# Patient Record
Sex: MALE | Race: WHITE | NOT HISPANIC OR LATINO | Employment: OTHER | URBAN - METROPOLITAN AREA
[De-identification: names, ages, dates, MRNs, and addresses within clinical notes are randomized per-mention and may not be internally consistent; named-entity substitution may affect disease eponyms.]

---

## 2017-05-01 ENCOUNTER — APPOINTMENT (OUTPATIENT)
Dept: LAB | Facility: CLINIC | Age: 77
End: 2017-05-01
Payer: MEDICARE

## 2017-05-01 ENCOUNTER — TRANSCRIBE ORDERS (OUTPATIENT)
Dept: LAB | Facility: CLINIC | Age: 77
End: 2017-05-01

## 2017-05-01 DIAGNOSIS — E78.5 HYPERLIPIDEMIA: ICD-10-CM

## 2017-05-01 LAB
ALBUMIN SERPL BCP-MCNC: 3.4 G/DL (ref 3.5–5)
ALP SERPL-CCNC: 52 U/L (ref 46–116)
ALT SERPL W P-5'-P-CCNC: 21 U/L (ref 12–78)
ANION GAP SERPL CALCULATED.3IONS-SCNC: 5 MMOL/L (ref 4–13)
AST SERPL W P-5'-P-CCNC: 12 U/L (ref 5–45)
BILIRUB SERPL-MCNC: 0.55 MG/DL (ref 0.2–1)
BUN SERPL-MCNC: 15 MG/DL (ref 5–25)
CALCIUM SERPL-MCNC: 9.4 MG/DL (ref 8.3–10.1)
CHLORIDE SERPL-SCNC: 103 MMOL/L (ref 100–108)
CHOLEST SERPL-MCNC: 177 MG/DL (ref 50–200)
CO2 SERPL-SCNC: 31 MMOL/L (ref 21–32)
CREAT SERPL-MCNC: 0.99 MG/DL (ref 0.6–1.3)
GFR SERPL CREATININE-BSD FRML MDRD: >60 ML/MIN/1.73SQ M
GLUCOSE P FAST SERPL-MCNC: 95 MG/DL (ref 65–99)
HDLC SERPL-MCNC: 84 MG/DL (ref 40–60)
LDLC SERPL CALC-MCNC: 83 MG/DL (ref 0–100)
POTASSIUM SERPL-SCNC: 4.6 MMOL/L (ref 3.5–5.3)
PROT SERPL-MCNC: 7 G/DL (ref 6.4–8.2)
SODIUM SERPL-SCNC: 139 MMOL/L (ref 136–145)
TRIGL SERPL-MCNC: 51 MG/DL

## 2017-05-01 PROCEDURE — 36415 COLL VENOUS BLD VENIPUNCTURE: CPT

## 2017-05-01 PROCEDURE — 80061 LIPID PANEL: CPT

## 2017-05-01 PROCEDURE — 80053 COMPREHEN METABOLIC PANEL: CPT

## 2017-05-17 ENCOUNTER — ALLSCRIPTS OFFICE VISIT (OUTPATIENT)
Dept: OTHER | Facility: OTHER | Age: 77
End: 2017-05-17

## 2017-05-18 ENCOUNTER — GENERIC CONVERSION - ENCOUNTER (OUTPATIENT)
Dept: OTHER | Facility: OTHER | Age: 77
End: 2017-05-18

## 2017-08-17 ENCOUNTER — ALLSCRIPTS OFFICE VISIT (OUTPATIENT)
Dept: OTHER | Facility: OTHER | Age: 77
End: 2017-08-17

## 2017-10-10 ENCOUNTER — ALLSCRIPTS OFFICE VISIT (OUTPATIENT)
Dept: OTHER | Facility: OTHER | Age: 77
End: 2017-10-10

## 2017-10-25 ENCOUNTER — APPOINTMENT (OUTPATIENT)
Dept: LAB | Facility: CLINIC | Age: 77
End: 2017-10-25
Payer: MEDICARE

## 2017-10-25 DIAGNOSIS — E78.5 HYPERLIPIDEMIA: ICD-10-CM

## 2017-10-25 DIAGNOSIS — N20.0 CALCULUS OF KIDNEY: ICD-10-CM

## 2017-10-25 DIAGNOSIS — Z12.5 ENCOUNTER FOR SCREENING FOR MALIGNANT NEOPLASM OF PROSTATE: ICD-10-CM

## 2017-10-25 LAB
ALBUMIN SERPL BCP-MCNC: 3.7 G/DL (ref 3.5–5)
ALP SERPL-CCNC: 55 U/L (ref 46–116)
ALT SERPL W P-5'-P-CCNC: 21 U/L (ref 12–78)
ANION GAP SERPL CALCULATED.3IONS-SCNC: 5 MMOL/L (ref 4–13)
AST SERPL W P-5'-P-CCNC: 17 U/L (ref 5–45)
BILIRUB SERPL-MCNC: 0.6 MG/DL (ref 0.2–1)
BUN SERPL-MCNC: 13 MG/DL (ref 5–25)
CALCIUM SERPL-MCNC: 8.8 MG/DL (ref 8.3–10.1)
CHLORIDE SERPL-SCNC: 103 MMOL/L (ref 100–108)
CHOLEST SERPL-MCNC: 190 MG/DL (ref 50–200)
CO2 SERPL-SCNC: 31 MMOL/L (ref 21–32)
CREAT SERPL-MCNC: 0.98 MG/DL (ref 0.6–1.3)
ERYTHROCYTE [DISTWIDTH] IN BLOOD BY AUTOMATED COUNT: 13.3 % (ref 11.6–15.1)
GFR SERPL CREATININE-BSD FRML MDRD: 74 ML/MIN/1.73SQ M
GLUCOSE P FAST SERPL-MCNC: 89 MG/DL (ref 65–99)
HCT VFR BLD AUTO: 42.3 % (ref 36.5–49.3)
HDLC SERPL-MCNC: 63 MG/DL (ref 40–60)
HGB BLD-MCNC: 14.3 G/DL (ref 12–17)
LDLC SERPL CALC-MCNC: 104 MG/DL (ref 0–100)
MCH RBC QN AUTO: 31.4 PG (ref 26.8–34.3)
MCHC RBC AUTO-ENTMCNC: 33.8 G/DL (ref 31.4–37.4)
MCV RBC AUTO: 93 FL (ref 82–98)
PLATELET # BLD AUTO: 179 THOUSANDS/UL (ref 149–390)
PMV BLD AUTO: 12 FL (ref 8.9–12.7)
POTASSIUM SERPL-SCNC: 4.1 MMOL/L (ref 3.5–5.3)
PROT SERPL-MCNC: 7 G/DL (ref 6.4–8.2)
PSA SERPL-MCNC: <0.1 NG/ML (ref 0–4)
RBC # BLD AUTO: 4.55 MILLION/UL (ref 3.88–5.62)
SODIUM SERPL-SCNC: 139 MMOL/L (ref 136–145)
TRIGL SERPL-MCNC: 116 MG/DL
TSH SERPL DL<=0.05 MIU/L-ACNC: 3.46 UIU/ML (ref 0.36–3.74)
WBC # BLD AUTO: 7.26 THOUSAND/UL (ref 4.31–10.16)

## 2017-10-25 PROCEDURE — 36415 COLL VENOUS BLD VENIPUNCTURE: CPT

## 2017-10-25 PROCEDURE — 80053 COMPREHEN METABOLIC PANEL: CPT

## 2017-10-25 PROCEDURE — G0103 PSA SCREENING: HCPCS

## 2017-10-25 PROCEDURE — 80061 LIPID PANEL: CPT

## 2017-10-25 PROCEDURE — 85027 COMPLETE CBC AUTOMATED: CPT

## 2017-10-25 PROCEDURE — 84443 ASSAY THYROID STIM HORMONE: CPT

## 2017-10-26 DIAGNOSIS — E78.5 HYPERLIPIDEMIA: ICD-10-CM

## 2017-10-26 DIAGNOSIS — Z12.5 ENCOUNTER FOR SCREENING FOR MALIGNANT NEOPLASM OF PROSTATE: ICD-10-CM

## 2017-10-26 DIAGNOSIS — N20.0 CALCULUS OF KIDNEY: ICD-10-CM

## 2017-11-15 ENCOUNTER — ALLSCRIPTS OFFICE VISIT (OUTPATIENT)
Dept: OTHER | Facility: OTHER | Age: 77
End: 2017-11-15

## 2018-01-10 NOTE — PROGRESS NOTES
Assessment   1  Encounter for preventive health examination (V70 0) (Z00 00)    Plan  Health Maintenance    · Call [phone] if: You have any warning signs of skin cancer ; Status:Complete;    Done: 24AQA1543   · Seek Immediate Medical Attention if: You experience a new kind of chest pain (angina)  or pressure ; Status:Complete;   Done: 61BMS6012   · Always use a seat belt and shoulder strap when riding or driving a motor vehicle ;  Status:Complete;   Done: 08FDK6862   · Eat a low fat and low cholesterol diet ; Status:Complete;   Done: 57PYR5527   · Stretch and warm up your muscles during the first 10 minutes , then cool down your  muscles for the last 10 minutes of exercise ; Status:Complete;   Done: 03UEQ2501   · The plan of care for falls is detailed in the plan and/or discussion section of today's note ;  Status:Complete;   Done: 35KKK5109   · There are many exercise options for seniors ; Status:Complete;   Done: 09VBP7080   · There ways to avoid falling ; Status:Complete;   Done: 45RYU1475   · These are things you can do to prevent falls in and around the home ; Status:Complete;    Done: 03COZ0346   · We recommend that you follow the "Mediterranean diet "; Status:Complete;   Done:  89RLY3887  PMH: History of hyperlipidemia    · (1) CBC/ PLT (NO DIFF); Status:Active - Retrospective By Protocol Authorization; Requested for:01Nov2018;    · (1) COMPREHENSIVE METABOLIC PANEL; Status:Active - Retrospective By Protocol  Authorization; Requested for:01Nov2018;    · (1) LIPID PANEL, FASTING; Status:Active - Retrospective By Protocol Authorization; Requested for:01Nov2018;    · (1) TSH; Status:Active - Retrospective By Protocol Authorization; Requested  for:01Nov2018;   PMH: History of malignant neoplasm of prostate    · (1) PSA, DIAGNOSTIC (FOLLOW-UP); Status:Active - Retrospective By Protocol  Authorization; Requested for:01Nov2018; Discussion/Summary    AWV completed  All labs excellent   PSA < 0 1  merrill yearly w labs  EKG UTD  FLU UTD  Impression: Subsequent Annual Wellness Visit  Cardiovascular screening and counseling: screening is current  Diabetes screening and counseling: screening is current  Colorectal cancer screening and counseling: screening not indicated  Prostate cancer screening and counseling: screening is current  Osteoporosis screening and counseling: screening not indicated  Abdominal aortic aneurysm screening and counseling: screening not indicated  Glaucoma screening and counseling: screening is current  HIV screening and counseling: screening not indicated  Advance Directive Planning: complete and up to date  Patient Discussion: follow-up visit needed in one year  Chief Complaint  AWV  Blue folder given  devon clark   Patient is here today for follow up of chronic conditions described in HPI  History of Present Illness  HPI: 66 yo wm for AWV  Labs completed  No new issues  Welcome to [place] and Wellness Visits: The patient is being seen for the subsequent annual wellness visit  Medicare Screening and Risk Factors   Hospitalizations: no previous hospitalizations  Medicare Screening Tests Risk Questions   Drug and Alcohol Use: The patient has never smoked cigarettes  The patient reports 4 glasses of wine/week  He has never used illicit drugs  Diet and Physical Activity: Current diet includes well balanced meals, 2 servings of dairy products per day, 3 cups of coffee per day and 4 glasses water daily  He exercises daily  Exercise: walking, stretching 20 minutes per day  Mood Disorder and Cognitive Impairment Screening:   Depression screening  negative for symptoms  He denies feeling down, depressed, or hopeless over the past two weeks  He denies feeling little interest or pleasure in doing things over the past two weeks     Cognitive impairment screening: denies difficulty learning/retaining new information, denies difficulty handling complex tasks, denies difficulty with reasoning, denies difficulty with spatial ability and orientation, denies difficulty with language and denies difficulty with behavior  Functional Ability/Level of Safety: Hearing is normal bilaterally, normal in the right ear and normal in the left ear  He denies hearing difficulties  He does not use a hearing aid  The patient is currently able to do activities of daily living without limitations  Fall risk factors: The patient fell 0 times in the past 12 months  Home safety risk factors:  no unfamiliar surroundings, no loose rugs, no poor household lighting, no uneven floors, no household clutter, grab bars in the bathroom and handrails on the stairs  Advance Directives: Advance directives: durable power of  for health care directives, but no living will and no advance directives  Co-Managers and Medical Equipment/Suppliers: See Patient Care Team       Reviewed Updated [employer]:   Last Medicare Wellness Visit Information was reviewed, patient interviewed, no change since last [facility]  Preventive Quality Program 65 and Older: Falls Risk: The patient fell 0 times in the past 12 months  The patient currently has no urinary incontinence symptoms  Date of last glaucoma screen was 05/2017      Patient Care Team    Care Team Member Role Specialty Office Number   Kaitlin Power CABRAL  Urology [phone]     Review of Systems    Constitutional: negative  Eyes: negative  ENT: negative  Cardiovascular: negative  Respiratory: negative  Gastrointestinal: negative  Genitourinary: negative  Musculoskeletal: negative  Integumentary and Breasts: negative  Neurological: negative  Psychiatric: negative  Endocrine: negative  Hematologic and Lymphatic: negative  Active Problems   1  Encounter for prostate cancer screening (V76 44) (Z12 5)  2  Flu vaccine need (V04 81) (Z23)  3  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)  4   Medicare annual wellness visit, subsequent (V70 0) (Z00 00)    Past Medical History    · History of Acute URI (465 9) (J06 9)   · History of Acute UTI (599 0) (N39 0)   · History of Astigmatism (367 20) (H52 209)   · History of Cyst of left kidney (753 10) (N28 1)   · History of Cyst of right kidney (753 10) (N28 1)   · History of Glaucoma screening (V80 1) (Z13 5)   · History of Gross hematuria (599 71) (R31 0)   · History of cataract (V12 49) (Z86 69)   · History of cough   · History of fatigue (V13 89) (N02 188)   · History of hematuria (V13 09) (Z87 448)   · History of influenza vaccination (V49 89) (Z92 29)   · History of malignant neoplasm of prostate (V10 46) (Z85 46)   · History of renal calculi (V13 01) (Z87 442)   · History of Impacted cerumen of left ear (380 4) (H61 22)   · History of Left knee pain (719 46) (M25 562)   · History of Need for influenza vaccination (V04 81) (Z23)   · History of Need for Tdap vaccination (V06 1) (Z23)   · History of Pneumococcal vaccination given (V06 6) (Z23)   · History of Prostate Cancer (V10 46)   · History of Renal cyst, acquired (593 2) (N28 1)   · History of Screening for genitourinary condition (V81 6) (Z13 89)   · History of Special screening examination for neoplasm of prostate (V76 44) (Z12 5)   · History of Special screening for other neurological conditions (V80 09) (Z13 89)   · History of Squamous Cell Carcinoma (199 1)   · History of Vitamin D deficiency (268 9) (E55 9)    Surgical History    · History of Biopsy Breast Open   · History of Complete Colonoscopy   · History of Diagnostic Cystoscopy   · History of Elbow Surgery   · History of Prostatectomy Retropubic Radical With Nerve Sparing   · History of Renal Lithotripsy    Family History  Mother    · Family history of Influenza  Father    · Family history of Congestive Heart Failure   · Family history of Peripheral Vascular Disease  Sister    · Family history of   Brother    · Family history of Prostate Cancer (V16 42)   · Family history of Stroke Syndrome (V17 1)  Family History    · Family history of Benign Essential Hypertension    Social History    · Being A Social Drinker   · Never A Smoker    Current Meds  1  Aspir-81 81 MG Oral Tablet Delayed Release; 1 qod; Therapy: (Recorded:31Ljv4946) to Recorded  2  Centrum Silver TABS; Therapy: (Recorded:09Gjj7535) to Recorded    Allergies   1  Sulfa Drugs    Immunizations   ** Printed in Appendix #1 below  Vitals  Signs    Systolic: 063  Diastolic: 72  Height: 5 ft 10 5 in  Weight: 171 lb   BMI Calculated: 24 19  BSA Calculated: 1 96    Physical Exam    Constitutional   General appearance: No acute distress, well appearing and well nourished  Eyes   Conjunctiva and lids: No swelling, erythema, or discharge  Pupils and irises: Equal, round and reactive to light  Ears, Nose, Mouth, and Throat   External inspection of ears and nose: Normal     Otoscopic examination: Tympanic membrance translucent with normal light reflex  Canals patent without erythema  Oropharynx: Normal with no erythema, edema, exudate or lesions  Pulmonary   Respiratory effort: No increased work of breathing or signs of respiratory distress  Auscultation of lungs: Clear to auscultation  Cardiovascular   Palpation of heart: Normal PMI, no thrills  Auscultation of heart: Normal rate and rhythm, normal S1 and S2, without murmurs  Examination of extremities for edema and/or varicosities: Normal     Abdomen   Abdomen: Non-tender, no masses  Liver and spleen: No hepatomegaly or splenomegaly  Lymphatic   Palpation of lymph nodes in neck: No lymphadenopathy  Musculoskeletal   Gait and station: Normal     Digits and nails: Normal without clubbing or cyanosis  Inspection/palpation of joints, bones, and muscles: Normal     Neurologic   Cranial nerves: Cranial nerves 2-12 intact      Psychiatric   Orientation to person, place and time: Normal     Mood and affect: Normal        Future Appointments    Date/Time Provider Specialty Site    99:38 AM Francheska Marie DO Family Medicine [facility]     Signatures   Electronically signed by : Emili Florentino DO; [id number] 10:11AM EST                       (Author)    Appendix #1     Patient: Eden Torres ; : 1940; MRN: [id number]      1 2 3 4 5 6    Influenza  05-Oct-2012 17-Sep-2013 15-Sep-2014 28-Sep-2015 03-Oct-2016 10-Oct-2017    PCV  28-Oct-2015         PPSV  18-Sep-2008         Tdap  Permanently Deferred: Medical Deferral, Insurance does  not cover  , [id number]         Zoster  18-Aug-2011 09-Apr-2013

## 2018-01-11 NOTE — PROGRESS NOTES
Chief Complaint  pt here today for a high dose flu shot  Active Problems    1  Astigmatism (367 20) (H52 209)   2  Cataract (366 9) (H26 9)   3  Cyst of left kidney (753 10) (N28 1)   4  Cyst of right kidney (753 10) (N28 1)   5  Encounter for prostate cancer screening (V76 44) (Z12 5)   6  Glaucoma screening (V80 1) (Z13 5)   7  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)   8  Hyperlipidemia (272 4) (E78 5)   9  Need for Tdap vaccination (V06 1) (Z23)   10  Nephrolithiasis (592 0) (N20 0)   11  Pneumococcal vaccination given (V06 6) (Z23)   12  Prostate cancer (185) (C61)    Current Meds   1  Aspir-81 81 MG Oral Tablet Delayed Release; 1 qod; Therapy: (Recorded:91Qiu1044) to Recorded   2  Centrum Silver TABS; Therapy: (Recorded:93Dkw3884) to Recorded   3  Pravastatin Sodium 10 MG Oral Tablet; TAKE ONE (1) TABLET(S) ONCE DAILY AS   DIRECTED; Therapy: 36RGQ9077 to (Evaluate:24Jun2017)  Requested for: 41Qfk0945; Last   Rx:29Jun2016 Ordered    Allergies    1   Sulfa Drugs    Plan  Need for influenza vaccination    · Fluzone High-Dose 0 5 ML Intramuscular Suspension Prefilled Syringe    Future Appointments    Date/Time Provider Specialty Site   58/34/3452 66:40 AM Fredis Hanson DO Family Medicine [facility]     Signatures   Electronically signed by : John Zaragoza DO; Oct  3 4893  8:12PM EST                       (Author)

## 2018-01-12 VITALS
HEIGHT: 71 IN | BODY MASS INDEX: 23.94 KG/M2 | SYSTOLIC BLOOD PRESSURE: 120 MMHG | DIASTOLIC BLOOD PRESSURE: 72 MMHG | WEIGHT: 171 LBS

## 2018-01-12 NOTE — RESULT NOTES
Message   Bilateral renal cysts with thinly septated cysts in the left mid and    upper poles  Rec  [facility] urology for US findings  Verified Results  Ultrasound Retroperitoneal [id number] 12:25PM Jitendra Contreras     Test Name Result Flag Reference   U/S Retroperitoneal (Report)     [address] [facility];;[place];[id number]   01/08/2016 1235   01/08/2016 1310   NONE     RENAL ULTRASOUND     INDICATION- Gross hematuria     COMPARISON- 6/18/2008     TECHNIQUE-  Ultrasound of the retroperitoneum was performed with a   curvilinear transducer utilizing volumetric sweeps and still imaging   techniques  FINDINGS-     KIDNEYS-   Symmetric and normal size  Right kidney- 11 8 x 6 0 cm  Normal echogenicity and contour  No suspicious masses detected  There is a small simple cyst in the   right midpole measuring 1 3 cm  There is a subcentimeter exophytic   midpole cyst    No hydronephrosis  No shadowing calculi  No perinephric fluid collections  Left kidney- 10 8 x 5 5 cm  Normal echogenicity and contour  No suspicious masses detected  There is a midpole cyst measuring 1 6 x   1 7 x 1 7 cm with thin avascular septation, not definitely present   previously  There is a simple lower pole cyst measuring 2 0 x 2 1 x   2 8 cm, previously 1 5 x 1 5 x 1 5 cm  There is a left upper pole cyst   with thin septation measuring 1 1 x 1 0 x 1 1 cm, previously 1 0 x 1 4   x 1 2 cm  There is a small peripelvic cyst    No hydronephrosis  No shadowing calculi  No perinephric fluid collections  URETERS-   Nonvisualized  BLADDER-    Normally distended  No focal thickening or mass lesions  Bilateral ureteral jets detected  IMPRESSION-      Bilateral renal cysts with thinly septated cysts in the left mid and   upper poles        Transcribed on- [id number]     - OSCAR Dykes MD   [place] Radiologist- OSCAR Dykes MD Electronically Signed- OSCAR Miranda MD   Released Date Time- 01/08/16 1344   ------------------------------------------------------------------------------   [id number]^ROSS I SILVER   [id number]^ROSS I SILVER

## 2018-01-14 VITALS
SYSTOLIC BLOOD PRESSURE: 108 MMHG | HEIGHT: 71 IN | WEIGHT: 172.38 LBS | DIASTOLIC BLOOD PRESSURE: 60 MMHG | BODY MASS INDEX: 24.13 KG/M2

## 2018-01-14 VITALS
HEIGHT: 71 IN | TEMPERATURE: 97.7 F | BODY MASS INDEX: 24.13 KG/M2 | SYSTOLIC BLOOD PRESSURE: 128 MMHG | WEIGHT: 172.38 LBS | DIASTOLIC BLOOD PRESSURE: 72 MMHG

## 2018-01-14 NOTE — CONSULTS
Assessment    1  Gross hematuria (599 71) (R31 0)   2  Acute UTI (599 0) (N39 0)   3  Blood in the urine (599 70) (R31 9)    Plan   Blood in the urine    · (1) URINALYSIS (will reflex a microscopy if leukocytes, occult blood, protein or nitrites are  not within normal limits); Status: In Progress - Specimen/Data Collected,Retrospective By  Protocol Authorization;   Done: [id number]   Perform:[facility]; JUR:[id number]; Last Updated Ramsey Gonsalves; 1/28/2016 10:45:29 AM;Ordered; For:Blood in the urine; Ordered By:Ronn Kent;   · Cystourethroscopy - POC; Status:Complete - Retrospective By Protocol Authorization;    Done: 62QKG1111   Perform: In Office; ; Last Updated Ramsey Gonsalves; 1/28/2016 10:45:29 AM;Ordered; For:Blood in the urine; Ordered By:Ronn Kent;  Blood in the urine, PMH: Diagnostic Cystoscopy    · (1) URINE CULTURE; Source:Urine, Clean Catch; Status: In Progress - Specimen/Data  Collected,Retrospective By Protocol Authorization;   Done: [id number]   Perform:[facility]; LZT:[id number]; Last Updated Ramsey Gonsalves; 1/28/2016 10:45:29 AM;Ordered; For:Blood in the urine, PMH: Diagnostic Cystoscopy; Ordered By:Ronn Kent;  Cyst of right kidney    · Urine Dip Non-Automated- POC; Status:Complete - Retrospective By Protocol  Authorization;   Done: 60ZVP6206 09:22AM   Performed: In Office; [id number]; Last Updated Koko Walden; 1/28/2016 9:24:18 AM;Ordered; For:Cyst of right kidney; Ordered By:Ronn Kent;  PMH: Diagnostic Cystoscopy    · (1) URINE CYTOLOGY; Source:Urine, Clean Catch; Status: In Progress - Specimen/Data  Collected,Retrospective By Protocol Authorization;   Done: 02DHE0077   Perform:[facility]; XFO:[id number]; Last Updated Ramsey Gonsalves; 1/28/2016 10:45:30 AM;Ordered; [address]: Diagnostic Cystoscopy; Ordered By:Ronn Kent;    Cystourethroscopy - POC; Status:Active; Requested CTF:82SNP0897;   Perform: In Office; IJR:[id number];CSUPGOZ; For:Blood in the urine; Ordered By:Ronn Kent; Discussion/Summary  Discussion Summary:   Patient has had evaluation for hematuria with ultrasound and now cystoscopy  There has been no abnormality identified  We will check urine cytology, but presuming this is normal, there is no further intervention necessary  He and his wife are relieved  They wish to continue follow-up with their family physician, and follow up here as needed  Certainly if any abnormal results are obtained we will address these  Understands and agrees with treatment plan: The treatment plan was reviewed with the patient/guardian  The patient/guardian understands and agrees with the treatment plan      Chief Complaint  Chief Complaint Free Text Note Form: pt presents b/l renal cyst, gross hematuria, uti  psa= < 0 1 as of 10/16/15      History of Present Illness  HPI: This is a pleasant 42-year-old gentleman who was previously followed for prostate cancer, and not seen since 2011  He has done well after prostatectomy in 2001 with no evidence of cancer recurrence area he denied any stress incontinence  He does have a history of stones  He reports recent history of spotting blood in underwear  This was temporary and self-limited  There was no pain associated with this  There was no trauma  He has not had radiation therapy in the past and has never smoked cigarettes  He was given Cipro for 5 days  He does not report any change in urinary stream or difficulty emptying his bladder  He thinks that he may have been constipated and straining, but he is not sure  He had an ultrasound which revealed bilateral renal cysts but no significant pathology noted  PSA was undetectable in April 2015  Review of Systems  Complete-Male Urology:   Constitutional: No fever or chills, feels well, no tiredness, no recent weight gain or weight loss     Respiratory: No complaints of shortness of breath, no wheezing, no cough, no SOB on exertion, no orthopnea or PND  Cardiovascular: No complaints of slow heart rate, no fast heart rate, no chest pain, no palpitations, no leg claudication, no lower extremity  Gastrointestinal: No complaints of abdominal pain, no constipation, no nausea or vomiting, no diarrhea or bloody stools  Genitourinary: Empty sensation and stream quality good, but as noted in HPI, no dysuria, no urinary hesitancy, no hematuria, no incontinence and no feelings of urinary urgency    The patient presents with complaints of occasional episodes of nocturia (1-2x)  Musculoskeletal: No complaints of arthralgia, no myalgias, no joint swelling or stiffness, no limb pain or swelling  Integumentary: No complaints of skin rash or skin lesions, no itching, no skin wound, no dry skin  Hematologic/Lymphatic: No complaints of swollen glands, no swollen glands in the neck, does not bleed easily, no easy bruising  Neurological: No compliants of headache, no confusion, no convulsions, no numbness or tingling, no dizziness or fainting, no limb weakness, no difficulty walking  ROS Reviewed:   ROS reviewed  Active Problems    1  Acute UTI (599 0) (N39 0)   2  Astigmatism (367 20) (H52 209)   3  Benign essential hypertension (401 1) (I10)   4  Blood in the urine (599 70) (R31 9)   5  Cataract (366 9) (H26 9)   6  Cyst of left kidney (753 10) (Q61 00)   7  Cyst of right kidney (753 10) (Q61 00)   8  Encounter for prostate cancer screening (V76 44) (Z12 5)   9  Fatigue (780 79) (R53 83)   10  Glaucoma screening (V80 1) (Z13 5)   11  Gross hematuria (599 71) (R31 0)   12  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)   13  Hyperlipidemia (272 4) (E78 5)   14  Impacted cerumen of left ear (380 4) (H61 22)   15  Left knee pain (719 46) (M25 562)   16  Need for influenza vaccination (V04 81) (Z23)   17  Nephrolithiasis (592 0) (N20 0)   18  Pneumococcal vaccination given (V06 6) (Z23)   19   Prostate cancer (185) (G39)   20  Vitamin D deficiency (268 9) (E55 9)    Past Medical History    1  History of Prostate Cancer (V10 46)   2  History of Renal cyst, acquired (593 2) (N28 1)   3  History of Special screening examination for neoplasm of prostate (V76 44) (Z12 5)   4  History of Squamous Cell Carcinoma (199 1)  Active Problems And Past Medical History Reviewed: The active problems and past medical history were reviewed and updated today  Surgical History    1  History of Biopsy Breast Open   2  History of Complete Colonoscopy   3  History of Elbow Surgery   4  History of Prostatectomy Retropubic Radical With Nerve Sparing   5  History of Renal Lithotripsy  Surgical History Reviewed: The surgical history was reviewed and updated today  Family History    1  Family history of Influenza    2  Family history of Congestive Heart Failure   3  Family history of Peripheral Vascular Disease    4  Family history of     5  Family history of Prostate Cancer (V16 42)   6  Family history of Stroke Syndrome (V17 1)    7  Family history of Benign Essential Hypertension  Family History Reviewed: The family history was reviewed and updated today  Social History    · Being A Social Drinker   · Never A Smoker  Social History Reviewed: The social history was reviewed and updated today  Current Meds   1  Aspir-81 81 MG Oral Tablet Delayed Release; 1 qod; Therapy: (Recorded:85Iwj0054) to Recorded   2  Centrum Silver TABS; Therapy: (Recorded:44Gup9920) to Recorded   3  Pravastatin Sodium 10 MG Oral Tablet; TAKE 1 TABLET DAILY AS DIRECTED; Therapy: 59LPO4577 to (Wendelyn Seat)  Requested for: 2015; Last   Rx:2015 Ordered    Allergies    1   Sulfa Drugs    Vitals  Vital Signs [Data Includes: Current Encounter]    Recorded: 23SQW6997 09:19AM   Heart Rate 72   Systolic 590   Diastolic 70   Height 5 ft 11 in   Weight 171 lb    BMI Calculated 23 85   BSA Calculated 1 97     Physical Exam    Constitutional   General appearance: No acute distress, well appearing and well nourished  Pulmonary   Respiratory effort: No increased work of breathing or signs of respiratory distress  Cardiovascular   Examination of extremities for edema and/or varicosities: Normal     Abdomen   Abdomen: Non-tender, no masses  Musculoskeletal   Gait and station: Normal     Skin   Skin and subcutaneous tissue: Normal without rashes or lesions  Neurologic   Sensation: No sensory loss  Additional Exam:  -no CVA tenderness  Results/Data  Encounter Results   Urine Dip Non-Automated- POC 00RNL3857 09:22AM Asim Mcmullen     Test Name Result Flag Reference   Color Yellow     Clarity Transparent     Leukocytes -     Nitrite -     Blood trace     Bilirubin -     Protein trace     Ph 6 0     Specific Gravity 1 030     Ketone -     Glucose -       Results   Ultrasound Retroperitoneal 26NJR2688 12:25PM Vernell Salamanca     Test Name Result Flag Reference   U/S Retroperitoneal (Report)     [address] [facility];;[place];[address]   01/08/2016 1235   01/08/2016 1310   NONE     RENAL ULTRASOUND     INDICATION- Gross hematuria     COMPARISON- 6/18/2008     TECHNIQUE-  Ultrasound of the retroperitoneum was performed with a   curvilinear transducer utilizing volumetric sweeps and still imaging   techniques  FINDINGS-     KIDNEYS-   Symmetric and normal size  Right kidney- 11 8 x 6 0 cm  Normal echogenicity and contour  No suspicious masses detected  There is a small simple cyst in the   right midpole measuring 1 3 cm  There is a subcentimeter exophytic   midpole cyst    No hydronephrosis  No shadowing calculi  No perinephric fluid collections  Left kidney- 10 8 x 5 5 cm  Normal echogenicity and contour  No suspicious masses detected   There is a midpole cyst measuring 1 6 x   1 7 x 1 7 cm with thin avascular septation, not definitely present previously  There is a simple lower pole cyst measuring 2 0 x 2 1 x   2 8 cm, previously 1 5 x 1 5 x 1 5 cm  There is a left upper pole cyst   with thin septation measuring 1 1 x 1 0 x 1 1 cm, previously 1 0 x 1 4   x 1 2 cm  There is a small peripelvic cyst    No hydronephrosis  No shadowing calculi  No perinephric fluid collections  URETERS-   Nonvisualized  BLADDER-    Normally distended  No focal thickening or mass lesions  Bilateral ureteral jets detected  IMPRESSION-      Bilateral renal cysts with thinly septated cysts in the left mid and   upper poles  Transcribed on- [id number]     - OSCAR Paulson MD   Reading Radiologist- OSCAR Paulson MD   Electronically Signed- OSCAR Paulson MD   Released Date Time- 01/08/16 1344   ------------------------------------------------------------------------------   [id number]^ROSS I SILVER   22923^ROSS I SILVER     Procedure    Procedure: diagnostic cystourethroscopy  Indications for the procedure include hematuria  Risks, risk of bleeding and infection risks were discussed with the patient  Written consent was obtained prior to the procedure and is detailed in the patient's record  Anesthesia: the urethra was lubricated with 2% lidocaine gel  Procedure Note:    The patient was prepped and draped in the usual sterile fashion using betadine  The periurethral area was exposed and a lubricated 16 Jordanian flexible cystoscope was introduced into the urethral meatus  The urethra was normal  The prostate was visualized at the proximal urethra and bladder neck and the prostate appeared abnormal and Prostate is surgically absent   All regions of the bladder were systematically inspected and the bladder appeared normal    Post-procedure: the bladder was drained and the cystoscope was removed      Future Appointments    Date/Time Provider Specialty Site   78/92/5088 92:20 AM Gabriela Pérez DO Family Medicine [facility]     Signatures   Electronically signed by : HUE Allen ; Jan 28 2016 12:38PM EST                       (Author)

## 2018-01-16 NOTE — PROGRESS NOTES
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)   10  Hyperlipidemia (272 4) (E78 5)   11  Medicare annual wellness visit, subsequent (V70 0) (Z00 00)   12  Need for influenza vaccination (V04 81) (Z23)   13  Need for Tdap vaccination (V06 1) (Z23)   14  Nephrolithiasis (592 0) (N20 0)   15  Pneumococcal vaccination given (V06 6) (Z23)   16  Prostate cancer (185) (C61)   17  Screening for genitourinary condition (V81 6) (Z13 89)   18  Special screening for other neurological conditions (V80 09) (Z13 89)    Current Meds   1  Aspir-81 81 MG Oral Tablet Delayed Release; 1 qod; Therapy: (Recorded:97Trd5231) to Recorded   2  Azithromycin 250 MG Oral Tablet; TAKE 2 TABLETS ON DAY 1 THEN TAKE 1 TABLET A   DAY FOR 4 DAYS; Therapy: 74BWU3786 to (Last Rx:55Orq5570)  Requested for: 50Efy7732 Ordered   3  Centrum Silver TABS; Therapy: (Recorded:23Pqi1133) to Recorded    Allergies    1   Sulfa Drugs    Plan  Flu vaccine need    · Fluzone High-Dose 0 5 ML Intramuscular Suspension Prefilled Syringe    Future Appointments    Date/Time Provider Specialty Site   73/63/7980 44:51 AM Ashlyn Levy DO Family Medicine [facility]     Signatures   Electronically signed by : Temi Escalante DO; Oct 10 1875 12:58PM EST                       (Author)

## 2018-08-09 DIAGNOSIS — Z23 NEED FOR SHINGLES VACCINE: Primary | ICD-10-CM

## 2018-08-13 ENCOUNTER — OFFICE VISIT (OUTPATIENT)
Dept: FAMILY MEDICINE CLINIC | Facility: CLINIC | Age: 78
End: 2018-08-13
Payer: MEDICARE

## 2018-08-13 VITALS
WEIGHT: 171.8 LBS | SYSTOLIC BLOOD PRESSURE: 114 MMHG | BODY MASS INDEX: 24.05 KG/M2 | DIASTOLIC BLOOD PRESSURE: 60 MMHG | HEIGHT: 71 IN

## 2018-08-13 DIAGNOSIS — Z85.46 HISTORY OF PROSTATE CANCER: ICD-10-CM

## 2018-08-13 DIAGNOSIS — N39.0 URINARY TRACT INFECTION WITH HEMATURIA, SITE UNSPECIFIED: ICD-10-CM

## 2018-08-13 DIAGNOSIS — R31.9 URINARY TRACT INFECTION WITH HEMATURIA, SITE UNSPECIFIED: ICD-10-CM

## 2018-08-13 DIAGNOSIS — I49.8 VENTRICULAR TRIGEMINY: ICD-10-CM

## 2018-08-13 DIAGNOSIS — R31.0 GROSS HEMATURIA: Primary | ICD-10-CM

## 2018-08-13 DIAGNOSIS — I49.9 IRREGULAR HEART RHYTHM: ICD-10-CM

## 2018-08-13 LAB
BACTERIA UR QL AUTO: ABNORMAL /HPF
BILIRUB UR QL STRIP: ABNORMAL
CAOX CRY URNS QL MICRO: ABNORMAL /HPF
CLARITY UR: ABNORMAL
COLOR UR: ABNORMAL
GLUCOSE UR STRIP-MCNC: ABNORMAL MG/DL
HGB UR QL STRIP.AUTO: ABNORMAL
KETONES UR STRIP-MCNC: ABNORMAL MG/DL
LEUKOCYTE ESTERASE UR QL STRIP: ABNORMAL
NITRITE UR QL STRIP: ABNORMAL
NON-SQ EPI CELLS URNS QL MICRO: ABNORMAL /HPF
PROT UR STRIP-MCNC: ABNORMAL MG/DL
RBC #/AREA URNS AUTO: ABNORMAL /HPF
SL AMB  POCT GLUCOSE, UA: ABNORMAL
SL AMB LEUKOCYTE ESTERASE,UA: ABNORMAL
SL AMB POCT BILIRUBIN,UA: ABNORMAL
SL AMB POCT BLOOD,UA: ABNORMAL
SL AMB POCT CLARITY,UA: ABNORMAL
SL AMB POCT COLOR,UA: ABNORMAL
SL AMB POCT KETONES,UA: 40
SL AMB POCT NITRITE,UA: ABNORMAL
SL AMB POCT PH,UA: 5
SL AMB POCT SPECIFIC GRAVITY,UA: 1.03
SL AMB POCT URINE PROTEIN: ABNORMAL
SL AMB POCT UROBILINOGEN: ABNORMAL
SP GR UR STRIP.AUTO: 1.03 (ref 1–1.03)
UROBILINOGEN UR QL STRIP.AUTO: ABNORMAL E.U./DL
WBC #/AREA URNS AUTO: ABNORMAL /HPF

## 2018-08-13 PROCEDURE — 81001 URINALYSIS AUTO W/SCOPE: CPT | Performed by: NURSE PRACTITIONER

## 2018-08-13 PROCEDURE — 87186 SC STD MICRODIL/AGAR DIL: CPT | Performed by: NURSE PRACTITIONER

## 2018-08-13 PROCEDURE — 99214 OFFICE O/P EST MOD 30 MIN: CPT | Performed by: NURSE PRACTITIONER

## 2018-08-13 PROCEDURE — 87086 URINE CULTURE/COLONY COUNT: CPT | Performed by: NURSE PRACTITIONER

## 2018-08-13 PROCEDURE — 93000 ELECTROCARDIOGRAM COMPLETE: CPT | Performed by: NURSE PRACTITIONER

## 2018-08-13 PROCEDURE — 87077 CULTURE AEROBIC IDENTIFY: CPT | Performed by: NURSE PRACTITIONER

## 2018-08-13 PROCEDURE — 81002 URINALYSIS NONAUTO W/O SCOPE: CPT | Performed by: NURSE PRACTITIONER

## 2018-08-13 RX ORDER — ASPIRIN 81 MG/1
TABLET ORAL
COMMUNITY
End: 2018-08-29 | Stop reason: SDUPTHER

## 2018-08-13 RX ORDER — UBIDECARENONE 75 MG
CAPSULE ORAL DAILY
COMMUNITY
End: 2018-08-29 | Stop reason: SDUPTHER

## 2018-08-13 RX ORDER — CIPROFLOXACIN 500 MG/1
500 TABLET, FILM COATED ORAL EVERY 12 HOURS SCHEDULED
Qty: 28 TABLET | Refills: 0 | Status: SHIPPED | OUTPATIENT
Start: 2018-08-13 | End: 2018-08-27

## 2018-08-13 NOTE — ASSESSMENT & PLAN NOTE
EKG completed  In and out of Trigeminy  This is new compared to previous EKG  Patient asymptomatic  Will update labs and check BMP and Magnesium and CBC  He was educated on any potential symptoms and when to call or go to ER  Patient and wife agree and understand

## 2018-08-13 NOTE — PATIENT INSTRUCTIONS
Start Cipro, this is 500mg twice daily for 14 days  Make appointment with urology  Schedule CT of abdomen and pelvis  Complete lab work  Call us if you experience any worsening symptoms or no improvement  Monitor any symptoms of chest pain/feeling faint  Premature Ventricular Contractions   WHAT YOU NEED TO KNOW:   Premature ventricular contractions (PVCs) are an interruption in your heart rhythm  They are caused by an early signal for your heart to pump  Your risk of PVCs increases when you drink alcohol or caffeine, or if you are fatigued or stressed  It is very important for you to follow up with your healthcare provider so the cause of your PVC can be diagnosed and treated  DISCHARGE INSTRUCTIONS:   Follow up with your healthcare provider as directed: You may need another EKG within the first 10 days and more testing for up to 12 months  Write down your questions so you remember to ask them during your visits  Medicines:   · Heart medicine  may be given to make your heart beat at a regular rate and rhythm  · Take your medicine as directed  Contact your healthcare provider if you think your medicine is not helping or if you have side effects  Tell him if you are allergic to any medicine  Keep a list of the medicines, vitamins, and herbs you take  Include the amounts, and when and why you take them  Bring the list or the pill bottles to follow-up visits  Carry your medicine list with you in case of an emergency  Contact your healthcare provider if:   · You still have symptoms after treatment, or your symptoms worsen  · You have questions or concerns about your condition or care    Return to the emergency department if:   · You have any of the following signs of a heart attack:      ¨ Squeezing, pressure, or pain in your chest that lasts longer than 5 minutes or returns    ¨ Discomfort or pain in your back, neck, jaw, stomach, or arm     ¨ Trouble breathing    ¨ Nausea or vomiting    ¨ Lightheadedness or a sudden cold sweat, especially with chest pain or trouble breathing    © 2017 [address] [employer] Information is for End User's use only and may not be sold, redistributed or otherwise used for commercial purposes  All illustrations and images included in CareNotes® are the copyrighted property of A D A M , Inc  or Kadeem Xie  The above information is an  only  It is not intended as medical advice for individual conditions or treatments  Talk to your doctor, nurse or pharmacist before following any medical regimen to see if it is safe and effective for you

## 2018-08-13 NOTE — PROGRESS NOTES
Assessment/Plan:    Gross hematuria/ UTI:   Urine very dark red  Will send out for UA and Culture  Will get CT abdomen and Pelvis given hx of prostate cancer  Will refer to urology  Will start cipro 500mg BID for 14 days  Will check CMP and CBC  Ventricular trigeminy  EKG completed  In and out of Trigeminy  This is new compared to previous EKG  Patient asymptomatic  Will update labs and check BMP and Magnesium and CBC  He was educated on any potential symptoms and when to call or go to ER  Patient and wife agree and understand  History of prostate cancer  Will refer back to urology for hematuria  Patient and wife verbalizes understand and agrees with treatment plan  Diagnoses and all orders for this visit:    Gross hematuria  -     POCT urine dip  -     CT abdomen pelvis w wo contrast; Future  -     Comprehensive metabolic panel; Future  -     Magnesium; Future  -     CBC and differential; Future  -     Cancel: Ambulatory referral to Urology; Future  -     Ambulatory referral to Urology; Future  -     Cancel: Urine culture; Future  -     Cancel: UA (URINE) with reflex to Microscopic  -     Urine culture  -     Urine culture; Future  -     UA (URINE) with reflex to Microscopic    Urinary tract infection with hematuria, site unspecified  -     ciprofloxacin (CIPRO) 500 mg tablet; Take 1 tablet (500 mg total) by mouth every 12 (twelve) hours for 14 days  -     Comprehensive metabolic panel; Future  -     Ambulatory referral to Urology; Future  -     UA (URINE) with reflex to Microscopic    Ventricular trigeminy  -     Comprehensive metabolic panel; Future  -     Magnesium; Future  -     CBC and differential; Future    History of prostate cancer  -     Cancel: Ambulatory referral to Urology; Future  -     Ambulatory referral to Urology; Future    Irregular heart rhythm  -     POCT ECG    Other orders  -     aspirin (ASPIR-81) 81 mg EC tablet;  Take by mouth  -     Multiple Vitamins-Minerals (CENTRUM SILVER 50+MEN) TABS; Take by mouth  -     cyanocobalamin (VITAMIN B-12) 100 mcg tablet; Take by mouth daily              Subjective:        Patient ID: Emily Lyman is a 77 y o  male  Chief Complaint   Patient presents with    Blood in Urine     pt noticed blood in urine this am/burning upon urination       Patient presents to office today for evaluation of hematuria and dysuria  Patient states that he woke up this morning when he PE noticed the blood  Symptoms started today  He does have some slight burning with urination  Patient admits to frequency  He denies any flank pain or urgency urethral discharge weak stream fever or chills  Patient does have a history of kidney stones 11 years ago which was treated with lithotripsy  Patient did have radical prostatectomy [facility] years ago at [facility] for cancer  Patient states last cysto was about 3 years ago when he was having hematuria but there were no findings  He has followed with Dr Dara Cool with Urology  Blood in Urine   This is a new problem  The current episode started today  The problem is unchanged  He describes the hematuria as gross hematuria  The hematuria occurs throughout his entire urinary stream  He reports no clotting in his urine stream  He describes his urine color as dark red  Irritative symptoms include frequency  Irritative symptoms do not include nocturia or urgency  Obstructive symptoms do not include dribbling, incomplete emptying, an intermittent stream, a slower stream, straining or a weak stream  Associated symptoms include dysuria  Pertinent negatives include no abdominal pain, chills, facial swelling, fever, flank pain, genital pain, hesitancy, inability to urinate, nausea or vomiting  His past medical history is significant for kidney stones   (  Prostate cancer)       The following portions of the patient's history were reviewed and updated as appropriate: allergies, current medications, past family history, past social history and problem list     Review of Systems   Constitutional: Negative for chills and fever  HENT: Negative for congestion and facial swelling  Eyes: Negative for pain and visual disturbance  Respiratory: Negative for cough and shortness of breath  Cardiovascular: Negative for chest pain, palpitations and leg swelling  Gastrointestinal: Negative for abdominal pain, diarrhea, nausea and vomiting  Genitourinary: Positive for dysuria, frequency and hematuria  Negative for difficulty urinating, flank pain, hesitancy, incomplete emptying, nocturia and urgency  Musculoskeletal: Negative for arthralgias and myalgias  Skin: Negative for color change and rash  Neurological: Negative for dizziness, syncope, numbness and headaches  Hematological: Negative for adenopathy  Psychiatric/Behavioral: Negative for agitation and behavioral problems  The patient is not nervous/anxious  Objective:  /60 (BP Location: Left arm, Patient Position: Sitting, Cuff Size: Standard)   Ht 5' 11" (1 803 m)   Wt 77 9 kg (171 lb 12 8 oz)   BMI 23 96 kg/m²      Physical Exam   Constitutional: He is oriented to person, place, and time  He appears well-developed  No distress  HENT:   Head: Normocephalic and atraumatic  Right Ear: External ear normal    Left Ear: External ear normal    Nose: Nose normal    Eyes: Conjunctivae and lids are normal  Right eye exhibits no discharge  Left eye exhibits no discharge  Neck: Normal range of motion  Neck supple  No tracheal deviation present  Cardiovascular: Normal rate  An irregular rhythm present  No murmur heard  Pulmonary/Chest: Effort normal and breath sounds normal  No respiratory distress  He has no wheezes  Abdominal: Soft  Bowel sounds are normal  He exhibits no distension  There is no tenderness  There is no guarding  Genitourinary:   Genitourinary Comments: Negative CVA tenderness   Musculoskeletal: Normal range of motion   He exhibits no edema, tenderness or deformity  Lymphadenopathy:     He has no cervical adenopathy  Neurological: He is alert and oriented to person, place, and time  Coordination normal    Skin: Skin is warm and dry  No rash noted  He is not diaphoretic  No erythema  Psychiatric: He has a normal mood and affect  His speech is normal and behavior is normal  Judgment and thought content normal  Cognition and memory are normal    Nursing note and vitals reviewed

## 2018-08-14 ENCOUNTER — APPOINTMENT (OUTPATIENT)
Dept: LAB | Facility: CLINIC | Age: 78
End: 2018-08-14
Payer: MEDICARE

## 2018-08-14 DIAGNOSIS — N39.0 URINARY TRACT INFECTION WITH HEMATURIA, SITE UNSPECIFIED: ICD-10-CM

## 2018-08-14 DIAGNOSIS — I49.8 VENTRICULAR TRIGEMINY: ICD-10-CM

## 2018-08-14 DIAGNOSIS — R31.9 URINARY TRACT INFECTION WITH HEMATURIA, SITE UNSPECIFIED: ICD-10-CM

## 2018-08-14 DIAGNOSIS — R31.0 GROSS HEMATURIA: ICD-10-CM

## 2018-08-14 LAB
ALBUMIN SERPL BCP-MCNC: 3.7 G/DL (ref 3.5–5)
ALP SERPL-CCNC: 46 U/L (ref 46–116)
ALT SERPL W P-5'-P-CCNC: 16 U/L (ref 12–78)
ANION GAP SERPL CALCULATED.3IONS-SCNC: 5 MMOL/L (ref 4–13)
AST SERPL W P-5'-P-CCNC: 14 U/L (ref 5–45)
BASOPHILS # BLD AUTO: 0.04 THOUSANDS/ΜL (ref 0–0.1)
BASOPHILS NFR BLD AUTO: 0 % (ref 0–1)
BILIRUB SERPL-MCNC: 0.93 MG/DL (ref 0.2–1)
BUN SERPL-MCNC: 17 MG/DL (ref 5–25)
CALCIUM SERPL-MCNC: 9.1 MG/DL (ref 8.3–10.1)
CHLORIDE SERPL-SCNC: 104 MMOL/L (ref 100–108)
CO2 SERPL-SCNC: 30 MMOL/L (ref 21–32)
CREAT SERPL-MCNC: 1.12 MG/DL (ref 0.6–1.3)
EOSINOPHIL # BLD AUTO: 0.42 THOUSAND/ΜL (ref 0–0.61)
EOSINOPHIL NFR BLD AUTO: 5 % (ref 0–6)
ERYTHROCYTE [DISTWIDTH] IN BLOOD BY AUTOMATED COUNT: 13.1 % (ref 11.6–15.1)
GFR SERPL CREATININE-BSD FRML MDRD: 62 ML/MIN/1.73SQ M
GLUCOSE P FAST SERPL-MCNC: 92 MG/DL (ref 65–99)
HCT VFR BLD AUTO: 42.7 % (ref 36.5–49.3)
HGB BLD-MCNC: 13.5 G/DL (ref 12–17)
IMM GRANULOCYTES # BLD AUTO: 0.05 THOUSAND/UL (ref 0–0.2)
IMM GRANULOCYTES NFR BLD AUTO: 1 % (ref 0–2)
LYMPHOCYTES # BLD AUTO: 1.45 THOUSANDS/ΜL (ref 0.6–4.47)
LYMPHOCYTES NFR BLD AUTO: 15 % (ref 14–44)
MAGNESIUM SERPL-MCNC: 2.5 MG/DL (ref 1.6–2.6)
MCH RBC QN AUTO: 30.5 PG (ref 26.8–34.3)
MCHC RBC AUTO-ENTMCNC: 31.6 G/DL (ref 31.4–37.4)
MCV RBC AUTO: 96 FL (ref 82–98)
MONOCYTES # BLD AUTO: 1.07 THOUSAND/ΜL (ref 0.17–1.22)
MONOCYTES NFR BLD AUTO: 11 % (ref 4–12)
NEUTROPHILS # BLD AUTO: 6.4 THOUSANDS/ΜL (ref 1.85–7.62)
NEUTS SEG NFR BLD AUTO: 68 % (ref 43–75)
NRBC BLD AUTO-RTO: 0 /100 WBCS
PLATELET # BLD AUTO: 170 THOUSANDS/UL (ref 149–390)
PMV BLD AUTO: 12.2 FL (ref 8.9–12.7)
POTASSIUM SERPL-SCNC: 4.2 MMOL/L (ref 3.5–5.3)
PROT SERPL-MCNC: 6.9 G/DL (ref 6.4–8.2)
RBC # BLD AUTO: 4.43 MILLION/UL (ref 3.88–5.62)
SODIUM SERPL-SCNC: 139 MMOL/L (ref 136–145)
WBC # BLD AUTO: 9.43 THOUSAND/UL (ref 4.31–10.16)

## 2018-08-14 PROCEDURE — 36415 COLL VENOUS BLD VENIPUNCTURE: CPT

## 2018-08-14 PROCEDURE — 85025 COMPLETE CBC W/AUTO DIFF WBC: CPT

## 2018-08-14 PROCEDURE — 83735 ASSAY OF MAGNESIUM: CPT

## 2018-08-14 PROCEDURE — 80053 COMPREHEN METABOLIC PANEL: CPT

## 2018-08-15 ENCOUNTER — HOSPITAL ENCOUNTER (OUTPATIENT)
Dept: CT IMAGING | Facility: HOSPITAL | Age: 78
Discharge: HOME | End: 2018-08-15
Payer: MEDICARE

## 2018-08-15 DIAGNOSIS — R31.0 GROSS HEMATURIA: ICD-10-CM

## 2018-08-15 PROCEDURE — 74178 CT ABD&PLV WO CNTR FLWD CNTR: CPT

## 2018-08-15 RX ADMIN — IOHEXOL 100 ML: 350 INJECTION, SOLUTION INTRAVENOUS at 14:56

## 2018-08-16 LAB — BACTERIA UR CULT: ABNORMAL

## 2018-08-20 ENCOUNTER — TELEPHONE (OUTPATIENT)
Dept: FAMILY MEDICINE CLINIC | Facility: CLINIC | Age: 78
End: 2018-08-20

## 2018-08-20 DIAGNOSIS — I49.8 VENTRICULAR TRIGEMINY: Primary | ICD-10-CM

## 2018-08-20 NOTE — TELEPHONE ENCOUNTER
Pt's wife is aware  She will be calling to make an appt with urology today  Also, they will see cardio, who do you recom?

## 2018-08-20 NOTE — TELEPHONE ENCOUNTER
Patient's wife called and states that he stopped the Cipro because he was having ankle pain  Also, what is the next step with the irregular EKG?   Calling for CT results too    #[phone]

## 2018-08-20 NOTE — TELEPHONE ENCOUNTER
CT results sent as result note    That's fine he stopped the cipro    Is he still having blood in urine? Did he make appointment with urology? Regarding abnormal EKG, labs were fine and didn't correlate for any causes  He can follow up with cardiology  Has he everseen a cardiologist before/ do they have a preference?

## 2018-08-21 ENCOUNTER — TELEPHONE (OUTPATIENT)
Dept: FAMILY MEDICINE CLINIC | Facility: CLINIC | Age: 78
End: 2018-08-21

## 2018-08-21 NOTE — TELEPHONE ENCOUNTER
Patient's wife called and asked if she should be scheduling his repeat EKG in 10 days?     #[phone]

## 2018-08-21 NOTE — TELEPHONE ENCOUNTER
Pt's wife states he is not being seen until October 4th  Per patients request, he wants to see Dr Essence Mckeon   He is scheduled for 08/27 at 230 PM

## 2018-08-24 RX ORDER — BIOTIN 1 MG
TABLET ORAL DAILY
COMMUNITY
End: 2018-09-05 | Stop reason: ALTCHOICE

## 2018-08-24 RX ORDER — PRAVASTATIN SODIUM 10 MG
TABLET ORAL DAILY
COMMUNITY
End: 2018-09-05 | Stop reason: ALTCHOICE

## 2018-08-24 RX ORDER — CHOLECALCIFEROL (VITAMIN D3) 125 MCG
1000 CAPSULE ORAL DAILY
COMMUNITY
End: 2022-02-12

## 2018-08-27 ENCOUNTER — OFFICE VISIT (OUTPATIENT)
Dept: FAMILY MEDICINE CLINIC | Facility: CLINIC | Age: 78
End: 2018-08-27
Payer: MEDICARE

## 2018-08-27 VITALS
DIASTOLIC BLOOD PRESSURE: 70 MMHG | WEIGHT: 170.4 LBS | BODY MASS INDEX: 23.85 KG/M2 | HEIGHT: 71 IN | SYSTOLIC BLOOD PRESSURE: 122 MMHG

## 2018-08-27 DIAGNOSIS — I49.8 VENTRICULAR TRIGEMINY: ICD-10-CM

## 2018-08-27 DIAGNOSIS — N30.01 ACUTE CYSTITIS WITH HEMATURIA: Primary | ICD-10-CM

## 2018-08-27 DIAGNOSIS — R31.0 FRANK HEMATURIA: ICD-10-CM

## 2018-08-27 DIAGNOSIS — N20.0 KIDNEY STONE: ICD-10-CM

## 2018-08-27 PROCEDURE — 99214 OFFICE O/P EST MOD 30 MIN: CPT | Performed by: FAMILY MEDICINE

## 2018-08-27 RX ORDER — NITROFURANTOIN 25; 75 MG/1; MG/1
100 CAPSULE ORAL 2 TIMES DAILY
Qty: 20 CAPSULE | Refills: 0 | Status: SHIPPED | OUTPATIENT
Start: 2018-08-27 | End: 2018-10-02 | Stop reason: ALTCHOICE

## 2018-08-29 ENCOUNTER — OFFICE VISIT (OUTPATIENT)
Dept: UROLOGY | Facility: AMBULATORY SURGERY CENTER | Age: 78
End: 2018-08-29
Payer: MEDICARE

## 2018-08-29 VITALS
HEIGHT: 71 IN | SYSTOLIC BLOOD PRESSURE: 128 MMHG | HEART RATE: 66 BPM | BODY MASS INDEX: 23.66 KG/M2 | WEIGHT: 169 LBS | DIASTOLIC BLOOD PRESSURE: 62 MMHG

## 2018-08-29 DIAGNOSIS — Z85.46 HISTORY OF PROSTATE CANCER: ICD-10-CM

## 2018-08-29 DIAGNOSIS — R31.9 URINARY TRACT INFECTION WITH HEMATURIA, SITE UNSPECIFIED: Primary | ICD-10-CM

## 2018-08-29 DIAGNOSIS — R31.0 GROSS HEMATURIA: ICD-10-CM

## 2018-08-29 DIAGNOSIS — N39.0 URINARY TRACT INFECTION WITH HEMATURIA, SITE UNSPECIFIED: Primary | ICD-10-CM

## 2018-08-29 LAB
POST-VOID RESIDUAL VOLUME, ML POC: 0 ML
SL AMB  POCT GLUCOSE, UA: NORMAL
SL AMB LEUKOCYTE ESTERASE,UA: NORMAL
SL AMB POCT BILIRUBIN,UA: NORMAL
SL AMB POCT BLOOD,UA: NORMAL
SL AMB POCT CLARITY,UA: NORMAL
SL AMB POCT COLOR,UA: NORMAL
SL AMB POCT KETONES,UA: NORMAL
SL AMB POCT NITRITE,UA: NORMAL
SL AMB POCT PH,UA: 5
SL AMB POCT SPECIFIC GRAVITY,UA: 1
SL AMB POCT URINE PROTEIN: NORMAL
SL AMB POCT UROBILINOGEN: NORMAL

## 2018-08-29 PROCEDURE — 81002 URINALYSIS NONAUTO W/O SCOPE: CPT | Performed by: UROLOGY

## 2018-08-29 PROCEDURE — 99214 OFFICE O/P EST MOD 30 MIN: CPT | Performed by: UROLOGY

## 2018-08-29 PROCEDURE — 51798 US URINE CAPACITY MEASURE: CPT | Performed by: UROLOGY

## 2018-08-29 NOTE — LETTER
August 29, 2018     Claude Oconnor, [address] [facility] , [address]  [address] [place] [address]    Patient: Rosalie Chris   YOB: 1940   Date of Visit: 8/29/2018       Dear Dr Esa Prescott: Thank you for referring Tia Brooks to me for evaluation  Below are my notes for this consultation  If you have questions, please do not hesitate to call me  I look forward to following your patient along with you           Sincerely,        Teena Pierson MD        CC: Martha Arenas, DO

## 2018-08-31 ENCOUNTER — TELEPHONE (OUTPATIENT)
Dept: FAMILY MEDICINE CLINIC | Facility: CLINIC | Age: 78
End: 2018-08-31

## 2018-08-31 NOTE — TELEPHONE ENCOUNTER
----- Message from Ryne Dominguez DO sent at 8/97/2503 10:28 AM EDT -----  Tell the patient Cardiology did not feel that this issue with his EKG would hold up his vacation in October  But if I am not mistaken I believe they got him in sooner to see Dr Beka Patel  If he has an appointment with this doctor then we do not even need to call him  ----- Message -----  From: Robbin Gómez MD  Sent: 8/28/2018   5:35 PM  To: DO Tristan Limon,    I am not able to see the ecg  Without seeing him it doesn't sound like this would hold up his trip  Thank you  Rinku Barron  ----- Message -----  From: Ryne Dominguez DO  Sent: 7/10/4097   9:16 AM  To: Robbin Gómez MD    Yannick Mcmillan, Mr Shantanu Rand has an appointment with you in early October  My nurse practitioner saw him for acute cystitis with hematuria approximately 2 weeks ago  He is following up with Urology  She noted incidentally that he might be in trigeminy  He is completely asymptomatic  He is supposed to go to [place] on vacation about 5 days after he seizure when October and he and his wife for concerns should they cancel the trip  I told him that likely he would have no issues from a cardiac standpoint  If you could at your convenience just double check the EKG to see if he have any major concerns that would make me think otherwise  I would hate cancel his trip just because of slightly abnormal EKG  Please let me know and as always thank you for your help  Comfort Taylor

## 2018-09-05 ENCOUNTER — OFFICE VISIT (OUTPATIENT)
Dept: CARDIOLOGY CLINIC | Facility: CLINIC | Age: 78
End: 2018-09-05
Payer: MEDICARE

## 2018-09-05 VITALS
SYSTOLIC BLOOD PRESSURE: 138 MMHG | WEIGHT: 171 LBS | BODY MASS INDEX: 23.94 KG/M2 | HEIGHT: 71 IN | DIASTOLIC BLOOD PRESSURE: 60 MMHG | HEART RATE: 86 BPM

## 2018-09-05 DIAGNOSIS — I49.9 IRREGULAR HEART BEAT: Primary | ICD-10-CM

## 2018-09-05 DIAGNOSIS — I49.3 PVC (PREMATURE VENTRICULAR CONTRACTION): ICD-10-CM

## 2018-09-05 PROCEDURE — 93000 ELECTROCARDIOGRAM COMPLETE: CPT | Performed by: INTERNAL MEDICINE

## 2018-09-05 PROCEDURE — 99204 OFFICE O/P NEW MOD 45 MIN: CPT | Performed by: INTERNAL MEDICINE

## 2018-09-05 NOTE — PROGRESS NOTES
Cardiology Follow Up    Jb Michael  1940  [id number]  [address]  [address] [address] [phone]    1  Irregular heart beat  POCT ECG    Holter monitor - 24 hour   2  PVC (premature ventricular contraction)  Echo complete with contrast if indicated    NM myocardial perfusion spect (stress and/or rest)       Interval History:  71-year-old male with no past cardiac history who was found to have ventricular trigeminy on routine EKG  He denies any symptoms including skipped heartbeat or palpitations  He denies chest pain shortness of breath orthopnea paroxysmal nocturnal dyspnea or syncope    Patient Active Problem List   Diagnosis    Hearing loss of left ear    Ventricular trigeminy    History of prostate cancer     Past Medical History:   Diagnosis Date    Astigmatism     last assessed 11/15/17    Cataract     Resolved 11/15/17    Cyst of left kidney     last assessed 11/15/2017    Cyst of right kidney     lasst assessed 11/15/2017    Gross hematuria     Last assessed 01/28/16    Hematuria     last assessed 01/28/16    Prostate cancer ([facility] [address] )     last assessed 05/18/17    Renal calculi     last assessed 08/16/12    Squamous cell carcinoma     Vitamin D deficiency     Last assessed 08/19/13     Social History     Social History    Marital status: /Civil Union     Spouse name: N/A    Number of children: N/A    Years of education: N/A     Occupational History    Not on file       Social History Main Topics    Smoking status: Never Smoker    Smokeless tobacco: Never Used    Alcohol use Yes      Comment: social    Drug use: No    Sexual activity: Not on file     Other Topics Concern    Not on file     Social History Narrative    No narrative on file      Family History   Problem Relation Age of Onset    Other Mother 76        Influenza    Heart failure Father 70    Peripheral vascular disease Father     Prostate cancer Brother     Stroke Brother 48    Hypertension Family      Past Surgical History:   Procedure Laterality Date    BREAST BIOPSY      Benign breast tumor    COLONOSCOPY      CYSTOSCOPY  01/28/2016    ELBOW SURGERY      Bone chip    LITHOTRIPSY      RETROPUBIC PROSTATECTOMY  11/13/2001    Radical with nerve sparing       Current Outpatient Prescriptions:     cyanocobalamin (VITAMIN B-12) 500 mcg tablet, 1,000 mcg Daily  , Disp: , Rfl:     Multiple Vitamins-Minerals (CENTRUM SILVER PO), Daily, Disp: , Rfl:     nitrofurantoin (MACROBID) 100 mg capsule, Take 1 capsule (100 mg total) by mouth 2 (two) times a day, Disp: 20 capsule, Rfl: 0  Allergies   Allergen Reactions    Sulfa Antibiotics Rash    Ciprofloxacin        Labs:  Office Visit on 08/29/2018   Component Date Value    POST-VOID RESIDUAL VOLUM* 08/29/2018 0     LEUKOCYTE ESTERASE,UA 08/29/2018 -     NITRITE,UA 08/29/2018 -     SL AMB POCT UROBILINOGEN 08/29/2018 -     SL AMB POCT URINE PROTEIN 08/29/2018 -      PH,UA 08/29/2018 5 0      BLOOD,UA 08/29/2018 trace     SPECIFIC GRAVITY,UA 08/29/2018 1 005     KETONES,UA 08/29/2018 -      BILIRUBIN,UA 08/29/2018 -     GLUCOSE, UA 08/29/2018 -      COLOR,UA 08/29/2018 west      CLARITY,UA 08/29/2018 transparent    Appointment on 08/14/2018   Component Date Value    Sodium 08/14/2018 139     Potassium 08/14/2018 4 2     Chloride 08/14/2018 104     CO2 08/14/2018 30     ANION GAP 08/14/2018 5     BUN 08/14/2018 17     Creatinine 08/14/2018 1 12     Glucose, Fasting 08/14/2018 92     Calcium 08/14/2018 9 1     AST 08/14/2018 14     ALT 08/14/2018 16     Alkaline Phosphatase 08/14/2018 46     Total Protein 08/14/2018 6 9     Albumin 08/14/2018 3 7     Total Bilirubin 08/14/2018 0 93     eGFR 08/14/2018 62     Magnesium 08/14/2018 2 5     WBC 08/14/2018 9 43     RBC 08/14/2018 4 43     Hemoglobin 08/14/2018 13 5     Hematocrit 08/14/2018 42 7     MCV 08/14/2018 96     MCH 08/14/2018 30 5     MCHC 08/14/2018 31 6     RDW 08/14/2018 13 1     MPV 08/14/2018 12 2     Platelets 93/25/6369 170     nRBC 08/14/2018 0     Neutrophils Relative 08/14/2018 68     Immat GRANS % 08/14/2018 1     Lymphocytes Relative 08/14/2018 15     Monocytes Relative 08/14/2018 11     Eosinophils Relative 08/14/2018 5     Basophils Relative 08/14/2018 0     Neutrophils Absolute 08/14/2018 6 40     Immature Grans Absolute 08/14/2018 0 05     Lymphocytes Absolute 08/14/2018 1 45     Monocytes Absolute 08/14/2018 1 07     Eosinophils Absolute 08/14/2018 0 42     Basophils Absolute 08/14/2018 0 04    Office Visit on 08/13/2018   Component Date Value    LEUKOCYTE ESTERASE,UA 08/13/2018 ++     NITRITE,UA 08/13/2018 +     SL AMB POCT UROBILINOGEN 08/13/2018 -     SL AMB POCT URINE PROTEIN 08/13/2018 +      PH,UA 08/13/2018 5      BLOOD,UA 08/13/2018 +++     SPECIFIC GRAVITY,UA 08/13/2018 1 030     KETONES,UA 08/13/2018 40      BILIRUBIN,UA 08/13/2018 +++     GLUCOSE, UA 08/13/2018 neg      COLOR,UA 08/13/2018 red      CLARITY,UA 08/13/2018 bloody     Urine Culture 08/13/2018 >100,000 cfu/ml Escherichia coli*    Color, UA 08/13/2018 Red     Clarity, UA 08/13/2018 Turbid     Specific Gravity, UA 08/13/2018 1 026     pH, UA 08/13/2018      Leukocytes, UA 08/13/2018 Interference- unable to analyze*    Nitrite, UA 08/13/2018 Interference- unable to analyze     Protein, UA 08/13/2018 Interference- unable to analyze*    Glucose, UA 08/13/2018 Interference- unable to analyze     Ketones, UA 08/13/2018 Interference- unable to analyze*    Urobilinogen, UA 08/13/2018 Interference-unable to analyze     Bilirubin, UA 08/13/2018 Interference- unable to analyze*    Blood, UA 08/13/2018 Interference- unable to analyze*    RBC, UA 08/13/2018 Innumerable*    WBC, UA 08/13/2018 Innumerable*    Epithelial Cells 08/13/2018 None Seen     Bacteria, UA 08/13/2018 Innumerable*    Ca Oxalate Nneka, UA 08/13/2018 Occasional*     Imaging: Ct Abdomen Pelvis W Wo Contrast    Result Date: 8/17/2018  Narrative: CT ABDOMEN AND PELVIS WITH AND WITHOUT IV CONTRAST INDICATION:   R31 0: Gross hematuria  COMPARISON:  None  TECHNIQUE: CT of the abdomen and pelvis was performed without intravenous contrast   Dynamic postcontrast CT evaluation of the abdomen and pelvis was performed in both nephrographic and delayed phases after the administration of intravenous contrast   Axial, sagittal, and coronal 2D reformatted images were created from the source data and submitted for interpretation  Radiation dose length product (DLP) for this visit:  1187 64 mGy-cm   This examination, like all CT scans performed in the [facility], was performed utilizing techniques to minimize radiation dose exposure, including the use of iterative reconstruction and automated exposure control  IV Contrast:  100 mL of iohexol (OMNIPAQUE) Enteric Contrast:  Enteric contrast was not administered  FINDINGS: ABDOMEN RIGHT KIDNEY AND URETER: No solid renal mass  No detectable urothelial mass  There are several too small to characterize but likely benign hypodensities in the right kidney  Punctate right kidney upper pole calyceal stone (series 2 image 63 ) No hydronephrosis or hydroureter  No ureteral stone  LEFT KIDNEY AND URETER: No solid renal mass  No detectable urothelial mass  2 cm simple cyst in the posterior aspect of the left kidney midpole  2 7 cm exophytic simple cyst off the left kidney lower pole  There is also a small nonenhancing hyperdense left kidney upper pole exophytic cyst measuring 9 mm, consistent with a hemorrhagic cyst (series 2 image 53 )  Several additional too small to characterize but likely benign hypodensities  No hydronephrosis or hydroureter  No urinary tract calculi  No perinephric collection  URINARY BLADDER: No bladder wall mass  No calculi   LOWER CHEST:  No clinically significant abnormality identified in the visualized lower chest  LIVER/BILIARY TREE:  Unremarkable  GALLBLADDER:  No calcified gallstones  No pericholecystic inflammatory change  SPLEEN:  Unremarkable  PANCREAS:  Unremarkable  ADRENAL GLANDS:  Unremarkable  STOMACH AND BOWEL:  Unremarkable  ABDOMINOPELVIC CAVITY:  No ascites  No free intraperitoneal air  No lymphadenopathy  VESSELS:  Unremarkable for patient's age  PELVIS REPRODUCTIVE ORGANS:  Status post prostatectomy  APPENDIX: No findings to suggest appendicitis  ABDOMINAL WALL/INGUINAL REGIONS:  Unremarkable  OSSEOUS STRUCTURES:  No acute fracture or destructive osseous lesion  Impression: There is no evidence of renal parenchymal or urothelial neoplasm  There is a punctate right kidney upper pole calyceal stone (series 2 image 63 ) Workstation performed: [id number]       Review of Systems:  Review of Systems   Constitutional: Negative for fatigue  HENT: Negative for nosebleeds  Eyes: Negative for redness  Respiratory: Negative for chest tightness and shortness of breath  Cardiovascular: Negative for chest pain, palpitations and leg swelling  Gastrointestinal: Negative for abdominal pain  Endocrine: Negative for polyuria  Genitourinary: Negative for urgency  Musculoskeletal: Negative for arthralgias  Skin: Negative for rash  Neurological: Negative for dizziness and syncope  Psychiatric/Behavioral: Negative for confusion and sleep disturbance  The patient is not nervous/anxious  Physical Exam:  Physical Exam   Constitutional: He is oriented to person, place, and time  He appears well-developed and well-nourished  HENT:   Head: Normocephalic and atraumatic  Nose: Nose normal    Mouth/Throat: Oropharynx is clear and moist    Eyes: Pupils are equal, round, and reactive to light  Neck: Neck supple  Cardiovascular: Normal rate, regular rhythm, normal heart sounds and intact distal pulses  Pulmonary/Chest: Effort normal and breath sounds normal    Abdominal: Soft  Bowel sounds are normal    Musculoskeletal: Normal range of motion  Neurological: He is alert and oriented to person, place, and time  Skin: Skin is warm and dry  Psychiatric: He has a normal mood and affect  His behavior is normal  Judgment and thought content normal        Discussion/Summary:  A 12 lead EKG in the office today confirms ventricular ectopy  He actually had a series of consecutive premature complexes couplets and a rhythm strip that does show trigeminy  Of he is not hyperthyroid, he does not drink axis any caffeine at home alcohol intake is modest and move up with  His physical exam is unremarkable  He is asymptomatic with normal affect with echocardiogram because structural integrity of his heart  On I will check an exercise nuclear stress test to rule out ischemic heart disease I will also have a Holter monitor to quantify his extent of ectopy and runs  I will see him back shortly after this to make any further recommendations

## 2018-09-06 ENCOUNTER — HOSPITAL ENCOUNTER (OUTPATIENT)
Dept: NON INVASIVE DIAGNOSTICS | Facility: CLINIC | Age: 78
Discharge: HOME | End: 2018-09-06
Payer: MEDICARE

## 2018-09-06 DIAGNOSIS — I49.3 PVC (PREMATURE VENTRICULAR CONTRACTION): ICD-10-CM

## 2018-09-06 DIAGNOSIS — I49.9 IRREGULAR HEART BEAT: ICD-10-CM

## 2018-09-06 PROCEDURE — 93018 CV STRESS TEST I&R ONLY: CPT | Performed by: INTERNAL MEDICINE

## 2018-09-06 PROCEDURE — 93306 TTE W/DOPPLER COMPLETE: CPT

## 2018-09-06 PROCEDURE — 93227 XTRNL ECG REC<48 HR R&I: CPT | Performed by: INTERNAL MEDICINE

## 2018-09-06 PROCEDURE — 93225 XTRNL ECG REC<48 HRS REC: CPT

## 2018-09-06 PROCEDURE — 78452 HT MUSCLE IMAGE SPECT MULT: CPT | Performed by: INTERNAL MEDICINE

## 2018-09-06 PROCEDURE — 93016 CV STRESS TEST SUPVJ ONLY: CPT | Performed by: INTERNAL MEDICINE

## 2018-09-06 PROCEDURE — 93306 TTE W/DOPPLER COMPLETE: CPT | Performed by: INTERNAL MEDICINE

## 2018-09-06 PROCEDURE — 78452 HT MUSCLE IMAGE SPECT MULT: CPT

## 2018-09-06 PROCEDURE — 93017 CV STRESS TEST TRACING ONLY: CPT

## 2018-09-06 PROCEDURE — A9502 TC99M TETROFOSMIN: HCPCS

## 2018-09-06 PROCEDURE — 93226 XTRNL ECG REC<48 HR SCAN A/R: CPT

## 2018-09-07 ENCOUNTER — TELEPHONE (OUTPATIENT)
Dept: CARDIOLOGY CLINIC | Facility: CLINIC | Age: 78
End: 2018-09-07

## 2018-09-07 LAB
CHEST PAIN STATEMENT: NORMAL
MAX DIASTOLIC BP: 80 MMHG
MAX HEART RATE: 130 BPM
MAX PREDICTED HEART RATE: 141 BPM
MAX. SYSTOLIC BP: 204 MMHG
PROTOCOL NAME: NORMAL
REASON FOR TERMINATION: NORMAL
TARGET HR FORMULA: NORMAL
TEST INDICATION: NORMAL
TIME IN EXERCISE PHASE: NORMAL

## 2018-09-10 NOTE — TELEPHONE ENCOUNTER
Spoke with pt, understand results, awaiting holter still  Pt does have an OV with Dr Claudy Howard on 10/4/18

## 2018-09-11 ENCOUNTER — CLINICAL SUPPORT (OUTPATIENT)
Dept: FAMILY MEDICINE CLINIC | Facility: CLINIC | Age: 78
End: 2018-09-11
Payer: MEDICARE

## 2018-09-11 DIAGNOSIS — N39.0 URINARY TRACT INFECTION WITHOUT HEMATURIA, SITE UNSPECIFIED: Primary | ICD-10-CM

## 2018-09-11 LAB
SL AMB  POCT GLUCOSE, UA: NORMAL
SL AMB LEUKOCYTE ESTERASE,UA: NORMAL
SL AMB POCT BILIRUBIN,UA: NORMAL
SL AMB POCT BLOOD,UA: NORMAL
SL AMB POCT CLARITY,UA: CLEAR
SL AMB POCT COLOR,UA: YELLOW
SL AMB POCT KETONES,UA: NORMAL
SL AMB POCT NITRITE,UA: NORMAL
SL AMB POCT PH,UA: 6.5
SL AMB POCT SPECIFIC GRAVITY,UA: 1.01
SL AMB POCT URINE PROTEIN: NORMAL
SL AMB POCT UROBILINOGEN: NORMAL

## 2018-09-11 PROCEDURE — 81002 URINALYSIS NONAUTO W/O SCOPE: CPT | Performed by: FAMILY MEDICINE

## 2018-09-11 NOTE — PROGRESS NOTES
Patient for a urine dip after completing a course of antibiotics  Urine dip done and negative  No further treatment needed

## 2018-09-14 DIAGNOSIS — I49.3 PVC'S (PREMATURE VENTRICULAR CONTRACTIONS): Primary | ICD-10-CM

## 2018-09-14 RX ORDER — VERAPAMIL HYDROCHLORIDE 240 MG/1
240 TABLET, FILM COATED, EXTENDED RELEASE ORAL DAILY
Qty: 30 TABLET | Refills: 5 | Status: SHIPPED | OUTPATIENT
Start: 2018-09-14 | End: 2018-10-04 | Stop reason: SDUPTHER

## 2018-09-14 NOTE — PROGRESS NOTES
Please call and make appointment next available with EPS  I discussed with Ashwin Penn  It can be any EP  Please do appointment before October 5

## 2018-09-17 ENCOUNTER — TELEPHONE (OUTPATIENT)
Dept: CARDIOLOGY CLINIC | Facility: CLINIC | Age: 78
End: 2018-09-17

## 2018-09-17 NOTE — TELEPHONE ENCOUNTER
Pt called stating he spoke with you on 9/14 re: a second opinion  Pt wants to know are you recommending a physician  He states he was unclear      Please Advise    C/B [phone]

## 2018-09-18 NOTE — TELEPHONE ENCOUNTER
I started him on Verapamil and he is to have an appointment with EPS next available  I had notified the office of this

## 2018-09-28 ENCOUNTER — CLINICAL SUPPORT (OUTPATIENT)
Dept: FAMILY MEDICINE CLINIC | Facility: CLINIC | Age: 78
End: 2018-09-28
Payer: MEDICARE

## 2018-09-28 DIAGNOSIS — Z23 NEED FOR INFLUENZA VACCINATION: Primary | ICD-10-CM

## 2018-09-28 PROCEDURE — 90662 IIV NO PRSV INCREASED AG IM: CPT | Performed by: FAMILY MEDICINE

## 2018-09-28 PROCEDURE — G0008 ADMIN INFLUENZA VIRUS VAC: HCPCS | Performed by: FAMILY MEDICINE

## 2018-10-01 ENCOUNTER — OFFICE VISIT (OUTPATIENT)
Dept: OBGYN CLINIC | Facility: OTHER | Age: 78
End: 2018-10-01
Payer: MEDICARE

## 2018-10-01 ENCOUNTER — APPOINTMENT (OUTPATIENT)
Dept: RADIOLOGY | Facility: OTHER | Age: 78
End: 2018-10-01
Payer: MEDICARE

## 2018-10-01 VITALS
HEIGHT: 71 IN | DIASTOLIC BLOOD PRESSURE: 63 MMHG | HEART RATE: 80 BPM | SYSTOLIC BLOOD PRESSURE: 156 MMHG | BODY MASS INDEX: 23.72 KG/M2 | WEIGHT: 169.4 LBS

## 2018-10-01 DIAGNOSIS — M79.672 PAIN OF LEFT HEEL: ICD-10-CM

## 2018-10-01 DIAGNOSIS — M72.2 PLANTAR FASCIITIS OF LEFT FOOT: Primary | ICD-10-CM

## 2018-10-01 PROCEDURE — 99203 OFFICE O/P NEW LOW 30 MIN: CPT | Performed by: ORTHOPAEDIC SURGERY

## 2018-10-01 PROCEDURE — 73650 X-RAY EXAM OF HEEL: CPT

## 2018-10-01 NOTE — LETTER
October 1, 5057     Walker Roman DO  [address] [facility] [address] [facility]  [address]  [address] [facility]    Patient: Thelma Holland   YOB: 1940   Date of Visit: 10/1/2018       Dear Dr Edilberto Deleon: Thank you for referring Maria Guadalupe Barreto to me for evaluation  Below are my notes for this consultation  If you have questions, please do not hesitate to call me  I look forward to following your patient along with you  Sincerely,        Jeana Paula MD        CC: No Recipients  Jeana Paula MD  10/1/2018 10:28 AM  Sign at close encounter  CC:  Left heel pain            Assessment:     Plantar fasciitis left foot    Plan :  I showed the patient appropriate home exercises to do with double leg wall stretch with knees straight, downhill skiier with knees bent, and  towel pull or rubber band stretch  I like him  to do 10 repetitions, 5 seconds each 1 at least once or twice a day  I went over the use of ice either with a small ice bag, rolling a can of soda, or ice massage with a [place] cup  He can take Advil, Aleve, or Tylenol, if cleared by his cardiologist   I did consider injection as an option but he wants to hold off on that for now that is perfectly acceptable  He should try to get some type of silicone rubber heel cups to wear in his shoes to give him some cushioniong when he wears his dock siders  They will call let me know how he is doing in 5 days and I want him  to come and see me for an injection before his vacation, if not significantly improved    HPI:     This 79-year-old white male friend of the family comes in complaining of left foot pain for the last 2 days  He notes no injury, trauma, or fall that brought this on  He got out of bed stepped down and has had pain since that time  He is limping  He denies this happening the past   He denies any antecedent change in his activity level such as at the gym, walking, gardening  He has no numbness, tingling, or paresthesias    He does have multiple other medical issues issue can say  He had allergic reaction to Cipro for bladder problems about 2 months ago on spell that is not totally resolved either and he still has pain in his Achilles tendon  PMHx:         Past Medical History:   Diagnosis Date    Astigmatism     last assessed 11/15/17    Cataract     Resolved 11/15/17    Cyst of left kidney     last assessed 11/15/2017    Cyst of right kidney     lasst assessed 11/15/2017    Gross hematuria     Last assessed 01/28/16    Hematuria     last assessed 01/28/16    Prostate cancer ([facility] [address] )     last assessed 05/18/17    Renal calculi     last assessed 08/16/12    Squamous cell carcinoma     Vitamin D deficiency     Last assessed 08/19/13       Past Surgical History:   Procedure Laterality Date    BREAST BIOPSY      Benign breast tumor    COLONOSCOPY      CYSTOSCOPY  01/28/2016    ELBOW SURGERY      Bone chip    HERNIA REPAIR  2002    LITHOTRIPSY      RETROPUBIC PROSTATECTOMY  11/13/2001    Radical with nerve sparing       Family History   Problem Relation Age of Onset    Other Mother 76        Influenza    Hypertension Mother     Heart failure Father 70    Peripheral vascular disease Father     Hypertension Father     Prostate cancer Brother     Stroke Brother 48    Hypertension Brother     Hypertension Family        Social History     Social History    Marital status: /Civil Union     Spouse name: N/A    Number of children: N/A    Years of education: N/A     Occupational History    Not on file       Social History Main Topics    Smoking status: Never Smoker    Smokeless tobacco: Never Used    Alcohol use Yes      Comment: social    Drug use: No    Sexual activity: Not on file     Other Topics Concern    Not on file     Social History Narrative    No narrative on file       Current Outpatient Prescriptions   Medication Sig Dispense Refill    cyanocobalamin (VITAMIN B-12) 500 mcg tablet 1,000 mcg Daily        Multiple Vitamins-Minerals (CENTRUM SILVER PO) Daily      nitrofurantoin (MACROBID) 100 mg capsule Take 1 capsule (100 mg total) by mouth 2 (two) times a day 20 capsule 0    verapamil (CALAN-SR) 240 mg CR tablet Take 1 tablet (240 mg total) by mouth daily 30 tablet 5     No current facility-administered medications for this visit  Allergies: Sulfa antibiotics and Ciprofloxacin    ROS:  Positive for cardiac arrhythmia, bladder issues, orthopedic problems as  above and cataracts  The remaining 8/12 systems on the intake sheet that I reviewed were negative  PE:  /63   Pulse 80   Ht 5' 11" (1 803 m)   Wt 76 8 kg (169 lb 6 4 oz)   BMI 23 63 kg/m²    Constitutional: The patient was  oriented to person, place, and time  Well-developed and well-nourished  In no acute distress  HEENT: Vision intact  Hearing normal  Swallowing normal   Head: Normocephalic  Cardiovascular: Intact distal pulses  Pulse regular  Pulmonary/Chest: Effort normal  No respiratory distress  Neurological: Alert and oriented to person, place, and time  Skin: Skin is warm  Psychiatric: Normal mood and affect  Ortho Exam:  He was limping mildly  He was not using crutches or cane  He has point tenderness over the plantar fascia insertion site on the medial calcaneus of his left heel  He had mild pronation of his feet upon weight-bearing, but his arch does reconstitute nonweightbearing  He was tender over the right Achilles tendon, but not on the irritated side on left  He had a good dorsalis pedis pulse  Sensation light touch was intact in his toes  He had normal knee flexion extension with no popliteal adenopathy and no calf tenderness  Studies reviewed:   X-rays of his left heel did not show any plantar fascia spur or calcification on my personal review today     His left ankle joint on the lateral view was normal also

## 2018-10-01 NOTE — PATIENT INSTRUCTIONS
Plan :  I showed the patient appropriate home exercises to do with double leg wall stretch with knees straight, downhill skiier with knees bent, and  towel pull or rubber band stretch  I like him  to do 10 repetitions, 5 seconds each 1 at least once or twice a day  I went over the use of ice either with a small ice bag, rolling a can of soda, or ice massage with a [place] cup  He can take Advil, Aleve, or Tylenol, if cleared by his cardiologist   I did consider injection as an option but he wants to hold off on that for now that is perfectly acceptable  He should try to get some type of silicone rubber heel cups to wear in his shoes to give him some cushioniong when he wears his dock siders    They will call let me know how he is doing in 5 days and I want him  to come and see me for an injection before his vacation, if not significantly improved

## 2018-10-01 NOTE — PROGRESS NOTES
CC:  Left heel pain            Assessment:     Plantar fasciitis left foot    Plan :  I showed the patient appropriate home exercises to do with double leg wall stretch with knees straight, downhill skiier with knees bent, and  towel pull or rubber band stretch  I like him  to do 10 repetitions, 5 seconds each 1 at least once or twice a day  I went over the use of ice either with a small ice bag, rolling a can of soda, or ice massage with a [place] cup  He can take Advil, Aleve, or Tylenol, if cleared by his cardiologist   I did consider injection as an option but he wants to hold off on that for now that is perfectly acceptable  He should try to get some type of silicone rubber heel cups to wear in his shoes to give him some cushioniong when he wears his dock siders  They will call let me know how he is doing in 5 days and I want him  to come and see me for an injection before his vacation, if not significantly improved    HPI:     This 48-year-old white male friend of the family comes in complaining of left foot pain for the last 2 days  He notes no injury, trauma, or fall that brought this on  He got out of bed stepped down and has had pain since that time  He is limping  He denies this happening the past   He denies any antecedent change in his activity level such as at the gym, walking, gardening  He has no numbness, tingling, or paresthesias  He does have multiple other medical issues issue can say  He had allergic reaction to Cipro for bladder problems about 2 months ago on spell that is not totally resolved either and he still has pain in his Achilles tendon      PMHx:         Past Medical History:   Diagnosis Date    Astigmatism     last assessed 11/15/17    Cataract     Resolved 11/15/17    Cyst of left kidney     last assessed 11/15/2017    Cyst of right kidney     lasst assessed 11/15/2017   Atha Liner hematuria     Last assessed 01/28/16    Hematuria     last assessed 01/28/16    Prostate cancer ([facility] [address] )     last assessed 05/18/17    Renal calculi     last assessed 08/16/12    Squamous cell carcinoma     Vitamin D deficiency     Last assessed 08/19/13       Past Surgical History:   Procedure Laterality Date    BREAST BIOPSY      Benign breast tumor    COLONOSCOPY      CYSTOSCOPY  01/28/2016    ELBOW SURGERY      Bone chip    HERNIA REPAIR  2002    LITHOTRIPSY      RETROPUBIC PROSTATECTOMY  11/13/2001    Radical with nerve sparing       Family History   Problem Relation Age of Onset    Other Mother 76        Influenza    Hypertension Mother     Heart failure Father 70    Peripheral vascular disease Father     Hypertension Father     Prostate cancer Brother     Stroke Brother 48    Hypertension Brother     Hypertension Family        Social History     Social History    Marital status: /Civil Union     Spouse name: N/A    Number of children: N/A    Years of education: N/A     Occupational History    Not on file  Social History Main Topics    Smoking status: Never Smoker    Smokeless tobacco: Never Used    Alcohol use Yes      Comment: social    Drug use: No    Sexual activity: Not on file     Other Topics Concern    Not on file     Social History Narrative    No narrative on file       Current Outpatient Prescriptions   Medication Sig Dispense Refill    cyanocobalamin (VITAMIN B-12) 500 mcg tablet 1,000 mcg Daily        Multiple Vitamins-Minerals (CENTRUM SILVER PO) Daily      nitrofurantoin (MACROBID) 100 mg capsule Take 1 capsule (100 mg total) by mouth 2 (two) times a day 20 capsule 0    verapamil (CALAN-SR) 240 mg CR tablet Take 1 tablet (240 mg total) by mouth daily 30 tablet 5     No current facility-administered medications for this visit  Allergies: Sulfa antibiotics and Ciprofloxacin    ROS:  Positive for cardiac arrhythmia, bladder issues, orthopedic problems as  above and cataracts     The remaining 8/12 systems on the intake sheet that I reviewed were negative  PE:  /63   Pulse 80   Ht 5' 11" (1 803 m)   Wt 76 8 kg (169 lb 6 4 oz)   BMI 23 63 kg/m²   Constitutional: The patient was  oriented to person, place, and time  Well-developed and well-nourished  In no acute distress  HEENT: Vision intact  Hearing normal  Swallowing normal   Head: Normocephalic  Cardiovascular: Intact distal pulses  Pulse regular  Pulmonary/Chest: Effort normal  No respiratory distress  Neurological: Alert and oriented to person, place, and time  Skin: Skin is warm  Psychiatric: Normal mood and affect  Ortho Exam:  He was limping mildly  He was not using crutches or cane  He has point tenderness over the plantar fascia insertion site on the medial calcaneus of his left heel  He had mild pronation of his feet upon weight-bearing, but his arch does reconstitute nonweightbearing  He was tender over the right Achilles tendon, but not on the irritated side on left  He had a good dorsalis pedis pulse  Sensation light touch was intact in his toes  He had normal knee flexion extension with no popliteal adenopathy and no calf tenderness  Studies reviewed:   X-rays of his left heel did not show any plantar fascia spur or calcification on my personal review today     His left ankle joint on the lateral view was normal also

## 2018-10-02 ENCOUNTER — OFFICE VISIT (OUTPATIENT)
Dept: CARDIOLOGY CLINIC | Facility: CLINIC | Age: 78
End: 2018-10-02
Payer: MEDICARE

## 2018-10-02 VITALS
DIASTOLIC BLOOD PRESSURE: 64 MMHG | WEIGHT: 169 LBS | BODY MASS INDEX: 23.66 KG/M2 | HEIGHT: 71 IN | HEART RATE: 64 BPM | SYSTOLIC BLOOD PRESSURE: 140 MMHG

## 2018-10-02 DIAGNOSIS — I49.3 PVC'S (PREMATURE VENTRICULAR CONTRACTIONS): Primary | ICD-10-CM

## 2018-10-02 DIAGNOSIS — I71.2 THORACIC AORTIC ANEURYSM WITHOUT RUPTURE (HCC): ICD-10-CM

## 2018-10-02 DIAGNOSIS — I35.1 NONRHEUMATIC AORTIC VALVE INSUFFICIENCY: ICD-10-CM

## 2018-10-02 PROCEDURE — 99203 OFFICE O/P NEW LOW 30 MIN: CPT | Performed by: INTERNAL MEDICINE

## 2018-10-02 PROCEDURE — 93000 ELECTROCARDIOGRAM COMPLETE: CPT | Performed by: INTERNAL MEDICINE

## 2018-10-02 NOTE — LETTER
October 9, 0657     Shasta Zhao DO  [address] [facility]  [address]  [address] [facility]    Patient: Mora Shepard   YOB: 1940   Date of Visit: 10/2/2018       Dear Dr Alis Merchant: Thank you for referring Gianni Stark to me for evaluation  Below are my notes for this consultation  If you have questions, please do not hesitate to call me  I look forward to following your patient along with you  Sincerely,        Oracio Spicer MD        CC: MD Oracio Ramos MD  10/9/2018 11:34 AM  Sign at close encounter                                             Cardiology Consultation     Mora Shepard  [id number]  1940  [address]  [address] [place] [address]  [address] [address]    1  PVC's (premature ventricular contractions)  POCT ECG   2  Thoracic aortic aneurysm without rupture ([address] )     3   Nonrheumatic aortic valve insufficiency           History of present illness  Patient has been referred by Dr Yuliya Ugalde for management of PVCs    Patient was originally seen for PVCs and underwent   Echo - normal, LVEF -60%  Nuclear stress test - normal   Holter - Predominantly sinus rhythm, with an average heart rate of 72 beats per minute, Significant PVC burden of 19 5%    He was started on verapamil with complete resolution of PVC on current rhythm strip    The patient is not complaining of anginal like chest pain or chest pressure  There is no worsening orthopnea, paroxysmal nocturnal dyspnea  There is no leg swelling    Patient does not get palpitation   There is no history of presyncope or syncope  There is no history of transient  lightheadedness  There is no associated exertional intolerance      There is no history of snoring at night  There is no history of morning fatigability  There is occasional history of daytime sleepiness      Patient Active Problem List   Diagnosis    Hearing loss of left ear    PVC's (premature ventricular contractions)    History of prostate cancer    Plantar fasciitis of left foot    Thoracic aortic aneurysm without rupture ([facility] [address]    Nonrheumatic aortic valve insufficiency    Non-rheumatic mitral regurgitation     Past Medical History:   Diagnosis Date    Astigmatism     last assessed 11/15/17    Cataract     Resolved 11/15/17    Cyst of left kidney     last assessed 11/15/2017    Cyst of right kidney     lasst assessed 11/15/2017    Gross hematuria     Last assessed 01/28/16    Hematuria     last assessed 01/28/16    Prostate cancer ([facility] [address] )     last assessed 05/18/17    Renal calculi     last assessed 08/16/12    Squamous cell carcinoma     Vitamin D deficiency     Last assessed 08/19/13     Social History     Social History    Marital status: /Civil Union     Spouse name: N/A    Number of children: N/A    Years of education: N/A     Occupational History    Not on file       Social History Main Topics    Smoking status: Never Smoker    Smokeless tobacco: Never Used    Alcohol use Yes      Comment: social    Drug use: No    Sexual activity: Not on file     Other Topics Concern    Not on file     Social History Narrative    No narrative on file      Family History   Problem Relation Age of Onset    Other Mother 76        Influenza    Hypertension Mother     Heart failure Father 70    Peripheral vascular disease Father     Hypertension Father     Prostate cancer Brother     Stroke Brother 48    Hypertension Brother     Hypertension Family      Past Surgical History:   Procedure Laterality Date    BREAST BIOPSY      Benign breast tumor    COLONOSCOPY      CYSTOSCOPY  01/28/2016    ELBOW SURGERY      Bone chip    HERNIA REPAIR  2002    LITHOTRIPSY      RETROPUBIC PROSTATECTOMY  11/13/2001    Radical with nerve sparing       Current Outpatient Prescriptions:     cyanocobalamin (VITAMIN B-12) 500 mcg tablet, 1,000 mcg Daily  , Disp: , Rfl:     Multiple Vitamins-Minerals (CENTRUM SILVER PO), Daily, Disp: , Rfl:     verapamil (CALAN-SR) 240 mg CR tablet, Take 1 tablet (240 mg total) by mouth daily, Disp: 90 tablet, Rfl: 3  Allergies   Allergen Reactions    Sulfa Antibiotics Rash    Ciprofloxacin      Vitals:    10/02/18 1321   BP: 140/64   BP Location: Left arm   Patient Position: Sitting   Cuff Size: Standard   Pulse: 64   Weight: 76 7 kg (169 lb)   Height: 5' 11" (1 803 m)       Labs:  Lab Results   Component Value Date     08/14/2018     04/06/2015    K 4 2 08/14/2018    K 3 9 04/06/2015     08/14/2018     04/06/2015    CO2 30 08/14/2018    CO2 30 04/06/2015    BUN 17 08/14/2018    BUN 16 04/06/2015    CREATININE 1 12 08/14/2018    CREATININE 1 04 04/06/2015    GLUCOSE 96 04/06/2015    CALCIUM 9 1 08/14/2018    CALCIUM 9 0 04/06/2015     No results found for: CKTOTAL, CKMB, CKMBINDEX, TROPONINI  Lab Results   Component Value Date    WBC 9 43 08/14/2018    WBC 6 73 10/16/2015    HGB 13 5 08/14/2018    HGB 14 7 10/16/2015    HCT 42 7 08/14/2018    HCT 43 5 10/16/2015    MCV 96 08/14/2018    MCV 92 10/16/2015     08/14/2018     10/16/2015     Lab Results   Component Value Date    CHOL 176 10/16/2015    TRIG 116 10/25/2017    TRIG 126 10/16/2015    HDL 63 (H) 10/25/2017    HDL 74 10/16/2015     Imaging:   Xr Heel / Calcaneus 2+ Vw Left  Result Date: 10/1/2018  Narrative: LEFT HEEL INDICATION:   M79 672: Pain in left foot  "-LEFT HEEL PAIN FOR A FEW WEEKS   NO INJURY " COMPARISON:  None VIEWS:  XR HEEL / CALCANEUS 2+ VW LEFT FINDINGS: There is no acute fracture or dislocation  No degenerative changes  No lytic or blastic lesions seen  Soft tissues are unremarkable  Impression: No acute osseous abnormality  Workstation performed: [id number]       Review of Systems:  Review of Systems   All other systems reviewed and are negative    as described in my history of present illness        Physical Exam:  Physical Exam   Constitutional: He is oriented to person, place, and time  He appears well-developed and well-nourished  No distress  Not in any distress at the current time   HENT:   Head: Normocephalic and atraumatic  Right Ear: External ear normal    Left Ear: External ear normal    Nose: Nose normal    Mouth/Throat: Uvula is midline and mucous membranes are normal    Eyes: Pupils are equal, round, and reactive to light  Conjunctivae, EOM and lids are normal  No scleral icterus  No pallor  No cyanosis  No icterus   Neck: Trachea normal and normal range of motion  No JVD present  Carotid bruit is not present  No thyromegaly present  No jugular lymphadenopathy   Cardiovascular: Normal rate, regular rhythm, S1 normal, S2 normal, normal heart sounds, intact distal pulses and normal pulses  PMI is not displaced  Exam reveals no gallop, no S3, no S4 and no friction rub  No murmur heard  Pulmonary/Chest: Effort normal and breath sounds normal  No accessory muscle usage  No respiratory distress  He has no decreased breath sounds  He has no wheezes  He has no rhonchi  He has no rales  He exhibits no tenderness  Abdominal: Soft  Normal appearance and bowel sounds are normal  He exhibits no distension and no mass  There is no splenomegaly or hepatomegaly  There is no tenderness  Musculoskeletal: Normal range of motion  He exhibits no edema, tenderness or deformity  Lymphadenopathy:     He has no cervical adenopathy  Neurological: He is alert and oriented to person, place, and time  Facial symmetry is retained  Extraocular movements are retained  Head neck tongue and palate movement are retained and symmetric   Skin: Skin is intact  No abrasion, no lesion and no rash noted  No erythema  Nails show no clubbing  Psychiatric: He has a normal mood and affect  His speech is normal and behavior is normal  Thought content normal        Discussion/Summary:      1   PVC  20% of all beats  Normal EF and no ischemia  Started on verapamil with complete resolution    Continue same   No other recommendation at current time         2   AVR, MVR, thoracic aortic aneurysm  Follow up with primary cardiologist

## 2018-10-04 ENCOUNTER — OFFICE VISIT (OUTPATIENT)
Dept: CARDIOLOGY CLINIC | Facility: CLINIC | Age: 78
End: 2018-10-04
Payer: MEDICARE

## 2018-10-04 VITALS
DIASTOLIC BLOOD PRESSURE: 54 MMHG | HEART RATE: 69 BPM | SYSTOLIC BLOOD PRESSURE: 136 MMHG | WEIGHT: 170 LBS | OXYGEN SATURATION: 97 % | BODY MASS INDEX: 23.71 KG/M2

## 2018-10-04 DIAGNOSIS — I34.0 NON-RHEUMATIC MITRAL REGURGITATION: ICD-10-CM

## 2018-10-04 DIAGNOSIS — I35.1 NONRHEUMATIC AORTIC VALVE INSUFFICIENCY: ICD-10-CM

## 2018-10-04 DIAGNOSIS — I49.3 PVC'S (PREMATURE VENTRICULAR CONTRACTIONS): Primary | ICD-10-CM

## 2018-10-04 DIAGNOSIS — I71.2 THORACIC AORTIC ANEURYSM WITHOUT RUPTURE (HCC): ICD-10-CM

## 2018-10-04 PROBLEM — I71.20 THORACIC AORTIC ANEURYSM WITHOUT RUPTURE: Status: ACTIVE | Noted: 2018-10-04

## 2018-10-04 PROCEDURE — 99214 OFFICE O/P EST MOD 30 MIN: CPT | Performed by: INTERNAL MEDICINE

## 2018-10-04 RX ORDER — VERAPAMIL HYDROCHLORIDE 240 MG/1
240 TABLET, FILM COATED, EXTENDED RELEASE ORAL DAILY
Qty: 90 TABLET | Refills: 3 | Status: SHIPPED | OUTPATIENT
Start: 2018-10-04 | End: 2019-04-26 | Stop reason: SDUPTHER

## 2018-10-04 NOTE — PROGRESS NOTES
Cardiology Follow Up            Rosalio Portillo  1940  [id number]  [address] [address] [address]  [place] [address] [address] [facility] [address]  [facility] [address]    1  PVC's (premature ventricular contractions)  verapamil (CALAN-SR) 240 mg CR tablet   2  Non-rheumatic mitral regurgitation     3  Nonrheumatic aortic valve insufficiency     4  Thoracic aortic aneurysm without rupture ([facility])         Interval History:  He feels well  No complaints  He denies chest pain shortness of breath orthopnea paroxysmal nocturnal dyspnea or syncope  He has no palpitations or easy aware of his heartbeat  Patient Active Problem List   Diagnosis    Hearing loss of left ear    PVC's (premature ventricular contractions)    History of prostate cancer    Plantar fasciitis of left foot    [facility]acic aortic aneurysm without rupture ([facility] [address]    Nonrheumatic aortic valve insufficiency    Non-rheumatic mitral regurgitation     Past Medical History:   Diagnosis Date    Astigmatism     last assessed 11/15/17    Cataract     Resolved 11/15/17    Cyst of left kidney     last assessed 11/15/2017    Cyst of right kidney     lasst assessed 11/15/2017    Gross hematuria     Last assessed 01/28/16    Hematuria     last assessed 01/28/16    [email or web address]     last assessed 05/18/17    Renal calculi     last assessed 08/16/12    Squamous cell carcinoma     Vitamin D deficiency     Last assessed 08/19/13     Social History     Social History    Marital status: /Civil Union     Spouse name: N/A    Number of children: N/A    Years of education: N/A     Occupational History    Not on file       Social History Main Topics    Smoking status: Never Smoker    Smokeless tobacco: Never Used    Alcohol use Yes      Comment: social    Drug use: No    Sexual activity: Not on file     Other Topics Concern    Not on file     Social History Narrative    No narrative on file      Family History   Problem Relation Age of Onset    Other Mother 76        Influenza    Hypertension Mother     Heart failure Father 70    Peripheral vascular disease Father     Hypertension Father     Prostate cancer Brother     Stroke Brother 48    Hypertension Brother     Hypertension Family      Past Surgical History:   Procedure Laterality Date    BREAST BIOPSY      Benign breast tumor    COLONOSCOPY      CYSTOSCOPY  01/28/2016    ELBOW SURGERY      Bone chip    HERNIA REPAIR  2002    LITHOTRIPSY      RETROPUBIC PROSTATECTOMY  11/13/2001    Radical with nerve sparing       Current Outpatient Prescriptions:     cyanocobalamin (VITAMIN B-12) 500 mcg tablet, 1,000 mcg Daily  , Disp: , Rfl:     Multiple Vitamins-Minerals (CENTRUM SILVER PO), Daily, Disp: , Rfl:     verapamil (CALAN-SR) 240 mg CR tablet, Take 1 tablet (240 mg total) by mouth daily, Disp: 90 tablet, Rfl: 3  Allergies   Allergen Reactions    Sulfa Antibiotics Rash    Ciprofloxacin        Labs:  Clinical Support on 09/11/2018   Component Date Value    LEUKOCYTE ESTERASE,UA 09/11/2018 neg     NITRITE,UA 09/11/2018 neg     SL AMB POCT UROBILINOGEN 09/11/2018 neg     SL AMB POCT URINE PROTEIN 09/11/2018 neg      PH,UA 09/11/2018 6 5      BLOOD,UA 09/11/2018 neg     SPECIFIC GRAVITY,UA 09/11/2018 1 010     KETONES,UA 09/11/2018 neg      BILIRUBIN,UA 09/11/2018 neg     GLUCOSE, UA 09/11/2018 neg      COLOR,UA 09/11/2018 yellow      CLARITY,UA 09/11/2018 clear    Hospital Outpatient Visit on 09/06/2018   Component Date Value    Protocol Name 09/06/2018 CARTER     Time In Exercise Phase 09/06/2018 00:05:00     MAX   SYSTOLIC BP 33/09/0311 200     Max Diastolic Bp 08/73/2634 80     Max Heart Rate 09/06/2018 130     Max Predicted Heart Rate 09/06/2018 141     Reason for Termination 09/06/2018 Fatigue     Test Indication 09/06/2018 Abnormal ECG- PVC's     Target Hr Formular 09/06/2018 (220 - Age)*100%  Arrhy During Ex 09/06/2018                      Value:ventricular premature beats  ventricular premature beats-triplets      Chest Pain Statement 09/06/2018 none    Office Visit on 08/29/2018   Component Date Value    POST-VOID RESIDUAL VOLUM* 08/29/2018 0     LEUKOCYTE ESTERASE,UA 08/29/2018 -     NITRITE,UA 08/29/2018 -     SL AMB POCT UROBILINOGEN 08/29/2018 -     SL AMB POCT URINE PROTEIN 08/29/2018 -      PH,UA 08/29/2018 5 0      BLOOD,UA 08/29/2018 trace     SPECIFIC GRAVITY,UA 08/29/2018 1 005     KETONES,UA 08/29/2018 -      BILIRUBIN,UA 08/29/2018 -     GLUCOSE, UA 08/29/2018 -      COLOR,UA 08/29/2018 west      CLARITY,UA 08/29/2018 transparent    Appointment on 08/14/2018   Component Date Value    Sodium 08/14/2018 139     Potassium 08/14/2018 4 2     Chloride 08/14/2018 104     CO2 08/14/2018 30     ANION GAP 08/14/2018 5     BUN 08/14/2018 17     Creatinine 08/14/2018 1 12     Glucose, Fasting 08/14/2018 92     Calcium 08/14/2018 9 1     AST 08/14/2018 14     ALT 08/14/2018 16     Alkaline Phosphatase 08/14/2018 46     Total Protein 08/14/2018 6 9     Albumin 08/14/2018 3 7     Total Bilirubin 08/14/2018 0 93     eGFR 08/14/2018 62     Magnesium 08/14/2018 2 5     WBC 08/14/2018 9 43     RBC 08/14/2018 4 43     Hemoglobin 08/14/2018 13 5     Hematocrit 08/14/2018 42 7     MCV 08/14/2018 96     MCH 08/14/2018 30 5     MCHC 08/14/2018 31 6     RDW 08/14/2018 13 1     MPV 08/14/2018 12 2     Platelets 14/42/6743 170     nRBC 08/14/2018 0     Neutrophils Relative 08/14/2018 68     Immat GRANS % 08/14/2018 1     Lymphocytes Relative 08/14/2018 15     Monocytes Relative 08/14/2018 11     Eosinophils Relative 08/14/2018 5     Basophils Relative 08/14/2018 0     Neutrophils Absolute 08/14/2018 6 40     Immature Grans Absolute 08/14/2018 0 05     Lymphocytes Absolute 08/14/2018 1 45     Monocytes Absolute 08/14/2018 1 07     Eosinophils Absolute 08/14/2018 0 42     Basophils Absolute 08/14/2018 0 04    Office Visit on 08/13/2018   Component Date Value    LEUKOCYTE ESTERASE,UA 08/13/2018 ++     NITRITE,UA 08/13/2018 +     SL AMB POCT UROBILINOGEN 08/13/2018 -     SL AMB POCT URINE PROTEIN 08/13/2018 +      PH,UA 08/13/2018 5      BLOOD,UA 08/13/2018 +++     SPECIFIC GRAVITY,UA 08/13/2018 1 030     KETONES,UA 08/13/2018 40      BILIRUBIN,UA 08/13/2018 +++     GLUCOSE, UA 08/13/2018 neg      COLOR,UA 08/13/2018 red      CLARITY,UA 08/13/2018 bloody     Urine Culture 08/13/2018 >100,000 cfu/ml Escherichia coli*    Color, UA 08/13/2018 Red     Clarity, UA 08/13/2018 Turbid     Specific Gravity, UA 08/13/2018 1 026     pH, UA 08/13/2018      Leukocytes, UA 08/13/2018 Interference- unable to analyze*    Nitrite, UA 08/13/2018 Interference- unable to analyze     Protein, UA 08/13/2018 Interference- unable to analyze*    Glucose, UA 08/13/2018 Interference- unable to analyze     Ketones, UA 08/13/2018 Interference- unable to analyze*    Urobilinogen, UA 08/13/2018 Interference-unable to analyze     Bilirubin, UA 08/13/2018 Interference- unable to analyze*    Blood, UA 08/13/2018 Interference- unable to analyze*    RBC, UA 08/13/2018 Innumerable*    WBC, UA 08/13/2018 Innumerable*    Epithelial Cells 08/13/2018 None Seen     Bacteria, UA 08/13/2018 Innumerable*    Ca Oxalate Nneka, UA 08/13/2018 Occasional*     Imaging: Xr Heel / Calcaneus 2+ Vw Left    Result Date: 10/1/2018  Narrative: LEFT HEEL INDICATION:   M79 672: Pain in left foot  "-LEFT HEEL PAIN FOR A FEW WEEKS   NO INJURY " COMPARISON:  None VIEWS:  XR HEEL / CALCANEUS 2+ VW LEFT FINDINGS: There is no acute fracture or dislocation  No degenerative changes  No lytic or blastic lesions seen  Soft tissues are unremarkable  Impression: No acute osseous abnormality   Workstation performed: [id number]       Review of Systems:  Review of Systems   Constitutional: Negative for fatigue  HENT: Negative for nosebleeds  Eyes: Negative for redness  Respiratory: Negative for chest tightness and shortness of breath  Cardiovascular: Negative for chest pain, palpitations and leg swelling  Gastrointestinal: Positive for constipation  Negative for abdominal pain  Endocrine: Negative for polyuria  Genitourinary: Negative for urgency  Musculoskeletal: Negative for arthralgias  Skin: Negative for rash  Neurological: Negative for dizziness and syncope  Psychiatric/Behavioral: Negative for confusion and sleep disturbance  The patient is not nervous/anxious  Physical Exam:  Physical Exam   Constitutional: He is oriented to person, place, and time  He appears well-developed and well-nourished  HENT:   Head: Normocephalic and atraumatic  Nose: Nose normal    Mouth/Throat: Oropharynx is clear and moist    Eyes: Pupils are equal, round, and reactive to light  Neck: Neck supple  Cardiovascular: Normal rate, regular rhythm, normal heart sounds and intact distal pulses  Pulmonary/Chest: Effort normal and breath sounds normal    Abdominal: Soft  Bowel sounds are normal    Musculoskeletal: Normal range of motion  Neurological: He is alert and oriented to person, place, and time  Skin: Skin is warm and dry  Psychiatric: He has a normal mood and affect  His behavior is normal  Judgment and thought content normal        Discussion/Summary:  He presented with asymptomatic ventricular ectopy Holter monitor showed that this encompasses 20% of his beats  It appeared to originate in the fascicle and was responsive to oral verapamil  He is on 240 mg daily with resolution  Left ventricular size and function was normal and stress testing showed no evidence of ischemia  He has mild constipation that he will treat with Colace I will see him again in 6 months  He had no ischemia on stress testing  2D echocardiogram showed moderate mitral and aortic insufficiency    There was mild aortic root dilatation These are asymptomatic    I will follow these up with an echo another echocardiogram in 1 year

## 2018-10-09 NOTE — PROGRESS NOTES
Cardiology Consultation     [facility]  [id number]  1940  HEART & VASCULAR  [facility] Martha Fregoso  [address] [place] [address]    1  PVC's (premature ventricular contractions)  POCT ECG   2  Thoracic aortic aneurysm without rupture ([facility] [address] )     3   Nonrheumatic aortic valve insufficiency           History of present illness  Patient has been referred by Dr James Russo for management of PVCs    Patient was originally seen for PVCs and underwent   Echo - normal, LVEF -60%  Nuclear stress test - normal   Holter - Predominantly sinus rhythm, with an average heart rate of 72 beats per minute, Significant PVC burden of 19 5%    He was started on verapamil with complete resolution of PVC on current rhythm strip    The patient is not complaining of anginal like chest pain or chest pressure  There is no worsening orthopnea, paroxysmal nocturnal dyspnea  There is no leg swelling    Patient does not get palpitation   There is no history of presyncope or syncope  There is no history of transient  lightheadedness  There is no associated exertional intolerance      There is no history of snoring at night  There is no history of morning fatigability  There is occasional history of daytime sleepiness      Patient Active Problem List   Diagnosis    Hearing loss of left ear    PVC's (premature ventricular contractions)    History of prostate cancer    Plantar fasciitis of left foot    Thoracic aortic aneurysm without rupture ([facility] [address]    Nonrheumatic aortic valve insufficiency    Non-rheumatic mitral regurgitation     Past Medical History:   Diagnosis Date    Astigmatism     last assessed 11/15/17    Cataract     Resolved 11/15/17    Cyst of left kidney     last assessed 11/15/2017    Cyst of right kidney     lasst assessed 11/15/2017    Gross hematuria     Last assessed 01/28/16    Hematuria     last assessed 01/28/16    Prostate cancer ([facility] [address] )     last assessed 05/18/17    Renal calculi     last assessed 08/16/12    Squamous cell carcinoma     Vitamin D deficiency     Last assessed 08/19/13     Social History     Social History    Marital status: /Civil Union     Spouse name: N/A    Number of children: N/A    Years of education: N/A     Occupational History    Not on file       Social History Main Topics    Smoking status: Never Smoker    Smokeless tobacco: Never Used    Alcohol use Yes      Comment: social    Drug use: No    Sexual activity: Not on file     Other Topics Concern    Not on file     Social History Narrative    No narrative on file      Family History   Problem Relation Age of Onset    Other Mother 76        Influenza    Hypertension Mother     Heart failure Father 70    Peripheral vascular disease Father     Hypertension Father     Prostate cancer Brother     Stroke Brother 48    Hypertension Brother     Hypertension Family      Past Surgical History:   Procedure Laterality Date    BREAST BIOPSY      Benign breast tumor    COLONOSCOPY      CYSTOSCOPY  01/28/2016    ELBOW SURGERY      Bone chip    HERNIA REPAIR  2002    LITHOTRIPSY      RETROPUBIC PROSTATECTOMY  11/13/2001    Radical with nerve sparing       Current Outpatient Prescriptions:     cyanocobalamin (VITAMIN B-12) 500 mcg tablet, 1,000 mcg Daily  , Disp: , Rfl:     Multiple Vitamins-Minerals (CENTRUM SILVER PO), Daily, Disp: , Rfl:     verapamil (CALAN-SR) 240 mg CR tablet, Take 1 tablet (240 mg total) by mouth daily, Disp: 90 tablet, Rfl: 3  Allergies   Allergen Reactions    Sulfa Antibiotics Rash    Ciprofloxacin      Vitals:    10/02/18 1321   BP: 140/64   BP Location: Left arm   Patient Position: Sitting   Cuff Size: Standard   Pulse: 64   Weight: 76 7 kg (169 lb)   Height: 5' 11" (1 803 m)       Labs:  Lab Results   Component Value Date     08/14/2018     04/06/2015    K 4 2 08/14/2018    K 3 9 04/06/2015     08/14/2018    CL 102 04/06/2015    CO2 30 08/14/2018    CO2 30 04/06/2015    BUN 17 08/14/2018    BUN 16 04/06/2015    CREATININE 1 12 08/14/2018    CREATININE 1 04 04/06/2015    GLUCOSE 96 04/06/2015    CALCIUM 9 1 08/14/2018    CALCIUM 9 0 04/06/2015     No results found for: CKTOTAL, CKMB, CKMBINDEX, TROPONINI  Lab Results   Component Value Date    WBC 9 43 08/14/2018    WBC 6 73 10/16/2015    HGB 13 5 08/14/2018    HGB 14 7 10/16/2015    HCT 42 7 08/14/2018    HCT 43 5 10/16/2015    MCV 96 08/14/2018    MCV 92 10/16/2015     08/14/2018     10/16/2015     Lab Results   Component Value Date    CHOL 176 10/16/2015    TRIG 116 10/25/2017    TRIG 126 10/16/2015    HDL 63 (H) 10/25/2017    HDL 74 10/16/2015     Imaging:   Xr Heel / Calcaneus 2+ Vw Left  Result Date: 10/1/2018  Narrative: LEFT HEEL INDICATION:   M79 672: Pain in left foot  "-LEFT HEEL PAIN FOR A FEW WEEKS   NO INJURY " COMPARISON:  None VIEWS:  XR HEEL / CALCANEUS 2+ VW LEFT FINDINGS: There is no acute fracture or dislocation  No degenerative changes  No lytic or blastic lesions seen  Soft tissues are unremarkable  Impression: No acute osseous abnormality  Workstation performed: [id number]       Review of Systems:  Review of Systems   All other systems reviewed and are negative  as described in my history of present illness        Physical Exam:  Physical Exam   Constitutional: He is oriented to person, place, and time  He appears well-developed and well-nourished  No distress  Not in any distress at the current time   HENT:   Head: Normocephalic and atraumatic  Right Ear: External ear normal    Left Ear: External ear normal    Nose: Nose normal    Mouth/Throat: Uvula is midline and mucous membranes are normal    Eyes: Pupils are equal, round, and reactive to light  Conjunctivae, EOM and lids are normal  No scleral icterus  No pallor  No cyanosis  No icterus   Neck: Trachea normal and normal range of motion  No JVD present   Carotid bruit is not present  No thyromegaly present  No jugular lymphadenopathy   Cardiovascular: Normal rate, regular rhythm, S1 normal, S2 normal, normal heart sounds, intact distal pulses and normal pulses  PMI is not displaced  Exam reveals no gallop, no S3, no S4 and no friction rub  No murmur heard  Pulmonary/Chest: Effort normal and breath sounds normal  No accessory muscle usage  No respiratory distress  He has no decreased breath sounds  He has no wheezes  He has no rhonchi  He has no rales  He exhibits no tenderness  Abdominal: Soft  Normal appearance and bowel sounds are normal  He exhibits no distension and no mass  There is no splenomegaly or hepatomegaly  There is no tenderness  Musculoskeletal: Normal range of motion  He exhibits no edema, tenderness or deformity  Lymphadenopathy:     He has no cervical adenopathy  Neurological: He is alert and oriented to person, place, and time  Facial symmetry is retained  Extraocular movements are retained  Head neck tongue and palate movement are retained and symmetric   Skin: Skin is intact  No abrasion, no lesion and no rash noted  No erythema  Nails show no clubbing  Psychiatric: He has a normal mood and affect  His speech is normal and behavior is normal  Thought content normal        Discussion/Summary:      1  PVC  20% of all beats  Normal EF and no ischemia  Started on verapamil with complete resolution    Continue same   No other recommendation at current time         2   AVR, MVR, thoracic aortic aneurysm  Follow up with primary cardiologist

## 2018-11-01 DIAGNOSIS — C61 MALIGNANT NEOPLASM OF PROSTATE (HCC): ICD-10-CM

## 2018-11-01 DIAGNOSIS — E78.5 HYPERLIPIDEMIA: ICD-10-CM

## 2018-11-02 ENCOUNTER — APPOINTMENT (OUTPATIENT)
Dept: LAB | Facility: CLINIC | Age: 78
End: 2018-11-02
Payer: MEDICARE

## 2018-11-02 DIAGNOSIS — E78.5 HYPERLIPIDEMIA: ICD-10-CM

## 2018-11-02 DIAGNOSIS — C61 MALIGNANT NEOPLASM OF PROSTATE (HCC): ICD-10-CM

## 2018-11-02 LAB
ALBUMIN SERPL BCP-MCNC: 3.4 G/DL (ref 3.5–5)
ALP SERPL-CCNC: 49 U/L (ref 46–116)
ALT SERPL W P-5'-P-CCNC: 39 U/L (ref 12–78)
ANION GAP SERPL CALCULATED.3IONS-SCNC: 4 MMOL/L (ref 4–13)
AST SERPL W P-5'-P-CCNC: 31 U/L (ref 5–45)
BILIRUB SERPL-MCNC: 0.72 MG/DL (ref 0.2–1)
BUN SERPL-MCNC: 17 MG/DL (ref 5–25)
CALCIUM SERPL-MCNC: 8.7 MG/DL (ref 8.3–10.1)
CHLORIDE SERPL-SCNC: 104 MMOL/L (ref 100–108)
CHOLEST SERPL-MCNC: 197 MG/DL (ref 50–200)
CO2 SERPL-SCNC: 28 MMOL/L (ref 21–32)
CREAT SERPL-MCNC: 1 MG/DL (ref 0.6–1.3)
ERYTHROCYTE [DISTWIDTH] IN BLOOD BY AUTOMATED COUNT: 12.9 % (ref 11.6–15.1)
GFR SERPL CREATININE-BSD FRML MDRD: 71 ML/MIN/1.73SQ M
GLUCOSE P FAST SERPL-MCNC: 87 MG/DL (ref 65–99)
HCT VFR BLD AUTO: 40.9 % (ref 36.5–49.3)
HDLC SERPL-MCNC: 64 MG/DL (ref 40–60)
HGB BLD-MCNC: 13.3 G/DL (ref 12–17)
LDLC SERPL CALC-MCNC: 111 MG/DL (ref 0–100)
MCH RBC QN AUTO: 30.7 PG (ref 26.8–34.3)
MCHC RBC AUTO-ENTMCNC: 32.5 G/DL (ref 31.4–37.4)
MCV RBC AUTO: 95 FL (ref 82–98)
NONHDLC SERPL-MCNC: 133 MG/DL
PLATELET # BLD AUTO: 175 THOUSANDS/UL (ref 149–390)
PMV BLD AUTO: 11.4 FL (ref 8.9–12.7)
POTASSIUM SERPL-SCNC: 4.2 MMOL/L (ref 3.5–5.3)
PROT SERPL-MCNC: 6.5 G/DL (ref 6.4–8.2)
PSA SERPL-MCNC: <0.1 NG/ML (ref 0–4)
RBC # BLD AUTO: 4.33 MILLION/UL (ref 3.88–5.62)
SODIUM SERPL-SCNC: 136 MMOL/L (ref 136–145)
TRIGL SERPL-MCNC: 108 MG/DL
TSH SERPL DL<=0.05 MIU/L-ACNC: 2.95 UIU/ML (ref 0.36–3.74)
WBC # BLD AUTO: 6.93 THOUSAND/UL (ref 4.31–10.16)

## 2018-11-02 PROCEDURE — 36415 COLL VENOUS BLD VENIPUNCTURE: CPT

## 2018-11-02 PROCEDURE — 80053 COMPREHEN METABOLIC PANEL: CPT

## 2018-11-02 PROCEDURE — 84443 ASSAY THYROID STIM HORMONE: CPT

## 2018-11-02 PROCEDURE — 85027 COMPLETE CBC AUTOMATED: CPT

## 2018-11-02 PROCEDURE — 80061 LIPID PANEL: CPT

## 2018-11-02 PROCEDURE — 84153 ASSAY OF PSA TOTAL: CPT

## 2018-11-29 ENCOUNTER — OFFICE VISIT (OUTPATIENT)
Dept: FAMILY MEDICINE CLINIC | Facility: CLINIC | Age: 78
End: 2018-11-29
Payer: MEDICARE

## 2018-11-29 VITALS
WEIGHT: 171.6 LBS | BODY MASS INDEX: 24.02 KG/M2 | HEIGHT: 71 IN | SYSTOLIC BLOOD PRESSURE: 120 MMHG | DIASTOLIC BLOOD PRESSURE: 58 MMHG

## 2018-11-29 DIAGNOSIS — Z13.6 ENCOUNTER FOR SCREENING FOR VASCULAR DISEASE: ICD-10-CM

## 2018-11-29 DIAGNOSIS — M79.604 RIGHT LEG PAIN: ICD-10-CM

## 2018-11-29 DIAGNOSIS — I49.3 PVC'S (PREMATURE VENTRICULAR CONTRACTIONS): ICD-10-CM

## 2018-11-29 DIAGNOSIS — Z23 NEED FOR PNEUMOCOCCAL VACCINATION: ICD-10-CM

## 2018-11-29 DIAGNOSIS — Z00.00 HEALTH CARE MAINTENANCE: Primary | ICD-10-CM

## 2018-11-29 PROCEDURE — 99213 OFFICE O/P EST LOW 20 MIN: CPT | Performed by: FAMILY MEDICINE

## 2018-11-29 PROCEDURE — 90732 PPSV23 VACC 2 YRS+ SUBQ/IM: CPT | Performed by: FAMILY MEDICINE

## 2018-11-29 PROCEDURE — G0439 PPPS, SUBSEQ VISIT: HCPCS | Performed by: FAMILY MEDICINE

## 2018-11-29 PROCEDURE — G0009 ADMIN PNEUMOCOCCAL VACCINE: HCPCS | Performed by: FAMILY MEDICINE

## 2018-11-29 NOTE — PROGRESS NOTES
Assessment and Plan:  Problem List Items Addressed This Visit     None        There are no preventive care reminders to display for this patient        HPI:  Patient Active Problem List   Diagnosis    Hearing loss of left ear    PVC's (premature ventricular contractions)    History of prostate cancer    Plantar fasciitis of left foot    Thoracic aortic aneurysm without rupture ([facility] [address]    Nonrheumatic aortic valve insufficiency    Non-rheumatic mitral regurgitation     Past Medical History:   Diagnosis Date    Astigmatism     last assessed 11/15/17    Cataract     Resolved 11/15/17    Cyst of left kidney     last assessed 11/15/2017    Cyst of right kidney     lasst assessed 11/15/2017    Gross hematuria     Last assessed 01/28/16    Hematuria     last assessed 01/28/16    Prostate cancer ([facility] [address] )     last assessed 05/18/17    Renal calculi     last assessed 08/16/12    Squamous cell carcinoma     Vitamin D deficiency     Last assessed 08/19/13     Past Surgical History:   Procedure Laterality Date    BREAST BIOPSY      Benign breast tumor    COLONOSCOPY      CYSTOSCOPY  01/28/2016    ELBOW SURGERY      Bone chip    HERNIA REPAIR  2002    LITHOTRIPSY      RETROPUBIC PROSTATECTOMY  11/13/2001    Radical with nerve sparing     Family History   Problem Relation Age of Onset    Other Mother 76        Influenza    Hypertension Mother     Heart failure Father 70    Peripheral vascular disease Father     Hypertension Father     Prostate cancer Brother     Stroke Brother 48    Hypertension Brother     Hypertension Family      History   Smoking Status    Never Smoker   Smokeless Tobacco    Never Used     History   Alcohol Use    Yes     Comment: social      History   Drug Use No         Current Outpatient Prescriptions   Medication Sig Dispense Refill    cyanocobalamin (VITAMIN B-12) 500 mcg tablet 1,000 mcg Daily        Multiple Vitamins-Minerals (CENTRUM SILVER PO) Daily      verapamil (CALAN-SR) 240 mg CR tablet Take 1 tablet (240 mg total) by mouth daily 90 tablet 3     No current facility-administered medications for this visit  Allergies   Allergen Reactions    Sulfa Antibiotics Rash    Ciprofloxacin      Immunization History   Administered Date(s) Administered    Influenza Split High Dose Preservative Free IM 10/05/2012, 09/17/2013, 09/15/2014, 09/28/2015, 10/03/2016, 10/10/2017    Influenza, high dose seasonal 0 5 mL 09/28/2018    Pneumococcal Conjugate 13-Valent 10/28/2015    Pneumococcal Polysaccharide PPV23 09/18/2008    Zoster 08/18/2011, 04/09/2013       Patient Care Team:  Poornima Krishnan DO as PCP - General  Caitlyn Amezquita MD    Medicare Screening Tests and Risk Assessments:  Brenda Garcia is here for his Subsequent Wellness visit  Last Medicare Wellness visit information reviewed, patient interviewed and updates made to the record today  Health Risk Assessment:  Patient rates overall health as very good  Patient feels that their physical health rating is Same  Eyesight was rated as Same  Hearing was rated as Same  Patient feels that their emotional and mental health rating is Same  Pain experienced by patient in the last 7 days has been Some  Patient's pain rating has been 3/10  Patient states that he has experienced no weight loss or gain in last 6 months  Emotional/Mental Health:  Patient has been feeling nervous/anxious  PHQ-9 Depression Screening:    Frequency of the following problems over the past two weeks:      1  Little interest or pleasure in doing things: 0 - not at all      2  Feeling down, depressed, or hopeless: 0 - not at all  PHQ-2 Score: 0          Broken Bones/Falls: Fall Risk Assessment:    In the past year, patient has experienced: No history of falling in past year          Bladder/Bowel:  Patient has not leaked urine accidently in the last six months  Patient reports no loss of bowel control      Immunizations:  Patient has had a flu vaccination within the last year  Patient has received a pneumonia shot  Patient has received a shingles shot  Patient has not received tetanus/diphtheria shot  Home Safety:  Patient does not have trouble with stairs inside or outside of their home  Patient currently reports that there are no safety hazards present in home, working smoke alarms, working carbon monoxide detectors  Preventative Screenings:   prostate cancer screen performed, no colon cancer screen completed, cholesterol screen completed, glaucoma eye exam completed,     Nutrition:  Current diet: Regular with servings of the following:    Medications:  Patient is currently taking over-the-counter supplements  List of OTC medications includes: Vitamin B12, Centrum Silver  Patient is able to manage medications  Lifestyle Choices:  Patient reports no tobacco use  Patient has not smoked or used tobacco in the past   Patient reports alcohol use  Alcohol use per week: 5 per week  Patient drives a vehicle  Patient wears seat belt  Current level of exercise of physical activity described by patient as: 15-20 min of walking each day, light yoga  Activities of Daily Living:  Can get out of bed by his or her self, able to dress self, able to make own meals, able to do own shopping, able to bathe self, can do own laundry/housekeeping, can manage own money, pay bills and track expenses    Previous Hospitalizations:  No hospitalization or ED visit in past 12 months        Advanced Directives:  Patient has decided on a power of   Patient has spoken to designated power of   Patient has completed advanced directive          Preventative Screening/Counseling:      Cardiovascular:      General: Screening Current          Diabetes:      General: Screening Current      Counseling: Healthy Weight and Healthy Diet          Colorectal Cancer:      General: Screening Not Indicated          Prostate Cancer:      General: Screening Current          Osteoporosis:      General: Screening Not Indicated          AAA:      General: Screening Not Indicated          Glaucoma:      General: Screening Current          HIV:      General: Screening Not Indicated          Hepatitis C:      General: Screening Not Indicated        Advanced Directives:   Patient has living will for healthcare, has durable POA for healthcare, patient has an advanced directive  End of life assessment reviewed with patient  Immunizations:      Influenza: Influenza UTD This Year      Pneumococcal: Pneumococcal Due Today      Shingrix: Risks & Benefits Discussed      Hepatitis B (Low risk patients): Series Not Indicated            No exam data present    Physical Exam:  Review of Systems   Gastrointestinal: Negative for bowel incontinence  Psychiatric/Behavioral: The patient is not nervous/anxious  There were no vitals filed for this visit  There is no height or weight on file to calculate BMI      Physical Exam

## 2018-11-30 PROBLEM — M72.2 PLANTAR FASCIITIS OF LEFT FOOT: Status: RESOLVED | Noted: 2018-10-01 | Resolved: 2018-11-30

## 2018-11-30 NOTE — PROGRESS NOTES
Assessment/Plan:  Patient here for annual Wellness visit as well as review of certain medical issues  Overall PVCs are quite stable on verapamil  Blood pressure stable  Patient had some issues recently after being on Cipro with some right Achilles pain  He did see Orthopedics for plantar fasciitis and that has resolved but he still having some persistent discomfort from time to time in his Achilles and lower calf  Recommend physical therapy evaluation and treat  If not better recommend orthopedic evaluation possible MRI to rule possible tear  Otherwise recheck yearly  1  Health care maintenance     2  Right leg pain  Ambulatory referral to Physical Therapy   3  PVC's (premature ventricular contractions)     4  Need for pneumococcal vaccination  PNEUMOCOCCAL POLYSACCHARIDE VACCINE 23-VALENT =>3YO SQ IM   5  Encounter for screening for vascular disease  VAS screening       Subjective:        Patient ID: Stacey Roy is a 77 y o  male  Chief Complaint   Patient presents with   [facility] Wellness Visit    Leg Pain     R leg pain from medication Cipro, low level pain pt states the pain effects his walking       Patient here for annual wellness as well as problem oriented visit  Overall feels well  Was on Cipro number of months ago and developed some pain in his calf and Achilles  He did stop it  Still having some issues  Leg Pain          The following portions of the patient's history were reviewed and updated as appropriate: past medical history, past surgical history and problem list       Review of Systems   Constitutional: Negative for appetite change, fatigue, fever and unexpected weight change  HENT: Negative for congestion, ear pain, postnasal drip, rhinorrhea, sinus pain, sinus pressure and sore throat  Eyes: Negative for redness and visual disturbance  Respiratory: Negative for chest tightness and shortness of breath      Cardiovascular: Negative for chest pain, palpitations and leg swelling  Gastrointestinal: Negative for abdominal distention, abdominal pain, diarrhea and nausea  Endocrine: Negative for cold intolerance and heat intolerance  Genitourinary: Negative for dysuria and hematuria  Musculoskeletal: Negative for arthralgias, gait problem and myalgias  Right Achilles and calf discomfort from time to time  Skin: Negative for pallor and rash  Neurological: Negative for dizziness and headaches  Psychiatric/Behavioral: Negative for behavioral problems  The patient is not nervous/anxious  Objective:  /58 (BP Location: Left arm, Patient Position: Sitting, Cuff Size: Standard)   Ht 5' 11" (1 803 m)   Wt 77 8 kg (171 lb 9 6 oz)   BMI 23 93 kg/m²        Physical Exam   Constitutional: He is oriented to person, place, and time  He appears well-nourished  No distress  HENT:   Head: Normocephalic and atraumatic  Right Ear: Tympanic membrane and external ear normal    Left Ear: Tympanic membrane and external ear normal    Nose: Nose normal    Mouth/Throat: Oropharynx is clear and moist    Eyes: Pupils are equal, round, and reactive to light  Conjunctivae and EOM are normal  No scleral icterus  Neck: Normal range of motion  Neck supple  No thyromegaly present  Cardiovascular: Normal rate, regular rhythm and intact distal pulses  No murmur heard  Pulses:       Carotid pulses are 0 on the right side, and 0 on the left side  Pulmonary/Chest: Effort normal and breath sounds normal  He has no wheezes  Abdominal: Soft  Bowel sounds are normal  He exhibits no distension and no mass  There is no tenderness  Musculoskeletal: Normal range of motion  He exhibits no edema  Lymphadenopathy:     He has no cervical adenopathy  Neurological: He is alert and oriented to person, place, and time  He has normal reflexes  No cranial nerve deficit  Coordination normal    Skin: Skin is warm  No pallor  Psychiatric: He has a normal mood and affect   His behavior is normal  Thought content normal    Nursing note and vitals reviewed

## 2018-11-30 NOTE — PROGRESS NOTES
Assessment/Plan:    Medicare wellness completed  Pneumovax 23 given today  Flu shot up-to-date  Patient is sent to [facility] for vascular screening  Diagnoses and all orders for this visit:    Health care maintenance    Right leg pain  -     Ambulatory referral to Physical Therapy; Future    PVC's (premature ventricular contractions)    Need for pneumococcal vaccination  -     PNEUMOCOCCAL POLYSACCHARIDE VACCINE 23-VALENT =>1YO SQ IM    Encounter for screening for vascular disease  -     VAS screening; Future        1  Health care maintenance     2  Right leg pain  Ambulatory referral to Physical Therapy   3  PVC's (premature ventricular contractions)     4  Need for pneumococcal vaccination  PNEUMOCOCCAL POLYSACCHARIDE VACCINE 23-VALENT =>1YO SQ IM   5  Encounter for screening for vascular disease  VAS screening       There are no preventive care reminders to display for this patient  Subjective:          HPI:  Lilia Pacheco is a 77 y o  male here for his Subsequent Wellness Visit  Reviewed Updated [facility] Prior Wellness Visits:   Last Medicare wellness visit information was reviewed, patient interviewed , no change since last AWV             Patient Active Problem List   Diagnosis    PVC's (premature ventricular contractions)    History of prostate cancer    Thoracic aortic aneurysm without rupture ([address]    Nonrheumatic aortic valve insufficiency    Non-rheumatic mitral regurgitation     Past Medical History:   Diagnosis Date    Astigmatism     last assessed 11/15/17    Cataract     Resolved 11/15/17    Cyst of left kidney     last assessed 11/15/2017    Cyst of right kidney     lasst assessed 11/15/2017    Gross hematuria     Last assessed 01/28/16    Hematuria     last assessed 01/28/16    Prostate cancer ([address] )     last assessed 05/18/17    Renal calculi     last assessed 08/16/12    Squamous cell carcinoma     Vitamin D deficiency     Last assessed 08/19/13     Past Surgical History:   Procedure Laterality Date    BREAST BIOPSY      Benign breast tumor    COLONOSCOPY      CYSTOSCOPY  01/28/2016    ELBOW SURGERY      Bone chip    HERNIA REPAIR  2002    LITHOTRIPSY      RETROPUBIC PROSTATECTOMY  11/13/2001    Radical with nerve sparing     Family History   Problem Relation Age of Onset    Other Mother 76        Influenza    Hypertension Mother     Heart failure Father 70    Peripheral vascular disease Father     Hypertension Father     Prostate cancer Brother     Stroke Brother 48    Hypertension Brother     Hypertension Family      History   Smoking Status    Never Smoker   Smokeless Tobacco    Never Used     History   Alcohol Use    Yes     Comment: social      History   Drug Use No       Current Outpatient Prescriptions   Medication Sig Dispense Refill    cyanocobalamin (VITAMIN B-12) 500 mcg tablet 1,000 mcg Daily        Multiple Vitamins-Minerals (CENTRUM SILVER PO) Daily      verapamil (CALAN-SR) 240 mg CR tablet Take 1 tablet (240 mg total) by mouth daily 90 tablet 3     No current facility-administered medications for this visit        Allergies   Allergen Reactions    Sulfa Antibiotics Rash    Ciprofloxacin      Immunization History   Administered Date(s) Administered    Influenza Split High Dose Preservative Free IM 10/05/2012, 09/17/2013, 09/15/2014, 09/28/2015, 10/03/2016, 10/10/2017    Influenza, high dose seasonal 0 5 mL 09/28/2018    Pneumococcal Conjugate 13-Valent 10/28/2015    Pneumococcal Polysaccharide PPV23 09/18/2008, 11/29/2018    Zoster 08/18/2011, 04/09/2013       Patient Care Team:  Walker Roman DO as PCP - Chris Raymond MD        Medicare Screening Tests and Risk Assessments:  [unfilled]   [unfilled]  No exam data present            Objective:  /58 (BP Location: Left arm, Patient Position: Sitting, Cuff Size: Standard)   Ht 5' 11" (1 803 m)   Wt 77 8 kg (171 lb 9 6 oz)   BMI 23 93 kg/m²

## 2018-12-04 ENCOUNTER — EVALUATION (OUTPATIENT)
Dept: PHYSICAL THERAPY | Facility: CLINIC | Age: 78
End: 2018-12-04
Payer: MEDICARE

## 2018-12-04 DIAGNOSIS — M79.604 RIGHT LEG PAIN: ICD-10-CM

## 2018-12-04 PROCEDURE — 97162 PT EVAL MOD COMPLEX 30 MIN: CPT | Performed by: PHYSICAL THERAPIST

## 2018-12-04 PROCEDURE — G8979 MOBILITY GOAL STATUS: HCPCS | Performed by: PHYSICAL THERAPIST

## 2018-12-04 PROCEDURE — G8978 MOBILITY CURRENT STATUS: HCPCS | Performed by: PHYSICAL THERAPIST

## 2018-12-04 PROCEDURE — 97530 THERAPEUTIC ACTIVITIES: CPT | Performed by: PHYSICAL THERAPIST

## 2018-12-06 ENCOUNTER — OFFICE VISIT (OUTPATIENT)
Dept: PHYSICAL THERAPY | Facility: CLINIC | Age: 78
End: 2018-12-06
Payer: MEDICARE

## 2018-12-06 DIAGNOSIS — M79.604 RIGHT LEG PAIN: Primary | ICD-10-CM

## 2018-12-06 PROCEDURE — 97112 NEUROMUSCULAR REEDUCATION: CPT | Performed by: PHYSICAL THERAPIST

## 2018-12-06 PROCEDURE — 97110 THERAPEUTIC EXERCISES: CPT | Performed by: PHYSICAL THERAPIST

## 2018-12-06 NOTE — PROGRESS NOTES
Daily Note     Today's date: 2018  Patient name: Alyson Powers  : 1940  MRN: [id number]  Referring provider: Dallas Lozano DO  Dx:   Encounter Diagnosis     ICD-10-CM    1  Right leg pain M79 604                   Subjective: "I was just a little sore after the home exercise program "      Objective: See treatment diary below        Precautions: not any given     Daily Treatment Diary      Manual                     R LE PROM/stretch t/o    10'                   R HS stretch    5x20"                                                                                                 Exercise Diary                     AROM R ankle 3xea  10x                   Step stretch 2x15"  10x15"                   DF strap stretch 2x15"  10x15"                   U/l balance    10x3"                   Bike    10'                    LE press    L2, 4'                    Bridging    10x2                    SLR    10x2                                            Postural education    5'                    HEP review    5'                                                                                                                                                                                                                                                 Modalities                                            CP                                                            Assessment: Tolerated treatment well  Patient presents with tightness of R LE t/o at hips  Working of generalized stretching and modified strengthening  No pain after tx  Educated on updated HEP  Plan: Continue PT as per POC

## 2018-12-11 ENCOUNTER — OFFICE VISIT (OUTPATIENT)
Dept: PHYSICAL THERAPY | Facility: CLINIC | Age: 78
End: 2018-12-11
Payer: MEDICARE

## 2018-12-11 DIAGNOSIS — M79.604 RIGHT LEG PAIN: Primary | ICD-10-CM

## 2018-12-11 PROCEDURE — 97112 NEUROMUSCULAR REEDUCATION: CPT | Performed by: PHYSICAL THERAPIST

## 2018-12-11 PROCEDURE — 97110 THERAPEUTIC EXERCISES: CPT | Performed by: PHYSICAL THERAPIST

## 2018-12-11 NOTE — PROGRESS NOTES
Daily Note     Today's date: 2018  Patient name: Jayshree Powell  : 1940  MRN: [id number]  Referring provider: Elina Hamilton DO  Dx:   Encounter Diagnosis     ICD-10-CM    1  Right leg pain M79 604                   Subjective: "I feel much better  I could walk for 1/2 hour yesterday without pain "      Objective: See treatment diary below       Precautions: not any given     Daily Treatment Diary      Manual                   R LE PROM/stretch t/o    10'  10'                 R HS stretch    5x20"  3'                                                                                               Exercise Diary                   AROM R ankle 3xea  10x                   Step stretch 2x15"  10x15"                   DF strap stretch 2x15"  10x15"  10x15"                 U/l balance    10x3"                   Bike    10'  10'                  LE press    L2, 4'  L2,5'                  Bridging    10x2  10x2 w  Tball                  SLR    10x2  15x2                                          Postural education    5'  5'                  HEP review    5'  5'                  B TKE w  T-ball      10x2                  step ups ant/lat      10x2                                                                                                                                                                                               Modalities                                          CP                                                       Assessment: Tolerated treatment well  Patient was able to perform all therex without any pain  Pt is motivated and was educated on updated daily HEP  Plan: Continue PT as per POC

## 2018-12-13 ENCOUNTER — OFFICE VISIT (OUTPATIENT)
Dept: PHYSICAL THERAPY | Facility: CLINIC | Age: 78
End: 2018-12-13
Payer: MEDICARE

## 2018-12-13 DIAGNOSIS — M79.604 RIGHT LEG PAIN: Primary | ICD-10-CM

## 2018-12-13 PROCEDURE — 97140 MANUAL THERAPY 1/> REGIONS: CPT | Performed by: PHYSICAL THERAPIST

## 2018-12-13 PROCEDURE — 97110 THERAPEUTIC EXERCISES: CPT | Performed by: PHYSICAL THERAPIST

## 2018-12-13 NOTE — PROGRESS NOTES
Daily Note     Today's date: 2018  Patient name: Cary Macedo  : 1940  MRN: [id number]  Referring provider: Linus Murphy DO  Dx:   Encounter Diagnosis     ICD-10-CM    1  Right leg pain M79 604                   Subjective: "I am better, just little muscle soreness in my right calf muscles  Nothing serious "      Objective: See treatment diary below  Precautions: not any given     Daily Treatment Diary      Manual                 R LE PROM/stretch t/o    10'  10'                 R HS stretch    5x20"  3'  3'                R calfs STM        10'                                                                     Exercise Diary                 AROM R ankle 3xea  10x                   Step stretch 2x15"  10x15"                   DF strap stretch 2x15"  10x15"  10x15"                 U/l balance    10x3"   foam, 10x2               Bike    10'  10'  10'                LE press    L2, 4'  L2,5'  L2,5'                Bridging    10x2  10x2 w  Tball                  SLR    10x2  15x2                                          Postural education    5'  5'  5'                HEP review    5'  5'  5'                B TKE w  T-ball      10x2                  step ups ant/lat      10x2  10x2                Foam HR        10x2                Foam mini-squats        10x2                HS strap stretch       10x15"                                                                                                                     Modalities                 MH        8'               CP                                                         Assessment: Tolerated treatment well  Patient continues to work on various strengthening/balance/gait and self stretching of LE techniques  No pain after tx  Plan: Continues PT as per POC

## 2018-12-14 ENCOUNTER — APPOINTMENT (OUTPATIENT)
Dept: PHYSICAL THERAPY | Facility: CLINIC | Age: 78
End: 2018-12-14
Payer: MEDICARE

## 2018-12-18 ENCOUNTER — OFFICE VISIT (OUTPATIENT)
Dept: PHYSICAL THERAPY | Facility: CLINIC | Age: 78
End: 2018-12-18
Payer: MEDICARE

## 2018-12-18 DIAGNOSIS — M79.604 RIGHT LEG PAIN: Primary | ICD-10-CM

## 2018-12-18 PROCEDURE — 97112 NEUROMUSCULAR REEDUCATION: CPT | Performed by: PHYSICAL THERAPIST

## 2018-12-18 PROCEDURE — 97110 THERAPEUTIC EXERCISES: CPT | Performed by: PHYSICAL THERAPIST

## 2018-12-18 NOTE — PROGRESS NOTES
Daily Note     Today's date: 2018  Patient name: Thelma Holland  : 1940  MRN: [id number]  Referring provider: Amna Borja DO  Dx:   Encounter Diagnosis     ICD-10-CM    1  Right leg pain M79 604                   Subjective: "I am good today "      Objective: See treatment diary below  Daily Treatment Diary      Manual               R LE PROM/stretch t/o    10'  10'                 R HS stretch    5x20"  3'  3'  3'              R calfs STM        10'                                                                     Exercise Diary               AROM R ankle 3xea  10x      10x2             Step stretch 2x15"  10x15"                   DF strap stretch 2x15"  10x15"  10x15"    10x15"             U/l balance    10x3"   foam, 10x2  foam 15x2             Bike    10'  10'  10'  10'              LE press    L2, 4'  L2,5'  L2,5'  L3,5'              Bridging    10x2  10x2 w  Tball                  SLR    10x2  15x2                                          Postural education    5'  5'  5'  8'              HEP review    5'  5'  5'  5'              B TKE w  T-ball      10x2    10x2              step ups ant/lat      10x2  10x2                Foam HR        10x2                Foam mini-squats        10x2                HS strap stretch       10x15"                sit to stand w T-ball          10x2             Eliptical           3'              Foam amb          5'              Open floor marching w arms swing          5'                   Modalities               MH        8'               CP                                                      Assessment: Tolerated treatment well  Patient continues to work on various LE strengthening and gait/balance ex  No pain after tx  Decreased arm swing with amb noted - verbal and tactile cues needed to correct posture  Plan: Continue PT as per POC

## 2018-12-21 ENCOUNTER — OFFICE VISIT (OUTPATIENT)
Dept: PHYSICAL THERAPY | Facility: CLINIC | Age: 78
End: 2018-12-21
Payer: MEDICARE

## 2018-12-21 DIAGNOSIS — M79.604 RIGHT LEG PAIN: Primary | ICD-10-CM

## 2018-12-21 PROCEDURE — 97110 THERAPEUTIC EXERCISES: CPT | Performed by: PHYSICAL THERAPIST

## 2018-12-21 PROCEDURE — 97112 NEUROMUSCULAR REEDUCATION: CPT | Performed by: PHYSICAL THERAPIST

## 2018-12-21 NOTE — PROGRESS NOTES
Daily Note     Today's date: 2018  Patient name: Surjit Mackey  : 1940  MRN: [id number]  Referring provider: Cesia Toth DO  Dx:   Encounter Diagnosis     ICD-10-CM    1  Right leg pain M79 604                   Subjective: "      Objective: See treatment diary below    Daily Treatment Diary      Manual             R LE PROM/stretch t/o    10'  10'                 R HS stretch    5x20"  3'  3'  3'  5'            R calfs STM        10'                                                                     Exercise Diary             AROM R ankle 3xea  10x      10x2             Step stretch 2x15"  10x15"                   DF strap stretch 2x15"  10x15"  10x15"    10x15"  10x15"           U/l balance    10x3"   foam, 10x2  foam 15x2  foam 15x2           Bike    10'  10'  10'  10'  10'            LE press    L2, 4'  L2,5'  L2,5'  L3,5'  L3,5'            Bridging    10x2  10x2 w  Tball      15x2 w t-ball            SLR    10x2  15x2      15x2                                    Postural education    5'  5'  5'  8'  8'            HEP review    5'  5'  5'  5'  5'            B TKE w  T-ball      10x2    10x2              step ups ant/lat      10x2  10x2    15x2 ea            Foam HR        10x2    10x2            Foam mini-squats        10x2    10x2            HS strap stretch       10x15"                sit to stand w T-ball          10x2             Eliptical           3'              Foam amb          5'              Open floor marching w arms swing          5'                   Modalities             MH        8'               CP                                                    Assessment: Tolerated treatment well  Patient without any discomfort  Showing progress in LE mm strength with various therex  Improvement in quality of gait/turns noted  Plan: Continue PT as per POC

## 2018-12-27 ENCOUNTER — APPOINTMENT (OUTPATIENT)
Dept: PHYSICAL THERAPY | Facility: CLINIC | Age: 78
End: 2018-12-27
Payer: MEDICARE

## 2018-12-27 ENCOUNTER — OFFICE VISIT (OUTPATIENT)
Dept: PHYSICAL THERAPY | Facility: CLINIC | Age: 78
End: 2018-12-27
Payer: MEDICARE

## 2018-12-27 DIAGNOSIS — M79.604 RIGHT LEG PAIN: Primary | ICD-10-CM

## 2018-12-27 PROCEDURE — 97140 MANUAL THERAPY 1/> REGIONS: CPT | Performed by: PHYSICAL THERAPIST

## 2018-12-27 PROCEDURE — 97110 THERAPEUTIC EXERCISES: CPT | Performed by: PHYSICAL THERAPIST

## 2018-12-27 NOTE — PROGRESS NOTES
Daily Note     Today's date: 2018  Patient name: Cary Macedo  : 1940  MRN: [id number]  Referring provider: Linus Murphy DO  Dx:   Encounter Diagnosis     ICD-10-CM    1  Right leg pain M79 604                   Subjective: "I am getting stronger, just today my right calf is hurting a little bit  About 2/10 "      Objective: See treatment diary below    Pain: 2/10 right calfs  Posture: Fair+, still decreased arm swing and trunk rotation with amb  MMT: L LE 4+ t/o, R LE 4/10 t/o  ROM: B LE WFL t/o   Balance: still decreased dynamic standing balance Fair/Fair+ on/off  HEP: some verbal and tactile cues needed for safe ex techniques      Daily Treatment Diary      Manual           R LE PROM/stretch t/o    10'  10'                 R HS stretch    5x20"  1'  3'  3'  5'  5'          R calfs STM        10'      10'                                                               Exercise Diary           AROM R ankle 3xea  10x      10x2             Step stretch 2x15"  10x15"                   DF strap stretch 2x15"  10x15"  10x15"    10x15"  10x15"           U/l balance    10x3"   foam, 10x2  foam 15x2  foam 15x2  foam 15x2         Bike    10'  10'  10'  10'  10'  10'          LE press    L2, 4'  L2,5'  L2,5'  L3,5'  L3,5'  L4, 5'          Bridging    10x2  10x2 w  Tball      15x2 w t-ball  15x2 w T-ball          SLR    10x2  15x2      15x2  15x2 ER         Re-asessment              10'            Postural education    5'  5'  5'  8'  8'  8'          HEP review    5'  5'  5'  5'  5'  3'          B TKE w  T-ball      10x2    10x2              step ups ant/lat      10x2  10x2    15x2 ea            Foam HR        10x2    10x2  15x2          Foam mini-squats        10x2    10x2  15x2          HS strap stretch       10x15"                sit to stand w T-ball          10x2             Eliptical           3'    3'          Foam amb          5'              Open floor marching w arms swing          5'    8'               Modalities  12/4 12/6 12/11 12/13 12/18 12/21                   8'               CP                                                         Goals from 12/4/18  STG's (1-2 weeks)  1  S with all HEP  (reached)  2  S with postural education  (reached)  3  Increase MMT in R ankle by 1/2 grade t/o  (reached)    LTG's (3-4 weeks) - pt  will continue to work on these goals for next 2 weeks  1  Independent with all HEP  (still working on)  2  Independent with self postural correction  (still working on)  3  B ankles/feet MMT 5/5 t/o - no limitations  (still working on)    Assessment: Tolerated treatment well  Patient shows improvement in LE strength, postural awareness  Still requires verbal and tactile cues with some therex/HEP techniques  Pt is very motivated and compliant with HEP  Plan: Continue as per new POC for next 2 weeks of individual tx

## 2018-12-28 ENCOUNTER — APPOINTMENT (OUTPATIENT)
Dept: PHYSICAL THERAPY | Facility: CLINIC | Age: 78
End: 2018-12-28
Payer: MEDICARE

## 2019-01-02 ENCOUNTER — OFFICE VISIT (OUTPATIENT)
Dept: PHYSICAL THERAPY | Facility: CLINIC | Age: 79
End: 2019-01-02
Payer: MEDICARE

## 2019-01-02 DIAGNOSIS — M79.604 RIGHT LEG PAIN: Primary | ICD-10-CM

## 2019-01-02 PROCEDURE — 97112 NEUROMUSCULAR REEDUCATION: CPT | Performed by: PHYSICAL THERAPIST

## 2019-01-02 PROCEDURE — 97110 THERAPEUTIC EXERCISES: CPT | Performed by: PHYSICAL THERAPIST

## 2019-01-02 NOTE — PROGRESS NOTES
Daily Note     Today's date: 2019  Patient name: Lei East  : 1940  MRN: [id number]  Referring provider: Migue Lobo DO  Dx:   Encounter Diagnosis     ICD-10-CM    1  Right leg pain M79 604                   Subjective: "I am good today  No pain  I can also walk more without any discomfort "      Objective: See treatment diary below       Manual    1/2       R LE PROM/stretch t/o    10'  10'                 R HS stretch    5x20"  3'  3'  3'  5'  5'          R calfs STM        10'      10'                                                               Exercise Diary    1       AROM R ankle 3xea  10x      10x2             Step stretch 2x15"  10x15"                   DF strap stretch 2x15"  10x15"  10x15"    10x15"  10x15"           U/l balance    10x3"   foam, 10x2  foam 15x2  foam 15x2  foam 15x2  foam 15x2       Bike    10'  10'  10'  10'  10'  10'  10'        LE press    L2, 4'  L2,5'  L2,5'  L3,5'  L3,5'  L4, 5'  L4,7'        Bridging    10x2  10x2 w  Tball      15x2 w t-ball  15x2 w T-ball  15x2 w T-ball        SLR    10x2  15x2      15x2  15x2 ER         Re-asessment              10'             Postural education    5'  5'  5'  8'  8'  8'  5'        HEP review    5'  5'  5'  5'  5'  3'  5'        B TKE w  T-ball      10x2    10x2              step ups ant/lat      10x2  10x2    15x2 ea    15x2ea        Foam HR        10x2    10x2  15x2  15x2        Foam mini-squats        10x2    10x2  15x2  15x2        HS strap stretch       10x15"                sit to stand w T-ball          10x2      15x2       Eliptical           3'    3'  4'        Foam amb          5'              Open floor marching w arms swing          5'    8'  8'             Modalities    1/2       MH        8'               CP                                                       Assessment: Tolerated treatment very well  No discomfort in R LE verbalized after tx  Patient continues to show improvement in LE strength and quality of gait/balance  Plan: 2 more review tx and D/C

## 2019-01-07 ENCOUNTER — OFFICE VISIT (OUTPATIENT)
Dept: PHYSICAL THERAPY | Facility: CLINIC | Age: 79
End: 2019-01-07
Payer: MEDICARE

## 2019-01-07 DIAGNOSIS — M79.604 RIGHT LEG PAIN: Primary | ICD-10-CM

## 2019-01-07 PROCEDURE — 97110 THERAPEUTIC EXERCISES: CPT | Performed by: PHYSICAL THERAPIST

## 2019-01-07 PROCEDURE — 97112 NEUROMUSCULAR REEDUCATION: CPT | Performed by: PHYSICAL THERAPIST

## 2019-01-07 NOTE — PROGRESS NOTES
Daily Note     Today's date: 2019  Patient name: Lei East  : 1940  MRN: [id number]  Referring provider: Migue Lobo DO  Dx:   Encounter Diagnosis     ICD-10-CM    1  Right leg pain M79 604                   Subjective: "I am fine  No pain in my leg "      Objective: See treatment diary below       Manual       R LE PROM/stretch t/o    10'  10'                 R HS stretch    5x20"  3'  3'  3'  5'  5'    5'      R calfs STM        10'      10'                                                                Exercise Diary       AROM R ankle 3xea  10x      10x2             Step stretch 2x15"  10x15"                   DF strap stretch 2x15"  10x15"  10x15"    10x15"  10x15"      10x15"     U/l balance    10x3"   foam, 10x2  foam 15x2  foam 15x2  foam 15x2  foam 15x2       Bike    10'  10'  10'  10'  10'  10'  10'  10'      LE press    L2, 4'  L2,5'  L2,5'  L3,5'  L3,5'  L4, 5'  L4,7'  L4,7'      Bridging    10x2  10x2 w  Tball      15x2 w t-ball  15x2 w T-ball  15x2 w T-ball  15x2  w T-call      SLR    10x2  15x2      15x2  15x2 ER    15x2     Re-asessment              10'             Postural education    5'  5'  5'  8'  8'  8'  5'  5'      HEP review    5'  5'  5'  5'  5'  3'  5'  3'      B TKE w  T-ball      10x2    10x2              step ups ant/lat      10x2  10x2    15x2 ea    15x2ea        Foam HR        10x2    10x2  15x2  15x2  15x2      Foam mini-squats        10x2    10x2  15x2  15x2  15x2      HS strap stretch       10x15"                sit to stand w T-ball          10x2      15x2       Eliptical           3'    3'  4'  4'      Foam amb          5'              Open floor marching w arms swing          5'    8'  8'             Modalities       MH        8'               CP                                             Assessment: Tolerated treatment well  Patient is safe with all therex  Improvement in quality of gait with better postural awareness noted  No pain after tx  Plan: Continue for 1 more tx  and D/C

## 2019-01-09 ENCOUNTER — OFFICE VISIT (OUTPATIENT)
Dept: PHYSICAL THERAPY | Facility: CLINIC | Age: 79
End: 2019-01-09
Payer: MEDICARE

## 2019-01-09 DIAGNOSIS — M79.604 RIGHT LEG PAIN: Primary | ICD-10-CM

## 2019-01-09 PROCEDURE — 97110 THERAPEUTIC EXERCISES: CPT | Performed by: PHYSICAL THERAPIST

## 2019-01-09 PROCEDURE — 97112 NEUROMUSCULAR REEDUCATION: CPT | Performed by: PHYSICAL THERAPIST

## 2019-01-09 NOTE — PROGRESS NOTES
Daily Note/Discharge Note    Today's date: 2019  Patient name: Milana Donovan  : 1940  MRN: [id number]  Referring provider: Mando Hood DO  Dx:   Encounter Diagnosis     ICD-10-CM    1  Right leg pain M79 604                   Subjective: "I am fine  No discomfort "      Objective: See treatment diary below  Posture: Good with increase arm swing and more upright posture with amb  Pain: 0/10  Gait: independent and safe on all surfaces, no AD use  B LE ROM: WFL t/o  B LE MMT: grossly 5/5 t/o       Manual     R LE PROM/stretch t/o    10'  10'                 R HS stretch    5x20"  3'  3'  3'  5'  5'    5'  5'    R calfs STM        10'      10'                                                                  Exercise Diary     AROM R ankle 3xea  10x      10x2             Step stretch 2x15"  10x15"                   DF strap stretch 2x15"  10x15"  10x15"    10x15"  10x15"      10x15"  10x15   U/l balance    10x3"   foam, 10x2  foam 15x2  foam 15x2  foam 15x2  foam 15x2       Bike    10'  10'  10'  10'  10'  10'  10'  10'  10'    LE press    L2, 4'  L2,5'  L2,5'  L3,5'  L3,5'  L4, 5'  L4,7'  L4,7'  L4,7'    Bridging    10x2  10x2 w  Tball      15x2 w t-ball  15x2 w T-ball  15x2 w T-ball  15x2  w T-call  15x2    SLR    10x2  15x2      15x2  15x2 ER    15x2  15x2   Re-asessment              10'             Postural education    5'  5'  5'  8'  8'  8'  5'  5'  5'    HEP review    5'  5'  5'  5'  5'  3'  5'  3'  5'    B TKE w  T-ball      10x2    10x2              step ups ant/lat      10x2  10x2    15x2 ea    15x2ea        Foam HR        10x2    10x2  15x2  15x2  15x2      Foam mini-squats        10x2    10x2  15x2  15x2  15x2      HS strap stretch       10x15"            10x15"    sit to stand w T-ball          10x2      15x2       Eliptical           3'    3'  4'  4'  4'    Foam amb          5'              Open floor marching w arms swing          5'    8'  8'    4'   Re-assessment          10'         Modalities  12/4 12/6 12/11 12/13 12/18 12/21 12/27 1/2 1/7 1/9           8'               CP                                                   Goals  STG's (1-2 weeks)  1  S with all HEP  (reached)  2  S with postural education  (reached)  3  Increase MMT in R ankle by 1/2 grade t/o  (reached)    LTG's (3-4 weeks)  1  Independent with all HEP  (reached)  2  Independent with self postural correction  (reached)  3  B ankles/feet MMT 5/5 t/o - no limitations  (reached)    Assessment: Tolerated treatment well  No pain, no functional limitations, no pain, all goals reached  Patient reached max potential in PT at this time        Plan: D/C from PT

## 2019-02-13 ENCOUNTER — OFFICE VISIT (OUTPATIENT)
Dept: FAMILY MEDICINE CLINIC | Facility: CLINIC | Age: 79
End: 2019-02-13
Payer: MEDICARE

## 2019-02-13 VITALS
HEIGHT: 71 IN | SYSTOLIC BLOOD PRESSURE: 110 MMHG | WEIGHT: 170 LBS | BODY MASS INDEX: 23.8 KG/M2 | DIASTOLIC BLOOD PRESSURE: 60 MMHG

## 2019-02-13 DIAGNOSIS — M53.3 DISORDER OF SI (SACROILIAC) JOINT: Primary | ICD-10-CM

## 2019-02-13 DIAGNOSIS — M54.50 ACUTE LEFT-SIDED LOW BACK PAIN WITHOUT SCIATICA: ICD-10-CM

## 2019-02-13 DIAGNOSIS — M21.70 LEG LENGTH DISCREPANCY: ICD-10-CM

## 2019-02-13 DIAGNOSIS — Z85.46 HISTORY OF PROSTATE CANCER: ICD-10-CM

## 2019-02-13 PROCEDURE — 99213 OFFICE O/P EST LOW 20 MIN: CPT | Performed by: FAMILY MEDICINE

## 2019-02-13 RX ORDER — UBIDECARENONE 75 MG
CAPSULE ORAL DAILY
COMMUNITY
End: 2019-02-27 | Stop reason: SDUPTHER

## 2019-02-13 NOTE — PROGRESS NOTES
Assessment/Plan:  Treat as acute left SI joint dysfunction  Recommend heat and range-of-motion exercises as demonstrated  Recommend topical salonpas as well as theraband  Recommend Advil 2-3 tablets twice daily  If no better will refer to [address] [place] [address] physical therapy in [place]  Hold x-rays for the 1st 30 days  Also given the name for local medical massage therapist        Diagnoses and all orders for this visit:    Disorder of SI (sacroiliac) joint    Acute left-sided low back pain without sciatica    Leg length discrepancy    History of prostate cancer    Other orders  -     Multiple Vitamins-Minerals (CENTRUM SILVER 50+MEN PO); Daily  -     cyanocobalamin (VITAMIN B-12) 100 mcg tablet; Daily        1  Disorder of SI (sacroiliac) joint     2  Acute left-sided low back pain without sciatica     3  Leg length discrepancy     4  History of prostate cancer         Subjective:        Patient ID: Cary Macedo is a 77 y o  male  Chief Complaint   Patient presents with    Hip Pain     Symptoms began on Monday after standing up - On Left side - middle of back/hip       Patient with left sided low back pain around his SI joint  States he just woke up on the weekend and within minutes started feeling discomfort in that area  Denies any injury or any recent travel or repetitive motion  Try Tylenol with no relief  The following portions of the patient's history were reviewed and updated as appropriate: past medical history, past surgical history and problem list       Review of Systems   Constitutional: Negative for fever  Respiratory: Negative for shortness of breath  Cardiovascular: Negative for chest pain and leg swelling  Musculoskeletal: Positive for back pain  Neurological: Negative for numbness           Objective:  /60 (BP Location: Right arm, Patient Position: Sitting, Cuff Size: Standard)   Ht 5' 11" (1 803 m)   Wt 77 1 kg (170 lb)   BMI 23 71 kg/m²        Physical Exam Constitutional: He appears well-nourished  He appears distressed  HENT:   Head: Normocephalic  Cardiovascular: Normal rate  Pulmonary/Chest: Breath sounds normal    Musculoskeletal: He exhibits no edema  Decreased lumbar  flexion  Negative straight leg raising  Slightly tender SI joint on palpation  Posture is not good alignment as patient is slightly hunched  Positive  leg length discrepancy   Neurological: He is alert

## 2019-02-15 ENCOUNTER — TRANSCRIBE ORDERS (OUTPATIENT)
Dept: FAMILY MEDICINE CLINIC | Facility: CLINIC | Age: 79
End: 2019-02-15

## 2019-02-15 ENCOUNTER — TELEPHONE (OUTPATIENT)
Dept: FAMILY MEDICINE CLINIC | Facility: CLINIC | Age: 79
End: 2019-02-15

## 2019-02-15 DIAGNOSIS — M54.9 BACK PAIN, UNSPECIFIED BACK LOCATION, UNSPECIFIED BACK PAIN LATERALITY, UNSPECIFIED CHRONICITY: Primary | ICD-10-CM

## 2019-02-15 NOTE — TELEPHONE ENCOUNTER
Please put physical therapy prescription in the computer  He will go for physical therapy done in [facility] for low back pain    Let the patient know when it is ready so he can call and make an appointment

## 2019-02-15 NOTE — TELEPHONE ENCOUNTER
Patient states he is feeling better but still feels he needs to see physical therapy and would like script put in  Please advise

## 2019-02-26 ENCOUNTER — EVALUATION (OUTPATIENT)
Dept: PHYSICAL THERAPY | Facility: CLINIC | Age: 79
End: 2019-02-26
Payer: MEDICARE

## 2019-02-26 DIAGNOSIS — M54.9 BACK PAIN, UNSPECIFIED BACK LOCATION, UNSPECIFIED BACK PAIN LATERALITY, UNSPECIFIED CHRONICITY: ICD-10-CM

## 2019-02-26 PROCEDURE — 97162 PT EVAL MOD COMPLEX 30 MIN: CPT | Performed by: PHYSICAL THERAPIST

## 2019-02-26 NOTE — PROGRESS NOTES
Physical Therapy Initial Evaluation    Today's date: 2019  Patient name: Benigno Guan  : 1940  MRN: [id number]  Referring provider: Evelyn Perrin DO  Dx:   Encounter Diagnosis     ICD-10-CM    1  Back pain, unspecified back location, unspecified back pain laterality, unspecified chronicity M54 9 Ambulatory referral to Physical Therapy                  Assessment  Assessment details: Pt's problem list: c/o pain with back extension/standing/amb, (+) tenderness and trigger points in LB, decreased MMT in LE's due to discomfort, poor posture, poor body mechanics, decreased AROM in LB  Impairments: abnormal gait, abnormal or restricted ROM, abnormal movement, activity intolerance, impaired physical strength, pain with function, weight-bearing intolerance, poor posture  and poor body mechanics  Understanding of Dx/Px/POC: good   Prognosis: good    Goals  STG's (1-3 weeks)  1  S with all HEP  2  S with postural education to improve body mechanics with ADL's at home with less discomfort to 5/10 at worst   3  Increase MMT in R LE by 1/2 grade at hip t/o to improve quality of gait  LTG's (4-6 weeks)  1  Independent with all HEP  2  Independent with self postural correction  3  Independent with safe body mechanics      Plan  Patient would benefit from: skilled physical therapy  Planned modality interventions: low level laser therapy and TENS  Planned therapy interventions: joint mobilization, manual therapy, neuromuscular re-education, patient education, postural training, strengthening, stretching, therapeutic activities, therapeutic exercise, therapeutic training, transfer training, home exercise program, gait training, functional ROM exercises, flexibility, body mechanics training and balance/weight bearing training  Frequency: 2x week  Duration in weeks: 6  Treatment plan discussed with: patient        Subjective Evaluation    History of Present Illness  Date of onset: 2019  Mechanism of injury: Pt  is 76 y/o male with c/o acute LBP for about 2 weeks  Pt has less discomfort in sitting and side sleeping  C/o increase pain with standing and amb/WB on R LE  Symptoms: c/o cenrtalized LBP and on/off R LE radic to calf mm with standing and amb  Current functional limitations: c/o pain amb and WB on R LE, prolong standing  Some decrease in symptoms with flexion FF  Not a recurrent problem   Quality of life: fair    Pain  Current pain ratin  At best pain ratin  At worst pain ratin  Location: LB and R LE  Quality: dull ache  Relieving factors: rest, relaxation, change in position and medications  Aggravating factors: standing and walking    Social Support  Steps to enter house: yes (2 steps, no HR)  Stairs in house: yes (2 flights of stairs, u/l HR)   Lives in: multiple-level home  Lives with: spouse    Employment status: not working    Diagnostic Tests  No diagnostic tests performed  Treatments  Previous treatment: medication  Current treatment: medication  Patient Goals  Patient goals for therapy: decreased pain, increased motion, increased strength and independence with ADLs/IADLs          Objective     Static Posture   General Observations  Asymmetrical weight bearing, left leg length discrepancy, shifted left and flexed  Head  Forward  Shoulders  Asymmetric shoulders and rounded  Thoracic Spine  Hyperkyphosis and flattened thoracic spine  Lumbar Spine   Flattened and decreased lordosis  Postural Observations  Seated posture: poor  Standing posture: poor  Correction of posture: has no consistent effect        Palpation     Right   Tenderness of the erector spinae and lumbar paraspinals  Trigger point to erector spinae and lumbar paraspinals       Active Range of Motion   Cervical/Thoracic Spine       Thoracic    Flexion:  WFL  Extension:  Restriction level: maximal  Left lateral flexion:  Restriction level: moderate  Right lateral flexion:  with pain Restriction level: maximal  Left rotation:  Restriction level: minimal  Right rotation:  Restriction level: minimal    Lumbar   Flexion:  Restriction level: minimal  Extension:  Restriction level: maximal  Left lateral flexion:  Restriction level: minimal  Right lateral flexion:  with pain  Left rotation:  Restriction level: minimal  Right rotation:  Restriction level: minimal    Passive Range of Motion     Lumbar   Left lateral flexion:  Restriction level: minimal  Left rotation:  Restriction level: minimal  Right rotation:  Restriction level: minimal    Strength/Myotome Testing     Left Hip   Planes of Motion   Flexion: 4  Extension: 4  Abduction: 4  Adduction: 4  External rotation: 4  Internal rotation: 4    Right Hip   Planes of Motion   Flexion: 3+  Extension: 3+  Abduction: 3+  Adduction: 3+  External rotation: 3+  Internal rotation: 3+    Left Knee   Extension: 5    Right Knee   Extension: 4    Left Ankle/Foot   Dorsiflexion: 5  Plantar flexion: 5  Inversion: 5  Eversion: 5    Right Ankle/Foot   Dorsiflexion: 5  Plantar flexion: 5  Inversion: 5  Eversion: 5    Tests     Lumbar     Right   Positive passive SLR  Ambulation     Observational Gait   Gait: antalgic and asymmetric   Left stance time, left swing time and left step length within functional limits  Decreased walking speed, stride length, right stance time, right swing time and right step length  Left foot contact pattern: heel to toe  Right foot contact pattern: heel to toe  Left arm swing: decreased  Right arm swing: decreased  Base of support: normal    Functional Assessment      Squat    Pain, trunk lean right and sitting toward right side  No trunk lean left and not sitting toward left side             Precautions: not any given      Daily Treatment Diary     Manual  2/26            TPR LB             B LE HS stretch                                                        Exercise Diary  2/26            U/L RFIL 3x            LTR 3x            Supine PPT 3x Postural edu 3'            HEP review 3'                                                                                                                                                                                                      Modalities

## 2019-02-27 ENCOUNTER — OFFICE VISIT (OUTPATIENT)
Dept: FAMILY MEDICINE CLINIC | Facility: CLINIC | Age: 79
End: 2019-02-27
Payer: MEDICARE

## 2019-02-27 VITALS
BODY MASS INDEX: 24.64 KG/M2 | WEIGHT: 176 LBS | DIASTOLIC BLOOD PRESSURE: 62 MMHG | SYSTOLIC BLOOD PRESSURE: 120 MMHG | OXYGEN SATURATION: 99 % | HEIGHT: 71 IN | HEART RATE: 61 BPM

## 2019-02-27 DIAGNOSIS — C61 MALIGNANT NEOPLASM OF PROSTATE (HCC): ICD-10-CM

## 2019-02-27 DIAGNOSIS — I71.2 THORACIC AORTIC ANEURYSM WITHOUT RUPTURE (HCC): ICD-10-CM

## 2019-02-27 DIAGNOSIS — H10.31 ACUTE BACTERIAL CONJUNCTIVITIS OF RIGHT EYE: Primary | ICD-10-CM

## 2019-02-27 DIAGNOSIS — H00.011 HORDEOLUM EXTERNUM OF RIGHT UPPER EYELID: ICD-10-CM

## 2019-02-27 PROCEDURE — 99213 OFFICE O/P EST LOW 20 MIN: CPT | Performed by: FAMILY MEDICINE

## 2019-02-27 RX ORDER — OFLOXACIN 3 MG/ML
1 SOLUTION/ DROPS OPHTHALMIC 4 TIMES DAILY
Qty: 5 ML | Refills: 0 | Status: SHIPPED | OUTPATIENT
Start: 2019-02-27 | End: 2019-04-04 | Stop reason: ALTCHOICE

## 2019-02-28 NOTE — PROGRESS NOTES
Assessment/Plan:  Treat as stye and possible conjunctivitis  Prescription for ofloxacin sent to pharmacy  Recommend warm compresses as needed  Call back if not improved  Diagnoses and all orders for this visit:    Acute bacterial conjunctivitis of right eye  -     ofloxacin (OCUFLOX) 0 3 % ophthalmic solution; Administer 1 drop to the right eye 4 (four) times a day    Hordeolum externum of right upper eyelid    Thoracic aortic aneurysm without rupture ([address] )    Malignant neoplasm of prostate ([address] )        1  Acute bacterial conjunctivitis of right eye  ofloxacin (OCUFLOX) 0 3 % ophthalmic solution   2  Hordeolum externum of right upper eyelid     3  Thoracic aortic aneurysm without rupture ([address] )     4  Malignant neoplasm of prostate ([facility])         Subjective:        Patient ID: Jil Alicea is a [de-identified] y o  male  Chief Complaint   Patient presents with    Stye     R eye pt states that he felt it over the weekend, not painful to touch       Patient with possible stye right eye upper lid  Also notes some pastiness to the eye  The following portions of the patient's history were reviewed and updated as appropriate: past medical history, past surgical history and problem list       Review of Systems   Constitutional: Negative for fever  HENT: Negative for congestion and sinus pressure  Eyes: Positive for discharge and redness  Negative for visual disturbance  Respiratory: Negative for shortness of breath  Neurological: Negative for headaches  Objective:  /62 (BP Location: Left arm, Patient Position: Sitting, Cuff Size: Standard)   Pulse 61   Ht 5' 11" (1 803 m)   Wt 79 8 kg (176 lb)   SpO2 99%   BMI 24 55 kg/m²        Physical Exam   Constitutional: He is oriented to person, place, and time  He appears well-nourished  HENT:   Head: Normocephalic  Eyes: Right eye exhibits discharge  Right conjunctiva is injected     Early stye formation right upper outer lid   Cardiovascular: Normal heart sounds  Pulmonary/Chest: Breath sounds normal    Neurological: He is alert and oriented to person, place, and time  Vitals reviewed

## 2019-03-05 ENCOUNTER — OFFICE VISIT (OUTPATIENT)
Dept: PHYSICAL THERAPY | Facility: CLINIC | Age: 79
End: 2019-03-05
Payer: MEDICARE

## 2019-03-05 DIAGNOSIS — M54.9 BACK PAIN, UNSPECIFIED BACK LOCATION, UNSPECIFIED BACK PAIN LATERALITY, UNSPECIFIED CHRONICITY: Primary | ICD-10-CM

## 2019-03-05 PROCEDURE — 97110 THERAPEUTIC EXERCISES: CPT | Performed by: PHYSICAL THERAPIST

## 2019-03-05 PROCEDURE — 97112 NEUROMUSCULAR REEDUCATION: CPT | Performed by: PHYSICAL THERAPIST

## 2019-03-05 NOTE — PROGRESS NOTES
Daily Note     Today's date: 3/5/2019  Patient name: Lei East  : 1940  MRN: [id number]  Referring provider: Migue Lobo DO  Dx:   Encounter Diagnosis     ICD-10-CM    1  Back pain, unspecified back location, unspecified back pain laterality, unspecified chronicity M54 9                   Subjective: "Some discomfort in my back today, about 3/10 today "      Objective: See treatment diary below    Precautions: not any given        Daily Treatment Diary      Manual  2/26  3/5                   TPR LB                       B LE HS stretch                                                                                                     Exercise Diary  2/26  3/5                   U/L RFIL 3x  10x5"                   LTR 3x  10x5"                   Supine PPT 3x  10x5"                                           Postural edu 3'  5'                   HEP review 3'  5'                                            UBE/L-roll, B'kwrd/posture    10'                    Corner stretch    10x10"                    B scap retr     Black TB 10x2                                                                                                                                                                                                                                                                         Modalities   2/26  3/5                    MH 90/90 supine    10'                                                                           Assessment: Tolerated treatment well  Patient is working on various self stretching back ex and strengthening with postural education  No worsening of pain after tx  Pt  was educated on updated HEP  Plan: Continue PT as per POC

## 2019-03-08 ENCOUNTER — OFFICE VISIT (OUTPATIENT)
Dept: PHYSICAL THERAPY | Facility: CLINIC | Age: 79
End: 2019-03-08
Payer: MEDICARE

## 2019-03-08 DIAGNOSIS — M54.9 BACK PAIN, UNSPECIFIED BACK LOCATION, UNSPECIFIED BACK PAIN LATERALITY, UNSPECIFIED CHRONICITY: Primary | ICD-10-CM

## 2019-03-08 PROCEDURE — 97112 NEUROMUSCULAR REEDUCATION: CPT | Performed by: PHYSICAL THERAPIST

## 2019-03-08 PROCEDURE — 97110 THERAPEUTIC EXERCISES: CPT | Performed by: PHYSICAL THERAPIST

## 2019-03-08 NOTE — PROGRESS NOTES
Daily Note     Today's date: 3/8/2019  Patient name: Mora Shepard  : 1940  MRN: [id number]  Referring provider: Carlee Escudero DO  Dx:   Encounter Diagnosis     ICD-10-CM    1  Back pain, unspecified back location, unspecified back pain laterality, unspecified chronicity M54 9                   Subjective: "Still some discomfort in my LB  Today about 2-3/10 "      Objective: See treatment diary below    Precautions: not any given        Daily Treatment Diary      Manual  2/26  3/5  3/8                 TPR LB                       B LE HS stretch      10'                                                                                               Exercise Diary  2/26  3/5  3/8                 U/L RFIL 3x  10x5"  10x5"                 LTR 3x  10x5"  10x5"                 Supine PPT 3x  10x5"  10x5"                  B TKE w DF stretch      5xea, 10"                 Postural edu 3'  5'  5'                 HEP review 3'  5'  5'                                          UBE/L-roll, B'kwrd/posture    10'                    Corner stretch    10x10"  15x10"                  B scap retr     Black TB 10x2  Black TB 10x2                  Recom  bike/L-roll use      10'                  step HS stretch      3x10" ea                                                                                                                                                                                                                       Modalities   2/26  3/5  3/8                  MH 90/90 supine    10'  10'                                                                      Assessment: Tolerated treatment  Patient continues to work on various back/core and LE stretching techniques with postural correction  No worsening of discomfort  Plan: Continue PT as per POC

## 2019-03-13 ENCOUNTER — OFFICE VISIT (OUTPATIENT)
Dept: PHYSICAL THERAPY | Facility: CLINIC | Age: 79
End: 2019-03-13
Payer: MEDICARE

## 2019-03-13 DIAGNOSIS — M54.9 BACK PAIN, UNSPECIFIED BACK LOCATION, UNSPECIFIED BACK PAIN LATERALITY, UNSPECIFIED CHRONICITY: Primary | ICD-10-CM

## 2019-03-13 PROCEDURE — 97110 THERAPEUTIC EXERCISES: CPT | Performed by: PHYSICAL THERAPIST

## 2019-03-13 PROCEDURE — 97112 NEUROMUSCULAR REEDUCATION: CPT | Performed by: PHYSICAL THERAPIST

## 2019-03-13 NOTE — PROGRESS NOTES
Daily Note     Today's date: 3/13/2019  Patient name: Leah Coelho  : 1940  MRN: [id number]  Referring provider: Francheska Marie DO  Dx:   Encounter Diagnosis     ICD-10-CM    1  Back pain, unspecified back location, unspecified back pain laterality, unspecified chronicity M54 9                   Subjective: "I have little bit of R LB today about 4/10 " no unusual activity at home, no trauma  Objective: See treatment diary below    Precautions: not any given        Daily Treatment Diary      Manual  2/26  3/5  3/8  3/13               TPR LB                       B LE HS stretch      10'                                                                                               Exercise Diary  2/26  3/5  3/8  3/13               U/L RFIL 3x  10x5"  10x5"  15x5"               LTR 3x  10x5"  10x5"  15x5"               Supine PPT 3x  10x5"  10x5"  15x5"                B TKE w DF stretch      5xea, 10"  10xea, 10"               Postural edu 3'  5'  5'  5'               HEP review 3'  5'  5'  5'                                        UBE/L-roll, B'kwrd/posture    10'                    Corner stretch    10x10"  15x10"  15x10"                B scap retr     Black TB 10x2  Black TB 10x2  Black TB 15x2                Recom  bike/L-roll use      10'  10'                step HS stretch      3x10" ea  3x10" ea                                                                                                                                                                                                                     Modalities   2/26  3/5  3/8  3/13                MH 90/90 supine    10'  10'  10'                                                                   Assessment: Tolerated treatment fair, no worsening of discomfort after tx  Patient's therex is modified to samira  range only with verbal/tactile cues for safe ex techniques  Plan: Continue PT as per POC

## 2019-03-15 ENCOUNTER — OFFICE VISIT (OUTPATIENT)
Dept: PHYSICAL THERAPY | Facility: CLINIC | Age: 79
End: 2019-03-15
Payer: MEDICARE

## 2019-03-15 DIAGNOSIS — M54.9 BACK PAIN, UNSPECIFIED BACK LOCATION, UNSPECIFIED BACK PAIN LATERALITY, UNSPECIFIED CHRONICITY: Primary | ICD-10-CM

## 2019-03-15 PROCEDURE — 97140 MANUAL THERAPY 1/> REGIONS: CPT | Performed by: PHYSICAL THERAPIST

## 2019-03-15 PROCEDURE — 97110 THERAPEUTIC EXERCISES: CPT | Performed by: PHYSICAL THERAPIST

## 2019-03-15 NOTE — PROGRESS NOTES
Daily Note     Today's date: 3/15/2019  Patient name: Rocio Sellers  : 1940  MRN: [id number]  Referring provider: Ashlyn Levy DO  Dx:   Encounter Diagnosis     ICD-10-CM    1  Back pain, unspecified back location, unspecified back pain laterality, unspecified chronicity M54 9                   Subjective: "My back is getting better, just little bit on the right side today "      Objective: See treatment diary below  Precautions: not any given        Daily Treatment Diary      Manual  2/26  3/5  3/8  3/13  3/15             TPR LB                       B LE HS stretch      10'    10'                                                                                           Exercise Diary  2/26  3/5  3/8  3/13  3/15             U/L RFIL 3x  10x5"  10x5"  15x5"  15x5"             LTR 3x  10x5"  10x5"  15x5"  15x5"             Supine PPT 3x  10x5"  10x5"  15x5"                B TKE w DF stretch      5xea, 10"  10xea, 10"  15xea, 10"             Postural edu 3'  5'  5'  5'  5'             HEP review 3'  5'  5'  5'  5'                                      UBE/L-roll, B'kwrd/posture    10'      10'              Corner stretch    10x10"  15x10"  15x10"                B scap retr     Black TB 10x2  Black TB 10x2  Black TB 15x2  black, elb  Flex/ext 20x ea              Recom  bike/L-roll use      10'  10'                step HS stretch      3x10" ea  3x10" ea  3x15" ea              Leg press w  L-roll          L2, 20x                                                                                                                                                                                           Modalities   2/26  3/5  3/8  3/13  3/15              MH 90/90 supine    10'  10'  10'  10'                                                                              Assessment: Tolerated treatment well   Patient continue to progress in current program, better postural awareness noted, no worsening of discomfort after tx       Plan: Continue PT as per POC

## 2019-03-19 ENCOUNTER — OFFICE VISIT (OUTPATIENT)
Dept: PHYSICAL THERAPY | Facility: CLINIC | Age: 79
End: 2019-03-19
Payer: MEDICARE

## 2019-03-19 DIAGNOSIS — M54.9 BACK PAIN, UNSPECIFIED BACK LOCATION, UNSPECIFIED BACK PAIN LATERALITY, UNSPECIFIED CHRONICITY: Primary | ICD-10-CM

## 2019-03-19 PROCEDURE — 97110 THERAPEUTIC EXERCISES: CPT | Performed by: PHYSICAL THERAPIST

## 2019-03-19 PROCEDURE — 97140 MANUAL THERAPY 1/> REGIONS: CPT | Performed by: PHYSICAL THERAPIST

## 2019-03-19 NOTE — PROGRESS NOTES
Daily Note     Today's date: 3/19/2019  Patient name: Gabriela Moore  : 1940  MRN: [id number]  Referring provider: Karishma Gutierrez DO  Dx:   Encounter Diagnosis     ICD-10-CM    1  Back pain, unspecified back location, unspecified back pain laterality, unspecified chronicity M54 9                   Subjective: "I still have some low back discomfort on the right  No bad, maybe 3/10 "      Objective: See treatment diary below      Precautions: not any given   Daily Treatment Diary   Manual  2/26 3/5 3/8 3/13 3/15 3/19       TPR LB      5'       B LE HS stretch   10'  10' 5'                                                Exercise Diary  2/26 3/5 3/8 3/13 3/15 3/19       U/L RFIL 3x 10x5" 10x5" 15x5" 15x5" 15x10"       LTR 3x 10x5" 10x5" 15x5" 15x5" 15x5"       Supine PPT 3x 10x5" 10x5" 15x5"         B TKE w DF stretch   5xea, 10" 10xea, 10" 15xea, 10" 15x10"       Postural edu 3' 5' 5' 5' 5' 5'       HEP review 3' 5' 5' 5' 5' 5'                    UBE/L-roll, B'kwrd/posture  10'   10' 10'       Corner stretch  10x10" 15x10" 15x10"         B scap retr  Black TB 10x2 Black TB 10x2 Black TB 15x2 black, elb  Flex/ext 20x ea Black flex/ext 20x ea       Recom  bike/L-roll use   10' 10'  10'       step HS stretch   3x10" ea 3x10" ea 3x15" ea 5x15" ea       Leg press w  L-roll     L2, 20x L2, 20x                                                                                                    Modalities  2/26 3/5 3/8 3/13 3/15 3/19       MH 90/90 supine  10' 10' 10' 10' 10'                                       Assessment: Tolerated treatment well  Patient was able to abolish pain after manual tx and modified therex  Postural awareness improved, but on/off some verbal cues needed  Plan: Continue PT as per POC

## 2019-03-22 ENCOUNTER — OFFICE VISIT (OUTPATIENT)
Dept: PHYSICAL THERAPY | Facility: CLINIC | Age: 79
End: 2019-03-22
Payer: MEDICARE

## 2019-03-22 DIAGNOSIS — M54.9 BACK PAIN, UNSPECIFIED BACK LOCATION, UNSPECIFIED BACK PAIN LATERALITY, UNSPECIFIED CHRONICITY: Primary | ICD-10-CM

## 2019-03-22 PROCEDURE — 97110 THERAPEUTIC EXERCISES: CPT | Performed by: PHYSICAL THERAPIST

## 2019-03-22 PROCEDURE — 97140 MANUAL THERAPY 1/> REGIONS: CPT | Performed by: PHYSICAL THERAPIST

## 2019-03-22 NOTE — PROGRESS NOTES
Daily Note     Today's date: 3/22/2019  Patient name: Polo Dueñas  : 1940  MRN: [id number]  Referring provider: Sheron Zapata DO  Dx:   Encounter Diagnosis     ICD-10-CM    1  Back pain, unspecified back location, unspecified back pain laterality, unspecified chronicity M54 9                   Subjective: "For the first time I had no pain "      Objective: See treatment diary below  Precautions: not any given   Daily Treatment Diary   Manual  2/26 3/5 3/8 3/13 3/15 3/19 3/22      TPR LB      5' 10'      B LE HS stretch   10'  10' 5'                                                Exercise Diary  2/26 3/5 3/8 3/13 3/15 3/19 3/22      U/L RFIL 3x 10x5" 10x5" 15x5" 15x5" 15x10"       LTR 3x 10x5" 10x5" 15x5" 15x5" 15x5"       Supine PPT 3x 10x5" 10x5" 15x5"         B TKE w DF stretch   5xea, 10" 10xea, 10" 15xea, 10" 15x10"       Postural edu 3' 5' 5' 5' 5' 5' 5'      HEP review 3' 5' 5' 5' 5' 5' 5'                   UBE/L-roll, B'kwrd/posture  10'   10' 10' 10'      Corner stretch  10x10" 15x10" 15x10"   15x10"      B scap retr  Black TB 10x2 Black TB 10x2 Black TB 15x2 black, elb  Flex/ext 20x ea Black flex/ext 20x ea Black flex/ext  20x      Recom  bike/L-roll use   10' 10'  10' 10'      step HS stretch   3x10" ea 3x10" ea 3x15" ea 5x15" ea 5x15"      Leg press w  L-roll     L2, 20x L2, 20x L2, 20x                                                                                                   Modalities  2/26 3/5 3/8 3/13 3/15 3/19 3/22      MH 90/90 supine  10' 10' 10' 10' 10' 10'                                      Assessment: Tolerated treatment very well  Patient without any discomfort after tx  Continues various back/core therex with postural improvement  Plan: Continue PT as per POC

## 2019-03-26 ENCOUNTER — OFFICE VISIT (OUTPATIENT)
Dept: PHYSICAL THERAPY | Facility: CLINIC | Age: 79
End: 2019-03-26
Payer: MEDICARE

## 2019-03-26 DIAGNOSIS — M54.9 BACK PAIN, UNSPECIFIED BACK LOCATION, UNSPECIFIED BACK PAIN LATERALITY, UNSPECIFIED CHRONICITY: Primary | ICD-10-CM

## 2019-03-26 PROCEDURE — 97140 MANUAL THERAPY 1/> REGIONS: CPT | Performed by: PHYSICAL THERAPIST

## 2019-03-26 PROCEDURE — 97110 THERAPEUTIC EXERCISES: CPT | Performed by: PHYSICAL THERAPIST

## 2019-03-26 NOTE — PROGRESS NOTES
Daily Note     Today's date: 3/26/2019  Patient name: Pallavi Carbajal  : 1940  MRN: [id number]  Referring provider: Laverne Barnhart DO  Dx:   Encounter Diagnosis     ICD-10-CM    1  Back pain, unspecified back location, unspecified back pain laterality, unspecified chronicity M54 9                   Subjective: "Already for 4 days I am pain free  Finally it is getting better "      Objective: See treatment diary below    Precautions: not any given   Daily Treatment Diary   Manual  2/26 3/5 3/8 3/13 3/15 3/19 3/22  3/26       TPR LB           5' 10'  10'       B LE HS stretch     10'   10' 5'                                                                                      Exercise Diary  2/26 3/5 3/8 3/13 3/15 3/19 3/22  3        U/L RFIL 3x 10x5" 10x5" 15x5" 15x5" 15x10"           LTR 3x 10x5" 10x5" 15x5" 15x5" 15x5"           Supine PPT 3x 10x5" 10x5" 15x5"               B TKE w DF stretch     5xea, 10" 10xea, 10" 15xea, 10" 15x10"           Postural edu 3' 5' 5' 5' 5' 5' 5'  5'       HEP review 3' 5' 5' 5' 5' 5' 5'  5'                               UBE/L-roll, B'kwrd/posture   8'     10' 10' 10'  10'       Corner stretch   10x10" 15x10" 15x10"     15x10"         B scap retr    Black TB 10x2 Black TB 10x2 Black TB 15x2 black, elb  Flex/ext 20x ea Black flex/ext 20x ea Black flex/ext  20x  black flex/ext 20x       Recom  bike/L-roll use     10' 10'   10' 10'  10'       step HS stretch     3x10" ea 3x10" ea 3x15" ea 5x15" ea 5x15"  5x15"       Leg press w  L-roll         L2, 20x L2, 20x L2, 20x  L3,4'                                                                                                                                                                                  Modalities  2/26 3/5 3/8 3/13 3/15 3/19 3/22  3/26       MH 90/90 supine   10' 10' 10' 10' 10' 10'  10'                                              Assessment: Tolerated treatment well  Patient without any discomfort after tx  Postural improvement noted with therex  Plan: Continue PT as per POC

## 2019-03-29 ENCOUNTER — OFFICE VISIT (OUTPATIENT)
Dept: PHYSICAL THERAPY | Facility: CLINIC | Age: 79
End: 2019-03-29
Payer: MEDICARE

## 2019-03-29 DIAGNOSIS — M54.9 BACK PAIN, UNSPECIFIED BACK LOCATION, UNSPECIFIED BACK PAIN LATERALITY, UNSPECIFIED CHRONICITY: Primary | ICD-10-CM

## 2019-03-29 PROCEDURE — 97110 THERAPEUTIC EXERCISES: CPT | Performed by: PHYSICAL THERAPIST

## 2019-03-29 PROCEDURE — 97140 MANUAL THERAPY 1/> REGIONS: CPT | Performed by: PHYSICAL THERAPIST

## 2019-03-29 NOTE — PROGRESS NOTES
Daily Note     Today's date: 3/29/2019  Patient name: Sara Davis  : 1940  MRN: [id number]  Referring provider: Balaji Aragon DO  Dx:   Encounter Diagnosis     ICD-10-CM    1  Back pain, unspecified back location, unspecified back pain laterality, unspecified chronicity M54 9                   Subjective: "I feel so good  I think I am going to try to do my exercises at home on my own "      Objective: See treatment diary below  Posture: Good  Body mechanics: Good  Pain: 0/10  MMT: B LE MMT  t/o  ROM in LB: WFL t/o  HEP: safe and independent with all       Precautions: not any given   Daily Treatment Diary   Manual  2/26 3/5 3/8 3/13 3/15 3/19 3/22 3/26 3/29    TPR LB      5' 10' 10'     B LE HS stretch   10'  10' 5'                                                Exercise Diary  2/26 3/5 3/8 3/13 3/15 3/19 3/22 3/26 3/29    U/L RFIL 3x 10x5" 10x5" 15x5" 15x5" 15x10"       LTR 3x 10x5" 10x5" 15x5" 15x5" 15x5"       Supine PPT 3x 10x5" 10x5" 15x5"         B TKE w DF stretch   5xea, 10" 10xea, 10" 15xea, 10" 15x10"       Postural edu 3' 5' 5' 5' 5' 5' 5' 5' 5'    HEP review 3' 5' 5' 5' 5' 5' 5' 5' 5'                 UBE/L-roll, B'kwrd/posture  10'   10' 10' 10' 10' 10'    Corner stretch  10x10" 15x10" 15x10"   15x10"  15x10"    B scap retr  Black TB 10x2 Black TB 10x2 Black TB 15x2 black, elb  Flex/ext 20x ea Black flex/ext 20x ea Black flex/ext   20x black flex/ext 20x 20x ea    Recom  bike/L-roll use   10' 10'  10' 10' 10' 10'    step HS stretch   3x10" ea 3x10" ea 3x15" ea 5x15" ea 5x15" 5x15" 10x15"    Leg press w  L-roll     L2, 20x L2, 20x L2, 20x L3,4' L3,5'                                                                                                 Modalities  2/26 3/5 3/8 3/13 3/15 3/19 3/22 3/26 3/29      supine  10' 10' 10' 10' 10' 10' 10'                    LTG's (4-6 weeks) from 19  1  Independent with all HEP  (reached)  2  Independent with self postural correction  (reached)  3  Independent with safe body mechanics  (reached)      Assessment: Tolerated treatment well, all LTG's reached  Patient shows improved postural awareness, safe with all HEP, no c/o pain at this time  Max potential reached        Plan: Self D/C from PT

## 2019-04-04 ENCOUNTER — OFFICE VISIT (OUTPATIENT)
Dept: CARDIOLOGY CLINIC | Facility: CLINIC | Age: 79
End: 2019-04-04
Payer: MEDICARE

## 2019-04-04 VITALS
OXYGEN SATURATION: 98 % | DIASTOLIC BLOOD PRESSURE: 64 MMHG | SYSTOLIC BLOOD PRESSURE: 146 MMHG | HEART RATE: 83 BPM | WEIGHT: 169.8 LBS | HEIGHT: 71 IN | BODY MASS INDEX: 23.77 KG/M2

## 2019-04-04 DIAGNOSIS — I49.3 PVC'S (PREMATURE VENTRICULAR CONTRACTIONS): Primary | ICD-10-CM

## 2019-04-04 DIAGNOSIS — I35.1 NONRHEUMATIC AORTIC VALVE INSUFFICIENCY: ICD-10-CM

## 2019-04-04 DIAGNOSIS — I71.2 THORACIC AORTIC ANEURYSM WITHOUT RUPTURE (HCC): ICD-10-CM

## 2019-04-04 DIAGNOSIS — I34.0 NON-RHEUMATIC MITRAL REGURGITATION: ICD-10-CM

## 2019-04-04 PROCEDURE — 99214 OFFICE O/P EST MOD 30 MIN: CPT | Performed by: INTERNAL MEDICINE

## 2019-04-04 PROCEDURE — 93000 ELECTROCARDIOGRAM COMPLETE: CPT | Performed by: INTERNAL MEDICINE

## 2019-04-09 ENCOUNTER — PROCEDURE VISIT (OUTPATIENT)
Dept: CARDIOLOGY CLINIC | Facility: CLINIC | Age: 79
End: 2019-04-09
Payer: MEDICARE

## 2019-04-09 DIAGNOSIS — I49.3 VENTRICULAR PREMATURE BEATS: Primary | ICD-10-CM

## 2019-04-09 PROCEDURE — 93224 XTRNL ECG REC UP TO 48 HRS: CPT | Performed by: INTERNAL MEDICINE

## 2019-04-11 ENCOUNTER — HOSPITAL ENCOUNTER (OUTPATIENT)
Dept: NON INVASIVE DIAGNOSTICS | Facility: HOSPITAL | Age: 79
Discharge: HOME | End: 2019-04-11
Payer: MEDICARE

## 2019-04-11 DIAGNOSIS — I49.3 PVC'S (PREMATURE VENTRICULAR CONTRACTIONS): ICD-10-CM

## 2019-04-11 PROCEDURE — 93226 XTRNL ECG REC<48 HR SCAN A/R: CPT

## 2019-04-11 PROCEDURE — 93225 XTRNL ECG REC<48 HRS REC: CPT

## 2019-04-12 PROCEDURE — 93227 XTRNL ECG REC<48 HR R&I: CPT | Performed by: INTERNAL MEDICINE

## 2019-04-24 ENCOUNTER — OFFICE VISIT (OUTPATIENT)
Dept: CARDIOLOGY CLINIC | Facility: CLINIC | Age: 79
End: 2019-04-24
Payer: MEDICARE

## 2019-04-24 VITALS
SYSTOLIC BLOOD PRESSURE: 130 MMHG | WEIGHT: 171 LBS | HEART RATE: 80 BPM | HEIGHT: 71 IN | DIASTOLIC BLOOD PRESSURE: 62 MMHG | BODY MASS INDEX: 23.94 KG/M2

## 2019-04-24 DIAGNOSIS — I34.0 NON-RHEUMATIC MITRAL REGURGITATION: ICD-10-CM

## 2019-04-24 DIAGNOSIS — I35.1 NONRHEUMATIC AORTIC VALVE INSUFFICIENCY: ICD-10-CM

## 2019-04-24 DIAGNOSIS — I49.3 PVC'S (PREMATURE VENTRICULAR CONTRACTIONS): Primary | ICD-10-CM

## 2019-04-24 DIAGNOSIS — I71.2 THORACIC AORTIC ANEURYSM WITHOUT RUPTURE (HCC): ICD-10-CM

## 2019-04-24 PROCEDURE — 99214 OFFICE O/P EST MOD 30 MIN: CPT | Performed by: INTERNAL MEDICINE

## 2019-04-25 ENCOUNTER — OFFICE VISIT (OUTPATIENT)
Dept: FAMILY MEDICINE CLINIC | Facility: CLINIC | Age: 79
End: 2019-04-25
Payer: MEDICARE

## 2019-04-25 ENCOUNTER — APPOINTMENT (OUTPATIENT)
Dept: RADIOLOGY | Facility: CLINIC | Age: 79
End: 2019-04-25
Payer: MEDICARE

## 2019-04-25 VITALS
SYSTOLIC BLOOD PRESSURE: 140 MMHG | WEIGHT: 171 LBS | BODY MASS INDEX: 23.94 KG/M2 | DIASTOLIC BLOOD PRESSURE: 60 MMHG | HEIGHT: 71 IN

## 2019-04-25 DIAGNOSIS — M25.551 PAIN OF RIGHT HIP JOINT: ICD-10-CM

## 2019-04-25 DIAGNOSIS — M54.16 CHRONIC RADICULAR LUMBAR PAIN: ICD-10-CM

## 2019-04-25 DIAGNOSIS — G89.29 CHRONIC RADICULAR LUMBAR PAIN: ICD-10-CM

## 2019-04-25 DIAGNOSIS — M54.16 CHRONIC RADICULAR LUMBAR PAIN: Primary | ICD-10-CM

## 2019-04-25 DIAGNOSIS — G89.29 CHRONIC RADICULAR LUMBAR PAIN: Primary | ICD-10-CM

## 2019-04-25 DIAGNOSIS — M51.36 LUMBAR DEGENERATIVE DISC DISEASE: ICD-10-CM

## 2019-04-25 PROCEDURE — 99213 OFFICE O/P EST LOW 20 MIN: CPT | Performed by: FAMILY MEDICINE

## 2019-04-25 PROCEDURE — 72110 X-RAY EXAM L-2 SPINE 4/>VWS: CPT

## 2019-04-26 DIAGNOSIS — I49.3 PVC'S (PREMATURE VENTRICULAR CONTRACTIONS): ICD-10-CM

## 2019-04-30 RX ORDER — VERAPAMIL HYDROCHLORIDE 240 MG/1
240 TABLET, FILM COATED, EXTENDED RELEASE ORAL DAILY
Qty: 135 TABLET | Refills: 3 | Status: SHIPPED | OUTPATIENT
Start: 2019-04-30 | End: 2019-06-03 | Stop reason: DRUGHIGH

## 2019-05-02 ENCOUNTER — TELEPHONE (OUTPATIENT)
Dept: FAMILY MEDICINE CLINIC | Facility: CLINIC | Age: 79
End: 2019-05-02

## 2019-05-03 ENCOUNTER — APPOINTMENT (OUTPATIENT)
Dept: RADIOLOGY | Facility: CLINIC | Age: 79
End: 2019-05-03
Payer: MEDICARE

## 2019-05-03 DIAGNOSIS — M54.16 CHRONIC RADICULAR LUMBAR PAIN: ICD-10-CM

## 2019-05-03 DIAGNOSIS — M25.551 PAIN OF RIGHT HIP JOINT: ICD-10-CM

## 2019-05-03 DIAGNOSIS — G89.29 CHRONIC RADICULAR LUMBAR PAIN: ICD-10-CM

## 2019-05-03 PROCEDURE — 73502 X-RAY EXAM HIP UNI 2-3 VIEWS: CPT

## 2019-05-08 ENCOUNTER — TELEPHONE (OUTPATIENT)
Dept: FAMILY MEDICINE CLINIC | Facility: CLINIC | Age: 79
End: 2019-05-08

## 2019-05-15 ENCOUNTER — OFFICE VISIT (OUTPATIENT)
Dept: OBGYN CLINIC | Facility: MEDICAL CENTER | Age: 79
End: 2019-05-15
Payer: MEDICARE

## 2019-05-15 VITALS — HEIGHT: 71 IN | BODY MASS INDEX: 23.66 KG/M2 | WEIGHT: 169 LBS

## 2019-05-15 DIAGNOSIS — S86.811A STRAIN OF CALF MUSCLE, RIGHT, INITIAL ENCOUNTER: Primary | ICD-10-CM

## 2019-05-15 PROBLEM — S86.819A STRAIN OF CALF MUSCLE: Status: ACTIVE | Noted: 2019-05-15

## 2019-05-15 PROCEDURE — 99213 OFFICE O/P EST LOW 20 MIN: CPT | Performed by: ORTHOPAEDIC SURGERY

## 2019-05-15 PROCEDURE — 1160F RVW MEDS BY RX/DR IN RCRD: CPT | Performed by: ORTHOPAEDIC SURGERY

## 2019-05-16 ENCOUNTER — TELEPHONE (OUTPATIENT)
Dept: CARDIOLOGY CLINIC | Facility: CLINIC | Age: 79
End: 2019-05-16

## 2019-06-03 DIAGNOSIS — I49.3 PVC (PREMATURE VENTRICULAR CONTRACTION): Primary | ICD-10-CM

## 2019-06-04 ENCOUNTER — OFFICE VISIT (OUTPATIENT)
Dept: FAMILY MEDICINE CLINIC | Facility: CLINIC | Age: 79
End: 2019-06-04
Payer: MEDICARE

## 2019-06-04 VITALS
HEIGHT: 71 IN | DIASTOLIC BLOOD PRESSURE: 60 MMHG | WEIGHT: 166.4 LBS | SYSTOLIC BLOOD PRESSURE: 130 MMHG | BODY MASS INDEX: 23.3 KG/M2

## 2019-06-04 DIAGNOSIS — I49.3 PVC'S (PREMATURE VENTRICULAR CONTRACTIONS): ICD-10-CM

## 2019-06-04 DIAGNOSIS — K59.00 CONSTIPATION, UNSPECIFIED CONSTIPATION TYPE: Primary | ICD-10-CM

## 2019-06-04 DIAGNOSIS — E78.00 PURE HYPERCHOLESTEROLEMIA: ICD-10-CM

## 2019-06-04 DIAGNOSIS — R79.9 ABNORMAL FINDING OF BLOOD CHEMISTRY: ICD-10-CM

## 2019-06-04 DIAGNOSIS — Z85.46 HISTORY OF PROSTATE CANCER: ICD-10-CM

## 2019-06-04 PROCEDURE — 99213 OFFICE O/P EST LOW 20 MIN: CPT | Performed by: FAMILY MEDICINE

## 2019-06-04 RX ORDER — POLYETHYLENE GLYCOL 3350 17 G/17G
17 POWDER, FOR SOLUTION ORAL EVERY OTHER DAY
COMMUNITY
End: 2020-01-08 | Stop reason: ALTCHOICE

## 2019-06-04 RX ORDER — VERAPAMIL HYDROCHLORIDE 240 MG/1
360 CAPSULE, EXTENDED RELEASE ORAL
COMMUNITY
End: 2019-06-04

## 2019-06-04 RX ORDER — VERAPAMIL HYDROCHLORIDE 240 MG/1
TABLET, FILM COATED, EXTENDED RELEASE ORAL
Qty: 135 TABLET | Refills: 3 | Status: SHIPPED | OUTPATIENT
Start: 2019-06-04 | End: 2020-05-26 | Stop reason: SDUPTHER

## 2019-06-14 RX ORDER — VERAPAMIL HYDROCHLORIDE 360 MG/1
360 CAPSULE, DELAYED RELEASE PELLETS ORAL
Qty: 30 CAPSULE | Refills: 6 | Status: CANCELLED | OUTPATIENT
Start: 2019-06-14

## 2019-07-04 NOTE — PROGRESS NOTES
Assessment/Plan:    Constipation reviewed in detail  Multiple etiologies as well as over-the-counter treatments have been reviewed in full  This all includes anything from Colace and dietary changes all way down to MiraLax and possible suppositories  Will call back if not improving  Patient on verapamil which can be constipating  May try to switch to bedtime dosing to see if this helps  Routine labs ordered for next routine visit  Diagnoses and all orders for this visit:    Constipation, unspecified constipation type    PVC's (premature ventricular contractions)  -     Comprehensive metabolic panel; Future  -     Lipid panel; Future  -     TSH, 3rd generation; Future  -     verapamil (CALAN-SR) 240 mg CR tablet; Take 1 and a 1/2  tablets daily as directed  Can switch to bedtime dosing if patient prefers    History of prostate cancer  -     PSA Total, Diagnostic; Future    Abnormal finding of blood chemistry   -     Lipid panel; Future    Pure hypercholesterolemia   -     TSH, 3rd generation; Future    Other orders  -     polyethylene glycol (MIRALAX) powder; Take 17 g by mouth every other day  -     Discontinue: verapamil (VERELAN) 240 MG 24 hr capsule; Take 360 mg by mouth daily at bedtime Take 1 1/2 tablets daily        1  Constipation, unspecified constipation type     2  PVC's (premature ventricular contractions)  Comprehensive metabolic panel    Lipid panel    TSH, 3rd generation    verapamil (CALAN-SR) 240 mg CR tablet   3  History of prostate cancer  PSA Total, Diagnostic   4  Abnormal finding of blood chemistry   Lipid panel   5  Pure hypercholesterolemia   TSH, 3rd generation       Subjective:        Patient ID: Rodrigo Gordon is a [de-identified] y o  male    Chief Complaint   Patient presents with    Follow-up     6 months;    Constipation     pt states that he may have constipation from the medication, last bowel movement two days ago, pt is drink prune jucice morning and night, one miralax everyother day, and from there a bowel movement everyother day  pt would like to discuss how to take miralax        Patient here to review constipation  Has been having issues  The following portions of the patient's history were reviewed and updated as appropriate: past medical history, past surgical history and problem list       Review of Systems   Constitutional: Negative for fatigue and fever  Cardiovascular: Negative  Gastrointestinal: Positive for constipation  Negative for abdominal distention, abdominal pain, blood in stool, nausea, rectal pain and vomiting  Genitourinary: Negative for dysuria  Musculoskeletal: Negative for back pain  Objective:  /60 (BP Location: Left arm, Patient Position: Sitting, Cuff Size: Standard)   Ht 5' 11" (1 803 m)   Wt 75 5 kg (166 lb 6 4 oz)   BMI 23 21 kg/m²        Physical Exam   Constitutional: He is oriented to person, place, and time  He appears well-nourished  No distress  HENT:   Head: Normocephalic  Cardiovascular: Normal heart sounds  Pulmonary/Chest: Breath sounds normal    Abdominal: Soft  Bowel sounds are normal  He exhibits no distension and no mass  There is no tenderness  Neurological: He is alert and oriented to person, place, and time  Nursing note and vitals reviewed

## 2019-07-09 ENCOUNTER — APPOINTMENT (OUTPATIENT)
Dept: LAB | Facility: CLINIC | Age: 79
End: 2019-07-09
Payer: MEDICARE

## 2019-07-09 ENCOUNTER — OFFICE VISIT (OUTPATIENT)
Dept: FAMILY MEDICINE CLINIC | Facility: CLINIC | Age: 79
End: 2019-07-09
Payer: MEDICARE

## 2019-07-09 VITALS
TEMPERATURE: 97.4 F | WEIGHT: 164.8 LBS | OXYGEN SATURATION: 98 % | SYSTOLIC BLOOD PRESSURE: 122 MMHG | DIASTOLIC BLOOD PRESSURE: 50 MMHG | HEIGHT: 72 IN | BODY MASS INDEX: 22.32 KG/M2 | HEART RATE: 68 BPM

## 2019-07-09 DIAGNOSIS — Z85.46 HISTORY OF PROSTATE CANCER: ICD-10-CM

## 2019-07-09 DIAGNOSIS — M79.10 MUSCLE PAIN: ICD-10-CM

## 2019-07-09 DIAGNOSIS — I73.9 PAD (PERIPHERAL ARTERY DISEASE) (HCC): ICD-10-CM

## 2019-07-09 DIAGNOSIS — M16.0 PRIMARY OSTEOARTHRITIS OF BOTH HIPS: ICD-10-CM

## 2019-07-09 DIAGNOSIS — M79.10 MUSCLE PAIN: Primary | ICD-10-CM

## 2019-07-09 DIAGNOSIS — R79.9 ABNORMAL FINDING OF BLOOD CHEMISTRY: ICD-10-CM

## 2019-07-09 DIAGNOSIS — I73.9 CLAUDICATION (HCC): ICD-10-CM

## 2019-07-09 DIAGNOSIS — I49.3 PVC'S (PREMATURE VENTRICULAR CONTRACTIONS): ICD-10-CM

## 2019-07-09 DIAGNOSIS — E78.00 PURE HYPERCHOLESTEROLEMIA: ICD-10-CM

## 2019-07-09 LAB
ALBUMIN SERPL BCP-MCNC: 3.8 G/DL (ref 3.5–5)
ALP SERPL-CCNC: 58 U/L (ref 46–116)
ALT SERPL W P-5'-P-CCNC: 20 U/L (ref 12–78)
ANION GAP SERPL CALCULATED.3IONS-SCNC: 4 MMOL/L (ref 4–13)
AST SERPL W P-5'-P-CCNC: 14 U/L (ref 5–45)
BILIRUB SERPL-MCNC: 0.4 MG/DL (ref 0.2–1)
BUN SERPL-MCNC: 18 MG/DL (ref 5–25)
CALCIUM SERPL-MCNC: 9.4 MG/DL (ref 8.3–10.1)
CHLORIDE SERPL-SCNC: 105 MMOL/L (ref 100–108)
CHOLEST SERPL-MCNC: 225 MG/DL (ref 50–200)
CK SERPL-CCNC: 40 U/L (ref 39–308)
CO2 SERPL-SCNC: 30 MMOL/L (ref 21–32)
CREAT SERPL-MCNC: 1.16 MG/DL (ref 0.6–1.3)
CRP SERPL QL: <3 MG/L
ERYTHROCYTE [SEDIMENTATION RATE] IN BLOOD: 4 MM/HOUR (ref 0–10)
GFR SERPL CREATININE-BSD FRML MDRD: 59 ML/MIN/1.73SQ M
GLUCOSE SERPL-MCNC: 108 MG/DL (ref 65–140)
HDLC SERPL-MCNC: 64 MG/DL (ref 40–60)
LDH SERPL-CCNC: 153 U/L (ref 81–234)
LDLC SERPL CALC-MCNC: 137 MG/DL (ref 0–100)
NONHDLC SERPL-MCNC: 161 MG/DL
POTASSIUM SERPL-SCNC: 4.1 MMOL/L (ref 3.5–5.3)
PROT SERPL-MCNC: 7.1 G/DL (ref 6.4–8.2)
PSA SERPL-MCNC: <0.1 NG/ML (ref 0–4)
SODIUM SERPL-SCNC: 139 MMOL/L (ref 136–145)
TRIGL SERPL-MCNC: 119 MG/DL
TSH SERPL DL<=0.05 MIU/L-ACNC: 3.1 UIU/ML (ref 0.36–3.74)

## 2019-07-09 PROCEDURE — 82550 ASSAY OF CK (CPK): CPT

## 2019-07-09 PROCEDURE — 82085 ASSAY OF ALDOLASE: CPT

## 2019-07-09 PROCEDURE — 99214 OFFICE O/P EST MOD 30 MIN: CPT | Performed by: FAMILY MEDICINE

## 2019-07-09 PROCEDURE — 85652 RBC SED RATE AUTOMATED: CPT

## 2019-07-09 PROCEDURE — 86038 ANTINUCLEAR ANTIBODIES: CPT

## 2019-07-09 PROCEDURE — 86430 RHEUMATOID FACTOR TEST QUAL: CPT

## 2019-07-09 PROCEDURE — 80061 LIPID PANEL: CPT

## 2019-07-09 PROCEDURE — 86140 C-REACTIVE PROTEIN: CPT

## 2019-07-09 PROCEDURE — 80053 COMPREHEN METABOLIC PANEL: CPT

## 2019-07-09 PROCEDURE — 84443 ASSAY THYROID STIM HORMONE: CPT

## 2019-07-09 PROCEDURE — 83615 LACTATE (LD) (LDH) ENZYME: CPT

## 2019-07-09 PROCEDURE — 84153 ASSAY OF PSA TOTAL: CPT

## 2019-07-09 PROCEDURE — 36415 COLL VENOUS BLD VENIPUNCTURE: CPT

## 2019-07-09 NOTE — PROGRESS NOTES
Assessment/Plan:    A review of the last 3-4 months worth of information has been completed  Multiple etiologies for muscle pain were reviewed  Per patient and per orthopedic note it does not appear to be related to the degenerative joint disease of the hip or back  Explain to the patient on the x-rays of the hip there were some arteriosclerotic calcifications noted  Pulses today could be more robust   Recommend arterial duplex of both extremities to rule out PAD  Update labs that go with possible myositis  Await test results  If negative patient may opt to just observe and live with it  Recommend continued heat and daily stretching if that is the case  They will also contact Orthopedics since then other doctor outside of the office  Diagnoses and all orders for this visit:    Muscle pain  -     VAS lower limb arterial duplex, complete bilateral; Future  -     TSH, 3rd generation; Future  -     RF Screen w/ Reflex to Titer; Future  -     Sedimentation rate, automated; Future  -     C-reactive protein; Future  -     YOSEF Screen w/ Reflex to Titer/Pattern; Future  -     Creatine Kinase, Total; Future  -     Aldolase; Future  -     LD,Blood; Future    PAD (peripheral artery disease) (HCC)  -     VAS lower limb arterial duplex, complete bilateral; Future    Claudication (HCC)  -     VAS lower limb arterial duplex, complete bilateral; Future    Primary osteoarthritis of both hips        1  Muscle pain  VAS lower limb arterial duplex, complete bilateral    TSH, 3rd generation    RF Screen w/ Reflex to Titer    Sedimentation rate, automated    C-reactive protein    YOSEF Screen w/ Reflex to Titer/Pattern    Creatine Kinase, Total    Aldolase    LD,Blood   2  PAD (peripheral artery disease) (HCC)  VAS lower limb arterial duplex, complete bilateral   3  Claudication (HCC)  VAS lower limb arterial duplex, complete bilateral   4   Primary osteoarthritis of both hips         Subjective:        Patient ID: Gerry Orantes Adam Cowan is a [de-identified] y o  male  Chief Complaint   Patient presents with    Muscle Pain     ongoing muscle pain; pt states sx have not changed       Patient here today with continued complaint of right lower extremity muscle pain  States the last 3 days it is barely bother him  Have been through physical therapy and has seen Orthopedics with regards to this  Pain mostly starts on the lateral calf of the right lower extremity  Sometimes he can come up to the thigh and gluteal area  The following portions of the patient's history were reviewed and updated as appropriate: past medical history, past surgical history and problem list       Review of Systems   Constitutional: Negative for fatigue  Respiratory: Negative  Cardiovascular: Negative  Gastrointestinal: Negative for abdominal pain  Genitourinary: Negative for difficulty urinating  Musculoskeletal: Negative for back pain, gait problem and joint swelling  Right lower extremity lateral muscle ache intermittently  Skin: Negative for rash  Neurological: Negative for weakness  Psychiatric/Behavioral: The patient is not nervous/anxious  Objective:  /50 (BP Location: Left arm, Patient Position: Sitting, Cuff Size: Standard)   Pulse 68   Temp (!) 97 4 °F (36 3 °C)   Ht 5' 11 5" (1 816 m)   Wt 74 8 kg (164 lb 12 8 oz)   SpO2 98%   BMI 22 66 kg/m²        Physical Exam   Constitutional: He is oriented to person, place, and time  He appears well-nourished  No distress  HENT:   Head: Normocephalic  Eyes: Pupils are equal, round, and reactive to light  Cardiovascular: Normal heart sounds  Pulmonary/Chest: Breath sounds normal    Musculoskeletal: He exhibits no edema, tenderness or deformity  Negative straight leg raising   Lymphadenopathy:     He has no cervical adenopathy  Neurological: He is alert and oriented to person, place, and time  He displays normal reflexes  No cranial nerve deficit   Coordination normal  Skin: No rash noted  Psychiatric: He has a normal mood and affect  His behavior is normal  Thought content normal    Nursing note and vitals reviewed

## 2019-07-10 LAB
ALDOLASE SERPL-CCNC: 5.7 U/L (ref 3.3–10.3)
RHEUMATOID FACT SER QL LA: NEGATIVE
RYE IGE QN: NEGATIVE

## 2019-07-17 ENCOUNTER — OFFICE VISIT (OUTPATIENT)
Dept: CARDIOLOGY CLINIC | Facility: CLINIC | Age: 79
End: 2019-07-17
Payer: MEDICARE

## 2019-07-17 VITALS
BODY MASS INDEX: 22.9 KG/M2 | WEIGHT: 163.6 LBS | OXYGEN SATURATION: 96 % | SYSTOLIC BLOOD PRESSURE: 122 MMHG | HEIGHT: 71 IN | HEART RATE: 78 BPM | DIASTOLIC BLOOD PRESSURE: 80 MMHG

## 2019-07-17 DIAGNOSIS — I34.0 NON-RHEUMATIC MITRAL REGURGITATION: ICD-10-CM

## 2019-07-17 DIAGNOSIS — I71.2 THORACIC AORTIC ANEURYSM WITHOUT RUPTURE (HCC): ICD-10-CM

## 2019-07-17 DIAGNOSIS — I35.1 NONRHEUMATIC AORTIC VALVE INSUFFICIENCY: ICD-10-CM

## 2019-07-17 DIAGNOSIS — I49.3 PVC'S (PREMATURE VENTRICULAR CONTRACTIONS): Primary | ICD-10-CM

## 2019-07-17 PROCEDURE — 99214 OFFICE O/P EST MOD 30 MIN: CPT | Performed by: INTERNAL MEDICINE

## 2019-07-17 NOTE — PROGRESS NOTES
Cardiology Follow Up    Benjy Barber  1940  [id number]  [address] [facility] [id number] [phone]    1  PVC's (premature ventricular contractions)     2  Thoracic aortic aneurysm without rupture ([facility] [address] )     3  Nonrheumatic aortic valve insufficiency  Echo complete with contrast if indicated   4  Non-rheumatic mitral regurgitation         Interval History:  Of still with some constipation however he told me with appropriate supplements it has been manageable  He denies any palpitations  He denies any chest pain shortness of breath orthopnea paroxysmal nocturnal dyspnea or syncope      Patient Active Problem List   Diagnosis    PVC's (premature ventricular contractions)    History of prostate cancer    Thoracic aortic aneurysm without rupture ([facility] )    Nonrheumatic aortic valve insufficiency    Non-rheumatic mitral regurgitation    Malignant neoplasm of prostate ([facility] )    Strain of calf muscle     Past Medical History:   Diagnosis Date    Astigmatism     last assessed 11/15/17    Cataract     Resolved 11/15/17    Cyst of left kidney     last assessed 11/15/2017    Cyst of right kidney     lasst assessed 11/15/2017    Gross hematuria     Last assessed 01/28/16    Hematuria     last assessed 01/28/16    [facility]     last assessed 05/18/17    Renal calculi     last assessed 08/16/12    Squamous cell carcinoma     Vitamin D deficiency     Last assessed 08/19/13     Social History     Socioeconomic History    Marital status: /Civil Union     Spouse name: Not on file    Number of children: Not on file    Years of education: Not on file    Highest education level: Not on file   Occupational History    Not on file   Social Needs    Financial resource strain: Not on file    Food insecurity:     Worry: Not on file     Inability: Not on file    Transportation needs:     Medical: Not on file     Non-medical: Not on file   Tobacco Use    Smoking status: Never Smoker    Smokeless tobacco: Never Used   Substance and Sexual Activity    Alcohol use: Yes     Comment: social    Drug use: No    Sexual activity: Not on file   Lifestyle    Physical activity:     Days per week: Not on file     Minutes per session: Not on file    Stress: Not on file   Relationships    Social connections:     Talks on phone: Not on file     Gets together: Not on file     Attends Sabianism service: Not on file     Active member of club or organization: Not on file     Attends meetings of clubs or organizations: Not on file     Relationship status: Not on file    Intimate partner violence:     Fear of current or ex partner: Not on file     Emotionally abused: Not on file     Physically abused: Not on file     Forced sexual activity: Not on file   Other Topics Concern    Not on file   Social History Narrative    Not on file      Family History   Problem Relation Age of Onset    Other Mother 76        Influenza    Hypertension Mother     Heart failure Father 70    Peripheral vascular disease Father     Hypertension Father     Prostate cancer Brother     Stroke Brother 48    Hypertension Brother     Hypertension Family      Past Surgical History:   Procedure Laterality Date    BREAST BIOPSY      Benign breast tumor    COLONOSCOPY      CYSTOSCOPY  01/28/2016    ELBOW SURGERY      Bone chip    HERNIA REPAIR  2002    LITHOTRIPSY      RETROPUBIC PROSTATECTOMY  11/13/2001    Radical with nerve sparing       Current Outpatient Medications:     cyanocobalamin (VITAMIN B-12) 500 mcg tablet, 1,000 mcg Daily  , Disp: , Rfl:     Multiple Vitamins-Minerals (CENTRUM SILVER 50+MEN PO), Daily, Disp: , Rfl:     polyethylene glycol (MIRALAX) powder, Take 17 g by mouth every other day, Disp: , Rfl:     verapamil (CALAN-SR) 240 mg CR tablet, Take 1 and a 1/2  tablets daily as directed    Can switch to bedtime dosing if patient prefers (Patient taking differently: Take 1 and a 1/2  tablets daily as directed  Can switch to bedtime dosing if patient prefers), Disp: 135 tablet, Rfl: 3  Allergies   Allergen Reactions    Sulfa Antibiotics Rash    Ciprofloxacin        Labs:  Appointment on 07/09/2019   Component Date Value    Sodium 07/09/2019 139     Potassium 07/09/2019 4 1     Chloride 07/09/2019 105     CO2 07/09/2019 30     ANION GAP 07/09/2019 4     BUN 07/09/2019 18     Creatinine 07/09/2019 1 16     Glucose 07/09/2019 108     Calcium 07/09/2019 9 4     AST 07/09/2019 14     ALT 07/09/2019 20     Alkaline Phosphatase 07/09/2019 58     Total Protein 07/09/2019 7 1     Albumin 07/09/2019 3 8     Total Bilirubin 07/09/2019 0 40     eGFR 07/09/2019 59     Cholesterol 07/09/2019 225*    Triglycerides 07/09/2019 119     HDL, Direct 07/09/2019 64*    LDL Calculated 07/09/2019 137*    Non-HDL-Chol (CHOL-HDL) 07/09/2019 161     TSH 3RD GENERATON 07/09/2019 3 100     PSA, Diagnostic 07/09/2019 <0 1     Rheumatoid Factor 07/09/2019 Negative     Sed Rate 07/09/2019 4     CRP 07/09/2019 <3 0     YOSEF 07/09/2019 Negative     Aldolase 07/09/2019 5 7     LD 07/09/2019 153     Total CK 07/09/2019 40      Imaging: No results found  Review of Systems:  Review of Systems   Constitutional: Negative for fatigue  HENT: Negative for nosebleeds  Eyes: Negative for redness  Respiratory: Negative for chest tightness and shortness of breath  Cardiovascular: Negative for chest pain, palpitations and leg swelling  Gastrointestinal: Positive for constipation  Negative for abdominal pain  Endocrine: Negative for polyuria  Genitourinary: Negative for urgency  Musculoskeletal: Negative for arthralgias  Skin: Negative for rash  Neurological: Negative for dizziness and syncope  Psychiatric/Behavioral: Negative for confusion and sleep disturbance  The patient is not nervous/anxious          Physical Exam:  Physical Exam   Constitutional: He is oriented to person, place, and time  He appears well-developed and well-nourished  HENT:   Head: Normocephalic and atraumatic  Nose: Nose normal    Mouth/Throat: Oropharynx is clear and moist    Eyes: Pupils are equal, round, and reactive to light  Neck: Neck supple  Cardiovascular: Normal rate, regular rhythm, normal heart sounds and intact distal pulses  Pulmonary/Chest: Effort normal and breath sounds normal    Abdominal: Soft  Bowel sounds are normal    Musculoskeletal: Normal range of motion  Neurological: He is alert and oriented to person, place, and time  Skin: Skin is warm and dry  Psychiatric: He has a normal mood and affect  His behavior is normal  Judgment and thought content normal        Discussion/Summary:  On verapamil 360 mg daily for ventricular ectopy  On Holter monitor verapamil his at ectopic beat burden by 50% to 9%  Still has some constipation  He feels it is manageable  Discussed changing to another calcium channel blocker such as Cardizem however he would prefer to stay with the current medical regimen  As noted he is asymptomatic in functional class 1  He has a history of moderate aortic and mitral insufficiency  His aorta on echocardiogram was also 4 3 cm  I will recheck an echocardiogram prior to his next visit  We discussed possibly getting a CT scan to assess aortic diameter  Further recommendations will be made at next visit

## 2019-07-21 ENCOUNTER — OFFICE VISIT (OUTPATIENT)
Dept: URGENT CARE | Facility: CLINIC | Age: 79
End: 2019-07-21
Payer: MEDICARE

## 2019-07-21 VITALS
HEIGHT: 72 IN | OXYGEN SATURATION: 98 % | BODY MASS INDEX: 22.11 KG/M2 | SYSTOLIC BLOOD PRESSURE: 161 MMHG | TEMPERATURE: 97.8 F | HEART RATE: 46 BPM | DIASTOLIC BLOOD PRESSURE: 67 MMHG | WEIGHT: 163.2 LBS | RESPIRATION RATE: 20 BRPM

## 2019-07-21 DIAGNOSIS — H10.32 ACUTE BACTERIAL CONJUNCTIVITIS OF LEFT EYE: Primary | ICD-10-CM

## 2019-07-21 PROCEDURE — G0463 HOSPITAL OUTPT CLINIC VISIT: HCPCS | Performed by: PHYSICIAN ASSISTANT

## 2019-07-21 PROCEDURE — 99213 OFFICE O/P EST LOW 20 MIN: CPT | Performed by: PHYSICIAN ASSISTANT

## 2019-07-21 RX ORDER — POLYMYXIN B SULFATE AND TRIMETHOPRIM 1; 10000 MG/ML; [USP'U]/ML
1 SOLUTION OPHTHALMIC EVERY 4 HOURS
Qty: 10 ML | Refills: 0 | Status: SHIPPED | OUTPATIENT
Start: 2019-07-21 | End: 2019-07-28

## 2019-07-21 NOTE — PROGRESS NOTES
Assessment/Plan    Acute bacterial conjunctivitis of left eye [H10 32]  1  Acute bacterial conjunctivitis of left eye  polymyxin b-trimethoprim (POLYTRIM) ophthalmic solution     Use eye drops as directed  Recommend warm, moist compresses to eye  Follow up with PCP in 3-5 days  Go to ER if chest pain or difficulty breathing develop    Subjective:     Patient ID: Mat Hawthorne is a [de-identified] y o  male  Reason For Visit / Chief Complaint  Chief Complaint   Patient presents with    Eye Redness     Pt reports Friday he started with left eye irriation and it continues to get worse  Pt reports itching and eye drainage  This am eye was closed shut  Patient presents with complaint of left eye irritation and discharge x 1 day  Pt states that the irritation began on Friday but didn't start having discharge until last night  Pt states that he woke up this AM with his eye crusted shut  Pt denies fever, chills, chest tightness, dyspnea, congestion, rhinorrhea, abdominal pain, and nausea  Pt states that he is getting cataract surgery on August 12th  Pt reports an allergy to ciprofloxacin stating that it has been causing him tendon pain since August  Pt denies recent antibiotic use        Past Medical History:   Diagnosis Date    Astigmatism     last assessed 11/15/17    Cataract     Resolved 11/15/17    Cyst of left kidney     last assessed 11/15/2017    Cyst of right kidney     lasst assessed 11/15/2017    Gross hematuria     Last assessed 01/28/16    Hematuria     last assessed 01/28/16    Prostate cancer ([facility] [address] )     last assessed 05/18/17    Renal calculi     last assessed 08/16/12    Squamous cell carcinoma     Vitamin D deficiency     Last assessed 08/19/13       Past Surgical History:   Procedure Laterality Date    BREAST BIOPSY      Benign breast tumor    COLONOSCOPY      CYSTOSCOPY  01/28/2016    ELBOW SURGERY      Bone chip    HERNIA REPAIR  2002    LITHOTRIPSY      RETROPUBIC PROSTATECTOMY 11/13/2001    Radical with nerve sparing       Family History   Problem Relation Age of Onset    Other Mother 76        Influenza   Flavia Cross Hypertension Mother     Heart failure Father 70    Peripheral vascular disease Father     Hypertension Father     Prostate cancer Brother     Stroke Brother 48    Hypertension Brother     Hypertension Family        Review of Systems   Constitutional: Negative for chills, fatigue and fever  HENT: Negative for congestion, ear pain, postnasal drip, rhinorrhea and sore throat  Eyes: Positive for discharge, redness and itching  Respiratory: Negative for cough, chest tightness and shortness of breath  Cardiovascular: Negative for chest pain  Gastrointestinal: Negative for abdominal pain, blood in stool, diarrhea, nausea and vomiting  Musculoskeletal: Negative for myalgias, neck pain and neck stiffness  Skin: Negative for color change, rash and wound  Neurological: Negative for dizziness, weakness, light-headedness, numbness and headaches  All other systems reviewed and are negative  Objective:    /67 (BP Location: Right arm, Patient Position: Sitting)   Pulse (!) 46   Temp 97 8 °F (36 6 °C) (Tympanic)   Resp 20   Ht 5' 11 5" (1 816 m)   Wt 74 kg (163 lb 3 2 oz)   SpO2 98%   BMI 22 44 kg/m²     Physical Exam   Constitutional: He is oriented to person, place, and time  He appears well-developed and well-nourished  No distress  HENT:   Head: Normocephalic and atraumatic  Right Ear: External ear normal    Left Ear: External ear normal    Nose: Nose normal    Mouth/Throat: Oropharynx is clear and moist  No oropharyngeal exudate  Eyes: Pupils are equal, round, and reactive to light  EOM are normal  Right eye exhibits no discharge  Left eye exhibits discharge  Left eye: Scleral injection with yellow discharge; mild swelling of upper eyelid   Neck: Normal range of motion  Neck supple     Cardiovascular: Normal rate, regular rhythm and normal heart sounds  Pulmonary/Chest: Effort normal and breath sounds normal  No respiratory distress  He has no wheezes  Lymphadenopathy:     He has no cervical adenopathy  Neurological: He is alert and oriented to person, place, and time  No cranial nerve deficit or sensory deficit  Skin: Skin is warm and dry  Capillary refill takes less than 2 seconds  No rash noted  He is not diaphoretic  Psychiatric: He has a normal mood and affect  His behavior is normal  Thought content normal    Nursing note and vitals reviewed

## 2019-07-21 NOTE — PATIENT INSTRUCTIONS
Use eye drops as directed  Recommend warm, moist compresses to eye  Follow up with PCP in 3-5 days  Go to ER if chest pain or difficulty breathing develop

## 2019-07-26 ENCOUNTER — HOSPITAL ENCOUNTER (OUTPATIENT)
Dept: NON INVASIVE DIAGNOSTICS | Facility: CLINIC | Age: 79
Discharge: HOME | End: 2019-07-26
Payer: MEDICARE

## 2019-07-26 DIAGNOSIS — I73.9 PAD (PERIPHERAL ARTERY DISEASE) (HCC): ICD-10-CM

## 2019-07-26 DIAGNOSIS — M79.10 MUSCLE PAIN: ICD-10-CM

## 2019-07-26 DIAGNOSIS — I73.9 CLAUDICATION (HCC): ICD-10-CM

## 2019-07-26 PROCEDURE — 93925 LOWER EXTREMITY STUDY: CPT

## 2019-07-26 PROCEDURE — 93925 LOWER EXTREMITY STUDY: CPT | Performed by: SURGERY

## 2019-07-26 PROCEDURE — 93923 UPR/LXTR ART STDY 3+ LVLS: CPT

## 2019-07-26 PROCEDURE — 93922 UPR/L XTREMITY ART 2 LEVELS: CPT | Performed by: SURGERY

## 2019-08-02 ENCOUNTER — OFFICE VISIT (OUTPATIENT)
Dept: FAMILY MEDICINE CLINIC | Facility: CLINIC | Age: 79
End: 2019-08-02
Payer: MEDICARE

## 2019-08-02 VITALS
BODY MASS INDEX: 22.08 KG/M2 | SYSTOLIC BLOOD PRESSURE: 124 MMHG | HEART RATE: 64 BPM | TEMPERATURE: 98 F | DIASTOLIC BLOOD PRESSURE: 72 MMHG | OXYGEN SATURATION: 97 % | WEIGHT: 163 LBS | HEIGHT: 72 IN

## 2019-08-02 DIAGNOSIS — I34.0 NON-RHEUMATIC MITRAL REGURGITATION: ICD-10-CM

## 2019-08-02 DIAGNOSIS — I35.1 NONRHEUMATIC AORTIC VALVE INSUFFICIENCY: ICD-10-CM

## 2019-08-02 DIAGNOSIS — I49.3 PVC'S (PREMATURE VENTRICULAR CONTRACTIONS): ICD-10-CM

## 2019-08-02 DIAGNOSIS — H26.9 CATARACT OF BOTH EYES, UNSPECIFIED CATARACT TYPE: ICD-10-CM

## 2019-08-02 DIAGNOSIS — Z01.818 PRE-OP EXAMINATION: Primary | ICD-10-CM

## 2019-08-02 DIAGNOSIS — I71.2 THORACIC AORTIC ANEURYSM WITHOUT RUPTURE (HCC): ICD-10-CM

## 2019-08-02 PROCEDURE — 99214 OFFICE O/P EST MOD 30 MIN: CPT | Performed by: NURSE PRACTITIONER

## 2019-08-02 NOTE — ASSESSMENT & PLAN NOTE
Yearly PSA, being managed  In remission  Was initially treated at [facility]

## 2019-08-02 NOTE — ASSESSMENT & PLAN NOTE
Follows with cardiology, most recent visit July 2019- they will discuss further imaging at next appointment  Patient asymptomatic

## 2019-08-02 NOTE — PROGRESS NOTES
Subjective:      Karina Garcia is a [de-identified] y o  male who presents to the office today for a preoperative consultation at the request of surgeon Dr Sohail Wagner who plans on performing Cataract Surgery on August 12 for right eye and August 26th for left eye  Planned anesthesia is MAC  The patient has the following known anesthesia issues: past general anesthesia with complications (respiratory arrest- Narcan given in 2001)  Patient has a bleeding risk of: use of Ca-channel blockers (see med list)  Review of Systems  Review of Systems   Constitutional: Negative for chills and fever  HENT: Negative for congestion  Eyes: Negative for pain and visual disturbance  Respiratory: Negative for cough and shortness of breath  Cardiovascular: Negative for chest pain, palpitations and leg swelling  Gastrointestinal: Negative for abdominal pain, diarrhea, nausea and vomiting  Genitourinary: Negative for difficulty urinating and dysuria  Musculoskeletal: Negative for arthralgias and myalgias  Skin: Negative for color change and rash  Neurological: Negative for dizziness, syncope, numbness and headaches  Hematological: Negative for adenopathy  Psychiatric/Behavioral: Negative for agitation and behavioral problems  The patient is not nervous/anxious  Objective:      Physical Exam    /72 (BP Location: Left arm, Patient Position: Sitting, Cuff Size: Standard)   Pulse 64   Temp 98 °F (36 7 °C) (Temporal)   Ht 5' 11 5" (1 816 m)   Wt 73 9 kg (163 lb)   SpO2 97%   BMI 22 42 kg/m²     Physical Exam   Constitutional: He is oriented to person, place, and time  He appears well-developed and well-nourished  No distress  HENT:   Head: Normocephalic and atraumatic  Right Ear: External ear normal    Left Ear: External ear normal    Nose: Nose normal    Eyes: Conjunctivae and lids are normal  Right eye exhibits no discharge  Left eye exhibits no discharge  Neck: Normal range of motion  Neck supple  No tracheal deviation present  Cardiovascular: Normal rate and regular rhythm  No murmur heard  Pulmonary/Chest: Effort normal and breath sounds normal  No respiratory distress  He has no wheezes  Abdominal: Soft  Bowel sounds are normal  He exhibits no distension  There is no tenderness  There is no guarding  Musculoskeletal: Normal range of motion  He exhibits no edema, tenderness or deformity  Lymphadenopathy:     He has no cervical adenopathy  Neurological: He is alert and oriented to person, place, and time  Coordination normal    Skin: Skin is warm and dry  No rash noted  He is not diaphoretic  No erythema  Psychiatric: He has a normal mood and affect  His speech is normal and behavior is normal  Judgment and thought content normal  Cognition and memory are normal    Nursing note and vitals reviewed  Predictors of intubation difficulty:  Morbid obesity? no  Anatomically abnormal facies? no  Short, thick neck? no  Neck range of motion: normal    Cardiographics  None indicated by surgeon  Imaging  None indicated by surgeon  Lab Review   None indicated by surgeon  Assessment:      [de-identified] y o  male with planned surgery as above  Known risk factors for perioperative complications: None  aortic valve insufficiency, mitral regurgitation, Frequent PVCs, thoracic aortic aneurysm     Respiratory arrest in 2001 with general anesthesia     Can walk 2 blocks without difficulty: yes  Can walk up 2 flights of stairs without difficulty: yes     1  Pre-op examination     2  Cataract of both eyes, unspecified cataract type     3  PVC's (premature ventricular contractions)     4  Thoracic aortic aneurysm without rupture ([address] )     5  Non-rheumatic mitral regurgitation     6  Nonrheumatic aortic valve insufficiency            Plan:        Malignant neoplasm of prostate ([address] )  Yearly PSA, being managed  In remission  [facility]       Thoracic aortic aneurysm without rupture [facility])  Follows with cardiology, most recent visit July 2019- they will discuss further imaging at next appointment  Patient asymptomatic  PVC's (premature ventricular contractions)  Follows with cardiology, most recent appointment July 2019, asymptomatic  Takes Verapamil  Nonrheumatic aortic valve insufficiency  Per cardiology note will be rechecking echocardiogram prior to next visit  Non-rheumatic mitral regurgitation  Per cardiology note will be rechecking echocardiogram prior to next visit  Patient is medically cleared for surgery         Sulfa antibiotics and Ciprofloxacin  Past Medical History:   Diagnosis Date    Astigmatism     last assessed 11/15/17    Cataract     Resolved 11/15/17    Cyst of left kidney     last assessed 11/15/2017    Cyst of right kidney     lasst assessed 11/15/2017    Gross hematuria     Last assessed 01/28/16    Hematuria     last assessed 01/28/16    Prostate cancer ([address] )     last assessed 05/18/17    Renal calculi     last assessed 08/16/12    Squamous cell carcinoma     Vitamin D deficiency     Last assessed 08/19/13     Past Surgical History:   Procedure Laterality Date    BREAST BIOPSY      Benign breast tumor    COLONOSCOPY      CYSTOSCOPY  01/28/2016    ELBOW SURGERY      Bone chip    HERNIA REPAIR  2002    LITHOTRIPSY      RETROPUBIC PROSTATECTOMY  11/13/2001    Radical with nerve sparing     Patient Active Problem List   Diagnosis    PVC's (premature ventricular contractions)    History of prostate cancer    Thoracic aortic aneurysm without rupture ([address] )    Nonrheumatic aortic valve insufficiency    Non-rheumatic mitral regurgitation    Malignant neoplasm of prostate ([address] )    Strain of calf muscle     Social History     Socioeconomic History    Marital status: /Civil Union     Spouse name: None    Number of children: None    Years of education: None    Highest education level: None   Occupational History    None   Social Needs    Financial resource strain: None    Food insecurity:     Worry: None     Inability: None    Transportation needs:     Medical: None     Non-medical: None   Tobacco Use    Smoking status: Never Smoker    Smokeless tobacco: Never Used   Substance and Sexual Activity    Alcohol use: Yes     Comment: social    Drug use: No    Sexual activity: None   Lifestyle    Physical activity:     Days per week: None     Minutes per session: None    Stress: None   Relationships    Social connections:     Talks on phone: None     Gets together: None     Attends Jain service: None     Active member of club or organization: None     Attends meetings of clubs or organizations: None     Relationship status: None    Intimate partner violence:     Fear of current or ex partner: None     Emotionally abused: None     Physically abused: None     Forced sexual activity: None   Other Topics Concern    None   Social History Narrative    None       Current Outpatient Medications:     cyanocobalamin (VITAMIN B-12) 500 mcg tablet, 1,000 mcg Daily  , Disp: , Rfl:     Multiple Vitamins-Minerals (CENTRUM SILVER 50+MEN PO), Daily, Disp: , Rfl:     polyethylene glycol (MIRALAX) powder, Take 17 g by mouth every other day, Disp: , Rfl:     verapamil (CALAN-SR) 240 mg CR tablet, Take 1 and a 1/2  tablets daily as directed  Can switch to bedtime dosing if patient prefers (Patient taking differently: Take 1 and a 1/2  tablets daily as directed    Can switch to bedtime dosing if patient prefers), Disp: 135 tablet, Rfl: 3  Lab Results   Component Value Date    WBC 6 93 11/02/2018    HGB 13 3 11/02/2018    HCT 40 9 11/02/2018    MCV 95 11/02/2018     11/02/2018     Lab Results   Component Value Date     04/06/2015    K 4 1 07/09/2019     07/09/2019    CO2 30 07/09/2019    ANIONGAP 7 04/06/2015    BUN 18 07/09/2019    CREATININE 1 16 07/09/2019    GLUCOSE 96 04/06/2015    GLUF 87 11/02/2018 CALCIUM 9 4 07/09/2019    AST 14 07/09/2019    ALT 20 07/09/2019    ALKPHOS 58 07/09/2019    PROT 7 0 04/06/2015    BILITOT 0 65 04/06/2015    EGFR 59 07/09/2019     No results found for: [facility]

## 2019-10-09 ENCOUNTER — TELEPHONE (OUTPATIENT)
Dept: FAMILY MEDICINE CLINIC | Facility: CLINIC | Age: 79
End: 2019-10-09

## 2019-10-09 NOTE — TELEPHONE ENCOUNTER
Patient is scheduled to see you on 11/5  He had his full blood work on 6/14/19    Does he need to get more blood work?    [phone]

## 2019-10-09 NOTE — TELEPHONE ENCOUNTER
Tell the patient that I do not think he needs any new labs right now other than just coming in for his office visit    We will go over everthing then

## 2019-10-23 ENCOUNTER — IMMUNIZATIONS (OUTPATIENT)
Dept: FAMILY MEDICINE CLINIC | Facility: CLINIC | Age: 79
End: 2019-10-23
Payer: MEDICARE

## 2019-10-23 DIAGNOSIS — Z23 ENCOUNTER FOR IMMUNIZATION: ICD-10-CM

## 2019-10-23 PROCEDURE — 90662 IIV NO PRSV INCREASED AG IM: CPT | Performed by: FAMILY MEDICINE

## 2019-10-23 PROCEDURE — G0008 ADMIN INFLUENZA VIRUS VAC: HCPCS | Performed by: FAMILY MEDICINE

## 2019-10-30 ENCOUNTER — HOSPITAL ENCOUNTER (OUTPATIENT)
Dept: NON INVASIVE DIAGNOSTICS | Facility: CLINIC | Age: 79
Discharge: HOME | End: 2019-10-30
Payer: MEDICARE

## 2019-10-30 DIAGNOSIS — I35.1 NONRHEUMATIC AORTIC VALVE INSUFFICIENCY: ICD-10-CM

## 2019-10-30 PROCEDURE — 93306 TTE W/DOPPLER COMPLETE: CPT

## 2019-10-30 PROCEDURE — 93306 TTE W/DOPPLER COMPLETE: CPT | Performed by: INTERNAL MEDICINE

## 2019-11-05 ENCOUNTER — OFFICE VISIT (OUTPATIENT)
Dept: FAMILY MEDICINE CLINIC | Facility: CLINIC | Age: 79
End: 2019-11-05
Payer: MEDICARE

## 2019-11-05 VITALS
TEMPERATURE: 97.5 F | SYSTOLIC BLOOD PRESSURE: 170 MMHG | BODY MASS INDEX: 22.85 KG/M2 | HEIGHT: 71 IN | DIASTOLIC BLOOD PRESSURE: 82 MMHG | WEIGHT: 163.2 LBS | HEART RATE: 78 BPM | OXYGEN SATURATION: 97 %

## 2019-11-05 DIAGNOSIS — M79.661 RIGHT CALF PAIN: Primary | ICD-10-CM

## 2019-11-05 DIAGNOSIS — I35.1 NONRHEUMATIC AORTIC VALVE INSUFFICIENCY: ICD-10-CM

## 2019-11-05 DIAGNOSIS — I34.0 NONRHEUMATIC MITRAL VALVE REGURGITATION: ICD-10-CM

## 2019-11-05 DIAGNOSIS — I77.810 AORTIC ROOT DILATATION (HCC): ICD-10-CM

## 2019-11-05 DIAGNOSIS — E78.00 PURE HYPERCHOLESTEROLEMIA: ICD-10-CM

## 2019-11-05 DIAGNOSIS — S86.811S STRAIN OF CALF MUSCLE, RIGHT, SEQUELA: ICD-10-CM

## 2019-11-05 DIAGNOSIS — Z85.46 HISTORY OF PROSTATE CANCER: ICD-10-CM

## 2019-11-05 PROCEDURE — 99214 OFFICE O/P EST MOD 30 MIN: CPT | Performed by: FAMILY MEDICINE

## 2019-11-10 NOTE — PROGRESS NOTES
Assessment/Plan:    Recommend MRI of the calf area to rule out possible micro tear potentially due to use of Cipro in the past   Patient initially responded with therapy but is still having issues therefore MRI is recommended  If this is completely negative then would need to look further at the lumbar spine  Patient has seen orthopedics in the past but not recently with regards to this issue  Otherwise do it 6 months for full blood work  Patient recently had echocardiogram ordered by Cardiology  There was some signs of mitral and aortic regurgitation as well as aortic root dilatation  Patient will continue with Cardiology routinely  Flu shot up-to-date  Await test results  Diagnoses and all orders for this visit:    Right calf pain  -     MRI tibia fibula right w wo contrast; Future    Strain of calf muscle, right, sequela    Pure hypercholesterolemia   -     Comprehensive metabolic panel; Future  -     Lipid panel; Future  -     Lipid panel; Future  -     Comprehensive metabolic panel; Future    Aortic root dilatation (HCC)    Nonrheumatic mitral valve regurgitation    Nonrheumatic aortic valve insufficiency    History of prostate cancer  -     PSA Total, Diagnostic; Future        1  Right calf pain  MRI tibia fibula right w wo contrast   2  Strain of calf muscle, right, sequela     3  Pure hypercholesterolemia   Comprehensive metabolic panel    Lipid panel    Lipid panel    Comprehensive metabolic panel   4  Aortic root dilatation (HCC)     5  Nonrheumatic mitral valve regurgitation     6  Nonrheumatic aortic valve insufficiency     7  History of prostate cancer  PSA Total, Diagnostic       Subjective:        Patient ID: Sasha Guadalupe is a [de-identified] y o  male  Chief Complaint   Patient presents with    Follow-up     5 months; R leg pain that has been going on for a year, pain starts at the calf and then goes up his leg  Patient here today to have routine office visit    Blood pressure stable  The following portions of the patient's history were reviewed and updated as appropriate: past medical history, past surgical history and problem list       Review of Systems   Constitutional: Negative for appetite change, fatigue, fever and unexpected weight change  HENT: Negative for congestion, ear pain, postnasal drip, rhinorrhea, sinus pressure, sinus pain and sore throat  Eyes: Negative for redness and visual disturbance  Respiratory: Negative for chest tightness and shortness of breath  Cardiovascular: Negative for chest pain, palpitations and leg swelling  Gastrointestinal: Negative for abdominal distention, abdominal pain, diarrhea and nausea  Endocrine: Negative for cold intolerance and heat intolerance  Genitourinary: Negative for dysuria and hematuria  Musculoskeletal: Negative for arthralgias, gait problem and myalgias  Refractory right calf stiffness  Skin: Negative for pallor and rash  Neurological: Negative for dizziness and headaches  Psychiatric/Behavioral: Negative for behavioral problems  The patient is not nervous/anxious  Objective:  /82 (BP Location: Left arm, Patient Position: Sitting, Cuff Size: Standard)   Pulse 78   Temp 97 5 °F (36 4 °C)   Ht 5' 11" (1 803 m)   Wt 74 kg (163 lb 3 2 oz)   SpO2 97%   BMI 22 76 kg/m²        Physical Exam   Constitutional: He is oriented to person, place, and time  He appears well-nourished  No distress  HENT:   Head: Normocephalic and atraumatic  Right Ear: Tympanic membrane normal    Left Ear: Tympanic membrane normal    Mouth/Throat: Oropharynx is clear and moist    Eyes: Pupils are equal, round, and reactive to light  Conjunctivae and EOM are normal  No scleral icterus  Neck: Neck supple  No thyromegaly present  Cardiovascular: Normal rate, regular rhythm, normal heart sounds and intact distal pulses  No murmur heard    Pulses:       Carotid pulses are 0 on the right side, and 0 on the left side  Pulmonary/Chest: Effort normal and breath sounds normal  No respiratory distress  He has no wheezes  Musculoskeletal: He exhibits no edema  Lymphadenopathy:     He has no cervical adenopathy  Neurological: He is alert and oriented to person, place, and time  He has normal reflexes  He displays normal reflexes  No cranial nerve deficit  Skin: Skin is warm  No pallor  Psychiatric: He has a normal mood and affect  His behavior is normal  Judgment and thought content normal    Nursing note and vitals reviewed

## 2019-11-13 DIAGNOSIS — M79.661 RIGHT CALF PAIN: Primary | ICD-10-CM

## 2019-11-19 ENCOUNTER — TELEPHONE (OUTPATIENT)
Dept: FAMILY MEDICINE CLINIC | Facility: CLINIC | Age: 79
End: 2019-11-19

## 2019-11-19 NOTE — TELEPHONE ENCOUNTER
The MRI showed no mass or bony abnormality  There was some edema which is fluid in part of the back of the calf which could be related to a partial tear  My concern is this could be from the Cipro  I would typically have this evaluated by Orthopedics  I do like Dr Gideon Smith who is a foot and ankle specialist with [facility] who has office is in [facility] which since they live in [place] may be helpful    See if willing to go and if so set up

## 2019-11-20 ENCOUNTER — TRANSCRIBE ORDERS (OUTPATIENT)
Dept: FAMILY MEDICINE CLINIC | Facility: CLINIC | Age: 79
End: 2019-11-20

## 2019-11-20 DIAGNOSIS — M79.669 CALF PAIN, UNSPECIFIED LATERALITY: Primary | ICD-10-CM

## 2019-11-22 ENCOUNTER — OFFICE VISIT (OUTPATIENT)
Dept: OBGYN CLINIC | Facility: CLINIC | Age: 79
End: 2019-11-22
Payer: MEDICARE

## 2019-11-22 VITALS
SYSTOLIC BLOOD PRESSURE: 160 MMHG | DIASTOLIC BLOOD PRESSURE: 77 MMHG | HEIGHT: 68 IN | BODY MASS INDEX: 24.55 KG/M2 | WEIGHT: 162 LBS | HEART RATE: 90 BPM

## 2019-11-22 DIAGNOSIS — M25.571 PAIN, JOINT, ANKLE AND FOOT, RIGHT: ICD-10-CM

## 2019-11-22 DIAGNOSIS — M76.61 ACHILLES TENDINITIS, RIGHT LEG: Primary | ICD-10-CM

## 2019-11-22 DIAGNOSIS — M79.669 CALF PAIN, UNSPECIFIED LATERALITY: ICD-10-CM

## 2019-11-22 PROCEDURE — 99213 OFFICE O/P EST LOW 20 MIN: CPT | Performed by: ORTHOPAEDIC SURGERY

## 2019-11-22 RX ORDER — METHYLPREDNISOLONE 4 MG/1
TABLET ORAL
Qty: 1 EACH | Refills: 0 | Status: SHIPPED | OUTPATIENT
Start: 2019-11-22 | End: 2020-01-03

## 2019-11-22 NOTE — PROGRESS NOTES
HUE Acuna  Attending, Orthopaedic Surgery  [facility] [address] [facility]        ORTHOPAEDIC FOOT AND ANKLE CLINIC VISIT     Assessment:     Encounter Diagnoses   Name Primary?  Calf pain, unspecified laterality     Pain, joint, ankle and foot, right     Achilles tendinitis, right leg Yes              Plan:   · The patient verbalized understanding of exam findings and treatment plan  We engaged in the shared decision-making process and treatment options were discussed at length with the patient  Surgical and conservative management discussed today along with risks and benefits  · Rell Ro is diagnosed with right achilles tendinopathy at the musculotendinous junction  He was given a referral to physical therapy  · He is to wear a high tide cam walker boot, which was dispensed to him today in the office  He is to wear boot with activity  · He was prescribed a medrol dose patrick, he is to take as directed  · Rest, ice and elevation was advised  Return in about 6 weeks (around 1/3/2020) for Recheck right calf                 History of Present Illness:   Chief Complaint:   Chief Complaint   Patient presents with    Right Ankle - Pain     Tiara Cam is a [de-identified] y o  male who was referred by Dr Riya Bhat is being seen for right calf pain  He denies any injury mechanism and states pain started acutely, he states pain radiates up leg from the calf muscle to lumbar spine  Pain is localized at calf muscle with minimal radiating and described as sharp and severe  Patient denies numbness, tingling or radicular pain  Denies history of neuropathy  Patient does not smoke, does not have diabetes and does not take blood thinners  Patient denies family history of anesthesia complications and has not had any complications with anesthesia       Pain/symptom timing:  Worse during the day when active  Pain/symptom context:  Worse with activites and work  Pain/symptom modifying factors:  Rest makes better, activities make worse  Pain/symptom associated signs/symptoms: none    Prior treatment   · NSAIDsNo    · Injections No   · Bracing/Orthotics No   · Physical Therapy Yes     Orthopedic Surgical History:   See below    Past Medical, Surgical and Social History:  Past Medical History:  has a past medical history of Astigmatism, Cataract, Cyst of left kidney, Cyst of right kidney, Gross hematuria, Hematuria, Prostate cancer ([address] ), Renal calculi, Squamous cell carcinoma, and Vitamin D deficiency  Problem List: does not have any pertinent problems on file  Past Surgical History:  has a past surgical history that includes Breast biopsy; Colonoscopy; Cystoscopy (01/28/2016); Elbow surgery; Retropubic prostatectomy (11/13/2001); Lithotripsy; Hernia repair (2002); Cataract extraction (Left, 08/26/2019); and Cataract extraction (Right, 08/12/2019)  Family History: family history includes Heart failure (age of onset: 70) in his father; Hypertension in his brother, family, father, and mother; Other (age of onset: 76) in his mother; Peripheral vascular disease in his father; Prostate cancer in his brother; Stroke (age of onset: 48) in his brother  Social History:  reports that he has never smoked  He has never used smokeless tobacco  He reports that he drinks alcohol  He reports that he does not use drugs  Current Medications: has a current medication list which includes the following prescription(s): vitamin b-12, multiple vitamins-minerals, polyethylene glycol, verapamil, and methylprednisolone  Allergies: is allergic to sulfa antibiotics and ciprofloxacin       Review of Systems:  General- denies fever/chills  HEENT- denies hearing loss or sore throat  Eyes- denies eye pain or visual disturbances, denies red eyes  Respiratory- denies cough or SOB  Cardio- denies chest pain or palpitations  GI- denies abdominal pain  Endocrine- denies urinary frequency  Urinary- denies pain with urination  Musculoskeletal- Negative except noted above  Skin- denies rashes or wounds  Neurological- denies dizziness or headache  Psychiatric- denies anxiety or difficulty concentrating    Physical Exam:   /77 (BP Location: Left arm, Patient Position: Sitting, Cuff Size: Adult)   Pulse 90   Ht 5' 8" (1 727 m)   Wt 73 5 kg (162 lb)   BMI 24 63 kg/m²   General/Constitutional: No apparent distress: well-nourished and well developed  Eyes: normal ocular motion  Lymphatic: No appreciable lymphadenopathy  Respiratory: Non-labored breathing  Vascular: No edema, swelling or tenderness, except as noted in detailed exam   Integumentary: No impressive skin lesions present, except as noted in detailed exam   Neuro: No ataxia or tremors noted  Psych: Normal mood and affect, oriented to person, place and time  Appropriate affect  Musculoskeletal: Normal, except as noted in detailed exam and in HPI  Examination    Right    Gait Normal   Musculoskeletal Tender to palpation at soleus muscular / tendonous junction   Skin Normal       Nails Normal    Range of Motion  10 degrees dorsiflexion pain present with dorsiflexion, 40 degrees plantarflexion  Subtalar motion: wnl    Stability Stable    Muscle Strength 5/5 tibialis anterior  5/5 gastrocnemius-soleus  5/5 posterior tibialis  5/5 peroneal/eversion strength  5/5 EHL  5/5 FHL    Neurologic Normal    Sensation Intact to light touch throughout sural, saphenous, superficial peroneal, deep peroneal and medial/lateral plantar nerve distributions  Reading-Bianca 5 07 filament (10g) testing deferred  Cardiovascular Brisk capillary refill < 2 seconds,intact DP and PT pulses    Special Tests Archer Sign:  Negative  Straight leg raise:  Negative      Imaging Studies:   MRI of the right ankle/calf was reviewed which demonstrates edema at the musculotendinous junction of the gastrocsoleus complex  Reviewed by me personally          Scribe Attestation    I,:   Berto Iyer am acting as a scribe while in the presence of the attending physician :        I,:   Rico Marsh MD personally performed the services described in this documentation    as scribed in my presence :              Jeremy Hull Lachman, MD  Foot & Ankle Surgery   Department of [address] [place] [address]      I personally performed the service  Jeremy Hull Lachman, MD

## 2019-11-26 ENCOUNTER — EVALUATION (OUTPATIENT)
Dept: PHYSICAL THERAPY | Facility: CLINIC | Age: 79
End: 2019-11-26
Payer: MEDICARE

## 2019-11-26 DIAGNOSIS — M79.669 CALF PAIN, UNSPECIFIED LATERALITY: ICD-10-CM

## 2019-11-26 DIAGNOSIS — M76.61 ACHILLES TENDINITIS, RIGHT LEG: Primary | ICD-10-CM

## 2019-11-26 PROCEDURE — 97161 PT EVAL LOW COMPLEX 20 MIN: CPT | Performed by: PHYSICAL THERAPIST

## 2019-11-26 NOTE — PROGRESS NOTES
PT Evaluation     Today's date: 2019  Patient name: Isael Montes  : 1940  MRN: [id number]  Referring provider: Keisha Shetty MD  Dx:   Encounter Diagnosis     ICD-10-CM    1  Calf pain, unspecified laterality M79 [id number] Ambulatory referral to Physical Therapy   2  Achilles tendinitis, right leg M76 61 Ambulatory referral to Physical Therapy                  Assessment  Assessment details: Patient is a [de-identified] y o  male who presents to outpatient PT with pain, decreased rom, decreased strength and decreased functional mobility  He will benefit from skilled PT to address these deficits in order to achieve his goals and maximize his functional mobility  Goals  Short Term Goals: Independent performance of initial hep  Decrease pain 2 points on VAS      Long Term Goals: Independent performance of comprehensive hep    Performance of IADL's is returned to max level of function  Performance in recreational activities is improved to max level of function  Able to walk community distances with min pain    Plan  Planned therapy interventions: IADL retraining, joint mobilization, manual therapy, massage, strengthening, stretching, therapeutic activities, therapeutic exercise, therapeutic training and home exercise program  Frequency: 2x week  Duration in weeks: 6        Subjective Evaluation    History of Present Illness  Mechanism of injury: Pt reports that he has been having on and off achilles pain for months  But that it has recently it has been getting worse and that he notices radiations up his leg  Reports that he has been in a CAM boot for 4 days with good pain reduction  Has been instructed to wear the boot for 6 weeks  Reports that prior to the boot he had difficulty with stair climbing      Pain  Current pain ratin  At best pain ratin  At worst pain ratin    Patient Goals  Patient goals for therapy: decreased pain, increased motion, increased strength, independence with ADLs/IADLs and return to sport/leisure activities          Objective   R ankle    ROM:    DF: 4  PF: 30  INV: 25  EV: 16    STRENGTH:  Df: 4+/5  Pf: NT  INV:  4/5  Ev: 4 /5    Slight TTP distal 1/3 of achilles                              Precautions: chronic lumbar pain, achilles tendonitis      Manual              prom             laser                                                        Exercise Diary              bike             Ankle tb no DF             Manual HS stretch             Seated rockerboard                                                                                                                                                                                                                                 Modalities

## 2019-12-02 ENCOUNTER — OFFICE VISIT (OUTPATIENT)
Dept: PHYSICAL THERAPY | Facility: CLINIC | Age: 79
End: 2019-12-02
Payer: MEDICARE

## 2019-12-02 DIAGNOSIS — M76.61 ACHILLES TENDINITIS, RIGHT LEG: Primary | ICD-10-CM

## 2019-12-02 PROCEDURE — 97110 THERAPEUTIC EXERCISES: CPT

## 2019-12-02 PROCEDURE — 97140 MANUAL THERAPY 1/> REGIONS: CPT

## 2019-12-02 NOTE — PROGRESS NOTES
Daily Note     Today's date: 2019  Patient name: Nicolasa Sibley  : 1940  MRN: [id number]  Referring provider: Zina Yu MD  Dx:   Encounter Diagnosis     ICD-10-CM    1  Achilles tendinitis, right leg M76 61                 Subjective: Prior to today's PT treatment patient states, "I'm actually feeling pretty good right now "  Patient reports compliance with wearing the CAM boot  Objective: See treatment diary below  Assessment: Treatment is tolerated well without complaints  Plan: Continue treatment as per PT plan of care       Precautions: chronic lumbar pain, achilles tendonitis    Manual              prom DF  3'            Laser R achilles and calf  5'                                                       Exercise Diary              bike             ankle tb no DF red  20            manual HS stretch 30"x3            seated rockerboard df/pf  inv/ev  20 ea            ankle pumps 30                                                                                                                                                                                                                   Modalities              MHP pre tx 8'

## 2019-12-06 ENCOUNTER — OFFICE VISIT (OUTPATIENT)
Dept: PHYSICAL THERAPY | Facility: CLINIC | Age: 79
End: 2019-12-06
Payer: MEDICARE

## 2019-12-06 DIAGNOSIS — M76.61 ACHILLES TENDINITIS, RIGHT LEG: Primary | ICD-10-CM

## 2019-12-06 PROCEDURE — 97140 MANUAL THERAPY 1/> REGIONS: CPT

## 2019-12-06 PROCEDURE — 97110 THERAPEUTIC EXERCISES: CPT

## 2019-12-06 NOTE — PROGRESS NOTES
Daily Note     Today's date: 2019  Patient name: Bolivar Nelson  : 1940  MRN: [id number]  Referring provider: Lizzie Escobar MD  Dx:   Encounter Diagnosis     ICD-10-CM    1  Achilles tendinitis, right leg M76 61                 Subjective: Patient reports the right calf was sore for one day following the last PT treatment - patient states, "It wasn't a severe pain "    Objective: See treatment diary below  Assessment: No pain noted with addition of theraband ankle dorsiflexion  Plan: Continue treatment as per PT plan of care       Precautions: chronic lumbar pain, achilles tendonitis    Manual             prom DF  3' DF  3'           Laser R achilles and calf  5' 5'                                                      Exercise Diary             bike  8'           ankle tb no DF red  20 all ways  red  20           manual HS stretch 30"x3 30"x3           seated rockerboard df/pf  inv/ev  20 ea df/pf  inv/ev  30 ea           ankle pumps 30 30                                                                                                                                                                                                                  Modalities             MHP pre tx 8' NP

## 2019-12-10 ENCOUNTER — OFFICE VISIT (OUTPATIENT)
Dept: PHYSICAL THERAPY | Facility: CLINIC | Age: 79
End: 2019-12-10
Payer: MEDICARE

## 2019-12-10 DIAGNOSIS — M76.61 ACHILLES TENDINITIS, RIGHT LEG: Primary | ICD-10-CM

## 2019-12-10 PROCEDURE — 97110 THERAPEUTIC EXERCISES: CPT

## 2019-12-10 PROCEDURE — 97140 MANUAL THERAPY 1/> REGIONS: CPT

## 2019-12-10 NOTE — PROGRESS NOTES
Daily Note     Today's date: 12/10/2019  Patient name: Dulce Mcmanus  : 1940  MRN: [id number]  Referring provider: Favian Holloway MD  Dx:   Encounter Diagnosis     ICD-10-CM    1  Achilles tendinitis, right leg M76 61                 Subjective: Patient reports right calf pain is improving  However patient complains the right hamstring felt sore for about a day following the last PT treatment  Objective: See treatment diary below  Assessment: Was more gentle during manual hamstring stretching today which patient tolerates well  Plan: Continue treatment as per PT plan of care       Precautions: chronic lumbar pain, achilles tendonitis    Manual  12/2 12/6 12/10          PROM R ankle DF  3' DF  3' DF  3'          Laser R achilles and calf  5' 5' 5' use roller ball                                                     Exercise Diary  12/2 12/6 12/10          bike  8' 8'          ankle tb no DF red  20 all ways  red  20 all ways  red  20          manual HS stretch 30"x3 30"x3 30"x4          seated rockerboard df/pf  inv/ev  20 ea df/pf  inv/ev  30 ea df/pf  inv/ev  30 ea          ankle pumps 30 30 30                                                                                                                                                                                                                 Modalities  12/2 12/6 12/10          MHP pre tx 8' NP NP

## 2019-12-12 ENCOUNTER — OFFICE VISIT (OUTPATIENT)
Dept: PHYSICAL THERAPY | Facility: CLINIC | Age: 79
End: 2019-12-12
Payer: MEDICARE

## 2019-12-12 DIAGNOSIS — M76.61 ACHILLES TENDINITIS, RIGHT LEG: Primary | ICD-10-CM

## 2019-12-12 PROCEDURE — 97140 MANUAL THERAPY 1/> REGIONS: CPT

## 2019-12-12 PROCEDURE — 97110 THERAPEUTIC EXERCISES: CPT

## 2019-12-12 NOTE — PROGRESS NOTES
Daily Note     Today's date: 2019  Patient name: Gabby Rowe  : 1940  MRN: [id number]  Referring provider: Mayda Cohen MD  Dx:   Encounter Diagnosis     ICD-10-CM    1  Achilles tendinitis, right leg M76 61                 Subjective: Patient reports he continues to be sore for 1-2 days following PT treatment  Objective: See treatment diary below  Assessment: Good tolerance to increased repetitions for ankle theraband exercises  Plan: Continue treatment as per PT plan of care       Precautions: chronic lumbar pain, achilles tendonitis    Manual  12/2 12/6 12/10 12/12         PROM R ankle DF  3' DF  3' DF  3' DF  3'         Laser R achilles and calf  5' 5' 5' use roller ball 5' use roller ball                                                    Exercise Diary  12/2 12/6 12/10 12/12         bike  8' 8' 8'         ankle tb no DF red  20 all ways  red  20 all ways  red  20 all ways red  25         manual HS stretch 30"x3 30"x3 30"x4 30"x4         seated rockerboard df/pf  inv/ev  20 ea df/pf  inv/ev  30 ea df/pf  inv/ev  30 ea df/pf  inv/ev  30 ea         ankle pumps 30 30 30 NP                                                                                                                                                                                                                Modalities  12/2 12/6 12/10 12/12         MHP pre tx 8' NP NP NP

## 2019-12-17 ENCOUNTER — OFFICE VISIT (OUTPATIENT)
Dept: PHYSICAL THERAPY | Facility: CLINIC | Age: 79
End: 2019-12-17
Payer: MEDICARE

## 2019-12-17 DIAGNOSIS — M76.61 ACHILLES TENDINITIS, RIGHT LEG: Primary | ICD-10-CM

## 2019-12-17 PROCEDURE — 97110 THERAPEUTIC EXERCISES: CPT

## 2019-12-17 PROCEDURE — 97140 MANUAL THERAPY 1/> REGIONS: CPT

## 2019-12-17 NOTE — PROGRESS NOTES
Daily Note     Today's date: 2019  Patient name: Martha Reich  : 1940  MRN: [id number]  Referring provider: Nathan Gillis MD  Dx:   Encounter Diagnosis     ICD-10-CM    1  Achilles tendinitis, right leg M76 61                 Subjective: Patient reports improvement in right calf pain  Objective: See treatment diary below  Assessment: Limited flexibility noted in the right hamstring during the manual stretching  Plan: Continue treatment as per PT plan of care       Precautions: chronic lumbar pain, achilles tendonitis    Manual  12/2 12/6 12/10 12/12 12/17        PROM R ankle DF  3' DF  3' DF  3' DF  3' DF  3'        Laser R achilles and calf  5' 5' 5' use roller ball 5' use roller ball 5' use  roller  ball                                                   Exercise Diary  12/2 12/6 12/10 12/12 12/17        bike  8' 8' 8' 8'        ankle tb no DF red  20 all ways  red  20 all ways  red  20 all ways red  25 all ways red 25        manual HS stretch 30"x3 30"x3 30"x4 30"x4 30"x4        seated rockerboard df/pf  inv/ev  20 ea df/pf  inv/ev  30 ea df/pf  inv/ev  30 ea df/pf  inv/ev  30 ea df/pf  inv/ev 30 ea        ankle pumps 30 30 30 NP 30                                                                                                                                                                                                               Modalities  12/2 12/6 12/10 12/12 12/17        MHP pre tx 8' NP NP NP NP

## 2019-12-19 ENCOUNTER — APPOINTMENT (OUTPATIENT)
Dept: PHYSICAL THERAPY | Facility: CLINIC | Age: 79
End: 2019-12-19
Payer: MEDICARE

## 2019-12-23 ENCOUNTER — OFFICE VISIT (OUTPATIENT)
Dept: PHYSICAL THERAPY | Facility: CLINIC | Age: 79
End: 2019-12-23
Payer: MEDICARE

## 2019-12-23 DIAGNOSIS — M76.61 ACHILLES TENDINITIS, RIGHT LEG: Primary | ICD-10-CM

## 2019-12-23 PROCEDURE — 97110 THERAPEUTIC EXERCISES: CPT

## 2019-12-23 PROCEDURE — 97140 MANUAL THERAPY 1/> REGIONS: CPT

## 2019-12-23 NOTE — PROGRESS NOTES
Daily Note     Today's date: 2019  Patient name: Bolivar Nelson  : 1940  MRN: [id number]  Referring provider: Lizzie Escobar MD  Dx:   Encounter Diagnosis     ICD-10-CM    1  Achilles tendinitis, right leg M76 61                 Subjective: Patient complains of mild soreness in the right calf this morning  Objective: See treatment diary below  Assessment: Therapeutic exercise program is tolerated well without complaints  Right hamstring flexibility slightly improved with manual stretching  Plan: Continue treatment as per PT plan of care       Precautions: chronic lumbar pain, achilles tendonitis    Manual  12/2 12/6 12/10 12/12 12/17 12/23       PROM R ankle DF  3' DF  3' DF  3' DF  3' DF  3' DF  3'       Laser R achilles and calf  5' 5' 5' use roller ball 5' use roller ball 5' use  roller  ball 5' use roller  ball                                                  Exercise Diary  12/2 12/6 12/10 12/12 12/17 12/23       bike  8' 8' 8' 8' 8'       ankle tb no DF red  20 all ways  red  20 all ways  red  20 all ways red  25 all ways red 25 all ways red 30       manual HS stretch 30"x3 30"x3 30"x4 30"x4 30"x4 30"x4       seated rockerboard df/pf  inv/ev  20 ea df/pf  inv/ev  30 ea df/pf  inv/ev  30 ea df/pf  inv/ev  30 ea df/pf  inv/ev 30 ea df/pf  inv/ev 30 ea       ankle pumps 30 30 30 NP 30 30                                                                                                                                                                                                              Modalities  12/2 12/6 12/10 12/12 12/17 12/23       MHP pre tx 8' NP NP NP NP NP

## 2019-12-27 ENCOUNTER — OFFICE VISIT (OUTPATIENT)
Dept: PHYSICAL THERAPY | Facility: CLINIC | Age: 79
End: 2019-12-27
Payer: MEDICARE

## 2019-12-27 DIAGNOSIS — M76.61 ACHILLES TENDINITIS, RIGHT LEG: Primary | ICD-10-CM

## 2019-12-27 PROCEDURE — 97110 THERAPEUTIC EXERCISES: CPT

## 2019-12-27 PROCEDURE — 97140 MANUAL THERAPY 1/> REGIONS: CPT

## 2019-12-27 NOTE — PROGRESS NOTES
Daily Note     Today's date: 2019  Patient name: Ze Monteiro  : 1940  MRN: [id number]  Referring provider: Juan Kirkpatrick MD  Dx:   Encounter Diagnosis     ICD-10-CM    1  Achilles tendinitis, right leg M76 61                 Subjective: Patient complains of "a little tightness" in the right calf  Patient reports he had been in the car for 2 1/2 hours - "When I got out, I felt it "    Objective: See treatment diary below  Assessment: Progression of therapeutic exercise program is tolerated well without complaints  Plan: Continue treatment as per PT plan of care       Precautions: chronic lumbar pain, achilles tendonitis    Manual  12/2 12/6 12/10 12/12 12/17 12/23 12/27      PROM R ankle DF  3' DF  3' DF  3' DF  3' DF  3' DF  3' DF  3'      Laser R achilles and calf  5' 5' 5' use roller ball 5' use roller ball 5' use  roller  ball 5' use roller  ball 5' use  roller  ball                                                 Exercise Diary  12/2 12/6 12/10 12/12 12/17 12/23 12/27      bike  8' 8' 8' 8' 8' 8'      ankle tb no DF red  20 all ways  red  20 all ways  red  20 all ways red  25 all ways red 25 all ways red 30 all ways red  30      manual HS stretch 30"x3 30"x3 30"x4 30"x4 30"x4 30"x4 30"x4      seated rockerboard df/pf  inv/ev  20 ea df/pf  inv/ev  30 ea df/pf  inv/ev  30 ea df/pf  inv/ev  30 ea df/pf  inv/ev 30 ea df/pf  inv/ev 30 ea stand  df/pf  inv/ev 30 ea      ankle pumps 30 30 30 NP 30 30 NP      Hr/tr       15 ea      gastroc stretch off step       30"x4      Tandem walking foam beams       6 laps                                                                                                                                                                      Modalities  12/2 12/6 12/10 12/12 12/17 12/23 12/27      MHP pre tx 8' NP NP NP NP NP NP

## 2019-12-31 ENCOUNTER — OFFICE VISIT (OUTPATIENT)
Dept: PHYSICAL THERAPY | Facility: CLINIC | Age: 79
End: 2019-12-31
Payer: MEDICARE

## 2019-12-31 DIAGNOSIS — M76.61 ACHILLES TENDINITIS, RIGHT LEG: Primary | ICD-10-CM

## 2019-12-31 PROCEDURE — 97110 THERAPEUTIC EXERCISES: CPT

## 2019-12-31 PROCEDURE — 97140 MANUAL THERAPY 1/> REGIONS: CPT

## 2019-12-31 NOTE — PROGRESS NOTES
Daily Note     Today's date: 2019  Patient name: Isael Montes  : 1940  MRN: [id number]  Referring provider: Keisha Shetty MD  Dx:   Encounter Diagnosis     ICD-10-CM    1  Achilles tendinitis, right leg M76 61                 Subjective: No significant changes to report since the last PT visit  Objective: See treatment diary below  Assessment: Therapeutic exercise program is tolerated well without complaints of pain  Patient's balance is challenged when performing tandem ambulation  Plan: Continue treatment as per PT plan of care       Precautions: chronic lumbar pain, achilles tendonitis    Manual  12/2 12/6 12/10 12/12 12/17 12/23 12/27 12/31     PROM R ankle DF  3' DF  3' DF  3' DF  3' DF  3' DF  3' DF  3' DF  3'     Laser R achilles and calf  5' 5' 5' use roller ball 5' use roller ball 5' use  roller  ball 5' use roller  ball 5' use  roller  ball 5' use roller ball                                                Exercise Diary  12/2 12/6 12/10 12/12 12/17 12/23 12/27 12/30     bike  8' 8' 8' 8' 8' 8' 8'     ankle tb no DF red  20 all ways  red  20 all ways  red  20 all ways red  25 all ways red 25 all ways red 30 all ways red  30 all ways  green  20     manual HS stretch 30"x3 30"x3 30"x4 30"x4 30"x4 30"x4 30"x4 30"x4     seated rockerboard df/pf  inv/ev  20 ea df/pf  inv/ev  30 ea df/pf  inv/ev  30 ea df/pf  inv/ev  30 ea df/pf  inv/ev 30 ea df/pf  inv/ev 30 ea stand  df/pf  inv/ev 30 ea stand  df/pf  inv/ev 30 ea     ankle pumps 30 30 30 NP 30 30 NP NP     Hr/tr       15 ea 30 ea     gastroc stretch off step       30"x4 30"x4     Tandem walking foam beams       6 laps 6 laps                                                                                                                                                                     Modalities  12/2 12/6 12/10 12/12 12/17 12/23 12/27 12/31     MHP pre tx 8' NP NP NP NP NP NP NP

## 2020-01-02 ENCOUNTER — OFFICE VISIT (OUTPATIENT)
Dept: PHYSICAL THERAPY | Facility: CLINIC | Age: 80
End: 2020-01-02
Payer: MEDICARE

## 2020-01-02 DIAGNOSIS — M76.61 ACHILLES TENDINITIS, RIGHT LEG: Primary | ICD-10-CM

## 2020-01-02 PROCEDURE — 97110 THERAPEUTIC EXERCISES: CPT

## 2020-01-02 PROCEDURE — 97140 MANUAL THERAPY 1/> REGIONS: CPT

## 2020-01-02 NOTE — PROGRESS NOTES
Daily Note     Today's date: 2020  Patient name: Nora Lawrence  : 1940  MRN: [id number]  Referring provider: Evie Beasley MD  Dx:   Encounter Diagnosis     ICD-10-CM    1  Achilles tendinitis, right leg M76 61                 Subjective: Patient reports right achilles tendon and calf pain is improving  Patient reports he is still wearing the CAM boot with the exception of when he is driving  Objective: See treatment diary below  Assessment: Therapeutic exercise program is tolerated well although patient's balance is challenged when performing tandem ambulation on the foam balance beams  Plan: Continue treatment as per PT plan of care         Precautions: chronic lumbar pain, achilles tendonitis    Manual  12/2 12/6 12/10 12/12 12/17 12/23 12/27 12/31 1/2    PROM R ankle DF  3' DF  3' DF  3' DF  3' DF  3' DF  3' DF  3' DF  3' DF  3'    Laser R achilles and calf  5' 5' 5' use roller ball 5' use roller ball 5' use  roller  ball 5' use roller  ball 5' use  roller  ball 5' use roller ball 5' use roller ball                                               Exercise Diary  12/2 12/6 12/10 12/12 12/17 12/23 12/27 12/30 1/2    bike  8' 8' 8' 8' 8' 8' 8' 8'    ankle tb no DF red  20 all ways  red  20 all ways  red  20 all ways red  25 all ways red 25 all ways red 30 all ways red  30 all ways  green  20 all ways green  25    manual HS stretch 30"x3 30"x3 30"x4 30"x4 30"x4 30"x4 30"x4 30"x4 30"x4    seated rockerboard df/pf  inv/ev  20 ea df/pf  inv/ev  30 ea df/pf  inv/ev  30 ea df/pf  inv/ev  30 ea df/pf  inv/ev 30 ea df/pf  inv/ev 30 ea stand  df/pf  inv/ev 30 ea stand  df/pf  inv/ev 30 ea stand  df/pf  inv/ev 30 ea    ankle pumps 30 30 30 NP 30 30 NP NP NP    Hr/tr       15 ea 30 ea 30 ea    gastroc stretch off step       30"x4 30"x4 30"x4    Tandem walking foam beams       6 laps 6 laps 6 laps Modalities  12/2 12/6 12/10 12/12 12/17 12/23 12/27 12/31 1/2    MHP pre tx 8' NP NP NP NP NP NP NP NP

## 2020-01-03 ENCOUNTER — OFFICE VISIT (OUTPATIENT)
Dept: OBGYN CLINIC | Facility: CLINIC | Age: 80
End: 2020-01-03
Payer: MEDICARE

## 2020-01-03 VITALS
SYSTOLIC BLOOD PRESSURE: 168 MMHG | HEIGHT: 68 IN | WEIGHT: 162 LBS | HEART RATE: 74 BPM | DIASTOLIC BLOOD PRESSURE: 54 MMHG | BODY MASS INDEX: 24.55 KG/M2

## 2020-01-03 DIAGNOSIS — S86.811D STRAIN OF CALF MUSCLE, RIGHT, SUBSEQUENT ENCOUNTER: Primary | ICD-10-CM

## 2020-01-03 PROCEDURE — 99213 OFFICE O/P EST LOW 20 MIN: CPT | Performed by: ORTHOPAEDIC SURGERY

## 2020-01-03 NOTE — PROGRESS NOTES
HUE Anthony  Attending, Orthopaedic Surgery  [facility] [address] [facility]      ORTHOPAEDIC FOOT AND ANKLE CLINIC VISIT     Assessment:     Encounter Diagnosis   Name Primary?  Strain of calf muscle, right, subsequent encounter Yes            Plan:   · The patient verbalized understanding of exam findings and treatment plan  We engaged in the shared decision-making process and treatment options were discussed at length with the patient  Surgical and conservative management discussed today along with risks and benefits  · Pt may wean out of boot at this point, instructions given on how to do this  · May continue with any remaining PT sessions as seen fit, transition to HEP eventually  · WBAT, no restrictions  · Return to normal activities as tolerated  Return if symptoms worsen or fail to improve  History of Present Illness:   Chief Complaint:   Chief Complaint   Patient presents with    Right Lower Leg - Follow-up     Lamont Teran is a [de-identified] y o  male who is being seen in follow-up for Right calf myontendinous strain  When we last saw he we recommended PT and CAM boot  Pain has completely improved  Residual pain is located at distal heel with minimal radiating and described as sharp and severe  No new injuries no numbness or tingling       Pain/symptom timing:  Worse during the day when active  Pain/symptom context:  Worse with activites and work  Pain/symptom modifying factors:  Rest makes better, activities make worse  Pain/symptom associated signs/symptoms: none    Prior treatment   · NSAIDsYes   · Injections No   · Bracing/Orthotics Yes    · Physical Therapy Yes     Orthopedic Surgical History:    none in the right leg or ankle    Past Medical, Surgical and Social History:  Past Medical History:  has a past medical history of Astigmatism, Cataract, Cyst of left kidney, Cyst of right kidney, Gross hematuria, Hematuria, Prostate cancer ([facility] [address] ), Renal calculi, Squamous cell carcinoma, and Vitamin D deficiency  Problem List: does not have any pertinent problems on file  Past Surgical History:  has a past surgical history that includes Breast biopsy; Colonoscopy; Cystoscopy (01/28/2016); Elbow surgery; Retropubic prostatectomy (11/13/2001); Lithotripsy; Hernia repair (2002); Cataract extraction (Left, 08/26/2019); and Cataract extraction (Right, 08/12/2019)  Family History: family history includes Heart failure (age of onset: 70) in his father; Hypertension in his brother, family, father, and mother; Other (age of onset: 76) in his mother; Peripheral vascular disease in his father; Prostate cancer in his brother; Stroke (age of onset: 48) in his brother  Social History:  reports that he has never smoked  He has never used smokeless tobacco  He reports that he drinks alcohol  He reports that he does not use drugs  Current Medications: has a current medication list which includes the following prescription(s): vitamin b-12, multiple vitamins-minerals, polyethylene glycol, and verapamil  Allergies: is allergic to sulfa antibiotics and ciprofloxacin  Review of Systems:  General- denies fever/chills  HEENT- denies hearing loss or sore throat  Eyes- denies eye pain or visual disturbances, denies red eyes  Respiratory- denies cough or SOB  Cardio- denies chest pain or palpitations  GI- denies abdominal pain  Endocrine- denies urinary frequency  Urinary- denies pain with urination  Musculoskeletal- Negative except noted above  Skin- denies rashes or wounds  Neurological- denies dizziness or headache  Psychiatric- denies anxiety or difficulty concentrating    Physical Exam:   /54   Pulse 74   Ht 5' 8" (1 727 m)   Wt 73 5 kg (162 lb)   BMI 24 63 kg/m²   General/Constitutional: No apparent distress: well-nourished and well developed    Eyes: normal ocular motion  Lymphatic: No appreciable lymphadenopathy  Respiratory: Non-labored breathing  Vascular: No edema, swelling or tenderness, except as noted in detailed exam   Integumentary: No impressive skin lesions present, except as noted in detailed exam   Neuro: No ataxia or tremors noted  Psych: Normal mood and affect, oriented to person, place and time  Appropriate affect  Musculoskeletal: Normal, except as noted in detailed exam and in HPI  Examination    Right    Gait Normal   Musculoskeletal  nontender to palpation at  Myotendinous junction right Achilles    Skin Normal       Nails Normal    Range of Motion   within normal limits    Stability Stable    Muscle Strength 5/5 tibialis anterior  5/5 gastrocnemius-soleus  5/5 posterior tibialis  5/5 peroneal/eversion strength  5/5 EHL  5/5 FHL    Neurologic Normal    Sensation Intact to light touch throughout sural, saphenous, superficial peroneal, deep peroneal and medial/lateral plantar nerve distributions  [place] 07 filament (10g) testing deferred  Cardiovascular Brisk capillary refill < 2 seconds,intact DP and PT pulses    Special Tests None      Imaging Studies:   No new imaging      James R Lachman, MD  Foot & Ankle Surgery   Department of [address] [place] [address]      I personally performed the service  Dallis Chum Lachman, MD

## 2020-01-06 ENCOUNTER — OFFICE VISIT (OUTPATIENT)
Dept: PHYSICAL THERAPY | Facility: CLINIC | Age: 80
End: 2020-01-06
Payer: MEDICARE

## 2020-01-06 DIAGNOSIS — M76.61 ACHILLES TENDINITIS, RIGHT LEG: Primary | ICD-10-CM

## 2020-01-06 PROCEDURE — 97110 THERAPEUTIC EXERCISES: CPT

## 2020-01-06 PROCEDURE — 97140 MANUAL THERAPY 1/> REGIONS: CPT

## 2020-01-06 NOTE — PROGRESS NOTES
Daily Note     Today's date: 2020  Patient name: Quoc Cisneros  : 1940  MRN: [id number]  Referring provider: Yue Tomlin MD  Dx:   Encounter Diagnosis     ICD-10-CM    1  Achilles tendinitis, right leg M76 61                   Subjective: Patient reports he went to the doctor on Friday and was told to wean out of the CAM boot  Patient reports having "just a little discomfort" in the right calf since doing this  Patient reports he is sleeping better  Objective: See treatment diary below  Assessment: Therapeutic exercise program is tolerated well without complaints  Noted poor flexibility in the right hamstring during the manual stretching  Plan: Continue treatment as per PT plan of care         Precautions: chronic lumbar pain, achilles tendonitis    Manual  12/2 12/6 12/10 12/12 12/17 12/23 12/27 12/31 1/2 1/6   PROM R ankle DF  3' DF  3' DF  3' DF  3' DF  3' DF  3' DF  3' DF  3' DF  3' DF  3'   Laser R achilles and calf  5' 5' 5' use roller ball 5' use roller ball 5' use  roller  ball 5' use roller  ball 5' use  roller  ball 5' use roller ball 5' use roller ball 5' use roller ball                                              Exercise Diary  12/2 12/6 12/10 12/12 12/17 12/23 12/27 12/30 1/2 1/6   bike  8' 8' 8' 8' 8' 8' 8' 8' 8'   ankle tb no DF red  20 all ways  red  20 all ways  red  20 all ways red  25 all ways red 25 all ways red 30 all ways red  30 all ways  green  20 all ways green  25 all ways green  30   manual HS stretch 30"x3 30"x3 30"x4 30"x4 30"x4 30"x4 30"x4 30"x4 30"x4 30"x4   seated rockerboard df/pf  inv/ev  20 ea df/pf  inv/ev  30 ea df/pf  inv/ev  30 ea df/pf  inv/ev  30 ea df/pf  inv/ev 30 ea df/pf  inv/ev 30 ea stand  df/pf  inv/ev 30 ea stand  df/pf  inv/ev 30 ea stand  df/pf  inv/ev 30 ea stand  df/pf  inv/ev 30 ea   ankle pumps 30 30 30 NP 30 30 NP NP NP NP   Hr/tr       15 ea 30 ea 30 ea 30 ea   gastroc stretch off step       30"x4 30"x4 30"x4 30"x4   Tandem walking foam beams       6 laps 6 laps 6 laps 6 laps                                                                                                                                                                   Modalities  12/2 12/6 12/10 12/12 12/17 12/23 12/27 12/31 1/2 1/6   MHP pre tx 8' NP NP NP NP NP NP NP NP NP

## 2020-01-08 ENCOUNTER — OFFICE VISIT (OUTPATIENT)
Dept: CARDIOLOGY CLINIC | Facility: CLINIC | Age: 80
End: 2020-01-08
Payer: MEDICARE

## 2020-01-08 VITALS
SYSTOLIC BLOOD PRESSURE: 128 MMHG | HEART RATE: 82 BPM | BODY MASS INDEX: 24.64 KG/M2 | OXYGEN SATURATION: 100 % | HEIGHT: 68 IN | DIASTOLIC BLOOD PRESSURE: 60 MMHG | WEIGHT: 162.6 LBS

## 2020-01-08 DIAGNOSIS — I71.2 THORACIC AORTIC ANEURYSM WITHOUT RUPTURE (HCC): ICD-10-CM

## 2020-01-08 DIAGNOSIS — I49.3 PVC'S (PREMATURE VENTRICULAR CONTRACTIONS): Primary | ICD-10-CM

## 2020-01-08 DIAGNOSIS — I35.1 NONRHEUMATIC AORTIC VALVE INSUFFICIENCY: ICD-10-CM

## 2020-01-08 DIAGNOSIS — I34.0 NON-RHEUMATIC MITRAL REGURGITATION: ICD-10-CM

## 2020-01-08 PROCEDURE — 99214 OFFICE O/P EST MOD 30 MIN: CPT | Performed by: INTERNAL MEDICINE

## 2020-01-08 NOTE — PROGRESS NOTES
Cardiology Follow Up    Leonardo Newton  1940  [id number]  [address] [facility] [address]  [phone]    1  PVC's (premature ventricular contractions)     2  Thoracic aortic aneurysm without rupture ([facility] )     3  Non-rheumatic mitral regurgitation     4  Nonrheumatic aortic valve insufficiency         Interval History:  No new cardiovascular complaints  Tolerates all activity  Denies chest pain shortness of breath orthopnea paroxysmal nocturnal dyspnea or syncope      Patient Active Problem List   Diagnosis    PVC's (premature ventricular contractions)    History of prostate cancer    Thoracic aortic aneurysm without rupture ([facility] )    Nonrheumatic aortic valve insufficiency    Non-rheumatic mitral regurgitation    Malignant neoplasm of prostate ([facility] )    Strain of calf muscle     Past Medical History:   Diagnosis Date    Astigmatism     last assessed 11/15/17    Cataract     Resolved 11/15/17    Cyst of left kidney     last assessed 11/15/2017    Cyst of right kidney     lasst assessed 11/15/2017    Gross hematuria     Last assessed 01/28/16    Hematuria     last assessed 01/28/16    Prostate cancer ([facility] )     last assessed 05/18/17    Renal calculi     last assessed 08/16/12    Squamous cell carcinoma     Vitamin D deficiency     Last assessed 08/19/13     Social History     Socioeconomic History    Marital status: /Civil Union     Spouse name: Not on file    Number of children: Not on file    Years of education: Not on file    Highest education level: Not on file   Occupational History    Not on file   Social Needs    Financial resource strain: Not on file    Food insecurity:     Worry: Not on file     Inability: Not on file    Transportation needs:     Medical: Not on file     Non-medical: Not on file   Tobacco Use    Smoking status: Never Smoker    Smokeless tobacco: Never Used Substance and Sexual Activity    Alcohol use: Yes     Comment: social    Drug use: No    Sexual activity: Not on file   Lifestyle    Physical activity:     Days per week: Not on file     Minutes per session: Not on file    Stress: Not on file   Relationships    Social connections:     Talks on phone: Not on file     Gets together: Not on file     Attends Zoroastrianism service: Not on file     Active member of club or organization: Not on file     Attends meetings of clubs or organizations: Not on file     Relationship status: Not on file    Intimate partner violence:     Fear of current or ex partner: Not on file     Emotionally abused: Not on file     Physically abused: Not on file     Forced sexual activity: Not on file   Other Topics Concern    Not on file   Social History Narrative    Not on file      Family History   Problem Relation Age of Onset    Other Mother 76        Influenza    Hypertension Mother     Heart failure Father 70    Peripheral vascular disease Father     Hypertension Father     Prostate cancer Brother     Stroke Brother 48    Hypertension Brother     Hypertension Family      Past Surgical History:   Procedure Laterality Date    BREAST BIOPSY      Benign breast tumor    CATARACT EXTRACTION Left 08/26/2019    CATARACT EXTRACTION Right 08/12/2019    COLONOSCOPY      CYSTOSCOPY  01/28/2016    ELBOW SURGERY      Bone chip    HERNIA REPAIR  2002    LITHOTRIPSY      RETROPUBIC PROSTATECTOMY  11/13/2001    Radical with nerve sparing       Current Outpatient Medications:     cyanocobalamin (VITAMIN B-12) 500 mcg tablet, 1,000 mcg Daily  , Disp: , Rfl:     Multiple Vitamins-Minerals (CENTRUM SILVER 50+MEN PO), Daily, Disp: , Rfl:     verapamil (CALAN-SR) 240 mg CR tablet, Take 1 and a 1/2  tablets daily as directed  Can switch to bedtime dosing if patient prefers (Patient taking differently: Take 1 and a 1/2  tablets daily as directed    Can switch to bedtime dosing if patient prefers), Disp: 135 tablet, Rfl: 3  Allergies   Allergen Reactions    Sulfa Antibiotics Rash    Ciprofloxacin        Labs:  No visits with results within 2 Month(s) from this visit  Latest known visit with results is:   Appointment on 07/09/2019   Component Date Value    Sodium 07/09/2019 139     Potassium 07/09/2019 4 1     Chloride 07/09/2019 105     CO2 07/09/2019 30     ANION GAP 07/09/2019 4     BUN 07/09/2019 18     Creatinine 07/09/2019 1 16     Glucose 07/09/2019 108     Calcium 07/09/2019 9 4     AST 07/09/2019 14     ALT 07/09/2019 20     Alkaline Phosphatase 07/09/2019 58     Total Protein 07/09/2019 7 1     Albumin 07/09/2019 3 8     Total Bilirubin 07/09/2019 0 40     eGFR 07/09/2019 59     Cholesterol 07/09/2019 225*    Triglycerides 07/09/2019 119     HDL, Direct 07/09/2019 64*    LDL Calculated 07/09/2019 137*    Non-HDL-Chol (CHOL-HDL) 07/09/2019 161     TSH 3RD GENERATON 07/09/2019 3 100     PSA, Diagnostic 07/09/2019 <0 1     Rheumatoid Factor 07/09/2019 Negative     Sed Rate 07/09/2019 4     CRP 07/09/2019 <3 0     YOSEF 07/09/2019 Negative     Aldolase 07/09/2019 5 7     LD 07/09/2019 153     Total CK 07/09/2019 40      Imaging: No results found  Review of Systems:  Review of Systems   Constitutional: Negative for fatigue  HENT: Negative for nosebleeds  Eyes: Negative for redness  Respiratory: Negative for chest tightness and shortness of breath  Cardiovascular: Negative for chest pain, palpitations and leg swelling  Gastrointestinal: Positive for constipation  Negative for abdominal pain  Endocrine: Negative for polyuria  Genitourinary: Negative for urgency  Musculoskeletal: Negative for arthralgias  Skin: Negative for rash  Neurological: Negative for dizziness and syncope  Psychiatric/Behavioral: Negative for confusion and sleep disturbance  The patient is not nervous/anxious          Physical Exam:  Physical Exam Constitutional: He is oriented to person, place, and time  He appears well-developed and well-nourished  HENT:   Head: Normocephalic and atraumatic  Nose: Nose normal    Mouth/Throat: Oropharynx is clear and moist    Eyes: Pupils are equal, round, and reactive to light  Neck: Neck supple  Cardiovascular: Normal rate, regular rhythm, normal heart sounds and intact distal pulses  Pulmonary/Chest: Effort normal and breath sounds normal    Abdominal: Soft  Bowel sounds are normal    Musculoskeletal: Normal range of motion  Neurological: He is alert and oriented to person, place, and time  Skin: Skin is warm and dry  Psychiatric: He has a normal mood and affect  His behavior is normal  Judgment and thought content normal        Discussion/Summary:  History of PVCs in the setting of normal left ventricular systolic function that are partially suppressed with calcium channel blocker  He will continue on verapamil it is current dosage  2D echocardiogram from October as noted below     LEFT VENTRICLE:  Size was normal   Systolic function was normal  Ejection fraction was estimated to be 60 %  Wall thickness was normal   Doppler parameters were consistent with abnormal left ventricular relaxation (grade 1 diastolic dysfunction)      RIGHT VENTRICLE:  The size was normal   Systolic function was normal      MITRAL VALVE:  There was mild to moderate regurgitation      AORTIC VALVE:  There was moderate regurgitation      TRICUSPID VALVE:  There was mild regurgitation      AORTA:  The root exhibited mild dilatation (44 mm)      COMPARISONS:  There has been no significant interval change  Comparison was made with the previous study of 16-Sep-2018  Left ventricular size and function is normal   He continues to have mild-to-moderate mitral regurgitation and moderate aortic regurgitation  His aortic root was 44 mm    Left ventricular cavity size is normal   We will continue to monitor him both for symptoms and by echocardiography  We will check another 1 in the year  We did discuss is small aortic aneurysm  As he will be happen an echocardiogram in a year to compare size we will withhold from ordering a CT scan at this time  I have made no other changes I will see him again in 6 months

## 2020-01-09 ENCOUNTER — OFFICE VISIT (OUTPATIENT)
Dept: PHYSICAL THERAPY | Facility: CLINIC | Age: 80
End: 2020-01-09
Payer: MEDICARE

## 2020-01-09 DIAGNOSIS — M76.61 ACHILLES TENDINITIS, RIGHT LEG: Primary | ICD-10-CM

## 2020-01-09 PROCEDURE — 97110 THERAPEUTIC EXERCISES: CPT

## 2020-01-09 NOTE — PROGRESS NOTES
Daily Note     Today's date: 2020  Patient name: Quoc Cisneros  : 1940  MRN: [id number]  Referring provider: Yue Tomlin MD  Dx:   Encounter Diagnosis     ICD-10-CM    1  Achilles tendinitis, right leg M76 61                   Subjective: Patient reports he has been feeling pretty good, no new complaints at this time  Objective: See treatment diary below  Precautions: chronic lumbar pain, achilles tendonitis    Manual     PROM R ankle DF  3' DF  3' DF  3' DF  3' DF  3' DF  3' DF 3'   Laser R achilles and calf  5' use  roller  ball 5' use roller  ball 5' use  roller  ball 5' use roller ball 5' use roller ball 5' use roller ball 5' use roller ball                                     Exercise Diary     bike 8' 8' 8' 8' 8' 8' 8'   ankle tb no DF all ways red 25 all ways red 30 all ways red  30 all ways  green  20 all ways green  25 all ways green  30 Blue all ways x30 ea   manual HS stretch 30"x4 30"x4 30"x4 30"x4 30"x4 30"x4 30"x4   seated rockerboard df/pf  inv/ev 30 ea df/pf  inv/ev 30 ea stand  df/pf  inv/ev 30 ea stand  df/pf  inv/ev 30 ea stand  df/pf  inv/ev 30 ea stand  df/pf  inv/ev 30 ea Stand df/pf/inv/evr x30 ea   ankle pumps 30 30 NP NP NP NP    Hr/tr   15 ea 30 ea 30 ea 30 ea 30 ea   gastroc stretch off step   30"x4 30"x4 30"x4 30"x4 30"x4   Tandem walking foam beams   6 laps 6 laps 6 laps 6 laps 6 laps                                                                                                                               Modalities  12/2 12/6 12/10 12/12 12/17 12/23 12/27 12/31 1/2 1/6   MHP pre tx 8' NP NP NP NP NP NP NP NP NP                                      Assessment: Patient did well with all TE as listed, reports no pain during or post tx  Plan: Continue per plan of care

## 2020-02-28 ENCOUNTER — OFFICE VISIT (OUTPATIENT)
Dept: FAMILY MEDICINE CLINIC | Facility: CLINIC | Age: 80
End: 2020-02-28
Payer: MEDICARE

## 2020-02-28 VITALS
HEIGHT: 68 IN | WEIGHT: 163 LBS | DIASTOLIC BLOOD PRESSURE: 70 MMHG | TEMPERATURE: 97.5 F | OXYGEN SATURATION: 96 % | BODY MASS INDEX: 24.71 KG/M2 | SYSTOLIC BLOOD PRESSURE: 140 MMHG | HEART RATE: 75 BPM

## 2020-02-28 DIAGNOSIS — C61 MALIGNANT NEOPLASM OF PROSTATE (HCC): ICD-10-CM

## 2020-02-28 DIAGNOSIS — H10.33 ACUTE BACTERIAL CONJUNCTIVITIS OF BOTH EYES: ICD-10-CM

## 2020-02-28 DIAGNOSIS — M51.36 LUMBAR DEGENERATIVE DISC DISEASE: Primary | ICD-10-CM

## 2020-02-28 DIAGNOSIS — M54.10 RADICULAR LEG PAIN: ICD-10-CM

## 2020-02-28 PROCEDURE — 99214 OFFICE O/P EST MOD 30 MIN: CPT | Performed by: FAMILY MEDICINE

## 2020-02-28 PROCEDURE — 4040F PNEUMOC VAC/ADMIN/RCVD: CPT | Performed by: FAMILY MEDICINE

## 2020-02-28 PROCEDURE — 1036F TOBACCO NON-USER: CPT | Performed by: FAMILY MEDICINE

## 2020-02-28 PROCEDURE — 1160F RVW MEDS BY RX/DR IN RCRD: CPT | Performed by: FAMILY MEDICINE

## 2020-02-28 RX ORDER — TOBRAMYCIN 3 MG/ML
1 SOLUTION/ DROPS OPHTHALMIC
Qty: 1 BOTTLE | Refills: 0 | Status: SHIPPED | OUTPATIENT
Start: 2020-02-28 | End: 2020-04-27 | Stop reason: SDUPTHER

## 2020-03-02 NOTE — PROGRESS NOTES
Assessment/Plan:    No problem-specific Assessment & Plan notes found for this encounter  Diagnoses and all orders for this visit:    Lumbar degenerative disc disease  -     MRI lumbar spine wo contrast; Future  -     Ambulatory referral to Physical Therapy; Future    Malignant neoplasm of prostate (HCC)    Acute bacterial conjunctivitis of both eyes  -     tobramycin (TOBREX) 0 3 % SOLN; Administer 1 drop to both eyes every 4 (four) hours while awake    Radicular leg pain  -     Ambulatory referral to Physical Therapy; Future    Other orders  -     Docusate Sodium (COLACE PO); Take by mouth daily        1  Lumbar degenerative disc disease  MRI lumbar spine wo contrast    Ambulatory referral to Physical Therapy   2  Malignant neoplasm of prostate ([facility] )     3  Acute bacterial conjunctivitis of both eyes  tobramycin (TOBREX) 0 3 % SOLN   4  Radicular leg pain  Ambulatory referral to Physical Therapy       Subjective:        Patient ID: Maria A Melendez is a 79 y o  male  Chief Complaint   Patient presents with    Leg Pain     pt states that his leg pain is the same    Follow-up     pt would like his eyes looked at discharge in the morning, not ithcy or painful       Patient here with wife with regards to different symptoms down right lower extremity  Notes right lower extremity pain with radicular symptoms  Recently diagnosed with Achilles tendon pain  Underwent full physical therapy for this  This is better  Current complaint is different and seems to be coming from back  The following portions of the patient's history were reviewed and updated as appropriate: past medical history, past surgical history and problem list       Review of Systems   Constitutional: Negative for appetite change, fatigue, fever and unexpected weight change  HENT: Negative for congestion, ear pain, postnasal drip, rhinorrhea, sinus pressure, sinus pain and sore throat      Eyes: Negative for redness and visual disturbance  Respiratory: Negative for chest tightness and shortness of breath  Cardiovascular: Negative for chest pain, palpitations and leg swelling  Gastrointestinal: Negative for abdominal distention, abdominal pain, diarrhea and nausea  Endocrine: Negative for cold intolerance and heat intolerance  Genitourinary: Negative for dysuria and hematuria  Musculoskeletal: Positive for back pain  Negative for arthralgias, gait problem and myalgias  Radicular symptoms down right lower extremity  Leg does not feel as strong  Improved with regards to Achilles complaint status post physical therapy   Skin: Negative for pallor and rash  Neurological: Negative for dizziness and headaches  Psychiatric/Behavioral: Negative for behavioral problems  The patient is not nervous/anxious  Objective:  /70 (BP Location: Left arm, Patient Position: Sitting, Cuff Size: Standard)   Pulse 75   Temp 97 5 °F (36 4 °C)   Ht 5' 8" (1 727 m)   Wt 73 9 kg (163 lb)   SpO2 96%   BMI 24 78 kg/m²        Physical Exam   Constitutional: He is oriented to person, place, and time  He appears well-nourished  No distress  HENT:   Head: Normocephalic and atraumatic  Right Ear: Tympanic membrane normal    Left Ear: Tympanic membrane normal    Mouth/Throat: Oropharynx is clear and moist    Eyes: Pupils are equal, round, and reactive to light  Conjunctivae and EOM are normal  No scleral icterus  Neck: Normal range of motion  Neck supple  No thyromegaly present  Cardiovascular: Normal rate, regular rhythm, normal heart sounds and intact distal pulses  No murmur heard  Pulses:       Carotid pulses are 0 on the right side, and 0 on the left side  Pulmonary/Chest: Effort normal and breath sounds normal  No respiratory distress  He has no wheezes  Abdominal: Soft  He exhibits no distension  Musculoskeletal: He exhibits no edema  Minimal decreased lumbar range of motion     Lymphadenopathy:     He has no cervical adenopathy  Neurological: He is alert and oriented to person, place, and time  He has normal reflexes  He displays normal reflexes  No cranial nerve deficit  Skin: Skin is warm  No pallor  Psychiatric: He has a normal mood and affect  His behavior is normal  Judgment and thought content normal    Nursing note and vitals reviewed

## 2020-03-06 ENCOUNTER — HOSPITAL ENCOUNTER (OUTPATIENT)
Dept: MRI IMAGING | Facility: HOSPITAL | Age: 80
Discharge: HOME | End: 2020-03-06
Payer: MEDICARE

## 2020-03-06 DIAGNOSIS — M51.36 LUMBAR DEGENERATIVE DISC DISEASE: ICD-10-CM

## 2020-03-06 PROCEDURE — 72148 MRI LUMBAR SPINE W/O DYE: CPT

## 2020-03-09 ENCOUNTER — EVALUATION (OUTPATIENT)
Dept: PHYSICAL THERAPY | Facility: MEDICAL CENTER | Age: 80
End: 2020-03-09
Payer: MEDICARE

## 2020-03-09 DIAGNOSIS — M54.16 LUMBAR RADICULOPATHY: Primary | ICD-10-CM

## 2020-03-09 DIAGNOSIS — M51.36 LUMBAR DEGENERATIVE DISC DISEASE: ICD-10-CM

## 2020-03-09 DIAGNOSIS — M54.10 RADICULAR LEG PAIN: ICD-10-CM

## 2020-03-09 PROCEDURE — 97162 PT EVAL MOD COMPLEX 30 MIN: CPT | Performed by: PHYSICAL THERAPIST

## 2020-03-09 NOTE — PROGRESS NOTES
PT Evaluation     Today's date: 3/9/2020  Patient name: Keila Berg  : 1940  MRN: [id number]  Referring provider: Golda Meigs, DO  Dx:   Encounter Diagnosis     ICD-10-CM    1  Lumbar radiculopathy M54 16                   Assessment/Plan  Patient is a very peasant 78 yo male who presents with worsening symptoms of right calf and leg pain with ambulation and standing for periods greater than 2 minutes  Patient demonstrated symptoms consistent with R L5 radiculopathy secondary to lumbar DDD and transverse foraminal stenosis  Patient has noted left foot clonus as well as B patellar hyper-reflexia as well as supra-patellar reflex  Patient had MRI on Friday and results are pending  Patient should have further work up due to this finding however is not contra-indicated to starting PT  2x a week for 6 weeks  Subjective  Patient states that he has been having right calf pain that goes up into his lower back following standing and walking for greater than 2 minutes  Patient and wife note shuffling gait at times however they both feel as though it is worsened with pain and he is doing it as a safety mechanism  Patient notes that he is otherwise doing well just has this increase in leg pain with walking  Objective  Red Flags: UMN signs clonus, hyperreflexia, supra petellar reflex, no signs or symptoms in UE  Pain: 8/10 with walking greater than 10min  Reflexes: B Patellar 3+, B achilles 2+  Posture:forward head rounded shoulders  AROM:WFL  Palpation:decreased symptoms into right leg with distraction and caudal lumbar glide  Loss of lumbar PAROM with UPA B  Special Tests: - SLR - slump - crossed slr - inverted supinator  Coordination of Movement/strengthe: Patient demonstrates poor activation of Transversus Abdominus and multifidus force couple and loss of feed forward mechanism with reaching tasks  Patient was able to perform activation with cueing     Goals:  - Pt I with initial HEP in 1-2 visits  - Improve ROM equal to contralateral side in 4-6 weeks  - improved stabilizing structure activation and improved feed forward mechanism with distal activation  - Increase Functional Status Measure measured by FOTO by MDIC  - ambulation 30 minutes without stopping         Precautions: none

## 2020-03-11 ENCOUNTER — OFFICE VISIT (OUTPATIENT)
Dept: PHYSICAL THERAPY | Facility: MEDICAL CENTER | Age: 80
End: 2020-03-11
Payer: MEDICARE

## 2020-03-11 DIAGNOSIS — M51.36 LUMBAR DEGENERATIVE DISC DISEASE: ICD-10-CM

## 2020-03-11 DIAGNOSIS — M54.10 RADICULAR LEG PAIN: ICD-10-CM

## 2020-03-11 DIAGNOSIS — M54.16 LUMBAR RADICULOPATHY: Primary | ICD-10-CM

## 2020-03-11 PROCEDURE — 97012 MECHANICAL TRACTION THERAPY: CPT | Performed by: PHYSICAL THERAPIST

## 2020-03-11 PROCEDURE — 97140 MANUAL THERAPY 1/> REGIONS: CPT | Performed by: PHYSICAL THERAPIST

## 2020-03-11 NOTE — PROGRESS NOTES
Daily Note     Today's date: 3/11/2020  Patient name: Nati Brooks  : 1940  MRN: [id number]  Referring provider: Adebayo Looney DO  Dx:   Encounter Diagnosis     ICD-10-CM    1  Lumbar radiculopathy M54 16    2  Lumbar degenerative disc disease M51 36    3  Radicular leg pain M54 10                   Subjective: patient states that is feeling good having some discomfort in his upper back and right leg  Patient is happy to start treatment  Objective: See treatment diary below      Assessment: Tolerated treatment well  Patient demonstrated fatigue post treatment, exhibited good technique with therapeutic exercises and would benefit from continued PT  Trial of traction today was tolerated well  Patient was feeling no pain in upper back following rib and thoracic mobilization  Plan: Continue per plan of care  Precautions: none    Daily Treatment Diary     Manual  3/11/2020              UPA T4-5 Gr   Iv-            Lumbar rotational mob right Gr  iv                                                       Exercise Diary              Hip flexor str             Hamstring str             Piriformis str             LTR             SKTC             SLR             Bridging with ball between knees             Clamshell with band             Standing hip abduction             Standing hip extension                                       Bike                                                                                                            Modalities                           Mechanical traction 10min 30/5 70lbs

## 2020-03-17 ENCOUNTER — APPOINTMENT (OUTPATIENT)
Dept: PHYSICAL THERAPY | Facility: MEDICAL CENTER | Age: 80
End: 2020-03-17
Payer: MEDICARE

## 2020-03-19 ENCOUNTER — APPOINTMENT (OUTPATIENT)
Dept: PHYSICAL THERAPY | Facility: MEDICAL CENTER | Age: 80
End: 2020-03-19
Payer: MEDICARE

## 2020-03-24 ENCOUNTER — APPOINTMENT (OUTPATIENT)
Dept: PHYSICAL THERAPY | Facility: MEDICAL CENTER | Age: 80
End: 2020-03-24
Payer: MEDICARE

## 2020-03-26 ENCOUNTER — APPOINTMENT (OUTPATIENT)
Dept: PHYSICAL THERAPY | Facility: MEDICAL CENTER | Age: 80
End: 2020-03-26
Payer: MEDICARE

## 2020-04-06 ENCOUNTER — TELEMEDICINE (OUTPATIENT)
Dept: PHYSICAL THERAPY | Facility: MEDICAL CENTER | Age: 80
End: 2020-04-06
Payer: MEDICARE

## 2020-04-06 DIAGNOSIS — M51.36 LUMBAR DEGENERATIVE DISC DISEASE: ICD-10-CM

## 2020-04-06 DIAGNOSIS — M54.10 RADICULAR LEG PAIN: ICD-10-CM

## 2020-04-06 DIAGNOSIS — M54.16 LUMBAR RADICULOPATHY: Primary | ICD-10-CM

## 2020-04-06 PROCEDURE — 97110 THERAPEUTIC EXERCISES: CPT | Performed by: PHYSICAL THERAPIST

## 2020-04-10 ENCOUNTER — TELEMEDICINE (OUTPATIENT)
Dept: PHYSICAL THERAPY | Facility: MEDICAL CENTER | Age: 80
End: 2020-04-10
Payer: MEDICARE

## 2020-04-10 DIAGNOSIS — M54.10 RADICULAR LEG PAIN: ICD-10-CM

## 2020-04-10 DIAGNOSIS — M54.16 LUMBAR RADICULOPATHY: Primary | ICD-10-CM

## 2020-04-10 DIAGNOSIS — M51.36 LUMBAR DEGENERATIVE DISC DISEASE: ICD-10-CM

## 2020-04-10 PROCEDURE — 97110 THERAPEUTIC EXERCISES: CPT | Performed by: PHYSICAL THERAPIST

## 2020-04-13 ENCOUNTER — TELEMEDICINE (OUTPATIENT)
Dept: PHYSICAL THERAPY | Facility: MEDICAL CENTER | Age: 80
End: 2020-04-13
Payer: MEDICARE

## 2020-04-13 DIAGNOSIS — M51.36 LUMBAR DEGENERATIVE DISC DISEASE: ICD-10-CM

## 2020-04-13 DIAGNOSIS — M54.16 LUMBAR RADICULOPATHY: Primary | ICD-10-CM

## 2020-04-13 DIAGNOSIS — M54.10 RADICULAR LEG PAIN: ICD-10-CM

## 2020-04-13 PROCEDURE — 97110 THERAPEUTIC EXERCISES: CPT | Performed by: PHYSICAL THERAPIST

## 2020-04-15 ENCOUNTER — APPOINTMENT (OUTPATIENT)
Dept: PHYSICAL THERAPY | Facility: MEDICAL CENTER | Age: 80
End: 2020-04-15
Payer: MEDICARE

## 2020-04-17 ENCOUNTER — TELEMEDICINE (OUTPATIENT)
Dept: PHYSICAL THERAPY | Facility: MEDICAL CENTER | Age: 80
End: 2020-04-17
Payer: MEDICARE

## 2020-04-17 ENCOUNTER — APPOINTMENT (OUTPATIENT)
Dept: PHYSICAL THERAPY | Facility: MEDICAL CENTER | Age: 80
End: 2020-04-17
Payer: MEDICARE

## 2020-04-17 DIAGNOSIS — M54.16 LUMBAR RADICULOPATHY: Primary | ICD-10-CM

## 2020-04-17 DIAGNOSIS — M54.10 RADICULAR LEG PAIN: ICD-10-CM

## 2020-04-17 DIAGNOSIS — M51.36 LUMBAR DEGENERATIVE DISC DISEASE: ICD-10-CM

## 2020-04-17 PROCEDURE — 97110 THERAPEUTIC EXERCISES: CPT | Performed by: PHYSICAL THERAPIST

## 2020-04-22 ENCOUNTER — APPOINTMENT (OUTPATIENT)
Dept: PHYSICAL THERAPY | Facility: MEDICAL CENTER | Age: 80
End: 2020-04-22
Payer: MEDICARE

## 2020-04-24 ENCOUNTER — APPOINTMENT (OUTPATIENT)
Dept: PHYSICAL THERAPY | Facility: MEDICAL CENTER | Age: 80
End: 2020-04-24
Payer: MEDICARE

## 2020-04-27 DIAGNOSIS — H10.33 ACUTE BACTERIAL CONJUNCTIVITIS OF BOTH EYES: ICD-10-CM

## 2020-04-28 RX ORDER — TOBRAMYCIN 3 MG/ML
1 SOLUTION/ DROPS OPHTHALMIC
Qty: 1 BOTTLE | Refills: 0 | Status: SHIPPED | OUTPATIENT
Start: 2020-04-28 | End: 2020-07-27

## 2020-05-08 ENCOUNTER — APPOINTMENT (OUTPATIENT)
Dept: PHYSICAL THERAPY | Facility: MEDICAL CENTER | Age: 80
End: 2020-05-08
Payer: MEDICARE

## 2020-05-26 DIAGNOSIS — I49.3 PVC'S (PREMATURE VENTRICULAR CONTRACTIONS): ICD-10-CM

## 2020-05-26 RX ORDER — VERAPAMIL HYDROCHLORIDE 240 MG/1
TABLET, FILM COATED, EXTENDED RELEASE ORAL
Qty: 135 TABLET | Refills: 3 | Status: SHIPPED | OUTPATIENT
Start: 2020-05-26 | End: 2020-11-10

## 2020-06-01 ENCOUNTER — OFFICE VISIT (OUTPATIENT)
Dept: PHYSICAL THERAPY | Facility: MEDICAL CENTER | Age: 80
End: 2020-06-01
Payer: MEDICARE

## 2020-06-01 DIAGNOSIS — M54.16 LUMBAR RADICULOPATHY: Primary | ICD-10-CM

## 2020-06-01 DIAGNOSIS — M54.41 CHRONIC BILATERAL LOW BACK PAIN WITH RIGHT-SIDED SCIATICA: ICD-10-CM

## 2020-06-01 DIAGNOSIS — G89.29 CHRONIC BILATERAL LOW BACK PAIN WITH RIGHT-SIDED SCIATICA: ICD-10-CM

## 2020-06-01 PROCEDURE — 97012 MECHANICAL TRACTION THERAPY: CPT | Performed by: PHYSICAL THERAPIST

## 2020-06-01 PROCEDURE — 97110 THERAPEUTIC EXERCISES: CPT | Performed by: PHYSICAL THERAPIST

## 2020-06-01 PROCEDURE — 97164 PT RE-EVAL EST PLAN CARE: CPT | Performed by: PHYSICAL THERAPIST

## 2020-06-05 ENCOUNTER — OFFICE VISIT (OUTPATIENT)
Dept: PHYSICAL THERAPY | Facility: MEDICAL CENTER | Age: 80
End: 2020-06-05
Payer: MEDICARE

## 2020-06-05 DIAGNOSIS — M54.16 LUMBAR RADICULOPATHY: Primary | ICD-10-CM

## 2020-06-05 DIAGNOSIS — M54.41 CHRONIC BILATERAL LOW BACK PAIN WITH RIGHT-SIDED SCIATICA: ICD-10-CM

## 2020-06-05 DIAGNOSIS — G89.29 CHRONIC BILATERAL LOW BACK PAIN WITH RIGHT-SIDED SCIATICA: ICD-10-CM

## 2020-06-05 PROCEDURE — 97012 MECHANICAL TRACTION THERAPY: CPT | Performed by: PHYSICAL THERAPIST

## 2020-06-05 PROCEDURE — 97110 THERAPEUTIC EXERCISES: CPT | Performed by: PHYSICAL THERAPIST

## 2020-06-05 PROCEDURE — 97140 MANUAL THERAPY 1/> REGIONS: CPT | Performed by: PHYSICAL THERAPIST

## 2020-06-08 ENCOUNTER — OFFICE VISIT (OUTPATIENT)
Dept: PHYSICAL THERAPY | Facility: MEDICAL CENTER | Age: 80
End: 2020-06-08
Payer: MEDICARE

## 2020-06-08 DIAGNOSIS — M54.41 CHRONIC BILATERAL LOW BACK PAIN WITH RIGHT-SIDED SCIATICA: ICD-10-CM

## 2020-06-08 DIAGNOSIS — M54.16 LUMBAR RADICULOPATHY: Primary | ICD-10-CM

## 2020-06-08 DIAGNOSIS — G89.29 CHRONIC BILATERAL LOW BACK PAIN WITH RIGHT-SIDED SCIATICA: ICD-10-CM

## 2020-06-08 PROCEDURE — 97110 THERAPEUTIC EXERCISES: CPT | Performed by: PHYSICAL THERAPIST

## 2020-06-08 PROCEDURE — 97140 MANUAL THERAPY 1/> REGIONS: CPT | Performed by: PHYSICAL THERAPIST

## 2020-06-08 PROCEDURE — 97012 MECHANICAL TRACTION THERAPY: CPT | Performed by: PHYSICAL THERAPIST

## 2020-06-10 ENCOUNTER — APPOINTMENT (OUTPATIENT)
Dept: PHYSICAL THERAPY | Facility: MEDICAL CENTER | Age: 80
End: 2020-06-10
Payer: MEDICARE

## 2020-06-12 ENCOUNTER — OFFICE VISIT (OUTPATIENT)
Dept: PHYSICAL THERAPY | Facility: MEDICAL CENTER | Age: 80
End: 2020-06-12
Payer: MEDICARE

## 2020-06-12 DIAGNOSIS — M54.16 LUMBAR RADICULOPATHY: Primary | ICD-10-CM

## 2020-06-12 DIAGNOSIS — G89.29 CHRONIC BILATERAL LOW BACK PAIN WITH RIGHT-SIDED SCIATICA: ICD-10-CM

## 2020-06-12 DIAGNOSIS — M54.41 CHRONIC BILATERAL LOW BACK PAIN WITH RIGHT-SIDED SCIATICA: ICD-10-CM

## 2020-06-12 PROCEDURE — 97012 MECHANICAL TRACTION THERAPY: CPT | Performed by: PHYSICAL THERAPIST

## 2020-06-12 PROCEDURE — 97140 MANUAL THERAPY 1/> REGIONS: CPT | Performed by: PHYSICAL THERAPIST

## 2020-06-12 PROCEDURE — 97110 THERAPEUTIC EXERCISES: CPT | Performed by: PHYSICAL THERAPIST

## 2020-06-15 ENCOUNTER — OFFICE VISIT (OUTPATIENT)
Dept: PHYSICAL THERAPY | Facility: MEDICAL CENTER | Age: 80
End: 2020-06-15
Payer: MEDICARE

## 2020-06-15 DIAGNOSIS — M54.41 CHRONIC BILATERAL LOW BACK PAIN WITH RIGHT-SIDED SCIATICA: ICD-10-CM

## 2020-06-15 DIAGNOSIS — M54.16 LUMBAR RADICULOPATHY: Primary | ICD-10-CM

## 2020-06-15 DIAGNOSIS — G89.29 CHRONIC BILATERAL LOW BACK PAIN WITH RIGHT-SIDED SCIATICA: ICD-10-CM

## 2020-06-15 PROCEDURE — 97012 MECHANICAL TRACTION THERAPY: CPT | Performed by: PHYSICAL THERAPIST

## 2020-06-15 PROCEDURE — 97140 MANUAL THERAPY 1/> REGIONS: CPT | Performed by: PHYSICAL THERAPIST

## 2020-06-19 ENCOUNTER — OFFICE VISIT (OUTPATIENT)
Dept: PHYSICAL THERAPY | Facility: MEDICAL CENTER | Age: 80
End: 2020-06-19
Payer: MEDICARE

## 2020-06-19 DIAGNOSIS — G89.29 CHRONIC BILATERAL LOW BACK PAIN WITH RIGHT-SIDED SCIATICA: ICD-10-CM

## 2020-06-19 DIAGNOSIS — M54.41 CHRONIC BILATERAL LOW BACK PAIN WITH RIGHT-SIDED SCIATICA: ICD-10-CM

## 2020-06-19 DIAGNOSIS — M54.16 LUMBAR RADICULOPATHY: Primary | ICD-10-CM

## 2020-06-19 PROCEDURE — 97110 THERAPEUTIC EXERCISES: CPT | Performed by: PHYSICAL THERAPIST

## 2020-06-19 PROCEDURE — 97012 MECHANICAL TRACTION THERAPY: CPT | Performed by: PHYSICAL THERAPIST

## 2020-06-19 PROCEDURE — 97140 MANUAL THERAPY 1/> REGIONS: CPT | Performed by: PHYSICAL THERAPIST

## 2020-06-26 ENCOUNTER — OFFICE VISIT (OUTPATIENT)
Dept: PHYSICAL THERAPY | Facility: MEDICAL CENTER | Age: 80
End: 2020-06-26
Payer: MEDICARE

## 2020-06-26 DIAGNOSIS — G89.29 CHRONIC BILATERAL LOW BACK PAIN WITH RIGHT-SIDED SCIATICA: ICD-10-CM

## 2020-06-26 DIAGNOSIS — M54.16 LUMBAR RADICULOPATHY: Primary | ICD-10-CM

## 2020-06-26 DIAGNOSIS — M54.41 CHRONIC BILATERAL LOW BACK PAIN WITH RIGHT-SIDED SCIATICA: ICD-10-CM

## 2020-06-26 PROCEDURE — 97012 MECHANICAL TRACTION THERAPY: CPT | Performed by: PHYSICAL THERAPIST

## 2020-06-26 PROCEDURE — 97110 THERAPEUTIC EXERCISES: CPT | Performed by: PHYSICAL THERAPIST

## 2020-06-26 PROCEDURE — 97140 MANUAL THERAPY 1/> REGIONS: CPT | Performed by: PHYSICAL THERAPIST

## 2020-07-02 ENCOUNTER — OFFICE VISIT (OUTPATIENT)
Dept: PHYSICAL THERAPY | Facility: MEDICAL CENTER | Age: 80
End: 2020-07-02
Payer: MEDICARE

## 2020-07-02 DIAGNOSIS — M54.16 LUMBAR RADICULOPATHY: Primary | ICD-10-CM

## 2020-07-02 DIAGNOSIS — G89.29 CHRONIC BILATERAL LOW BACK PAIN WITH RIGHT-SIDED SCIATICA: ICD-10-CM

## 2020-07-02 DIAGNOSIS — M54.41 CHRONIC BILATERAL LOW BACK PAIN WITH RIGHT-SIDED SCIATICA: ICD-10-CM

## 2020-07-02 PROCEDURE — 97110 THERAPEUTIC EXERCISES: CPT | Performed by: PHYSICAL THERAPIST

## 2020-07-02 PROCEDURE — 97140 MANUAL THERAPY 1/> REGIONS: CPT | Performed by: PHYSICAL THERAPIST

## 2020-07-02 PROCEDURE — 97012 MECHANICAL TRACTION THERAPY: CPT | Performed by: PHYSICAL THERAPIST

## 2020-07-02 NOTE — PROGRESS NOTES
Daily Note     Today's date: 2020  Patient name: Nick Hardy  : 1940  MRN: [id number]  Referring provider: Dolores Farrell DO  Dx:   Encounter Diagnosis     ICD-10-CM    1  Lumbar radiculopathy M54 16    2  Chronic bilateral low back pain with right-sided sciatica M54 41     G89 29                   Subjective: patient states that he continues to have the same pain in his lower leg  But is overall improved  Limping at times when his leg is painful  Objective: See treatment diary below      Assessment: Patient has improved with his lower back pain however he continues to have discomfort in his leg that does not seem to be changing with treatment  Patient will also continue with PT treatment as he has not plateaued and feels as though he sees improvement in strength and stability  Plan: Continue per plan of care  Precautions: none    Daily Treatment Diary     Manual  2020               UPA L4-5 Gr   Iv-             Lumbar rotational mob right Gr  iv              right calf and posterior leg 5 min                                             Exercise Diary              Hip flexor str             Hamstring str             Piriformis str             LTR             SKTC             SLR             Bridging with ball between knees             Clamshell with band             Standing hip abduction             Standing hip extension                                       Bike                                                                                                            Modalities                           Mechanical traction 10min 30/5 100lbs

## 2020-07-09 ENCOUNTER — CONSULT (OUTPATIENT)
Dept: PAIN MEDICINE | Facility: CLINIC | Age: 80
End: 2020-07-09
Payer: MEDICARE

## 2020-07-09 VITALS
SYSTOLIC BLOOD PRESSURE: 140 MMHG | BODY MASS INDEX: 24.71 KG/M2 | DIASTOLIC BLOOD PRESSURE: 68 MMHG | WEIGHT: 163 LBS | HEIGHT: 68 IN | TEMPERATURE: 98.3 F | HEART RATE: 85 BPM

## 2020-07-09 DIAGNOSIS — M48.062 SPINAL STENOSIS OF LUMBAR REGION WITH NEUROGENIC CLAUDICATION: Primary | ICD-10-CM

## 2020-07-09 DIAGNOSIS — G89.29 CHRONIC BILATERAL LOW BACK PAIN WITH RIGHT-SIDED SCIATICA: ICD-10-CM

## 2020-07-09 DIAGNOSIS — M54.41 CHRONIC BILATERAL LOW BACK PAIN WITH RIGHT-SIDED SCIATICA: ICD-10-CM

## 2020-07-09 DIAGNOSIS — M54.16 LUMBAR RADICULOPATHY: ICD-10-CM

## 2020-07-09 PROCEDURE — 4040F PNEUMOC VAC/ADMIN/RCVD: CPT | Performed by: ANESTHESIOLOGY

## 2020-07-09 PROCEDURE — 1036F TOBACCO NON-USER: CPT | Performed by: ANESTHESIOLOGY

## 2020-07-09 PROCEDURE — 3008F BODY MASS INDEX DOCD: CPT | Performed by: ANESTHESIOLOGY

## 2020-07-09 PROCEDURE — 1160F RVW MEDS BY RX/DR IN RCRD: CPT | Performed by: ANESTHESIOLOGY

## 2020-07-09 PROCEDURE — 99204 OFFICE O/P NEW MOD 45 MIN: CPT | Performed by: ANESTHESIOLOGY

## 2020-07-09 NOTE — PROGRESS NOTES
Assessment  1  Spinal stenosis of lumbar region with neurogenic claudication    2  Lumbar radiculopathy    3  Chronic bilateral low back pain with right-sided sciatica        Plan  The patient's pain persists despite time, relative rest, activity modification and therapy  I believe that Alcira Viveros would benefit from a lumbar epidural steroid injection to diminish any inflammatory component of his pain  I will initially use an translaminar approach  If the patient does not receive significant pain relief following the initial injection, I may need to repeat using a transforaminal approach that may allow for better concentrate of the steroid along the affected nerve root  If he does not obtain significant relief than one may consider EMG of the lower limb  In the office today, we reviewed the nature of the patient's pathology in depth using  diagrams and models  I discussed the approach I would use for the epidural steroid injection and provided literature for home review  The patient understands the risks associated with the procedure including but not limited to bleeding, infection, tissue injury, exacerbation of symptoms, allergic reaction, spinal headache, and paralysis and provided verbal consent  today  My impressions and treatment recommendations were discussed in detail with the patient who verbalized understanding and had no further questions  Discharge instructions were provided  I personally saw and examined the patient and I agree with the above discussed plan of care  This note is created using dictation transcription  It may contain typographical errors, grammatical errors, improperly dictated words, background noise and other errors  Orders Placed This Encounter   Procedures    FL spine and pain procedure     Standing Status:   Future     Standing Expiration Date:   7/9/2024     Order Specific Question:   Reason for Exam:     Answer:   MELVI to the right 1       Order Specific Question: Anticoagulant hold needed? Answer:   No    FL spine and pain procedure     Standing Status:   Future     Standing Expiration Date:   7/9/2024     Order Specific Question:   Reason for Exam:     Answer:   LESI to the right 2  Order Specific Question:   Anticoagulant hold needed? Answer:   No       REFERRED BY DR YOUNG [facility]     History of Present Illness    Flory Gamez is a 79 y o  male with 12 month history of right-sided lower extremity pain  He is unaware of any clear precipitating event denies any trauma or injury  He is undergoing physical therapy is pain is moderate to severe rates as 7/10 on the visual analog scale significant interfering with daily living activities  His pain is described as pressure-like achy with subjective weakness of his lower limbs standing and walking aggravate his symptoms  He denies any bowel or bladder dysfunction any recent fever chills or respiratory issues  I have personally reviewed and/or updated the patient's past medical history, past surgical history, family history, social history, current medications, allergies, and vital signs today  Review of Systems   Constitutional: Negative for fever and unexpected weight change  HENT: Negative for trouble swallowing  Eyes: Negative for visual disturbance  Respiratory: Negative for shortness of breath and wheezing  Cardiovascular: Negative for chest pain and palpitations  Gastrointestinal: Negative for constipation, diarrhea, nausea and vomiting  Endocrine: Negative for cold intolerance, heat intolerance and polydipsia  Genitourinary: Negative for difficulty urinating and frequency  Musculoskeletal: Negative for arthralgias, gait problem, joint swelling and myalgias  Skin: Negative for rash  Neurological: Negative for dizziness, seizures, syncope, weakness and headaches  Hematological: Does not bruise/bleed easily  Psychiatric/Behavioral: Negative for dysphoric mood     All other systems reviewed and are negative        Patient Active Problem List   Diagnosis    PVC's (premature ventricular contractions)    History of prostate cancer    [facility]    Nonrheumatic aortic valve insufficiency    Non-rheumatic mitral regurgitation    [facility]    Strain of calf muscle       Past Medical History:   Diagnosis Date    Astigmatism     last assessed 11/15/17    Cataract     Resolved 11/15/17    Cyst of left kidney     last assessed 11/15/2017    Cyst of right kidney     lasst assessed 11/15/2017    Gross hematuria     Last assessed 01/28/16    Hematuria     last assessed 01/28/16    [facility]     last assessed 05/18/17    Renal calculi     last assessed 08/16/12    Squamous cell carcinoma     Vitamin D deficiency     Last assessed 08/19/13       Past Surgical History:   Procedure Laterality Date    BREAST BIOPSY      Benign breast tumor    CATARACT EXTRACTION Left 08/26/2019    CATARACT EXTRACTION Right 08/12/2019    COLONOSCOPY      CYSTOSCOPY  01/28/2016    ELBOW SURGERY      Bone chip    HERNIA REPAIR  2002    LITHOTRIPSY      RETROPUBIC PROSTATECTOMY  11/13/2001    Radical with nerve sparing       Family History   Problem Relation Age of Onset    Other Mother 76        Influenza    Hypertension Mother     Heart failure Father 70    Peripheral vascular disease Father     Hypertension Father     Prostate cancer Brother     Stroke Brother 48    Hypertension Brother     Hypertension Family        Social History     Occupational History    Not on file   Tobacco Use    Smoking status: Never Smoker    Smokeless tobacco: Never Used   Substance and Sexual Activity    Alcohol use: Yes     Comment: social    Drug use: No    Sexual activity: Not on file       Current Outpatient Medications on File Prior to Visit   Medication Sig    cyanocobalamin (VITAMIN B-12) 500 mcg tablet 1,000 mcg Daily      Docusate Sodium (COLACE PO) Take by mouth daily    Multiple Vitamins-Minerals (CENTRUM SILVER 50+MEN PO) Daily    tobramycin (TOBREX) 0 3 % SOLN Administer 1 drop to both eyes every 4 (four) hours while awake    verapamil (CALAN-SR) 240 mg CR tablet Take 1 and a 1/2  tablets daily as directed  Can switch to bedtime dosing if patient prefers     No current facility-administered medications on file prior to visit  Allergies   Allergen Reactions    Sulfa Antibiotics Rash    Ciprofloxacin        Physical Exam    /68   Pulse 85   Temp 98 3 °F (36 8 °C)   Ht 5' 8" (1 727 m)   Wt 73 9 kg (163 lb)   BMI 24 78 kg/m²     Constitutional: normal, well developed, well nourished, alert, in no distress and non-toxic and no overt pain behavior  Eyes: anicteric  HEENT: grossly intact  Neck: supple, symmetric, trachea midline and no masses   Pulmonary:even and unlabored  Cardiovascular:No edema or pitting edema present  Skin:Normal without rashes or lesions and well hydrated  Psychiatric:Mood and affect appropriate  Neurologic:Cranial Nerves II-XII grossly intact  Musculoskeletal:normal,   Inspection:  Normal station and gait  Normal lumbar lordotic curve with no significant scoliosis or spinal step-off  Skin intact without erythema  No gross mass or muscle atrophy  Palpation:  There is no tenderness to palpation overlying the sacroiliac joint as well as the ischial bursa bilateral   No significant tenderness over the greater trochanteric bursa bilaterally  Motor/Strength:  5/5 strength in the bilateral lower limbs  The patient is able to heel and/toe walk  Tandem gait is intact  Reflexes: Deep tendon reflex are diminished but symmetrical bilateral lower limbs  Sensation:   Sensation intact to light touch and pinprick in the bilateral lower limbs  Proprioception is intact at bilateral hallux    Maneuvers:  Positive straight leg raising on the right g   Negative Kenji's maneuver  Imaging  MRI LUMBAR SPINE WITHOUT CONTRAST     INDICATION: M51 [id number]: Other intervertebral disc degeneration, lumbar region      COMPARISON:  None      TECHNIQUE:  Sagittal T1, sagittal T2, sagittal inversion recovery, axial T1 and axial T2, coronal T2     IMAGE QUALITY:  Diagnostic     FINDINGS:     VERTEBRAL BODIES:  There are 5 lumbar type vertebral bodies  Moderate rotatory levoscoliosis of the lumbar spine centered at L3  No spondylolisthesis or spondylolysis  Endplate marrow degenerative change towards the right at the L3-4 level      SACRUM:  Normal signal within the sacrum  No evidence of insufficiency or stress fracture      DISTAL CORD AND CONUS:  Normal size and signal within the distal cord and conus      PARASPINAL SOFT TISSUES:  There is a small simple cyst identified within the left kidney  Mild atrophy of the paraspinal musculature      LOWER THORACIC DISC SPACES:  Minor lower thoracic degenerative change without canal stenosis, cord compression or foraminal nerve impingement      LUMBAR DISC SPACES:     L1-L2:  Normal      L2-L3:  Loss of disc height with mild annular bulging diffusely  Mild facet arthropathy  No disc herniation is present  No significant canal stenosis or foraminal narrowing      L3-L4:  Disc desiccation and loss of disc height with diffuse annular bulging, asymmetric towards the right  Mild facet arthropathy  There is no canal stenosis  Mild bilateral foraminal narrowing      L4-L5:  Disc desiccation and loss of disc height  Diffuse annular bulging with bilateral facet degenerative change and ligamentum flavum thickening  Moderate canal stenosis with tricompartmental canal stenosis present  Mild bilateral foraminal   narrowing      L5-S1:  Loss of disc height with diffuse annular bulging asymmetric towards the left  Far lateral endplate osteophyte formation  There is left greater than right facet arthropathy  No canal stenosis    Mild left foraminal narrowing      IMPRESSION:     There is moderate rotatory levoscoliosis of the lumbar spine  Multilevel lumbar degenerative disc disease, most prominent at L4-5 where there is moderate canal stenosis and mild bilateral foraminal narrowing  Mild right foraminal narrowing noted at the   L3-4 level and mild left foraminal narrowing at L5-S1    I have personally reviewed pertinent films in PACS

## 2020-07-09 NOTE — PATIENT INSTRUCTIONS
Lumbar Radiculopathy   WHAT YOU NEED TO KNOW:   What is lumbar radiculopathy? Lumbar radiculopathy is a painful condition that happens when a nerve in your lumbar spine (lower back) is pinched or irritated  Nerves control feeling and movement in your body  What causes lumbar radiculopathy? You may get a pinched nerve in your lumbar spine if you have disc damage  Discs are natural, spongy cushions between your vertebrae (back bones) that allow your spine to move  Your discs may move out of place and pinch the nerve in your spine  Your risk for a pinched nerve and lumbar radiculopathy increases if:  · You smoke  · You have diabetes, a spinal infection, or a growth in your spine  · You are overweight  · You are male  · You are elderly  What are the signs and symptoms of lumbar radiculopathy? You may have any of the following:  · Pain that moves from your lower back to your buttocks, groin, and the back of your leg  The pain is often felt below your knee  Your pain may worsen when you cough, sneeze, stand, or sit  · Numbness, weakness, or tingling in your back or legs  How is lumbar radiculopathy diagnosed? Your healthcare provider will examine you and ask about your family history of back and leg pain  He may also test you for weakness, numbness, or tingling in your back, buttocks, and legs  Your healthcare provider may ask you to lie on your back and lift your leg to locate your pain  You may have any of the following:  · Blood tests: You may need blood taken to give caregivers information about how your body is working  The blood may be taken from your hand, arm, or IV  · Magnetic resonance imaging (MRI): An MRI machine is used to take a picture of your lower back  Your healthcare provider will use this picture to check for problems and changes in your back bones, nerves, and discs  You will need to lie still during this test  The MRI machine contains a very powerful magnet    Never enter the MRI room with any metal objects  This can cause serious injury  Tell your healthcare provider if you have any metal implants in your body  · X-ray: An x-ray is a picture of your lower back  A lumbar x-ray may show signs of infection or other problems with your spine  · An electromyography (EMG)  test measures the electrical activity of your muscles at rest and with movement  · Computed tomography (CT) scan: A special x-ray machine uses a computer to take pictures of your lower back  It may be used to look at your bones, discs, and nerves  You may be given dye in your IV to help improve the pictures  Tell your healthcare provider if you are allergic to shellfish, or have other allergies or medical conditions  People who are allergic to iodine or shellfish (lobster, crab, or shrimp) may be allergic to some dyes  How is lumbar radiculopathy treated? Treatment of lumbar radiculopathy may reduce pain and swelling, and improve your ability to walk and do your normal activities  Ask your healthcare provider for more information about these and other treatments for lumbar radiculopathy:  · Medicines:     ¨ NSAIDs , such as ibuprofen, help decrease swelling, pain, and fever  This medicine is available with or without a doctor's order  NSAIDs can cause stomach bleeding or kidney problems in certain people  If you take blood thinner medicine, always ask your healthcare provider if NSAIDs are safe for you  Always read the medicine label and follow directions  ¨ Muscle relaxers  help decrease pain and muscle spasms  ¨ Opioids: This is a strong medicine given to reduce severe pain  It is also called narcotic pain medicine  Take this medicine exactly as directed by your healthcare provider  ¨ Oral steroids: Steroids may be given to reduce swelling and pain  ¨ Steroid injections: Healthcare providers may give you steroid medicine through a needle (shot) into your lumbar spine   This may help decrease your nerve pain and swelling  You may need more than 1 injection if your symptoms do not improve after the first treatment  · Physical therapy: Your healthcare provider may suggest physical therapy  Your physical therapist may teach you certain exercises to improve posture (the way you stand and sit), flexibility, and strength in your lower back  He may also teach you how to remain safely active and avoid further injury  Follow the exercise plan given to you by your physical therapist     · Transcutaneous electrical nerve stimulation: This treatment, called TENS, stimulates your nerves and may decrease your pain  Wires are attached to pads  The pads are attached to your skin  The wires send a mild current through your nerves  · Surgery: You may need surgery to relieve your pinched nerve if your condition has not improved within 4 to 6 weeks  You may also need it if you have lumbar radiculopathy more than once  What are the risks of treatment for lumbar radiculopathy? · Without treatment, your pain may worsen  The pinched and swollen nerve may lead to problems when you walk or move  In severe cases, you may lose control of your urine or bowel movements  Bedrest can make your symptoms worse  · Pain medicines can cause vomiting, upset stomach, constipation (large, hard bowel movements that are difficult to pass), or kidney or liver problems  Opioid medicine may be addictive (hard to quit taking once you start)  Oral steroids and steroid injections may have side effects, such as facial redness, fluid retention (water weight gain), and mood changes  Steroid injections may be painful and can cause severe headaches, infections, allergic reactions, or nerve damage  Surgery risks include delayed problems with healing, spinal or bladder infection, damage to the spinal cord or other nerves, and ongoing pain  In rare cases, you could become paralyzed (not able to move your arms or legs)    How can I care for myself when I have lumbar radiculopathy? · Stay active: It is best to be active when you have lumbar radiculopathy  Your healthcare provider may tell you to take walks to ease yourself back into your daily routine  Avoid long periods of bed rest  Bed rest could worsen your symptoms  Do not move in ways that increase your pain  Ask your healthcare provider for more information about the best ways to stay active  · Use ice or heat packs:  Use ice or heat packs on the sore area of your body to decrease the pain and swelling  Put ice in a plastic bag covered with a towel on your low back  Cover heated items with a towel to avoid burns  Use ice and heat as directed  · Avoid heavy lifting: Your condition may worsen if you lift heavy things  Avoid lifting if possible  · Maintain a healthy weight:  Excess body weight may strain your back  Talk with your healthcare provider about ways to lose excess weight if you are overweight  When should I contact my healthcare provider? · Your pain does not improve within 1 to 3 weeks after treatment  · Your pain and weakness keep you from your normal activities at work, home, or school  · You lose more than 10 pounds in 6 months without trying  · You become depressed or sad because of the pain  · You have questions or concerns about your condition or care  When should I seek immediate care or call 911? · You have a fever greater than 100 4°F for longer than 2 days  · You have new, severe back or leg pain, or your pain spreads to both legs  · You have any new signs of numbness or weakness, especially in your lower back, legs, arms, or genital area  · You have new trouble controlling your urine and bowel movements  · You do not feel like your bladder empties when you urinate  CARE AGREEMENT:   You have the right to help plan your care  Learn about your health condition and how it may be treated   Discuss treatment options with your caregivers to decide what care you want to receive  You always have the right to refuse treatment  The above information is an  only  It is not intended as medical advice for individual conditions or treatments  Talk to your doctor, nurse or pharmacist before following any medical regimen to see if it is safe and effective for you  © 2017 [employer] Information is for End User's use only and may not be sold, redistributed or otherwise used for commercial purposes  All illustrations and images included in CareNotes® are the copyrighted property of [employer]  or [employer]

## 2020-07-10 ENCOUNTER — HOSPITAL ENCOUNTER (OUTPATIENT)
Dept: RADIOLOGY | Facility: CLINIC | Age: 80
Discharge: HOME | End: 2020-07-10
Attending: ANESTHESIOLOGY | Admitting: ANESTHESIOLOGY
Payer: MEDICARE

## 2020-07-10 ENCOUNTER — APPOINTMENT (OUTPATIENT)
Dept: PHYSICAL THERAPY | Facility: MEDICAL CENTER | Age: 80
End: 2020-07-10
Payer: MEDICARE

## 2020-07-10 VITALS
TEMPERATURE: 98.6 F | HEART RATE: 81 BPM | RESPIRATION RATE: 18 BRPM | DIASTOLIC BLOOD PRESSURE: 73 MMHG | OXYGEN SATURATION: 98 % | SYSTOLIC BLOOD PRESSURE: 146 MMHG

## 2020-07-10 DIAGNOSIS — M48.062 SPINAL STENOSIS OF LUMBAR REGION WITH NEUROGENIC CLAUDICATION: ICD-10-CM

## 2020-07-10 DIAGNOSIS — M54.16 LUMBAR RADICULOPATHY: ICD-10-CM

## 2020-07-10 PROCEDURE — 62323 NJX INTERLAMINAR LMBR/SAC: CPT | Performed by: ANESTHESIOLOGY

## 2020-07-10 RX ORDER — LIDOCAINE HYDROCHLORIDE 10 MG/ML
5 INJECTION, SOLUTION EPIDURAL; INFILTRATION; INTRACAUDAL; PERINEURAL ONCE
Status: COMPLETED | OUTPATIENT
Start: 2020-07-10 | End: 2020-07-10

## 2020-07-10 RX ORDER — METHYLPREDNISOLONE ACETATE 80 MG/ML
160 INJECTION, SUSPENSION INTRA-ARTICULAR; INTRALESIONAL; INTRAMUSCULAR; PARENTERAL; SOFT TISSUE ONCE
Status: COMPLETED | OUTPATIENT
Start: 2020-07-10 | End: 2020-07-10

## 2020-07-10 RX ADMIN — LIDOCAINE HYDROCHLORIDE 3 ML: 10 INJECTION, SOLUTION EPIDURAL; INFILTRATION; INTRACAUDAL; PERINEURAL at 11:05

## 2020-07-10 RX ADMIN — METHYLPREDNISOLONE ACETATE 160 MG: 80 INJECTION, SUSPENSION INTRA-ARTICULAR; INTRALESIONAL; INTRAMUSCULAR; SOFT TISSUE at 11:07

## 2020-07-10 RX ADMIN — IOHEXOL 1 ML: 300 INJECTION, SOLUTION INTRAVENOUS at 11:06

## 2020-07-10 NOTE — H&P
History of Present Illness: The patient is a 79 y o  male who presents with complaints of low back and leg pain  Patient Active Problem List   Diagnosis    PVC's (premature ventricular contractions)    History of prostate cancer    [facility]    Nonrheumatic aortic valve insufficiency    Non-rheumatic mitral regurgitation    [facility]    Strain of calf muscle       Past Medical History:   Diagnosis Date    Astigmatism     last assessed 11/15/17    Cataract     Resolved 11/15/17    Cyst of left kidney     last assessed 11/15/2017    Cyst of right kidney     lasst assessed 11/15/2017    Gross hematuria     Last assessed 01/28/16    Hematuria     last assessed 01/28/16    [facility]     last assessed 05/18/17    Renal calculi     last assessed 08/16/12    Squamous cell carcinoma     Vitamin D deficiency     Last assessed 08/19/13       Past Surgical History:   Procedure Laterality Date    BREAST BIOPSY      Benign breast tumor    CATARACT EXTRACTION Left 08/26/2019    CATARACT EXTRACTION Right 08/12/2019    COLONOSCOPY      CYSTOSCOPY  01/28/2016    ELBOW SURGERY      Bone chip    HERNIA REPAIR  2002    LITHOTRIPSY      RETROPUBIC PROSTATECTOMY  11/13/2001    Radical with nerve sparing         Current Outpatient Medications:     cyanocobalamin (VITAMIN B-12) 500 mcg tablet, 1,000 mcg Daily  , Disp: , Rfl:     Docusate Sodium (COLACE PO), Take by mouth daily, Disp: , Rfl:     Multiple Vitamins-Minerals (CENTRUM SILVER 50+MEN PO), Daily, Disp: , Rfl:     tobramycin (TOBREX) 0 3 % SOLN, Administer 1 drop to both eyes every 4 (four) hours while awake, Disp: 1 Bottle, Rfl: 0    verapamil (CALAN-SR) 240 mg CR tablet, Take 1 and a 1/2  tablets daily as directed    Can switch to bedtime dosing if patient prefers, Disp: 135 tablet, Rfl: 3    Current Facility-Administered Medications:     iohexol (OMNIPAQUE) 300 mg/mL injection 50 mL, 50 mL, Epidural, Once, Franky Wallace DO    lidocaine (PF) (XYLOCAINE-MPF) 1 % injection 5 mL, 5 mL, Other, Once, Franky Wallace DO    methylPREDNISolone acetate (DEPO-MEDROL) injection 160 mg, 160 mg, Epidural, Once, [employer], DO    Allergies   Allergen Reactions    Sulfa Antibiotics Rash    Ciprofloxacin        Physical Exam:   General: Awake, Alert, Oriented x 3, Mood and affect appropriate  Respiratory: Respirations even and unlabored  Cardiovascular: Peripheral pulses intact; no edema  Musculoskeletal Exam:  Decreased range of motion lumbar spine    ASA Score: II         Assessment:   1  Lumbar radiculopathy    2  Spinal stenosis of lumbar region with neurogenic claudication        Plan: LESI to the right 1

## 2020-07-10 NOTE — DISCHARGE INSTRUCTIONS
Epidural Steroid Injection   WHAT YOU NEED TO KNOW:   An epidural steroid injection (ROBBI) is a procedure to inject steroid medicine into the epidural space  The epidural space is between your spinal cord and vertebrae  Steroids reduce inflammation and fluid buildup in your spine that may be causing pain  You may be given pain medicine along with the steroids  ACTIVITY  · Do not drive or operate machinery today  · No strenuous activity today - bending, lifting, etc   · You may resume normal activites starting tomorrow - start slowly and as tolerated  · You may shower today, but no tub baths or hot tubs  · You may have numbness for several hours from the local anesthetic  Please use caution and common sense, especially with weight-bearing activities  CARE OF THE INJECTION SITE  · If you have soreness or pain, apply ice to the area today (20 minutes on/20 minutes off)  · Starting tomorrow, you may use warm, moist heat or ice if needed  · You may have an increase or change in your discomfort for 36-48 hours after your treatment  · Apply ice and continue with any pain medication you have been prescribed  · [facility]ify [facility] if you have any of the following: redness, drainage, swelling, headache, stiff neck or fever above 100°F     SPECIAL INSTRUCTIONS  · Our office will contact you in approximately 7 days for a progress report  MEDICATIONS  · Continue to take all routine medications  · Our office may have instructed you to hold some medications  If you have a problem specifically related to your procedure, please call our office at [phone]  Problems not related to your procedure should be directed to your primary care physician

## 2020-07-17 ENCOUNTER — TELEPHONE (OUTPATIENT)
Dept: PAIN MEDICINE | Facility: CLINIC | Age: 80
End: 2020-07-17

## 2020-07-20 ENCOUNTER — APPOINTMENT (OUTPATIENT)
Dept: LAB | Facility: CLINIC | Age: 80
End: 2020-07-20
Payer: MEDICARE

## 2020-07-20 DIAGNOSIS — Z85.46 HISTORY OF PROSTATE CANCER: ICD-10-CM

## 2020-07-20 DIAGNOSIS — E78.00 PURE HYPERCHOLESTEROLEMIA: ICD-10-CM

## 2020-07-20 LAB
ALBUMIN SERPL BCP-MCNC: 4 G/DL (ref 3.5–5)
ALP SERPL-CCNC: 56 U/L (ref 46–116)
ALT SERPL W P-5'-P-CCNC: 21 U/L (ref 12–78)
ANION GAP SERPL CALCULATED.3IONS-SCNC: 5 MMOL/L (ref 4–13)
AST SERPL W P-5'-P-CCNC: 13 U/L (ref 5–45)
BILIRUB SERPL-MCNC: 0.64 MG/DL (ref 0.2–1)
BUN SERPL-MCNC: 24 MG/DL (ref 5–25)
CALCIUM SERPL-MCNC: 9.4 MG/DL (ref 8.3–10.1)
CHLORIDE SERPL-SCNC: 106 MMOL/L (ref 100–108)
CHOLEST SERPL-MCNC: 216 MG/DL (ref 50–200)
CO2 SERPL-SCNC: 28 MMOL/L (ref 21–32)
CREAT SERPL-MCNC: 1.03 MG/DL (ref 0.6–1.3)
GFR SERPL CREATININE-BSD FRML MDRD: 68 ML/MIN/1.73SQ M
GLUCOSE P FAST SERPL-MCNC: 83 MG/DL (ref 65–99)
HDLC SERPL-MCNC: 80 MG/DL
LDLC SERPL CALC-MCNC: 112 MG/DL (ref 0–100)
NONHDLC SERPL-MCNC: 136 MG/DL
POTASSIUM SERPL-SCNC: 4 MMOL/L (ref 3.5–5.3)
PROT SERPL-MCNC: 7.3 G/DL (ref 6.4–8.2)
PSA SERPL-MCNC: <0.1 NG/ML (ref 0–4)
SODIUM SERPL-SCNC: 139 MMOL/L (ref 136–145)
TRIGL SERPL-MCNC: 119 MG/DL

## 2020-07-20 PROCEDURE — 80061 LIPID PANEL: CPT

## 2020-07-20 PROCEDURE — 80053 COMPREHEN METABOLIC PANEL: CPT

## 2020-07-20 PROCEDURE — 84153 ASSAY OF PSA TOTAL: CPT

## 2020-07-20 PROCEDURE — 36415 COLL VENOUS BLD VENIPUNCTURE: CPT

## 2020-07-27 ENCOUNTER — OFFICE VISIT (OUTPATIENT)
Dept: FAMILY MEDICINE CLINIC | Facility: CLINIC | Age: 80
End: 2020-07-27
Payer: MEDICARE

## 2020-07-27 VITALS
HEIGHT: 68 IN | BODY MASS INDEX: 24.25 KG/M2 | DIASTOLIC BLOOD PRESSURE: 60 MMHG | WEIGHT: 160 LBS | OXYGEN SATURATION: 98 % | TEMPERATURE: 97.8 F | HEART RATE: 95 BPM | SYSTOLIC BLOOD PRESSURE: 130 MMHG

## 2020-07-27 DIAGNOSIS — M48.062 SPINAL STENOSIS OF LUMBAR REGION WITH NEUROGENIC CLAUDICATION: ICD-10-CM

## 2020-07-27 DIAGNOSIS — I49.3 PVC'S (PREMATURE VENTRICULAR CONTRACTIONS): Primary | ICD-10-CM

## 2020-07-27 DIAGNOSIS — M54.16 LUMBAR RADICULOPATHY: ICD-10-CM

## 2020-07-27 DIAGNOSIS — I35.1 NONRHEUMATIC AORTIC VALVE INSUFFICIENCY: ICD-10-CM

## 2020-07-27 DIAGNOSIS — I77.819 AORTIC DILATATION (HCC): ICD-10-CM

## 2020-07-27 DIAGNOSIS — C61 MALIGNANT NEOPLASM OF PROSTATE (HCC): ICD-10-CM

## 2020-07-27 PROCEDURE — 1160F RVW MEDS BY RX/DR IN RCRD: CPT | Performed by: FAMILY MEDICINE

## 2020-07-27 PROCEDURE — 4040F PNEUMOC VAC/ADMIN/RCVD: CPT | Performed by: FAMILY MEDICINE

## 2020-07-27 PROCEDURE — 99214 OFFICE O/P EST MOD 30 MIN: CPT | Performed by: FAMILY MEDICINE

## 2020-07-27 PROCEDURE — 3008F BODY MASS INDEX DOCD: CPT | Performed by: FAMILY MEDICINE

## 2020-07-27 PROCEDURE — 1036F TOBACCO NON-USER: CPT | Performed by: FAMILY MEDICINE

## 2020-07-27 RX ORDER — ZINC GLUCONATE 50 MG
50 TABLET ORAL
COMMUNITY
End: 2021-01-27 | Stop reason: ALTCHOICE

## 2020-07-27 NOTE — PROGRESS NOTES
Assessment/Plan:    Blood pressure stable  Patient doing well from a PVC standpoint  Will be seeing pain management later this week  May continue physical therapy  Recommend flu shot in the fall  Labs up-to-date and are excellent  PSA is less than 0 01  Patient should be following up with cardiology on a yearly basis regarding aortic dilatation and aortic regurgitation  COVID score reviewed and explained     Diagnoses and all orders for this visit:    PVC's (premature ventricular contractions)    Spinal stenosis of lumbar region with neurogenic claudication    Malignant neoplasm of prostate ([address] )    Aortic dilatation (HCC)    Lumbar radiculopathy    Nonrheumatic aortic valve insufficiency    Other orders  -     Zinc 50 MG TABS; Take 50 mg by mouth  -     Probiotic Product (PROBIOTIC PO); Take by mouth        1  PVC's (premature ventricular contractions)     2  Spinal stenosis of lumbar region with neurogenic claudication     3  Malignant neoplasm of prostate ([address] )     4  Aortic dilatation (HCC)     5  Lumbar radiculopathy     6  Nonrheumatic aortic valve insufficiency         Subjective:        Patient ID: Stacey Roy is a 79 y o  male  Chief Complaint   Patient presents with    Follow-up     8 months;       Patient here for routine recheck  Has had 1 shot in the low back from pain management which did not help as much as physical therapy  He does see pain management later this week  Otherwise feels well  Had labs done      The following portions of the patient's history were reviewed and updated as appropriate: past medical history, past surgical history and problem list       Review of Systems   Constitutional: Negative for appetite change, fatigue, fever and unexpected weight change  HENT: Negative for congestion  Eyes: Negative for redness and visual disturbance  Respiratory: Negative for cough, chest tightness and shortness of breath      Cardiovascular: Negative for chest pain, palpitations and leg swelling  Gastrointestinal: Negative for abdominal distention, abdominal pain, diarrhea and nausea  Endocrine: Negative for cold intolerance and heat intolerance  Genitourinary: Negative for dysuria and hematuria  Musculoskeletal: Positive for back pain  Negative for arthralgias, gait problem and myalgias  Skin: Negative for pallor and rash  Neurological: Negative for dizziness and headaches  Psychiatric/Behavioral: Negative for behavioral problems  The patient is not nervous/anxious  Objective:  /60 (BP Location: Left arm, Patient Position: Sitting, Cuff Size: Standard)   Pulse 95   Temp 97 8 °F (36 6 °C)   Ht 5' 8" (1 727 m)   Wt 72 6 kg (160 lb)   SpO2 98%   BMI 24 33 kg/m²        Physical Exam   Constitutional: He is oriented to person, place, and time  He appears well-developed and well-nourished  No distress  HENT:   Head: Normocephalic  Right Ear: Tympanic membrane normal    Left Ear: Tympanic membrane normal    Mouth/Throat: No oropharyngeal exudate  Eyes: Pupils are equal, round, and reactive to light  No scleral icterus  Neck:   Negative carotid bruit   Cardiovascular: Normal rate, regular rhythm and normal heart sounds  No murmur heard  Pulmonary/Chest: Breath sounds normal    Abdominal: Soft  Musculoskeletal: He exhibits no edema  Lymphadenopathy:     He has no cervical adenopathy  Neurological: He is alert and oriented to person, place, and time  Psychiatric: He has a normal mood and affect  His behavior is normal  Thought content normal    Nursing note and vitals reviewed

## 2020-07-30 ENCOUNTER — HOSPITAL ENCOUNTER (OUTPATIENT)
Dept: RADIOLOGY | Facility: CLINIC | Age: 80
Discharge: HOME | End: 2020-07-30
Attending: ANESTHESIOLOGY | Admitting: ANESTHESIOLOGY
Payer: MEDICARE

## 2020-07-30 VITALS
SYSTOLIC BLOOD PRESSURE: 162 MMHG | RESPIRATION RATE: 20 BRPM | OXYGEN SATURATION: 96 % | HEART RATE: 76 BPM | TEMPERATURE: 99.1 F | DIASTOLIC BLOOD PRESSURE: 73 MMHG

## 2020-07-30 DIAGNOSIS — M48.062 SPINAL STENOSIS OF LUMBAR REGION WITH NEUROGENIC CLAUDICATION: ICD-10-CM

## 2020-07-30 DIAGNOSIS — M54.16 LUMBAR RADICULOPATHY: ICD-10-CM

## 2020-07-30 PROCEDURE — 62323 NJX INTERLAMINAR LMBR/SAC: CPT | Performed by: ANESTHESIOLOGY

## 2020-07-30 RX ORDER — LIDOCAINE HYDROCHLORIDE 10 MG/ML
5 INJECTION, SOLUTION EPIDURAL; INFILTRATION; INTRACAUDAL; PERINEURAL ONCE
Status: COMPLETED | OUTPATIENT
Start: 2020-07-30 | End: 2020-07-30

## 2020-07-30 RX ORDER — METHYLPREDNISOLONE ACETATE 80 MG/ML
160 INJECTION, SUSPENSION INTRA-ARTICULAR; INTRALESIONAL; INTRAMUSCULAR; PARENTERAL; SOFT TISSUE ONCE
Status: COMPLETED | OUTPATIENT
Start: 2020-07-30 | End: 2020-07-30

## 2020-07-30 RX ADMIN — IOHEXOL 1 ML: 300 INJECTION, SOLUTION INTRAVENOUS at 13:07

## 2020-07-30 RX ADMIN — LIDOCAINE HYDROCHLORIDE 3 ML: 10 INJECTION, SOLUTION EPIDURAL; INFILTRATION; INTRACAUDAL; PERINEURAL at 13:07

## 2020-07-30 RX ADMIN — METHYLPREDNISOLONE ACETATE 160 MG: 80 INJECTION, SUSPENSION INTRA-ARTICULAR; INTRALESIONAL; INTRAMUSCULAR; SOFT TISSUE at 13:07

## 2020-07-30 NOTE — DISCHARGE INSTRUCTIONS
Epidural Steroid Injection   WHAT YOU NEED TO KNOW:   An epidural steroid injection (ROBBI) is a procedure to inject steroid medicine into the epidural space  The epidural space is between your spinal cord and vertebrae  Steroids reduce inflammation and fluid buildup in your spine that may be causing pain  You may be given pain medicine along with the steroids  ACTIVITY  · Do not drive or operate machinery today  · No strenuous activity today - bending, lifting, etc   · You may resume normal activites starting tomorrow - start slowly and as tolerated  · You may shower today, but no tub baths or hot tubs  · You may have numbness for several hours from the local anesthetic  Please use caution and common sense, especially with weight-bearing activities  CARE OF THE INJECTION SITE  · If you have soreness or pain, apply ice to the area today (20 minutes on/20 minutes off)  · Starting tomorrow, you may use warm, moist heat or ice if needed  · You may have an increase or change in your discomfort for 36-48 hours after your treatment  · Apply ice and continue with any pain medication you have been prescribed  · Notify [facility] if you have any of the following: redness, drainage, swelling, headache, stiff neck or fever above 100°F     SPECIAL INSTRUCTIONS  · Our office will contact you in approximately 7 days for a progress report  MEDICATIONS  · Continue to take all routine medications  · Our office may have instructed you to hold some medications  If you have a problem specifically related to your procedure, please call our office at [phone]  Problems not related to your procedure should be directed to your primary care physician

## 2020-07-30 NOTE — H&P
History of Present Illness: The patient is a 79 y o  male who presents with complaints of back and leg pain  Patient Active Problem List   Diagnosis    PVC's (premature ventricular contractions)    History of prostate cancer    [facility]    Nonrheumatic aortic valve insufficiency    Non-rheumatic mitral regurgitation    Malignant neoplasm of [facility] )    Strain of calf muscle    Spinal stenosis of lumbar region with neurogenic claudication    Lumbar radiculopathy       Past Medical History:   Diagnosis Date    Astigmatism     last assessed 11/15/17    Cataract     Resolved 11/15/17    Cyst of left kidney     last assessed 11/15/2017    Cyst of right kidney     lasst assessed 11/15/2017    Gross hematuria     Last assessed 01/28/16    Hematuria     last assessed 01/28/16    Prostate cancer ([facility] )     last assessed 05/18/17    Renal calculi     last assessed 08/16/12    Squamous cell carcinoma     Vitamin D deficiency     Last assessed 08/19/13       Past Surgical History:   Procedure Laterality Date    BREAST BIOPSY      Benign breast tumor    CATARACT EXTRACTION Left 08/26/2019    CATARACT EXTRACTION Right 08/12/2019    COLONOSCOPY      CYSTOSCOPY  01/28/2016    ELBOW SURGERY      Bone chip    HERNIA REPAIR  2002    LITHOTRIPSY      RETROPUBIC PROSTATECTOMY  11/13/2001    Radical with nerve sparing         Current Outpatient Medications:     cyanocobalamin (VITAMIN B-12) 500 mcg tablet, 1,000 mcg Daily  , Disp: , Rfl:     Docusate Sodium (COLACE PO), Take by mouth daily, Disp: , Rfl:     Multiple Vitamins-Minerals (CENTRUM SILVER 50+MEN PO), Daily, Disp: , Rfl:     Probiotic Product (PROBIOTIC PO), Take by mouth, Disp: , Rfl:     verapamil (CALAN-SR) 240 mg CR tablet, Take 1 and a 1/2  tablets daily as directed    Can switch to bedtime dosing if patient prefers, Disp: 135 tablet, Rfl: 3    Zinc 50 MG TABS, Take 50 mg by mouth, Disp: , Rfl: Current Facility-Administered Medications:     iohexol (OMNIPAQUE) 300 mg/mL injection 50 mL, 50 mL, Epidural, Once, Franky Wallace DO    lidocaine (PF) (XYLOCAINE-MPF) 1 % injection 5 mL, 5 mL, Other, Once, Franky Wallace DO    methylPREDNISolone acetate (DEPO-MEDROL) injection 160 mg, 160 mg, Epidural, Once, [employer], DO    Allergies   Allergen Reactions    Sulfa Antibiotics Rash    Ciprofloxacin        Physical Exam:   Vitals:    07/30/20 1258   BP: 154/73   Pulse: 77   Resp: 20   Temp: 99 1 °F (37 3 °C)   SpO2: 95%     General: Awake, Alert, Oriented x 3, Mood and affect appropriate  Respiratory: Respirations even and unlabored  Cardiovascular: Peripheral pulses intact; no edema  Musculoskeletal Exam:  Decreased range of motion lumbar spine    ASA Score: II    Patient/Chart Verification  Patient ID Verified: Verbal  ID Band Applied: No  Consents Confirmed: Procedural, To be obtained in the Pre-Procedure area  H&P( within 30 days) Verified: To be obtained in the Pre-Procedure area  Interval H&P(within 24 hr) Complete (required for Outpatients and Surgery Admit only): To be obtained in the Pre-Procedure area  Allergies Reviewed: Yes  Anticoag/NSAID held?: No  Currently on antibiotics?: No    Assessment:   1  Lumbar radiculopathy    2  Spinal stenosis of lumbar region with neurogenic claudication        Plan: LESI to the right 2

## 2020-08-06 ENCOUNTER — TELEPHONE (OUTPATIENT)
Dept: PAIN MEDICINE | Facility: CLINIC | Age: 80
End: 2020-08-06

## 2020-08-14 ENCOUNTER — OFFICE VISIT (OUTPATIENT)
Dept: CARDIOLOGY CLINIC | Facility: CLINIC | Age: 80
End: 2020-08-14
Payer: MEDICARE

## 2020-08-14 VITALS
DIASTOLIC BLOOD PRESSURE: 60 MMHG | WEIGHT: 156 LBS | TEMPERATURE: 98.8 F | BODY MASS INDEX: 23.64 KG/M2 | SYSTOLIC BLOOD PRESSURE: 136 MMHG | HEART RATE: 82 BPM | HEIGHT: 68 IN

## 2020-08-14 DIAGNOSIS — I35.1 NONRHEUMATIC AORTIC VALVE INSUFFICIENCY: ICD-10-CM

## 2020-08-14 DIAGNOSIS — I71.2 THORACIC AORTIC ANEURYSM WITHOUT RUPTURE (HCC): ICD-10-CM

## 2020-08-14 DIAGNOSIS — I34.0 NON-RHEUMATIC MITRAL REGURGITATION: ICD-10-CM

## 2020-08-14 DIAGNOSIS — I49.3 PVC'S (PREMATURE VENTRICULAR CONTRACTIONS): Primary | ICD-10-CM

## 2020-08-14 PROCEDURE — 4040F PNEUMOC VAC/ADMIN/RCVD: CPT | Performed by: INTERNAL MEDICINE

## 2020-08-14 PROCEDURE — 99214 OFFICE O/P EST MOD 30 MIN: CPT | Performed by: INTERNAL MEDICINE

## 2020-08-14 PROCEDURE — 93000 ELECTROCARDIOGRAM COMPLETE: CPT | Performed by: INTERNAL MEDICINE

## 2020-08-14 PROCEDURE — 1160F RVW MEDS BY RX/DR IN RCRD: CPT | Performed by: INTERNAL MEDICINE

## 2020-08-14 PROCEDURE — 3008F BODY MASS INDEX DOCD: CPT | Performed by: INTERNAL MEDICINE

## 2020-08-14 PROCEDURE — 1036F TOBACCO NON-USER: CPT | Performed by: INTERNAL MEDICINE

## 2020-08-14 NOTE — PROGRESS NOTES
Cardiology Follow Up    Milana Donovan  1940  [id number]  [address] [id number] [phone]    1  PVC's (premature ventricular contractions)  POCT ECG   2  Nonrheumatic aortic valve insufficiency  Echo complete with contrast if indicated   3  Non-rheumatic mitral regurgitation     4  Thoracic aortic aneurysm without rupture ([facility])         Interval History:  No complaints today  He denies chest pain shortness of breath orthopnea paroxysmal nocturnal dyspnea or syncope      Patient Active Problem List   Diagnosis    PVC's (premature ventricular contractions)    History of prostate cancer    Thoracic aortic aneurysm without rupture ([address] )    Nonrheumatic aortic valve insufficiency    Non-rheumatic mitral regurgitation    Malignant ne[facility] of [facility] )    Strain of calf muscle    Spinal stenosis of lumbar region with neurogenic claudication    Lumbar radiculopathy     Past Medical History:   Diagnosis Date    Astigmatism     last assessed 11/15/17    Cataract     Resolved 11/15/17    Cyst of left kidney     last assessed 11/15/2017    Cyst of right kidney     lasst assessed 11/15/2017    Gross hematuria     Last assessed 01/28/16    Hematuria     last assessed 01/28/16    Prostate cancer ([address] )     last assessed 05/18/17    Renal calculi     last assessed 08/16/12    Squamous cell carcinoma     Vitamin D deficiency     Last assessed 08/19/13     Social History     Socioeconomic History    Marital status: /Civil Union     Spouse name: Not on file    Number of children: Not on file    Years of education: Not on file    Highest education level: Not on file   Occupational History    Not on file   Social Needs    Financial resource strain: Not on file    Food insecurity     Worry: Not on file     Inability: Not on file    Transportation needs     Medical: Not on file Non-medical: Not on file   Tobacco Use    Smoking status: Never Smoker    Smokeless tobacco: Never Used   Substance and Sexual Activity    Alcohol use: Yes     Comment: social    Drug use: No    Sexual activity: Not on file   Lifestyle    Physical activity     Days per week: Not on file     Minutes per session: Not on file    Stress: Not on file   Relationships    Social connections     Talks on phone: Not on file     Gets together: Not on file     Attends Sikh service: Not on file     Active member of club or organization: Not on file     Attends meetings of clubs or organizations: Not on file     Relationship status: Not on file    Intimate partner violence     Fear of current or ex partner: Not on file     Emotionally abused: Not on file     Physically abused: Not on file     Forced sexual activity: Not on file   Other Topics Concern    Not on file   Social History Narrative    Not on file      Family History   Problem Relation Age of Onset    Other Mother 76        Influenza    Hypertension Mother     Heart failure Father 70    Peripheral vascular disease Father     Hypertension Father     Prostate cancer Brother     Stroke Brother 48    Hypertension Brother     Hypertension Family      Past Surgical History:   Procedure Laterality Date    BREAST BIOPSY      Benign breast tumor    CATARACT EXTRACTION Left 08/26/2019    CATARACT EXTRACTION Right 08/12/2019    COLONOSCOPY      CYSTOSCOPY  01/28/2016    ELBOW SURGERY      Bone chip    HERNIA REPAIR  2002    LITHOTRIPSY      RETROPUBIC PROSTATECTOMY  11/13/2001    Radical with nerve sparing       Current Outpatient Medications:     cyanocobalamin (VITAMIN B-12) 500 mcg tablet, 1,000 mcg Daily  , Disp: , Rfl:     Docusate Sodium (COLACE PO), Take by mouth daily, Disp: , Rfl:     Multiple Vitamins-Minerals (CENTRUM SILVER 50+MEN PO), Daily, Disp: , Rfl:     Probiotic Product (PROBIOTIC PO), Take by mouth, Disp: , Rfl:    verapamil (CALAN-SR) 240 mg CR tablet, Take 1 and a 1/2  tablets daily as directed  Can switch to bedtime dosing if patient prefers, Disp: 135 tablet, Rfl: 3    Zinc 50 MG TABS, Take 50 mg by mouth, Disp: , Rfl:   Allergies   Allergen Reactions    Sulfa Antibiotics Rash    Ciprofloxacin        Labs:  Appointment on 07/20/2020   Component Date Value    Sodium 07/20/2020 139     Potassium 07/20/2020 4 0     Chloride 07/20/2020 106     CO2 07/20/2020 28     ANION GAP 07/20/2020 5     BUN 07/20/2020 24     Creatinine 07/20/2020 1 03     Glucose, Fasting 07/20/2020 83     Calcium 07/20/2020 9 4     AST 07/20/2020 13     ALT 07/20/2020 21     Alkaline Phosphatase 07/20/2020 56     Total Protein 07/20/2020 7 3     Albumin 07/20/2020 4 0     Total Bilirubin 07/20/2020 0 64     eGFR 07/20/2020 68     Cholesterol 07/20/2020 216*    Triglycerides 07/20/2020 119     HDL, Direct 07/20/2020 80     LDL Calculated 07/20/2020 112*    Non-HDL-Chol (CHOL-HDL) 07/20/2020 136     PSA, Diagnostic 07/20/2020 <0 1      Imaging: [facility]    Result Date: 7/30/2020  Narrative:  Patient presents after initial evaluation she with lumbar epidural steroid injection  Patient reports 50% reduction symptoms presents today for repeat epidural steroid injection  Procedure:   Lumbar epidural steroid injection under fluoroscopic guidance  L5-S1 directed towards the right Diagnosis:  Lumbar spinal stenosis with lumbar radiculitis Indication for Fluoroscopy: This procedure requires the precise placement of the Tuohy needle into the epidural space  It is the only way to accurately perform the injection  Medical Necessity:  Failure of conservative management  Procedure Note:   After fully informed written consent was obtained, the patient was brought into the Procedure Room and placed prone on the fluoroscopy table  A pillow was placed under the abdomen to lessen the lumbar lordosis    After Chloraprep and sterile drapes, C-arm fluoroscopy was utilized to identify the proper interspace  Using a 25 gauge needle 1% Lidocaine was used for local anesthetic  A 20 gauge Tuohy needle was then placed by loss of resistance technique on the first attempt  After negative aspiration for heme and CSF,  Omnipaque 300 was injected confirming proper placement in the epidural space  AP projections were obtained  Again, after negative aspiration methylprednisolone diluted in preservative free saline was then injected  The needle was then withdrawn, the back cleansed and a band-aid placed  Complications:  None  Disposition: Motor function was intact  Vital signs stable  The patient was discharged home with a   The discharge instruction sheet was given to the patient  The patient was discharged with instructions to call immediately if there are any complications  Estimated blood loss:  Minimal No specimens were taken          Review of Systems:  Review of Systems   Constitutional: Negative for fatigue  HENT: Negative for nosebleeds  Eyes: Negative for redness  Respiratory: Negative for chest tightness and shortness of breath  Cardiovascular: Negative for chest pain, palpitations and leg swelling  Gastrointestinal: Positive for constipation  Negative for abdominal pain  Endocrine: Negative for polyuria  Genitourinary: Negative for urgency  Musculoskeletal: Negative for arthralgias  Skin: Negative for rash  Neurological: Negative for dizziness and syncope  Psychiatric/Behavioral: Negative for confusion and sleep disturbance  The patient is not nervous/anxious  Physical Exam:  Physical Exam  Constitutional:       Appearance: He is well-developed  HENT:      Head: Normocephalic and atraumatic  Nose: Nose normal    Eyes:      Pupils: Pupils are equal, round, and reactive to light  Neck:      Musculoskeletal: Neck supple     Cardiovascular:      Rate and Rhythm: Normal rate and regular rhythm  Heart sounds: Normal heart sounds  Pulmonary:      Effort: Pulmonary effort is normal       Breath sounds: Normal breath sounds  Abdominal:      General: Bowel sounds are normal       Palpations: Abdomen is soft  Musculoskeletal: Normal range of motion  Skin:     General: Skin is warm and dry  Neurological:      Mental Status: He is alert and oriented to person, place, and time  Psychiatric:         Behavior: Behavior normal          Thought Content: Thought content normal          Judgment: Judgment normal          Discussion/Summary:  Known history of PVCs as many as 19% of his beats with some suppression with calcium channel blocker  Twelve lead EKG today shows normal sinus rhythm and is within normal limits there was no ectopy on the EKG nor on examination  He continues on verapamil 240 mg daily  Blood pressure is 136/60  He has a known history of moderate aortic insufficiency and mild-to-moderate mitral regurgitation  I will check a 2D echocardiogram in 3 months to reassess both of these regurgitant lesions  In addition he has a known aortic diameter of 4 4 mm  This has been relatively stable we can also consider reassessment  His blood pressure is well controlled on verapamil and because of his age potential conduction system disease he is not on beta blockade at this time  Aortic diameter will continue to be monitored  I will see him again in 6 months

## 2020-08-27 ENCOUNTER — OFFICE VISIT (OUTPATIENT)
Dept: PAIN MEDICINE | Facility: CLINIC | Age: 80
End: 2020-08-27
Payer: MEDICARE

## 2020-08-27 VITALS
HEIGHT: 68 IN | HEART RATE: 80 BPM | WEIGHT: 158 LBS | BODY MASS INDEX: 23.95 KG/M2 | DIASTOLIC BLOOD PRESSURE: 72 MMHG | SYSTOLIC BLOOD PRESSURE: 132 MMHG | TEMPERATURE: 98.4 F

## 2020-08-27 DIAGNOSIS — G89.29 CHRONIC BILATERAL LOW BACK PAIN WITHOUT SCIATICA: ICD-10-CM

## 2020-08-27 DIAGNOSIS — M48.062 SPINAL STENOSIS OF LUMBAR REGION WITH NEUROGENIC CLAUDICATION: ICD-10-CM

## 2020-08-27 DIAGNOSIS — M54.50 CHRONIC BILATERAL LOW BACK PAIN WITHOUT SCIATICA: ICD-10-CM

## 2020-08-27 DIAGNOSIS — M54.16 LUMBAR RADICULOPATHY: ICD-10-CM

## 2020-08-27 DIAGNOSIS — G89.4 CHRONIC PAIN SYNDROME: Primary | ICD-10-CM

## 2020-08-27 PROCEDURE — 99214 OFFICE O/P EST MOD 30 MIN: CPT | Performed by: NURSE PRACTITIONER

## 2020-08-27 PROCEDURE — 1036F TOBACCO NON-USER: CPT | Performed by: NURSE PRACTITIONER

## 2020-08-27 PROCEDURE — 1160F RVW MEDS BY RX/DR IN RCRD: CPT | Performed by: NURSE PRACTITIONER

## 2020-08-27 PROCEDURE — 4040F PNEUMOC VAC/ADMIN/RCVD: CPT | Performed by: NURSE PRACTITIONER

## 2020-08-27 PROCEDURE — 3008F BODY MASS INDEX DOCD: CPT | Performed by: NURSE PRACTITIONER

## 2020-08-27 RX ORDER — GABAPENTIN 300 MG/1
CAPSULE ORAL
Qty: 90 CAPSULE | Refills: 1 | Status: SHIPPED | OUTPATIENT
Start: 2020-08-27 | End: 2020-11-10

## 2020-08-27 NOTE — PATIENT INSTRUCTIONS
Gabapentin (By mouth)   Gabapentin (nay-a-PEN-tin)  Treats seizures and pain caused by shingles  Brand Name(s): ACTIVE-PAC with Gabapentin, Convenience Terrence, Cyclo/Rosa M 10/300 Pack, FusePaq Fanatrex, Rosa M-V, Gralise, [address] Pack, Neurontin, SmartRx Rosa M Kit   There may be other brand names for this medicine  When This Medicine Should Not Be Used: This medicine is not right for everyone  Do not use it if you had an allergic reaction to gabapentin  How to Use This Medicine:   Capsule, Liquid, Tablet  · Take your medicine as directed  Your dose may need to be changed several times to find what works best for you  If you have epilepsy, do not allow more than 12 hours to pass between doses  · Capsule: Swallow the capsule whole with plenty of water  Do not open, crush, or chew it  · Gralise® tablet: Swallow the tablet whole   Do not crush, break, or chew it  · Neurontin® tablet: If you break a tablet into 2 pieces, use the second half as your next dose  If you don't use it within 28 days, throw it away  · Measure the oral liquid medicine with a marked measuring spoon, oral syringe, or medicine cup  · This medicine should come with a Medication Guide  Ask your pharmacist for a copy if you do not have one  · Missed dose: Take a dose as soon as you remember  If it is almost time for your next dose, wait until then and take a regular dose  Do not take extra medicine to make up for a missed dose  · Store the medicine in a closed container at room temperature, away from heat, moisture, and direct light  Store the Neurontin® oral liquid in the refrigerator  Do not freeze  Drugs and Foods to Avoid:   Ask your doctor or pharmacist before using any other medicine, including over-the-counter medicines, vitamins, and herbal products  · Some medicines can affect how gabapentin works   Tell your doctor if you also use any of the following:   ¨ Hydrocodone  ¨ Morphine  · If you take an antacid, wait at least 2 hours before you take gabapentin  · Tell your doctor if you use anything else that makes you sleepy  Some examples are allergy medicine, narcotic pain medicine, and alcohol  Warnings While Using This Medicine:   · Tell your doctor if you are pregnant or breastfeeding, or if you have kidney problems or are receiving dialysis  Tell your doctor if you have a history of depression or mental health problems  · This medicine may increase depression or thoughts of suicide  Tell your doctor right away if you start to feel more depressed or think about hurting yourself  · This medicine may cause a serious allergic reaction called multiorgan hypersensitivity, which can damage organs and be life-threatening  · Do not stop using this medicine suddenly  Your doctor will need to slowly decrease your dose before you stop it completely  If you take this medicine to prevent seizures, your seizures may return or occur more often if you stop this medicine suddenly  · This medicine may make you dizzy or drowsy  Do not drive or do anything else that could be dangerous until you know how this medicine affects you  · Tell any doctor or dentist who treats you that you are using this medicine  This medicine may affect certain medical test results  · Your doctor will check your progress and the effects of this medicine at regular visits  Keep all appointments  · Keep all medicine out of the reach of children  Never share your medicine with anyone    Possible Side Effects While Using This Medicine:   Call your doctor right away if you notice any of these side effects:  · Allergic reaction: Itching or hives, swelling in your face or hands, swelling or tingling in your mouth or throat, chest tightness, trouble breathing  · Behavior problems, aggression, restlessness, trouble concentrating, moodiness (especially in children)  · Blistering, peeling, red skin rash  · Change in how much or how often you urinate, bloody or cloudy urine,  · Chest pain, fast heartbeat, trouble breathing  · Dark urine or pale stools, nausea, vomiting, loss of appetite, stomach pain, yellow skin or eyes  · Fever, rash, swollen or tender glands in the neck, armpit, or groin  · Problems with coordination, shakiness, unsteadiness  · Rapid weight gain, swelling in your hands, ankles, or feet  · Unusual moods or behaviors, thoughts of hurting yourself, feeling depressed  If you notice these less serious side effects, talk with your doctor:   · Dizziness, drowsiness, sleepiness, tiredness  If you notice other side effects that you think are caused by this medicine, tell your doctor  Call your doctor for medical advice about side effects  You may report side effects to FDA at 2-116-FDA-4930  © 2017 [employer] Information is for End User's use only and may not be sold, redistributed or otherwise used for commercial purposes  The above information is an  only  It is not intended as medical advice for individual conditions or treatments  Talk to your doctor, nurse or pharmacist before following any medical regimen to see if it is safe and effective for you

## 2020-08-27 NOTE — PROGRESS NOTES
Assessment:  1  Chronic pain syndrome    2  Chronic bilateral low back pain without sciatica    3  Lumbar radiculopathy    4  Spinal stenosis of lumbar region with neurogenic claudication        Plan:  While the patient was in the office today, I did have a thorough conversation with the patient regarding their chronic pain syndrome, symptoms, medication regimen, and treatment plan  I discussed with the patient and his wife that at this point time since the epidural steroid injection is still providing moderate relief, but does seem to be fading quickly, we could consider a repeat injection utilizing a right L5 and S1 transforaminal approach to see if that would provide better relief and allow for better concentration of the medication along the affected nerve roots  However, at this point the patient is not sure that he wants to proceed with a repeat injection because of the fact that the relief is already starting to fade after 4 weeks  For now, the patient would like to hold off on a repeat injection and discuss other options  I did explain to the patient and his wife that 1 option would be to consider proceeding with an updated MRI and a surgical consultation  However, the patient prefers to save a surgical consultation as the absolute last option and was not interested in a surgical consultation at this time  I did discuss with the patient that overall I feel there is definitely a significant neuropathic component to his pain symptoms and that he may benefit from a neuron membrane stabilizer such as gabapentin  I discussed with the patient the type of medication it is, how it works, and that it requires a titration process that is specific to each individual  I reviewed with the patient that it may take 3-4 weeks for the medication's effects to be noticed and that it should never be abruptly stopped   Possible side effects include but are not limited to; vertigo, lethargy, nausea, and edema of the extremities  Advised the patient to call our office if they experience any side effects  The patient verbalized an understanding  The patient will follow-up in 6-7 weeks for medication prescription refill and reevaluation  The patient was advised to contact the office should their symptoms worsen in the interim  The patient was agreeable and verbalized an understanding  History of Present Illness: The patient is a 79 y o  male last seen on 7/30/2020 who presents for a follow up office visit in regards to chronic pain syndrome secondary to lumbar stenosis with radiculopathy  The patient currently reports that since his last office visit he does feel there is some improvement overall in his right lower extremity radicular symptoms as he is status post his 2nd L5-S1 interlaminar lumbar epidural steroid injection directed towards the right with Dr Mario Alberto Woodruff on July 30, 2020  The patient reports that after both injections he did note at least moderate improvement of his right leg pain and although he is still noting some improvement, he would say at best is 20% and fading  The patient reports that typically the leg pain starts as soon as he stands and walks and gets worse as the day goes on  He reports that if he is sitting or lying down, he has little to no pain  He presents today for re-evaluation and to discuss his treatment plan options  I have personally reviewed and/or updated the patient's past medical history, past surgical history, family history, social history, current medications, allergies, and vital signs today  Review of Systems:    Review of Systems   Respiratory: Negative for shortness of breath  Cardiovascular: Negative for chest pain  Gastrointestinal: Negative for constipation, diarrhea, nausea and vomiting  Musculoskeletal: Positive for gait problem  Negative for arthralgias, joint swelling and myalgias  Skin: Negative for rash     Neurological: Negative for dizziness, seizures and weakness  All other systems reviewed and are negative  Past Medical History:   Diagnosis Date    Astigmatism     last assessed 11/15/17    Cataract     Resolved 11/15/17    Cyst of left kidney     last assessed 11/15/2017    Cyst of right kidney     lasst assessed 11/15/2017    Gross hematuria     Last assessed 01/28/16    Hematuria     last assessed 01/28/16    Prostate cancer ([facility] [address] )     last assessed 05/18/17    Renal calculi     last assessed 08/16/12    Squamous cell carcinoma     Vitamin D deficiency     Last assessed 08/19/13       Past Surgical History:   Procedure Laterality Date    BREAST BIOPSY      Benign breast tumor    CATARACT EXTRACTION Left 08/26/2019    CATARACT EXTRACTION Right 08/12/2019    COLONOSCOPY      CYSTOSCOPY  01/28/2016    ELBOW SURGERY      Bone chip    HERNIA REPAIR  2002    LITHOTRIPSY      RETROPUBIC PROSTATECTOMY  11/13/2001    Radical with nerve sparing       Family History   Problem Relation Age of Onset    Other Mother 76        Influenza    Hypertension Mother     Heart failure Father 70    Peripheral vascular disease Father     Hypertension Father     Prostate cancer Brother     Stroke Brother 48    Hypertension Brother     Hypertension Family        Social History     Occupational History    Not on file   Tobacco Use    Smoking status: Never Smoker    Smokeless tobacco: Never Used   Substance and Sexual Activity    Alcohol use: Yes     Comment: social    Drug use: No    Sexual activity: Not on file         Current Outpatient Medications:     cyanocobalamin (VITAMIN B-12) 500 mcg tablet, 1,000 mcg Daily  , Disp: , Rfl:     Docusate Sodium (COLACE PO), Take by mouth daily, Disp: , Rfl:     Multiple Vitamins-Minerals (CENTRUM SILVER 50+MEN PO), Daily, Disp: , Rfl:     Probiotic Product (PROBIOTIC PO), Take by mouth, Disp: , Rfl:     verapamil (CALAN-SR) 240 mg CR tablet, Take 1 and a 1/2  tablets daily as directed    Can switch to bedtime dosing if patient prefers, Disp: 135 tablet, Rfl: 3    Zinc 50 MG TABS, Take 50 mg by mouth, Disp: , Rfl:     gabapentin (NEURONTIN) 300 mg capsule, Take 1 PO HS every other night x 4 days, then 1 PO HS x 1 week, then 1 PO BID x 1 week, then 1 PO TID , Disp: 90 capsule, Rfl: 1    Allergies   Allergen Reactions    Sulfa Antibiotics Rash    Ciprofloxacin        Physical Exam:    /72 (BP Location: Left arm, Patient Position: Sitting, Cuff Size: Standard)   Pulse 80   Temp 98 4 °F (36 9 °C)   Ht 5' 8" (1 727 m)   Wt 71 7 kg (158 lb)   BMI 24 02 kg/m²     Constitutional:normal, well developed, well nourished, alert, in no distress and non-toxic and no overt pain behavior  Eyes:anicteric  HEENT:grossly intact  Neck:supple, symmetric, trachea midline and no masses   Pulmonary:even and unlabored  Cardiovascular:No edema or pitting edema present  Skin:Normal without rashes or lesions and well hydrated  Psychiatric:Mood and affect appropriate  Neurologic:Cranial Nerves II-XII grossly intact  Musculoskeletal:The patient's gait is slightly antalgic, but steady without the use of any assistive devices  Imaging  No orders to display         No orders of the defined types were placed in this encounter

## 2020-08-28 PROBLEM — G89.4 CHRONIC PAIN SYNDROME: Status: ACTIVE | Noted: 2020-08-28

## 2020-09-09 ENCOUNTER — TELEPHONE (OUTPATIENT)
Dept: FAMILY MEDICINE CLINIC | Facility: CLINIC | Age: 80
End: 2020-09-09

## 2020-09-09 DIAGNOSIS — H10.33 ACUTE BACTERIAL CONJUNCTIVITIS OF BOTH EYES: Primary | ICD-10-CM

## 2020-09-09 RX ORDER — TOBRAMYCIN 3 MG/ML
1 SOLUTION/ DROPS OPHTHALMIC
Qty: 5 ML | Refills: 1 | Status: SHIPPED | OUTPATIENT
Start: 2020-09-09 | End: 2021-02-09

## 2020-09-15 NOTE — TELEPHONE ENCOUNTER
S/w pt, stated that he started taking gabapentin 300 mg 1 pill po bid yesterday w/ no relief of his pain  Pt stated that since he started gabapentin he has had blurred vision, dizziness, light headedness, blurred ision, tremors (UE)  Pt stated that his balance is poor and affecting his ability to walk  Pt stated that he would like instructions to stop this medication  Advised pt, will d/w DG and cb to advise  Pt verbalized understanding and appreciation

## 2020-09-15 NOTE — TELEPHONE ENCOUNTER
He can decrease the gabapentin down to 1 pill daily x 3 days, then 1 pill every other day x 4 days, then STOP  He should call us with an update next week when he is off of the gabapentin and we can either discuss a different neuropathic medication, OR repeat injection, OR an new MRI and surgical eval at that point as we discussed at his last OV

## 2020-09-15 NOTE — TELEPHONE ENCOUNTER
Pt's wife Nini Yanelis called in to say that the Gabapentin 300 mg is giving the PT side effects dizziness, blurred vision, tremors in both arms and slurred speech  Please be advise thank you        Please call Nini Hilario  back @ [phone]

## 2020-09-15 NOTE — TELEPHONE ENCOUNTER
Note:    Discussed repeat procedure - hold off for now  MRI w/ poss surg eval - last choice    gabapentin (NEURONTIN) 300 mg capsule [[id number]]     Order Details   Dose, Route, Frequency: As Directed    Dispense Quantity: 90 capsule  Refills: 1  Fills remaining: --             Sig: Take 1 PO HS every other night x 4 days, then 1 PO HS x 1 week, then 1 PO BID x 1 week, then 1 PO TID             Written Date: 08/27/20  Expiration Date: 08/27/21      Start Date: 08/27/20  End Date: --              Ordering Provider: --  Phone:  --  Fax:  --     Address:  --  [facility] #:  [id number]  NPI:  --             Authorizing Provider: Ilya Pinzon

## 2020-09-29 NOTE — TELEPHONE ENCOUNTER
Patients wife is calling for advice on what is next step for her  since Gabapentin did not work maybe an injection if so he is ready to do that      Please advise,     Thank you     [phone]

## 2020-09-29 NOTE — TELEPHONE ENCOUNTER
S/w pt's wife, Juice Alan per medical communcation consent on file  Per Corina Swain gabapentin LD on 9/25/2020  Pt continues w/ pain, se's resolved  Pt c/o pain in the back of his R lower leg / calf  Stated that he would like to proceed w/ the injection discussed w/ DG at his last ov      we could consider a repeat injection utilizing a right L5 and S1 transforaminal approach to see if that would provide better relief and allow for     Explained to Corina Swain, the previous injection - LESI, provides a general spread of medication where as tfesi concentrates the steroid at a very specific site, targeting pain that travels along a specific pathway  Elba Brown, min d/w Dr Janice Martínez  Anticipate a cb from [address] [place] [address] [address] to schedule injection as discussed  Corina Swain verbalized understanding and appreciation  Corina Swain added that the pt's gait has been affected by pain x ~ 4 mos and would likely benefit from PT for reconditioning after his procedure  Elba Brown, will d/w Dr Janice Martínez as well  Elba Brown, at this point, would recommend keeping fu ov on 10/15 as scheduled in the event that the pt's procedure is scheduled this week - no anticoag, no insurance delays  Corina Swain again, verbalized understanding and appreciation

## 2020-10-02 DIAGNOSIS — M48.062 SPINAL STENOSIS OF LUMBAR REGION WITH NEUROGENIC CLAUDICATION: ICD-10-CM

## 2020-10-02 DIAGNOSIS — M54.16 LUMBAR RADICULOPATHY: Primary | ICD-10-CM

## 2020-10-06 ENCOUNTER — IMMUNIZATIONS (OUTPATIENT)
Dept: FAMILY MEDICINE CLINIC | Facility: CLINIC | Age: 80
End: 2020-10-06
Payer: MEDICARE

## 2020-10-06 DIAGNOSIS — Z23 ENCOUNTER FOR IMMUNIZATION: Primary | ICD-10-CM

## 2020-10-06 PROCEDURE — 90662 IIV NO PRSV INCREASED AG IM: CPT

## 2020-10-06 PROCEDURE — G0008 ADMIN INFLUENZA VIRUS VAC: HCPCS

## 2020-10-19 ENCOUNTER — HOSPITAL ENCOUNTER (OUTPATIENT)
Dept: RADIOLOGY | Facility: CLINIC | Age: 80
Discharge: HOME | End: 2020-10-19
Attending: ANESTHESIOLOGY | Admitting: ANESTHESIOLOGY
Payer: MEDICARE

## 2020-10-19 VITALS
SYSTOLIC BLOOD PRESSURE: 179 MMHG | TEMPERATURE: 98.8 F | HEART RATE: 72 BPM | OXYGEN SATURATION: 98 % | DIASTOLIC BLOOD PRESSURE: 77 MMHG | RESPIRATION RATE: 20 BRPM

## 2020-10-19 DIAGNOSIS — M48.062 SPINAL STENOSIS OF LUMBAR REGION WITH NEUROGENIC CLAUDICATION: ICD-10-CM

## 2020-10-19 DIAGNOSIS — M54.16 LUMBAR RADICULOPATHY: ICD-10-CM

## 2020-10-19 PROCEDURE — 64483 NJX AA&/STRD TFRM EPI L/S 1: CPT | Performed by: ANESTHESIOLOGY

## 2020-10-19 PROCEDURE — 64484 NJX AA&/STRD TFRM EPI L/S EA: CPT | Performed by: ANESTHESIOLOGY

## 2020-10-19 RX ORDER — PAPAVERINE HCL 150 MG
20 CAPSULE, EXTENDED RELEASE ORAL ONCE
Status: COMPLETED | OUTPATIENT
Start: 2020-10-19 | End: 2020-10-19

## 2020-10-19 RX ORDER — LIDOCAINE HYDROCHLORIDE 10 MG/ML
5 INJECTION, SOLUTION EPIDURAL; INFILTRATION; INTRACAUDAL; PERINEURAL ONCE
Status: COMPLETED | OUTPATIENT
Start: 2020-10-19 | End: 2020-10-19

## 2020-10-19 RX ADMIN — DEXAMETHASONE SODIUM PHOSPHATE 20 MG: 10 INJECTION, SOLUTION INTRAMUSCULAR; INTRAVENOUS at 14:14

## 2020-10-19 RX ADMIN — LIDOCAINE HYDROCHLORIDE 3 ML: 20 INJECTION, SOLUTION EPIDURAL; INFILTRATION; INTRACAUDAL at 14:14

## 2020-10-19 RX ADMIN — LIDOCAINE HYDROCHLORIDE 3 ML: 10 INJECTION, SOLUTION EPIDURAL; INFILTRATION; INTRACAUDAL; PERINEURAL at 14:11

## 2020-10-19 RX ADMIN — IOHEXOL 1 ML: 300 INJECTION, SOLUTION INTRAVENOUS at 14:14

## 2020-10-26 ENCOUNTER — TELEPHONE (OUTPATIENT)
Dept: PAIN MEDICINE | Facility: CLINIC | Age: 80
End: 2020-10-26

## 2020-11-09 DIAGNOSIS — I49.3 PVC'S (PREMATURE VENTRICULAR CONTRACTIONS): ICD-10-CM

## 2020-11-10 ENCOUNTER — OFFICE VISIT (OUTPATIENT)
Dept: PAIN MEDICINE | Facility: CLINIC | Age: 80
End: 2020-11-10
Payer: MEDICARE

## 2020-11-10 VITALS
WEIGHT: 158 LBS | HEART RATE: 72 BPM | SYSTOLIC BLOOD PRESSURE: 122 MMHG | TEMPERATURE: 97.8 F | BODY MASS INDEX: 23.95 KG/M2 | DIASTOLIC BLOOD PRESSURE: 82 MMHG | HEIGHT: 68 IN

## 2020-11-10 DIAGNOSIS — M54.50 CHRONIC BILATERAL LOW BACK PAIN WITHOUT SCIATICA: ICD-10-CM

## 2020-11-10 DIAGNOSIS — M54.16 LUMBAR RADICULOPATHY: ICD-10-CM

## 2020-11-10 DIAGNOSIS — G89.29 CHRONIC BILATERAL LOW BACK PAIN WITHOUT SCIATICA: ICD-10-CM

## 2020-11-10 DIAGNOSIS — M48.062 SPINAL STENOSIS OF LUMBAR REGION WITH NEUROGENIC CLAUDICATION: ICD-10-CM

## 2020-11-10 DIAGNOSIS — G89.4 CHRONIC PAIN SYNDROME: Primary | ICD-10-CM

## 2020-11-10 PROCEDURE — 99214 OFFICE O/P EST MOD 30 MIN: CPT | Performed by: NURSE PRACTITIONER

## 2020-11-10 RX ORDER — VERAPAMIL HYDROCHLORIDE 240 MG/1
TABLET, FILM COATED, EXTENDED RELEASE ORAL
Qty: 135 TABLET | Refills: 3 | Status: SHIPPED | OUTPATIENT
Start: 2020-11-10 | End: 2022-03-11 | Stop reason: SDUPTHER

## 2020-11-10 RX ORDER — ASCORBIC ACID 500 MG
500 TABLET ORAL DAILY
COMMUNITY
End: 2022-03-11

## 2020-11-16 ENCOUNTER — HOSPITAL ENCOUNTER (OUTPATIENT)
Dept: NON INVASIVE DIAGNOSTICS | Facility: HOSPITAL | Age: 80
Discharge: HOME | End: 2020-11-16
Attending: INTERNAL MEDICINE
Payer: MEDICARE

## 2020-11-16 DIAGNOSIS — I35.1 NONRHEUMATIC AORTIC VALVE INSUFFICIENCY: ICD-10-CM

## 2020-11-16 PROCEDURE — 93306 TTE W/DOPPLER COMPLETE: CPT | Performed by: INTERNAL MEDICINE

## 2020-11-16 PROCEDURE — 93306 TTE W/DOPPLER COMPLETE: CPT

## 2020-11-19 ENCOUNTER — HOSPITAL ENCOUNTER (OUTPATIENT)
Dept: MRI IMAGING | Facility: HOSPITAL | Age: 80
Discharge: HOME | End: 2020-11-19
Payer: MEDICARE

## 2020-11-19 DIAGNOSIS — G89.29 CHRONIC BILATERAL LOW BACK PAIN WITHOUT SCIATICA: ICD-10-CM

## 2020-11-19 DIAGNOSIS — M54.50 CHRONIC BILATERAL LOW BACK PAIN WITHOUT SCIATICA: ICD-10-CM

## 2020-11-19 DIAGNOSIS — M48.062 SPINAL STENOSIS OF LUMBAR REGION WITH NEUROGENIC CLAUDICATION: ICD-10-CM

## 2020-11-19 DIAGNOSIS — M54.16 LUMBAR RADICULOPATHY: ICD-10-CM

## 2020-11-19 DIAGNOSIS — G89.4 CHRONIC PAIN SYNDROME: ICD-10-CM

## 2020-11-19 PROCEDURE — 72148 MRI LUMBAR SPINE W/O DYE: CPT

## 2020-11-19 PROCEDURE — G1004 CDSM NDSC: HCPCS

## 2020-11-23 ENCOUNTER — TELEPHONE (OUTPATIENT)
Dept: PAIN MEDICINE | Facility: CLINIC | Age: 80
End: 2020-11-23

## 2020-11-23 ENCOUNTER — TELEPHONE (OUTPATIENT)
Dept: FAMILY MEDICINE CLINIC | Facility: CLINIC | Age: 80
End: 2020-11-23

## 2020-12-01 ENCOUNTER — TRANSCRIBE ORDERS (OUTPATIENT)
Dept: ADMINISTRATIVE | Facility: HOSPITAL | Age: 80
End: 2020-12-01

## 2020-12-01 ENCOUNTER — OFFICE VISIT (OUTPATIENT)
Dept: FAMILY MEDICINE CLINIC | Facility: CLINIC | Age: 80
End: 2020-12-01
Payer: MEDICARE

## 2020-12-01 VITALS
SYSTOLIC BLOOD PRESSURE: 110 MMHG | HEIGHT: 68 IN | RESPIRATION RATE: 16 BRPM | TEMPERATURE: 97.6 F | DIASTOLIC BLOOD PRESSURE: 70 MMHG | OXYGEN SATURATION: 99 % | WEIGHT: 158.6 LBS | HEART RATE: 70 BPM | BODY MASS INDEX: 24.04 KG/M2

## 2020-12-01 DIAGNOSIS — N60.21 LUMPY BREASTS, RIGHT: ICD-10-CM

## 2020-12-01 DIAGNOSIS — N63.0 BREAST LUMP: Primary | ICD-10-CM

## 2020-12-01 DIAGNOSIS — N63.10 LUMP OF RIGHT BREAST: Primary | ICD-10-CM

## 2020-12-01 DIAGNOSIS — N28.1 RENAL CYST, ACQUIRED, LEFT: ICD-10-CM

## 2020-12-01 DIAGNOSIS — N63.21 UNSPECIFIED LUMP IN THE LEFT BREAST, UPPER OUTER QUADRANT: ICD-10-CM

## 2020-12-01 PROCEDURE — 99214 OFFICE O/P EST MOD 30 MIN: CPT | Performed by: FAMILY MEDICINE

## 2020-12-04 ENCOUNTER — HOSPITAL ENCOUNTER (OUTPATIENT)
Dept: ULTRASOUND IMAGING | Facility: CLINIC | Age: 80
Discharge: HOME | End: 2020-12-04
Payer: MEDICARE

## 2020-12-04 ENCOUNTER — HOSPITAL ENCOUNTER (OUTPATIENT)
Dept: MAMMOGRAPHY | Facility: CLINIC | Age: 80
Discharge: HOME | End: 2020-12-04
Payer: MEDICARE

## 2020-12-04 VITALS — WEIGHT: 158 LBS | BODY MASS INDEX: 23.95 KG/M2 | HEIGHT: 68 IN

## 2020-12-04 DIAGNOSIS — N63.21 UNSPECIFIED LUMP IN THE LEFT BREAST, UPPER OUTER QUADRANT: ICD-10-CM

## 2020-12-04 DIAGNOSIS — N63.0 BREAST LUMP: ICD-10-CM

## 2020-12-04 PROCEDURE — 77066 DX MAMMO INCL CAD BI: CPT

## 2020-12-04 PROCEDURE — G0279 TOMOSYNTHESIS, MAMMO: HCPCS

## 2020-12-04 PROCEDURE — 76642 ULTRASOUND BREAST LIMITED: CPT

## 2020-12-07 DIAGNOSIS — N63.0 BREAST LUMP: ICD-10-CM

## 2020-12-07 DIAGNOSIS — R92.8 ABNORMAL MAMMOGRAM: Primary | ICD-10-CM

## 2020-12-10 PROBLEM — N28.1 RENAL CYST, ACQUIRED, LEFT: Status: ACTIVE | Noted: 2020-12-10

## 2021-01-21 ENCOUNTER — IMMUNIZATIONS (OUTPATIENT)
Dept: FAMILY MEDICINE CLINIC | Facility: HOSPITAL | Age: 81
End: 2021-01-21

## 2021-01-21 DIAGNOSIS — Z23 ENCOUNTER FOR IMMUNIZATION: Primary | ICD-10-CM

## 2021-01-21 PROCEDURE — 91301 SARS-COV-2 / COVID-19 MRNA VACCINE (MODERNA) 100 MCG: CPT

## 2021-01-21 PROCEDURE — 0011A SARS-COV-2 / COVID-19 MRNA VACCINE (MODERNA) 100 MCG: CPT

## 2021-01-27 ENCOUNTER — OFFICE VISIT (OUTPATIENT)
Dept: FAMILY MEDICINE CLINIC | Facility: CLINIC | Age: 81
End: 2021-01-27
Payer: MEDICARE

## 2021-01-27 VITALS
BODY MASS INDEX: 22.73 KG/M2 | TEMPERATURE: 97.9 F | HEIGHT: 71 IN | RESPIRATION RATE: 16 BRPM | WEIGHT: 162.4 LBS | OXYGEN SATURATION: 98 % | DIASTOLIC BLOOD PRESSURE: 62 MMHG | HEART RATE: 76 BPM | SYSTOLIC BLOOD PRESSURE: 100 MMHG

## 2021-01-27 DIAGNOSIS — I71.2 THORACIC AORTIC ANEURYSM WITHOUT RUPTURE (HCC): ICD-10-CM

## 2021-01-27 DIAGNOSIS — E78.00 PURE HYPERCHOLESTEROLEMIA: ICD-10-CM

## 2021-01-27 DIAGNOSIS — R26.2 AMBULATORY DYSFUNCTION: ICD-10-CM

## 2021-01-27 DIAGNOSIS — M51.36 LUMBAR DEGENERATIVE DISC DISEASE: ICD-10-CM

## 2021-01-27 DIAGNOSIS — I49.9 CARDIAC ARRHYTHMIA, UNSPECIFIED CARDIAC ARRHYTHMIA TYPE: ICD-10-CM

## 2021-01-27 DIAGNOSIS — C61 MALIGNANT NEOPLASM OF PROSTATE (HCC): ICD-10-CM

## 2021-01-27 DIAGNOSIS — M54.16 LUMBAR RADICULOPATHY: ICD-10-CM

## 2021-01-27 DIAGNOSIS — E55.9 VITAMIN D DEFICIENCY: ICD-10-CM

## 2021-01-27 DIAGNOSIS — M62.81 DECREASED MOTOR STRENGTH: ICD-10-CM

## 2021-01-27 DIAGNOSIS — Z00.00 HEALTHCARE MAINTENANCE: Primary | ICD-10-CM

## 2021-01-27 DIAGNOSIS — I49.3 PVC'S (PREMATURE VENTRICULAR CONTRACTIONS): ICD-10-CM

## 2021-01-27 PROCEDURE — 1123F ACP DISCUSS/DSCN MKR DOCD: CPT | Performed by: FAMILY MEDICINE

## 2021-01-27 PROCEDURE — 99213 OFFICE O/P EST LOW 20 MIN: CPT | Performed by: FAMILY MEDICINE

## 2021-01-27 PROCEDURE — G0439 PPPS, SUBSEQ VISIT: HCPCS | Performed by: FAMILY MEDICINE

## 2021-01-27 RX ORDER — MELOXICAM 7.5 MG/1
7.5 TABLET ORAL DAILY
Qty: 30 TABLET | Refills: 5 | Status: SHIPPED | OUTPATIENT
Start: 2021-01-27 | End: 2021-03-23 | Stop reason: SDUPTHER

## 2021-01-27 NOTE — PROGRESS NOTES
Assessment and Plan:     Problem List Items Addressed This Visit        Cardiovascular and Mediastinum    PVC's (premature ventricular contractions)    Relevant Orders    Lipid panel    Comprehensive metabolic panel    TSH, 3rd generation    Thoracic aortic aneurysm without rupture (HCC)       Nervous and Auditory    Lumbar radiculopathy    Relevant Medications    meloxicam (MOBIC) 7 5 mg tablet       Genitourinary    Malignant neoplasm of prostate (HCC)    Relevant Orders    CBC and differential    PSA Total, Diagnostic      Other Visit Diagnoses     Healthcare maintenance    -  Primary    Lumbar degenerative disc disease        Relevant Medications    meloxicam (MOBIC) 7 5 mg tablet    Ambulatory dysfunction        Relevant Orders    Ambulatory referral to Physical Therapy    Pure hypercholesterolemia         Relevant Orders    Lipid panel    Cardiac arrhythmia, unspecified cardiac arrhythmia type         Relevant Orders    TSH, 3rd generation    Vitamin D deficiency        Relevant Orders    Vitamin D 25 hydroxy    Decreased motor strength        Relevant Orders    Ambulatory referral to Physical Therapy           Preventive health issues were discussed with patient, and age appropriate screening tests were ordered as noted in patient's After Visit Summary  Personalized health advice and appropriate referrals for health education or preventive services given if needed, as noted in patient's After Visit Summary       History of Present Illness:     Patient presents for Medicare Annual Wellness visit    Patient Care Team:  Liam Pacheco DO as PCP - General  Rosangela Nayak MD     Problem List:     Patient Active Problem List   Diagnosis    PVC's (premature ventricular contractions)    History of prostate cancer    Thoracic aortic aneurysm without rupture ([address]    Nonrheumatic aortic valve insufficiency    Non-rheumatic mitral regurgitation    Malignant neoplasm of prostate ([address]    Strain of calf muscle  Spinal stenosis of lumbar region with neurogenic claudication    Lumbar radiculopathy    Chronic bilateral low back pain without sciatica    Chronic pain syndrome    Renal cyst, acquired, left      Past Medical and Surgical History:     Past Medical History:   Diagnosis Date    Astigmatism     last assessed 11/15/17    Cataract     Resolved 11/15/17    Gross hematuria     Last assessed 01/28/16    Hematuria     last assessed 01/28/16    Prostate cancer ([address] )     last assessed 05/18/17    Renal calculi     last assessed 08/16/12    Squamous cell carcinoma     Vitamin D deficiency     Last assessed 08/19/13     Past Surgical History:   Procedure Laterality Date    BREAST EXCISIONAL BIOPSY N/A     Benign breast tumor unsure of side over 50 yrs ago    CATARACT EXTRACTION Left 08/26/2019    CATARACT EXTRACTION Right 08/12/2019    COLONOSCOPY      CYSTOSCOPY  01/28/2016    ELBOW SURGERY      Bone chip    HERNIA REPAIR  2002    LITHOTRIPSY      RETROPUBIC PROSTATECTOMY  11/13/2001    Radical with nerve sparing      Family History:     Family History   Problem Relation Age of Onset    Other Mother 76        Influenza    Hypertension Mother     Heart failure Father 70    Peripheral vascular disease Father     Hypertension Father     Prostate cancer Brother     Stroke Brother 48    Hypertension Brother     Hypertension Family       Social History:        Social History     Socioeconomic History    Marital status: /Civil Union     Spouse name: Not on file    Number of children: Not on file    Years of education: Not on file    Highest education level: Not on file   Occupational History    Not on file   Social Needs    Financial resource strain: Not on file    Food insecurity     Worry: Not on file     Inability: Not on file    Transportation needs     Medical: Not on file     Non-medical: Not on file   Tobacco Use    Smoking status: Never Smoker    Smokeless tobacco: Never Used   Substance and Sexual Activity    Alcohol use: Yes     Comment: social    Drug use: No    Sexual activity: Not on file   Lifestyle    Physical activity     Days per week: Not on file     Minutes per session: Not on file    Stress: Not on file   Relationships    Social connections     Talks on phone: Not on file     Gets together: Not on file     Attends [employer] service: Not on file     Active member of club or organization: Not on file     Attends meetings of clubs or organizations: Not on file     Relationship status: Not on file    Intimate partner violence     Fear of current or ex partner: Not on file     Emotionally abused: Not on file     Physically abused: Not on file     Forced sexual activity: Not on file   Other Topics Concern    Not on file   Social History Narrative    Not on file      Medications and Allergies:     Current Outpatient Medications   Medication Sig Dispense Refill    ascorbic acid (VITAMIN C) 500 mg tablet Take 500 mg by mouth daily      cyanocobalamin (VITAMIN B-12) 500 mcg tablet 1,000 mcg Daily        Docusate Sodium (COLACE PO) Take by mouth daily      Multiple Vitamins-Minerals (CENTRUM SILVER 50+MEN PO) Daily      Probiotic Product (PROBIOTIC PO) Take by mouth      verapamil (CALAN-SR) 240 mg CR tablet TAKE 1 AND 1/2 TABLETS BY MOUTH DAILY AS DIRECTED 135 tablet 3    meloxicam (MOBIC) 7 5 mg tablet Take 1 tablet (7 5 mg total) by mouth daily As needed for leg pain 30 tablet 5    tobramycin (TOBREX) 0 3 % SOLN Administer 1 drop to both eyes every 4 (four) hours while awake (Patient not taking: Reported on 12/1/2020) 5 mL 1     No current facility-administered medications for this visit        Allergies   Allergen Reactions    Sulfa Antibiotics Rash    Ciprofloxacin       Immunizations:     Immunization History   Administered Date(s) Administered    Influenza Split High Dose Preservative Free IM 10/05/2012, 09/17/2013, 09/15/2014, 09/28/2015, 10/03/2016, 10/10/2017    Influenza, high dose seasonal 0 7 mL 09/28/2018, 10/23/2019, 10/06/2020    Pneumococcal Conjugate 13-Valent 10/28/2015    Pneumococcal Polysaccharide PPV23 09/18/2008, 11/29/2018    SARS-CoV-2 / COVID-19 mRNA IM (Moderna) 01/21/2021    Zoster 08/18/2011, 04/09/2013      Health Maintenance: There are no preventive care reminders to display for this patient  There are no preventive care reminders to display for this patient  Medicare Health Risk Assessment:     /62 (BP Location: Left arm, Patient Position: Sitting, Cuff Size: Standard)   Pulse 76   Temp 97 9 °F (36 6 °C) (Temporal)   Resp 16   Ht 5' 11" (1 803 m)   Wt 73 7 kg (162 lb 6 4 oz)   SpO2 98%   BMI 22 65 kg/m²      Mansoor Ferreira is here for his Subsequent Wellness visit  Last Medicare Wellness visit information reviewed, patient interviewed, no change since last AWV  Health Risk Assessment:   Patient rates overall health as very good  Patient feels that their physical health rating is slightly worse  Eyesight was rated as slightly worse  Hearing was rated as same  Patient feels that their emotional and mental health rating is same  Pain experienced in the last 7 days has been some  Patient's pain rating has been 5/10  Patient states that he has experienced no weight loss or gain in last 6 months  Right leg Pain    Fall Risk Screening: In the past year, patient has experienced: no history of falling in past year      Home Safety:  Patient has trouble with stairs inside or outside of their home  Patient has working smoke alarms and has working carbon monoxide detector  Home safety hazards include: none  Nutrition:   Current diet is Regular and Limited junk food  Medications:   Patient is currently taking over-the-counter supplements  OTC medications include: Vitamins  Patient is able to manage medications       Activities of Daily Living (ADLs)/Instrumental Activities of Daily Living (IADLs):   Walk and transfer into and out of bed and chair?: Yes  Dress and groom yourself?: Yes    Bathe or shower yourself?: Yes    Feed yourself? Yes  Do your laundry/housekeeping?: Yes  Manage your money, pay your bills and track your expenses?: Yes  Make your own meals?: Yes    Do your own shopping?: Yes    Previous Hospitalizations:   Any hospitalizations or ED visits within the last 12 months?: No      Advance Care Planning:   Living will: Yes    Durable POA for healthcare:  Yes    Advanced directive: Yes    Five wishes given: Yes      Cognitive Screening:   Provider or family/friend/caregiver concerned regarding cognition?: No    PREVENTIVE SCREENINGS      Cardiovascular Screening:    General: Screening Not Indicated and History Lipid Disorder      Diabetes Screening:     General: Screening Current      Prostate Cancer Screening:    General: History Prostate Cancer and Screening Not Indicated      Osteoporosis Screening:    General: Screening Not Indicated      Lung Cancer Screening:     General: Screening Not Indicated      Hepatitis C Screening:    General: Screening Not Indicated      Natty Vega, DO

## 2021-02-09 NOTE — PROGRESS NOTES
Assessment/Plan:     Medicare wellness completed  Patient has living will and power-of-  Patient's as always are always wonderful to take care of  Patient unfortunately continues to have issues with his lumbar degenerative disc disease in his lumbar radiculopathy  Will check with local massage therapy to see whether not they will be willing to take him on as a new patient  Diagnoses and all orders for this visit:    Healthcare maintenance    Lumbar degenerative disc disease  -     meloxicam (MOBIC) 7 5 mg tablet; Take 1 tablet (7 5 mg total) by mouth daily As needed for leg pain    Lumbar radiculopathy  -     meloxicam (MOBIC) 7 5 mg tablet; Take 1 tablet (7 5 mg total) by mouth daily As needed for leg pain    Ambulatory dysfunction  -     Ambulatory referral to Physical Therapy; Future    Thoracic aortic aneurysm without rupture (HCC)    Malignant neoplasm of prostate (HCC)  -     CBC and differential; Future  -     PSA Total, Diagnostic; Future    PVC's (premature ventricular contractions)  -     Lipid panel; Future  -     Comprehensive metabolic panel; Future  -     TSH, 3rd generation; Future    Pure hypercholesterolemia   -     Lipid panel; Future    Cardiac arrhythmia, unspecified cardiac arrhythmia type   -     TSH, 3rd generation; Future    Vitamin D deficiency  -     Vitamin D 25 hydroxy; Future    Decreased motor strength  -     Ambulatory referral to Physical Therapy; Future        1  Healthcare maintenance     2  Lumbar degenerative disc disease  meloxicam (MOBIC) 7 5 mg tablet   3  Lumbar radiculopathy  meloxicam (MOBIC) 7 5 mg tablet   4  Ambulatory dysfunction  Ambulatory referral to Physical Therapy   5  Thoracic aortic aneurysm without rupture ([facility] [address] )     6  Malignant neoplasm of prostate (HCC)  CBC and differential    PSA Total, Diagnostic   7  PVC's (premature ventricular contractions)  Lipid panel    Comprehensive metabolic panel    TSH, 3rd generation   8   Pure hypercholesterolemia   Lipid panel   9  Cardiac arrhythmia, unspecified cardiac arrhythmia type   TSH, 3rd generation   10  Vitamin D deficiency  Vitamin D 25 hydroxy   11  Decreased motor strength  Ambulatory referral to Physical Therapy       Subjective:        Patient ID: Keila Berg is a 79 y o  male  Chief Complaint   Patient presents with   [facility] Wellness Visit     Subsequent    Leg Pain     Right         Patient here today with wife as usual for Medicare wellness  Overall doing well  Having unfortunately continued issues with his low back issues and lumbar radiculopathy  Has seen pain management in the past  Also has done physical therapy  Looking for possible new avenue  Frustrated  Labs otherwise up-to-date  Cardiology up-to-date  The following portions of the patient's history were reviewed and updated as appropriate: past medical history, past surgical history and problem list       Review of Systems   Constitutional: Negative for fatigue  Eyes: Negative for visual disturbance  Respiratory: Negative for cough and shortness of breath  Cardiovascular: Negative for chest pain, palpitations and leg swelling  Gastrointestinal: Negative for abdominal pain  Neurological: Negative for dizziness and headaches  Chronic lower extremity lumbar radiculopathy   Psychiatric/Behavioral: The patient is not nervous/anxious  Objective:  /62 (BP Location: Left arm, Patient Position: Sitting, Cuff Size: Standard)   Pulse 76   Temp 97 9 °F (36 6 °C) (Temporal)   Resp 16   Ht 5' 11" (1 803 m)   Wt 73 7 kg (162 lb 6 4 oz)   SpO2 98%   BMI 22 65 kg/m²        Physical Exam  Vitals signs and nursing note reviewed  Constitutional:       General: He is not in acute distress  HENT:      Head: Normocephalic  Eyes:      General: No scleral icterus  Pupils: Pupils are equal, round, and reactive to light     Cardiovascular:      Rate and Rhythm: Normal rate and regular rhythm  Heart sounds: Normal heart sounds  No murmur  Pulmonary:      Breath sounds: Normal breath sounds  Musculoskeletal:      Right lower leg: No edema  Left lower leg: No edema  Lymphadenopathy:      Cervical: No cervical adenopathy  Skin:     Coloration: Skin is not pale  Neurological:      General: No focal deficit present  Mental Status: He is alert and oriented to person, place, and time  Psychiatric:         Mood and Affect: Mood normal          Behavior: Behavior normal          Thought Content:  Thought content normal          Judgment: Judgment normal

## 2021-02-19 ENCOUNTER — IMMUNIZATIONS (OUTPATIENT)
Dept: FAMILY MEDICINE CLINIC | Facility: HOSPITAL | Age: 81
End: 2021-02-19

## 2021-02-19 DIAGNOSIS — Z23 ENCOUNTER FOR IMMUNIZATION: Primary | ICD-10-CM

## 2021-02-19 PROCEDURE — 91301 SARS-COV-2 / COVID-19 MRNA VACCINE (MODERNA) 100 MCG: CPT

## 2021-02-19 PROCEDURE — 0012A SARS-COV-2 / COVID-19 MRNA VACCINE (MODERNA) 100 MCG: CPT

## 2021-03-02 ENCOUNTER — TELEPHONE (OUTPATIENT)
Dept: FAMILY MEDICINE CLINIC | Facility: CLINIC | Age: 81
End: 2021-03-02

## 2021-03-02 NOTE — TELEPHONE ENCOUNTER
Let her know that we can increase the dose to 2 tablets daily which would be equal to 15 milligrams total   See if he would like to try a higher dose

## 2021-03-02 NOTE — TELEPHONE ENCOUNTER
Thierno Cabezas pt;'s spouse called the office requesting to please get a message out for you to address  Pt has been on Meloxicam 7 5 mg pt takes 1 tablet by mouth daily  Pt has finished script and there is refills on file for 1 month  It really has not done anything for his pain  Should pt increase dose or is there another medication he can try? Pt uses the [facility]  on [address] in [place]     Please call [phone]

## 2021-03-02 NOTE — TELEPHONE ENCOUNTER
Spoke to Pt's wife Venkat Oh and she states yes Pt will try 2 tablets daily  She said thank you very much

## 2021-03-08 ENCOUNTER — EVALUATION (OUTPATIENT)
Dept: PHYSICAL THERAPY | Facility: CLINIC | Age: 81
End: 2021-03-08
Payer: MEDICARE

## 2021-03-08 DIAGNOSIS — R26.2 AMBULATORY DYSFUNCTION: ICD-10-CM

## 2021-03-08 DIAGNOSIS — M62.81 DECREASED MOTOR STRENGTH: ICD-10-CM

## 2021-03-08 PROCEDURE — 97162 PT EVAL MOD COMPLEX 30 MIN: CPT | Performed by: PHYSICAL MEDICINE & REHABILITATION

## 2021-03-08 NOTE — PROGRESS NOTES
PT Evaluation     Today's date: 3/8/2021  Patient name: Gabby Rowe  : 1940  MRN: [id number]  Referring provider: Juana Squires DO  Dx:   Encounter Diagnosis     ICD-10-CM    1  Decreased motor strength  R53 1 Ambulatory referral to Physical Therapy   2  Ambulatory dysfunction  R26 2 Ambulatory referral to Physical Therapy                  Assessment  Assessment details: Pt is a pleasant 79 y o  male presenting to outpatient physical therapy with sgs/sxs consistent with imaging and referring diagnoses  Pt presents with pain, decreased range of motion, decreased strength, and decreased tolerance to activity  Pt is a good candidate for outpatient physical therapy and would benefit from skilled physical therapy to address limitations and to achieve goals  Thank you for this referral    Impairments: abnormal coordination, abnormal or restricted ROM, activity intolerance, impaired physical strength and pain with function  Understanding of Dx/Px/POC: good   Prognosis: good    Goals  ST  Patient will report 25% decrease in pain in 4 weeks  2  Patient will demonstrate 25% improvement in ROM in 4 weeks  3  Patient will demonstrate 1/2 grade improvement in strength in 4 weeks  LT  Patient will be able to perform IADLS without restriction or pain by discharge  2  Patient will be independent in HEP by discharge    3  Patient will be able to ambulate preferred distance throughout neighborhood without significant symptom increase      Plan  Patient would benefit from: PT eval and skilled PT  Planned modality interventions: cryotherapy and thermotherapy: hydrocollator packs  Planned therapy interventions: IADL retraining, body mechanics training, flexibility, functional ROM exercises, home exercise program, neuromuscular re-education, manual therapy, postural training, strengthening, stretching, therapeutic activities, therapeutic exercise and joint mobilization  Frequency: 2x week  Duration in visits: 12  Duration in weeks: 6  Treatment plan discussed with: patient        Subjective Evaluation    History of Present Illness  Mechanism of injury: Patient presents with c/o RLE pain present for over a year  Patient has had series of lumbar injections which offered little relief  Patient now notes decreased step length, decreased walking tolerance  Patient notes mild relief of symptoms with recent change in Meloxicam dosage  Patient notes step to step pattern secondary to pain  Mild discomfort with transfer into car  Patient denies N/T, perceived weakness in the RLE  Patient notes most pain with WBing activity, quick relief of sxs with seated rest, supported flexion (ie: walking with shopping cart)     Pain  Current pain ratin  At worst pain ratin    Treatments  Previous treatment: injection treatment and physical therapy  Patient Goals  Patient goal: Walk around neighborhood, a bit less than a mile         Objective     Strength/Myotome Testing     Left Hip   Planes of Motion   Flexion: 4  External rotation: 4  Internal rotation: 4    Right Hip   Planes of Motion   Flexion: 4-  External rotation: 4-  Internal rotation: 4-    Left Knee   Flexion: 4  Extension: 4    Right Knee   Flexion: 4-  Extension: 4-    Left Ankle/Foot   Dorsiflexion: 4  Plantar flexion: 4+    Right Ankle/Foot   Dorsiflexion: 4-  Plantar flexion: 4-  Inversion: 4-  Eversion: 4-    General Comments:      Lumbar Comments  Gait: decreased step length, absent trunk rotation and arm swing, overall decreased gait speed, increased downward gaze, lack of full R knee ext  90/90 -70 limited by posterior LE sxs  Passive SLR limited to 30 deg hip flexion    Able to initiate SLR without pain provocation, range limited by LE pain  Challenged with HR, unable to perform TR in standing on R    5x STS 18 sec without use of hands  Standing reach: 3 in   Unable to maintain balance on RLE in SLS  Able to maintain NBOS and tandem stance with min/mod sway    -30 deg passive hip ext in sidelying limited by perceived stretch    (-) TTP through   Mild LE tremor noted with exertion             Precautions: h/o prostate CA, PVCs      Manuals             R hip PROM                                                    Neuro Re-Ed             NBOS foam             Tandem walking             Walking with head turns             Wobble board                                                    Ther Ex             Roll outs w/ pball- fwd             Seated march             HR  HEP            Step up/down                                                                 Ther Activity                                       Gait Training                                       Modalities

## 2021-03-10 ENCOUNTER — OFFICE VISIT (OUTPATIENT)
Dept: PHYSICAL THERAPY | Facility: CLINIC | Age: 81
End: 2021-03-10
Payer: MEDICARE

## 2021-03-10 DIAGNOSIS — R26.2 AMBULATORY DYSFUNCTION: ICD-10-CM

## 2021-03-10 DIAGNOSIS — M62.81 DECREASED MOTOR STRENGTH: Primary | ICD-10-CM

## 2021-03-10 PROCEDURE — 97140 MANUAL THERAPY 1/> REGIONS: CPT

## 2021-03-10 PROCEDURE — 97110 THERAPEUTIC EXERCISES: CPT

## 2021-03-15 ENCOUNTER — OFFICE VISIT (OUTPATIENT)
Dept: PHYSICAL THERAPY | Facility: CLINIC | Age: 81
End: 2021-03-15
Payer: MEDICARE

## 2021-03-15 DIAGNOSIS — M62.81 DECREASED MOTOR STRENGTH: Primary | ICD-10-CM

## 2021-03-15 DIAGNOSIS — R26.2 AMBULATORY DYSFUNCTION: ICD-10-CM

## 2021-03-15 PROCEDURE — 97140 MANUAL THERAPY 1/> REGIONS: CPT

## 2021-03-15 PROCEDURE — 97110 THERAPEUTIC EXERCISES: CPT

## 2021-03-15 PROCEDURE — 97112 NEUROMUSCULAR REEDUCATION: CPT

## 2021-03-15 NOTE — PROGRESS NOTES
Daily Note     Today's date: 3/15/2021  Patient name: Philippe Welch  : 1940  MRN: [id number]  Referring provider: Evelyn Frausto DO  Dx:   Encounter Diagnosis     ICD-10-CM    1  Decreased motor strength  R53 1    2  Ambulatory dysfunction  R26 2                   Subjective: Patient arrives to today's PT treatment complaining of mild pain in the right hip and right calf  Objective: See treatment diary below  Assessment: Fatigue noted with progression of therapeutic exercise program   Patient's balance is challenged throughout performance of several therapeutic exercises - specifically tandem walking, marching over hurdles, and cone toe taps  Patient will benefit from continued PT to improve his gait, balance, and overall strength  Plan: Continue treatment as per PT plan of care         Precautions: h/o prostate CA, PVCs      Manuals  3/10 3/15          R hip PROM  10' 8'                                                 Neuro Re-Ed             NBOS foam  3x30"  No UE airex  30"x3          Tandem walking  x2laps 12ft x4          Walking with head turns             Wobble board             Cone top taps   2x10 ea          Marching over hurdles   12ft x6          sidestepping   12ft x4                                    Ther Ex             Roll outs w/ pball- fwd  5"x10 10"x10          Seated march  x15           HR  HEP x20 20          Step up/down  6"x10  ea step up  6"  15 on R          bike   7'          Stairs   2 flights                                    Ther Activity                                       Gait Training                                       Modalities

## 2021-03-17 ENCOUNTER — OFFICE VISIT (OUTPATIENT)
Dept: PHYSICAL THERAPY | Facility: CLINIC | Age: 81
End: 2021-03-17
Payer: MEDICARE

## 2021-03-17 DIAGNOSIS — R26.2 AMBULATORY DYSFUNCTION: ICD-10-CM

## 2021-03-17 DIAGNOSIS — M62.81 DECREASED MOTOR STRENGTH: Primary | ICD-10-CM

## 2021-03-17 PROCEDURE — 97110 THERAPEUTIC EXERCISES: CPT

## 2021-03-17 PROCEDURE — 97112 NEUROMUSCULAR REEDUCATION: CPT

## 2021-03-17 PROCEDURE — 97140 MANUAL THERAPY 1/> REGIONS: CPT

## 2021-03-17 NOTE — PROGRESS NOTES
Daily Note     Today's date: 3/17/2021  Patient name: Nora Lawrence  : 1940  MRN: [id number]  Referring provider: Lucina Robin DO  Dx:   Encounter Diagnosis     ICD-10-CM    1  Decreased motor strength  R53 1    2  Ambulatory dysfunction  R26 2                   Subjective: Patient reports his right lower extremity felt sore after the last PT visit  Objective: See treatment diary below  Assessment: Treatment is tolerated well  Good balance noted during NBOS on airex - consider performing this with eyes closed next visit  Patient will benefit from continued PT to improve his gait, balance, and overall strength  Plan: Continue treatment as per PT plan of care         Precautions: h/o prostate CA, PVCs      Manuals  3/10 3/15 3/17         R hip PROM  10' 8' 8'                                                Neuro Re-Ed             NBOS foam  3x30"  No UE airex  30"x3 airex  30"x3 EC        Tandem walking  x2laps 12ft x4 12ft x4         Walking with head turns             [employer]             Cone top taps   2x10 ea 20 ea         Marching over hurdles   12ft x6 forward and lateral  12ft x4 ea         sidestepping   12ft x4 12ft x2                                   Ther Ex             Roll outs w/ pball- fwd  5"x10 10"x10 10"x10         Seated march  x15           HR  HEP x20 20 25         Step up/down  6"x10  ea step up  6"  15 on R step up  6"  20 on R         bike   7' 8'         Stairs   2 flights 2 flights                                   Ther Activity                                       Gait Training                                       Modalities

## 2021-03-22 ENCOUNTER — APPOINTMENT (OUTPATIENT)
Dept: PHYSICAL THERAPY | Facility: CLINIC | Age: 81
End: 2021-03-22
Payer: MEDICARE

## 2021-03-23 ENCOUNTER — TELEPHONE (OUTPATIENT)
Dept: FAMILY MEDICINE CLINIC | Facility: CLINIC | Age: 81
End: 2021-03-23

## 2021-03-23 DIAGNOSIS — M51.36 LUMBAR DEGENERATIVE DISC DISEASE: ICD-10-CM

## 2021-03-23 DIAGNOSIS — M54.16 LUMBAR RADICULOPATHY: ICD-10-CM

## 2021-03-23 RX ORDER — MELOXICAM 7.5 MG/1
TABLET ORAL
Qty: 60 TABLET | Refills: 5 | Status: SHIPPED | OUTPATIENT
Start: 2021-03-23 | End: 2022-02-12

## 2021-03-23 NOTE — TELEPHONE ENCOUNTER
Patient's wife states his Meloxicam 7 5 mg was changed to BID  Needs new Rx to [facility]  For new dose

## 2021-03-24 ENCOUNTER — OFFICE VISIT (OUTPATIENT)
Dept: PHYSICAL THERAPY | Facility: CLINIC | Age: 81
End: 2021-03-24
Payer: MEDICARE

## 2021-03-24 DIAGNOSIS — M62.81 DECREASED MOTOR STRENGTH: Primary | ICD-10-CM

## 2021-03-24 DIAGNOSIS — R26.2 AMBULATORY DYSFUNCTION: ICD-10-CM

## 2021-03-24 PROCEDURE — 97110 THERAPEUTIC EXERCISES: CPT | Performed by: PHYSICAL THERAPIST

## 2021-03-24 PROCEDURE — 97140 MANUAL THERAPY 1/> REGIONS: CPT | Performed by: PHYSICAL THERAPIST

## 2021-03-24 PROCEDURE — 97116 GAIT TRAINING THERAPY: CPT | Performed by: PHYSICAL THERAPIST

## 2021-03-24 NOTE — PROGRESS NOTES
Daily Note     Today's date: 3/24/2021  Patient name: Gabby Rowe  : 1940  MRN: [id number]  Referring provider: Juana Squires DO  Dx:   Encounter Diagnosis     ICD-10-CM    1  Decreased motor strength  R53 1    2  Ambulatory dysfunction  R26 2                   Subjective: Patient reports mild mm soreness after his LV but otherwise offers no complaints  Objective: See treatment diary below  Assessment: progressed therex as listed with reports of fatigue but otherwise good tolerance  Pt requires occasional assistance to maintain balance when standing on airex  Plan: Continue treatment as per PT plan of care         Precautions: h/o prostate CA, PVCs      Manuals  3/10 3/15 3/17 3/24        R hip PROM  10' 8' 8' 8'                                               Neuro Re-Ed             NBOS foam  3x30"  No UE airex  30"x3 airex  30"x3 EC 30"x3        Tandem walking  x2laps 12ft x4 12ft x4 12ftx6        Walking with head turns             [employer]             Cone top taps   2x10 ea 20 ea 30ea        Marching over hurdles   12ft x6 forward and lateral  12ft x4 ea [id number]        sidestepping   12ft x4 12ft x2 12ftx4                                  Ther Ex             Roll outs w/ pball- fwd  5"x10 10"x10 10"x10 10"  x10        Seated march  x15           HR  HEP x20 20 25 25        Step up/down  6"x10  ea step up  6"  15 on R step up  6"  20 on R 8"x20 R        bike   7' 8' 8'        Stairs   2 flights 2 flights 2 flights                                  Ther Activity                                       Gait Training                                       Modalities

## 2021-03-26 ENCOUNTER — OFFICE VISIT (OUTPATIENT)
Dept: CARDIOLOGY CLINIC | Facility: CLINIC | Age: 81
End: 2021-03-26
Payer: MEDICARE

## 2021-03-26 VITALS
DIASTOLIC BLOOD PRESSURE: 66 MMHG | HEIGHT: 71 IN | SYSTOLIC BLOOD PRESSURE: 146 MMHG | WEIGHT: 159.2 LBS | HEART RATE: 81 BPM | BODY MASS INDEX: 22.29 KG/M2

## 2021-03-26 DIAGNOSIS — I35.1 NONRHEUMATIC AORTIC VALVE INSUFFICIENCY: ICD-10-CM

## 2021-03-26 DIAGNOSIS — I34.0 NON-RHEUMATIC MITRAL REGURGITATION: ICD-10-CM

## 2021-03-26 DIAGNOSIS — I49.3 PVC'S (PREMATURE VENTRICULAR CONTRACTIONS): Primary | ICD-10-CM

## 2021-03-26 DIAGNOSIS — I71.2 THORACIC AORTIC ANEURYSM WITHOUT RUPTURE (HCC): ICD-10-CM

## 2021-03-26 PROCEDURE — 99214 OFFICE O/P EST MOD 30 MIN: CPT | Performed by: INTERNAL MEDICINE

## 2021-03-26 NOTE — PROGRESS NOTES
Cardiology Follow Up    Heather Tillman  1940  [id number]  [facility]  [address] [facility] [address]  [phone]    1  PVC's (premature ventricular contractions)     2  Nonrheumatic aortic valve insufficiency     3  Non-rheumatic mitral regurgitation     4  Thoracic aortic aneurysm without rupture ([facility])         Interval History:  No cardiovascular complaints  Denies chest pain shortness of breath orthopnea paroxysmal nocturnal dyspnea palpitations or syncope      Patient Active Problem List   Diagnosis    PVC's (premature ventricular contractions)    History of prostate cancer    Thoracic aortic aneurysm without rupture ([facility] )    Nonrheumatic aortic valve insufficiency    Non-rheumatic mitral regurgitation    Malignant neoplasm of prostate ([facility] )    Strain of calf muscle    Spinal stenosis of lumbar region with neurogenic claudication    Lumbar radiculopathy    Chronic bilateral low back pain without sciatica    Chronic pain syndrome    Renal cyst, acquired, left     Past Medical History:   Diagnosis Date    Astigmatism     last assessed 11/15/17    Cataract     Resolved 11/15/17    Gross hematuria     Last assessed 01/28/16    Hematuria     last assessed 01/28/16    Prostate cancer ([facility] [address] )     last assessed 05/18/17    Renal calculi     last assessed 08/16/12    Squamous cell carcinoma     Vitamin D deficiency     Last assessed 08/19/13     Social History     Socioeconomic History    Marital status: /Civil Union     Spouse name: Not on file    Number of children: Not on file    Years of education: Not on file    Highest education level: Not on file   Occupational History    Not on file   Social Needs    Financial resource strain: Not on file    Food insecurity     Worry: Not on file     Inability: Not on file    Transportation needs     Medical: Not on file     Non-medical: Not on file   Tobacco Use    Smoking status: Never Smoker    Smokeless tobacco: Never Used   Substance and Sexual Activity    Alcohol use: Yes     Comment: social    Drug use: No    Sexual activity: Not on file   Lifestyle    Physical activity     Days per week: Not on file     Minutes per session: Not on file    Stress: Not on file   Relationships    Social connections     Talks on phone: Not on file     Gets together: Not on file     Attends [employer] service: Not on file     Active member of club or organization: Not on file     Attends meetings of clubs or organizations: Not on file     Relationship status: Not on file    Intimate partner violence     Fear of current or ex partner: Not on file     Emotionally abused: Not on file     Physically abused: Not on file     Forced sexual activity: Not on file   Other Topics Concern    Not on file   Social History Narrative    Not on file      Family History   Problem Relation Age of Onset    Other Mother 76        Influenza    Hypertension Mother     Heart failure Father 70    Peripheral vascular disease Father     Hypertension Father     Prostate cancer Brother     Stroke Brother 48    Hypertension Brother     Hypertension Family      Past Surgical History:   Procedure Laterality Date    BREAST EXCISIONAL BIOPSY N/A     Benign breast tumor unsure of side over 50 yrs ago    CATARACT EXTRACTION Left 08/26/2019    CATARACT EXTRACTION Right 08/12/2019    COLONOSCOPY      CYSTOSCOPY  01/28/2016    ELBOW SURGERY      Bone chip    HERNIA REPAIR  2002    LITHOTRIPSY      RETROPUBIC PROSTATECTOMY  11/13/2001    Radical with nerve sparing       Current Outpatient Medications:     ascorbic acid (VITAMIN C) 500 mg tablet, Take 500 mg by mouth daily, Disp: , Rfl:     cyanocobalamin (VITAMIN B-12) 500 mcg tablet, 1,000 mcg Daily  , Disp: , Rfl:     Docusate Sodium (COLACE PO), Take by mouth daily, Disp: , Rfl:     meloxicam (MOBIC) 7 5 mg tablet, Take 1 tablet twice daily As needed for leg pain, Disp: 60 tablet, Rfl: 5    Multiple Vitamins-Minerals (CENTRUM SILVER 50+MEN PO), Daily, Disp: , Rfl:     Probiotic Product (PROBIOTIC PO), Take by mouth, Disp: , Rfl:     verapamil (CALAN-SR) 240 mg CR tablet, TAKE 1 AND 1/2 TABLETS BY MOUTH DAILY AS DIRECTED, Disp: 135 tablet, Rfl: 3  Allergies   Allergen Reactions    Sulfa Antibiotics Rash    Ciprofloxacin        Labs:  No visits with results within 2 Month(s) from this visit  Latest known visit with results is:   Appointment on 07/20/2020   Component Date Value    Sodium 07/20/2020 139     Potassium 07/20/2020 4 0     Chloride 07/20/2020 106     CO2 07/20/2020 28     ANION GAP 07/20/2020 5     BUN 07/20/2020 24     Creatinine 07/20/2020 1 03     Glucose, Fasting 07/20/2020 83     Calcium 07/20/2020 9 4     AST 07/20/2020 13     ALT 07/20/2020 21     Alkaline Phosphatase 07/20/2020 56     Total Protein 07/20/2020 7 3     Albumin 07/20/2020 4 0     Total Bilirubin 07/20/2020 0 64     eGFR 07/20/2020 68     Cholesterol 07/20/2020 216*    Triglycerides 07/20/2020 119     HDL, Direct 07/20/2020 80     LDL Calculated 07/20/2020 112*    Non-HDL-Chol (CHOL-HDL) 07/20/2020 136     PSA, Diagnostic 07/20/2020 <0 1      Imaging: No results found  Review of Systems:  Review of Systems   Constitutional: Negative for fatigue  HENT: Negative for nosebleeds  Eyes: Negative for redness  Respiratory: Negative for chest tightness and shortness of breath  Cardiovascular: Negative for chest pain, palpitations and leg swelling  Gastrointestinal: Positive for constipation  Negative for abdominal pain  Endocrine: Negative for polyuria  Genitourinary: Negative for urgency  Musculoskeletal: Positive for back pain  Negative for arthralgias  Skin: Negative for rash  Neurological: Negative for dizziness and syncope  Psychiatric/Behavioral: Negative for confusion and sleep disturbance  The patient is not nervous/anxious  Physical Exam:  Physical Exam  Constitutional:       Appearance: He is well-developed  HENT:      Head: Normocephalic and atraumatic  Nose: Nose normal    Eyes:      Pupils: Pupils are equal, round, and reactive to light  Neck:      Musculoskeletal: Neck supple  Cardiovascular:      Rate and Rhythm: Normal rate and regular rhythm  Heart sounds: Normal heart sounds  Pulmonary:      Effort: Pulmonary effort is normal       Breath sounds: Normal breath sounds  Abdominal:      General: Bowel sounds are normal       Palpations: Abdomen is soft  Musculoskeletal: Normal range of motion  Skin:     General: Skin is warm and dry  Neurological:      Mental Status: He is alert and oriented to person, place, and time  Psychiatric:         Behavior: Behavior normal          Thought Content: Thought content normal          Judgment: Judgment normal          Discussion/Summary:  Asymptomatic in functional class 1   Echocardiogram in November showed normal left ventricular size and function with moderate aortic and mitral insufficiency  Ascending aorta was 4 5 cm which was consistent with previous echocardiography  Blood pressure is slightly elevated today  He is on verapamil 360 mg daily and has constipation  His PVCs are not entirely sensitive could to calcium blockade  In the setting of his elevated blood pressure and aortic aneurysm I will place him on metoprolol 50 25 mg b i d  Audrey Allan I will cut the verapamil back to 240 mg once daily  He will have a blood pressure check with his next primary care physician appointment and I will see him again in 6 months

## 2021-03-29 ENCOUNTER — OFFICE VISIT (OUTPATIENT)
Dept: PHYSICAL THERAPY | Facility: CLINIC | Age: 81
End: 2021-03-29
Payer: MEDICARE

## 2021-03-29 DIAGNOSIS — R26.2 AMBULATORY DYSFUNCTION: ICD-10-CM

## 2021-03-29 DIAGNOSIS — M62.81 DECREASED MOTOR STRENGTH: Primary | ICD-10-CM

## 2021-03-29 PROCEDURE — 97110 THERAPEUTIC EXERCISES: CPT

## 2021-03-29 PROCEDURE — 97140 MANUAL THERAPY 1/> REGIONS: CPT

## 2021-03-29 PROCEDURE — 97112 NEUROMUSCULAR REEDUCATION: CPT

## 2021-03-29 NOTE — PROGRESS NOTES
Daily Note     Today's date: 3/29/2021  Patient name: Surya Mcdonald  : 1940  MRN: [id number]  Referring provider: Charmaine Madrigal DO  Dx:   Encounter Diagnosis     ICD-10-CM    1  Decreased motor strength  R53 1    2  Ambulatory dysfunction  R26 2                   Subjective: No significant changes to report since the last PT treatment  Objective: See treatment diary below  Assessment: Therapeutic exercise program is tolerated well  Right hamstring flexibility remains limited - patient will benefit from continued manual stretching to improve this  Plan: Continue treatment as per PT plan of care         Precautions: h/o prostate CA, PVCs      Manuals  3/10 3/15 3/17 3/24 3/29       R hip PROM  10' 8' 8' 8' 8'                                              Neuro Re-Ed             NBOS foam  3x30"  No UE airex  30"x3 airex  30"x3 EC 30"x3 airex  EC  30"x3       Tandem walking  x2laps 12ft x4 12ft x4 12ftx6 12ft x4       Walking with head turns             [employer]             Cone top taps   2x10 ea 20 ea 30ea 20 ea       Marching over hurdles   12ft x6 forward and lateral  12ft x4 ea [id number] forward and lateral  12ft x4 ea       sidestepping   12ft x4 12ft x2 12ftx4 12ft x4                                 Ther Ex             Roll outs w/ pball - fwd  5"x10 10"x10 10"x10 10"  x10 10"x10       Seated march  x15           HR  HEP x20 20 25 25 30       Step up/down  6"x10  ea step up  6"  15 on R step up  6"  20 on R 8"x20 R step up on R  8" x20       bike   7' 8' 8' 8'       Stairs   2 flights 2 flights 2 flights 2 flights       Mini squats       20                    Ther Activity                                       Gait Training                                       Modalities

## 2021-03-31 ENCOUNTER — OFFICE VISIT (OUTPATIENT)
Dept: PHYSICAL THERAPY | Facility: CLINIC | Age: 81
End: 2021-03-31
Payer: MEDICARE

## 2021-03-31 DIAGNOSIS — R26.2 AMBULATORY DYSFUNCTION: ICD-10-CM

## 2021-03-31 DIAGNOSIS — M62.81 DECREASED MOTOR STRENGTH: Primary | ICD-10-CM

## 2021-03-31 PROCEDURE — 97140 MANUAL THERAPY 1/> REGIONS: CPT

## 2021-03-31 PROCEDURE — 97112 NEUROMUSCULAR REEDUCATION: CPT

## 2021-03-31 PROCEDURE — 97110 THERAPEUTIC EXERCISES: CPT

## 2021-04-05 ENCOUNTER — OFFICE VISIT (OUTPATIENT)
Dept: PHYSICAL THERAPY | Facility: CLINIC | Age: 81
End: 2021-04-05
Payer: MEDICARE

## 2021-04-05 DIAGNOSIS — M62.81 DECREASED MOTOR STRENGTH: Primary | ICD-10-CM

## 2021-04-05 DIAGNOSIS — R26.2 AMBULATORY DYSFUNCTION: ICD-10-CM

## 2021-04-05 PROCEDURE — 97110 THERAPEUTIC EXERCISES: CPT

## 2021-04-05 PROCEDURE — 97140 MANUAL THERAPY 1/> REGIONS: CPT

## 2021-04-05 PROCEDURE — 97112 NEUROMUSCULAR REEDUCATION: CPT

## 2021-04-05 NOTE — PROGRESS NOTES
Daily Note     Today's date: 2021  Patient name: Suan Apley  : 1940  MRN: [id number]  Referring provider: Melany Bliss DO  Dx:   Encounter Diagnosis     ICD-10-CM    1  Decreased motor strength  R53 1    2  Ambulatory dysfunction  R26 2                   Subjective: No significant changes to report since the last PT treatment  Objective: See treatment diary below  Assessment: Additional squats on the leg press tolerated well  FT EC balance on airex foam more challenging today due to right lower extremity fatigue  Patient will benefit from continued PT to improve his gait, balance, and lower extremity strength  Plan: Continue treatment as per PT plan of care         Precautions: h/o prostate CA, PVCs      Manuals  3/10 3/15 3/17 3/24 3/29 3/31 4/5     R hip PROM  10' 8' 8' 8' 8' 8' 8'                                            Neuro Re-Ed             NBOS foam  3x30"  No UE airex  30"x3 airex  30"x3 EC 30"x3 airex  EC  30"x3 airex  EC  30"x3 airex  EC  30"x3     Tandem walking  x2laps 12ft x4 12ft x4 12ftx6 12ft x4 12ft x4 12ft x4     Walking with head turns             [employer]       a/p, m/l  15 ea a/p, m/l  20 ea     Cone top taps   2x10 ea 20 ea 30ea 20 ea 20 ea 20 ea     Marching over hurdles   12ft x6 forward and lateral  12ft x4 ea [id number] forward and lateral  12ft x4 ea forward  12ft x6      sidestepping   12ft x4 12ft x2 12ftx4 12ft x4 12ft x4 12ft x4                               Ther Ex             Roll outs w/ pball - fwd  5"x10 10"x10 10"x10 10"  x10 10"x10 10"x10 10"x10     Seated march  x15           HR  HEP x20 20 25 25 30 30 30     Step up/down  6"x10  ea step up  6"  15 on R step up  6"  20 on R 8"x20 R step up on R  8" x20 step up on R  8" x20 step up on R  8" x25     bike   7' 8' 8' 8' 8' 8'     Stairs   2 flights 2 flights 2 flights 2 flights 2 flights 2 flights     Mini squats       20 20 20     Leg press squats        20#  15                  Ther Activity                                       Gait Training                                       Modalities

## 2021-04-07 ENCOUNTER — OFFICE VISIT (OUTPATIENT)
Dept: PHYSICAL THERAPY | Facility: CLINIC | Age: 81
End: 2021-04-07
Payer: MEDICARE

## 2021-04-07 DIAGNOSIS — R26.2 AMBULATORY DYSFUNCTION: ICD-10-CM

## 2021-04-07 DIAGNOSIS — M62.81 DECREASED MOTOR STRENGTH: Primary | ICD-10-CM

## 2021-04-07 PROCEDURE — 97110 THERAPEUTIC EXERCISES: CPT

## 2021-04-07 PROCEDURE — 97140 MANUAL THERAPY 1/> REGIONS: CPT

## 2021-04-07 PROCEDURE — 97112 NEUROMUSCULAR REEDUCATION: CPT

## 2021-04-07 NOTE — PROGRESS NOTES
Daily Note     Today's date: 2021  Patient name: Gabriela Moore  : 1940  MRN: [id number]  Referring provider: Karishma Gutierrez DO  Dx:   Encounter Diagnosis     ICD-10-CM    1  Decreased motor strength  R53 1    2  Ambulatory dysfunction  R26 2                   Subjective: No significant changes to report since the last PT treatment  Objective: See treatment diary below  Assessment: Treatment is tolerated well  Limited mobility and weakness noted when performing additional bridges  Patient will benefit from continued PT to improve his gait, balance, and lower extremity strength  Plan: Continue treatment as per PT plan of care         Precautions: h/o prostate CA, PVCs      Manuals  3/10 3/15 3/17 3/24 3/29 3/31 4/5 4/7    R hip PROM  10' 8' 8' 8' 8' 8' 8' 8'                                           Neuro Re-Ed             NBOS foam  3x30"  No UE airex  30"x3 airex  30"x3 EC 30"x3 airex  EC  30"x3 airex  EC  30"x3 airex  EC  30"x3     Tandem walking  x2laps 12ft x4 12ft x4 12ftx6 12ft x4 12ft x4 12ft x4 12ft x4    Walking with head turns             [employer]       a/p, m/l  15 ea a/p, m/l  20 ea a/p, m/l  20 ea    [employer] top taps   2x10 ea 20 ea 30ea 20 ea 20 ea 20 ea     Marching over hurdles   12ft x6 forward and lateral  12ft x4 ea [id number] forward and lateral  12ft x4 ea forward  12ft x6  forward  12ft x6    sidestepping   12ft x4 12ft x2 12ftx4 12ft x4 12ft x4 12ft x4 red  12ft x6                              Ther Ex             Roll outs w/ pball - fwd  5"x10 10"x10 10"x10 10"  x10 10"x10 10"x10 10"x10 10"x10    Seated march  x15           HR  HEP x20 20 25 25 30 30 30 30    Step up/down  6"x10  ea step up  6"  15 on R step up  6"  20 on R 8"x20 R step up on R  8" x20 step up on R  8" x20 step up on R  8" x25 step up on R  8" x25    bike   7' 8' 8' 8' 8' 8' 8'    Stairs   2 flights 2 flights 2 flights 2 flights 2 flights 2 flights 2 flights    Mini squats       20 20 20 20    Leg press squats        20#  15 20#  20    bridges          20                 Ther Activity                                       Gait Training                                       Modalities

## 2021-04-12 ENCOUNTER — OFFICE VISIT (OUTPATIENT)
Dept: PHYSICAL THERAPY | Facility: CLINIC | Age: 81
End: 2021-04-12
Payer: MEDICARE

## 2021-04-12 DIAGNOSIS — R26.2 AMBULATORY DYSFUNCTION: ICD-10-CM

## 2021-04-12 DIAGNOSIS — M62.81 DECREASED MOTOR STRENGTH: Primary | ICD-10-CM

## 2021-04-12 PROCEDURE — 97112 NEUROMUSCULAR REEDUCATION: CPT

## 2021-04-12 PROCEDURE — 97110 THERAPEUTIC EXERCISES: CPT

## 2021-04-12 PROCEDURE — 97140 MANUAL THERAPY 1/> REGIONS: CPT

## 2021-04-12 NOTE — PROGRESS NOTES
Daily Note     Today's date: 2021  Patient name: Jayshree Powell  : 1940  MRN: [id number]  Referring provider: Elina Hamilton DO  Dx:   Encounter Diagnosis     ICD-10-CM    1  Decreased motor strength  R53 1    2  Ambulatory dysfunction  R26 2                   Subjective: Patient arrives to today's PT treatment complaining of discomfort in the right calf and hamstring  Objective: See treatment diary below  Assessment: Fatigue noted throughout performance of therapeutic exercise program    In order to improve patient's gait and balance, introduced "obstacle course" - when stepping up/over steps and hurdles, patient requires UE support on the hand rail to avoid loss of balance  Plan: Continue treatment as per PT plan of care         Precautions: h/o prostate CA, PVCs      Manuals  3/10 3/15 3/17 3/24 3/29 3/31 4/5 4/7 4/12   R hip PROM  10' 8' 8' 8' 8' 8' 8' 8' 8'                                          Neuro Re-Ed             NBOS foam  3x30"  No UE airex  30"x3 airex  30"x3 EC 30"x3 airex  EC  30"x3 airex  EC  30"x3 airex  EC  30"x3     Tandem walking  x2laps 12ft x4 12ft x4 12ftx6 12ft x4 12ft x4 12ft x4 12ft x4    Walking with head turns             rockerboard       a/p, m/l  15 ea a/p, m/l  20 ea a/p, m/l  20 ea a/p, m/l  20 ea   [facility] top taps   2x10 ea 20 ea 30ea 20 ea 20 ea 20 ea     Marching over hurdles   12ft x6 forward and lateral  12ft x4 ea [id number] forward and lateral  12ft x4 ea forward  12ft x6  forward  12ft x6    sidestepping   12ft x4 12ft x2 12ftx4 12ft x4 12ft x4 12ft x4 red  12ft x6    obstacle course           4'                Ther Ex             Roll outs w/ pball - fwd  5"x10 10"x10 10"x10 10"  x10 10"x10 10"x10 10"x10 10"x10 10"x10   Seated march  x15           HR  HEP x20 20 25 25 30 30 30 30 30   Step up/down  6"x10  ea step up  6"  15 on R step up  6"  20 on R 8"x20 R step up on R  8" x20 step up on R  8" x20 step up on R  8" x25 step up on R  8" x25 step up on R  8" x25   bike   7' 8' 8' 8' 8' 8' 8' 8'   Stairs   2 flights 2 flights 2 flights 2 flights 2 flights 2 flights 2 flights 2 flights   Mini squats       20 20 20 20 25   Leg press squats        20#  15 20#  20 20#  25   bridges          20  20                Ther Activity                                       Gait Training                                       Modalities

## 2021-04-14 ENCOUNTER — APPOINTMENT (OUTPATIENT)
Dept: PHYSICAL THERAPY | Facility: CLINIC | Age: 81
End: 2021-04-14
Payer: MEDICARE

## 2021-04-19 ENCOUNTER — TELEPHONE (OUTPATIENT)
Dept: PAIN MEDICINE | Facility: CLINIC | Age: 81
End: 2021-04-19

## 2021-04-19 ENCOUNTER — APPOINTMENT (OUTPATIENT)
Dept: PHYSICAL THERAPY | Facility: CLINIC | Age: 81
End: 2021-04-19
Payer: MEDICARE

## 2021-04-19 NOTE — TELEPHONE ENCOUNTER
The patient has not been seen since November  He should schedule a f/u OV so we can re-discuss the options and proceed with whatever he is most comfortable with

## 2021-04-19 NOTE — TELEPHONE ENCOUNTER
Patients wife Aurora called requesting to have MARE Aragon to discuss patient's next plan of care   Please advise, mateusz    Call back# [phone]

## 2021-04-21 ENCOUNTER — APPOINTMENT (OUTPATIENT)
Dept: PHYSICAL THERAPY | Facility: CLINIC | Age: 81
End: 2021-04-21
Payer: MEDICARE

## 2021-04-26 ENCOUNTER — OFFICE VISIT (OUTPATIENT)
Dept: PAIN MEDICINE | Facility: CLINIC | Age: 81
End: 2021-04-26
Payer: MEDICARE

## 2021-04-26 VITALS
DIASTOLIC BLOOD PRESSURE: 70 MMHG | BODY MASS INDEX: 23.52 KG/M2 | SYSTOLIC BLOOD PRESSURE: 128 MMHG | HEIGHT: 71 IN | HEART RATE: 75 BPM | WEIGHT: 168 LBS | TEMPERATURE: 97.7 F

## 2021-04-26 DIAGNOSIS — G89.4 CHRONIC PAIN SYNDROME: Primary | ICD-10-CM

## 2021-04-26 DIAGNOSIS — M54.50 CHRONIC BILATERAL LOW BACK PAIN WITHOUT SCIATICA: ICD-10-CM

## 2021-04-26 DIAGNOSIS — M48.062 SPINAL STENOSIS OF LUMBAR REGION WITH NEUROGENIC CLAUDICATION: ICD-10-CM

## 2021-04-26 DIAGNOSIS — G89.29 CHRONIC BILATERAL LOW BACK PAIN WITHOUT SCIATICA: ICD-10-CM

## 2021-04-26 DIAGNOSIS — M54.16 LUMBAR RADICULOPATHY: ICD-10-CM

## 2021-04-26 PROCEDURE — 99214 OFFICE O/P EST MOD 30 MIN: CPT | Performed by: NURSE PRACTITIONER

## 2021-04-26 RX ORDER — DULOXETIN HYDROCHLORIDE 20 MG/1
CAPSULE, DELAYED RELEASE ORAL
Qty: 60 CAPSULE | Refills: 1 | Status: SHIPPED | OUTPATIENT
Start: 2021-04-26 | End: 2021-06-14 | Stop reason: SDUPTHER

## 2021-04-26 NOTE — PROGRESS NOTES
Assessment:  1  Chronic pain syndrome    2  Chronic bilateral low back pain without sciatica    3  Lumbar radiculopathy    4  Spinal stenosis of lumbar region with neurogenic claudication        Plan:    While the patient was in the office today, I did have a thorough conversation with the patient regarding their chronic pain syndrome, symptoms, medication regimen, and treatment plan  I discussed with the patient and his wife that at this point since the physical therapy seems to make his pain worse and he is currently not interested in a surgical opinion, we could try to exhaust all alternative options and agree that maybe acupuncture may be helpful and encouraged him to follow-up with Dr Alcira Batres  The patient was agreeable and verbalized an understanding  In the meantime, I did discuss with the patient and his wife that overall I feel that there is definitely significant osteoarthritic, neuropathic, and myofascial component to his pain symptoms and since he did not do well with the previously prescribed gabapentin we will start him on a very low and subtherapeutic dose of Cymbalta 20 mg and slowly work him up to 40 mg over the next several weeks and see how he does  The patient denied being prescribed any anti-depressant and/or psychiatric medications  I reviewed with the patient that it may take 3-4 weeks for the medication's effects to be noticed and that it should never be abruptly stopped  Possible side effects include but are not limited to; vertigo, lethargy, nausea, worsening depression/anxiety, and confusion  I advised the patient to call our office if they experience any side effects  The patient verbalized an understanding  The patient will follow-up in 6-7 weeks for medication prescription refill and reevaluation  The patient was advised to contact the office should their symptoms worsen in the interim  The patient was agreeable and verbalized an understanding        History of Present Illness: The patient is a 79 y o  male last seen on 11/10/2020 who presents for a follow up office visit in regards to Chronic pain syndrome secondary to lumbar stenosis with radiculopathy  The patient currently reports that since his last office visit he did retry physical therapy for several weeks and unfortunately it seems as though the therapy only makes his pain significantly worse  The patient did have an updated MRI of the lumbosacral spine which did show moderate central canal stenosis as well as bilateral foraminal stenosis at the L4-L5 level  However, it is still the patient's overall goal to try to avoid surgery as much as possible and presents today to discuss his medication regimen and treatment plan options as he is also been trying over-the-counter CBD cream and oral with very little, if any improvement  He also reports he has been seriously considering acupuncture and is going to be following up with Dr Georgina Kraus as well, however, he presents today to discuss his medication regimen options as we had previously discussed at his last office visit in November  I have personally reviewed and/or updated the patient's past medical history, past surgical history, family history, social history, current medications, allergies, and vital signs today  Review of Systems:    Review of Systems   Respiratory: Negative for shortness of breath  Cardiovascular: Negative for chest pain  Gastrointestinal: Negative for constipation, diarrhea, nausea and vomiting  Musculoskeletal: Positive for gait problem  Negative for arthralgias, joint swelling (joint stiffness) and myalgias  Skin: Negative for rash  Neurological: Negative for dizziness, seizures and weakness  All other systems reviewed and are negative          Past Medical History:   Diagnosis Date    Astigmatism     last assessed 11/15/17    Cataract     Resolved 11/15/17   Eather Waverly hematuria     Last assessed 01/28/16    Hematuria     last assessed 01/28/16    Prostate cancer ([facility] [address] )     last assessed 05/18/17    Renal calculi     last assessed 08/16/12    Squamous cell carcinoma     Vitamin D deficiency     Last assessed 08/19/13       Past Surgical History:   Procedure Laterality Date    BREAST EXCISIONAL BIOPSY N/A     Benign breast tumor unsure of side over 50 yrs ago    CATARACT EXTRACTION Left 08/26/2019    CATARACT EXTRACTION Right 08/12/2019    COLONOSCOPY      CYSTOSCOPY  01/28/2016    ELBOW SURGERY      Bone chip    HERNIA REPAIR  2002    LITHOTRIPSY      RETROPUBIC PROSTATECTOMY  11/13/2001    Radical with nerve sparing       Family History   Problem Relation Age of Onset    Other Mother 76        Influenza    Hypertension Mother     Heart failure Father 70    Peripheral vascular disease Father     Hypertension Father     Prostate cancer Brother     Stroke Brother 48    Hypertension Brother     Hypertension Family        Social History     Occupational History    Not on file   Tobacco Use    Smoking status: Never Smoker    Smokeless tobacco: Never Used   Substance and Sexual Activity    Alcohol use: Yes     Comment: social    Drug use: No    Sexual activity: Not on file         Current Outpatient Medications:     ascorbic acid (VITAMIN C) 500 mg tablet, Take 500 mg by mouth daily, Disp: , Rfl:     cyanocobalamin (VITAMIN B-12) 500 mcg tablet, 1,000 mcg Daily  , Disp: , Rfl:     Docusate Sodium (COLACE PO), Take by mouth daily, Disp: , Rfl:     meloxicam (MOBIC) 7 5 mg tablet, Take 1 tablet twice daily As needed for leg pain, Disp: 60 tablet, Rfl: 5    metoprolol tartrate (LOPRESSOR) 25 mg tablet, Take 1 tablet (25 mg total) by mouth every 12 (twelve) hours, Disp: 180 tablet, Rfl: 3    Multiple Vitamins-Minerals (CENTRUM SILVER 50+MEN PO), Daily, Disp: , Rfl:     Probiotic Product (PROBIOTIC PO), Take by mouth, Disp: , Rfl:     verapamil (CALAN-SR) 240 mg CR tablet, TAKE 1 AND 1/2 TABLETS BY MOUTH DAILY AS DIRECTED, Disp: 135 tablet, Rfl: 3    DULoxetine (CYMBALTA) 20 mg capsule, Take 1 PO HS every other night x 10 days, then 1 PO HS every night x 10 days, then 2 PO HS , Disp: 60 capsule, Rfl: 1    Allergies   Allergen Reactions    Sulfa Antibiotics Rash    Ciprofloxacin     Gabapentin Dizziness       Physical Exam:    /70 (BP Location: Left arm, Patient Position: Sitting, Cuff Size: Standard)   Pulse 75   Temp 97 7 °F (36 5 °C)   Ht 5' 11" (1 803 m)   Wt 76 2 kg (168 lb)   BMI 23 43 kg/m²     Constitutional:normal, well developed, well nourished, alert, in no distress and non-toxic and no overt pain behavior  Eyes:anicteric  HEENT:grossly intact  Neck:supple, symmetric, trachea midline and no masses   Pulmonary:even and unlabored  Cardiovascular:No edema or pitting edema present  Skin:Normal without rashes or lesions and well hydrated  Psychiatric:Mood and affect appropriate  Neurologic:Cranial Nerves II-XII grossly intact  Musculoskeletal:The patient's gait is slightly antalgic, painful, but steady without the use of any assistive devices  Imaging  No orders to display         No orders of the defined types were placed in this encounter

## 2021-04-26 NOTE — PATIENT INSTRUCTIONS
Duloxetine (By mouth)   Duloxetine ([id number]-LOX-[id number])  Treats depression, anxiety, diabetic peripheral neuropathy, fibromyalgia, and chronic muscle or bone pain  This medicine is an SSNRI  Brand Name(s): Cymbalta, izalma Sprinkle, Agaka   There may be other brand names for this medicine  When This Medicine Should Not Be Used: This medicine is not right for everyone  Do not use it if you had an allergic reaction to duloxetine  How to Use This Medicine:   Capsule, Delayed Release Capsule  · Take your medicine as directed  Your dose may need to be changed several times to find what works best for you  · Delayed-release capsule: Swallow the capsule whole  Do not crush, chew, break, or open it  Do not open the Cymbalta® delayed-release capsule and sprinkle the contents on food or in liquids  · If you have trouble swallowing the Krista  delayed release capsule:   ? You may open the capsule and sprinkle the contents over one tablespoon (15 mL) of applesauce  Swallow the mixture right away and do not save any of the mixture to use later  ? You may open the capsule and pour the contents to an all plastic catheter tip syringe and add 50 mL of water  Do not use other liquids  Gently shake it for 10 seconds, and then use it through a nasogastric tube  Rinse with additional water (about 15 mL) if needed  · This medicine should come with a Medication Guide  Ask your pharmacist for a copy if you do not have one  · Missed dose: Take a dose as soon as you remember  If it is almost time for your next dose, wait until then and take a regular dose  Do not take extra medicine to make up for a missed dose  · Store the medicine in a closed container at room temperature, away from heat, moisture, and direct light  Drugs and Foods to Avoid:   Ask your doctor or pharmacist before using any other medicine, including over-the-counter medicines, vitamins, and herbal products    · Do not take duloxetine if you have used an MAO inhibitor (MAOI) within the past 14 days  Do not start taking an MAO inhibitor within 5 days of stopping duloxetine  · Some medicines can affect how duloxetine works  Tell your doctor if you are using any of the following:  ? Buspirone, cimetidine, ciprofloxacin, enoxacin, fentanyl, lithium, Jennifer's wort, theophylline, tramadol, tryptophan, or warfarin  ? Amphetamines  ? Blood pressure medicine  ? Diuretic (water pill)  ? Medicine for heart rhythm problems (including flecainide, propafenone, quinidine)  ? Medicine to treat migraine headaches (including triptans)  ? NSAID pain or arthritis medicine (including aspirin, celecoxib, diclofenac, ibuprofen, naproxen)  ? Other medicine to treat depression or mood disorders (including amitriptyline, desipramine, fluoxetine, imipramine, nortriptyline, paroxetine)  ? Phenothiazine medicine (including thioridazine)  · Tell your doctor if you use anything else that makes you sleepy  Some examples are allergy medicine, narcotic pain medicine, and alcohol  · Do not drink alcohol while you are using this medicine  Warnings While Using This Medicine:   · Tell your doctor if you are pregnant or breastfeeding, or if you have kidney disease, liver disease, diabetes, digestion problems, glaucoma, heart disease, high or low blood pressure, or problems with urination  Tell your doctor if you smoke or you have a history of seizures, or drug or alcohol addiction  · This medicine may cause the following problems:   ? Serious liver problems  ? Serotonin syndrome (more likely when used with certain other medicines)  ? Increased risk of bleeding problems  ? Serious skin reactions  ? Low sodium levels in the blood  · This medicine can increase thoughts of suicide  Tell your doctor right away if you start to feel depressed and have thoughts about hurting yourself  · This medicine can cause changes in your blood pressure  This may make you dizzy or drowsy   Do not drive or do anything that could be dangerous until you know how this medicine affects you  Stand up slowly to avoid falls  · Do not stop using this medicine suddenly  Your doctor will need to slowly decrease your dose before you stop it completely  · Your doctor will check your progress and the effects of this medicine at regular visits  Keep all appointments  · Keep all medicine out of the reach of children  Never share your medicine with anyone  Possible Side Effects While Using This Medicine:   Call your doctor right away if you notice any of these side effects:  · Allergic reaction: Itching or hives, swelling in your face or hands, swelling or tingling in your mouth or throat, chest tightness, trouble breathing  · Anxiety, restlessness, fever, fast heartbeat, sweating, muscle spasms, diarrhea, seeing or hearing things that are not there  · Blistering, peeling, red skin rash  · Confusion, weakness, muscle twitching  · Dark urine or pale stools, nausea, vomiting, loss of appetite, stomach pain, yellow skin or eyes  · Decrease in how much or how often you urinate  · Eye pain, vision changes, seeing halos around lights  · Feeling more energetic than usual  · Lightheadedness, dizziness, fainting  · Unusual moods or behaviors, worsening depression, thoughts about hurting yourself, trouble sleeping  · Unusual bleeding or bruising  If you notice these less serious side effects, talk with your doctor:   · Decrease in appetite or weight  · Dry mouth, constipation, mild nausea  · Headache  · Unusual drowsiness, sleepiness, or tiredness  If you notice other side effects that you think are caused by this medicine, tell your doctor  Call your doctor for medical advice about side effects  You may report side effects to FDA at 4-566-FDA-0829  © Copyright [address] [place] [address] Drive Information is for End User's use only and may not be sold, redistributed or otherwise used for commercial purposes  The above information is an  only   It is not intended as medical advice for individual conditions or treatments  Talk to your doctor, nurse or pharmacist before following any medical regimen to see if it is safe and effective for you

## 2021-05-12 ENCOUNTER — TELEPHONE (OUTPATIENT)
Dept: PAIN MEDICINE | Facility: CLINIC | Age: 81
End: 2021-05-12

## 2021-05-12 NOTE — TELEPHONE ENCOUNTER
Patient's wife Corina Swain would like to know if patient is able to take Cymbalta with Meloxicam medication together   Please advise, thx    Call back# [phone]

## 2021-05-12 NOTE — TELEPHONE ENCOUNTER
S/w pt's wife, Claude Dominique, per myc release  Advised angle, ok to take meloxicam and cymbalta together  Explained that meloxicam is more of an "as needed" medication - ok to skip a dose if it's not needed  However, cymbalta is a maintenance medication and should be taken consistently  Shannon Sheehan verbalized understanding and appreciation  Will proceed as directed

## 2021-06-14 ENCOUNTER — OFFICE VISIT (OUTPATIENT)
Dept: PAIN MEDICINE | Facility: CLINIC | Age: 81
End: 2021-06-14
Payer: MEDICARE

## 2021-06-14 VITALS
WEIGHT: 170 LBS | DIASTOLIC BLOOD PRESSURE: 74 MMHG | BODY MASS INDEX: 23.8 KG/M2 | TEMPERATURE: 98.1 F | HEART RATE: 62 BPM | HEIGHT: 71 IN | SYSTOLIC BLOOD PRESSURE: 128 MMHG

## 2021-06-14 DIAGNOSIS — M54.16 LUMBAR RADICULOPATHY: ICD-10-CM

## 2021-06-14 DIAGNOSIS — M48.062 SPINAL STENOSIS OF LUMBAR REGION WITH NEUROGENIC CLAUDICATION: ICD-10-CM

## 2021-06-14 DIAGNOSIS — G89.4 CHRONIC PAIN SYNDROME: ICD-10-CM

## 2021-06-14 DIAGNOSIS — M54.50 CHRONIC BILATERAL LOW BACK PAIN WITHOUT SCIATICA: ICD-10-CM

## 2021-06-14 DIAGNOSIS — G89.29 CHRONIC BILATERAL LOW BACK PAIN WITHOUT SCIATICA: ICD-10-CM

## 2021-06-14 PROCEDURE — 99214 OFFICE O/P EST MOD 30 MIN: CPT | Performed by: NURSE PRACTITIONER

## 2021-06-14 RX ORDER — DULOXETIN HYDROCHLORIDE 30 MG/1
CAPSULE, DELAYED RELEASE ORAL
Qty: 60 CAPSULE | Refills: 1 | Status: SHIPPED | OUTPATIENT
Start: 2021-06-14 | End: 2021-08-05

## 2021-06-14 NOTE — PROGRESS NOTES
Assessment:  1  Chronic pain syndrome    2  Chronic bilateral low back pain without sciatica    3  Lumbar radiculopathy    4  Spinal stenosis of lumbar region with neurogenic claudication        Plan:    While the patient was in the office today, I did have a thorough conversation with the patient regarding their chronic pain syndrome, symptoms, medication regimen, and treatment plan  I encouraged the patient to proceed with his surgical consultation as scheduled later on this month with Dr Ulices Morfin  The patient was agreeable and verbalized an understanding  In the meantime, our office can continue to work on finding a medication regimen the provides at least moderate stable overall relief and to make his pain more manageable  At this point we decided to increase the Cymbalta to 60 mg a day which is a more therapeutic dose and see how he does with the next 2-3 weeks  The patient is to give our office a call if he experiences any side effects with the increase and in 2-3 weeks to let us know how he is doing with the medications as at that point we would decide whether not to continue the Cymbalta or titrate him off of the Cymbalta and try Lyrica instead  I advised the patient that if they experience any side effects or issues with the changes in their medication regiment, they should give our office a call to discuss  I also advised the patient not to drive or operate machinery until they see how the changes in the medication regimen affects them  The patient was agreeable and verbalized an understanding  The patient will follow-up in 8 weeks for medication prescription refill and reevaluation  The patient was advised to contact the office should their symptoms worsen in the interim  The patient was agreeable and verbalized an understanding  History of Present Illness:     The patient is a 79 y o  male last seen on 4/26/2021 who presents for a follow up office visit in regards to Chronic pain syndrome secondary to lumbar stenosis with radiculopathy  The patient currently reports that since his last office visit he has not seen any improvement in his pain symptoms or any worsening in his pain symptoms despite starting the Cymbalta and is currently taking 40 mg daily  The patient reports that the Cymbalta is not providing any relief, however, he is also not noticed any side effects  He does report that he does have an upcoming surgical consultation with Dr Sergey Jackson to discuss any possible surgical options as scheduled on June 30, 2021  The patient and his wife present today for his regular medication follow-up visit and to discuss his treatment plan options  Current pain medications includes: Cymbalta 40 mg daily  The patient reports that this regimen is providing minimal to no pain relief  The patient is reporting no side effects from this pain medication regimen  I have personally reviewed and/or updated the patient's past medical history, past surgical history, family history, social history, current medications, allergies, and vital signs today  Review of Systems:    Review of Systems   Respiratory: Negative for shortness of breath  Cardiovascular: Negative for chest pain  Gastrointestinal: Negative for constipation, diarrhea, nausea and vomiting  Musculoskeletal: Positive for gait problem  Negative for arthralgias, joint swelling and myalgias  Skin: Negative for rash  Neurological: Negative for dizziness, seizures and weakness  All other systems reviewed and are negative          Past Medical History:   Diagnosis Date    Astigmatism     last assessed 11/15/17    Cataract     Resolved 11/15/17    Gross hematuria     Last assessed 01/28/16    Hematuria     last assessed 01/28/16    Prostate cancer ([facility] [address] )     last assessed 05/18/17    Renal calculi     last assessed 08/16/12    Squamous cell carcinoma     Vitamin D deficiency     Last assessed 08/19/13       Past Surgical History:   Procedure Laterality Date    BREAST EXCISIONAL BIOPSY N/A     Benign breast tumor unsure of side over 50 yrs ago    CATARACT EXTRACTION Left 08/26/2019    CATARACT EXTRACTION Right 08/12/2019    COLONOSCOPY      CYSTOSCOPY  01/28/2016    ELBOW SURGERY      Bone chip    HERNIA REPAIR  2002    LITHOTRIPSY      RETROPUBIC PROSTATECTOMY  11/13/2001    Radical with nerve sparing       Family History   Problem Relation Age of Onset    Other Mother 76        Influenza    Hypertension Mother     Heart failure Father 70    Peripheral vascular disease Father     Hypertension Father     Prostate cancer Brother     Stroke Brother 48    Hypertension Brother     Hypertension Family        Social History     Occupational History    Not on file   Tobacco Use    Smoking status: Never Smoker    Smokeless tobacco: Never Used   Substance and Sexual Activity    Alcohol use: Yes     Comment: social    Drug use: No    Sexual activity: Not on file         Current Outpatient Medications:     ascorbic acid (VITAMIN C) 500 mg tablet, Take 500 mg by mouth daily, Disp: , Rfl:     cyanocobalamin (VITAMIN B-12) 500 mcg tablet, 1,000 mcg Daily  , Disp: , Rfl:     Docusate Sodium (COLACE PO), Take by mouth daily, Disp: , Rfl:     meloxicam (MOBIC) 7 5 mg tablet, Take 1 tablet twice daily As needed for leg pain, Disp: 60 tablet, Rfl: 5    metoprolol tartrate (LOPRESSOR) 25 mg tablet, Take 1 tablet (25 mg total) by mouth every 12 (twelve) hours, Disp: 180 tablet, Rfl: 3    Multiple Vitamins-Minerals (CENTRUM SILVER 50+MEN PO), Daily, Disp: , Rfl:     Probiotic Product (PROBIOTIC PO), Take by mouth, Disp: , Rfl:     verapamil (CALAN-SR) 240 mg CR tablet, TAKE 1 AND 1/2 TABLETS BY MOUTH DAILY AS DIRECTED, Disp: 135 tablet, Rfl: 3    DULoxetine (CYMBALTA) 30 mg delayed release capsule, Take 2 PO HS , Disp: 60 capsule, Rfl: 1    Allergies   Allergen Reactions    Sulfa Antibiotics Rash    Ciprofloxacin     Gabapentin Dizziness       Physical Exam:    /74 (BP Location: Left arm, Patient Position: Sitting, Cuff Size: Standard)   Pulse 62   Temp 98 1 °F (36 7 °C)   Ht 5' 11" (1 803 m)   Wt 77 1 kg (170 lb)   BMI 23 71 kg/m²     Constitutional:normal, well developed, well nourished, alert, in no distress and non-toxic and no overt pain behavior  Eyes:anicteric  HEENT:grossly intact  Neck:supple, symmetric, trachea midline and no masses   Pulmonary:even and unlabored  Cardiovascular:No edema or pitting edema present  Skin:Normal without rashes or lesions and well hydrated  Psychiatric:Mood and affect appropriate  Neurologic:Cranial Nerves II-XII grossly intact  Musculoskeletal:The patient's gait is slightly antalgic, but steady without the use of any assistive devices  Imaging  No orders to display         No orders of the defined types were placed in this encounter

## 2021-06-17 ENCOUNTER — TELEPHONE (OUTPATIENT)
Dept: PAIN MEDICINE | Facility: CLINIC | Age: 81
End: 2021-06-17

## 2021-06-17 NOTE — TELEPHONE ENCOUNTER
I would see no reason he couldn't but pain should be his guide and follow the recommendations and instructions with/for the device  Thank you

## 2021-06-17 NOTE — TELEPHONE ENCOUNTER
Wife  [phone]  MISHA Reynaga    Wife want to know if her  can use an inversion board?  Please follow up thank you

## 2021-06-30 ENCOUNTER — OFFICE VISIT (OUTPATIENT)
Dept: NEUROSURGERY | Facility: CLINIC | Age: 81
End: 2021-06-30
Payer: MEDICARE

## 2021-06-30 VITALS
DIASTOLIC BLOOD PRESSURE: 75 MMHG | HEIGHT: 71 IN | TEMPERATURE: 97.8 F | BODY MASS INDEX: 23.8 KG/M2 | SYSTOLIC BLOOD PRESSURE: 125 MMHG | WEIGHT: 170 LBS

## 2021-06-30 DIAGNOSIS — G89.4 CHRONIC PAIN SYNDROME: ICD-10-CM

## 2021-06-30 DIAGNOSIS — M79.661 RIGHT CALF PAIN: Primary | ICD-10-CM

## 2021-06-30 DIAGNOSIS — M48.062 SPINAL STENOSIS OF LUMBAR REGION WITH NEUROGENIC CLAUDICATION: ICD-10-CM

## 2021-06-30 DIAGNOSIS — G89.29 CHRONIC BILATERAL LOW BACK PAIN WITHOUT SCIATICA: ICD-10-CM

## 2021-06-30 DIAGNOSIS — M54.16 LUMBAR RADICULOPATHY: ICD-10-CM

## 2021-06-30 DIAGNOSIS — M54.50 CHRONIC BILATERAL LOW BACK PAIN WITHOUT SCIATICA: ICD-10-CM

## 2021-06-30 PROCEDURE — 99204 OFFICE O/P NEW MOD 45 MIN: CPT | Performed by: NEUROLOGICAL SURGERY

## 2021-06-30 NOTE — PATIENT INSTRUCTIONS
Ordered MRI lumbar spine and scoliosis x-rays as well as bilateral lower extremity duplex and MRI right calf to rule out etiology given patient's continued symptoms  Patient will follow-up in 2-4 weeks once imaging completed or sooner if symptoms worsen

## 2021-06-30 NOTE — PROGRESS NOTES
Neurosurgery Office Note  Chantel Centeno 79 y o  male MRN: [id number]      Assessment/Plan     Lumbar radiculopathy  Patient seen in outpatient office today as a referral by pain management for further evaluation of lumbar radiculopathy  Imaging:    MRI lumbar spine wo 11/19/20:Moderate levoscoliotic curvature to the lumbar spine with stable multilevel lumbar spondylosis resulting in moderate spinal stenosis and bilateral foraminal narrowing at L4-L5  Multiple left renal cysts, one of which contains layering hemorrhage  The exophytic cyst measures approximately 2 5 cm  Plan:  · Reviewed imaging with patient and wife  · Pain control with OTC medication as needed  · Patient states he saw physical therapy in April of this year without much change in his pain or symptoms  Patient states last year he received multiple injections in his back from Pain Management which did not help  · Ordered updated MRI lumbar spine as well as scoliosis x-ray to rule out any etiology given patient's symptoms  Also ordered updated bilateral LE VAS duplex and MRI right calf to rule out any etiology given patient's continued right calf pain  · Patient will follow up in 2-4 weeks once imaging completed or sooner if symptoms worsen as a spnx with Ad[facility]  · Patient made aware if he develops new or worsening back pain, leg pain, leg weakness, paresthesias, bowel or bladder issues, saddle anesthesia, or ambulatory dysfunction to seek care sooner     · Patient made aware to contact neurosurgery with any further questions or concerns          Diagnoses and all orders for this visit:    Right calf pain  -     VAS lower limb arterial duplex, complete bilateral; Future  -     MRI tibia fibula right wo contrast; Future    Chronic pain syndrome  -     Ambulatory referral to Neurosurgery  -     XR entire spine (scoliosis) 2-3 vw; Future  -     MRI lumbar spine without contrast; Future    Chronic bilateral low back pain without sciatica  - Ambulatory referral to Neurosurgery    Lumbar radiculopathy  -     Ambulatory referral to Neurosurgery    Spinal stenosis of lumbar region with neurogenic claudication  -     Ambulatory referral to Neurosurgery            CHIEF COMPLAINT    Chief Complaint   Patient presents with    Consult     Chronic pain syndrome       HISTORY    History of Present Illness     79y o  year old male with past medical history significant for prostate CA s/p prostatectomy, PVCs, thoracic aortic aneurysm without rupture, and squamous cell carcinoma  Patient seen in outpatient office today as a referral by pain management for further evaluation of lumbar radiculopathy  Patient states low back pain and right calf pain has been going on since 2019  He denies any precipitating events or traumas  Patient was seeing Pain Management and had MRI lumbar spine done in November 2020 which demonstrated moderate scoliotic curvature of lumbar spine with multilevel lumbar spondylosis resulting in moderate spinal stenosis and bilateral foraminal narrowing at L4-5  Patient was referred to our service at that time but due to personal reasons patient did not follow-up  Patient states over the past 18 months his right calf pain has worsened  Wife also states he has been having trouble walking  Patient states he has no pain while sitting in a chair or lying down but once he swings his legs over the bed bilateral legs become painful  He reports his left leg pain resolves quickly but right leg pain remains there for little  Patient reports as the day goes on his right posterior calf pain will radiate slowly up into his lateral thigh and hip and into his back towards the end of the day  Patient denies any back pain currently  Patient reports standing or walking for long periods of time worsen his pain  Patient reports sitting or lying down will help with his pain  He reports taking Cymbalta, Mobic, and Tylenol which does not seem to help  Patient does report when he does have back pain in his low back pain  He denies any recent falls or traumas or difficulty with his balance  He denies any headaches, dizziness, blurry vision, chest pain, shortness of breath, abdominal pain, nausea, vomiting, diarrhea, no problems with bowel or bladder, no saddle anesthesia, no new weakness or numbness/tingling  Patient states he did see physical therapy in April of this year which did not help with his pain  He also reports seeing Pain Management last year and receiving multiple injections which did not help with his pain either  Of note patient did undergo MRI right calf in 2019 which per read demonstrates no acute osseous abnormality  Edema of the medial soleus muscle from either a soft tissue contusion, myositis, or small partial thickness tear  No findings of a mass  Patient was seen by orthopedics in January 2020 for right calf strain  Reviewed imaging with patient and wife  Also reviewed bilateral lower extremity duplex from July 2019 which demonstrates hyperechoic, avascular focus within right calf muscle noted at the distal calf  Ordered updated MRI lumbar spine as well as scoliosis x-rays to rule out etiology given patient's symptoms  Also ordered updated bilateral lower extremity duplex and MRI right calf to re-evaluate right calf pain and finding on prior imaging  Patient will follow-up in 2-4 weeks once imaging completed or sooner symptoms worsen  Patient made aware to contact Neurosurgery with any further questions or concerns  HPI    See Discussion    REVIEW OF SYSTEMS    Review of Systems   Constitutional: Negative  HENT: Negative  Eyes: Negative  Respiratory: Negative  Cardiovascular: Negative  Gastrointestinal: Negative  Endocrine: Negative  Genitourinary: Negative  Musculoskeletal: Positive for arthralgias (Right leg pain) and gait problem (Painful to walk )  Pain for about 2 years getting worse  Physical therapy- three different times 2019,2020,4/2021  (no relief)   Injections- 10/2020, 7/2020  (no relief)      Skin: Negative  Allergic/Immunologic: Negative  Neurological: Positive for weakness (Right leg)  Hematological: Negative  Psychiatric/Behavioral: Negative  ROS reviewed with patient and agree and changes were made as needed  Meds/Allergies     Current Outpatient Medications   Medication Sig Dispense Refill    ascorbic acid (VITAMIN C) 500 mg tablet Take 500 mg by mouth daily      cyanocobalamin (VITAMIN B-12) 500 mcg tablet 1,000 mcg Daily        Docusate Sodium (COLACE PO) Take by mouth daily      DULoxetine (CYMBALTA) 30 mg delayed release capsule Take 2 PO HS  60 capsule 1    meloxicam (MOBIC) 7 5 mg tablet Take 1 tablet twice daily As needed for leg pain 60 tablet 5    metoprolol tartrate (LOPRESSOR) 25 mg tablet Take 1 tablet (25 mg total) by mouth every 12 (twelve) hours 180 tablet 3    Multiple Vitamins-Minerals (CENTRUM SILVER 50+MEN PO) Daily      Probiotic Product (PROBIOTIC PO) Take by mouth      verapamil (CALAN-SR) 240 mg CR tablet TAKE 1 AND 1/2 TABLETS BY MOUTH DAILY AS DIRECTED 135 tablet 3     No current facility-administered medications for this visit         Allergies   Allergen Reactions    Sulfa Antibiotics Rash    Ciprofloxacin     Gabapentin Dizziness       PAST HISTORY    Past Medical History:   Diagnosis Date    Astigmatism     last assessed 11/15/17    Cataract     Resolved 11/15/17    Gross hematuria     Last assessed 01/28/16    Hematuria     last assessed 01/28/16    Prostate cancer ([facility] [address] )     last assessed 05/18/17    Renal calculi     last assessed 08/16/12    Squamous cell carcinoma     Vitamin D deficiency     Last assessed 08/19/13       Past Surgical History:   Procedure Laterality Date    BREAST EXCISIONAL BIOPSY N/A     Benign breast tumor unsure of side over 50 yrs ago    CATARACT EXTRACTION Left 08/26/2019    CATARACT EXTRACTION Right 08/12/2019    COLONOSCOPY      CYSTOSCOPY  01/28/2016    ELBOW SURGERY      Bone chip    HERNIA REPAIR  2002    LITHOTRIPSY      RETROPUBIC PROSTATECTOMY  11/13/2001    Radical with nerve sparing       Social History     Tobacco Use    Smoking status: Never Smoker    Smokeless tobacco: Never Used   Substance Use Topics    Alcohol use: Yes     Comment: social    Drug use: No       Family History   Problem Relation Age of Onset    Other Mother 76        Influenza   [facility] Hypertension Mother     Heart failure Father 70    Peripheral vascular disease Father     Hypertension Father     Prostate cancer Brother     Stroke Brother 48    Hypertension Brother     Hypertension Family          Above history personally reviewed  EXAM    Vitals:Blood pressure 125/75, temperature 97 8 °F (36 6 °C), temperature source Temporal, height 5' 11" (1 803 m), weight 77 1 kg (170 lb)  ,Body mass index is 23 71 kg/m²  Physical Exam  Vitals reviewed  Constitutional:       General: He is awake  He is not in acute distress  Appearance: Normal appearance  He is not ill-appearing  HENT:      Head: Normocephalic and atraumatic  Eyes:      Extraocular Movements: Extraocular movements intact and EOM normal       Conjunctiva/sclera: Conjunctivae normal       Pupils: Pupils are equal, round, and reactive to light  Cardiovascular:      Rate and Rhythm: Normal rate  Pulmonary:      Effort: Pulmonary effort is normal  No respiratory distress  Chest:      Chest wall: No tenderness  Abdominal:      General: There is no distension  Palpations: Abdomen is soft  Tenderness: There is no abdominal tenderness  Musculoskeletal:         General: Normal range of motion  Cervical back: Normal range of motion and neck supple  No tenderness  No spinous process tenderness or muscular tenderness  Thoracic back: No tenderness  Lumbar back: No tenderness     Skin:     General: Skin is warm and dry  Neurological:      Mental Status: He is alert and oriented to person, place, and time  Coordination: Finger-Nose-Finger Test normal       Deep Tendon Reflexes:      Reflex Scores:       Tricep reflexes are 2+ on the right side and 2+ on the left side  Bicep reflexes are 2+ on the right side and 2+ on the left side  Brachioradialis reflexes are 2+ on the right side and 2+ on the left side  Patellar reflexes are 2+ on the right side and 2+ on the left side  Psychiatric:         Attention and Perception: Attention and perception normal          Mood and Affect: Mood and affect normal          Speech: Speech normal          Behavior: Behavior normal  Behavior is cooperative  Thought Content: Thought content normal          Cognition and Memory: Cognition and memory normal          Judgment: Judgment normal          Neurologic Exam     Mental Status   Oriented to person, place, and time  Follows 2 step commands  Attention: normal  Concentration: normal    Speech: speech is normal   Level of consciousness: alert  Knowledge: good  Able to perform simple calculations  Able to name object  Able to repeat  Normal comprehension  Cranial Nerves     CN III, IV, VI   Pupils are equal, round, and reactive to light  Extraocular motions are normal    CN III: no CN III palsy  CN VI: no CN VI palsy  Nystagmus: none   Diplopia: none  Conjugate gaze: present    CN V   Facial sensation intact  CN VII   Facial expression full, symmetric       CN VIII   CN VIII normal    Hearing: intact    CN IX, X   CN IX normal      CN XI   CN XI normal      CN XII   CN XII normal      Motor Exam   Muscle bulk: normal  Overall muscle tone: normal  Right arm pronator drift: absent  Left arm pronator drift: absentstrength 4+/5 throughout      Sensory Exam   Light touch normal    Proprioception normal    JPS and DST intact     Gait, Coordination, and Reflexes     Gait  Gait: (slowed walking)    Coordination   Finger to nose coordination: normal    Tremor   Resting tremor: absent  Intention tremor: absent  Action tremor: absent    Reflexes   Right brachioradialis: 2+  Left brachioradialis: 2+  Right biceps: 2+  Left biceps: 2+  Right triceps: 2+  Left triceps: 2+  Right patellar: 2+  Left patellar: 2+  Right Chacon: absent  Left Chacon: absent  Right ankle clonus: absent  Left ankle clonus: absent        MEDICAL DECISION MAKING    Imaging Studies:     No results found  I have personally reviewed pertinent reports     and I have personally reviewed pertinent films in PACS

## 2021-06-30 NOTE — ASSESSMENT & PLAN NOTE
Patient seen in outpatient office today as a referral by pain management for further evaluation of lumbar radiculopathy  Imaging:    MRI lumbar spine wo 11/19/20:Moderate levoscoliotic curvature to the lumbar spine with stable multilevel lumbar spondylosis resulting in moderate spinal stenosis and bilateral foraminal narrowing at L4-L5  Multiple left renal cysts, one of which contains layering hemorrhage  The exophytic cyst measures approximately 2 5 cm  Plan:  · Reviewed imaging with patient and wife  · Pain control with OTC medication as needed  · Patient states he saw physical therapy in April of this year without much change in his pain or symptoms  Patient states last year he received multiple injections in his back from Pain Management which did not help  · Ordered updated MRI lumbar spine as well as scoliosis x-ray to rule out any etiology given patient's symptoms  Also ordered updated bilateral LE VAS duplex and MRI right calf to rule out any etiology given patient's continued right calf pain  · Patient will follow up in 2-4 weeks once imaging completed or sooner if symptoms worsen as a spnx with Ad[facility]  · Patient made aware if he develops new or worsening back pain, leg pain, leg weakness, paresthesias, bowel or bladder issues, saddle anesthesia, or ambulatory dysfunction to seek care sooner     · Patient made aware to contact neurosurgery with any further questions or concerns

## 2021-07-02 ENCOUNTER — HOSPITAL ENCOUNTER (OUTPATIENT)
Dept: NON INVASIVE DIAGNOSTICS | Facility: CLINIC | Age: 81
Discharge: HOME | End: 2021-07-02
Payer: MEDICARE

## 2021-07-02 DIAGNOSIS — M79.661 RIGHT CALF PAIN: ICD-10-CM

## 2021-07-02 PROCEDURE — 93922 UPR/L XTREMITY ART 2 LEVELS: CPT | Performed by: SURGERY

## 2021-07-02 PROCEDURE — 93925 LOWER EXTREMITY STUDY: CPT | Performed by: SURGERY

## 2021-07-02 PROCEDURE — 93925 LOWER EXTREMITY STUDY: CPT

## 2021-07-02 PROCEDURE — 93923 UPR/LXTR ART STDY 3+ LVLS: CPT

## 2021-07-06 NOTE — TELEPHONE ENCOUNTER
Can we call and see if he is taking the 30 mg, 2 PO HS? Is he having side effects? Obviously he is not having relief  Thank you

## 2021-07-06 NOTE — TELEPHONE ENCOUNTER
S/w pt's wife, Gaye Amin  Per Bhupinder Corea, pt is not getting relief with cymbalta  Proceeding w/ more tests as ordered by Dr Basim Doran, mri's and us  Corine Capellan per DG, decrease cymbalta to 1 pill po qd x 10 days, 1 pill eod x 10 days, stop  Gaye Amin verbalized understanding and appreciation  Stated that she will cancel the appt on 8/9 and fu prn for now  Advised kandi barbour sooner if questions / concerns arise       DG aware

## 2021-07-06 NOTE — TELEPHONE ENCOUNTER
Wenceslao Knott called in to request the pt be weaned off the Cymbalta and wanted to know how to do that because it is not helping  Please be advise thank you        Please call Wenceslao Knott  back @ [phone]

## 2021-07-13 ENCOUNTER — HOSPITAL ENCOUNTER (OUTPATIENT)
Dept: MRI IMAGING | Facility: HOSPITAL | Age: 81
Discharge: HOME | End: 2021-07-13
Payer: MEDICARE

## 2021-07-13 ENCOUNTER — HOSPITAL ENCOUNTER (OUTPATIENT)
Dept: RADIOLOGY | Facility: HOSPITAL | Age: 81
Discharge: HOME | End: 2021-07-13
Payer: MEDICARE

## 2021-07-13 DIAGNOSIS — G89.4 CHRONIC PAIN SYNDROME: ICD-10-CM

## 2021-07-13 DIAGNOSIS — M79.661 RIGHT CALF PAIN: ICD-10-CM

## 2021-07-13 PROCEDURE — G1004 CDSM NDSC: HCPCS

## 2021-07-13 PROCEDURE — 72148 MRI LUMBAR SPINE W/O DYE: CPT

## 2021-07-13 PROCEDURE — 73718 MRI LOWER EXTREMITY W/O DYE: CPT

## 2021-07-13 PROCEDURE — 72082 X-RAY EXAM ENTIRE SPI 2/3 VW: CPT

## 2021-07-20 ENCOUNTER — APPOINTMENT (OUTPATIENT)
Dept: LAB | Facility: CLINIC | Age: 81
End: 2021-07-20
Payer: MEDICARE

## 2021-07-20 ENCOUNTER — TELEPHONE (OUTPATIENT)
Dept: PAIN MEDICINE | Facility: CLINIC | Age: 81
End: 2021-07-20

## 2021-07-20 DIAGNOSIS — E55.9 VITAMIN D DEFICIENCY: ICD-10-CM

## 2021-07-20 DIAGNOSIS — E78.00 PURE HYPERCHOLESTEROLEMIA: ICD-10-CM

## 2021-07-20 DIAGNOSIS — C61 MALIGNANT NEOPLASM OF PROSTATE (HCC): ICD-10-CM

## 2021-07-20 DIAGNOSIS — I49.9 CARDIAC ARRHYTHMIA, UNSPECIFIED CARDIAC ARRHYTHMIA TYPE: ICD-10-CM

## 2021-07-20 DIAGNOSIS — I49.3 PVC'S (PREMATURE VENTRICULAR CONTRACTIONS): ICD-10-CM

## 2021-07-20 LAB
25(OH)D3 SERPL-MCNC: 37.9 NG/ML (ref 30–100)
ALBUMIN SERPL BCP-MCNC: 3.7 G/DL (ref 3.5–5)
ALP SERPL-CCNC: 49 U/L (ref 46–116)
ALT SERPL W P-5'-P-CCNC: 24 U/L (ref 12–78)
ANION GAP SERPL CALCULATED.3IONS-SCNC: 5 MMOL/L (ref 4–13)
AST SERPL W P-5'-P-CCNC: 15 U/L (ref 5–45)
BASOPHILS # BLD AUTO: 0.07 THOUSANDS/ΜL (ref 0–0.1)
BASOPHILS NFR BLD AUTO: 1 % (ref 0–1)
BILIRUB SERPL-MCNC: 0.73 MG/DL (ref 0.2–1)
BUN SERPL-MCNC: 21 MG/DL (ref 5–25)
CALCIUM SERPL-MCNC: 9.2 MG/DL (ref 8.3–10.1)
CHLORIDE SERPL-SCNC: 105 MMOL/L (ref 100–108)
CHOLEST SERPL-MCNC: 210 MG/DL (ref 50–200)
CO2 SERPL-SCNC: 28 MMOL/L (ref 21–32)
CREAT SERPL-MCNC: 1.1 MG/DL (ref 0.6–1.3)
EOSINOPHIL # BLD AUTO: 0.54 THOUSAND/ΜL (ref 0–0.61)
EOSINOPHIL NFR BLD AUTO: 7 % (ref 0–6)
ERYTHROCYTE [DISTWIDTH] IN BLOOD BY AUTOMATED COUNT: 13.1 % (ref 11.6–15.1)
GFR SERPL CREATININE-BSD FRML MDRD: 62 ML/MIN/1.73SQ M
GLUCOSE P FAST SERPL-MCNC: 95 MG/DL (ref 65–99)
HCT VFR BLD AUTO: 42.4 % (ref 36.5–49.3)
HDLC SERPL-MCNC: 59 MG/DL
HGB BLD-MCNC: 13.2 G/DL (ref 12–17)
IMM GRANULOCYTES # BLD AUTO: 0.11 THOUSAND/UL (ref 0–0.2)
IMM GRANULOCYTES NFR BLD AUTO: 1 % (ref 0–2)
LDLC SERPL CALC-MCNC: 127 MG/DL (ref 0–100)
LYMPHOCYTES # BLD AUTO: 1.39 THOUSANDS/ΜL (ref 0.6–4.47)
LYMPHOCYTES NFR BLD AUTO: 17 % (ref 14–44)
MCH RBC QN AUTO: 30.5 PG (ref 26.8–34.3)
MCHC RBC AUTO-ENTMCNC: 31.1 G/DL (ref 31.4–37.4)
MCV RBC AUTO: 98 FL (ref 82–98)
MONOCYTES # BLD AUTO: 0.96 THOUSAND/ΜL (ref 0.17–1.22)
MONOCYTES NFR BLD AUTO: 12 % (ref 4–12)
NEUTROPHILS # BLD AUTO: 5.07 THOUSANDS/ΜL (ref 1.85–7.62)
NEUTS SEG NFR BLD AUTO: 62 % (ref 43–75)
NONHDLC SERPL-MCNC: 151 MG/DL
NRBC BLD AUTO-RTO: 0 /100 WBCS
PLATELET # BLD AUTO: 172 THOUSANDS/UL (ref 149–390)
PMV BLD AUTO: 12 FL (ref 8.9–12.7)
POTASSIUM SERPL-SCNC: 4.4 MMOL/L (ref 3.5–5.3)
PROT SERPL-MCNC: 7.2 G/DL (ref 6.4–8.2)
PSA SERPL-MCNC: <0.1 NG/ML (ref 0–4)
RBC # BLD AUTO: 4.33 MILLION/UL (ref 3.88–5.62)
SODIUM SERPL-SCNC: 138 MMOL/L (ref 136–145)
TRIGL SERPL-MCNC: 120 MG/DL
TSH SERPL DL<=0.05 MIU/L-ACNC: 1.91 UIU/ML (ref 0.36–3.74)
WBC # BLD AUTO: 8.14 THOUSAND/UL (ref 4.31–10.16)

## 2021-07-20 PROCEDURE — 84443 ASSAY THYROID STIM HORMONE: CPT

## 2021-07-20 PROCEDURE — 85025 COMPLETE CBC W/AUTO DIFF WBC: CPT

## 2021-07-20 PROCEDURE — 80053 COMPREHEN METABOLIC PANEL: CPT

## 2021-07-20 PROCEDURE — 82306 VITAMIN D 25 HYDROXY: CPT

## 2021-07-20 PROCEDURE — 36415 COLL VENOUS BLD VENIPUNCTURE: CPT

## 2021-07-20 PROCEDURE — 80061 LIPID PANEL: CPT

## 2021-07-20 PROCEDURE — 84153 ASSAY OF PSA TOTAL: CPT

## 2021-07-20 NOTE — TELEPHONE ENCOUNTER
Walker Wagner called in to request another [y of the disability decal forms to be filled out and she will pick it up from the office   Please be advise thank you        Please call patient back @ [phone]

## 2021-07-21 NOTE — TELEPHONE ENCOUNTER
S/w angle  Advised of above  Eulene Eugenie verbalized understanding and appreciation  Will pu the paperwork tomorrow

## 2021-07-21 NOTE — TELEPHONE ENCOUNTER
Can you please let the patient know that the handicapped placard forms that they requested are waiting for him at the   Thank you

## 2021-07-28 ENCOUNTER — OFFICE VISIT (OUTPATIENT)
Dept: FAMILY MEDICINE CLINIC | Facility: CLINIC | Age: 81
End: 2021-07-28
Payer: MEDICARE

## 2021-07-28 VITALS
TEMPERATURE: 96.4 F | DIASTOLIC BLOOD PRESSURE: 70 MMHG | OXYGEN SATURATION: 99 % | SYSTOLIC BLOOD PRESSURE: 108 MMHG | BODY MASS INDEX: 23.49 KG/M2 | HEIGHT: 71 IN | HEART RATE: 82 BPM | WEIGHT: 167.8 LBS

## 2021-07-28 DIAGNOSIS — M79.661 RIGHT CALF PAIN: ICD-10-CM

## 2021-07-28 DIAGNOSIS — C61 MALIGNANT NEOPLASM OF PROSTATE (HCC): Primary | ICD-10-CM

## 2021-07-28 DIAGNOSIS — I71.2 THORACIC AORTIC ANEURYSM WITHOUT RUPTURE (HCC): ICD-10-CM

## 2021-07-28 DIAGNOSIS — I35.1 NONRHEUMATIC AORTIC VALVE INSUFFICIENCY: ICD-10-CM

## 2021-07-28 DIAGNOSIS — I49.3 PVC'S (PREMATURE VENTRICULAR CONTRACTIONS): ICD-10-CM

## 2021-07-28 DIAGNOSIS — I34.0 NON-RHEUMATIC MITRAL REGURGITATION: ICD-10-CM

## 2021-07-28 PROCEDURE — 99214 OFFICE O/P EST MOD 30 MIN: CPT | Performed by: FAMILY MEDICINE

## 2021-08-04 ENCOUNTER — TELEPHONE (OUTPATIENT)
Dept: FAMILY MEDICINE CLINIC | Facility: CLINIC | Age: 81
End: 2021-08-04

## 2021-08-04 NOTE — TELEPHONE ENCOUNTER
Patient aware and agreeable to seeing Dr Roger Ford again and would appreciate you speaking to him prior to him scheduling an appointment

## 2021-08-04 NOTE — TELEPHONE ENCOUNTER
----- Message from Aimee Winston, DO sent at 5/3/9581  9:20 AM EDT -----   Can you let Mr  Scott Chavez know that I spoke with vascular  They do not feel that anything on the arterial scan relates to there field  They agree with me to have Ortho consider re-evaluating the patient    Let me know if he would like to see Dr Kamran David again and I will speak to that Dr  Prior to give them a heads up

## 2021-08-05 PROBLEM — G89.4 CHRONIC PAIN SYNDROME: Status: RESOLVED | Noted: 2020-08-28 | Resolved: 2021-08-05

## 2021-08-05 NOTE — PROGRESS NOTES
Assessment/Plan:     blood pressure stable but needs to be careful that it is not going too low  Not having any symptoms of orthostatic hypotension  To date with Cardiology with regards to his cardiac diagnosis  Continues on verapamil and metoprolol  If blood pressure going too low dosing may need to be adjusted  Vitamin-D level normal PSA less than 0 1   TSH normal   CMP normal   Cholesterol 210 with an HDL of 59 and an LDL of 127  Most important thing that is honestly bothering this patient is proximal posterior calf discomfort with ambulation  Rest he is okay  He did have an Achilles injury status post quinolone  Initially were boot which improved  Eventually saw pain management with regards to lumbar degenerative changes  Currently back pain is fine he is left mid to proximal right posterior discomfort  No evidence of DVT  Recent MRI area came back negative  Will check vascular regard to arterial duplex  May want the patient to be seen back by orthopedics further evaluation  Otherwise recheck 6 months  Consider flu shot in the fall     Diagnoses and all orders for this visit:    Malignant neoplasm of prostate ([address] )    Right calf pain    PVC's (premature ventricular contractions)    Thoracic aortic aneurysm without rupture (HCC)    Nonrheumatic aortic valve insufficiency    Non-rheumatic mitral regurgitation    Other orders  -     Cholecalciferol 25 MCG (1000 UT) capsule; Daily        1  Malignant neoplasm of prostate ([address] )     2  Right calf pain     3  PVC's (premature ventricular contractions)     4  Thoracic aortic aneurysm without rupture ([address] )     5  Nonrheumatic aortic valve insufficiency     6  Non-rheumatic mitral regurgitation         Subjective:        Patient ID: Emily Lyman is a 79 y o  male  Chief Complaint   Patient presents with    Follow-up     6 month follow up  Pt has c/o right calf pain          HPI    The following portions of the patient's history were reviewed and updated as appropriate: past medical history, past surgical history and problem list       Review of Systems      Objective:  /70   Pulse 82   Temp (!) 96 4 °F (35 8 °C) (Temporal)   Ht 5' 11" (1 803 m)   Wt 76 1 kg (167 lb 12 8 oz)   SpO2 99%   BMI 23 40 kg/m²        Physical Exam

## 2021-08-05 NOTE — ASSESSMENT & PLAN NOTE
Patient seen in outpatient office today for follow up after imaging for lumbar radiculopathy  Imaging:  · X-ray entire spine 07/13/2021:  Lumbar level scoliosis appears slightly progressed from April 2019  Lower lumbar degenerative changes  · MRI lumbar spine without contrast 07/13/2021: Spondylotic degenerative changes are identified similar to the prior study resulting in moderate central and bilateral lateral recess stenosis at L4-5 with mild bilateral foraminal narrowing  There is a levoscoliosis apex L3 noted  Plan:  · Extensive discussion with attending, patient and wife  Review of imaging reveals scoliosis as well as some foraminal narrowing  Patient's symptomatology does not fully correlate with imaging as he reports his pain starts in the calf and radiates upward which is not typical of a radiculopathy  We did discuss surgical options including a large corrective fixation fusion, a minimally invasive foraminotomy as well as possible spinal cord stimulator placement  Given patient's history of tendon rupture, atypical presentation for a lumbar radiculopathy, tenderness to palpation of the calf, etcetera, query CRPS  Patient may benefit overall from a spinal cord stimulator which is ultimately the recommendation however if he is interested in a more minimally invasive procedure rather than a fusion, he may come back to the office to further discuss that as an option  · Patient may return to the office on an as-needed basis or sooner should he develop worsening symptoms or red flag signs or decided he would like permanent spinal cord stimulator placement

## 2021-08-06 ENCOUNTER — TELEPHONE (OUTPATIENT)
Dept: FAMILY MEDICINE CLINIC | Facility: CLINIC | Age: 81
End: 2021-08-06

## 2021-08-06 ENCOUNTER — OFFICE VISIT (OUTPATIENT)
Dept: NEUROSURGERY | Facility: CLINIC | Age: 81
End: 2021-08-06
Payer: MEDICARE

## 2021-08-06 VITALS
SYSTOLIC BLOOD PRESSURE: 130 MMHG | TEMPERATURE: 97 F | DIASTOLIC BLOOD PRESSURE: 60 MMHG | RESPIRATION RATE: 16 BRPM | BODY MASS INDEX: 23.38 KG/M2 | WEIGHT: 167 LBS | HEART RATE: 66 BPM | HEIGHT: 71 IN

## 2021-08-06 DIAGNOSIS — M54.16 LUMBAR RADICULOPATHY: Primary | ICD-10-CM

## 2021-08-06 PROCEDURE — 99214 OFFICE O/P EST MOD 30 MIN: CPT | Performed by: NEUROLOGICAL SURGERY

## 2021-08-06 NOTE — TELEPHONE ENCOUNTER
Patient's wife called asking for a recommendation for a General Orthopaedic Doctor and if we can setup the appointment for the patient

## 2021-08-06 NOTE — TELEPHONE ENCOUNTER
Called and spoke with the Pt he is unsure if he has seen any other general orthopedics he has to check with his wife and then get back to us

## 2021-08-06 NOTE — PROGRESS NOTES
Neurosurgery Office Note  Brittney Richter 79 y o  male MRN: [id number]      Assessment/Plan     Lumbar radiculopathy  Patient seen in outpatient office today for follow up after imaging for lumbar radiculopathy  Imaging:  · X-ray entire spine 07/13/2021:  Lumbar level scoliosis appears slightly progressed from April 2019  Lower lumbar degenerative changes  · MRI lumbar spine without contrast 07/13/2021: Spondylotic degenerative changes are identified similar to the prior study resulting in moderate central and bilateral lateral recess stenosis at L4-5 with mild bilateral foraminal narrowing  There is a levoscoliosis apex L3 noted  Plan:  · Extensive discussion with attending, patient and wife  Review of imaging reveals scoliosis as well as some foraminal narrowing  Patient's symptomatology does not fully correlate with imaging as he reports his pain starts in the calf and radiates upward which is not typical of a radiculopathy  We did discuss surgical options including a large corrective fixation fusion, a minimally invasive foraminotomy as well as possible spinal cord stimulator placement  Given patient's history of tendon rupture, atypical presentation for a lumbar radiculopathy, tenderness to palpation of the calf, etcetera, query CRPS  Patient may benefit overall from a spinal cord stimulator which is ultimately the recommendation however if he is interested in a more minimally invasive procedure rather than a fusion, he may come back to the office to further discuss that as an option  · Patient may return to the office on an as-needed basis or sooner should he develop worsening symptoms or red flag signs or decided he would like permanent spinal cord stimulator placement      Strain of calf muscle  Imaging:  · VAS lower limb arterial duplex 7/2/2021:  No evidence of significant lower extremity arterial occlusive disease bilaterally  · MRI tibia fibula right wo contrast 7/13/2021:  No source of pain identified in the right tibia-fibula  Plan:  No further work up regarding this issue        CHIEF COMPLAINT    Chief Complaint   Patient presents with    Follow-up       HISTORY    History of Present Illness     79y o  year old male with past medical history significant for prostate cancer status post prostatectomy, PVCs, thoracic aortic aneurysm without rupture, squamous cell carcinoma, hypertension who is seen for the evaluation of possible lumbar radiculopathy with updated imaging  Patient states overall he is doing about the same  He denies any back pain  He states for the most part he has just isolated right calf pain that radiates up into his buttock with activity such as standing and walking  When he is sitting down he is normally okay without significant pain  He does make note that the pain radiates upward towards his buttock rather than down towards his calf  He has no numbness but feels his leg is weak secondary to pain  His wife is present and states he ambulates very slowly and spends most of his time in his chair at home as he is unable to ambulate for long distances  He has not had any falls but has felt a little bit wobbly on his legs  He does follow with pain management, states he was recently weaned off of Cymbalta as he did not think this was helping  He takes 2 Tylenol and Mobic in the morning but does not know if this helps  He has had 2 injections in the past without significant relief  Denies any bowel or bladder issues or saddle anesthesia  Of note, patient and wife do admit to Cipro use in the past which did cause tendon rupture in the right leg  He was managed in a boot however shortly after this event patient developed this right calf pain, query CRPS?      HPI    See Discussion    REVIEW OF SYSTEMS    Review of Systems   Constitutional: Negative  HENT: Negative  Eyes: Negative  Respiratory: Negative  Cardiovascular: Negative      Gastrointestinal: Negative  Endocrine: Negative  Genitourinary: Negative  Musculoskeletal: Positive for back pain (right ankle pain traveling up side of leg to right buttock/hip) and gait problem (Painful to walk )  Negative for arthralgias  Pain for about 2 years getting worse  Physical therapy- three different times 2019,2020,4/2021  (no relief)   Injections- 10/2020, 7/2020  (no relief)      Skin: Negative  Allergic/Immunologic: Negative  Neurological: Positive for weakness (Right leg)  Hematological: Negative  Psychiatric/Behavioral: Negative  Meds/Allergies     Current Outpatient Medications   Medication Sig Dispense Refill    ascorbic acid (VITAMIN C) 500 mg tablet Take 500 mg by mouth daily      Cholecalciferol 25 MCG (1000 UT) capsule Daily      cyanocobalamin (VITAMIN B-12) 500 mcg tablet 1,000 mcg Daily        Docusate Sodium (COLACE PO) Take by mouth daily      meloxicam (MOBIC) 7 5 mg tablet Take 1 tablet twice daily As needed for leg pain 60 tablet 5    metoprolol tartrate (LOPRESSOR) 25 mg tablet Take 1 tablet (25 mg total) by mouth every 12 (twelve) hours 180 tablet 3    Multiple Vitamins-Minerals (CENTRUM SILVER 50+MEN PO) Daily      Probiotic Product (PROBIOTIC PO) Take by mouth      verapamil (CALAN-SR) 240 mg CR tablet TAKE 1 AND 1/2 TABLETS BY MOUTH DAILY AS DIRECTED (Patient taking differently: Take 240 mg by mouth daily ) 135 tablet 3     No current facility-administered medications for this visit         Allergies   Allergen Reactions    Sulfa Antibiotics Rash    Ciprofloxacin     Gabapentin Dizziness       PAST HISTORY    Past Medical History:   Diagnosis Date    Astigmatism     last assessed 11/15/17    Cataract     Resolved 11/15/17    Gross hematuria     Last assessed 01/28/16    Hematuria     last assessed 01/28/16    Prostate cancer ([facility] [address] )     last assessed 05/18/17    Renal calculi     last assessed 08/16/12    Squamous cell carcinoma     Vitamin D deficiency     Last assessed 08/19/13       Past Surgical History:   Procedure Laterality Date    BREAST EXCISIONAL BIOPSY N/A     Benign breast tumor unsure of side over 50 yrs ago    CATARACT EXTRACTION Left 08/26/2019    CATARACT EXTRACTION Right 08/12/2019    COLONOSCOPY      CYSTOSCOPY  01/28/2016    ELBOW SURGERY      Bone chip    HERNIA REPAIR  2002    LITHOTRIPSY      RETROPUBIC PROSTATECTOMY  11/13/2001    Radical with nerve sparing       Social History     Tobacco Use    Smoking status: Never Smoker    Smokeless tobacco: Never Used   Substance Use Topics    Alcohol use: Yes     Comment: social    Drug use: No       Family History   Problem Relation Age of Onset    Other Mother 76        Influenza   [place]als Hypertension Mother     Heart failure Father 70    Peripheral vascular disease Father     Hypertension Father     Prostate cancer Brother     Stroke Brother 48    Hypertension Brother     Hypertension Family          Above history personally reviewed  EXAM    Vitals:Blood pressure 130/60, pulse 66, temperature (!) 97 °F (36 1 °C), temperature source Tympanic, resp  rate 16, height 5' 11" (1 803 m), weight 75 8 kg (167 lb)  ,Body mass index is 23 29 kg/m²  Physical Exam  Constitutional:       General: He is awake  Appearance: Normal appearance  HENT:      Head: Normocephalic and atraumatic  Eyes:      Conjunctiva/sclera: Conjunctivae normal    Cardiovascular:      Rate and Rhythm: Normal rate  Pulmonary:      Effort: Pulmonary effort is normal  No respiratory distress  Musculoskeletal:      Cervical back: No tenderness  Thoracic back: No tenderness  Lumbar back: No tenderness  Comments: TTP right buttock region   Skin:     General: Skin is warm and dry  Neurological:      Mental Status: He is alert and oriented to person, place, and time        Coordination: Finger-Nose-Finger Test normal       Deep Tendon Reflexes: Strength normal       Reflex Scores:       Patellar reflexes are 2+ on the right side and 2+ on the left side  Psychiatric:         Attention and Perception: Attention and perception normal          Mood and Affect: Mood and affect normal          Speech: Speech normal          Behavior: Behavior normal  Behavior is cooperative  Thought Content: Thought content normal          Cognition and Memory: Cognition and memory normal          Judgment: Judgment normal          Neurologic Exam     Mental Status   Oriented to person, place, and time  Follows 1 step commands  Attention: normal  Concentration: normal    Speech: speech is normal   Level of consciousness: alert  Knowledge: good  Normal comprehension  Cranial Nerves     CN III, IV, VI   Conjugate gaze: present    CN VIII   Hearing: intact    Motor Exam   Muscle bulk: normal  Overall muscle tone: normal  Right arm pronator drift: absent  Left arm pronator drift: absent    Strength   Strength 5/5 throughout  Sensory Exam   Light touch normal      Gait, Coordination, and Reflexes     Gait  Gait: (slowed gait)    Coordination   Finger to nose coordination: normal    Tremor   Resting tremor: absent  Intention tremor: absent  Action tremor: absent    Reflexes   Right patellar: 2+  Left patellar: 2+  Right : 2+  Left : 2+  Right Ch[place]: absent  Left [place]: absent  Right ankle clonus: absent  Left ankle clonus: absent        MEDICAL DECISION MAKING    Imaging Studies:     MRI lumbar spine without contrast    Result Date: 7/17/2021  Narrative: MRI LUMBAR SPINE WITHOUT CONTRAST INDICATION: G89 4: Chronic pain syndrome  Right calf pain  Chronic pain syndrome  L4-5 spinal stenosis  Chronic low back pain radiating down the right leg  COMPARISON:  November 19, 2020 TECHNIQUE:  Sagittal T1, sagittal T2, sagittal inversion recovery, axial T1 and axial T2, coronal T2   IMAGE QUALITY:  Diagnostic FINDINGS: VERTEBRAL BODIES:  There are 5 lumbar type vertebral bodies  Levoscoliosis apex L3  Normal marrow signal is identified within the visualized bony structures  No discrete marrow lesion  SACRUM:  Normal signal within the sacrum  No evidence of insufficiency or stress fracture  DISTAL CORD AND CONUS:  Normal size and signal within the distal cord and conus  PARASPINAL SOFT TISSUES:  Paraspinal soft tissues are unremarkable  LOWER THORACIC DISC SPACES:  Normal disc height and signal   No disc herniation, canal stenosis or foraminal narrowing  LUMBAR DISC SPACES: L1-L2:  No significant central or foraminal narrowing  L2-L3:  Mild facet hypertrophy with ligamentous infolding noted with mild annular bulge  Minimal central stenosis  Foramina patent  L3-L4:  Moderate facet hypertrophy identified  Degenerative disc osteophyte complex with marginal osteophytes eccentric to the right results in moderate right foraminal narrowing  Central canal is patent  L4-L5:  Severe facet hypertrophy identified  There is a degenerative disc osteophyte complex with marginal osteophytes which result in moderate central and bilateral lateral recess stenosis  There is mild bilateral foraminal narrowing  L5-S1:  Moderate facet hypertrophy  Mild annular bulge results in minimal left foraminal narrowing  Impression: Spondylotic degenerative changes are identified similar to the prior study resulting in moderate central and bilateral lateral recess stenosis at L4-5 with mild bilateral foraminal narrowing  There is a levoscoliosis apex L3 noted  Workstation performed: [id number]     MRI tibia fibula right wo contrast    Result Date: 7/15/2021  Narrative: MRI LOWER EXTREMITY WITHOUT CONTRAST - RIGHT TIBIA-FIBULA INDICATION:   M79 661: Pain in right lower leg  Right calf pain  COMPARISON:  Generalized right calf pain for about 2 years  TECHNIQUE:  MR examination of the right tibia-fibula was performed   Pulse Sequences: Localizers, coronal STIR, coronal T1-weighted, sagittal T2 fat sat, axial T1 weighted, axial T2 fat sat  (Please note the coronal images also include the contralateral lower extremity ) IV contrast was not given  FINDINGS: SUBCUTANEOUS TISSUES: Normal BONES: No fracture, dislocation or destructive osseous lesion  ARTICULAR SURFACES:  Normal  VISUALIZED MUSCULATURE:  Unremarkable  OTHER SOFT TISSUES:  Unremarkable  OTHER PERTINENT FINDINGS:  None  PARTIALLY IMAGED CONTRALATERAL LOWER EXTREMITY: There are no abnormalities in the partially imaged contralateral lower extremity  Impression: No source of pain identified in the right tibia-fibula  Workstation performed: [id number]     XR entire spine (scoliosis) 2-3 vw    Result Date: 7/19/2021  Narrative: SCOLIOSIS INDICATION:   G89 4: Chronic pain syndrome  COMPARISON:  April 25, 2019 VIEWS:  XR ENTIRE SPINE (SCOLIOSIS) 2-3 VW FINDINGS: Osseous structures are somewhat osteopenic  There is no fracture, pathologic bone lesion or congenital segmentation anomaly  There is left convex curvature lumbar spine, apex at L3 with an angle of curvature of approximately 34 degrees somewhat progressed from April 25, 2019  Multilevel lower lumbar degenerative changes are noted  No anterolisthesis or retrolisthesis  Multilevel facet spondylosis in the lumbar spine  Surgical clips noted throughout the lumbar spine  Impression: Lumbar levoscoliosis appears slightly progressed from April 2019  Lower lumbar degenerative changes  Workstation performed: [id number]       I have personally reviewed pertinent reports     and I have personally reviewed pertinent films in PACS

## 2021-08-06 NOTE — TELEPHONE ENCOUNTER
Please tell the patient I had a conversation with Dr Lonnie Mckeon who reviewed the latest imaging  He feels this is likely better served by [facility] as he is more Foot and Ankle  The doctor you saw in the past Dr John Paul Redd  is no longer with [address]    Please double check if patient has ever seen other general Orthopedics if not I will refer to Aracely Rangel

## 2021-08-06 NOTE — TELEPHONE ENCOUNTER
Tell the patient or his wife I am going to review his records through the weekend and make the appropriate recommendation Monday    Please send back

## 2021-08-06 NOTE — PATIENT INSTRUCTIONS
Outpatient follow-up on an as-needed basis or sooner should patient develop worsening symptoms or red flag signs or should spinal cord stimulator permanent placement be warranted  Continue to follow-up with pain management on a regular basis and discussed possible spinal cord stimulator placement with them

## 2021-08-06 NOTE — ASSESSMENT & PLAN NOTE
Imaging:  · VAS lower limb arterial duplex 7/2/2021:  No evidence of significant lower extremity arterial occlusive disease bilaterally  · MRI tibia fibula right wo contrast 7/13/2021:  No source of pain identified in the right tibia-fibula      Plan:  No further work up regarding this issue

## 2021-08-17 DIAGNOSIS — M17.11 PRIMARY OSTEOARTHRITIS OF RIGHT KNEE: ICD-10-CM

## 2021-08-17 DIAGNOSIS — M79.661 RIGHT CALF PAIN: Primary | ICD-10-CM

## 2021-08-17 NOTE — TELEPHONE ENCOUNTER
Please apologize to Mr  Mrs Mukherjee as I am off this week  I did directly speak with Dr Karen Muñoz  He would be very happy to see him regarding his chronic right calf discomfort  He is also off this week but I will place a referral if they are willing to do so  It may just take a short bit of time to get in if they can be patient

## 2021-08-17 NOTE — TELEPHONE ENCOUNTER
Patient and his wife are agreeable to seeing Dr Sue Carballo, please place the referral   Will need to make the patient aware when it is done so they can call to schedule appt

## 2021-08-25 ENCOUNTER — OFFICE VISIT (OUTPATIENT)
Dept: UROLOGY | Facility: AMBULATORY SURGERY CENTER | Age: 81
End: 2021-08-25
Payer: MEDICARE

## 2021-08-25 VITALS
BODY MASS INDEX: 23.21 KG/M2 | DIASTOLIC BLOOD PRESSURE: 68 MMHG | HEIGHT: 71 IN | HEART RATE: 61 BPM | SYSTOLIC BLOOD PRESSURE: 140 MMHG | WEIGHT: 165.8 LBS

## 2021-08-25 DIAGNOSIS — R30.0 DYSURIA: Primary | ICD-10-CM

## 2021-08-25 DIAGNOSIS — N40.1 BENIGN PROSTATIC HYPERPLASIA WITH URINARY FREQUENCY: ICD-10-CM

## 2021-08-25 DIAGNOSIS — R35.0 BENIGN PROSTATIC HYPERPLASIA WITH URINARY FREQUENCY: ICD-10-CM

## 2021-08-25 LAB
BACTERIA UR QL AUTO: ABNORMAL /HPF
BILIRUB UR QL STRIP: ABNORMAL
CLARITY UR: CLEAR
COLOR UR: ABNORMAL
GLUCOSE UR STRIP-MCNC: NEGATIVE MG/DL
HGB UR QL STRIP.AUTO: NEGATIVE
HYALINE CASTS #/AREA URNS LPF: ABNORMAL /LPF
KETONES UR STRIP-MCNC: ABNORMAL MG/DL
LEUKOCYTE ESTERASE UR QL STRIP: ABNORMAL
NITRITE UR QL STRIP: NEGATIVE
NON-SQ EPI CELLS URNS QL MICRO: ABNORMAL /HPF
PH UR STRIP.AUTO: 5.5 [PH]
POST-VOID RESIDUAL VOLUME, ML POC: 0 ML
PROT UR STRIP-MCNC: ABNORMAL MG/DL
RBC #/AREA URNS AUTO: ABNORMAL /HPF
SL AMB  POCT GLUCOSE, UA: ABNORMAL
SL AMB LEUKOCYTE ESTERASE,UA: ABNORMAL
SL AMB POCT BILIRUBIN,UA: ABNORMAL
SL AMB POCT BLOOD,UA: ABNORMAL
SL AMB POCT CLARITY,UA: CLEAR
SL AMB POCT COLOR,UA: ABNORMAL
SL AMB POCT KETONES,UA: ABNORMAL
SL AMB POCT NITRITE,UA: ABNORMAL
SL AMB POCT PH,UA: 5
SL AMB POCT SPECIFIC GRAVITY,UA: 1.02
SL AMB POCT URINE PROTEIN: ABNORMAL
SL AMB POCT UROBILINOGEN: ABNORMAL
SP GR UR STRIP.AUTO: 1.03 (ref 1–1.03)
UROBILINOGEN UR QL STRIP.AUTO: 1 E.U./DL
WBC #/AREA URNS AUTO: ABNORMAL /HPF

## 2021-08-25 PROCEDURE — 99213 OFFICE O/P EST LOW 20 MIN: CPT | Performed by: NURSE PRACTITIONER

## 2021-08-25 PROCEDURE — 81002 URINALYSIS NONAUTO W/O SCOPE: CPT | Performed by: NURSE PRACTITIONER

## 2021-08-25 PROCEDURE — 51798 US URINE CAPACITY MEASURE: CPT | Performed by: NURSE PRACTITIONER

## 2021-08-25 PROCEDURE — 87086 URINE CULTURE/COLONY COUNT: CPT | Performed by: NURSE PRACTITIONER

## 2021-08-25 PROCEDURE — 81001 URINALYSIS AUTO W/SCOPE: CPT | Performed by: NURSE PRACTITIONER

## 2021-08-25 NOTE — PROGRESS NOTES
08/25/21    Jaki Victor   1940   [id number]     Assessment  1 Radical prostatectomy (2001)  2 Urinary incontinence    Discussion/Plan  1 Radical prostatectomy (2001)   7/20/21 PSA <0 1, undetectable   2 Urinary incontinence   Urine dip in office: negative leukocytes, negative blood, dark kaity    Sent for microscopic urinalysis and culture   PVR: 0   Encouraged increased hydration with water and avoidance of bladder irritating beverages  Patient will practice Kegel exercises and wear a light pad for protection from leakage  He defers medication and pelvic floor physical therapy at this time  Patient wishes to remain conservative in his treatment at this time  Await urine studies  If urinalysis reveals microscopic blood, patient is agreeable to Renal US imaging  He will increase his water intake and notify our office if symptoms failure to improve or worsen  All questions answered at this time  Subjective  HPI   Sunny Cook is an 79 y o  male known to Dr Vladimir Graham with a history of radical prostatectomy in 2001 at [facility]  He was originally referred for evaluation of gross hematuria in 2016  He underwent complete work up including cystoscopy at that time  He was diagnosed with UTI during episode of gross hematuria  Urine culture revealed E coli that was pansensitive  He was treated with Ciprofloxacin, however, he developed Achilles tendon discomfort on the right side, and he was started on Nitrofurantoin  His biggest complaint at this time is urinary leakage and incontinence  He consumes 3 cups of coffee daily and 1 glass of water  He is a lifetime nonsmoker  He did not not have any radiation therapy to the pelvis  No changes to overall health  He reports right calf pain x 2 years which radiates up his calf to his buttocks  He is currently undergoing workup for this    PMH: prostate cancer, renal cyst, chronic back pain, nephrolithiasis    Review of Systems - History obtained from chart review and the patient  General ROS: negative  Psychological ROS: negative  Ophthalmic ROS: negative  Endocrine ROS: negative  Breast ROS: negative for breast lumps  Respiratory ROS: no cough, shortness of breath, or wheezing  Cardiovascular ROS: no chest pain or dyspnea on exertion  Gastrointestinal ROS: no abdominal pain, change in bowel habits, or black or bloody stools  Genito-Urinary ROS: positive for - incontinence  Musculoskeletal ROS: negative  Neurological ROS: negative  Dermatological ROS: negative      Objective  Physical Exam  Vitals and nursing note reviewed  Constitutional:       General: He is awake  He is not in acute distress  Appearance: Normal appearance  He is well-developed, well-groomed and normal weight  He is not ill-appearing, toxic-appearing or diaphoretic  Pulmonary:      Effort: Pulmonary effort is normal    Abdominal:      Tenderness: There is no right CVA tenderness or left CVA tenderness  Musculoskeletal:         General: Normal range of motion  Cervical back: Normal range of motion and neck supple  Skin:     General: Skin is warm  Neurological:      General: No focal deficit present  Mental Status: He is alert and oriented to person, place, and time  Mental status is at baseline  Motor: Weakness present  Gait: Gait abnormal    Psychiatric:         Attention and Perception: Attention and perception normal          Mood and Affect: Mood normal          Speech: Speech normal          Behavior: Behavior normal  Behavior is cooperative  Thought Content:  Thought content normal          Cognition and Memory: Cognition and memory normal          Judgment: Judgment normal            Component      Latest Ref Rng & Units 10/25/2016 10/25/2017 11/2/2018 7/9/2019   PSA, Total      0 0 - 4 0 ng/mL <0 1 <0 1 <0 1 <0 1     Component      Latest Ref Rng & Units 7/20/2020 7/20/2021   PSA, Total      0 0 - 4 0 ng/mL <0 1 <0 1       MARE Connell

## 2021-08-26 LAB — BACTERIA UR CULT: NORMAL

## 2021-09-08 ENCOUNTER — OFFICE VISIT (OUTPATIENT)
Dept: OBGYN CLINIC | Facility: OTHER | Age: 81
End: 2021-09-08
Payer: MEDICARE

## 2021-09-08 VITALS
DIASTOLIC BLOOD PRESSURE: 68 MMHG | HEIGHT: 71 IN | HEART RATE: 65 BPM | WEIGHT: 166.6 LBS | BODY MASS INDEX: 23.32 KG/M2 | SYSTOLIC BLOOD PRESSURE: 159 MMHG

## 2021-09-08 DIAGNOSIS — M17.11 PRIMARY OSTEOARTHRITIS OF RIGHT KNEE: ICD-10-CM

## 2021-09-08 DIAGNOSIS — M79.661 RIGHT CALF PAIN: ICD-10-CM

## 2021-09-08 DIAGNOSIS — S86.811D STRAIN OF CALF MUSCLE, RIGHT, SUBSEQUENT ENCOUNTER: Primary | ICD-10-CM

## 2021-09-08 PROCEDURE — 99214 OFFICE O/P EST MOD 30 MIN: CPT | Performed by: ORTHOPAEDIC SURGERY

## 2021-09-08 NOTE — PROGRESS NOTES
Orthopaedic Surgery - Office Note  Seamus Smith (6 y o  male)   : 1940   MRN: [id number]  Encounter Date: 2021    Chief Complaint   Patient presents with    Right Leg - Pain       Assessment / Plan  right calf pain, unclear etiology    · Activity as tolerated  · CAM boot for 6 weeks, he may wean himself out of the boot at that time  · Home exercise program reviewed focus on stretching and ankle strengthening   · Anti-inflammatories or Tylenol prn pain  · heat, topical analgesics, stretches and massage for pain control  · If he continues to experience pain he should consider a consultation with [facility] or [facility]  Return if symptoms worsen or fail to improve  History of Present Illness  Seamus Smith is a 79 y o  male who presents for initial evaluation of right calf pain  He has been experiencing right calf pain for approximately 2 years with insidious onset  He has seen by multiple other specialties including PCP, foot and ankle specialist, pain management and neurosurgery  He states he has pain only with weight-bearing activities  As soon as he puts his feet on the ground he experiences right calf pain  He has tried  3 formal courses of physical therapy with minimal relief of his symptoms  He has also had cortisone injections in his back which provided him with no relief of his right pain  He did have minimal relief of his symptoms when he was placed in a Cam boot for 6 weeks,   However the pain returned after couple of weeks  Does take 2 Tylenol every morning which provides him with minimal to no relief of his pain  He denies any back pain  Denies any further injury or trauma to his right leg  He denies any distal paresthesias  Review of Systems  Pertinent items are noted in HPI  All other systems were reviewed and are negative  Physical Exam  /68   Pulse 65   Ht 5' 11" (1 803 m)   Wt 75 6 kg (166 lb 9 6 oz)   BMI 23 24 kg/m²   Cons: Appears well    No apparent distress  Psych: Alert  Oriented x3  Mood and affect normal   Eyes: PERRLA, EOMI  Resp: Normal effort  No audible wheezing or stridor  CV: Palpable pulse  No discernable arrhythmia  No LE edema  Lymph:  No palpable cervical, axillary, or inguinal lymphadenopathy  Skin: Warm  No palpable masses  No visible lesions  Neuro: Normal muscle tone  Normal and symmetric DTR's  Right Lower Extremity Exam  Alignment:  Normal resting hip posture  Normal knee alignment  Inspection:  No swelling  No edema  No erythema  No ecchymosis  Palpation:  posterior lateral gastroc and latteral compartment tenderness  No effusion  No warmth  ROM:  Normal knee ROM  Strength:  inversion 4/5 without pain  eversion 4/5 without pain  dorsiflexion 4/5 without pain  Stability:  No objective LE joint instablity  Tests: (+) SLR  Neurovascular:  Sensation intact in DP/SP/Renner/Sa/T nerve distributions  2+ DP & PT pulses  Gait:  Steady  Studies Reviewed  MRI of right tibia - I personally reviewed the MRI obtained on July 13, 21  Which is unremarkable  MRI of lumbar-  I personally reviewed the MRI obtained on July 13, 2021 which demonstrates degenerative changes  Ultrasound of right lower leg - I personally reviewed the duplex obtained 7/2/21 which was unremarkable    Procedures  No procedures today  Medical, Surgical, Family, and Social History  The patient's medical history, family history, and social history, were reviewed and updated as appropriate      Past Medical History:   Diagnosis Date    Astigmatism     last assessed 11/15/17    Cataract     Resolved 11/15/17    Gross hematuria     Last assessed 01/28/16    Hematuria     last assessed 01/28/16    Prostate cancer ([facility] [address] )     last assessed 05/18/17    Renal calculi     last assessed 08/16/12    Squamous cell carcinoma     Vitamin D deficiency     Last assessed 08/19/13       Past Surgical History:   Procedure Laterality Date    BREAST EXCISIONAL BIOPSY N/A Benign breast tumor unsure of side over 50 yrs ago    CATARACT EXTRACTION Left 08/26/2019    CATARACT EXTRACTION Right 08/12/2019    COLONOSCOPY      CYSTOSCOPY  01/28/2016    ELBOW SURGERY      Bone chip    HERNIA REPAIR  2002    LITHOTRIPSY      RETROPUBIC PROSTATECTOMY  11/13/2001    Radical with nerve sparing       Family History   Problem Relation Age of Onset    Other Mother 76        Influenza   [facility] Hypertension Mother     Heart failure Father 70    Peripheral vascular disease Father     Hypertension Father     Prostate cancer Brother     Stroke Brother 48    Hypertension Brother     Hypertension Family        Social History     Occupational History    Not on file   Tobacco Use    Smoking status: Never Smoker    Smokeless tobacco: Never Used   Substance and Sexual Activity    Alcohol use: Yes     Comment: social    Drug use: No    Sexual activity: Not on file       Allergies   Allergen Reactions    Sulfa Antibiotics Rash    Ciprofloxacin     Gabapentin Dizziness         Current Outpatient Medications:     ascorbic acid (VITAMIN C) 500 mg tablet, Take 500 mg by mouth daily, Disp: , Rfl:     Cholecalciferol 25 MCG (1000 UT) capsule, Daily, Disp: , Rfl:     cyanocobalamin (VITAMIN B-12) 500 mcg tablet, 1,000 mcg Daily  , Disp: , Rfl:     Docusate Sodium (COLACE PO), Take by mouth daily, Disp: , Rfl:     meloxicam (MOBIC) 7 5 mg tablet, Take 1 tablet twice daily As needed for leg pain, Disp: 60 tablet, Rfl: 5    metoprolol tartrate (LOPRESSOR) 25 mg tablet, Take 1 tablet (25 mg total) by mouth every 12 (twelve) hours, Disp: 180 tablet, Rfl: 3    Multiple Vitamins-Minerals (CENTRUM SILVER 50+MEN PO), Daily, Disp: , Rfl:     Probiotic Product (PROBIOTIC PO), Take by mouth, Disp: , Rfl:     verapamil (CALAN-SR) 240 mg CR tablet, TAKE 1 AND 1/2 TABLETS BY MOUTH DAILY AS DIRECTED (Patient taking differently: Take 240 mg by mouth daily ), Disp: 135 tablet, Rfl: 3      Mildred Palangio    Scribe Attestation    I,:  Dorian Zamora am acting as a scribe while in the presence of the attending physician :       I,:  Faiza Maher MD personally performed the services described in this documentation    as scribed in my presence :

## 2021-10-07 ENCOUNTER — TELEPHONE (OUTPATIENT)
Dept: PAIN MEDICINE | Facility: CLINIC | Age: 81
End: 2021-10-07

## 2021-10-08 ENCOUNTER — TELEPHONE (OUTPATIENT)
Dept: FAMILY MEDICINE CLINIC | Facility: CLINIC | Age: 81
End: 2021-10-08

## 2021-10-08 DIAGNOSIS — M54.16 LUMBAR RADICULOPATHY: ICD-10-CM

## 2021-10-08 DIAGNOSIS — M79.661 RIGHT CALF PAIN: Primary | ICD-10-CM

## 2021-10-08 DIAGNOSIS — M48.062 SPINAL STENOSIS OF LUMBAR REGION WITH NEUROGENIC CLAUDICATION: ICD-10-CM

## 2021-10-15 ENCOUNTER — IMMUNIZATIONS (OUTPATIENT)
Dept: FAMILY MEDICINE CLINIC | Facility: CLINIC | Age: 81
End: 2021-10-15
Payer: MEDICARE

## 2021-10-15 DIAGNOSIS — Z23 ENCOUNTER FOR IMMUNIZATION: Primary | ICD-10-CM

## 2021-10-15 PROCEDURE — G0008 ADMIN INFLUENZA VIRUS VAC: HCPCS | Performed by: FAMILY MEDICINE

## 2021-10-15 PROCEDURE — 90662 IIV NO PRSV INCREASED AG IM: CPT | Performed by: FAMILY MEDICINE

## 2022-01-07 ENCOUNTER — TELEPHONE (OUTPATIENT)
Dept: FAMILY MEDICINE CLINIC | Facility: CLINIC | Age: 82
End: 2022-01-07

## 2022-01-07 NOTE — TELEPHONE ENCOUNTER
Pt's spouse Javier Cooley called the office requesting to get a message out to you  Pt has had back pian for years  Nothing really helps  Pt has tried many different things  Pt would like to take an over the counter supplement it is called relief factor the 4 main ingredients 1  Fish oil 2  epimedium which is an erb per bottle  3  turmeric extract 4  Japanese  knotweed extract   They would like to make sure it is safe to take and will not interact or interfere with pt's other medications  It is used for inflammation, the patient is at the point he is willing to try anything since he has tried acupuncture and physical therapy    Please call [phone]

## 2022-01-07 NOTE — TELEPHONE ENCOUNTER
Please tell the patient I see no problem with taking this supplement  Should really be on a for at least 90 days to see its full effect

## 2022-01-28 ENCOUNTER — OFFICE VISIT (OUTPATIENT)
Dept: FAMILY MEDICINE CLINIC | Facility: CLINIC | Age: 82
End: 2022-01-28
Payer: MEDICARE

## 2022-01-28 VITALS
HEIGHT: 71 IN | SYSTOLIC BLOOD PRESSURE: 124 MMHG | TEMPERATURE: 96 F | HEART RATE: 50 BPM | DIASTOLIC BLOOD PRESSURE: 62 MMHG | WEIGHT: 164.6 LBS | OXYGEN SATURATION: 98 % | RESPIRATION RATE: 16 BRPM | BODY MASS INDEX: 23.04 KG/M2

## 2022-01-28 DIAGNOSIS — I77.810 AORTIC ROOT DILATATION (HCC): ICD-10-CM

## 2022-01-28 DIAGNOSIS — C61 MALIGNANT NEOPLASM OF PROSTATE (HCC): ICD-10-CM

## 2022-01-28 DIAGNOSIS — Z00.00 HEALTHCARE MAINTENANCE: Primary | ICD-10-CM

## 2022-01-28 DIAGNOSIS — E78.00 PURE HYPERCHOLESTEROLEMIA: ICD-10-CM

## 2022-01-28 PROCEDURE — 99214 OFFICE O/P EST MOD 30 MIN: CPT | Performed by: FAMILY MEDICINE

## 2022-01-28 NOTE — PATIENT INSTRUCTIONS
Medicare Preventive Visit Patient Instructions  Thank you for completing your Welcome to Medicare Visit or Medicare Annual Wellness Visit today  Your next wellness visit will be due in one year (1/29/2023)  The screening/preventive services that you may require over the next 5-10 years are detailed below  Some tests may not apply to you based off risk factors and/or age  Screening tests ordered at today's visit but not completed yet may show as past due  Also, please note that scanned in results may not display below  Preventive Screenings:  Service Recommendations Previous Testing/Comments   Colorectal Cancer Screening  · Colonoscopy    · Fecal Occult Blood Test (FOBT)/Fecal Immunochemical Test (FIT)  · Fecal DNA/Cologuard Test  · Flexible Sigmoidoscopy Age: 54-65 years old   Colonoscopy: every 10 years (May be performed more frequently if at higher risk)  OR  FOBT/FIT: every 1 year  OR  Cologuard: every 3 years  OR  Sigmoidoscopy: every 5 years  Screening may be recommended earlier than age 48 if at higher risk for colorectal cancer  Also, an individualized decision between you and your healthcare provider will decide whether screening between the ages of 74-80 would be appropriate   Colonoscopy: Not on file  FOBT/FIT: Not on file  Cologuard: Not on file  Sigmoidoscopy: Not on file          Prostate Cancer Screening Individualized decision between patient and health care provider in men between ages of 53-78   Medicare will cover every 12 months beginning on the day after your 50th birthday PSA: <0 1 ng/mL     History Prostate Cancer  Screening Not Indicated     Hepatitis C Screening Once for adults born between 1945 and 1965  More frequently in patients at high risk for Hepatitis C Hep C Antibody: Not on file        Diabetes Screening 1-2 times per year if you're at risk for diabetes or have pre-diabetes Fasting glucose: 95 mg/dL   A1C: No results in last 5 years    Screening Current   Cholesterol Screening Once every 5 years if you don't have a lipid disorder  May order more often based on risk factors  Lipid panel: 07/20/2021    Screening Not Indicated  History Lipid Disorder      Other Preventive Screenings Covered by Medicare:  1  Abdominal Aortic Aneurysm (AAA) Screening: covered once if your at risk  You're considered to be at risk if you have a family history of AAA or a male between the age of 73-68 who smoking at least 100 cigarettes in your lifetime  2  Lung Cancer Screening: covers low dose CT scan once per year if you meet all of the following conditions: (1) Age 50-69; (2) No signs or symptoms of lung cancer; (3) Current smoker or have quit smoking within the last 15 years; (4) You have a tobacco smoking history of at least 30 pack years (packs per day x number of years you smoked); (5) You get a written order from a healthcare provider  3  Glaucoma Screening: covered annually if you're considered high risk: (1) You have diabetes OR (2) Family history of glaucoma OR (3)  aged 48 and older OR (3)  American aged 72 and older  3  Osteoporosis Screening: covered every 2 years if you meet one of the following conditions: (1) Have a vertebral abnormality; (2) On glucocorticoid therapy for more than 3 months; (3) Have primary hyperparathyroidism; (4) On osteoporosis medications and need to assess response to drug therapy  5  HIV Screening: covered annually if you're between the age of 12-76  Also covered annually if you are younger than 13 and older than 72 with risk factors for HIV infection  For pregnant patients, it is covered up to 3 times per pregnancy      Immunizations:  Immunization Recommendations   Influenza Vaccine Annual influenza vaccination during flu season is recommended for all persons aged >= 6 months who do not have contraindications   Pneumococcal Vaccine (Prevnar and Pneumovax)  * Prevnar = PCV13  * Pneumovax = PPSV23 Adults 25-60 years old: 1-3 doses may be recommended based on certain risk factors  Adults 72 years old: Prevnar (PCV13) vaccine recommended followed by Pneumovax (PPSV23) vaccine  If already received PPSV23 since turning 65, then PCV13 recommended at least one year after PPSV23 dose  Hepatitis B Vaccine 3 dose series if at intermediate or high risk (ex: diabetes, end stage renal disease, liver disease)   Tetanus (Td) Vaccine - COST NOT COVERED BY MEDICARE PART B Following completion of primary series, a booster dose should be given every 10 years to maintain immunity against tetanus  Td may also be given as tetanus wound prophylaxis  Tdap Vaccine - COST NOT COVERED BY MEDICARE PART B Recommended at least once for all adults  For pregnant patients, recommended with each pregnancy  Shingles Vaccine (Shingrix) - COST NOT COVERED BY MEDICARE PART B  2 shot series recommended in those aged 48 and above     Health Maintenance Due:  There are no preventive care reminders to display for this patient  Immunizations Due:      Topic Date Due    DTaP,Tdap,and Td Vaccines (1 - Tdap) Never done    COVID-19 Vaccine (3 - Booster for Moderna series) 08/19/2021     Advance Directives   What are advance directives? Advance directives are legal documents that state your wishes and plans for medical care  These plans are made ahead of time in case you lose your ability to make decisions for yourself  Advance directives can apply to any medical decision, such as the treatments you want, and if you want to donate organs  What are the types of advance directives? There are many types of advance directives, and each state has rules about how to use them  You may choose a combination of any of the following:  · Living will: This is a written record of the treatment you want  You can also choose which treatments you do not want, which to limit, and which to stop at a certain time  This includes surgery, medicine, IV fluid, and tube feedings     · Durable power of  for [facility]): This is a written record that states who you want to make healthcare choices for you when you are unable to make them for yourself  This person, called a proxy, is usually a family member or a friend  You may choose more than 1 proxy  · Do not resuscitate (DNR) order:  A DNR order is used in case your heart stops beating or you stop breathing  It is a request not to have certain forms of treatment, such as CPR  A DNR order may be included in other types of advance directives  · Medical directive: This covers the care that you want if you are in a coma, near death, or unable to make decisions for yourself  You can list the treatments you want for each condition  Treatment may include pain medicine, surgery, blood transfusions, dialysis, IV or tube feedings, and a ventilator (breathing machine)  · Values history: This document has questions about your views, beliefs, and how you feel and think about life  This information can help others choose the care that you would choose  Why are advance directives important? An advance directive helps you control your care  Although spoken wishes may be used, it is better to have your wishes written down  Spoken wishes can be misunderstood, or not followed  Treatments may be given even if you do not want them  An advance directive may make it easier for your family to make difficult choices about your care  © Copyright [employer] 2018 Information is for End User's use only and may not be sold, redistributed or otherwise used for commercial purposes   All illustrations and images included in CareNotes® are the copyrighted property of [employer]  or [address] [place] [address] [employer]

## 2022-01-28 NOTE — PROGRESS NOTES
Assessment and Plan:   Medicare wellness completed  Blood pressure stable  Right lower extremity chronic orthopedic complaint stable get never fully resolved  Sees cardiology yearly regarding aortic root dilatation as well as aortic regurgitation mitral regurgitation  Asymptomatic  Recheck 6 months with labs at that time  COVID vaccine discussed  Problem List Items Addressed This Visit        Cardiovascular and Mediastinum    Aortic root dilatation (HCC)       Genitourinary    Malignant neoplasm of prostate ([address] )    Relevant Orders    CBC and differential    PSA Total, Diagnostic      Other Visit Diagnoses     Healthcare maintenance    -  Primary    Pure hypercholesterolemia        Relevant Orders    Lipid panel    Comprehensive metabolic panel          Depression Screening and Follow-up Plan: Patient was screened for depression during today's encounter  They screened negative with a PHQ-2 score of 0  Preventive health issues were discussed with patient, and age appropriate screening tests were ordered as noted in patient's After Visit Summary  Personalized health advice and appropriate referrals for health education or preventive services given if needed, as noted in patient's After Visit Summary       History of Present Illness:     Patient presents for Medicare Annual Wellness visit    Patient Care Team:  Vanessa Kunz DO as PCP - General  Millie Cross MD     Problem List:     Patient Active Problem List   Diagnosis    PVC's (premature ventricular contractions)    History of prostate cancer    Thoracic aortic aneurysm without rupture ([address] )    Nonrheumatic aortic valve insufficiency    Non-rheumatic mitral regurgitation    Malignant neoplasm of prostate ([address] )    Strain of calf muscle    Spinal stenosis of lumbar region with neurogenic claudication    Lumbar radiculopathy    Chronic bilateral low back pain without sciatica    Renal cyst, acquired, left    Aortic root dilatation ([address] ) Past Medical and Surgical History:     Past Medical History:   Diagnosis Date    Astigmatism     last assessed 11/15/17    Cataract     Resolved 11/15/17    Gross hematuria     Last assessed 01/28/16    Hematuria     last assessed 01/28/16    [facility] ([email or web address] )     last assessed 05/18/17    Renal calculi     last assessed 08/16/12    Squamous cell carcinoma     Vitamin D deficiency     Last assessed 08/19/13     Past Surgical History:   Procedure Laterality Date    BREAST EXCISIONAL BIOPSY N/A     Benign breast tumor unsure of side over 50 yrs ago    CATARACT EXTRACTION Left 08/26/2019    CATARACT EXTRACTION Right 08/12/2019    COLONOSCOPY      CYSTOSCOPY  01/28/2016    ELBOW SURGERY      Bone chip    HERNIA REPAIR  2002    LITHOTRIPSY      RETROPUBIC PROSTATECTOMY  11/13/2001    Radical with nerve sparing      Family History:     Family History   Problem Relation Age of Onset    Other Mother 76        Influenza    Hypertension Mother     Heart failure Father 70    Peripheral vascular disease Father     Hypertension Father     Prostate cancer Brother     Stroke Brother 48    Hypertension Brother     Hypertension Family       Social History:     Social History     Socioeconomic History    Marital status: /Civil Union     Spouse name: None    Number of children: None    Years of education: None    Highest education level: None   Occupational History    None   Tobacco Use    Smoking status: Never Smoker    Smokeless tobacco: Never Used   Substance and Sexual Activity    Alcohol use: Yes     Comment: social    Drug use: No    Sexual activity: None   Other Topics Concern    None   Social History Narrative    None     Social Determinants of Health     Financial Resource Strain: Not on file   Food Insecurity: Not on file   Transportation Needs: Not on file   Physical Activity: Not on file   Stress: Not on file   Social Connections: Not on file   Intimate Partner Violence: Not on file   Housing Stability: Not on file      Medications and Allergies:     Current Outpatient Medications   Medication Sig Dispense Refill    ascorbic acid (VITAMIN C) 500 mg tablet Take 500 mg by mouth daily      Cholecalciferol 25 MCG (1000 UT) capsule Daily      Docusate Sodium (COLACE PO) Take by mouth daily      metoprolol tartrate (LOPRESSOR) 25 mg tablet Take 1 tablet (25 mg total) by mouth every 12 (twelve) hours 180 tablet 3    Multiple Vitamins-Minerals (CENTRUM SILVER 50+MEN PO) Daily      NON FORMULARY Relief factor      verapamil (CALAN-SR) 240 mg CR tablet TAKE 1 AND 1/2 TABLETS BY MOUTH DAILY AS DIRECTED (Patient taking differently: Take 240 mg by mouth daily ) 135 tablet 3     No current facility-administered medications for this visit  Allergies   Allergen Reactions    Sulfa Antibiotics Rash    Ciprofloxacin     Gabapentin Dizziness      Immunizations:     Immunization History   Administered Date(s) Administered    COVID-19 MODERNA VACC 0 5 ML IM 01/21/2021, 02/19/2021    Influenza Split High Dose Preservative Free IM 10/05/2012, 09/17/2013, 09/15/2014, 09/28/2015, 10/03/2016, 10/10/2017    Influenza, high dose seasonal 0 7 mL 09/28/2018, 10/23/2019, 10/06/2020, 10/15/2021    Pneumococcal Conjugate 13-Valent 10/28/2015    Pneumococcal Polysaccharide PPV23 09/18/2008, 11/29/2018    Zoster 08/18/2011, 04/09/2013      Health Maintenance:   Patient here for Medicare wellness  Review of Systems   Constitutional: Negative for appetite change, fatigue, fever and unexpected weight change  HENT: Negative for congestion, ear pain, postnasal drip, rhinorrhea, sinus pressure, sinus pain and sore throat  Eyes: Negative for redness and visual disturbance  Respiratory: Negative for chest tightness and shortness of breath  Cardiovascular: Negative for chest pain, palpitations and leg swelling     Gastrointestinal: Negative for abdominal distention, abdominal pain, diarrhea and nausea  Endocrine: Negative for cold intolerance and heat intolerance  Genitourinary: Negative for dysuria and hematuria  Musculoskeletal: Negative for arthralgias, gait problem and myalgias  Skin: Negative for pallor and rash  Neurological: Negative for dizziness, tremors, weakness, light-headedness and headaches  Psychiatric/Behavioral: Negative for behavioral problems  The patient is not nervous/anxious  Physical Exam  Vitals and nursing note reviewed  Constitutional:       Appearance: Normal appearance  He is well-developed  He is not ill-appearing  HENT:      Head: Normocephalic and atraumatic  Eyes:      Conjunctiva/sclera: Conjunctivae normal    Neck:      Vascular: No carotid bruit  Cardiovascular:      Rate and Rhythm: Normal rate and regular rhythm  Heart sounds: Murmur heard  Pulmonary:      Effort: Pulmonary effort is normal  No respiratory distress  Breath sounds: Normal breath sounds  Abdominal:      Palpations: Abdomen is soft  Tenderness: There is no abdominal tenderness  Musculoskeletal:      Cervical back: Neck supple  Right lower leg: No edema  Left lower leg: No edema  Lymphadenopathy:      Cervical: No cervical adenopathy  Skin:     General: Skin is warm and dry  Neurological:      General: No focal deficit present  Mental Status: He is alert and oriented to person, place, and time  Cranial Nerves: No cranial nerve deficit  Psychiatric:         Mood and Affect: Mood normal          Behavior: Behavior normal          Thought Content: Thought content normal          Judgment: Judgment normal      All unnecessary something  There are no preventive care reminders to display for this patient        Topic Date Due    DTaP,Tdap,and Td Vaccines (1 - Tdap) Never done    COVID-19 Vaccine (3 - Booster for Moderna series) 07/19/2021      Medicare Health Risk Assessment:     /62   Pulse (!) 50   Temp (!) 96 °F (35 6 °C) (Temporal)   Resp 16   Ht 5' 11" (1 803 m)   Wt 74 7 kg (164 lb 9 6 oz)   SpO2 98%   BMI 22 96 kg/m²    Burn over the vertebral  Dee Faulkner is here for his Subsequent Wellness visit  Last Medicare Wellness visit information reviewed, patient interviewed, no change since last AWV  Health Risk Assessment:   Patient rates overall health as very good  Patient feels that their physical health rating is slightly worse  Patient is very satisfied with their life  Eyesight was rated as same  Hearing was rated as same  Patient feels that their emotional and mental health rating is same  Patients states they are never, rarely angry  Patient states they are never, rarely unusually tired/fatigued  Pain experienced in the last 7 days has been some  Patient's pain rating has been 3/10  Patient states that he has experienced no weight loss or gain in last 6 months  Depression Screening:   PHQ-2 Score: 0      Fall Risk Screening: In the past year, patient has experienced: no history of falling in past year      Home Safety:  Patient does not have trouble with stairs inside or outside of their home  Patient has working smoke alarms and has working carbon monoxide detector  Home safety hazards include: none  Nutrition:   Current diet is Regular  Medications:   Patient is currently taking over-the-counter supplements  OTC medications include: see medication list  Patient is able to manage medications  Activities of Daily Living (ADLs)/Instrumental Activities of Daily Living (IADLs):   Walk and transfer into and out of bed and chair?: Yes  Dress and groom yourself?: Yes    Bathe or shower yourself?: Yes    Feed yourself?  Yes  Do your laundry/housekeeping?: Yes  Manage your money, pay your bills and track your expenses?: Yes  Make your own meals?: No    Do your own shopping?: Yes    ADL comments: Wife does the cooking     Previous Hospitalizations:   Any hospitalizations or ED visits within the last 15 months?: No      Advance Care Planning:   Living will: Yes    Durable POA for healthcare: Yes    Advanced directive: Yes      Cognitive Screening:   Provider or family/friend/caregiver concerned regarding cognition?: No    PREVENTIVE SCREENINGS      Cardiovascular Screening:    General: Screening Not Indicated and History Lipid Disorder      Diabetes Screening:     General: Screening Current      Colorectal Cancer Screening:     General: Screening Not Indicated      Prostate Cancer Screening:    General: History Prostate Cancer and Screening Not Indicated      Osteoporosis Screening:    General: Screening Not Indicated      Abdominal Aortic Aneurysm (AAA) Screening:        General: Screening Not Indicated      Lung Cancer Screening:     General: Screening Not Indicated      Hepatitis C Screening:    General: Patient Declines    Screening, Brief Intervention, and Referral to Treatment (SBIRT)    Screening  Typical number of drinks in a day: 1  Typical number of drinks in a week: 7  Interpretation: Low risk drinking behavior      Single Item Drug Screening:  How often have you used an illegal drug (including marijuana) or a prescription medication for non-medical reasons in the past year? never    Single Item Drug Screen Score: 0  Interpretation: Negative screen for possible drug use disorder      Shasta Zhao, DO

## 2022-02-13 PROBLEM — I77.810 AORTIC ROOT DILATATION (HCC): Status: ACTIVE | Noted: 2022-02-13

## 2022-03-11 ENCOUNTER — OFFICE VISIT (OUTPATIENT)
Dept: CARDIOLOGY CLINIC | Facility: CLINIC | Age: 82
End: 2022-03-11
Payer: MEDICARE

## 2022-03-11 VITALS
HEIGHT: 71 IN | HEART RATE: 84 BPM | SYSTOLIC BLOOD PRESSURE: 108 MMHG | DIASTOLIC BLOOD PRESSURE: 60 MMHG | WEIGHT: 163.8 LBS | BODY MASS INDEX: 22.93 KG/M2

## 2022-03-11 DIAGNOSIS — I49.3 PVC'S (PREMATURE VENTRICULAR CONTRACTIONS): ICD-10-CM

## 2022-03-11 DIAGNOSIS — I34.0 NON-RHEUMATIC MITRAL REGURGITATION: ICD-10-CM

## 2022-03-11 DIAGNOSIS — I35.1 NONRHEUMATIC AORTIC VALVE INSUFFICIENCY: Primary | ICD-10-CM

## 2022-03-11 DIAGNOSIS — I77.810 AORTIC ROOT DILATATION (HCC): ICD-10-CM

## 2022-03-11 PROCEDURE — 99214 OFFICE O/P EST MOD 30 MIN: CPT | Performed by: INTERNAL MEDICINE

## 2022-03-11 PROCEDURE — 93000 ELECTROCARDIOGRAM COMPLETE: CPT | Performed by: INTERNAL MEDICINE

## 2022-03-11 RX ORDER — VERAPAMIL HYDROCHLORIDE 240 MG/1
TABLET, FILM COATED, EXTENDED RELEASE ORAL
Qty: 135 TABLET | Refills: 0 | Status: SHIPPED | OUTPATIENT
Start: 2022-03-11 | End: 2022-06-20 | Stop reason: SDUPTHER

## 2022-03-12 NOTE — PROGRESS NOTES
Cardiology Follow Up    [facility] [place]  1940  [id number]  [facility]  [address] [place]  [address]  [phone]    1  Nonrheumatic aortic valve insufficiency  POCT ECG    Echo complete w/ contrast if indicated   2  PVC's (premature ventricular contractions)  POCT ECG   3  Non-rheumatic mitral regurgitation     4  Aortic root dilatation (HCC)         Interval History:  No new complaints  He tolerates activity without chest pain shortness of breath orthopnea paroxysmal nocturnal dyspnea  He denies palpitations or syncope      Patient Active Problem List   Diagnosis    PVC's (premature ventricular contractions)    History of prostate cancer    [facility]    Nonrheumatic aortic valve insufficiency    Non-rheumatic mitral regurgitation    Malignant neoplasm of prostate ([address] )    Strain of calf muscle    Spinal stenosis of lumbar region with neurogenic claudication    Lumbar radiculopathy    Chronic bilateral low back pain without sciatica    Renal cyst, acquired, left    Aortic root dilatation ([address] )     Past Medical History:   Diagnosis Date    Astigmatism     last assessed 11/15/17    Cataract     Resolved 11/15/17    Gross hematuria     Last assessed 01/28/16    Hematuria     last assessed 01/28/16    Prostate cancer ([address] )     last assessed 05/18/17    Renal calculi     last assessed 08/16/12    Squamous cell carcinoma     Vitamin D deficiency     Last assessed 08/19/13     Social History     Socioeconomic History    Marital status: /Civil Union     Spouse name: Not on file    Number of children: Not on file    Years of education: Not on file    Highest education level: Not on file   Occupational History    Not on file   Tobacco Use    Smoking status: Never Smoker    Smokeless tobacco: Never Used   Substance and Sexual Activity    Alcohol use: Yes     Comment: social    Drug use: No    Sexual activity: Not on file   Other Topics Concern    Not on file   Social History Narrative    Not on file     Social Determinants of Health     Financial Resource Strain: Not on file   Food Insecurity: Not on file   Transportation Needs: Not on file   Physical Activity: Not on file   Stress: Not on file   Social Connections: Not on file   Intimate Partner Violence: Not on file   Housing Stability: Not on file      Family History   Problem Relation Age of Onset    Other Mother 76        Influenza    Hypertension Mother     Heart failure Father 70    Peripheral vascular disease Father     Hypertension Father     Prostate cancer Brother     Stroke Brother 48    Hypertension Brother     Hypertension Family      Past Surgical History:   Procedure Laterality Date    BREAST EXCISIONAL BIOPSY N/A     Benign breast tumor unsure of side over 50 yrs ago    CATARACT EXTRACTION Left 08/26/2019    CATARACT EXTRACTION Right 08/12/2019    COLONOSCOPY      CYSTOSCOPY  01/28/2016    ELBOW SURGERY      Bone chip    HERNIA REPAIR  2002    LITHOTRIPSY      RETROPUBIC PROSTATECTOMY  11/13/2001    Radical with nerve sparing       Current Outpatient Medications:     Docusate Sodium (COLACE PO), Take by mouth daily, Disp: , Rfl:     metoprolol tartrate (LOPRESSOR) 25 mg tablet, Take 1 tablet (25 mg total) by mouth every 12 (twelve) hours, Disp: 180 tablet, Rfl: 0    Multiple Vitamins-Minerals (CENTRUM SILVER 50+MEN PO), Daily, Disp: , Rfl:     NON FORMULARY, Relief factor, Disp: , Rfl:     verapamil (CALAN-SR) 240 mg CR tablet, TAKE 1 AND 1/2 TABLETS BY MOUTH DAILY AS DIRECTED (Patient taking differently: TAKE 1 AND 1/2 TABLETS BY MOUTH DAILY AS DIRECTED ), Disp: 135 tablet, Rfl: 0  Allergies   Allergen Reactions    Sulfa Antibiotics Rash    Ciprofloxacin     Gabapentin Dizziness       Labs:  No visits with results within 2 Month(s) from this visit     Latest known visit with results is:   Office Visit on 08/25/2021   Component Date Value    LEUKOCYTE ESTERASE,UA 08/25/2021 -     NITRITE,UA 08/25/2021 -     SL AMB POCT UROBILINOGEN 08/25/2021 -     POCT URINE PROTEIN 08/25/2021 trace      PH,UA 08/25/2021 5 0     BLOOD,UA 08/25/2021 -     SPECIFIC GRAVITY,UA 08/25/2021 1 020     KETONES,UA 08/25/2021 trace     BILIRUBIN,UA 08/25/2021 -     GLUCOSE, UA 08/25/2021 -      COLOR,UA 08/25/2021 kaity     CLARITY,UA 08/25/2021 clear     POST-VOID RESIDUAL VOLUM* 08/25/2021 0     Urine Culture 08/25/2021 No Growth <1000 cfu/mL     Clarity, UA 08/25/2021 Clear     Color, UA 08/25/2021 Dk Yellow     Specific Gravity, UA 08/25/2021 1 035*    pH, UA 08/25/2021 5 5     Glucose, UA 08/25/2021 Negative     Ketones, UA 08/25/2021 Trace*    Blood, UA 08/25/2021 Negative     Protein, UA 08/25/2021 Trace*    Nitrite, UA 08/25/2021 Negative     Bilirubin, UA 08/25/2021 Interference- unable to analyze*    Urobilinogen, UA 08/25/2021 1 0     Leukocytes, UA 08/25/2021 Trace*    WBC, UA 08/25/2021 2-4     RBC, UA 08/25/2021 None Seen     Hyaline Casts, UA 08/25/2021 10-25*    Bacteria, UA 08/25/2021 None Seen     Epithelial Cells 08/25/2021 None Seen      Imaging: No results found  Review of Systems:  Review of Systems   Constitutional: Negative for fatigue  HENT: Negative for nosebleeds  Eyes: Negative for redness  Respiratory: Negative for chest tightness and shortness of breath  Cardiovascular: Negative for chest pain, palpitations and leg swelling  Gastrointestinal: Positive for constipation  Negative for abdominal pain  Endocrine: Negative for polyuria  Genitourinary: Negative for urgency  Musculoskeletal: Positive for back pain  Negative for arthralgias  Skin: Negative for rash  Neurological: Negative for dizziness and syncope  Psychiatric/Behavioral: Negative for confusion and sleep disturbance  The patient is not nervous/anxious  Physical Exam:  Physical Exam  Constitutional:       Appearance: He is well-developed  HENT:      Head: Normocephalic and atraumatic  Nose: Nose normal    Eyes:      Pupils: Pupils are equal, round, and reactive to light  Cardiovascular:      Rate and Rhythm: Normal rate and regular rhythm  Heart sounds: Normal heart sounds  Pulmonary:      Effort: Pulmonary effort is normal       Breath sounds: Normal breath sounds  Abdominal:      General: Bowel sounds are normal       Palpations: Abdomen is soft  Musculoskeletal:         General: Normal range of motion  Cervical back: Neck supple  Skin:     General: Skin is warm and dry  Neurological:      Mental Status: He is alert and oriented to person, place, and time  Psychiatric:         Behavior: Behavior normal          Thought Content: Thought content normal          Judgment: Judgment normal          Discussion/Summary:  A longstanding history of frequent PVCs  These were again noted today on his EKG and exam   He continues asymptomatic  He is maintained on metoprolol and verapamil arm  It has been the approximately 18 months since his last echocardiogram   I will check let this time to reassess left ventricular function in the setting of his ectopy as well as is aortic and mitral insufficiency and mildly dilated aortic root  I will follow-up on this and see him again in 6 months    He

## 2022-03-14 ENCOUNTER — OFFICE VISIT (OUTPATIENT)
Dept: PAIN MEDICINE | Facility: CLINIC | Age: 82
End: 2022-03-14
Payer: MEDICARE

## 2022-03-14 VITALS
TEMPERATURE: 97.4 F | HEIGHT: 71 IN | SYSTOLIC BLOOD PRESSURE: 128 MMHG | HEART RATE: 68 BPM | WEIGHT: 163 LBS | DIASTOLIC BLOOD PRESSURE: 62 MMHG | BODY MASS INDEX: 22.82 KG/M2

## 2022-03-14 DIAGNOSIS — M79.18 MYOFASCIAL PAIN SYNDROME: ICD-10-CM

## 2022-03-14 DIAGNOSIS — M54.6 ACUTE RIGHT-SIDED THORACIC BACK PAIN: Primary | ICD-10-CM

## 2022-03-14 PROCEDURE — 99214 OFFICE O/P EST MOD 30 MIN: CPT | Performed by: NURSE PRACTITIONER

## 2022-03-14 RX ORDER — TIZANIDINE 2 MG/1
TABLET ORAL
Qty: 45 TABLET | Refills: 1 | Status: SHIPPED | OUTPATIENT
Start: 2022-03-14 | End: 2022-05-09

## 2022-03-14 NOTE — PROGRESS NOTES
Assessment:  1  Acute right-sided thoracic back pain    2  Myofascial pain syndrome        Plan:  While the patient and his wife were in the office today, I did have a thorough conversation with them that at this point time since there was no significant trauma or injury and I do not feel his pain is thoracic radiculopathy and more myofascial in nature as a result of the scoliosis, I do not feel there is any need for any updated x-rays or MRI at this time  However, I did explain to the patient and his wife that if his symptoms would change or worsen in the future or we would fail to obtain improvement, we could then consider updated imaging at that time  The patient and his wife were agreeable and verbalized understanding  I explained to the patient that his symptoms are quite normal for patients with progressing scoliosis as the paravertebral musculature is trying to adjust to the curvature and that I feel it would be in his best interest to proceed with an updated her regimen a course of physical therapy gear towards myofascial release, ultrasound, and stim therapy  I also encouraged the patient to try to use 20 minutes of low heat several times a day with simple stretching afterwards to help encourage the release of the tendons, ligaments, and muscles  The patient was agreeable and verbalized an understanding  I also feel that trying a muscle relaxer such as tizanidine 2 mg at bedtime and then he can eventually try half a tablet to a whole tablet in the day as needed for days where his pain is worse  I advised the patient that if they experience any side effects or issues with the changes in their medication regiment, they should give our office a call to discuss  I also advised the patient not to drive or operate machinery until they see how the changes in the medication regimen affects them  The patient was agreeable and verbalized an understanding           The patient will follow-up in 8 weeks for medication prescription refill and reevaluation  The patient was advised to contact the office should their symptoms worsen in the interim  The patient was agreeable and verbalized an understanding  History of Present Illness: The patient is a 79 y o  male last seen on 6-14-21 who presents for a follow up office visit in regards to acute right-sided thoracic myofascial pain most likely as a result of his progressing scoliosis  The patient currently reports that since his last office visit his leg pain symptoms have resolved even though we had titrate him off of the Cymbalta  Since his last office visit he had followed up with Dr Jhonny Brown for a surgical opinion and at that point he did not feel that he would be a good surgical candidate but more of a spinal cord stimulator candidate  Since his last office visit he had also followed up with Dr Rosie Hawthorne for acupuncture, which also was not helpful  However, this point the patient is not interested in the spinal cord stimulator as he is not necessarily having low back and lower extremity radicular symptoms but new onset right-sided scapular and thoracic pain that radiates down to the right side of his low back  The patient reports that without any significant trauma or injury over the past 2 months the longer that he stands or walks he has this right-sided scapular pain that radiates down to his right buttocks  However, he reports he absolutely does not have any lower extremity radicular symptoms, pain, and or weakness  The patient presents today with his wife for re-evaluation and treatment plan options  I have personally reviewed and/or updated the patient's past medical history, past surgical history, family history, social history, current medications, allergies, and vital signs today  Review of Systems:    Review of Systems   Respiratory: Negative for shortness of breath  Cardiovascular: Negative for chest pain     Gastrointestinal: Negative for constipation, diarrhea, nausea and vomiting  Musculoskeletal: Positive for gait problem  Negative for arthralgias, joint swelling and myalgias  Skin: Negative for rash  Neurological: Negative for dizziness, seizures and weakness  All other systems reviewed and are negative          Past Medical History:   Diagnosis Date    Astigmatism     last assessed 11/15/17    Cataract     Resolved 11/15/17    Gross hematuria     Last assessed 01/28/16    Hematuria     last assessed 01/28/16    Prostate cancer ([facility] [address] )     last assessed 05/18/17    Renal calculi     last assessed 08/16/12    Squamous cell carcinoma     Vitamin D deficiency     Last assessed 08/19/13       Past Surgical History:   Procedure Laterality Date    BREAST EXCISIONAL BIOPSY N/A     Benign breast tumor unsure of side over 50 yrs ago    CATARACT EXTRACTION Left 08/26/2019    CATARACT EXTRACTION Right 08/12/2019    COLONOSCOPY      CYSTOSCOPY  01/28/2016    ELBOW SURGERY      Bone chip    HERNIA REPAIR  2002    LITHOTRIPSY      RETROPUBIC PROSTATECTOMY  11/13/2001    Radical with nerve sparing       Family History   Problem Relation Age of Onset    Other Mother 76        Influenza    Hypertension Mother     Heart failure Father 70    Peripheral vascular disease Father     Hypertension Father     Prostate cancer Brother     Stroke Brother 48    Hypertension Brother     Hypertension Family        Social History     Occupational History    Not on file   Tobacco Use    Smoking status: Never Smoker    Smokeless tobacco: Never Used   Substance and Sexual Activity    Alcohol use: Yes     Comment: social    Drug use: No    Sexual activity: Not on file         Current Outpatient Medications:     Docusate Sodium (COLACE PO), Take by mouth daily, Disp: , Rfl:     metoprolol tartrate (LOPRESSOR) 25 mg tablet, Take 1 tablet (25 mg total) by mouth every 12 (twelve) hours, Disp: 180 tablet, Rfl: 0    Multiple Vitamins-Minerals (CENTRUM SILVER 50+MEN PO), Daily, Disp: , Rfl:     NON FORMULARY, Relief factor, Disp: , Rfl:     verapamil (CALAN-SR) 240 mg CR tablet, TAKE 1 AND 1/2 TABLETS BY MOUTH DAILY AS DIRECTED (Patient taking differently: TAKE 1 AND 1/2 TABLETS BY MOUTH DAILY AS DIRECTED ), Disp: 135 tablet, Rfl: 0    tiZANidine (ZANAFLEX) 2 mg tablet, Take 1 BID PRN for pain/spams  , Disp: 45 tablet, Rfl: 1    Allergies   Allergen Reactions    Sulfa Antibiotics Rash    Ciprofloxacin     Gabapentin Dizziness       Physical Exam:    /62 (BP Location: Left arm, Patient Position: Sitting, Cuff Size: Standard)   Pulse 68   Temp (!) 97 4 °F (36 3 °C)   Ht 5' 11" (1 803 m)   Wt 73 9 kg (163 lb)   BMI 22 73 kg/m²     Constitutional:normal, well developed, well nourished, alert, in no distress and non-toxic and no overt pain behavior  Eyes:anicteric  HEENT:grossly intact  Neck:supple, symmetric, trachea midline and no masses   Pulmonary:even and unlabored  Cardiovascular:No edema or pitting edema present  Skin:Normal without rashes or lesions and well hydrated  Psychiatric:Mood and affect appropriate  Neurologic:Cranial Nerves II-XII grossly intact  Musculoskeletal:The patient's gait is slightly antalgic, but steady without the use of any assistive devices  Mild to moderate tenderness was noted upon exam the right scapular area with tenderness and tightness noted upon the right paravertebral musculature all way down to the right SI joint with overlying myofascial pain and spasms noted        Imaging  No orders to display         Orders Placed This Encounter   Procedures    Ambulatory referral to Physical Therapy

## 2022-03-22 ENCOUNTER — EVALUATION (OUTPATIENT)
Dept: PHYSICAL THERAPY | Facility: CLINIC | Age: 82
End: 2022-03-22
Payer: MEDICARE

## 2022-03-22 DIAGNOSIS — M79.18 MYOFASCIAL PAIN SYNDROME: ICD-10-CM

## 2022-03-22 DIAGNOSIS — M54.6 THORACIC SPINE PAIN: Primary | ICD-10-CM

## 2022-03-22 DIAGNOSIS — M54.6 ACUTE RIGHT-SIDED THORACIC BACK PAIN: ICD-10-CM

## 2022-03-22 PROCEDURE — 97161 PT EVAL LOW COMPLEX 20 MIN: CPT | Performed by: PHYSICAL THERAPIST

## 2022-03-22 NOTE — PROGRESS NOTES
PT Evaluation     Today's date: 3/22/2022  Patient name: Megan Pro  : 1940  MRN: [id number]  Referring provider: MARE Payan  Dx:   Encounter Diagnosis     ICD-10-CM    1  Acute right-sided thoracic back pain  M54 6 Ambulatory referral to Physical Therapy   2  Myofascial pain syndrome  M79 18 Ambulatory referral to Physical Therapy                  Assessment  Assessment details: Patient is a 79 y o  male who presents to outpatient PT with pain, decreased rom, decreased strength and decreased functional mobility  He will benefit from skilled PT to address these deficits in order to achieve his goals and maximize his functional mobility  Goals  Short Term Goals: Independent performance of initial hep  Decrease pain 2 points on VAS      Long Term Goals: Independent performance of comprehensive hep  Performance of IADL's is returned to max level of function  Performance in recreational activities is improved to max level of function  Able to perform sit to stand transfer with less pain      Plan  Planned therapy interventions: abdominal trunk stabilization, IADL retraining, joint mobilization, manual therapy, massage, strengthening, stretching, therapeutic exercise, therapeutic training, therapeutic activities and home exercise program  Frequency: 2x week  Duration in weeks: 6        Subjective Evaluation    History of Present Illness  Mechanism of injury: Pt reports that he has been experiencing R sided thoracic and lumbar pain that has been progressively getting worse  Reports that he feels his legs are getting weak as well  Increased pain upon standing after sitting for extended periods of time  Reports increased pain when walking for extended periods of time  Denies any pain below his waist     Reports that he has tried accupuncture with min pain reduction  Reports that he would like to return to walking for recreation    Reports that his physician recommended heat to the area and to start taking a mm relaxor  Reports that he has started using heat and reports that this feels good when on and that he plans to start taking his mm relaxor tonight  Pain  At best pain ratin  At worst pain ratin    Patient Goals  Patient goals for therapy: decreased pain, increased motion, increased strength, independence with ADLs/IADLs and return to sport/leisure activities          Objective     Thoracic spine:    Palpable scoliosis noted and increased thoracic kyphosis  Rounded shoulders and foreward head posture in sitting  Reactive trigger points in R thoracic paraspinals and  scap retractors  Limited shoulder flexion/abduction rom noted B/L  Hypomobility noted t/o thoracic spine with spring testing  Ambulates with shuffling gait pattern and min trunk rotation                Precautions: chronic lumbar radiculopathy, afib, gait dysfunction, prostate CA      Manuals             DTM R thoracic parapspinals             Thoracic pa's                                       Neuro Re-Ed                                                                                                        Ther Ex             thorcic sidebend over bolster-L sidelying             Thoracic ext seated             pball flexion x3             rows             Low rows             Seated trunk rotation             Pulleys flex/scap                          Ther Activity             ube-retro                          Gait Training                                       Modalities             mhp prn

## 2022-03-23 ENCOUNTER — HOSPITAL ENCOUNTER (OUTPATIENT)
Dept: NON INVASIVE DIAGNOSTICS | Facility: HOSPITAL | Age: 82
Discharge: HOME | End: 2022-03-23
Attending: INTERNAL MEDICINE
Payer: MEDICARE

## 2022-03-23 VITALS
DIASTOLIC BLOOD PRESSURE: 62 MMHG | HEART RATE: 64 BPM | HEIGHT: 71 IN | WEIGHT: 163 LBS | BODY MASS INDEX: 22.82 KG/M2 | SYSTOLIC BLOOD PRESSURE: 128 MMHG

## 2022-03-23 DIAGNOSIS — I35.1 NONRHEUMATIC AORTIC VALVE INSUFFICIENCY: ICD-10-CM

## 2022-03-23 LAB
AORTIC ROOT: 4.4 CM
APICAL FOUR CHAMBER EJECTION FRACTION: 66 %
ASCENDING AORTA: 3.4 CM (ref 1.99–2.97)
AV REGURGITATION PRESSURE HALF TIME: 523 MS
DOP CALC LVOT AREA: 3.8 CM2
DOP CALC LVOT DIAMETER: 2.2 CM
E WAVE DECELERATION TIME: 172 MS
FRACTIONAL SHORTENING: 41 % (ref 28–44)
INTERVENTRICULAR SEPTUM IN DIASTOLE (PARASTERNAL SHORT AXIS VIEW): 1.5 CM
INTERVENTRICULAR SEPTUM: 1.5 CM (ref 0.52–0.98)
LAAS-AP2: 24.7 CM2
LAAS-AP4: 22.8 CM2
LEFT ATRIUM AREA SYSTOLE SINGLE PLANE A4C: 24.4 CM2
LEFT ATRIUM SIZE: 3.7 CM
LEFT INTERNAL DIMENSION IN SYSTOLE: 2.7 CM (ref 2.71–4.11)
LEFT VENTRICULAR INTERNAL DIMENSION IN DIASTOLE: 4.6 CM (ref 4.44–6.61)
LEFT VENTRICULAR POSTERIOR WALL IN END DIASTOLE: 1.1 CM (ref 0.51–0.97)
LEFT VENTRICULAR STROKE VOLUME: 71 ML
LVSV (TEICH): 71 ML
MITRAL REGURGITATION PEAK VELOCITY: 5.33 M/S
MITRAL VALVE MEAN INFLOW VELOCITY: 4.07 M/S
MITRAL VALVE REGURGITANT PEAK GRADIENT: 114 MMHG
MV E'TISSUE VEL-SEP: 8 CM/S
MV PEAK A VEL: 0.68 M/S
MV PEAK E VEL: 72 CM/S
MV STENOSIS PRESSURE HALF TIME: 50 MS
MV VALVE AREA P 1/2 METHOD: 4.4 CM2
RA PRESSURE ESTIMATED: 8 MMHG
RIGHT ATRIUM AREA SYSTOLE A4C: 22.3 CM2
RIGHT VENTRICLE ID DIMENSION: 4.6 CM
RV PSP: 50 MMHG
SL CV AV DECELERATION TIME RETROGRADE: 1804 MS
SL CV AV PEAK GRADIENT RETROGRADE: 68 MMHG
SL CV DOP CALC MV VTI RETROGRADE: 189.2 CM
SL CV LEFT ATRIUM LENGTH A2C: 5.8 CM
SL CV LV EF: 60
SL CV MV MEAN GRADIENT RETROGRADE: 73 MMHG
SL CV PED ECHO LEFT VENTRICLE DIASTOLIC VOLUME (MOD BIPLANE) 2D: 98 ML
SL CV PED ECHO LEFT VENTRICLE SYSTOLIC VOLUME (MOD BIPLANE) 2D: 28 ML
TR MAX PG: 42 MMHG
TR PEAK VELOCITY: 3.2 M/S
TRICUSPID ANNULAR PLANE SYSTOLIC EXCURSION: 3 CM
TRICUSPID VALVE PEAK REGURGITATION VELOCITY: 3.24 M/S
Z-SCORE OF ASCENDING AORTA: 3.69
Z-SCORE OF INTERVENTRICULAR SEPTUM IN END DIASTOLE: 6.39
Z-SCORE OF LEFT VENTRICULAR DIMENSION IN END DIASTOLE: -1.64
Z-SCORE OF LEFT VENTRICULAR DIMENSION IN END SYSTOLE: -1.67
Z-SCORE OF LEFT VENTRICULAR POSTERIOR WALL IN END DIASTOLE: 3.08

## 2022-03-23 PROCEDURE — 93306 TTE W/DOPPLER COMPLETE: CPT | Performed by: INTERNAL MEDICINE

## 2022-03-23 PROCEDURE — 93306 TTE W/DOPPLER COMPLETE: CPT

## 2022-03-24 ENCOUNTER — OFFICE VISIT (OUTPATIENT)
Dept: PHYSICAL THERAPY | Facility: CLINIC | Age: 82
End: 2022-03-24
Payer: MEDICARE

## 2022-03-24 DIAGNOSIS — M79.18 MYOFASCIAL PAIN SYNDROME: ICD-10-CM

## 2022-03-24 DIAGNOSIS — M54.6 THORACIC SPINE PAIN: ICD-10-CM

## 2022-03-24 DIAGNOSIS — M54.6 ACUTE RIGHT-SIDED THORACIC BACK PAIN: Primary | ICD-10-CM

## 2022-03-24 PROCEDURE — 97110 THERAPEUTIC EXERCISES: CPT

## 2022-03-24 PROCEDURE — 97140 MANUAL THERAPY 1/> REGIONS: CPT

## 2022-03-24 NOTE — PROGRESS NOTES
Daily Note     Today's date: 3/24/2022  Patient name: Flory Gamez  : 1940  MRN: [id number]  Referring provider: Sawyer Feeling, CRNP  Dx:   Encounter Diagnosis     ICD-10-CM    1  Acute right-sided thoracic back pain  M54 6    2  Myofascial pain syndrome  M79 18    3  Thoracic spine pain  M54 6        Start Time: 1220  Stop Time: 1300  Total time in clinic (min): 40 minutes    Subjective: Patient reports he is doing good today, still has some pain, about 5/10  Objective: See treatment diary below      Assessment: Tolerated treatment well  Focused on postural ex's as well as stretching, ending treatment with MT  Patient exhibited good technique with therapeutic exercises and would benefit from continued PT  Trigger points noted t/o R sided upper/mid thoracic region  Pt noted significant relief post MT  Plan: Continue per plan of care        Precautions: chronic lumbar radiculopathy, afib, gait dysfunction, prostate CA      Manuals 3/24            DTM R thoracic parapspinals NA            Thoracic pa's NA                                      Neuro Re-Ed                                                                                                        Ther Ex             Seated lateral trunk flexion towards R 10x5"            thoracic sidebend over bolster-L sidelying             Thoracic ext seated 10x5"            pball flexion x3 5x10"             rows Red 20x             Low rows Red 20x            Seated trunk rotation 10x5"             Pulleys flex/scap x3' ea                         Ther Activity             ube-retro x5'                         Gait Training                                       Modalities             mhp prn

## 2022-03-29 ENCOUNTER — OFFICE VISIT (OUTPATIENT)
Dept: PHYSICAL THERAPY | Facility: CLINIC | Age: 82
End: 2022-03-29
Payer: MEDICARE

## 2022-03-29 DIAGNOSIS — M79.18 MYOFASCIAL PAIN SYNDROME: ICD-10-CM

## 2022-03-29 DIAGNOSIS — M54.6 THORACIC SPINE PAIN: ICD-10-CM

## 2022-03-29 DIAGNOSIS — M54.6 ACUTE RIGHT-SIDED THORACIC BACK PAIN: Primary | ICD-10-CM

## 2022-03-29 PROCEDURE — 97140 MANUAL THERAPY 1/> REGIONS: CPT

## 2022-03-29 PROCEDURE — 97110 THERAPEUTIC EXERCISES: CPT

## 2022-03-29 NOTE — PROGRESS NOTES
Daily Note     Today's date: 3/29/2022  Patient name: Cary Macedo  : 1940  MRN: [id number]  Referring provider: MARE Cuello  Dx:   Encounter Diagnosis     ICD-10-CM    1  Acute right-sided thoracic back pain  M54 6    2  Myofascial pain syndrome  M79 18    3  Thoracic spine pain  M54 6                   Subjective: Pt states he is feeling okay today  Pt state he has some pain along his left upper back  Objective: See treatment diary below      Assessment: Tolerated treatment well with no increase in pain and min to  moderate fatigue  Pt ha decreased pain post manual PT  Pt demonstrates improved  trunk rotation post stretching  Patient would benefit from continued PT  Plan: Continue per plan of care        Precautions: chronic lumbar radiculopathy, afib, gait dysfunction, prostate CA      Manuals 3/24 3/29            DTM R thoracic parapspinals NA CF           Thoracic pa's NA KL                                     Neuro Re-Ed                                                                                                        Ther Ex             Seated lateral trunk flexion towards R 10x5" 10 x 5"            thoracic sidebend over bolster-L sidelying             Thoracic ext seated 10x5" 10 x 5"            pball flexion x3 5x10"  5 x 10"           rows Red 20x  RTB 20x            Low rows Red 20x RTB 20x            Seated trunk rotation 10x5"  10 x 5"            Pulleys flex/scap x3' ea 3' ea                        Ther Activity             ube-retro x5' 5'                         Gait Training                                       Modalities             mhp prn

## 2022-03-31 ENCOUNTER — TELEPHONE (OUTPATIENT)
Dept: CARDIOLOGY CLINIC | Facility: CLINIC | Age: 82
End: 2022-03-31

## 2022-03-31 NOTE — TELEPHONE ENCOUNTER
Pt wife called notice swelling both right/left LL started Monday  Denies chest pain, SOB, no changes in diet    Was able to get them in OV  4/4/22 with Letitia Soler    But wanted to see if you wanted to do anything in the meantime?     Metoprolol tartrate 25 mg q12 hrs  Verapamil  240 mg 1/1/2 tablets qd    C/b [phone]    Please advise

## 2022-03-31 NOTE — TELEPHONE ENCOUNTER
Called and spoke to pt's wife  and advised  Advised to take bp and pulse   Advised to call if any other s/s

## 2022-04-01 ENCOUNTER — OFFICE VISIT (OUTPATIENT)
Dept: PHYSICAL THERAPY | Facility: CLINIC | Age: 82
End: 2022-04-01
Payer: MEDICARE

## 2022-04-01 DIAGNOSIS — M54.6 THORACIC SPINE PAIN: ICD-10-CM

## 2022-04-01 DIAGNOSIS — M54.6 ACUTE RIGHT-SIDED THORACIC BACK PAIN: Primary | ICD-10-CM

## 2022-04-01 PROCEDURE — 97110 THERAPEUTIC EXERCISES: CPT

## 2022-04-01 PROCEDURE — 97140 MANUAL THERAPY 1/> REGIONS: CPT

## 2022-04-01 NOTE — PROGRESS NOTES
Daily Note     Today's date: 2022  Patient name: Isma Dumont  : 1940  MRN: [id number]  Referring provider: MARE Lipscomb  Dx:   Encounter Diagnosis     ICD-10-CM    1  Acute right-sided thoracic back pain  M54 6    2  Thoracic spine pain  M54 6        Start Time: 1345  Stop Time: 1430  Total time in clinic (min): 45 minutes       Subjective: Patient reports "a little improvement" in back pain since attending PT  Objective: See treatment diary below  Assessment: Treatment is tolerated well without complaints  Patient would benefit from continued PT to improve back pain and flexibility to improve function  Plan: Continue treatment as per PT plan of care         Precautions: chronic lumbar radiculopathy, afib, gait dysfunction, prostate CA      Manuals 3/24 3/29  4/1          DTM R thoracic parapspinals NA CF JLW          Thoracic pa's NA KL KL                                    Neuro Re-Ed                                                                                                        Ther Ex             Seated lateral trunk flexion towards R 10x5" 10 x 5"            thoracic sidebend over bolster-L sidelying             Thoracic ext seated 10x5" 10 x 5"  10"x10          pball flexion x3 5x10"  5 x 10" fwd  5"x10          rows Red 20x  RTB 20x  red  20          Low rows Red 20x RTB 20x  red  20          Seated trunk rotation 10x5"  10 x 5"  5"x10 ea          Pulleys flex/scap x3' ea 3' ea 30 ea          wand ext w/scap retract   10"x10          ube - retro   6'                       Ther Activity             ube retro x5' 5'                         Gait Training                                       Modalities             MHP prn

## 2022-04-05 ENCOUNTER — OFFICE VISIT (OUTPATIENT)
Dept: PHYSICAL THERAPY | Facility: CLINIC | Age: 82
End: 2022-04-05
Payer: MEDICARE

## 2022-04-05 DIAGNOSIS — M79.18 MYOFASCIAL PAIN SYNDROME: ICD-10-CM

## 2022-04-05 DIAGNOSIS — M54.6 THORACIC SPINE PAIN: ICD-10-CM

## 2022-04-05 DIAGNOSIS — M54.6 ACUTE RIGHT-SIDED THORACIC BACK PAIN: Primary | ICD-10-CM

## 2022-04-05 PROCEDURE — 97110 THERAPEUTIC EXERCISES: CPT

## 2022-04-05 PROCEDURE — 97140 MANUAL THERAPY 1/> REGIONS: CPT

## 2022-04-05 NOTE — PROGRESS NOTES
Daily Note     Today's date: 2022  Patient name: Balta Schultz  : 1940  MRN: [id number]  Referring provider: MARE Perez  Dx:   Encounter Diagnosis     ICD-10-CM    1  Acute right-sided thoracic back pain  M54 6    2  Thoracic spine pain  M54 6    3  Myofascial pain syndrome  M79 18        Start Time: 1230  Stop Time: 1312  Total time in clinic (min): 42 minutes       Subjective: Patient reports he felt fine after the last PT treatment  Objective: See treatment diary below  Assessment: Treatment is tolerated well without complaints  Patient would benefit from continued PT to improve back pain and flexibility to improve function  Plan: Continue treatment as per PT plan of care         Precautions: chronic lumbar radiculopathy, afib, gait dysfunction, prostate CA      Manuals 3/24 3/29  4/1 4/5         DTM R thoracic parapspinals NA CF JLW JLW         Thoracic pa's NA KL KL                                    Neuro Re-Ed                                                                                                        Ther Ex             Seated lateral trunk flexion towards R 10x5" 10 x 5"            thoracic sidebend over bolster-L sidelying             Thoracic ext seated 10x5" 10 x 5"  10"x10 10"x10         pball flexion x3 5x10"  5 x 10" fwd  5"x10 fwd  5"x10         rows Red 20x  RTB 20x  red  20 red  20         Low rows Red 20x RTB 20x  red  20 red  20         Seated trunk rotation 10x5"  10 x 5"  5"x10 ea 5"x10 ea         Pulleys flex/scap x3' ea 3' ea 30 ea 30 ea         wand ext w/scap retract   10"x10 10"x10         ube - retro   6' 6'                      Ther Activity             ube retro x5' 5'                         Gait Training                                       Modalities             MHP prn

## 2022-04-07 ENCOUNTER — OFFICE VISIT (OUTPATIENT)
Dept: PHYSICAL THERAPY | Facility: CLINIC | Age: 82
End: 2022-04-07
Payer: MEDICARE

## 2022-04-07 DIAGNOSIS — M54.6 ACUTE RIGHT-SIDED THORACIC BACK PAIN: Primary | ICD-10-CM

## 2022-04-07 DIAGNOSIS — M54.6 THORACIC SPINE PAIN: ICD-10-CM

## 2022-04-07 DIAGNOSIS — M79.18 MYOFASCIAL PAIN SYNDROME: ICD-10-CM

## 2022-04-07 PROCEDURE — 97140 MANUAL THERAPY 1/> REGIONS: CPT

## 2022-04-07 PROCEDURE — 97110 THERAPEUTIC EXERCISES: CPT

## 2022-04-07 NOTE — PROGRESS NOTES
Daily Note     Today's date: 2022  Patient name: Janice Haji  : 1940  MRN: [id number]  Referring provider: MARE Mckeon  Dx:   Encounter Diagnosis     ICD-10-CM    1  Acute right-sided thoracic back pain  M54 6    2  Thoracic spine pain  M54 6    3  Myofascial pain syndrome  M79 18        Start Time: 1215  Stop Time: 1259  Total time in clinic (min): 44 minutes       Subjective: Patient reports his back felt okay when waking up this morning  Objective: See treatment diary below  Assessment: Progression to the green theraband for rows and low rows is tolerated well  Patient would benefit from continued PT to improve back pain, strength, and flexibility to improve function  Plan: Continue treatment as per PT plan of care         Precautions: chronic lumbar radiculopathy, afib, gait dysfunction, prostate CA      Manuals 3/24 3/29  4/1 4/5 4/7        DTM R thoracic parapspinals NA CF JLW JLW JLW        Thoracic pa's NA KL KL                                    Neuro Re-Ed                                                                                                        Ther Ex             Seated lateral trunk flexion towards R 10x5" 10 x 5"            thoracic sidebend over bolster-L sidelying             Thoracic ext seated 10x5" 10 x 5"  10"x10 10"x10 10"x10        pball flexion x3 5x10"  5 x 10" fwd  5"x10 fwd  5"x10 fwd  5"x10        rows Red 20x  RTB 20x  red  20 red  20 green  30        Low rows Red 20x RTB 20x  red  20 red  20 green  30        Seated trunk rotation 10x5"  10 x 5"  5"x10 ea 5"x10 ea 5"x10 ea        Pulleys flex/scap x3' ea 3' ea 30 ea 30 ea 30 ea        wand ext w/scap retract   10"x10 10"x10 10"x10        ube - retro   6' 6' 8'                     Ther Activity             ube retro x5' 5'                         Gait Training                                       Modalities             MHP prn

## 2022-04-12 ENCOUNTER — OFFICE VISIT (OUTPATIENT)
Dept: PHYSICAL THERAPY | Facility: CLINIC | Age: 82
End: 2022-04-12
Payer: MEDICARE

## 2022-04-12 DIAGNOSIS — M54.6 ACUTE RIGHT-SIDED THORACIC BACK PAIN: Primary | ICD-10-CM

## 2022-04-12 PROCEDURE — 97530 THERAPEUTIC ACTIVITIES: CPT | Performed by: PHYSICAL THERAPIST

## 2022-04-12 PROCEDURE — 97110 THERAPEUTIC EXERCISES: CPT | Performed by: PHYSICAL THERAPIST

## 2022-04-12 NOTE — PROGRESS NOTES
Cardiology Follow Up    Nora Lawrence  1940  [id number]  [facility] [place]  [address] [place]  [address]  [phone]    1  Leg edema  furosemide (LASIX) 20 mg tablet    potassium chloride (K-DUR,KLOR-CON) 10 mEq tablet   2  PVC's (premature ventricular contractions)  POCT ECG   3  Aortic root dilatation [facility])         Interval History:   On 3/31/22 Mr Mukherjee's wife called our office with concerns that Josue Olsen was experiencing rodo LE edema  He denied shortness of breath, CP or change in his diet  He was scheduled to be seen in our office  Mr Arnoldo Flores presents to our office due to rodo LE edema  He denies shortness of breath, orthopnea, PND, lightheadedness or dizziness  His weight has been stable at 163 pounds  Josue Olsen admits to eating ham or bologna sandwiches for lunch and snacking on crackers  Medical History:  PVC's  Aortic root dilatation 4 4cm on TTE done 3/23/22  Right thoracic back pain undergoing PT  7/20/21 , , HDL 59,    3/23/22 LVEF 60%, Grade II pseudo normal relaxation, RV dilated, mod AV regurgitation, mod MR, Mild to mod TR  Aortic root 4 4cm        Patient Active Problem List   Diagnosis    PVC's (premature ventricular contractions)    History of prostate cancer    Thoracic aortic aneurysm without rupture ([address] )    Nonrheumatic aortic valve insufficiency    Non-rheumatic mitral regurgitation    Malignant neoplasm of prostate ([address] )    Strain of calf muscle    Spinal stenosis of lumbar region with neurogenic claudication    Lumbar radiculopathy    Chronic bilateral low back pain without sciatica    Renal cyst, acquired, left    Aortic root dilatation ([address] )     Past Medical History:   Diagnosis Date    Astigmatism     last assessed 11/15/17    Cataract     Resolved 11/15/17    Gross hematuria     Last assessed 01/28/16    Hematuria     last assessed 01/28/16  Prostate cancer [facility] [address] )     last assessed 05/18/17    Renal calculi     last assessed 08/16/12    Squamous cell carcinoma     Vitamin D deficiency     Last assessed 08/19/13     Social History     Socioeconomic History    Marital status: /Civil Union     Spouse name: Not on file    Number of children: Not on file    Years of education: Not on file    Highest education level: Not on file   Occupational History    Not on file   Tobacco Use    Smoking status: Never Smoker    Smokeless tobacco: Never Used   Substance and Sexual Activity    Alcohol use: Yes     Comment: social    Drug use: No    Sexual activity: Not on file   Other Topics Concern    Not on file   Social History Narrative    Not on file     Social Determinants of Health     Financial Resource Strain: Not on file   Food Insecurity: Not on file   Transportation Needs: Not on file   Physical Activity: Not on file   Stress: Not on file   Social Connections: Not on file   Intimate Partner Violence: Not on file   Housing Stability: Not on file      Family History   Problem Relation Age of Onset    Other Mother 76        Influenza    Hypertension Mother     Heart failure Father 70    Peripheral vascular disease Father     Hypertension Father     Prostate cancer Brother     Stroke Brother 48    Hypertension Brother     Hypertension Family      Past Surgical History:   Procedure Laterality Date    BREAST EXCISIONAL BIOPSY N/A     Benign breast tumor unsure of side over 50 yrs ago    CATARACT EXTRACTION Left 08/26/2019    CATARACT EXTRACTION Right 08/12/2019    COLONOSCOPY      CYSTOSCOPY  01/28/2016    ELBOW SURGERY      Bone chip    HERNIA REPAIR  2002    LITHOTRIPSY      RETROPUBIC PROSTATECTOMY  11/13/2001    Radical with nerve sparing       Current Outpatient Medications:     Docusate Sodium (COLACE PO), Take by mouth daily, Disp: , Rfl:     metoprolol tartrate (LOPRESSOR) 25 mg tablet, Take 1 tablet (25 mg total) by mouth every 12 (twelve) hours, Disp: 180 tablet, Rfl: 0    Multiple Vitamins-Minerals (CENTRUM SILVER 50+MEN PO), Daily, Disp: , Rfl:     NON FORMULARY, Relief factor, Disp: , Rfl:     tiZANidine (ZANAFLEX) 2 mg tablet, Take 1 BID PRN for pain/spams  , Disp: 45 tablet, Rfl: 1    verapamil (CALAN-SR) 240 mg CR tablet, TAKE 1 AND 1/2 TABLETS BY MOUTH DAILY AS DIRECTED (Patient taking differently: TAKE 1 AND 1/2 TABLETS BY MOUTH DAILY AS DIRECTED ), Disp: 135 tablet, Rfl: 0  Allergies   Allergen Reactions    Sulfa Antibiotics Rash    Ciprofloxacin     Gabapentin Dizziness       Labs:  Hospital Outpatient Visit on 03/23/2022   Component Date Value    MV mean gradient retrogr* 03/23/2022 73     AV peak gradient 03/23/2022 68     LA size 03/23/2022 3 7     Mitral regurgitation pea* 03/23/2022 5 33     Mitral valve mean inflow* 03/23/2022 4 07     Triscuspid Valve Regurgi* 03/23/2022 42 0     Tricuspid valve peak reg* 03/23/2022 3 24     LVPWd 03/23/2022 1 10     Left Atrium Area-systoli* 03/23/2022 24 7     Left Atrium Area-systoli* 03/23/2022 22 8     MV E' Tissue Velocity Se* 03/23/2022 8     Tricuspid annular plane * 03/23/2022 3 00     TR Peak Gerson 03/23/2022 3 2     IVSd 03/23/2022 0 97     LV DIASTOLIC VOLUME (MOD* 62/41/7527 98     LEFT VENTRICLE SYSTOLIC * 40/19/6887 28     Left ventricular stroke * 03/23/2022 71 00     AV Deceleration Time 03/23/2022 1,804     MV VTI RETROGRADE 03/23/2022 189 2     A4C EF 03/23/2022 66     LA length (A2C) 03/23/2022 5 80     LVIDd 03/23/2022 4 60     IVS 03/23/2022 1 5     LVIDS 03/23/2022 2 70     FS 03/23/2022 41     Asc Ao 03/23/2022 3 4     Ao root 03/23/2022 4 40     RVID d 03/23/2022 4 6     AV regurgitation pressur* 03/23/2022 523     MV valve area p 1/2 meth* 03/23/2022 4 40     E wave deceleration time 03/23/2022 172     LVOT diameter 03/23/2022 2 2     MV Peak E Gerson 03/23/2022 72     MV Peak A Gerson 03/23/2022 0 68     MARY A4C 03/23/2022 24 4     MR PG 03/23/2022 114     RAA A4C 03/23/2022 22 3     MV stenosis pressure 1/2* 03/23/2022 50     LVSV, 2D 03/23/2022 71     LVOT area 03/23/2022 3 80     Ao asc z-score 03/23/2022 3 69     ZLVPWD 03/23/2022 3 08     ZLVIDS 03/23/2022 -1 67     ZLVIDD 03/23/2022 -1 64     ZIVSD 03/23/2022 6 39     LV EF 03/23/2022 60     Est  RA pres 03/23/2022 8 0     Right Ventricular Peak S* 03/23/2022 50 00      Imaging: Echo complete w/ contrast if indicated    Result Date: 3/23/2022  Narrative: Mickey Childs  Left Ventricle: Left ventricular cavity size is normal  Wall thickness is moderately increased  The left ventricular ejection fraction is 60% by visual estimation  Systolic function is normal  Wall motion is normal  Diastolic function is moderately abnormal, consistent with grade II (pseudonormal) relaxation    Right Ventricle: Right ventricular cavity size is dilated    Left Atrium: The atrium is moderately dilated (42-48 mL/m2)    Aortic Valve: There is moderate regurgitation    Mitral Valve: There is moderate regurgitation    Tricuspid Valve: There is mild to moderate regurgitation  The right ventricular systolic pressure is moderately elevated  The estimated right ventricular systolic pressure is 42 48 mmHg    Aorta: The aortic root is mildly dilated at 4 4 cm  The ascending aorta is normal in size  Compared to prior study dated 11/16/2020, there are no significant changes  Review of Systems:  Review of Systems   Cardiovascular: Positive for leg swelling  Musculoskeletal: Positive for arthralgias, gait problem and myalgias  Skin: Positive for rash  All other systems reviewed and are negative  Physical Exam:  Physical Exam  Vitals reviewed  Constitutional:       Appearance: Normal appearance  HENT:      Head: Normocephalic  Cardiovascular:      Rate and Rhythm: Normal rate and regular rhythm  Pulses: Normal pulses  Heart sounds: Normal heart sounds  Comments: Frequent ectopic beats   Pulmonary:      Effort: Pulmonary effort is normal       Breath sounds: Normal breath sounds  Abdominal:      General: Bowel sounds are normal       Palpations: Abdomen is soft  Musculoskeletal:      Cervical back: Normal range of motion  Right lower leg: Edema present  Left lower leg: Edema present  Comments: 1+ monty Pedal and ankle edema    Skin:     General: Skin is warm and dry  Capillary Refill: Capillary refill takes less than 2 seconds  Neurological:      General: No focal deficit present  Mental Status: He is alert and oriented to person, place, and time  Psychiatric:         Mood and Affect: Mood normal          Behavior: Behavior normal          Discussion/Summary:  1  Monty LE edema, grade II diastolic dysfunction per TTE on 3/23/22  Dietary indiscretion eating ham or bologna sandwiches for lunch  Lasix 20mg daily for 3 days, K dur 10meq daily for 3 days then PRN for LE edema  BMP and mag in near future  Hand out information provided on a 2gm sodium diet, reading food labels to consume low sodium,      2  PVC's frequent, EKG showed multifocal PVC's, ST T wave abnormality in lateral leads  Discussed ischemic evaluation  Arnie Spears denies SOB or CP  3/23/22 TTE Showed LVEF 74%, Systolic function moderately abnormal consistent with grade 2 pseudo normal relaxation  It was decided to hold off on ischemic evaluation at this time  Continue on  Calan  mg daily, metoprolol tartrate 25 mg q 12 hours,  Denies symptoms of palpitation,  shortness of breath,  lightheadedness or dizziness  TSH  BMP and Mag to be done in the near future  24 hour Holter monitor evaluate burden of PVCs  3   Aortic root dilatation 4 4cm on TTE done 3/23/22 continue to monitor yearly

## 2022-04-12 NOTE — PROGRESS NOTES
Daily Note     Today's date: 2022  Patient name: Lena Barba  : 1940  MRN: [id number]  Referring provider: MARE Fagan  Dx:   Encounter Diagnosis     ICD-10-CM    1  Acute right-sided thoracic back pain  M54 6                      Subjective: Pt reports min pain upon arrival to PT today but reports that he is noticing that he is shuffling more while he is walking  Objective: See treatment diary below  Assessment: progressed therex as listed  Pt requires verbal cues to maintain lumbar extension and scap retraction during cone taps and lateral steps  Plan: Continue treatment as per PT plan of care  Precautions: chronic lumbar radiculopathy, afib, gait dysfunction, prostate CA      Manuals 3/24 3/29  4/1 4/5 4/7 4/12       DTM R thoracic parapspinals NA CF JLW JLW JLW kl       Thoracic pa's NA KL KL   kl                                 Neuro Re-Ed                                                                                                        Ther Ex             Seated lateral trunk flexion towards R 10x5" 10 x 5"            thoracic sidebend over bolster-L sidelying             Thoracic ext seated 10x5" 10 x 5"  10"x10 10"x10 10"x10 10"x10       pball flexion x3 5x10"  5 x 10" fwd  5"x10 fwd  5"x10 fwd  5"x10        rows Red 20x  RTB 20x  red  20 red  20 green  30 black x30       Low rows Red 20x RTB 20x  red  20 red  20 green  30 black x20       Seated trunk rotation 10x5"  10 x 5"  5"x10 ea 5"x10 ea 5"x10 ea        Pulleys flex/scap x3' ea 3' ea 30 ea 30 ea 30 ea x20       wand ext w/scap retract   10"x10 10"x10 10"x10 10"x10       ube - retro   6' 6' 8' 8'       Cone tap orange      x20       Lateral step up and over          6"x20       Ther Activity             ube retro x5' 5'                         Gait Training                                       Modalities             MHP prn

## 2022-04-13 ENCOUNTER — OFFICE VISIT (OUTPATIENT)
Dept: CARDIOLOGY CLINIC | Facility: CLINIC | Age: 82
End: 2022-04-13
Payer: MEDICARE

## 2022-04-13 VITALS
WEIGHT: 163.4 LBS | HEART RATE: 72 BPM | OXYGEN SATURATION: 92 % | BODY MASS INDEX: 22.88 KG/M2 | DIASTOLIC BLOOD PRESSURE: 60 MMHG | SYSTOLIC BLOOD PRESSURE: 128 MMHG | HEIGHT: 71 IN

## 2022-04-13 DIAGNOSIS — I77.810 AORTIC ROOT DILATATION (HCC): ICD-10-CM

## 2022-04-13 DIAGNOSIS — I49.3 PVC'S (PREMATURE VENTRICULAR CONTRACTIONS): ICD-10-CM

## 2022-04-13 DIAGNOSIS — R60.0 LEG EDEMA: Primary | ICD-10-CM

## 2022-04-13 PROCEDURE — 99215 OFFICE O/P EST HI 40 MIN: CPT | Performed by: INTERNAL MEDICINE

## 2022-04-13 PROCEDURE — 93000 ELECTROCARDIOGRAM COMPLETE: CPT | Performed by: INTERNAL MEDICINE

## 2022-04-13 RX ORDER — FUROSEMIDE 20 MG/1
TABLET ORAL
Qty: 3 TABLET | Refills: 10 | Status: SHIPPED | OUTPATIENT
Start: 2022-04-13 | End: 2022-05-09

## 2022-04-13 RX ORDER — POTASSIUM CHLORIDE 750 MG/1
TABLET, EXTENDED RELEASE ORAL
Qty: 3 TABLET | Refills: 10 | Status: SHIPPED | OUTPATIENT
Start: 2022-04-13 | End: 2022-05-09

## 2022-04-13 NOTE — PATIENT INSTRUCTIONS
2gm sodium low fat low cholesterol diet, eating fresh is best    Lab studies  Lasix 20mg daily for 3 days with K dur 10meq daily for 3 days, then on when needed basis for LE edema    Give our office a call next week

## 2022-04-15 ENCOUNTER — OFFICE VISIT (OUTPATIENT)
Dept: PHYSICAL THERAPY | Facility: CLINIC | Age: 82
End: 2022-04-15
Payer: MEDICARE

## 2022-04-15 DIAGNOSIS — M54.6 THORACIC SPINE PAIN: ICD-10-CM

## 2022-04-15 DIAGNOSIS — M54.6 ACUTE RIGHT-SIDED THORACIC BACK PAIN: Primary | ICD-10-CM

## 2022-04-15 DIAGNOSIS — M79.18 MYOFASCIAL PAIN SYNDROME: ICD-10-CM

## 2022-04-15 PROCEDURE — 97110 THERAPEUTIC EXERCISES: CPT

## 2022-04-15 PROCEDURE — 97140 MANUAL THERAPY 1/> REGIONS: CPT

## 2022-04-15 NOTE — PROGRESS NOTES
Daily Note     Today's date: 4/15/2022  Patient name: Dulce Mcmanus  : 1940  MRN: [id number]  Referring provider: MARE Russ  Dx:   Encounter Diagnosis     ICD-10-CM    1  Acute right-sided thoracic back pain  M54 6    2  Thoracic spine pain  M54 6    3  Myofascial pain syndrome  M79 18        Start Time: 1300  Stop Time: 1345  Total time in clinic (min): 45 minutes       Subjective: Patient reports his back is feeling "a little sore" but better since attending PT  Patient reports he is sleeping better  Objective: See treatment diary below  Assessment: Exercise technique improved when performing rows and low rows with decreased resistance  Plan: Continue treatment as per PT plan of care         Precautions: chronic lumbar radiculopathy, afib, gait dysfunction, prostate CA      Manuals 3/24 3/29  4/1 4/5 4/7 4/12 4/15      DTM R thoracic parapspinals NA CF JLW JLW JLW kl JLW      Thoracic pa's NA KL KL   kl                                 Neuro Re-Ed                                                                                                        Ther Ex             Seated lateral trunk flexion towards R 10x5" 10 x 5"            thoracic sidebend over bolster-L sidelying             Thoracic ext seated 10x5" 10 x 5"  10"x10 10"x10 10"x10 10"x10 10"x10      pball flexion x3 5x10"  5 x 10" fwd  5"x10 fwd  5"x10 fwd  5"x10  5"x10 ea      rows Red 20x  RTB 20x  red  20 red  20 green  30 black x30 blue  20      Low rows Red 20x RTB 20x  red  20 red  20 green  30 black x20 blue  20      Seated trunk rotation 10x5"  10 x 5"  5"x10 ea 5"x10 ea 5"x10 ea  with 2 lb med ball  10 ea side      Pulleys flex/scap x3' ea 3' ea 30 ea 30 ea 30 ea x20 30 ea      wand ext w/scap retract   10"x10 10"x10 10"x10 10"x10 10"x10      ube - retro   6' 6' 8' 8' 8'      Cone tap orange      x20 20 ea      Lateral step up and over      6"x20  6"   15                                Ther Activity ube retro x5' 5'                         Gait Training                                       Modalities             MHP prn

## 2022-04-19 ENCOUNTER — OFFICE VISIT (OUTPATIENT)
Dept: PHYSICAL THERAPY | Facility: CLINIC | Age: 82
End: 2022-04-19
Payer: MEDICARE

## 2022-04-19 DIAGNOSIS — M54.6 THORACIC SPINE PAIN: ICD-10-CM

## 2022-04-19 DIAGNOSIS — M54.6 ACUTE RIGHT-SIDED THORACIC BACK PAIN: Primary | ICD-10-CM

## 2022-04-19 DIAGNOSIS — M79.18 MYOFASCIAL PAIN SYNDROME: ICD-10-CM

## 2022-04-19 PROCEDURE — 97140 MANUAL THERAPY 1/> REGIONS: CPT

## 2022-04-19 PROCEDURE — 97110 THERAPEUTIC EXERCISES: CPT

## 2022-04-19 NOTE — PROGRESS NOTES
Daily Note     Today's date: 2022  Patient name: Constance Roach  : 1940  MRN: [id number]  Referring provider: MARE Jacob  Dx:   Encounter Diagnosis     ICD-10-CM    1  Acute right-sided thoracic back pain  M54 6    2  Thoracic spine pain  M54 6    3  Myofascial pain syndrome  M79 18        Start Time: 1145  Stop Time: 1230  Total time in clinic (min): 45 minutes       Subjective: Patient reports he is able to sleep well however back pain limits his ability to participate in recreational activities  Objective: See treatment diary below  Assessment: Treatment is tolerated well  Patient would benefit from continued PT to improve back pain, strength, and flexibility to improve function  Plan: Continue treatment as per PT plan of care         Precautions: chronic lumbar radiculopathy, afib, gait dysfunction, prostate CA      Manuals 3/24 3/29  4/1 4/5 4/7 4/12 4/15 4/19     DTM R thoracic parapspinals NA CF JLW JLW JLW kl JLW JLW     Thoracic pa's NA KL KL   kl                                 Neuro Re-Ed                                                                                                        Ther Ex             Seated lateral trunk flexion towards R 10x5" 10 x 5"            thoracic sidebend over bolster-L sidelying             Thoracic ext seated 10x5" 10 x 5"  10"x10 10"x10 10"x10 10"x10 10"x10 10"x10     pball flexion x3 5x10"  5 x 10" fwd  5"x10 fwd  5"x10 fwd  5"x10  5"x10 ea 5"x10 ea     rows Red 20x  RTB 20x  red  20 red  20 green  30 black x30 blue  20 blue  20     Low rows Red 20x RTB 20x  red  20 red  20 green  30 black x20 blue  20 blue  20     Seated trunk rotation 10x5"  10 x 5"  5"x10 ea 5"x10 ea 5"x10 ea  with 2 lb med ball  10 ea side      Pulleys flex/scap x3' ea 3' ea 30 ea 30 ea 30 ea x20 30 ea 30 ea     wand ext w/scap retract   10"x10 10"x10 10"x10 10"x10 10"x10 10"x10     ube - retro   6' 6' 8' 8' 8' 8'     Cone tap orange      x20 20 ea 20 ea Lateral step up and over      6"x20  6"   15  6"   15                               Ther Activity             ube retro x5' 5'                         Gait Training                                       Modalities             MHP prn

## 2022-04-21 ENCOUNTER — OFFICE VISIT (OUTPATIENT)
Dept: PHYSICAL THERAPY | Facility: CLINIC | Age: 82
End: 2022-04-21
Payer: MEDICARE

## 2022-04-21 DIAGNOSIS — M79.18 MYOFASCIAL PAIN SYNDROME: ICD-10-CM

## 2022-04-21 DIAGNOSIS — M54.6 ACUTE RIGHT-SIDED THORACIC BACK PAIN: Primary | ICD-10-CM

## 2022-04-21 DIAGNOSIS — M54.6 THORACIC SPINE PAIN: ICD-10-CM

## 2022-04-21 PROCEDURE — 97140 MANUAL THERAPY 1/> REGIONS: CPT

## 2022-04-21 PROCEDURE — 97110 THERAPEUTIC EXERCISES: CPT

## 2022-04-21 NOTE — PROGRESS NOTES
Daily Note     Today's date: 2022  Patient name: Philippe Welch  : 1940  MRN: [id number]  Referring provider: MARE Rocha  Dx: No diagnosis found  Subjective: Pt reports he has good relief after therapy, which lasts into the evening  Objective: See treatment diary below      Assessment: Performed exercise program w/o difficulty or discomfort  Required miimal vc'ing during same  Responded well to STM to R thoracic area  Will monitor  Pt would benefit from cont PT  Plan: Cont per POC       Precautions: chronic lumbar radiculopathy, afib, gait dysfunction, prostate CA      Manuals 3/24 3/29  4/1 4/5 4/7 4/12 4/15 4/19 4/21    DTM R thoracic parapspinals NA CF JLW JLW JLW kl JLW JLW MO    Thoracic pa's NA KL KL   kl                                 Neuro Re-Ed                                                                                                        Ther Ex             Seated lateral trunk flexion towards R 10x5" 10 x 5"            thoracic sidebend over bolster-L sidelying             Thoracic ext seated 10x5" 10 x 5"  10"x10 10"x10 10"x10 10"x10 10"x10 10"x10 10x10"    pball flexion x3 5x10"  5 x 10" fwd  5"x10 fwd  5"x10 fwd  5"x10  5"x10 ea 5"x10 ea 5"x10 ea    rows Red 20x  RTB 20x  red  20 red  20 green  30 black x30 blue  20 blue  20 Blue  20    Low rows Red 20x RTB 20x  red  20 red  20 green  30 black x20 blue  20 blue  20 Blue  20    Seated trunk rotation 10x5"  10 x 5"  5"x10 ea 5"x10 ea 5"x10 ea  with 2 lb med ball  10 ea side      Pulleys flex/scap x3' ea 3' ea 30 ea 30 ea 30 ea x20 30 ea 30 ea 30ea    wand ext w/scap retract   10"x10 10"x10 10"x10 10"x10 10"x10 10"x10 10"x10    ube - retro   6' 6' 8' 8' 8' 8' 8'    Cone tap orange      x20 20 ea 20 ea 20 ea    Lateral step up and over      6"x20  6"   15  6"   15 6"x15                              Ther Activity             ube retro x5' 5'                         Gait Training Modalities             MHP prn

## 2022-04-22 ENCOUNTER — APPOINTMENT (OUTPATIENT)
Dept: LAB | Facility: CLINIC | Age: 82
End: 2022-04-22
Payer: MEDICARE

## 2022-04-22 LAB
ANION GAP SERPL CALCULATED.3IONS-SCNC: 3 MMOL/L (ref 4–13)
BUN SERPL-MCNC: 22 MG/DL (ref 5–25)
CALCIUM SERPL-MCNC: 9.5 MG/DL (ref 8.3–10.1)
CHLORIDE SERPL-SCNC: 109 MMOL/L (ref 100–108)
CO2 SERPL-SCNC: 29 MMOL/L (ref 21–32)
CREAT SERPL-MCNC: 1.18 MG/DL (ref 0.6–1.3)
GFR SERPL CREATININE-BSD FRML MDRD: 56 ML/MIN/1.73SQ M
GLUCOSE P FAST SERPL-MCNC: 99 MG/DL (ref 65–99)
MAGNESIUM SERPL-MCNC: 2.2 MG/DL (ref 1.6–2.6)
POTASSIUM SERPL-SCNC: 4.5 MMOL/L (ref 3.5–5.3)
SODIUM SERPL-SCNC: 141 MMOL/L (ref 136–145)
TSH SERPL DL<=0.05 MIU/L-ACNC: 2.91 UIU/ML (ref 0.45–4.5)

## 2022-04-22 PROCEDURE — 80048 BASIC METABOLIC PNL TOTAL CA: CPT | Performed by: NURSE PRACTITIONER

## 2022-04-22 PROCEDURE — 84443 ASSAY THYROID STIM HORMONE: CPT | Performed by: NURSE PRACTITIONER

## 2022-04-22 PROCEDURE — 83735 ASSAY OF MAGNESIUM: CPT | Performed by: NURSE PRACTITIONER

## 2022-04-22 PROCEDURE — 36415 COLL VENOUS BLD VENIPUNCTURE: CPT | Performed by: NURSE PRACTITIONER

## 2022-04-26 ENCOUNTER — OFFICE VISIT (OUTPATIENT)
Dept: PHYSICAL THERAPY | Facility: CLINIC | Age: 82
End: 2022-04-26
Payer: MEDICARE

## 2022-04-26 DIAGNOSIS — M54.6 THORACIC SPINE PAIN: ICD-10-CM

## 2022-04-26 DIAGNOSIS — M79.18 MYOFASCIAL PAIN SYNDROME: ICD-10-CM

## 2022-04-26 DIAGNOSIS — M54.6 ACUTE RIGHT-SIDED THORACIC BACK PAIN: Primary | ICD-10-CM

## 2022-04-26 PROCEDURE — 97110 THERAPEUTIC EXERCISES: CPT

## 2022-04-26 PROCEDURE — 97140 MANUAL THERAPY 1/> REGIONS: CPT

## 2022-04-26 NOTE — PROGRESS NOTES
Daily Note     Today's date: 2022  Patient name: Balta Schultz  : 1940  MRN: [id number]  Referring provider: MARE Perez  Dx: No diagnosis found  Subjective: Pt reports he has good relief after therapy  Notes he feels good today  Objective: See treatment diary below      Assessment: Performed exercise program w/o complaint  Cont to respond well to STM, relief after same  Will monitor  Pt would benefit from cont PT  Plan:  Cont per POC    Precautions: chronic lumbar radiculopathy, afib, gait dysfunction, prostate CA      Manuals 3/24 3/29  4/1 4/5 4/7 4/12 4/15 4/19 4/21 4/26   DTM R thoracic parapspinals NA CF JLW JLW JLW kl JLW JLW MO MO   Thoracic pa's NA KL KL   kl                                 Neuro Re-Ed                                                                                                        Ther Ex             Seated lateral trunk flexion towards R 10x5" 10 x 5"            thoracic sidebend over bolster-L sidelying             Thoracic ext seated 10x5" 10 x 5"  10"x10 10"x10 10"x10 10"x10 10"x10 10"x10 10x10" 10x10"   pball flexion x3 5x10"  5 x 10" fwd  5"x10 fwd  5"x10 fwd  5"x10  5"x10 ea 5"x10 ea 5"x10 ea 5"x10 ea   rows Red 20x  RTB 20x  red  20 red  20 green  30 black x30 blue  20 blue  20 Blue  20 Blue  20   Low rows Red 20x RTB 20x  red  20 red  20 green  30 black x20 blue  20 blue  20 Blue  20 Blue  20   Seated trunk rotation 10x5"  10 x 5"  5"x10 ea 5"x10 ea 5"x10 ea  with 2 lb med ball  10 ea side      Pulleys flex/scap x3' ea 3' ea 30 ea 30 ea 30 ea x20 30 ea 30 ea 30ea 30 ea   wand ext w/scap retract   10"x10 10"x10 10"x10 10"x10 10"x10 10"x10 10"x10 10"x10   ube - retro   6' 6' 8' 8' 8' 8' 8' 8'   Cone tap orange      x20 20 ea 20 ea 20 ea 20 ea   Lateral step up and over      6"x20  6"   15  6"   15 6"x15 6"x15                             Ther Activity             ube retro x5' 5'                         Gait Training Modalities             MHP prn

## 2022-04-29 ENCOUNTER — OFFICE VISIT (OUTPATIENT)
Dept: PHYSICAL THERAPY | Facility: CLINIC | Age: 82
End: 2022-04-29
Payer: MEDICARE

## 2022-04-29 DIAGNOSIS — M54.6 ACUTE RIGHT-SIDED THORACIC BACK PAIN: Primary | ICD-10-CM

## 2022-04-29 PROCEDURE — 97140 MANUAL THERAPY 1/> REGIONS: CPT | Performed by: PHYSICAL THERAPIST

## 2022-04-29 PROCEDURE — 97110 THERAPEUTIC EXERCISES: CPT | Performed by: PHYSICAL THERAPIST

## 2022-04-29 NOTE — PROGRESS NOTES
Daily Note/DC summary    Today's date: 2022  Patient name: Lena Barba  : 1940  MRN: [id number]  Referring provider: MARE Fagan  Dx:   Encounter Diagnosis     ICD-10-CM    1  Acute right-sided thoracic back pain  M54 6                   Subjective: pt denies pain upon arrival to PT today but that when he does experience pain it is location in his R lumbar paraspinals  Objective: See treatment diary below      Assessment:  Pt has reached his max benefit from formal PT at this time, is independent with his hep and has been provided with tb for home use,  and will be Dc'd at this time   Thank you for this referral           Plan:  DC to HEP      Precautions: chronic lumbar radiculopathy, afib, gait dysfunction, prostate CA      Manuals 3/24 3/29  4/1 4/5 4/7 4/12 4/15 4/19 4/21 4/26 4/29   DTM R thoracic parapspinals NA CF JLW JLW JLW kl JLW JLW MO MO lumbar paraspinals kl   Thoracic pa's NA KL KL   kl                                    Neuro Re-Ed                                                                                                                Ther Ex              Seated lateral trunk flexion towards R 10x5" 10 x 5"             thoracic sidebend over bolster-L sidelying              Thoracic ext seated 10x5" 10 x 5"  10"x10 10"x10 10"x10 10"x10 10"x10 10"x10 10x10" 10x10" 10"x10   pball flexion x3 5x10"  5 x 10" fwd  5"x10 fwd  5"x10 fwd  5"x10  5"x10 ea 5"x10 ea 5"x10 ea 5"x10 ea 10"x10   rows Red 20x  RTB 20x  red  20 red  20 green  30 black x30 blue  20 blue  20 Blue  20 Blue  20 Black  x20   Low rows Red 20x RTB 20x  red  20 red  20 green  30 black x20 blue  20 blue  20 Blue  20 Blue  20 Black x20   Seated trunk rotation 10x5"  10 x 5"  5"x10 ea 5"x10 ea 5"x10 ea  with 2 lb med ball  10 ea side       Pulleys flex/scap x3' ea 3' ea 30 ea 30 ea 30 ea x20 30 ea 30 ea 30ea 30 ea 30 ea   wand ext w/scap retract   10"x10 10"x10 10"x10 10"x10 10"x10 10"x10 10"x10 10"x10    ube - retro   6' 6' 8' 8' 8' 8' 8' 8' 8'   Cone tap orange      x20 20 ea 20 ea 20 ea 20 ea 20 ea   Lateral step up and over      6"x20  6"   15  6"   15 6"x15 6"x15  6" x15                               Ther Activity              ube retro x5' 5'                           Gait Training                                          Modalities              MHP prn

## 2022-05-04 ENCOUNTER — HOSPITAL ENCOUNTER (OUTPATIENT)
Dept: NON INVASIVE DIAGNOSTICS | Age: 82
Discharge: HOME | End: 2022-05-04
Payer: MEDICARE

## 2022-05-04 DIAGNOSIS — I49.3 PVC'S (PREMATURE VENTRICULAR CONTRACTIONS): ICD-10-CM

## 2022-05-04 PROCEDURE — 93226 XTRNL ECG REC<48 HR SCAN A/R: CPT

## 2022-05-04 PROCEDURE — 93225 XTRNL ECG REC<48 HRS REC: CPT

## 2022-05-06 ENCOUNTER — TELEPHONE (OUTPATIENT)
Dept: UROLOGY | Facility: AMBULATORY SURGERY CENTER | Age: 82
End: 2022-05-06

## 2022-05-06 ENCOUNTER — APPOINTMENT (OUTPATIENT)
Dept: LAB | Facility: CLINIC | Age: 82
End: 2022-05-06
Payer: MEDICARE

## 2022-05-06 DIAGNOSIS — R31.0 GROSS HEMATURIA: Primary | ICD-10-CM

## 2022-05-06 DIAGNOSIS — R31.0 GROSS HEMATURIA: ICD-10-CM

## 2022-05-06 PROBLEM — Z85.46 HISTORY OF PROSTATE CANCER: Status: RESOLVED | Noted: 2018-08-13 | Resolved: 2022-05-06

## 2022-05-06 LAB
BACTERIA UR QL AUTO: ABNORMAL /HPF
BILIRUB UR QL STRIP: NEGATIVE
CLARITY UR: ABNORMAL
COLOR UR: ABNORMAL
GLUCOSE UR STRIP-MCNC: NEGATIVE MG/DL
HGB UR QL STRIP.AUTO: ABNORMAL
HYALINE CASTS #/AREA URNS LPF: ABNORMAL /LPF
KETONES UR STRIP-MCNC: NEGATIVE MG/DL
LEUKOCYTE ESTERASE UR QL STRIP: ABNORMAL
MUCOUS THREADS UR QL AUTO: ABNORMAL
NITRITE UR QL STRIP: NEGATIVE
NON-SQ EPI CELLS URNS QL MICRO: ABNORMAL /HPF
PH UR STRIP.AUTO: 6 [PH]
PROT UR STRIP-MCNC: ABNORMAL MG/DL
RBC #/AREA URNS AUTO: ABNORMAL /HPF
SP GR UR STRIP.AUTO: 1.02 (ref 1–1.03)
UROBILINOGEN UR STRIP-ACNC: <2 MG/DL
WBC #/AREA URNS AUTO: ABNORMAL /HPF

## 2022-05-06 PROCEDURE — 81001 URINALYSIS AUTO W/SCOPE: CPT

## 2022-05-06 PROCEDURE — 87077 CULTURE AEROBIC IDENTIFY: CPT

## 2022-05-06 PROCEDURE — 87186 SC STD MICRODIL/AGAR DIL: CPT

## 2022-05-06 PROCEDURE — 87086 URINE CULTURE/COLONY COUNT: CPT

## 2022-05-06 NOTE — TELEPHONE ENCOUNTER
Pt under care of Jesu Jackson    Pt wife called and state pt has burning starting yesterday  Pt woke up today and is urinating a lot of bright red blood   Pt does not have any fever or vomiting      Pt call [facility]-[phone]

## 2022-05-06 NOTE — TELEPHONE ENCOUNTER
Please start with UA/UCx and Cytology  Can give specimen at lab  Follow results and abx if indicated  Hydration during this time    Likely future US and Cystoscopy with Dr Rand Dumont

## 2022-05-06 NOTE — TELEPHONE ENCOUNTER
Returned call to patient wife  Voided twice after call this morning  Dark wine colored urine   First void some small clots present, second void not clots dark colored urine  Advised patient wife to have him continued increased  with hydration, monitoring urine,fever, chills pain  Report any changes to our office  Ed precautions reviewed  Patient will get urine testing this afternoon

## 2022-05-06 NOTE — TELEPHONE ENCOUNTER
Patient's wife just called and said he just urinated and had lots of blood in his urine again  She concerned nad would like a call back      She can be reached at [phone]

## 2022-05-06 NOTE — TELEPHONE ENCOUNTER
Returned call to patient wife  Patient last seen 8/25/21 in our [facility] location history of prostate cancer, gross hematuria,uti,radical prostatectomy 2001  Patient started with burning ,and blood ( dark wine in color)  Patient wife states that he voided in the night and she did see in toilet this morning  Since he has voided same color urine   Denies fevers, chills, nausea or vomiting  + frequency and urgency  Patient wife states he has never had blood in his urine like this before     Encouraged hydration 40-60oz of water daily  No fluid restrictions per wife the lasix he was given was for fluid on his ankles a few weeks ago  Avoiding Bladder irritants such as coffee,tea,soda,carbonated beverages  Limiting your alcohol intake  Avoiding constipation  Continue taking medications as prescribed  Reducing cigarette smoking  Advised patient monitor/contact our office with fever above 101 0, chills, decreased urination or unable to urinate, blood or clots in the urine  Health Call after hours protocol reviewed  Ed precautions reviewed   Patient verbilized understanding/ Agreement

## 2022-05-08 LAB — BACTERIA UR CULT: ABNORMAL

## 2022-05-09 ENCOUNTER — APPOINTMENT (OUTPATIENT)
Dept: LAB | Facility: CLINIC | Age: 82
End: 2022-05-09
Payer: MEDICARE

## 2022-05-09 ENCOUNTER — TELEPHONE (OUTPATIENT)
Dept: OTHER | Facility: OTHER | Age: 82
End: 2022-05-09

## 2022-05-09 ENCOUNTER — OFFICE VISIT (OUTPATIENT)
Dept: PAIN MEDICINE | Facility: CLINIC | Age: 82
End: 2022-05-09
Payer: MEDICARE

## 2022-05-09 VITALS
DIASTOLIC BLOOD PRESSURE: 68 MMHG | WEIGHT: 159 LBS | TEMPERATURE: 98 F | BODY MASS INDEX: 22.26 KG/M2 | HEART RATE: 74 BPM | HEIGHT: 71 IN | SYSTOLIC BLOOD PRESSURE: 130 MMHG

## 2022-05-09 DIAGNOSIS — M79.18 MYOFASCIAL PAIN SYNDROME: ICD-10-CM

## 2022-05-09 DIAGNOSIS — M54.50 CHRONIC BILATERAL LOW BACK PAIN WITHOUT SCIATICA: Primary | ICD-10-CM

## 2022-05-09 DIAGNOSIS — M54.6 ACUTE RIGHT-SIDED THORACIC BACK PAIN: ICD-10-CM

## 2022-05-09 DIAGNOSIS — R31.0 GROSS HEMATURIA: ICD-10-CM

## 2022-05-09 DIAGNOSIS — G89.29 CHRONIC BILATERAL LOW BACK PAIN WITHOUT SCIATICA: Primary | ICD-10-CM

## 2022-05-09 DIAGNOSIS — M48.062 SPINAL STENOSIS OF LUMBAR REGION WITH NEUROGENIC CLAUDICATION: ICD-10-CM

## 2022-05-09 DIAGNOSIS — M54.16 LUMBAR RADICULOPATHY: ICD-10-CM

## 2022-05-09 DIAGNOSIS — N39.0 URINARY TRACT INFECTION WITHOUT HEMATURIA, SITE UNSPECIFIED: Primary | ICD-10-CM

## 2022-05-09 PROCEDURE — 88112 CYTOPATH CELL ENHANCE TECH: CPT | Performed by: PATHOLOGY

## 2022-05-09 PROCEDURE — 93227 XTRNL ECG REC<48 HR R&I: CPT | Performed by: INTERNAL MEDICINE

## 2022-05-09 PROCEDURE — 99213 OFFICE O/P EST LOW 20 MIN: CPT | Performed by: NURSE PRACTITIONER

## 2022-05-09 RX ORDER — TIZANIDINE 2 MG/1
TABLET ORAL
Qty: 45 TABLET | Refills: 1 | Status: CANCELLED | OUTPATIENT
Start: 2022-05-09

## 2022-05-09 RX ORDER — CEPHALEXIN 500 MG/1
500 CAPSULE ORAL EVERY 12 HOURS SCHEDULED
Qty: 10 CAPSULE | Refills: 0 | Status: SHIPPED | OUTPATIENT
Start: 2022-05-09 | End: 2022-05-13 | Stop reason: ALTCHOICE

## 2022-05-09 NOTE — TELEPHONE ENCOUNTER
Spoke to patient's wife to advise of AP's note  Advised to call the office with any questions or concerns

## 2022-05-09 NOTE — PROGRESS NOTES
Assessment:  1  Chronic bilateral low back pain without sciatica    2  Acute right-sided thoracic back pain    3  Lumbar radiculopathy    4  Myofascial pain syndrome    5  Spinal stenosis of lumbar region with neurogenic claudication        Plan:  While the patient was in the office today, I did have a thorough conversation with the patient regarding their chronic pain syndrome, symptoms, medication regimen, and treatment plan  I did discuss with the patient that because of his underlying degenerative disease, scoliosis, and myofascial component, that he is most likely going to have some component of pain  However, I explained the patient that he has tried multiple different medications and other is not had relief or he is noted side effects and at this point he was hesitant to consider any other medication options  However, I did explain to him since I do feel there is definitely significant myofascial component, 1 option would be to try topical medications such as Voltaren gel or lidocaine patches to see if that would be helpful  The patient was agreeable and verbalized an understanding  I also encouraged the patient to try to continue with a home exercise and stretching program and stressed the importance of utilizing 20 minutes of low heat prior to his home exercises and stretching  The patient was agreeable and verbalized an understanding  I also discussed with the patient his wife that we could consider trying tramadol as needed for when the pain is more consistent to see if that would be helpful, however, both the patient and his wife were hesitant to consider any kind of opioid medication at this time were not interested in trying the tramadol  I discussed with the patient that at this point time he can followup with our office on an as-needed basis   I did review the patient that if his pain symptoms should change, worsen, and/or if he would experience any new symptoms he would like to be evaluated for, he should give our office a call  The patient was agreeable and verbalized an understanding  History of Present Illness: The patient is a 79 y o  male last seen on 3/14/2022 who presents for a follow up office visit in regards to chronic low back pain and acute right-sided thoracic pain secondary to lumbar stenosis with radiculopathy and myofascial pain  The patient currently reports that since his last office visit as he did proceed with physical therapy for the acute right-sided thoracic pain, there has been complete resolution of the thoracic pain but continues with the right-sided greater than left low back and pelvic pain  The patient and his wife report that as long as he is sitting or lying down he has little to no pain but as soon as he stands up research to walking starts to have the right-sided low back pain which very quickly seems to elevate to an irritating level in a short period of time  The patient reports he did try the previously discussed tizanidine as he has also tried and failed gabapentin and Cymbalta and even over-the-counter Tylenol Arthritis without any relief and or with side effects  The patient and his wife present today to discuss any other treatment plan recommendations  I have personally reviewed and/or updated the patient's past medical history, past surgical history, family history, social history, current medications, allergies, and vital signs today  Review of Systems:    Review of Systems   Respiratory: Negative for shortness of breath  Cardiovascular: Negative for chest pain  Gastrointestinal: Negative for constipation, diarrhea, nausea and vomiting  Musculoskeletal: Positive for gait problem  Negative for arthralgias, joint swelling and myalgias  Skin: Negative for rash  Neurological: Positive for weakness  Negative for dizziness and seizures  All other systems reviewed and are negative          Past Medical History: Diagnosis Date    Astigmatism     last assessed 11/15/17    Cataract     Resolved 11/15/17    Gross hematuria     Last assessed 01/28/16    Hematuria     last assessed 01/28/16    Prostate cancer ([facility] [address] )     last assessed 05/18/17    Renal calculi     last assessed 08/16/12    Squamous cell carcinoma     Vitamin D deficiency     Last assessed 08/19/13       Past Surgical History:   Procedure Laterality Date    BREAST EXCISIONAL BIOPSY N/A     Benign breast tumor unsure of side over 50 yrs ago    CATARACT EXTRACTION Left 08/26/2019    CATARACT EXTRACTION Right 08/12/2019    COLONOSCOPY      CYSTOSCOPY  01/28/2016    ELBOW SURGERY      Bone chip    HERNIA REPAIR  2002    LITHOTRIPSY      RETROPUBIC PROSTATECTOMY  11/13/2001    Radical with nerve sparing       Family History   Problem Relation Age of Onset    Other Mother 76        Influenza    Hypertension Mother     Heart failure Father 70    Peripheral vascular disease Father     Hypertension Father     Prostate cancer Brother     Stroke Brother 48    Hypertension Brother     Hypertension Family        Social History     Occupational History    Not on file   Tobacco Use    Smoking status: Never Smoker    Smokeless tobacco: Never Used   Substance and Sexual Activity    Alcohol use: Yes     Comment: social    Drug use: No    Sexual activity: Not on file         Current Outpatient Medications:     Docusate Sodium (COLACE PO), Take by mouth daily, Disp: , Rfl:     metoprolol tartrate (LOPRESSOR) 25 mg tablet, Take 1 tablet (25 mg total) by mouth every 12 (twelve) hours, Disp: 180 tablet, Rfl: 0    Multiple Vitamins-Minerals (CENTRUM SILVER 50+MEN PO), Daily, Disp: , Rfl:     verapamil (CALAN-SR) 240 mg CR tablet, TAKE 1 AND 1/2 TABLETS BY MOUTH DAILY AS DIRECTED (Patient taking differently: TAKE 1 AND 1/2 TABLETS BY MOUTH DAILY AS DIRECTED ), Disp: 135 tablet, Rfl: 0    Allergies   Allergen Reactions    Sulfa Antibiotics Rash    Ciprofloxacin     Gabapentin Dizziness       Physical Exam:    /68 (BP Location: Left arm, Patient Position: Sitting, Cuff Size: Standard)   Pulse 74   Temp 98 °F (36 7 °C)   Ht 5' 11" (1 803 m)   Wt 72 1 kg (159 lb)   BMI 22 18 kg/m²     Constitutional:normal, well developed, well nourished, alert, in no distress and non-toxic and no overt pain behavior  Eyes:anicteric  HEENT:grossly intact  Neck:supple, symmetric, trachea midline and no masses   Pulmonary:even and unlabored  Cardiovascular:No edema or pitting edema present  Skin:Normal without rashes or lesions and well hydrated  Psychiatric:Mood and affect appropriate  Neurologic:Cranial Nerves II-XII grossly intact  Musculoskeletal:The patient's gait is slightly antalgic, but steady without the use of any assistive devices  Imaging  No orders to display         No orders of the defined types were placed in this encounter

## 2022-05-09 NOTE — TELEPHONE ENCOUNTER
Urine culture positive for infection  Prescription for Keflex was electronically sent to his pharmacy  Urine cytology remains pending

## 2022-05-12 NOTE — TELEPHONE ENCOUNTER
Spoke with Thierno Cabezas and notified her that Shayan's script was the correct one for UTI  Also the Cystology is negative which is good new  Encouraged her to have him hydrate with water and she states he is

## 2022-05-13 ENCOUNTER — OFFICE VISIT (OUTPATIENT)
Dept: CARDIOLOGY CLINIC | Facility: CLINIC | Age: 82
End: 2022-05-13
Payer: MEDICARE

## 2022-05-13 VITALS
WEIGHT: 161.6 LBS | SYSTOLIC BLOOD PRESSURE: 120 MMHG | DIASTOLIC BLOOD PRESSURE: 58 MMHG | BODY MASS INDEX: 22.62 KG/M2 | HEART RATE: 74 BPM | HEIGHT: 71 IN

## 2022-05-13 DIAGNOSIS — I49.3 PVC'S (PREMATURE VENTRICULAR CONTRACTIONS): Primary | ICD-10-CM

## 2022-05-13 DIAGNOSIS — I71.2 THORACIC AORTIC ANEURYSM WITHOUT RUPTURE (HCC): ICD-10-CM

## 2022-05-13 DIAGNOSIS — I35.1 NONRHEUMATIC AORTIC VALVE INSUFFICIENCY: ICD-10-CM

## 2022-05-13 DIAGNOSIS — I34.0 NON-RHEUMATIC MITRAL REGURGITATION: ICD-10-CM

## 2022-05-13 PROCEDURE — 99214 OFFICE O/P EST MOD 30 MIN: CPT | Performed by: INTERNAL MEDICINE

## 2022-05-13 NOTE — PROGRESS NOTES
Cardiology Follow Up    Stephenie Nielsen  1940  [id number]  [facility]  [address]  [address]  [phone]    1  PVC's (premature ventricular contractions)     2  Non-rheumatic mitral regurgitation     3  Nonrheumatic aortic valve insufficiency     4  Thoracic aortic aneurysm without rupture ([facility])         Interval History:  No new cardiovascular complaints    He denies any symptom including chest pain shortness of breath orthopnea paroxysmal nocturnal dyspnea or syncope    Patient Active Problem List   Diagnosis    PVC's (premature ventricular contractions)    Thoracic aortic aneurysm without rupture ([facility] [address]    Nonrheumatic aortic valve insufficiency    Non-rheumatic mitral regurgitation    Malignant neoplasm of prostate ([facility] [address]    Strain of calf muscle    Spinal stenosis of lumbar region with neurogenic claudication    Lumbar radiculopathy    Chronic bilateral low back pain without sciatica    Renal cyst, acquired, left    Aortic root dilatation ([address] )    Gross hematuria    Acute right-sided thoracic back pain    Myofascial pain syndrome     Past Medical History:   Diagnosis Date    Astigmatism     last assessed 11/15/17    Cataract     Resolved 11/15/17    Gross hematuria     Last assessed 01/28/16    Hematuria     last assessed 01/28/16    Prostate cancer ([facility] [address] )     last assessed 05/18/17    Renal calculi     last assessed 08/16/12    Squamous cell carcinoma     Vitamin D deficiency     Last assessed 08/19/13     Social History     Socioeconomic History    Marital status: /Civil Union     Spouse name: Not on file    Number of children: Not on file    Years of education: Not on file    Highest education level: Not on file   Occupational History    Not on file   Tobacco Use    Smoking status: Never Smoker    Smokeless tobacco: Never Used   Substance and Sexual Activity    Alcohol use: Yes     Comment: social    Drug use: No    Sexual activity: Not on file   Other Topics Concern    Not on file   Social History Narrative    Not on file     Social Determinants of Health     Financial Resource Strain: Not on file   Food Insecurity: Not on file   Transportation Needs: Not on file   Physical Activity: Not on file   Stress: Not on file   Social Connections: Not on file   Intimate Partner Violence: Not on file   Housing Stability: Not on file      Family History   Problem Relation Age of Onset    Other Mother 76        Influenza    Hypertension Mother     Heart failure Father 70    Peripheral vascular disease Father     Hypertension Father     Prostate cancer Brother     Stroke Brother 48    Hypertension Brother     Hypertension Family      Past Surgical History:   Procedure Laterality Date    BREAST EXCISIONAL BIOPSY N/A     Benign breast tumor unsure of side over 50 yrs ago    CATARACT EXTRACTION Left 08/26/2019    CATARACT EXTRACTION Right 08/12/2019    COLONOSCOPY      CYSTOSCOPY  01/28/2016    ELBOW SURGERY      Bone chip    HERNIA REPAIR  2002    LITHOTRIPSY      RETROPUBIC PROSTATECTOMY  11/13/2001    Radical with nerve sparing       Current Outpatient Medications:     Docusate Sodium (COLACE PO), Take by mouth daily, Disp: , Rfl:     metoprolol tartrate (LOPRESSOR) 25 mg tablet, Take 1 tablet (25 mg total) by mouth every 12 (twelve) hours, Disp: 180 tablet, Rfl: 0    Multiple Vitamins-Minerals (CENTRUM SILVER 50+MEN PO), Daily, Disp: , Rfl:     verapamil (CALAN-SR) 240 mg CR tablet, TAKE 1 AND 1/2 TABLETS BY MOUTH DAILY AS DIRECTED (Patient taking differently: TAKE 1 AND 1/2 TABLETS BY MOUTH DAILY AS DIRECTED), Disp: 135 tablet, Rfl: 0  Allergies   Allergen Reactions    Sulfa Antibiotics Rash    Ciprofloxacin     Gabapentin Dizziness       Labs:  Appointment on 05/09/2022   Component Date Value    Case Report 05/09/2022 Value:Non-gynecologic Cytology                          Case: [id number]                                  Authorizing Provider:  Rex Madden PA-C      Collected:           05/09/2022 1140              Ordering Location:     [place] [address] Laboratory      Received:            05/09/2022 [address] [facility]                                                            Pathologist:           Madina Vitale MD                                                                 Specimen:    Urine, Voided                                                                              Final Diagnosis 05/09/2022                      Value: This result contains rich text formatting which cannot be displayed here  Prieto Bella Gross Description 05/09/2022                      Value: This result contains rich text formatting which cannot be displayed here   Additional Information 05/09/2022                      Value: This result contains rich text formatting which cannot be displayed here     Appointment on 05/06/2022   Component Date Value    Urine Culture 05/06/2022 30,000-39,000 cfu/ml Escherichia coli (A)   Telephone on 05/06/2022   Component Date Value    Color, UA 05/06/2022 Orange     Clarity, UA 05/06/2022 Extra Turbid     Specific Gravity, UA 05/06/2022 1 019     pH, UA 05/06/2022 6 0     Leukocytes, UA 05/06/2022 Large (A)    Nitrite, UA 05/06/2022 Negative     Protein, UA 05/06/2022 200 (2+) (A)    Glucose, UA 05/06/2022 Negative     Ketones, UA 05/06/2022 Negative     Urobilinogen, UA 05/06/2022 <2 0     Bilirubin, UA 05/06/2022 Negative     Blood, UA 05/06/2022 Large (A)    RBC, UA 05/06/2022 Innumerable (A)    WBC, UA 05/06/2022 Innumerable (A)    Epithelial Cells 05/06/2022 None Seen     Bacteria, UA 05/06/2022 Moderate (A)    MUCUS THREADS 05/06/2022 Innumerable (A)    Hyaline Casts, UA 05/06/2022 10-25 (A)   Office Visit on 04/13/2022   Component Date Value    Sodium 04/22/2022 141     Potassium 04/22/2022 4 5     Chloride 04/22/2022 109 (A)    CO2 04/22/2022 29     ANION GAP 04/22/2022 3 (A)    BUN 04/22/2022 22     Creatinine 04/22/2022 1 18     Glucose, Fasting 04/22/2022 99     Calcium 04/22/2022 9 5     eGFR 04/22/2022 56     Magnesium 04/22/2022 2 2     TSH 3RD GENERATON 04/22/2022 2 910    Hospital Outpatient Visit on 03/23/2022   Component Date Value    MV mean gradient retrogr* 03/23/2022 73     AV peak gradient 03/23/2022 68     LA size 03/23/2022 3 7     Mitral regurgitation pea* 03/23/2022 5 33     Mitral valve mean inflow* 03/23/2022 4 07     Triscuspid Valve Regurgi* 03/23/2022 42 0     Tricuspid valve peak reg* 03/23/2022 3 24     LVPWd 03/23/2022 1 10     Left Atrium Area-systoli* 03/23/2022 24 7     Left Atrium Area-systoli* 03/23/2022 22 8     MV E' Tissue Velocity Se* 03/23/2022 8     Tricuspid annular plane * 03/23/2022 3 00     TR Peak Gerson 03/23/2022 3 2     IVSd 03/23/2022 0 75     LV DIASTOLIC VOLUME (MOD* 93/12/5567 98     LEFT VENTRICLE SYSTOLIC * 82/55/4570 28     Left ventricular stroke * 03/23/2022 71 00     AV Deceleration Time 03/23/2022 1,804     MV VTI RETROGRADE 03/23/2022 189 2     A4C EF 03/23/2022 66     LA length (A2C) 03/23/2022 5 80     LVIDd 03/23/2022 4 60     IVS 03/23/2022 1 5     LVIDS 03/23/2022 2 70     FS 03/23/2022 41     Asc Ao 03/23/2022 3 4     Ao root 03/23/2022 4 40     RVID d 03/23/2022 4 6     AV regurgitation pressur* 03/23/2022 523     MV valve area p 1/2 meth* 03/23/2022 4 40     E wave deceleration time 03/23/2022 172     LVOT diameter 03/23/2022 2 2     MV Peak E Gerson 03/23/2022 72     MV Peak A Gerson 03/23/2022 0 68     MARY A4C 03/23/2022 24 4     MR PG 03/23/2022 114     RAA A4C 03/23/2022 22 3     MV stenosis pressure 1/2* 03/23/2022 50     LVSV, 2D 03/23/2022 71     LVOT area 03/23/2022 3 80     Ao asc z-score 03/23/2022 3 69     ZLVPWD 03/23/2022 3 08     ZLVIDS 03/23/2022 -1 67     ZLVIDD 03/23/2022 -1 64     ZIVSD 03/23/2022 6 39     LV EF 03/23/2022 60     Est  RA pres 03/23/2022 8 0     Right Ventricular Peak S* 03/23/2022 50 00      Imaging: Holter monitor    Result Date: 5/9/2022  Narrative: PATIENT NAME:  Constance Roach AGE:  79 y o  Sex:  male MRN:  [id number] PROCEDURE: Holter monitor - 24 hour INDICATIONS: PVCs HOLTER FINDINGS: The patient was monitored for 24 continuous hours  This revealed the patient to be in sinus rhythm with an average rate of 61 BPM; a minimum rate of 47 BPM; and a maximum rate of 93 BPM  There were a total of 1521 ventricular ectopic beats (1 9% of all monitored beats)  There were 68 in couplets, 844 single PVCs and 609 single VEs  There was no sustained or non-sustained VT  There were a total of 425 supraventricular ectopic beats (0 5% of all monitored beats)  There was no evidence of atrial fibrillation or atrial flutter  There was approximately 11 hr 56 min of bradycardia  There was no evidence of heart block, and no significant pauses were seen  There was approximately 0 hr 0 min of tachycardia  No symptoms were reported  Impression: The patient was in sinus rhythm throughout the duration of the study  The average heart rate was 61 bpm  Occasional ventricular ectopy, totaling 1 9% of monitored beats, comprised almost entirely of single ectopics without any ventricular tachycardia  This has significantly lessened since prior study 4/11/2019, which recorded 9 0% ventricular ectopy  ST-segment deviation varying between 1 0 mm to 1 5 mm throughout entirety of study  No symptoms reported  Abner Foreman MD      Review of Systems:  Review of Systems   Constitutional: Negative for fatigue  HENT: Negative for nosebleeds  Eyes: Negative for redness  Respiratory: Negative for chest tightness and shortness of breath  Cardiovascular: Negative for chest pain, palpitations and leg swelling  Gastrointestinal: Positive for constipation  Negative for abdominal pain  Endocrine: Negative for polyuria  Genitourinary: Negative for urgency  Musculoskeletal: Positive for back pain  Negative for arthralgias  Skin: Negative for rash  Neurological: Negative for dizziness and syncope  Psychiatric/Behavioral: Negative for confusion and sleep disturbance  The patient is not nervous/anxious  Physical Exam:  Physical Exam  Vitals and nursing note reviewed  Constitutional:       Appearance: Normal appearance  HENT:      Head: Normocephalic and atraumatic  Nose: Nose normal       Mouth/Throat:      Mouth: Mucous membranes are moist    Eyes:      Conjunctiva/sclera: Conjunctivae normal    Cardiovascular:      Rate and Rhythm: Normal rate and regular rhythm  Pulmonary:      Effort: Pulmonary effort is normal       Breath sounds: Normal breath sounds  Abdominal:      Palpations: Abdomen is soft  Musculoskeletal:         General: Normal range of motion  Cervical back: Normal range of motion  Right lower leg: Edema present  Left lower leg: Edema present  Skin:     General: Skin is warm and dry  Neurological:      General: No focal deficit present  Mental Status: He is alert and oriented to person, place, and time  Psychiatric:         Mood and Affect: Mood normal          Discussion/Summary:  No change in cardiovascular status since his most recent visit  He is limited by low back pain that is chronic  He had been in the office approximately 6 weeks ago for some mild lower extremity edema he took Lasix for a number of days with resolution  This is a chronic problem it typically resolves elevation and lack of dependency  A recent Holter shows PVC burden to be down to 1 9 percent which is a significant improvement    Recent echocardiogram showed his left ventricle to be normal with continued moderate aortic and mitral insufficiency that asymptomatic  We will continue to manage medically I will see him again in 6 months

## 2022-05-24 ENCOUNTER — OFFICE VISIT (OUTPATIENT)
Dept: PHYSICAL THERAPY | Facility: CLINIC | Age: 82
End: 2022-05-24
Payer: MEDICARE

## 2022-05-24 DIAGNOSIS — M54.40 CHRONIC LOW BACK PAIN WITH SCIATICA, SCIATICA LATERALITY UNSPECIFIED, UNSPECIFIED BACK PAIN LATERALITY: ICD-10-CM

## 2022-05-24 DIAGNOSIS — M54.6 ACUTE RIGHT-SIDED THORACIC BACK PAIN: Primary | ICD-10-CM

## 2022-05-24 DIAGNOSIS — G89.29 CHRONIC LOW BACK PAIN WITH SCIATICA, SCIATICA LATERALITY UNSPECIFIED, UNSPECIFIED BACK PAIN LATERALITY: ICD-10-CM

## 2022-05-24 PROCEDURE — 97161 PT EVAL LOW COMPLEX 20 MIN: CPT | Performed by: PHYSICAL THERAPIST

## 2022-05-24 NOTE — PROGRESS NOTES
PT Evaluation     Today's date: 2022  Patient name: Maria A Melendez  : 1940  MRN: [id number]  Referring provider: Avril Moreno, PT  Dx:   Encounter Diagnosis     ICD-10-CM    1  Acute right-sided thoracic back pain  M54 6                   Assessment  Assessment details: Patient is a 79 y o  male who presents to outpatient PT via direct access with pain, decreased rom, decreased strength and decreased functional mobility  He will benefit from skilled PT to address these deficits in order to achieve his goals and maximize his functional mobility  Goals  Short Term Goals: Independent performance of initial hep  Decrease pain 2 points on VAS      Long Term Goals: Independent performance of comprehensive hep  Performance of IADL's is returned to max level of function  Performance in recreational activities is improved to max level of function  Able walk >10 minutes with min lumbar pain    Plan  Planned therapy interventions: abdominal trunk stabilization, IADL retraining, joint mobilization, manual therapy, massage, strengthening, stretching, therapeutic activities, therapeutic exercise, therapeutic training, transfer training and home exercise program  Frequency: 2x week  Duration in weeks: 4        Subjective Evaluation    History of Present Illness  Mechanism of injury: Pt report that he has been experiencing increasing lumbar pain over the past several weeks  History or chronic lumbar spine pain secondary to scoliosis and DJD  Reports that he has most pain upon standing and has walking tolerance of <5 min secondary to pain  Reports pain reduction when sitting or lying down  Reports upon standing his legs feels very heavy  Thee radicular symptoms distal to the knee  Reports that he is not taking medication for this condition  Reports slight pain reduction with heat to the area  Denies falls, does not require AD for household ambulation    Pain  Current pain ratin  At worst pain ratin    Patient Goals  Patient goals for therapy: decreased pain, increased motion, improved balance, increased strength and independence with ADLs/IADLs          Objective     Lumbar spine:    ROM:   Flexion: mod limited   Extension: max limited   Right Rotation: max limited   Left Rotation: max limited   Right Lateral Flexion: max limited   Left Lateral Flexion:  Max limited      Ambulates with foreward trunk lean, decrease step length B/L and slow gait pattern  Poor control of abdominals noted with TaA  Hypomobility noted with spring testing  Straight Leg Raise: tight HS but no adverse nueral tension  Cross SLR:  neg  Slump Test: neg  Directional testing: no change in symptoms with repeated flexion or extension  Decreased flexibility: B/L HS, glutes, gastrocs, hip flexors  [place]'s sign: pos        Myotome testin/5 B/L    Dermatome testing: intact to light touch                     Precautions: DDD, scoliosis,      Manuals                                                                 Neuro Re-Ed             TaA             Pelvic tilt             TaA bridge             TaA marching             TaA squats                                       Ther Ex             ltr             Repeated extension stranding             Hs stretch                                                                              Ther Activity             bike                          Gait Training                                       Modalities

## 2022-05-26 ENCOUNTER — OFFICE VISIT (OUTPATIENT)
Dept: PHYSICAL THERAPY | Facility: CLINIC | Age: 82
End: 2022-05-26
Payer: MEDICARE

## 2022-05-26 DIAGNOSIS — M54.6 ACUTE RIGHT-SIDED THORACIC BACK PAIN: Primary | ICD-10-CM

## 2022-05-26 PROCEDURE — 97110 THERAPEUTIC EXERCISES: CPT | Performed by: PHYSICAL THERAPIST

## 2022-05-26 PROCEDURE — 97112 NEUROMUSCULAR REEDUCATION: CPT | Performed by: PHYSICAL THERAPIST

## 2022-05-26 PROCEDURE — 97140 MANUAL THERAPY 1/> REGIONS: CPT | Performed by: PHYSICAL THERAPIST

## 2022-05-26 NOTE — PROGRESS NOTES
Daily Note     Today's date: 2022  Patient name: Melisa Carrillo  : 1940  MRN: [id number]  Referring provider: Alanis Porter PT  Dx:   Encounter Diagnosis     ICD-10-CM    1  Acute right-sided thoracic back pain  M54 6                   Subjective: pt reports that he was sore after his IE  Reports that he has difficulty with his hep secondary to performing these on the floor and having difficulty getting up from that position  Instructed pt to perform these on his bed instead  Objective: See treatment diary below      Assessment: initiated tx as listed  Pt initially requires verbal cues for proper TaA with most therex this improves with reps  Pt does not express pain t/o tx  Monitor response and progress as samira NV  Plan: Continue per plan of care        Precautions: DDD, scoliosis,      Manuals                                                                 Neuro Re-Ed             TaA 5"x5            Pelvic tilt 5"x5            TaA bridge 10            TaA marching 2x10            TaA squats             TaA ball squeeze 5"x15            TaA cone taps x20            Ther Ex             ltr 5"x10            Repeated extension stranding At table 5"x10            Hs stretch manual            Seated trunk twist 5"x10ea            sktc manul                                                   Ther Activity             bike 8'                         Gait Training                                       Modalities

## 2022-06-06 ENCOUNTER — APPOINTMENT (OUTPATIENT)
Dept: PHYSICAL THERAPY | Facility: CLINIC | Age: 82
End: 2022-06-06
Payer: MEDICARE

## 2022-06-06 ENCOUNTER — OFFICE VISIT (OUTPATIENT)
Dept: PHYSICAL THERAPY | Facility: CLINIC | Age: 82
End: 2022-06-06
Payer: MEDICARE

## 2022-06-06 DIAGNOSIS — M54.6 ACUTE RIGHT-SIDED THORACIC BACK PAIN: Primary | ICD-10-CM

## 2022-06-06 DIAGNOSIS — M54.40 CHRONIC LOW BACK PAIN WITH SCIATICA, SCIATICA LATERALITY UNSPECIFIED, UNSPECIFIED BACK PAIN LATERALITY: ICD-10-CM

## 2022-06-06 DIAGNOSIS — G89.29 CHRONIC LOW BACK PAIN WITH SCIATICA, SCIATICA LATERALITY UNSPECIFIED, UNSPECIFIED BACK PAIN LATERALITY: ICD-10-CM

## 2022-06-06 PROCEDURE — 97110 THERAPEUTIC EXERCISES: CPT | Performed by: PHYSICAL THERAPIST

## 2022-06-06 PROCEDURE — 97112 NEUROMUSCULAR REEDUCATION: CPT | Performed by: PHYSICAL THERAPIST

## 2022-06-06 PROCEDURE — 97140 MANUAL THERAPY 1/> REGIONS: CPT | Performed by: PHYSICAL THERAPIST

## 2022-06-06 NOTE — PROGRESS NOTES
Daily Note     Today's date: 2022  Patient name: Sasha Guadalupe  : 1940  MRN: [id number]  Referring provider: Tiffany Upton, PT  Dx:   Encounter Diagnosis     ICD-10-CM    1  Acute right-sided thoracic back pain  M54 6    2  Chronic low back pain with sciatica, sciatica laterality unspecified, unspecified back pain laterality  M54 40     G89 29                   Subjective: pt reports mild soreness after his LV  Reports "im feeling so-so today"  Reports improved tolerance to hep since performing these on his bed vs the floor  Objective: See treatment diary below      Assessment:  Progressed reps and resistance with therex as listed  Pt reports fatigue post-tx but denies any replication of pain  Continue to progress stabilization program as samira NV  Plan: Continue per plan of care        Precautions: DDD, scoliosis,      Manuals                                                                Neuro Re-Ed             TaA 5"x5 hep           Pelvic tilt 5"x5 hep           TaA bridge 10 x20           TaA marching 2x10            TaA squats             TaA ball squeeze 5"x15            TaA cone taps x20 x20           Ther Ex             ltr 5"x10 5"x20           Repeated extension stranding At table 5"x10 5"x10           Hs stretch manual manual           Seated trunk twist 5"x10ea 5"x20           sktc manual manaul           Lateral step up and over  4'x15                                     Ther Activity             bike 8' 10'                        Gait Training                                       Modalities

## 2022-06-08 ENCOUNTER — OFFICE VISIT (OUTPATIENT)
Dept: PHYSICAL THERAPY | Facility: CLINIC | Age: 82
End: 2022-06-08
Payer: MEDICARE

## 2022-06-08 DIAGNOSIS — M54.40 CHRONIC LOW BACK PAIN WITH SCIATICA, SCIATICA LATERALITY UNSPECIFIED, UNSPECIFIED BACK PAIN LATERALITY: Primary | ICD-10-CM

## 2022-06-08 DIAGNOSIS — G89.29 CHRONIC LOW BACK PAIN WITH SCIATICA, SCIATICA LATERALITY UNSPECIFIED, UNSPECIFIED BACK PAIN LATERALITY: Primary | ICD-10-CM

## 2022-06-08 PROCEDURE — 97110 THERAPEUTIC EXERCISES: CPT | Performed by: PHYSICAL THERAPIST

## 2022-06-08 PROCEDURE — 97140 MANUAL THERAPY 1/> REGIONS: CPT | Performed by: PHYSICAL THERAPIST

## 2022-06-08 PROCEDURE — 97112 NEUROMUSCULAR REEDUCATION: CPT | Performed by: PHYSICAL THERAPIST

## 2022-06-08 NOTE — PROGRESS NOTES
Daily Note     Today's date: 2022  Patient name: Cale Carrillo  : 1940  MRN: [id number]  Referring provider: Yumiko Lainez, PT  Dx:   Encounter Diagnosis     ICD-10-CM    1  Chronic low back pain with sciatica, sciatica laterality unspecified, unspecified back pain laterality  M54 40     G89 29                   Subjective: pt reports that he generally notice mm soreness for approx 48 hours after his PT sessions so he feels he is still recovering from his LV  Objective: See treatment diary below      Assessment:  Modified therex as listed  No pain reported t/o tx but reports fatigue post-tx  Ended session with MHP in sitting  Plan: Continue per plan of care        Precautions: DDD, scoliosis,      Manuals                                                               Neuro Re-Ed             TaA 5"x5 hep           Pelvic tilt 5"x5 hep           TaA bridge 10 x20           TaA marching 2x10  x20          TaA squats             TaA ball squeeze 5"x15  5"x20          TaA cone taps x20 x20 x20          Ther Ex             ltr 5"x10 5"x20 5"x20          Repeated extension stranding At table 5"x10 5"x10 5"x20          Hs stretch manual manual manaul          Seated trunk twist 5"x10ea 5"x20 nv          sktc manual manaul manual          Lateral step up and over  4'x15 6"x10                                    Ther Activity             bike 8' 10' 8'                       Gait Training                                       Modalities             mhp   8' seated

## 2022-06-13 ENCOUNTER — OFFICE VISIT (OUTPATIENT)
Dept: PHYSICAL THERAPY | Facility: CLINIC | Age: 82
End: 2022-06-13
Payer: MEDICARE

## 2022-06-13 DIAGNOSIS — M54.6 ACUTE RIGHT-SIDED THORACIC BACK PAIN: ICD-10-CM

## 2022-06-13 DIAGNOSIS — M54.40 CHRONIC LOW BACK PAIN WITH SCIATICA, SCIATICA LATERALITY UNSPECIFIED, UNSPECIFIED BACK PAIN LATERALITY: Primary | ICD-10-CM

## 2022-06-13 DIAGNOSIS — G89.29 CHRONIC LOW BACK PAIN WITH SCIATICA, SCIATICA LATERALITY UNSPECIFIED, UNSPECIFIED BACK PAIN LATERALITY: Primary | ICD-10-CM

## 2022-06-13 PROCEDURE — 97530 THERAPEUTIC ACTIVITIES: CPT

## 2022-06-13 PROCEDURE — 97112 NEUROMUSCULAR REEDUCATION: CPT

## 2022-06-13 PROCEDURE — 97110 THERAPEUTIC EXERCISES: CPT

## 2022-06-13 NOTE — PROGRESS NOTES
Daily Note     Today's date: 2022  Patient name: Nicolasa Sibley  : 1940  MRN: [id number]  Referring provider: Krysten Bernal, PT  Dx:   Encounter Diagnosis     ICD-10-CM    1  Chronic low back pain with sciatica, sciatica laterality unspecified, unspecified back pain laterality  M54 40     G89 29    2  Acute right-sided thoracic back pain  M54 6        Start Time: 1100  Stop Time: 1145  Total time in clinic (min): 45 minutes       Subjective: Patient reports his back is feeling "maybe a little bit better "      Objective: See treatment diary below  Assessment: Treatment is tolerated well  Patient has very limited hamstring flexibility and would benefit from continued manual stretching  Plan: Continue treatment as per PT plan of care         Precautions: DDD, scoliosis      Manuals                                                              Neuro Re-Ed             TaA 5"x5 hep           Pelvic tilt 5"x5 hep           TaA bridge 10 x20           TaA marching 2x10  x20 20         TaA squats    20         TaA ball squeeze 5"x15  5"x20 5"x20         TaA cone taps x20 x20 x20 20                      Ther Ex             ltr 5"x10 5"x20 5"x20 5"x20         Repeated extension stranding At table 5"x10 5"x10 5"x20 5"x20         Hs stretch manual manual manual manual         Seated trunk twist 5"x10ea 5"x20 nv          sktc manual manaul manual manual         Lateral step up and over  4'x15 6"x10 6" x10                                   Ther Activity             bike 8' 10' 8' 8'                      Gait Training                                       Modalities             MHP   8' seated 8'  seated

## 2022-06-15 ENCOUNTER — OFFICE VISIT (OUTPATIENT)
Dept: PHYSICAL THERAPY | Facility: CLINIC | Age: 82
End: 2022-06-15
Payer: MEDICARE

## 2022-06-15 DIAGNOSIS — I49.3 PVC'S (PREMATURE VENTRICULAR CONTRACTIONS): ICD-10-CM

## 2022-06-15 DIAGNOSIS — M54.40 CHRONIC LOW BACK PAIN WITH SCIATICA, SCIATICA LATERALITY UNSPECIFIED, UNSPECIFIED BACK PAIN LATERALITY: Primary | ICD-10-CM

## 2022-06-15 DIAGNOSIS — G89.29 CHRONIC LOW BACK PAIN WITH SCIATICA, SCIATICA LATERALITY UNSPECIFIED, UNSPECIFIED BACK PAIN LATERALITY: Primary | ICD-10-CM

## 2022-06-15 DIAGNOSIS — M54.6 ACUTE RIGHT-SIDED THORACIC BACK PAIN: ICD-10-CM

## 2022-06-15 PROCEDURE — 97110 THERAPEUTIC EXERCISES: CPT

## 2022-06-15 PROCEDURE — 97530 THERAPEUTIC ACTIVITIES: CPT

## 2022-06-15 PROCEDURE — 97112 NEUROMUSCULAR REEDUCATION: CPT

## 2022-06-15 NOTE — TELEPHONE ENCOUNTER
Requested medication(s) are due for refill today: Yes  Patient has already received a courtesy refill: No  Other reason request has been forwarded to provider: drug interaction warnings

## 2022-06-15 NOTE — PROGRESS NOTES
Daily Note     Today's date: 6/15/2022  Patient name: Lynn Sinclair  : 1940  MRN: [id number]  Referring provider: Aquiles Locke, PT  Dx:   Encounter Diagnosis     ICD-10-CM    1  Chronic low back pain with sciatica, sciatica laterality unspecified, unspecified back pain laterality  M54 40     G89 29    2  Acute right-sided thoracic back pain  M54 6        Start Time: 1104  Stop Time: 1200  Total time in clinic (min): 56 minutes       Subjective: No significant changes to report since the last PT treatment  Objective: See treatment diary below  Assessment: Treatment is tolerated well  Patient continues to have very limited hamstring flexibility and would benefit from continued manual stretching  Plan: Continue treatment as per PT plan of care         Precautions: DDD, scoliosis      Manuals 5/26 6/6 6/8 6/13 6/15                                                            Neuro Re-Ed             TaA 5"x5 hep           Pelvic tilt 5"x5 hep           TaA bridge 10 x20           TaA marching 2x10  x20 20 20        TaA squats    20 20        TaA ball squeeze 5"x15  5"x20 5"x20 5"x20        TaA cone taps x20 x20 x20 20 20                     Ther Ex             ltr 5"x10 5"x20 5"x20 5"x20 5"x20        Repeated extension stranding At table 5"x10 5"x10 5"x20 5"x20 5"x20        Hs stretch manual manual manual manual manual        Seated trunk twist 5"x10ea 5"x20 nv          sktc manual manaul manual manual manual        Lateral step up and over  4'x15 6"x10 6" x10 6" x10                                  Ther Activity             bike 8' 10' 8' 8' 9'                     Gait Training                                       Modalities             MHP   8' seated 8'  seated 10'  seated

## 2022-06-20 ENCOUNTER — OFFICE VISIT (OUTPATIENT)
Dept: PHYSICAL THERAPY | Facility: CLINIC | Age: 82
End: 2022-06-20
Payer: MEDICARE

## 2022-06-20 DIAGNOSIS — G89.29 CHRONIC LOW BACK PAIN WITH SCIATICA, SCIATICA LATERALITY UNSPECIFIED, UNSPECIFIED BACK PAIN LATERALITY: Primary | ICD-10-CM

## 2022-06-20 DIAGNOSIS — M54.6 ACUTE RIGHT-SIDED THORACIC BACK PAIN: ICD-10-CM

## 2022-06-20 DIAGNOSIS — M54.40 CHRONIC LOW BACK PAIN WITH SCIATICA, SCIATICA LATERALITY UNSPECIFIED, UNSPECIFIED BACK PAIN LATERALITY: Primary | ICD-10-CM

## 2022-06-20 DIAGNOSIS — I49.3 PVC'S (PREMATURE VENTRICULAR CONTRACTIONS): ICD-10-CM

## 2022-06-20 PROCEDURE — 97110 THERAPEUTIC EXERCISES: CPT

## 2022-06-20 PROCEDURE — 97112 NEUROMUSCULAR REEDUCATION: CPT

## 2022-06-20 PROCEDURE — 97530 THERAPEUTIC ACTIVITIES: CPT

## 2022-06-20 RX ORDER — VERAPAMIL HYDROCHLORIDE 240 MG/1
TABLET, FILM COATED, EXTENDED RELEASE ORAL
Qty: 135 TABLET | Refills: 1 | Status: SHIPPED | OUTPATIENT
Start: 2022-06-20

## 2022-06-20 NOTE — PROGRESS NOTES
Daily Note     Today's date: 2022  Patient name: Nora Lawrence  : 1940  MRN: [id number]  Referring provider: Maria Elena Hernandez, PT  Dx:   Encounter Diagnosis     ICD-10-CM    1  Chronic low back pain with sciatica, sciatica laterality unspecified, unspecified back pain laterality  M54 40     G89 29    2  Acute right-sided thoracic back pain  M54 6        Start Time: 1100  Stop Time: 1141  Total time in clinic (min): 41 minutes      Subjective: Patient reports his back pain is getting better "slowly but surely "  Patient reports back pain and stiffness after sitting in the car for 30 minutes  Objective: See treatment diary below  Assessment: Additional theraband rows tolerated well  Patient continues to have very limited hamstring flexibility and would benefit from continued manual stretching  Plan: Continue treatment as per PT plan of care         Precautions: DDD, scoliosis      Manuals 5/26 6/6 6/8 6/13 6/15 6/20                                                           Neuro Re-Ed             TaA 5"x5 hep           Pelvic tilt 5"x5 hep           TaA bridge 10 x20           TaA marching 2x10  x20 20 20 20       TaA squats    20 20 20       TaA ball squeeze 5"x15  5"x20 5"x20 5"x20 5"x20       TaA cone taps x20 x20 x20 20 20 20       rows       green   20                    Ther Ex             ltr 5"x10 5"x20 5"x20 5"x20 5"x20 5"x20       Repeated extension stranding At table 5"x10 5"x10 5"x20 5"x20 5"x20 5"x20       Hs stretch manual manual manual manual manual manual       Seated trunk twist 5"x10ea 5"x20 nv          sktc manual manaul manual manual manual manual       Lateral step up and over  4'x15 6"x10 6" x10 6" x10 6" x10                                 Ther Activity             bike 8' 10' 8' 8' 9' 9'                    Gait Training                                       Modalities             MHP   8' seated 8'  seated 10'  seated

## 2022-06-22 ENCOUNTER — OFFICE VISIT (OUTPATIENT)
Dept: PHYSICAL THERAPY | Facility: CLINIC | Age: 82
End: 2022-06-22
Payer: MEDICARE

## 2022-06-22 DIAGNOSIS — M54.40 CHRONIC LOW BACK PAIN WITH SCIATICA, SCIATICA LATERALITY UNSPECIFIED, UNSPECIFIED BACK PAIN LATERALITY: Primary | ICD-10-CM

## 2022-06-22 DIAGNOSIS — G89.29 CHRONIC LOW BACK PAIN WITH SCIATICA, SCIATICA LATERALITY UNSPECIFIED, UNSPECIFIED BACK PAIN LATERALITY: Primary | ICD-10-CM

## 2022-06-22 DIAGNOSIS — M54.6 ACUTE RIGHT-SIDED THORACIC BACK PAIN: ICD-10-CM

## 2022-06-22 PROCEDURE — 97110 THERAPEUTIC EXERCISES: CPT

## 2022-06-22 PROCEDURE — 97530 THERAPEUTIC ACTIVITIES: CPT

## 2022-06-22 PROCEDURE — 97112 NEUROMUSCULAR REEDUCATION: CPT

## 2022-06-22 NOTE — PROGRESS NOTES
Daily Note     Today's date: 2022  Patient name: Sasha Guadalupe  : 1940  MRN: [id number]  Referring provider: Tiffany Upton, PT  Dx:   Encounter Diagnosis     ICD-10-CM    1  Chronic low back pain with sciatica, sciatica laterality unspecified, unspecified back pain laterality  M54 40     G89 29    2  Acute right-sided thoracic back pain  M54 6        Start Time: 1102  Stop Time: 1145  Total time in clinic (min): 43 minutes      Subjective: No significant changes to report since the last PT treatment  Objective: See treatment diary below  Assessment: Progression to the blue theraband for rows is tolerated well  Patient continues to [facility] limited hamstring flexibility and would benefit from continued manual stretching  Plan: Continue treatment as per PT plan of care         Precautions: DDD, scoliosis      Manuals 5/26 6/6 6/8 6/13 6/15 6/20 6/22                                                          Neuro Re-Ed             TaA 5"x5 hep           Pelvic tilt 5"x5 hep           TaA bridge 10 x20     20      TaA marching 2x10  x20 20 20 20 20      TaA squats    20 20 20 20      TaA ball squeeze 5"x15  5"x20 5"x20 5"x20 5"x20 5"x20      TaA cone taps x20 x20 x20 20 20 20 20      rows       green   20  blue   20                   Ther Ex             ltr 5"x10 5"x20 5"x20 5"x20 5"x20 5"x20 5"x20      Repeated extension stranding At table 5"x10 5"x10 5"x20 5"x20 5"x20 5"x20 5"x20      Hs stretch manual manual manual manual manual manual manual      Seated trunk twist 5"x10ea 5"x20 nv          sktc manual manaul manual manual manual manual manual      Lateral step up and over  4'x15 6"x10 6" x10 6" x10 6" x10 6" x10                                Ther Activity             bike 8' 10' 8' 8' 9' 9' 9'                   Gait Training                                       Modalities             MHP   8' seated 8'  seated 10'  seated

## 2022-06-27 ENCOUNTER — OFFICE VISIT (OUTPATIENT)
Dept: PHYSICAL THERAPY | Facility: CLINIC | Age: 82
End: 2022-06-27
Payer: MEDICARE

## 2022-06-27 DIAGNOSIS — M54.40 CHRONIC LOW BACK PAIN WITH SCIATICA, SCIATICA LATERALITY UNSPECIFIED, UNSPECIFIED BACK PAIN LATERALITY: Primary | ICD-10-CM

## 2022-06-27 DIAGNOSIS — M54.6 ACUTE RIGHT-SIDED THORACIC BACK PAIN: ICD-10-CM

## 2022-06-27 DIAGNOSIS — G89.29 CHRONIC LOW BACK PAIN WITH SCIATICA, SCIATICA LATERALITY UNSPECIFIED, UNSPECIFIED BACK PAIN LATERALITY: Primary | ICD-10-CM

## 2022-06-27 PROCEDURE — 97530 THERAPEUTIC ACTIVITIES: CPT

## 2022-06-27 PROCEDURE — 97112 NEUROMUSCULAR REEDUCATION: CPT

## 2022-06-27 PROCEDURE — 97110 THERAPEUTIC EXERCISES: CPT

## 2022-06-27 NOTE — PROGRESS NOTES
Daily Note     Today's date: 2022  Patient name: Isael Montes  : 1940  MRN: [id number]  Referring provider: Burke De La Torre, PT  Dx:   Encounter Diagnosis     ICD-10-CM    1  Chronic low back pain with sciatica, sciatica laterality unspecified, unspecified back pain laterality  M54 40     G89 29    2  Acute right-sided thoracic back pain  M54 6        Start Time: 1101  Stop Time: 1144  Total time in clinic (min): 43 minutes       Subjective: Patient reports he is feeling "okay "      Objective: See treatment diary below  Assessment: Therapeutic exercise program is tolerated well without complaints  Patient continues to [facility] limited hamstring flexibility and would benefit from continued manual stretching  Plan: Continue treatment as per PT plan of care         Precautions: DDD, scoliosis      Manuals 5/26 6/6 6/8 6/13 6/15 6/20 6/22 6/27                                                         Neuro Re-Ed             TaA 5"x5 hep           Pelvic tilt 5"x5 hep           TaA bridge 10 x20     20 20     TaA marching 2x10  x20 20 20 20 20 20     TaA squats    20 20 20 20 20     TaA ball squeeze 5"x15  5"x20 5"x20 5"x20 5"x20 5"x20 5"x20     TaA cone taps x20 x20 x20 20 20 20 20 20     rows       green   20  blue   20  blue   20                  Ther Ex             ltr 5"x10 5"x20 5"x20 5"x20 5"x20 5"x20 5"x20 5"x20     Repeated extension stranding At table 5"x10 5"x10 5"x20 5"x20 5"x20 5"x20 5"x20 5"x20     Hs stretch manual manual manual manual manual manual manual manual     Seated trunk twist 5"x10ea 5"x20 nv          sktc manual manaul manual manual manual manual manual manual     Lateral step up and over  4'x15 6"x10 6" x10 6" x10 6" x10 6" x10 6" x10                               Ther Activity             bike 8' 10' 8' 8' 9' 9' 9' 9'                  Gait Training                                       Modalities             MHP   8' seated 8'  seated 10'  seated

## 2022-06-29 ENCOUNTER — OFFICE VISIT (OUTPATIENT)
Dept: PHYSICAL THERAPY | Facility: CLINIC | Age: 82
End: 2022-06-29
Payer: MEDICARE

## 2022-06-29 DIAGNOSIS — M54.40 CHRONIC LOW BACK PAIN WITH SCIATICA, SCIATICA LATERALITY UNSPECIFIED, UNSPECIFIED BACK PAIN LATERALITY: Primary | ICD-10-CM

## 2022-06-29 DIAGNOSIS — G89.29 CHRONIC LOW BACK PAIN WITH SCIATICA, SCIATICA LATERALITY UNSPECIFIED, UNSPECIFIED BACK PAIN LATERALITY: Primary | ICD-10-CM

## 2022-06-29 DIAGNOSIS — M54.6 ACUTE RIGHT-SIDED THORACIC BACK PAIN: ICD-10-CM

## 2022-06-29 PROCEDURE — 97110 THERAPEUTIC EXERCISES: CPT

## 2022-06-29 PROCEDURE — 97112 NEUROMUSCULAR REEDUCATION: CPT

## 2022-06-29 PROCEDURE — 97530 THERAPEUTIC ACTIVITIES: CPT

## 2022-06-29 NOTE — PROGRESS NOTES
Daily Note     Today's date: 2022  Patient name: Philippe Welch  : 1940  MRN: [id number]  Referring provider: Ryan Montalvo, PT  Dx:   Encounter Diagnosis     ICD-10-CM    1  Chronic low back pain with sciatica, sciatica laterality unspecified, unspecified back pain laterality  M54 40     G89 29    2  Acute right-sided thoracic back pain  M54 6        Start Time: 1105  Stop Time: 1154  Total time in clinic (min): 49 minutes       Subjective: No significant changes to report since the last PT treatment  Objective: See treatment diary below  Assessment: Therapeutic exercise program is tolerated well  Patient would benefit from continued PT along with performance of HEP to reduce back pain and improve function  Plan: Patient going away on vacation - follow up with patient when he returns home         Precautions: DDD, scoliosis      Manuals 5/26 6/6 6/8 6/13 6/15 6/20 6/22 6/27 6/29                                                        Neuro Re-Ed             TaA 5"x5 hep           Pelvic tilt 5"x5 hep           TaA bridge 10 x20     20 20 20    TaA marching 2x10  x20 20 20 20 20 20 20    TaA squats    20 20 20 20 20 20    TaA ball squeeze 5"x15  5"x20 5"x20 5"x20 5"x20 5"x20 5"x20 5"x20    TaA cone taps x20 x20 x20 20 20 20 20 20 20    rows       green   20  blue   20  blue   20  blue   20    low rows          green   20                 Ther Ex             ltr 5"x10 5"x20 5"x20 5"x20 5"x20 5"x20 5"x20 5"x20 5"x20    Repeated extension stranding At table 5"x10 5"x10 5"x20 5"x20 5"x20 5"x20 5"x20 5"x20 5"x20    Hs stretch manual manual manual manual manual manual manual manual manual    Seated trunk twist 5"x10ea 5"x20 nv          sktc manual manaul manual manual manual manual manual manual manual    Lateral step up and over  4'x15 6"x10 6" x10 6" x10 6" x10 6" x10 6" x10 6" x20                              Ther Activity             bike 8' 10' 8' 8' 9' 9' 9' 9' 9'                 Gait Training                                       Modalities             MHP   8' seated 8'  seated 10'  seated

## 2022-07-19 ENCOUNTER — APPOINTMENT (OUTPATIENT)
Dept: LAB | Facility: CLINIC | Age: 82
End: 2022-07-19
Payer: MEDICARE

## 2022-07-19 DIAGNOSIS — E78.00 PURE HYPERCHOLESTEROLEMIA: ICD-10-CM

## 2022-07-19 DIAGNOSIS — C61 MALIGNANT NEOPLASM OF PROSTATE (HCC): ICD-10-CM

## 2022-07-19 LAB
ALBUMIN SERPL BCP-MCNC: 3.8 G/DL (ref 3.5–5)
ALP SERPL-CCNC: 58 U/L (ref 46–116)
ALT SERPL W P-5'-P-CCNC: 20 U/L (ref 12–78)
ANION GAP SERPL CALCULATED.3IONS-SCNC: 2 MMOL/L (ref 4–13)
AST SERPL W P-5'-P-CCNC: 19 U/L (ref 5–45)
BASOPHILS # BLD AUTO: 0.04 THOUSANDS/ΜL (ref 0–0.1)
BASOPHILS NFR BLD AUTO: 1 % (ref 0–1)
BILIRUB SERPL-MCNC: 0.6 MG/DL (ref 0.2–1)
BUN SERPL-MCNC: 26 MG/DL (ref 5–25)
CALCIUM SERPL-MCNC: 9.3 MG/DL (ref 8.3–10.1)
CHLORIDE SERPL-SCNC: 110 MMOL/L (ref 96–108)
CHOLEST SERPL-MCNC: 214 MG/DL
CO2 SERPL-SCNC: 29 MMOL/L (ref 21–32)
CREAT SERPL-MCNC: 1.19 MG/DL (ref 0.6–1.3)
EOSINOPHIL # BLD AUTO: 0.22 THOUSAND/ΜL (ref 0–0.61)
EOSINOPHIL NFR BLD AUTO: 3 % (ref 0–6)
ERYTHROCYTE [DISTWIDTH] IN BLOOD BY AUTOMATED COUNT: 14 % (ref 11.6–15.1)
GFR SERPL CREATININE-BSD FRML MDRD: 56 ML/MIN/1.73SQ M
GLUCOSE P FAST SERPL-MCNC: 91 MG/DL (ref 65–99)
HCT VFR BLD AUTO: 40 % (ref 36.5–49.3)
HDLC SERPL-MCNC: 59 MG/DL
HGB BLD-MCNC: 12.8 G/DL (ref 12–17)
IMM GRANULOCYTES # BLD AUTO: 0.08 THOUSAND/UL (ref 0–0.2)
IMM GRANULOCYTES NFR BLD AUTO: 1 % (ref 0–2)
LDLC SERPL CALC-MCNC: 138 MG/DL (ref 0–100)
LYMPHOCYTES # BLD AUTO: 1.71 THOUSANDS/ΜL (ref 0.6–4.47)
LYMPHOCYTES NFR BLD AUTO: 26 % (ref 14–44)
MCH RBC QN AUTO: 30.4 PG (ref 26.8–34.3)
MCHC RBC AUTO-ENTMCNC: 32 G/DL (ref 31.4–37.4)
MCV RBC AUTO: 95 FL (ref 82–98)
MONOCYTES # BLD AUTO: 0.87 THOUSAND/ΜL (ref 0.17–1.22)
MONOCYTES NFR BLD AUTO: 13 % (ref 4–12)
NEUTROPHILS # BLD AUTO: 3.6 THOUSANDS/ΜL (ref 1.85–7.62)
NEUTS SEG NFR BLD AUTO: 56 % (ref 43–75)
NONHDLC SERPL-MCNC: 155 MG/DL
NRBC BLD AUTO-RTO: 0 /100 WBCS
PLATELET # BLD AUTO: 163 THOUSANDS/UL (ref 149–390)
PMV BLD AUTO: 12.7 FL (ref 8.9–12.7)
POTASSIUM SERPL-SCNC: 4.3 MMOL/L (ref 3.5–5.3)
PROT SERPL-MCNC: 7.3 G/DL (ref 6.4–8.4)
PSA SERPL-MCNC: <0.1 NG/ML (ref 0–4)
RBC # BLD AUTO: 4.21 MILLION/UL (ref 3.88–5.62)
SODIUM SERPL-SCNC: 141 MMOL/L (ref 135–147)
TRIGL SERPL-MCNC: 85 MG/DL
WBC # BLD AUTO: 6.52 THOUSAND/UL (ref 4.31–10.16)

## 2022-07-19 PROCEDURE — 85025 COMPLETE CBC W/AUTO DIFF WBC: CPT

## 2022-07-19 PROCEDURE — 80061 LIPID PANEL: CPT

## 2022-07-19 PROCEDURE — 36415 COLL VENOUS BLD VENIPUNCTURE: CPT

## 2022-07-19 PROCEDURE — 84153 ASSAY OF PSA TOTAL: CPT

## 2022-07-19 PROCEDURE — 80053 COMPREHEN METABOLIC PANEL: CPT

## 2022-07-29 ENCOUNTER — OFFICE VISIT (OUTPATIENT)
Dept: FAMILY MEDICINE CLINIC | Facility: CLINIC | Age: 82
End: 2022-07-29
Payer: MEDICARE

## 2022-07-29 VITALS
TEMPERATURE: 97.7 F | DIASTOLIC BLOOD PRESSURE: 84 MMHG | OXYGEN SATURATION: 96 % | BODY MASS INDEX: 22.79 KG/M2 | SYSTOLIC BLOOD PRESSURE: 142 MMHG | WEIGHT: 162.8 LBS | HEIGHT: 71 IN | RESPIRATION RATE: 18 BRPM | HEART RATE: 63 BPM

## 2022-07-29 DIAGNOSIS — I49.3 PVC'S (PREMATURE VENTRICULAR CONTRACTIONS): ICD-10-CM

## 2022-07-29 DIAGNOSIS — C61 MALIGNANT NEOPLASM OF PROSTATE (HCC): ICD-10-CM

## 2022-07-29 DIAGNOSIS — I77.810 AORTIC ROOT DILATATION (HCC): ICD-10-CM

## 2022-07-29 DIAGNOSIS — M48.062 SPINAL STENOSIS OF LUMBAR REGION WITH NEUROGENIC CLAUDICATION: Primary | ICD-10-CM

## 2022-07-29 PROCEDURE — G0439 PPPS, SUBSEQ VISIT: HCPCS | Performed by: FAMILY MEDICINE

## 2022-07-29 PROCEDURE — 99214 OFFICE O/P EST MOD 30 MIN: CPT | Performed by: FAMILY MEDICINE

## 2022-07-29 RX ORDER — METHYLPREDNISOLONE 4 MG/1
TABLET ORAL
Qty: 21 EACH | Refills: 1 | Status: SHIPPED | OUTPATIENT
Start: 2022-07-29 | End: 2022-10-04

## 2022-07-29 NOTE — PROGRESS NOTES
Assessment/Plan:  BLOOD PRESSURE STABLE  PATIENT IS CONTINUED ISSUE IS CHRONIC LOW BACK PAIN  Strongly consider possible medical marijuana evaluation  Also referred for medical massage  Cards given for both  Up-to-date with Cardiology  Labs reviewed  PSA is less than 0 1  Patient to continue verapamil and metoprolol  Otherwise stable  Would like to get his pain under much better control  Would like to avoid narcotics  Wife agrees  Otherwise recheck q 6 months  Diagnoses and all orders for this visit:    Spinal stenosis of lumbar region with neurogenic claudication  -     methylPREDNISolone 4 MG tablet therapy pack; Use as directed on package        1  Spinal stenosis of lumbar region with neurogenic claudication  methylPREDNISolone 4 MG tablet therapy pack       Subjective:        Patient ID: Polo Dueñas is a 79 y o  male  Chief Complaint   Patient presents with    Medicare Wellness Visit    Back Pain     Patient would like to discuss back pain 8/10 pain scale        Six-month check regarding labs  Continues with low back pain  Has been through physical therapy neurosurgical consult as well as pain management  Unfortunately he states nothing has really helped  Has days where his pain can be anywhere from a 4 to a 9  The following portions of the patient's history were reviewed and updated as appropriate: past medical history, past surgical history and problem list       Review of Systems   Constitutional: Negative for appetite change, fatigue, fever and unexpected weight change  HENT: Negative for congestion, ear pain, postnasal drip, rhinorrhea, sinus pressure, sinus pain and sore throat  Eyes: Negative for redness and visual disturbance  Respiratory: Negative for chest tightness and shortness of breath  Cardiovascular: Negative for chest pain, palpitations and leg swelling  Gastrointestinal: Negative for abdominal distention, abdominal pain, diarrhea and nausea  Endocrine: Negative for cold intolerance and heat intolerance  Genitourinary: Negative for dysuria and hematuria  Musculoskeletal: Positive for back pain  Skin: Negative for pallor and rash  Neurological: Negative for dizziness, tremors, weakness, light-headedness and headaches  Psychiatric/Behavioral: Negative for behavioral problems  The patient is not nervous/anxious  Objective:  /84 (BP Location: Left arm, Patient Position: Sitting, Cuff Size: Adult)   Pulse 63   Temp 97 7 °F (36 5 °C) (Temporal)   Resp 18   Ht 5' 11" (1 803 m)   Wt 73 8 kg (162 lb 12 8 oz)   SpO2 96%   BMI 22 71 kg/m²        Physical Exam  Vitals and nursing note reviewed  Constitutional:       General: He is not in acute distress  Appearance: He is not diaphoretic  HENT:      Head: Normocephalic and atraumatic  Eyes:      General: No scleral icterus  Conjunctiva/sclera: Conjunctivae normal       Pupils: Pupils are equal, round, and reactive to light  Neck:      Thyroid: No thyromegaly  Cardiovascular:      Rate and Rhythm: Normal rate and regular rhythm  Pulses:           Carotid pulses are 0 on the right side and 0 on the left side  Heart sounds: Normal heart sounds  No murmur heard  Pulmonary:      Effort: Pulmonary effort is normal  No respiratory distress  Breath sounds: Normal breath sounds  No wheezing  Abdominal:      General: There is no distension  Palpations: Abdomen is soft  Musculoskeletal:      Cervical back: Normal range of motion and neck supple  Right lower leg: No edema  Left lower leg: No edema  Lymphadenopathy:      Cervical: No cervical adenopathy  Skin:     General: Skin is warm  Coloration: Skin is not pale  Neurological:      General: No focal deficit present  Mental Status: He is alert and oriented to person, place, and time  Cranial Nerves: No cranial nerve deficit        Deep Tendon Reflexes: Reflexes are normal and symmetric  Psychiatric:         Mood and Affect: Mood normal          Behavior: Behavior normal          Thought Content: Thought content normal          Judgment: Judgment normal        Answers for HPI/ROS submitted by the patient on 7/28/2022  How would you rate your overall health?: good  Compared to last year, how is your physical health?: same  In general, how satisfied are you with your life?: very satisfied  Compared to last year, how is your eyesight?: same  Compared to last year, how is your hearing?: same  Compared to last year, how is your emotional/mental health?: same  How often is anger a problem for you?: never, rarely  How often do you feel unusually tired/fatigued?: never, rarely  In the past 7 days, how much pain have you experienced?: a lot  If you answered "some" or "a lot", please rate the severity of your pain on a scale of 1 to 10 (1 being the least severe pain and 10 being the most intense pain)  : 7/10  In the past 6 months, have you lost or gained 10 pounds without trying?: No  Additional Comments: I have back pain  One or more falls in the last year: No  Do you have trouble with the stairs inside or outside your home?: No  Does your home have working smoke alarms?: Yes  Does your home have a carbon monoxide monitor?: Yes  Which safety hazards (if any) have you experienced in your home? Please select all that apply : none  How would you describe your current diet?  Please select all that apply : Regular  In addition to prescription medications, are you taking any over-the-counter supplements?: Yes  If yes, what supplements are you taking?: Centrum silver  Can you manage your medications?: Yes  Are you currently taking any opioid medications?: No  Can you walk and transfer into and out of your bed and chair?: Yes  Can you dress and groom yourself?: Yes  Can you bathe or shower yourself?: Yes  Can you feed yourself?: Yes  Can you do your laundry/ housekeeping?: Yes  Can you manage your money, pay your bills, and track your expenses?: Yes  Can you make your own meals?: Yes  Can you do your own shopping?: Yes  Within the last 12 months, have you had any hospitalizations or Emergency Department visits?: No  Do you have a living will?: No  Do you have a Durable POA (Power of ) for healthcare decisions?: Yes  Do you have an Advanced Directive for end of life decisions?: No  How often have you used an illegal drug (including marijuana) or a prescription medication for non-medical reasons in the past year?: never  What is the typical number of drinks you consume in a day?: 1  What is the typical number of drinks you consume in a week?: 2  How often did you have a drink containing alcohol in the past year?: monthly or less  How many drinks did you have on a typical day  when you were drinking in the past year?: 0  How often did you have 6 or more drinks on one occasion in the past year?: never

## 2022-07-31 PROBLEM — M54.6 ACUTE RIGHT-SIDED THORACIC BACK PAIN: Status: RESOLVED | Noted: 2022-05-09 | Resolved: 2022-07-31

## 2022-08-31 ENCOUNTER — OFFICE VISIT (OUTPATIENT)
Dept: FAMILY MEDICINE CLINIC | Facility: CLINIC | Age: 82
End: 2022-08-31
Payer: MEDICARE

## 2022-08-31 VITALS
DIASTOLIC BLOOD PRESSURE: 60 MMHG | WEIGHT: 160 LBS | HEIGHT: 71 IN | BODY MASS INDEX: 22.4 KG/M2 | SYSTOLIC BLOOD PRESSURE: 124 MMHG

## 2022-08-31 DIAGNOSIS — N39.41 URGE INCONTINENCE OF URINE: Primary | ICD-10-CM

## 2022-08-31 DIAGNOSIS — M48.062 SPINAL STENOSIS OF LUMBAR REGION WITH NEUROGENIC CLAUDICATION: ICD-10-CM

## 2022-08-31 PROBLEM — N18.31 STAGE 3A CHRONIC KIDNEY DISEASE (HCC): Status: ACTIVE | Noted: 2022-08-31

## 2022-08-31 PROCEDURE — 99214 OFFICE O/P EST MOD 30 MIN: CPT | Performed by: FAMILY MEDICINE

## 2022-09-01 ENCOUNTER — TELEPHONE (OUTPATIENT)
Dept: FAMILY MEDICINE CLINIC | Facility: CLINIC | Age: 82
End: 2022-09-01

## 2022-09-01 PROBLEM — S86.819A STRAIN OF CALF MUSCLE: Status: RESOLVED | Noted: 2019-05-15 | Resolved: 2022-09-01

## 2022-09-01 PROBLEM — R31.0 GROSS HEMATURIA: Status: RESOLVED | Noted: 2022-05-06 | Resolved: 2022-09-01

## 2022-09-01 NOTE — TELEPHONE ENCOUNTER
Please tell the patient's wife to absolutely try good Rx  Again if after 2 weeks he is not seeing any change increase to 2 tablets daily and keep me up-to-date  If not improving we would want to refer back to Urology

## 2022-09-01 NOTE — PROGRESS NOTES
Assessment/Plan:    Patient with incontinence type issues  History of prostatectomy  History of prostate cancer  Discussed options  Has not seen [facility] in about a year  Was mentioned last note but no further options discussed at that time  Did discuss possible medications  Will start with Myrbetriq 25 mg daily  Recommend phone call in 2 weeks  We can titrate up  Side effects reviewed  If not improving having side effects would need to see Urology again for further evaluation  With regards to his back and lower extremity issues he is doing much better with medical massage, medical marijuana  Otherwise will keep his normal routine appointments  Recommend flu shot in the fall as well as COVID vaccine  Diagnoses and all orders for this visit:    Urge incontinence of urine  -     Mirabegron ER 25 MG TB24; Take 25 mg by mouth in the morning    Spinal stenosis of lumbar region with neurogenic claudication        1  Urge incontinence of urine  Mirabegron ER 25 MG TB24   2  Spinal stenosis of lumbar region with neurogenic claudication         Subjective:        Patient ID: Lyn Lopez is a 79 y o  male  Chief Complaint   Patient presents with    Urinary Incontinence       Patient with continued issues with some incontinence  Lots of urgency  Has been seen by Urology in the past with regards to this  No acute symptoms today such as dysuria fever or chills  The following portions of the patient's history were reviewed and updated as appropriate: past medical history, past surgical history and problem list       Review of Systems   Constitutional: Negative for appetite change, fatigue, fever and unexpected weight change  HENT: Negative for congestion, ear pain, postnasal drip, rhinorrhea, sinus pressure, sinus pain and sore throat  Eyes: Negative for redness and visual disturbance  Respiratory: Negative for chest tightness and shortness of breath      Cardiovascular: Negative for chest pain, palpitations and leg swelling  Gastrointestinal: Negative for abdominal distention, abdominal pain, diarrhea and nausea  Endocrine: Negative for cold intolerance and heat intolerance  Genitourinary: Positive for urgency  Negative for dysuria, frequency and hematuria  Musculoskeletal: Negative for arthralgias, gait problem and myalgias  Skin: Negative for pallor and rash  Neurological: Negative for dizziness, tremors, weakness, light-headedness and headaches  Psychiatric/Behavioral: Negative for behavioral problems  The patient is not nervous/anxious  Objective:  /60   Ht 5' 11" (1 803 m)   Wt 72 6 kg (160 lb)   BMI 22 32 kg/m²        Physical Exam  Vitals and nursing note reviewed  Constitutional:       General: He is not in acute distress  HENT:      Head: Normocephalic  Eyes:      General: No scleral icterus  Pupils: Pupils are equal, round, and reactive to light  Cardiovascular:      Rate and Rhythm: Normal rate and regular rhythm  Heart sounds: Normal heart sounds  No murmur heard  Pulmonary:      Breath sounds: Normal breath sounds  Musculoskeletal:      Right lower leg: No edema  Left lower leg: No edema  Lymphadenopathy:      Cervical: No cervical adenopathy  Skin:     Coloration: Skin is not pale  Neurological:      General: No focal deficit present  Mental Status: He is alert and oriented to person, place, and time  Psychiatric:         Behavior: Behavior normal          Thought Content:  Thought content normal

## 2022-09-01 NOTE — TELEPHONE ENCOUNTER
Patient's wife called they went to the pharmacy to  is Mirabegron and it was going to cost them $242 out of pocket  Do you know of another medication that would be cheaper? The wife is looking on [facility] also   Please advise

## 2022-09-12 ENCOUNTER — TELEPHONE (OUTPATIENT)
Dept: FAMILY MEDICINE CLINIC | Facility: CLINIC | Age: 82
End: 2022-09-12

## 2022-09-13 ENCOUNTER — TELEPHONE (OUTPATIENT)
Dept: FAMILY MEDICINE CLINIC | Facility: CLINIC | Age: 82
End: 2022-09-13

## 2022-09-13 DIAGNOSIS — M54.16 LUMBAR RADICULOPATHY: ICD-10-CM

## 2022-09-13 DIAGNOSIS — M48.062 SPINAL STENOSIS OF LUMBAR REGION WITH NEUROGENIC CLAUDICATION: Primary | ICD-10-CM

## 2022-09-13 NOTE — TELEPHONE ENCOUNTER
Left voicemail message for patient to return call  To make patient aware Dr Melly Casas placed order for Physical Therapy and faxed over to [address] [place] [address] [phone]

## 2022-09-26 DIAGNOSIS — I49.3 PVC'S (PREMATURE VENTRICULAR CONTRACTIONS): ICD-10-CM

## 2022-10-04 ENCOUNTER — OFFICE VISIT (OUTPATIENT)
Dept: PAIN MEDICINE | Facility: CLINIC | Age: 82
End: 2022-10-04
Payer: MEDICARE

## 2022-10-04 VITALS
DIASTOLIC BLOOD PRESSURE: 74 MMHG | WEIGHT: 163 LBS | BODY MASS INDEX: 22.82 KG/M2 | SYSTOLIC BLOOD PRESSURE: 130 MMHG | HEART RATE: 68 BPM | TEMPERATURE: 97.9 F | HEIGHT: 71 IN

## 2022-10-04 DIAGNOSIS — M48.062 SPINAL STENOSIS OF LUMBAR REGION WITH NEUROGENIC CLAUDICATION: ICD-10-CM

## 2022-10-04 DIAGNOSIS — M54.50 CHRONIC BILATERAL LOW BACK PAIN WITHOUT SCIATICA: ICD-10-CM

## 2022-10-04 DIAGNOSIS — G89.29 CHRONIC BILATERAL LOW BACK PAIN WITHOUT SCIATICA: ICD-10-CM

## 2022-10-04 DIAGNOSIS — M47.816 LUMBAR SPONDYLOSIS: ICD-10-CM

## 2022-10-04 DIAGNOSIS — M46.1 SACROILIITIS (HCC): ICD-10-CM

## 2022-10-04 DIAGNOSIS — G89.4 CHRONIC PAIN SYNDROME: Primary | ICD-10-CM

## 2022-10-04 DIAGNOSIS — M79.18 MYOFASCIAL PAIN SYNDROME: ICD-10-CM

## 2022-10-04 DIAGNOSIS — M53.3 SACROILIAC JOINT DYSFUNCTION OF BOTH SIDES: ICD-10-CM

## 2022-10-04 PROCEDURE — 99214 OFFICE O/P EST MOD 30 MIN: CPT | Performed by: NURSE PRACTITIONER

## 2022-10-04 NOTE — PROGRESS NOTES
Assessment:  1  Chronic pain syndrome    2  Chronic bilateral low back pain without sciatica    3  Myofascial pain syndrome    4  Lumbar spondylosis    5  Spinal stenosis of lumbar region with neurogenic claudication    6  Sacroiliitis ([address] )    7  Sacroiliac joint dysfunction of both sides        Plan:  While the patient was in the office today, I did have a thorough conversation with the patient regarding their chronic pain syndrome, symptoms, medication regimen, and treatment plan  I discussed with the patient that at this point in time because of his pain is centered in his low back and he does not have any significant lower extremity radicular symptoms or weakness, I do not feel it is necessary to proceed with an updated MRI as he has already had a surgical opinion and has been deemed not a good surgical candidate and his symptoms have not significantly changed or worsened  The patient was agreeable and verbalized an understanding  I did discuss with the patient that at this point time with his current symptoms and exam findings, I feel that for both a diagnostic, and hopefully, therapeutic purposes that it would be beneficial to proceed with a bilateral sacroiliac joint injection with Dr Genaro Barron  The patient was agreeable and verbalized an understanding  Complete risks and benefits including bleeding, infection, tissue reaction, nerve injury and allergic reaction were discussed  The approach was demonstrated using models and literature was provided  Verbal and written consent was obtained  Depending on the results of the sacroiliac joint injections, we may also consider a bilateral L3 through L5 diagnostic medial branch block to see if he would be an eventual candidate for radiofrequency ablation procedure in the future as well  However, we will see how he does from the SI joint injections and proceed from there as appropriate  The patient was agreeable and verbalized an understanding  We also discussed the possibility of looking at other neuropathic medication such as nortriptyline or Lyrica since he has already previously tried and failed Cymbalta and gabapentin to see if we can find a medicine that were provided at least more moderate and stable overall relief  However, again, we decided to proceed with a sacroiliac joint injection and see how he does as he is hesitant to try medications again because of the side effects, but just feels he is at a point where he needs to do something provide some kind of manageable relief of the pain  The patient is to schedule a follow-up office visit 3-4 weeks after the injection and at that point in time we will re-evaluate his pain symptoms and discussed this treatment plan options  The patient was agreeable and verbalized an understanding  History of Present Illness: The patient is a 79 y o  male last seen on 5/9/2022 who presents for a follow up office visit in regards to chronic pain syndrome, as the patient's pain has been ongoing for greater than a year, secondary to lumbar spondylosis and stenosis with sacroiliitis and sacroiliac joint dysfunction and myofascial pain  The patient currently reports that since his last office visit overall his chronic low back pain has continued to worsen as previously he was doing physical therapy and then his pain was at least a little bit more manageable, however, that over the past month or 2, without any preceding trauma or injury the low back pain has continued to return and at this point he is back to doing physical therapy twice a week for the past 3 weeks and is also going for therapeutic massage every 2 weeks which is somewhat helpful but the relief is short-lived overall  The patient reports that he is currently taking 2 over-the-counter Aleve twice a day with minimal improvement of his pain    The patient presents today for re-evaluation as he continues with just the chronic low back pain and denies any lower extremity radicular pain, symptoms, and/or weakness as well as any signs or symptoms of cauda equina syndrome  The patient presents today to discuss his treatment plan options as in the past he has tried and failed tizanidine, meloxicam, Cymbalta, and gabapentin, all either with our relief or with intolerable side effects  The patient was wondering if we felt he needs to proceed with an updated MRI even though his symptoms have not changed and again are all located primarily in his low back  I have personally reviewed and/or updated the patient's past medical history, past surgical history, family history, social history, current medications, allergies, and vital signs today  Review of Systems:    Review of Systems   Respiratory: Negative for shortness of breath  Cardiovascular: Negative for chest pain  Gastrointestinal: Negative for constipation, diarrhea, nausea and vomiting  Musculoskeletal: Positive for gait problem  Negative for arthralgias, joint swelling and myalgias  Skin: Negative for rash  Neurological: Positive for weakness  Negative for dizziness and seizures  All other systems reviewed and are negative          Past Medical History:   Diagnosis Date    Astigmatism     last assessed 11/15/17    Cataract     Resolved 11/15/17    Gross hematuria     Last assessed 01/28/16    Hematuria     last assessed 01/28/16    Prostate cancer ([facility] [address] )     last assessed 05/18/17    Renal calculi     last assessed 08/16/12    Squamous cell carcinoma     Vitamin D deficiency     Last assessed 08/19/13       Past Surgical History:   Procedure Laterality Date    BREAST EXCISIONAL BIOPSY N/A     Benign breast tumor unsure of side over 50 yrs ago    CATARACT EXTRACTION Left 08/26/2019    CATARACT EXTRACTION Right 08/12/2019    COLONOSCOPY      CYSTOSCOPY  01/28/2016    ELBOW SURGERY      Bone chip    HERNIA REPAIR  2002    LITHOTRIPSY      RETROPUBIC PROSTATECTOMY  11/13/2001    Radical with nerve sparing       Family History   Problem Relation Age of Onset    Other Mother 76        Influenza   Laurann Cameron Hypertension Mother     Heart failure Father 70    Peripheral vascular disease Father     Hypertension Father     Prostate cancer Brother     Stroke Brother 48    Hypertension Brother     Hypertension Family        Social History     Occupational History    Not on file   Tobacco Use    Smoking status: Never Smoker    Smokeless tobacco: Never Used   Vaping Use    Vaping Use: Never used   Substance and Sexual Activity    Alcohol use: Yes     Comment: social    Drug use: No    Sexual activity: Not on file         Current Outpatient Medications:     Docusate Sodium (COLACE PO), Take by mouth daily, Disp: , Rfl:     metoprolol tartrate (LOPRESSOR) 25 mg tablet, Take 1 tablet (25 mg total) by mouth every 12 (twelve) hours, Disp: 180 tablet, Rfl: 3    Mirabegron ER 25 MG TB24, Take 25 mg by mouth in the morning, Disp: 30 tablet, Rfl: 5    Multiple Vitamins-Minerals (CENTRUM SILVER 50+MEN PO), Daily, Disp: , Rfl:     verapamil (CALAN-SR) 240 mg CR tablet, TAKE 1 AND 1/2 TABLETS BY MOUTH DAILY AS DIRECTED, Disp: 135 tablet, Rfl: 1    Allergies   Allergen Reactions    Sulfa Antibiotics Rash    Ciprofloxacin     Gabapentin Dizziness       Physical Exam:    /74 (BP Location: Left arm, Patient Position: Sitting, Cuff Size: Standard)   Pulse 68   Temp 97 9 °F (36 6 °C)   Ht 5' 11" (1 803 m)   Wt 73 9 kg (163 lb)   BMI 22 73 kg/m²     Constitutional:normal, well developed, well nourished, alert, in no distress and non-toxic and no overt pain behavior    Eyes:anicteric  HEENT:grossly intact  Neck:supple, symmetric, trachea midline and no masses   Pulmonary:even and unlabored  Cardiovascular:No edema or pitting edema present  Skin:Normal without rashes or lesions and well hydrated  Psychiatric:Mood and affect appropriate  Neurologic:Cranial Nerves II-XII grossly intact  Musculoskeletal:The patient's gait is slightly antalgic, but steady without the use of any assistive devices  Pain was noted upon exam of the bilateral SI joints and lumbar spine paravertebral musculature  Positive Kenji's and Gaenslen tests bilaterally    As well as bilateral L3 through L5 lumbar facet joint tenderness      Imaging  FL spine and pain procedure    (Results Pending)         Orders Placed This Encounter   Procedures    FL spine and pain procedure

## 2022-10-04 NOTE — PATIENT INSTRUCTIONS
Sacroiliac Joint Injection   WHAT YOU NEED TO KNOW:   What do I need to know about a sacroiliac joint injection? A sacroiliac (SI) joint injection is done to diagnose or treat pain from sacroiliac joint syndrome  The pain caused by this syndrome may be felt in your lower back, buttocks, groin, and your thigh  How do I prepare for an SI injection? Your healthcare provider will ask you to not take any pain medicine the day of the injection  Ask him if there are any other medicines you should not take  You will need to find someone to drive you home after your procedure  What will happen during the SI injection? You will be awake for your injection  You may be given calming medicine if you are anxious  You will lie on your stomach with a pillow under your abdomen  Your healthcare provider will give you an injection of medicine to numb the area  He may use an x-ray, ultrasound, or CT scan to find the area to inject  You may also be given an injection of contrast material to help your SI joint show up better  Then, your healthcare provider will inject local anesthesia, antiinflammatory medicine, or both into your SI joint  Healthcare providers will watch you closely for any problems for up to 30 minutes after your injection  Your healthcare provider will check to see if your pain decreases after the injection  What are the risks of an SI injection? You may have some weakness for a short time after your injection  The SI injection can cause bleeding, infection, and pain  It can also cause temporary weakness in your leg and problems urinating  You may have an allergic reaction to the medicine that is injected into your SI joint  Your pain may return and you may need more treatment  CARE AGREEMENT:   You have the right to help plan your care  Learn about your health condition and how it may be treated  Discuss treatment options with your healthcare providers to decide what care you want to receive   You always have the right to refuse treatment  The above information is an  only  It is not intended as medical advice for individual conditions or treatments  Talk to your doctor, nurse or pharmacist before following any medical regimen to see if it is safe and effective for you  © Copyright [employer] 2022 Information is for End User's use only and may not be sold, redistributed or otherwise used for commercial purposes   All illustrations and images included in CareNotes® are the copyrighted property of A D A M , Inc  or [address] [place] [address]

## 2022-10-07 ENCOUNTER — HOSPITAL ENCOUNTER (OUTPATIENT)
Dept: RADIOLOGY | Facility: CLINIC | Age: 82
Discharge: HOME | End: 2022-10-07
Payer: MEDICARE

## 2022-10-07 VITALS
DIASTOLIC BLOOD PRESSURE: 70 MMHG | HEART RATE: 69 BPM | TEMPERATURE: 97.2 F | SYSTOLIC BLOOD PRESSURE: 147 MMHG | OXYGEN SATURATION: 95 % | RESPIRATION RATE: 20 BRPM

## 2022-10-07 DIAGNOSIS — M46.1 SACROILIITIS (HCC): ICD-10-CM

## 2022-10-07 DIAGNOSIS — M53.3 SACROILIAC JOINT DYSFUNCTION OF BOTH SIDES: ICD-10-CM

## 2022-10-07 PROCEDURE — 27096 INJECT SACROILIAC JOINT: CPT | Performed by: ANESTHESIOLOGY

## 2022-10-07 RX ORDER — METHYLPREDNISOLONE ACETATE 80 MG/ML
80 INJECTION, SUSPENSION INTRA-ARTICULAR; INTRALESIONAL; INTRAMUSCULAR; PARENTERAL; SOFT TISSUE ONCE
Status: COMPLETED | OUTPATIENT
Start: 2022-10-07 | End: 2022-10-07

## 2022-10-07 RX ADMIN — IOHEXOL 1 ML: 300 INJECTION, SOLUTION INTRAVENOUS at 13:40

## 2022-10-07 RX ADMIN — METHYLPREDNISOLONE ACETATE 80 MG: 80 INJECTION, SUSPENSION INTRA-ARTICULAR; INTRALESIONAL; INTRAMUSCULAR; SOFT TISSUE at 13:41

## 2022-10-07 RX ADMIN — LIDOCAINE HYDROCHLORIDE 2 ML: 20 INJECTION, SOLUTION EPIDURAL; INFILTRATION; INTRACAUDAL at 13:41

## 2022-10-07 NOTE — DISCHARGE INSTRUCTIONS
Steroid Joint Injection   WHAT YOU NEED TO KNOW:   A steroid joint injection is a procedure to inject steroid medicine into a joint  Steroid medicine decreases pain and inflammation  The injection may also contain an anesthetic (numbing medicine) to decrease pain  It may be done to treat conditions such as arthritis, gout, or carpal tunnel syndrome  The injections may be given in your knee, ankle, shoulder, elbow, wrist, ankle or sacroiliac joint  Do not apply heat to any area that is numb  If you have discomfort or soreness at the injection site, you may apply ice today, 20 minutes on and 20 minutes off  Tomorrow you may use ice or warm, moist heat  Do not apply ice or heat directly to the skin  You may have an increase or change in the discomfort for 36-48 hours after your treatment  Apply ice and continue with any pain medicine you have been prescribed  Do not do anything strenuous today  You may shower, but no tub baths or hot tubs today  You may resume your normal activities tomorrow, but do not overdo it  Resume normal activities slowly when you are feeling better  If you experience redness, drainage or swelling at the injection site, or if you develop a fever above 100 degrees, please call [facility] at [phone] or go to the Emergency Room  Continue to take all routine medicines prescribed by your primary care physician unless otherwise instructed by our staff  Most blood thinners should be started again according to your regularly scheduled dosing  If you have any questions, please give our office a call  As no general anesthesia was used in today's procedure, you should not experience any side effects related to anesthesia  If you are diabetic, the steroids used in today's injection may temporarily increase your blood sugar levels after the first few days after your injection   Please keep a close eye on your sugars and alert the doctor who manages your diabetes if your sugars are significantly high from your baseline or you are symptomatic  If you have a problem specifically related to your procedure, please call our office at [phone]  Problems not related to your procedure should be directed to your primary care physician

## 2022-10-07 NOTE — H&P
History of Present Illness: The patient is a 79 y o  male who presents with complaints of low back pain      Past Medical History:   Diagnosis Date    Astigmatism     last assessed 11/15/17    Cataract     Resolved 11/15/17    Gross hematuria     Last assessed 01/28/16    Hematuria     last assessed 01/28/16    Prostate cancer ([facility] [address] )     last assessed 05/18/17    Renal calculi     last assessed 08/16/12    Squamous cell carcinoma     Vitamin D deficiency     Last assessed 08/19/13       Past Surgical History:   Procedure Laterality Date    BREAST EXCISIONAL BIOPSY N/A     Benign breast tumor unsure of side over 50 yrs ago    CATARACT EXTRACTION Left 08/26/2019    CATARACT EXTRACTION Right 08/12/2019    COLONOSCOPY      CYSTOSCOPY  01/28/2016    ELBOW SURGERY      Bone chip    HERNIA REPAIR  2002    LITHOTRIPSY      RETROPUBIC PROSTATECTOMY  11/13/2001    Radical with nerve sparing         Current Outpatient Medications:     Docusate Sodium (COLACE PO), Take by mouth daily, Disp: , Rfl:     metoprolol tartrate (LOPRESSOR) 25 mg tablet, Take 1 tablet (25 mg total) by mouth every 12 (twelve) hours, Disp: 180 tablet, Rfl: 3    Mirabegron ER 25 MG TB24, Take 25 mg by mouth in the morning, Disp: 30 tablet, Rfl: 5    Multiple Vitamins-Minerals (CENTRUM SILVER 50+MEN PO), Daily, Disp: , Rfl:     verapamil (CALAN-SR) 240 mg CR tablet, TAKE 1 AND 1/2 TABLETS BY MOUTH DAILY AS DIRECTED, Disp: 135 tablet, Rfl: 1    Current Facility-Administered Medications:     iohexol (OMNIPAQUE) 300 mg/mL injection 50 mL, 50 mL, Intra-articular, Once, Franky Wallace DO    lidocaine (PF) (XYLOCAINE-MPF) 2 % injection 5 mL, 5 mL, Intra-articular, Once, Franky Wallace DO    methylPREDNISolone acetate (DEPO-MEDROL) injection 80 mg, 80 mg, Intra-articular, Once, Franky Wallace DO    Allergies   Allergen Reactions    Sulfa Antibiotics Rash    Ciprofloxacin     Gabapentin Dizziness       Physical Exam:   General: Awake, Alert, Oriented x 3, Mood and affect appropriate  Respiratory: Respirations even and unlabored  Cardiovascular: Peripheral pulses intact; no edema  Musculoskeletal Exam:  Slightly antalgic gait    ASA Score: II         Assessment:   1  Sacroiliitis ([facility] [address] )    2   Sacroiliac joint dysfunction of both sides        Plan: B/L SI Joint Injection

## 2022-10-10 DIAGNOSIS — I49.3 PVC'S (PREMATURE VENTRICULAR CONTRACTIONS): ICD-10-CM

## 2022-10-10 DIAGNOSIS — M54.16 LUMBAR RADICULOPATHY: Primary | ICD-10-CM

## 2022-10-10 RX ORDER — METHYLPREDNISOLONE 4 MG/1
TABLET ORAL
Qty: 21 EACH | Refills: 0 | Status: SHIPPED | OUTPATIENT
Start: 2022-10-10 | End: 2022-10-17

## 2022-10-14 ENCOUNTER — TELEPHONE (OUTPATIENT)
Dept: PAIN MEDICINE | Facility: CLINIC | Age: 82
End: 2022-10-14

## 2022-10-14 NOTE — TELEPHONE ENCOUNTER
1st attempt  Lm to cb with % improvement and pain level.     B/L SI Joint Injection 10/7 , No F/u

## 2022-10-14 NOTE — TELEPHONE ENCOUNTER
Caller: Edison Mukherjee  Doctor/office: Dr Aleman/Say  #: [phone]  % of improvement: Lt Side 10% Rt Side 0%  Pain Scale (1-10): 8/10

## 2022-10-17 ENCOUNTER — TELEPHONE (OUTPATIENT)
Dept: PAIN MEDICINE | Facility: CLINIC | Age: 82
End: 2022-10-17

## 2022-10-17 ENCOUNTER — OFFICE VISIT (OUTPATIENT)
Dept: PAIN MEDICINE | Facility: CLINIC | Age: 82
End: 2022-10-17
Payer: MEDICARE

## 2022-10-17 VITALS
HEART RATE: 69 BPM | SYSTOLIC BLOOD PRESSURE: 142 MMHG | WEIGHT: 156 LBS | HEIGHT: 71 IN | TEMPERATURE: 97.7 F | DIASTOLIC BLOOD PRESSURE: 72 MMHG | BODY MASS INDEX: 21.84 KG/M2

## 2022-10-17 DIAGNOSIS — M48.062 SPINAL STENOSIS OF LUMBAR REGION WITH NEUROGENIC CLAUDICATION: ICD-10-CM

## 2022-10-17 DIAGNOSIS — M53.3 SACROILIAC JOINT DYSFUNCTION OF BOTH SIDES: ICD-10-CM

## 2022-10-17 DIAGNOSIS — G89.29 CHRONIC BILATERAL LOW BACK PAIN WITHOUT SCIATICA: ICD-10-CM

## 2022-10-17 DIAGNOSIS — M79.18 MYOFASCIAL PAIN SYNDROME: ICD-10-CM

## 2022-10-17 DIAGNOSIS — M54.16 LUMBAR RADICULOPATHY: ICD-10-CM

## 2022-10-17 DIAGNOSIS — M47.816 LUMBAR SPONDYLOSIS: ICD-10-CM

## 2022-10-17 DIAGNOSIS — G89.4 CHRONIC PAIN SYNDROME: Primary | ICD-10-CM

## 2022-10-17 DIAGNOSIS — M54.50 CHRONIC BILATERAL LOW BACK PAIN WITHOUT SCIATICA: ICD-10-CM

## 2022-10-17 DIAGNOSIS — M46.1 SACROILIITIS (HCC): ICD-10-CM

## 2022-10-17 PROCEDURE — 99214 OFFICE O/P EST MOD 30 MIN: CPT | Performed by: NURSE PRACTITIONER

## 2022-10-17 RX ORDER — METHOCARBAMOL 500 MG/1
TABLET, FILM COATED ORAL
Qty: 30 TABLET | Refills: 1 | Status: SHIPPED | OUTPATIENT
Start: 2022-10-17

## 2022-10-17 RX ORDER — TRAMADOL HYDROCHLORIDE 50 MG/1
TABLET ORAL
Qty: 14 TABLET | Refills: 1 | Status: SHIPPED | OUTPATIENT
Start: 2022-10-17

## 2022-10-17 NOTE — PATIENT INSTRUCTIONS
Decrease Aleave to 1 pill in AM and at bed time for the next 4-5 days, then try to stop the Aleave  Start Methocarbamol 500 mg, 1 pill tonight (10/17/22) then try taking 1/2 tablet in AM and afternoon and 1 at bed time tomorrow night  Tramadol 50 m/2-1 tablet 2-3 times daily, AS NEEDED, max of 2 pills per day  Schedule a f/u OV in 4 weeks  Methocarbamol (By mouth)   Methocarbamol (meth-oh-CLAUDIA-ba-mol)  Treats muscle pain and spasms  Brand Name(s): Robaxin   There may be other brand names for this medicine  When This Medicine Should Not Be Used: This medicine is not right for everyone  Do not use it if you had an allergic reaction to methocarbamol  How to Use This Medicine:   Tablet  Take your medicine as directed  Your dose may need to be changed several times to find what works best for you  Missed dose: Take a dose as soon as you remember  If it is almost time for your next dose, wait until then and take a regular dose  Do not take extra medicine to make up for a missed dose  Store the medicine in a closed container at room temperature, away from heat, moisture, and direct light  Drugs and Foods to Avoid:   Ask your doctor or pharmacist before using any other medicine, including over-the-counter medicines, vitamins, and herbal products  Some medicines can affect how methocarbamol works  Tell your doctor if you are using pyridostigmine  Do not drink alcohol while you are using this medicine  Tell your doctor if you use anything else that makes you sleepy  Some examples are allergy medicine, narcotic pain medicine, and alcohol  Warnings While Using This Medicine:   Tell your doctor if you are pregnant or breastfeeding, or if you have kidney disease, liver disease, or myasthenia gravis  This medicine may make you dizzy or drowsy  Do not drive or do anything that could be dangerous until you know how this medicine affects you    Tell any doctor or dentist who treats you that you are using this medicine  This medicine may affect certain medical test results  Your doctor will check your progress and the effects of this medicine at regular visits  Keep all appointments  Keep all medicine out of the reach of children  Never share your medicine with anyone  Possible Side Effects While Using This Medicine:   Call your doctor right away if you notice any of these side effects: Allergic reaction: Itching or hives, swelling in your face or hands, swelling or tingling in your mouth or throat, chest tightness, trouble breathing  Blurred vision, redness, pain, or swelling of the eyes  Dark urine or pale stools, nausea, vomiting, loss of appetite, stomach pain, yellow skin or eyes  Fever  Lightheadedness, dizziness, fainting  Seizures  Slow heartbeat  Unusual bleeding, bruising, or weakness  If you notice these less serious side effects, talk with your doctor:   Confusion, trouble sleeping  Headache  Warmth or redness in your face, neck, arms, or upper chest  If you notice other side effects that you think are caused by this medicine, tell your doctor  Call your doctor for medical advice about side effects  You may report side effects to FDA at 4-577-FDA-0427    © Copyright [employer] 2022 Information is for End User's use only and may not be sold, redistributed or otherwise used for commercial purposes  The above information is an  only  It is not intended as medical advice for individual conditions or treatments  Talk to your doctor, nurse or pharmacist before following any medical regimen to see if it is safe and effective for you  Tramadol (By mouth)   Tramadol (TRAM-a-dol)  Treats moderate to severe pain  This medicine is a narcotic pain reliever  Brand Name(s): FusePaq Synapryn, Qdolo, Ultram, Ultram ER   There may be other brand names for this medicine  When This Medicine Should Not Be Used: This medicine is not right for everyone   Do not use it if you had an allergic reaction to tramadol or other narcotic medicine, or if you have stomach or bowel blockage (including paralytic ileus) or serious lung or breathing problems (including asthma, respiratory depression)  How to Use This Medicine:   Long Acting Capsule, Liquid, Tablet, Dissolving Tablet, Long Acting Tablet  Take your medicine as directed  Your dose may need to be changed several times to find what works best for you  Make sure your hands are dry before you handle the disintegrating tablet  Peel back the foil from the blister pack, then remove the tablet  Do not push the tablet through the foil  Place the tablet in your mouth  After it has melted, swallow or take a drink of water  Swallow the extended-release tablet whole  Do not crush, break, or chew it  Drink plenty of liquids to help avoid constipation  This medicine should come with a Medication Guide  Ask your pharmacist for a copy if you do not have one  Missed dose: Take a dose as soon as you remember  If it is almost time for your next dose, wait until then and take a regular dose  Do not take extra medicine to make up for a missed dose  Store the medicine in a closed container at room temperature, away from heat, moisture, and direct light  Drop off any unused narcotic medicine at a drug take-back location right away  If you do not have a drug take-back location near you, flush any unused narcotic medicine down the toilet  Check your local drug store and clinics for take-back locations  You can also check the [employer] web site for locations  Here is the link to the FDA safe disposal of medicines website: www fda gov/drugs/resourcesforyou/consumers/buyingusingmedicinesafely/ensuringsafeuseofmedicine/safedisposalofmedicines/eoe231130 htm  Drugs and Foods to Avoid:   Ask your doctor or pharmacist before using any other medicine, including over-the-counter medicines, vitamins, and herbal products    Do not use this medicine if you are using or have used an MAO inhibitor within the past 14 days  Some medicines can affect how tramadol works  Tell your doctor if you are using any of the following:  Amiodarone, carbamazepine, cyclobenzaprine, digoxin, erythromycin, ketoconazole, lithium, metaxalone, mirtazapine, phenytoin, promethazine, rifampin, ritonavir, quinidine, trazodone  Blood thinner (including warfarin)  Diuretic (water pill)  Medicine to treat depression (including bupropion, fluoxetine, paroxetine, quinidine, SSRIs, TCAs)  Phenothiazine medicine  Triptan medicine for migraine headaches  Tell your doctor if you use anything else that makes you sleepy  Some examples are allergy medicine, narcotic pain medicine, and alcohol  Tell your doctor if you are using buprenorphine, butorphanol, nalbuphine, pentazocine, or a muscle relaxer (including cyclobenzaprine, metaxalone)  Do not drink alcohol while you are using this medicine  Warnings While Using This Medicine:   Tell your doctor if you are pregnant or breastfeeding, or if you have kidney disease, liver disease (including cirrhosis), adrenal problems, gallstones, lung or breathing problems (including sleep apnea), diabetes, pancreas problems, or a history of head injury, seizures, drug addiction, or depression or similar emotional problems  Tell your doctor if you have phenylketonuria  This medicine may cause the following problems:  High risk of overdose, which can lead to death  Respiratory depression (serious breathing problem that can be life-threatening)  Sleep-related breathing problems (including sleep apnea, sleep-related hypoxemia)  Serotonin syndrome (when used with certain medicines)  Increased risk of seizures  Adrenal gland problem  Low blood pressure  Unusual change in mood or behavior  Hypoglycemia (low blood sugar level)  This medicine may make you dizzy, drowsy, or lightheaded  Do not drive or do anything else that could be dangerous until you know how this medicine affects you   Sit or lie down if you feel dizzy  Stand up carefully  This medicine can be habit-forming  Do not use more than your prescribed dose  Call your doctor if you think your medicine is not working  Do not stop using this medicine suddenly  Your doctor will need to slowly decrease your dose before you stop it completely  Tell any doctor or dentist who treats you that you are using this medicine  This medicine may cause constipation, especially with long-term use  Ask your doctor if you should use a laxative to prevent and treat constipation  This medicine could cause infertility  Talk with your doctor before using this medicine if you plan to have children  Your doctor will do lab tests at regular visits to check on the effects of this medicine  Keep all appointments  Keep all medicine out of the reach of children  Never share your medicine with anyone  Possible Side Effects While Using This Medicine:   Call your doctor right away if you notice any of these side effects:   Allergic reaction: Itching or hives, swelling in your face or hands, swelling or tingling in your mouth or throat, chest tightness, trouble breathing  Anxiety, restlessness, fast heartbeat, fever, sweating, muscle spasms, nausea, vomiting, diarrhea, seeing or hearing things that are not there  Blistering, peeling, red skin rash  Blue lips, fingernails, or skin  Changes in skin color, dark freckles, cold feeling, tiredness, weight loss  Extreme dizziness, drowsiness, or weakness, shallow breathing, slow heartbeat, seizures, and cold, clammy skin  Lightheadedness, dizziness, fainting  Seizures  Shaking, trembling, sweating, hunger, confusion  Unusual mood or behavior, thoughts of killing yourself or others  Trouble breathing  Weakness, muscle twitching  If you notice these less serious side effects, talk with your doctor:   Constipation, loss of appetite, stomach upset  Dry mouth  Headache  If you notice other side effects that you think are caused by this medicine, tell your doctor  Call your doctor for medical advice about side effects  You may report side effects to FDA at 9-832-FDA-5881    © Copyright [facility] 2022 Information is for End User's use only and may not be sold, redistributed or otherwise used for commercial purposes  The above information is an  only  It is not intended as medical advice for individual conditions or treatments  Talk to your doctor, nurse or pharmacist before following any medical regimen to see if it is safe and effective for you

## 2022-10-17 NOTE — PROGRESS NOTES
Assessment:  1  Chronic pain syndrome    2  Chronic bilateral low back pain without sciatica    3  Lumbar radiculopathy    4  Lumbar spondylosis    5  Myofascial pain syndrome    6  Sacroiliitis ([address] )    7  Sacroiliac joint dysfunction of both sides    8  Spinal stenosis of lumbar region with neurogenic claudication        Plan:  While the patient was in the office today, I did have a thorough conversation with the patient regarding their chronic pain syndrome, symptoms, medication regimen, and treatment plan  I discussed with the patient and his wife that at this point in time it is possible he could be experiencing an acute neuritis which can happen with any of the injections we performed and that it is a good sign that he is noting moderate to significant relief of the left-sided pain from the SI joint injection that hopefully once we can calmed down his current myofascial and neuropathic pain flare-up would notice at least moderate relief on the right side as well  At this point to try to address the myofascial component I am going to start him on methocarbamol 500 mg at bedtime, especially since he is having difficulty sleeping that he can try half a tablet in the morning and the afternoon as well as a full tablet at bedtime over the next few days to weeks and see how he does  I am also going to give him a small prescription for p r n  tramadol, 50 mg, 1 p o  b i d  p r n  for severe breakthrough pain only so we have something to take if the pain is significant, especially at nighttime to hopefully help him with some relief so he can get some rest   I advised the patient that if they experience any side effects or issues with the changes in their medication regiment, they should give our office a call to discuss  I also advised the patient not to drive or operate machinery until they see how the changes in the medication regimen affects them  The patient was agreeable and verbalized an understanding  I discussed with the patient and his wife that many times as these side effects can be unpredictable, if we can get everything calmed down may see improvement and at least know moderate overall relief of his pain symptoms and hopefully we can give the SI joint injection more time as it does typically take 3-4 weeks for an SI joint injection to be effective  The patient and his wife were agreeable and verbalized understanding  [place] Prescription Drug Monitoring Program report was reviewed and was appropriate     There are risks associated with opioid medications, including dependence, addiction and tolerance  The patient understands and agrees to use these medications only as prescribed  Potential side effects of the medications include, but are not limited to, constipation, drowsiness, addiction, impaired judgment and risk of fatal overdose if not taken as prescribed  The patient was warned against driving while taking sedation medications  Sharing medications is a felony  At this point in time, the patient is showing no signs of addiction, abuse, diversion or suicidal ideation  The patient will follow-up in 4 weeks for medication prescription refill and reevaluation  The patient was advised to contact the office should their symptoms worsen in the interim  The patient was agreeable and verbalized an understanding  History of Present Illness: The patient is a 79 y o  male last seen on 10/7/2022 who presents for a follow up office visit in regards to chronic pain syndrome, as the patient's pain has been ongoing for greater than a year,  secondary to lumbar stenosis and spondylosis with radiculopathy as well as sacroiliitis and sacroiliac joint dysfunction and diffuse myofascial pain    The patient currently reports that this point time as he is status post his bilateral sacroiliac joint injection on October 7, 2022 that since the injection right-sided low back pain has definitely worsened  However, the patient and his wife report that his left-sided low back pain is significantly better as a result of the injection but the right-sided pain starts at the SI joint area and radiates into his right flank  He denies any recent trauma or injury as well as any signs or symptoms of infection without any redness, drainage come and or swelling around the injection site  The patient and his wife present today for re-evaluation because of his worsening pain as he reports he just completed a Medrol Dosepak as prescribed by his primary care provider yesterday with minimal relief  The patient denies any significant or consistent lower extremity radicular symptoms  I have personally reviewed and/or updated the patient's past medical history, past surgical history, family history, social history, current medications, allergies, and vital signs today  Review of Systems:    Review of Systems   Respiratory: Negative for shortness of breath  Cardiovascular: Negative for chest pain  Gastrointestinal: Negative for constipation, diarrhea, nausea and vomiting  Musculoskeletal: Positive for gait problem  Negative for arthralgias, joint swelling and myalgias  Skin: Negative for rash  Neurological: Negative for dizziness, seizures and weakness  All other systems reviewed and are negative          Past Medical History:   Diagnosis Date   • Astigmatism     last assessed 11/15/17   • Cataract     Resolved 11/15/17   • Gross hematuria     Last assessed 01/28/16   • Hematuria     last assessed 01/28/16   • Prostate cancer ([facility] )     last assessed 05/18/17   • Renal calculi     last assessed 08/16/12   • Squamous cell carcinoma    • Vitamin D deficiency     Last assessed 08/19/13       Past Surgical History:   Procedure Laterality Date   • BREAST EXCISIONAL BIOPSY N/A     Benign breast tumor unsure of side over 50 yrs ago   • CATARACT EXTRACTION Left 08/26/2019   • CATARACT EXTRACTION Right 08/12/2019 • COLONOSCOPY     • CYSTOSCOPY  01/28/2016   • ELBOW SURGERY      Bone chip   • HERNIA REPAIR  2002   • LITHOTRIPSY     • RETROPUBIC PROSTATECTOMY  11/13/2001    Radical with nerve sparing       Family History   Problem Relation Age of Onset   • Other Mother 76        Influenza   • Hypertension Mother    • Heart failure Father 70   • Peripheral vascular disease Father    • Hypertension Father    • Prostate cancer Brother    • Stroke Brother 48   • Hypertension Brother    • Hypertension Family        Social History     Occupational History   • Not on file   Tobacco Use   • Smoking status: Never Smoker   • Smokeless tobacco: Never Used   Vaping Use   • Vaping Use: Never used   Substance and Sexual Activity   • Alcohol use: Yes     Comment: social   • Drug use: No   • Sexual activity: Not on file         Current Outpatient Medications:   •  Docusate Sodium (COLACE PO), Take by mouth daily, Disp: , Rfl:   •  methocarbamol (ROBAXIN) 500 mg tablet, Take 1 PO BID PRN for pain/spasms  , Disp: 30 tablet, Rfl: 1  •  metoprolol tartrate (LOPRESSOR) 25 mg tablet, Take 1 tablet (25 mg total) by mouth every 12 (twelve) hours, Disp: 180 tablet, Rfl: 3  •  Mirabegron ER 25 MG TB24, Take 25 mg by mouth in the morning, Disp: 30 tablet, Rfl: 5  •  Multiple Vitamins-Minerals (CENTRUM SILVER 50+MEN PO), Daily, Disp: , Rfl:   •  traMADol (Ultram) 50 mg tablet, Take 1 PO BID PRN for pain , Disp: 14 tablet, Rfl: 1  •  verapamil (CALAN-SR) 240 mg CR tablet, TAKE 1 AND 1/2 TABLETS BY MOUTH DAILY AS DIRECTED, Disp: 135 tablet, Rfl: 1    Allergies   Allergen Reactions   • Sulfa Antibiotics Rash   • Ciprofloxacin    • Gabapentin Dizziness       Physical Exam:    /72 (BP Location: Left arm, Patient Position: Sitting, Cuff Size: Standard)   Pulse 69   Temp 97 7 °F (36 5 °C)   Ht 5' 11" (1 803 m)   Wt 70 8 kg (156 lb)   BMI 21 76 kg/m²     Constitutional:normal, well developed, well nourished, alert, in no distress and non-toxic and no overt pain behavior  Eyes:anicteric  HEENT:grossly intact  Neck:supple, symmetric, trachea midline and no masses   Pulmonary:even and unlabored  Cardiovascular:No edema or pitting edema present  Skin:Normal without rashes or lesions and well hydrated  Psychiatric:Mood and affect appropriate  Neurologic:Cranial Nerves II-XII grossly intact  Musculoskeletal:The patient's gait is slightly antalgic, but steady without the use of any assistive devices  Mild to moderate tenderness is noted upon exam of the right SI joint and lumbosacral spine paravertebral musculature, without any signs or symptoms of erythema, edema or infection around the injection site  Imaging  No orders to display         No orders of the defined types were placed in this encounter

## 2022-10-17 NOTE — TELEPHONE ENCOUNTER
Caller: Patient    Doctor/Office: [facility]    [phone]     Location of pain: Near the SI joints above where he had the injection    Pain scale: 10/10    Duration of pain: pain is now all the time. He can blearily walk or sleep

## 2022-10-17 NOTE — TELEPHONE ENCOUNTER
S/w pt's wife Rosa M (ok per medical communication consent on file). Pt had B/L SIJ on 10/7 and has gotten relief on the left but is having #10/10 right lower back pain  that is stabbing and worse that before the injection.  Pt is having difficulty walking and is using a cane.    Pt prescribed steroid pack by PCP on 10/10. Pt advised that he has not had the full effect yet of either the injection or the steroid pack. Per wife, heat and tylenol only helped minimally. Pt stopped steroids yesterday so pt will try Aleve today, no more that 2400mg/24hrs and take with food. Pt has Voltaren cream but pt would need to take it qid for 7 days and pt needs something for relief now.   Please advise

## 2022-10-18 NOTE — TELEPHONE ENCOUNTER
S/w pt's wife Rosa M(ok per medical communication consent on file) and advised of same  She will cb on Friday with update on how pt is doing with new med, per DG from [facility] yesterday  She will call PT and advise of recommendation

## 2022-10-21 ENCOUNTER — APPOINTMENT (INPATIENT)
Dept: RADIOLOGY | Facility: HOSPITAL | Age: 82
End: 2022-10-21

## 2022-10-21 ENCOUNTER — APPOINTMENT (EMERGENCY)
Dept: CT IMAGING | Facility: HOSPITAL | Age: 82
End: 2022-10-21

## 2022-10-21 ENCOUNTER — HOSPITAL ENCOUNTER (INPATIENT)
Facility: HOSPITAL | Age: 82
LOS: 4 days | End: 2022-10-25
Attending: EMERGENCY MEDICINE | Admitting: STUDENT IN AN ORGANIZED HEALTH CARE EDUCATION/TRAINING PROGRAM

## 2022-10-21 DIAGNOSIS — M54.50 ACUTE LOW BACK PAIN: Primary | ICD-10-CM

## 2022-10-21 DIAGNOSIS — M48.46XA STRESS FRACTURE OF LUMBAR VERTEBRA, INITIAL ENCOUNTER: ICD-10-CM

## 2022-10-21 DIAGNOSIS — M48.00 SPINAL STENOSIS: ICD-10-CM

## 2022-10-21 PROBLEM — S32.008A CLOSED FRACTURE OF LUMBAR VERTEBRA WITH SPINAL CORD INJURY (HCC): Status: ACTIVE | Noted: 2022-10-21

## 2022-10-21 PROBLEM — S34.109A CLOSED FRACTURE OF LUMBAR VERTEBRA WITH SPINAL CORD INJURY (HCC): Status: ACTIVE | Noted: 2022-10-21

## 2022-10-21 LAB
ANION GAP SERPL CALCULATED.3IONS-SCNC: 6 MMOL/L (ref 4–13)
BACTERIA UR QL AUTO: ABNORMAL /HPF
BASOPHILS # BLD AUTO: 0.05 THOUSANDS/ÂΜL (ref 0–0.1)
BASOPHILS NFR BLD AUTO: 1 % (ref 0–1)
BILIRUB UR QL STRIP: NEGATIVE
BUN SERPL-MCNC: 35 MG/DL (ref 5–25)
CALCIUM SERPL-MCNC: 8.5 MG/DL (ref 8.3–10.1)
CHLORIDE SERPL-SCNC: 102 MMOL/L (ref 96–108)
CLARITY UR: CLEAR
CO2 SERPL-SCNC: 29 MMOL/L (ref 21–32)
COLOR UR: YELLOW
CREAT SERPL-MCNC: 1.32 MG/DL (ref 0.6–1.3)
EOSINOPHIL # BLD AUTO: 0.19 THOUSAND/ÂΜL (ref 0–0.61)
EOSINOPHIL NFR BLD AUTO: 2 % (ref 0–6)
ERYTHROCYTE [DISTWIDTH] IN BLOOD BY AUTOMATED COUNT: 13.1 % (ref 11.6–15.1)
GFR SERPL CREATININE-BSD FRML MDRD: 49 ML/MIN/1.73SQ M
GLUCOSE SERPL-MCNC: 92 MG/DL (ref 65–140)
GLUCOSE UR STRIP-MCNC: NEGATIVE MG/DL
HCT VFR BLD AUTO: 38.2 % (ref 36.5–49.3)
HGB BLD-MCNC: 12.3 G/DL (ref 12–17)
HGB UR QL STRIP.AUTO: ABNORMAL
IMM GRANULOCYTES # BLD AUTO: 0.21 THOUSAND/UL (ref 0–0.2)
IMM GRANULOCYTES NFR BLD AUTO: 2 % (ref 0–2)
KETONES UR STRIP-MCNC: NEGATIVE MG/DL
LEUKOCYTE ESTERASE UR QL STRIP: NEGATIVE
LYMPHOCYTES # BLD AUTO: 1.22 THOUSANDS/ÂΜL (ref 0.6–4.47)
LYMPHOCYTES NFR BLD AUTO: 14 % (ref 14–44)
MCH RBC QN AUTO: 31.4 PG (ref 26.8–34.3)
MCHC RBC AUTO-ENTMCNC: 32.2 G/DL (ref 31.4–37.4)
MCV RBC AUTO: 97 FL (ref 82–98)
MONOCYTES # BLD AUTO: 0.92 THOUSAND/ÂΜL (ref 0.17–1.22)
MONOCYTES NFR BLD AUTO: 10 % (ref 4–12)
NEUTROPHILS # BLD AUTO: 6.25 THOUSANDS/ÂΜL (ref 1.85–7.62)
NEUTS SEG NFR BLD AUTO: 71 % (ref 43–75)
NITRITE UR QL STRIP: NEGATIVE
NON-SQ EPI CELLS URNS QL MICRO: ABNORMAL /HPF
NRBC BLD AUTO-RTO: 0 /100 WBCS
PH UR STRIP.AUTO: 5.5 [PH]
PLATELET # BLD AUTO: 166 THOUSANDS/UL (ref 149–390)
PMV BLD AUTO: 10.8 FL (ref 8.9–12.7)
POTASSIUM SERPL-SCNC: 5.4 MMOL/L (ref 3.5–5.3)
PROT UR STRIP-MCNC: ABNORMAL MG/DL
RBC # BLD AUTO: 3.92 MILLION/UL (ref 3.88–5.62)
RBC #/AREA URNS AUTO: ABNORMAL /HPF
SODIUM SERPL-SCNC: 137 MMOL/L (ref 135–147)
SP GR UR STRIP.AUTO: >=1.03 (ref 1–1.03)
UROBILINOGEN UR STRIP-ACNC: <2 MG/DL
WBC # BLD AUTO: 8.84 THOUSAND/UL (ref 4.31–10.16)
WBC #/AREA URNS AUTO: ABNORMAL /HPF

## 2022-10-21 RX ORDER — MAGNESIUM HYDROXIDE/ALUMINUM HYDROXICE/SIMETHICONE 120; 1200; 1200 MG/30ML; MG/30ML; MG/30ML
30 SUSPENSION ORAL EVERY 6 HOURS PRN
Status: DISCONTINUED | OUTPATIENT
Start: 2022-10-21 | End: 2022-10-25 | Stop reason: HOSPADM

## 2022-10-21 RX ORDER — OXYCODONE HYDROCHLORIDE 5 MG/1
10 TABLET ORAL EVERY 4 HOURS PRN
Status: DISCONTINUED | OUTPATIENT
Start: 2022-10-21 | End: 2022-10-25 | Stop reason: HOSPADM

## 2022-10-21 RX ORDER — OXYCODONE HYDROCHLORIDE 5 MG/1
5 TABLET ORAL EVERY 4 HOURS PRN
Status: DISCONTINUED | OUTPATIENT
Start: 2022-10-21 | End: 2022-10-25 | Stop reason: HOSPADM

## 2022-10-21 RX ORDER — METHYLPREDNISOLONE 4 MG/1
4 TABLET ORAL DAILY
Status: DISCONTINUED | OUTPATIENT
Start: 2022-10-27 | End: 2022-10-25 | Stop reason: HOSPADM

## 2022-10-21 RX ORDER — ACETAMINOPHEN 325 MG/1
975 TABLET ORAL EVERY 8 HOURS SCHEDULED
Status: DISCONTINUED | OUTPATIENT
Start: 2022-10-21 | End: 2022-10-25 | Stop reason: HOSPADM

## 2022-10-21 RX ORDER — METHYLPREDNISOLONE 4 MG/1
20 TABLET ORAL DAILY
Status: COMPLETED | OUTPATIENT
Start: 2022-10-23 | End: 2022-10-23

## 2022-10-21 RX ORDER — HYDROMORPHONE HCL IN WATER/PF 6 MG/30 ML
0.2 PATIENT CONTROLLED ANALGESIA SYRINGE INTRAVENOUS EVERY 4 HOURS PRN
Status: DISCONTINUED | OUTPATIENT
Start: 2022-10-21 | End: 2022-10-25 | Stop reason: HOSPADM

## 2022-10-21 RX ORDER — VERAPAMIL HYDROCHLORIDE 240 MG/1
240 TABLET, FILM COATED, EXTENDED RELEASE ORAL DAILY
Status: DISCONTINUED | OUTPATIENT
Start: 2022-10-21 | End: 2022-10-25 | Stop reason: HOSPADM

## 2022-10-21 RX ORDER — METHOCARBAMOL 500 MG/1
500 TABLET, FILM COATED ORAL EVERY 8 HOURS SCHEDULED
Status: DISCONTINUED | OUTPATIENT
Start: 2022-10-21 | End: 2022-10-25 | Stop reason: HOSPADM

## 2022-10-21 RX ORDER — METHYLPREDNISOLONE 4 MG/1
12 TABLET ORAL DAILY
Status: COMPLETED | OUTPATIENT
Start: 2022-10-25 | End: 2022-10-25

## 2022-10-21 RX ORDER — METHYLPREDNISOLONE 4 MG/1
8 TABLET ORAL DAILY
Status: DISCONTINUED | OUTPATIENT
Start: 2022-10-26 | End: 2022-10-25 | Stop reason: HOSPADM

## 2022-10-21 RX ORDER — METHYLPREDNISOLONE 4 MG/1
24 TABLET ORAL DAILY
Status: COMPLETED | OUTPATIENT
Start: 2022-10-22 | End: 2022-10-22

## 2022-10-21 RX ORDER — ENOXAPARIN SODIUM 100 MG/ML
40 INJECTION SUBCUTANEOUS DAILY
Status: DISCONTINUED | OUTPATIENT
Start: 2022-10-22 | End: 2022-10-25 | Stop reason: HOSPADM

## 2022-10-21 RX ORDER — POLYETHYLENE GLYCOL 3350 17 G/17G
17 POWDER, FOR SOLUTION ORAL DAILY PRN
Status: DISCONTINUED | OUTPATIENT
Start: 2022-10-21 | End: 2022-10-25 | Stop reason: HOSPADM

## 2022-10-21 RX ORDER — METHYLPREDNISOLONE 4 MG/1
16 TABLET ORAL DAILY
Status: COMPLETED | OUTPATIENT
Start: 2022-10-24 | End: 2022-10-24

## 2022-10-21 RX ORDER — OXYBUTYNIN CHLORIDE 5 MG/1
5 TABLET, EXTENDED RELEASE ORAL DAILY
Status: DISCONTINUED | OUTPATIENT
Start: 2022-10-22 | End: 2022-10-25 | Stop reason: HOSPADM

## 2022-10-21 RX ORDER — LIDOCAINE 50 MG/G
1 PATCH TOPICAL DAILY
Status: DISCONTINUED | OUTPATIENT
Start: 2022-10-22 | End: 2022-10-25 | Stop reason: HOSPADM

## 2022-10-21 RX ORDER — TRAMADOL HYDROCHLORIDE 50 MG/1
50 TABLET ORAL 2 TIMES DAILY PRN
Status: DISCONTINUED | OUTPATIENT
Start: 2022-10-21 | End: 2022-10-25 | Stop reason: HOSPADM

## 2022-10-21 RX ORDER — HYDROMORPHONE HCL/PF 1 MG/ML
0.5 SYRINGE (ML) INJECTION ONCE
Status: COMPLETED | OUTPATIENT
Start: 2022-10-21 | End: 2022-10-21

## 2022-10-21 RX ORDER — ONDANSETRON 2 MG/ML
4 INJECTION INTRAMUSCULAR; INTRAVENOUS EVERY 6 HOURS PRN
Status: DISCONTINUED | OUTPATIENT
Start: 2022-10-21 | End: 2022-10-25 | Stop reason: HOSPADM

## 2022-10-21 RX ADMIN — IOHEXOL 100 ML: 350 INJECTION, SOLUTION INTRAVENOUS at 16:01

## 2022-10-21 RX ADMIN — SODIUM CHLORIDE 500 ML: 0.9 INJECTION, SOLUTION INTRAVENOUS at 17:27

## 2022-10-21 RX ADMIN — HYDROMORPHONE HYDROCHLORIDE 0.5 MG: 1 INJECTION, SOLUTION INTRAMUSCULAR; INTRAVENOUS; SUBCUTANEOUS at 15:19

## 2022-10-21 RX ADMIN — METOPROLOL TARTRATE 25 MG: 25 TABLET, FILM COATED ORAL at 18:44

## 2022-10-21 NOTE — ED PROVIDER NOTES
History  Chief Complaint   Patient presents with   • Back Pain     Pt with chronic right lower back pain, pt has been on medrol dose pack, was started on robaxin and tramadol 10/17/22 when he saw [facility], here with wife today after Dr Nilda Dozier recommended ER visit since pain is continued  Pt denies new injury  Denies trauma, denies loss of bowel or bladder control  60-year-old male presents for evaluation worsening acute on chronic low back pain  Patient follows with pain management and was advised to come to the emergency department for further evaluation as his pain has gotten significantly worse  Patient received to injections last week  Patient reports significant improvement on 1 side but not the other  Patient denies any numbness, tingling  Denies issues with urination or incontinence  Patient denies any trauma  He has had a lot of trouble walking and his wife states she has been using a scooter to get him around the house to the bathroom etc            Prior to Admission Medications   Prescriptions Last Dose Informant Patient Reported? Taking? Docusate Sodium (COLACE PO)  Self Yes No   Sig: Take by mouth daily   Mirabegron ER 25 MG TB24   No No   Sig: Take 25 mg by mouth in the morning   Multiple Vitamins-Minerals (CENTRUM SILVER 50+MEN PO)  Self Yes No   Sig: Daily   Naproxen Sodium (ALEVE PO)   Yes Yes   Sig: Take by mouth 2 (two) times a day   methocarbamol (ROBAXIN) 500 mg tablet   No No   Sig: Take 1 PO BID PRN for pain/spasms  metoprolol tartrate (LOPRESSOR) 25 mg tablet   No No   Sig: Take 1 tablet (25 mg total) by mouth every 12 (twelve) hours   traMADol (Ultram) 50 mg tablet   No No   Sig: Take 1 PO BID PRN for pain     verapamil (CALAN-SR) 240 mg CR tablet   No No   Sig: TAKE 1 AND 1/2 TABLETS BY MOUTH DAILY AS DIRECTED   Patient taking differently: Take 240 mg by mouth in the morning      Facility-Administered Medications: None       Past Medical History:   Diagnosis Date   • Astigmatism     last assessed 11/15/17   • Cataract     Resolved 11/15/17   • Gross hematuria     Last assessed 01/28/16   • Hematuria     last assessed 01/28/16   • Prostate cancer ([facility] [address] )     last assessed 05/18/17   • Renal calculi     last assessed 08/16/12   • Squamous cell carcinoma    • Vitamin D deficiency     Last assessed 08/19/13       Past Surgical History:   Procedure Laterality Date   • BREAST EXCISIONAL BIOPSY N/A     Benign breast tumor unsure of side over 50 yrs ago   • CATARACT EXTRACTION Left 08/26/2019   • CATARACT EXTRACTION Right 08/12/2019   • COLONOSCOPY     • CYSTOSCOPY  01/28/2016   • ELBOW SURGERY      Bone chip   • HERNIA REPAIR  2002   • LITHOTRIPSY     • RETROPUBIC PROSTATECTOMY  11/13/2001    Radical with nerve sparing       Family History   Problem Relation Age of Onset   • Other Mother 76        Influenza   • Hypertension Mother    • Heart failure Father 70   • Peripheral vascular disease Father    • Hypertension Father    • Prostate cancer Brother    • Stroke Brother 48   • Hypertension Brother    • Hypertension Family      I have reviewed and agree with the history as documented  E-Cigarette/Vaping   • E-Cigarette Use Never User      E-Cigarette/Vaping Substances   • Nicotine No    • THC No    • CBD No    • Flavoring No    • Other No    • Unknown No      Social History     Tobacco Use   • Smoking status: Never Smoker   • Smokeless tobacco: Never Used   Vaping Use   • Vaping Use: Never used   Substance Use Topics   • Alcohol use: Yes     Comment: social   • Drug use: No       Review of Systems   Constitutional: Negative for fever  Musculoskeletal: Positive for back pain  All other systems reviewed and are negative  Physical Exam  Physical Exam  Vitals and nursing note reviewed  Constitutional:       General: He is not in acute distress  Appearance: He is well-developed  HENT:      Head: Normocephalic and atraumatic        Right Ear: External ear normal  Left Ear: External ear normal       Nose: Nose normal    Eyes:      General: No scleral icterus  Pulmonary:      Effort: Pulmonary effort is normal  No respiratory distress  Abdominal:      General: There is no distension  Palpations: Abdomen is soft  Musculoskeletal:         General: Tenderness present  No deformity  Cervical back: Normal range of motion and neck supple  Comments: Able to flex and extend the bilateral hips  No evidence of saddle anesthesia  Moves very slowly 2/2 to pain  Very unsteady standing  TTP diffuse lumbar paraspinal region  No midline tenderness  Skin:     General: Skin is warm  Findings: No rash  Neurological:      General: No focal deficit present  Mental Status: He is alert  Sensory: No sensory deficit        Gait: Gait normal    Psychiatric:         Mood and Affect: Mood normal          Vital Signs  ED Triage Vitals [10/21/22 1255]   Temperature Pulse Respirations Blood Pressure SpO2   97 9 °F (36 6 °C) 69 16 (!) 177/74 100 %      Temp Source Heart Rate Source Patient Position - Orthostatic VS BP Location FiO2 (%)   Temporal Monitor Sitting Left arm --      Pain Score       10 - Worst Possible Pain           Vitals:    10/21/22 2045 10/22/22 0714 10/22/22 0758 10/22/22 1455   BP: 135/63 148/66 144/62 145/64   Pulse: 55 59 (!) 54 59   Patient Position - Orthostatic VS:  Lying           Visual Acuity      ED Medications  Medications   verapamil (CALAN-SR) CR tablet 240 mg (240 mg Oral Given 10/22/22 1004)   metoprolol tartrate (LOPRESSOR) tablet 25 mg (25 mg Oral Given 10/22/22 0712)   oxybutynin (DITROPAN-XL) 24 hr tablet 5 mg (5 mg Oral Given 10/22/22 1004)   methocarbamol (ROBAXIN) tablet 500 mg (500 mg Oral Given 10/22/22 1346)   traMADol (ULTRAM) tablet 50 mg (has no administration in time range)   acetaminophen (TYLENOL) tablet 975 mg (975 mg Oral Given 10/22/22 1346)   methylprednisolone (MEDROL) tablet 24 mg (24 mg Oral Given 10/22/22 1004) Followed by   methylprednisolone (MEDROL) tablet 20 mg (has no administration in time range)     Followed by   methylprednisolone (MEDROL) tablet 16 mg (has no administration in time range)     Followed by   methylprednisolone (MEDROL) tablet 12 mg (has no administration in time range)     Followed by   methylprednisolone (MEDROL) tablet 8 mg (has no administration in time range)     Followed by   methylprednisolone (MEDROL) tablet 4 mg (has no administration in time range)   enoxaparin (LOVENOX) subcutaneous injection 40 mg (40 mg Subcutaneous Given 10/22/22 1004)   aluminum-magnesium hydroxide-simethicone (MYLANTA) oral suspension 30 mL (has no administration in time range)   polyethylene glycol (MIRALAX) packet 17 g (has no administration in time range)   ondansetron (ZOFRAN) injection 4 mg (has no administration in time range)   oxyCODONE (ROXICODONE) IR tablet 10 mg (has no administration in time range)   oxyCODONE (ROXICODONE) IR tablet 5 mg (has no administration in time range)   HYDROmorphone HCl (DILAUDID) injection 0 2 mg (has no administration in time range)   lidocaine (LIDODERM) 5 % patch 1 patch (1 patch Topical Medication Applied 10/22/22 1003)   pantoprazole (PROTONIX) EC tablet 40 mg (has no administration in time range)   HYDROmorphone (DILAUDID) injection 0 5 mg (0 5 mg Intravenous Given 10/21/22 1519)   sodium chloride 0 9 % bolus 500 mL (500 mL Intravenous New Bag 10/21/22 1727)   iohexol (OMNIPAQUE) 350 MG/ML injection (SINGLE-DOSE) 100 mL (100 mL Intravenous Given 10/21/22 1601)   influenza vaccine, high-dose (FLUZONE HIGH-DOSE) IM injection IBETH 0 7 mL (0 7 mL Intramuscular Given 10/22/22 0713)       Diagnostic Studies  Results Reviewed     Procedure Component Value Units Date/Time    Basic metabolic panel [[id number]]  (Abnormal) Collected: 10/22/22 0309    Lab Status: Final result Specimen: Blood from Arm, Right Updated: 10/22/22 9176     Sodium 138 mmol/L      Potassium 4 5 mmol/L Chloride 104 mmol/L      CO2 28 mmol/L      ANION GAP 6 mmol/L      BUN 26 mg/dL      Creatinine 0 99 mg/dL      Glucose 94 mg/dL      Calcium 9 0 mg/dL      eGFR 70 ml/min/1 73sq m     Narrative:      [employer] guidelines for Chronic Kidney Disease (CKD):   •  Stage 1 with normal or high GFR (GFR > 90 mL/min/1 73 square meters)  •  Stage 2 Mild CKD (GFR = 60-89 mL/min/1 73 square meters)  •  Stage 3A Moderate CKD (GFR = 45-59 mL/min/1 73 square meters)  •  Stage 3B Moderate CKD (GFR = 30-44 mL/min/1 73 square meters)  •  Stage 4 Severe CKD (GFR = 15-29 mL/min/1 73 square meters)  •  Stage 5 End Stage CKD (GFR <15 mL/min/1 73 square meters)  Note: GFR calculation is accurate only with a steady state creatinine    CBC and differential [[id number]] Collected: 10/22/22 0309    Lab Status: Final result Specimen: Blood from Arm, Right Updated: 10/22/22 0328     WBC 8 29 Thousand/uL      RBC 3 90 Million/uL      Hemoglobin 12 1 g/dL      Hematocrit 37 1 %      MCV 95 fL      MCH 31 0 pg      MCHC 32 6 g/dL      RDW 13 0 %      MPV 11 6 fL      Platelets 554 Thousands/uL      nRBC 0 /100 WBCs      Neutrophils Relative 64 %      Immat GRANS % 2 %      Lymphocytes Relative 19 %      Monocytes Relative 11 %      Eosinophils Relative 3 %      Basophils Relative 1 %      Neutrophils Absolute 5 34 Thousands/µL      Immature Grans Absolute 0 16 Thousand/uL      Lymphocytes Absolute 1 55 Thousands/µL      Monocytes Absolute 0 94 Thousand/µL      Eosinophils Absolute 0 26 Thousand/µL      Basophils Absolute 0 04 Thousands/µL     Platelet count [[id number]]     Lab Status: No result Specimen: Blood     Urine Microscopic [[id number]]  (Abnormal) Collected: 10/21/22 1557    Lab Status: Final result Specimen: Urine, Clean Catch Updated: 10/21/22 1620     RBC, UA 4-10 /hpf      WBC, UA 10-20 /hpf      Epithelial Cells Occasional /hpf      Bacteria, UA Occasional /hpf     Urine culture [[id number]] Collected: 10/21/22 1557    Lab Status:  In process Specimen: Urine, Clean Catch Updated: 10/21/22 1619    UA w Reflex to Microscopic w Reflex to Culture [[id number]]  (Abnormal) Collected: 10/21/22 1557    Lab Status: Final result Specimen: Urine, Clean Catch Updated: 10/21/22 1619     Color, UA Yellow     Clarity, UA Clear     Specific Gravity, UA >=1 030     pH, UA 5 5     Leukocytes, UA Negative     Nitrite, UA Negative     Protein, UA Trace mg/dl      Glucose, UA Negative mg/dl      Ketones, UA Negative mg/dl      Urobilinogen, UA <2 0 mg/dl      Bilirubin, UA Negative     Occult Blood, UA Trace    Basic metabolic panel [[id number]]  (Abnormal) Collected: 10/21/22 1518    Lab Status: Final result Specimen: Blood from Arm, Right Updated: 10/21/22 1532     Sodium 137 mmol/L      Potassium 5 4 mmol/L      Chloride 102 mmol/L      CO2 29 mmol/L      ANION GAP 6 mmol/L      BUN 35 mg/dL      Creatinine 1 32 mg/dL      Glucose 92 mg/dL      Calcium 8 5 mg/dL      eGFR 49 ml/min/1 73sq m     Narrative:      [place] guidelines for Chronic Kidney Disease (CKD):   •  Stage 1 with normal or high GFR (GFR > 90 mL/min/1 73 square meters)  •  Stage 2 Mild CKD (GFR = 60-89 mL/min/1 73 square meters)  •  Stage 3A Moderate CKD (GFR = 45-59 mL/min/1 73 square meters)  •  Stage 3B Moderate CKD (GFR = 30-44 mL/min/1 73 square meters)  •  Stage 4 Severe CKD (GFR = 15-29 mL/min/1 73 square meters)  •  Stage 5 End Stage CKD (GFR <15 mL/min/1 73 square meters)  Note: GFR calculation is accurate only with a steady state creatinine    CBC and differential [[id number]]  (Abnormal) Collected: 10/21/22 1518    Lab Status: Final result Specimen: Blood from Arm, Right Updated: 10/21/22 1525     WBC 8 84 Thousand/uL      RBC 3 92 Million/uL      Hemoglobin 12 3 g/dL      Hematocrit 38 2 %      MCV 97 fL      MCH 31 4 pg      MCHC 32 2 g/dL      RDW 13 1 %      MPV 10 8 fL      Platelets 381 Thousands/uL      nRBC 0 /100 WBCs Neutrophils Relative 71 %      Immat GRANS % 2 %      Lymphocytes Relative 14 %      Monocytes Relative 10 %      Eosinophils Relative 2 %      Basophils Relative 1 %      Neutrophils Absolute 6 25 Thousands/µL      Immature Grans Absolute 0 21 Thousand/uL      Lymphocytes Absolute 1 22 Thousands/µL      Monocytes Absolute 0 92 Thousand/µL      Eosinophils Absolute 0 19 Thousand/µL      Basophils Absolute 0 05 Thousands/µL                  XR spine lumbar 2 or 3 views injury   Final Result by Leanna Pierce MD (10/22 0651)      Degenerative changes  Levoscoliosis  L2 compression fracture  Workstation performed: [id number]         CT recon only lumbar spine   Final Result by Juliana Rae MD (10/21 6474)      Scoliosis and multilevel spondylosis as well as osteoporosis with compression fracture deformities at L2 and L3  The L2 compression fracture deformity appears to be acute with bony retropulsion of the superior endplate resulting in moderate spinal    stenosis  The L3 compression fracture deformity is less severe and appears to be chronic  Follow-up MR imaging could be utilized for additional assessment of the marrow and the distal thoracic cord/conus  Multilevel spondylosis with stable moderate to severe L4-5 spinal stenosis  Additional spondylotic changes as described above  Sclerotic lesion within the left T11 rib, presumably related to patient's history of prostate cancer  Please see the separate CT abdomen and pelvis report for additional extraspinal findings  I personally discussed this study with Bernardo Flower on 10/21/2022 at 4:43 PM                      Workstation performed: [id number]         CT abdomen pelvis w contrast   Final Result by Ritesh Frausto MD (10/21 1655)      Acute L2 compression deformity  See the separate CT L-spine report  No acute inflammatory changes in the abdomen or pelvis  Prominent colonic stool suggestive of constipation  Workstation performed: [id number]                    Procedures  Procedures         ED Course  ED Course as of 10/22/22 1536   Fri Oct 21, 2022   1639 L2 compression deformity likely acute  SBIRT 20yo+    Flowsheet Row Most Recent Value   SBIRT (25 yo +)    In order to provide better care to our patients, we are screening all of our patients for alcohol and drug use  Would it be okay to ask you these screening questions? No Filed at: 10/21/2022 1306                    [facility]  Number of Diagnoses or Management Options  Acute low back pain  Spinal stenosis  Stress fracture of lumbar vertebra, initial encounter: new and requires workup  Diagnosis management comments:  79year-old worsening acute on chronic back pain  Obtain CT L-spine, CT abdomen pelvis  Labs and pain control  Upon reassessment, patient with significant improvement of pain after Dilaudid but still very unsteady while standing  Apply LSO brace  Discussed case with neuro surgical team   Advised lumbar up right in brace, multimodal pain control  Ok to stay at [facility]  Will admit for ambulatory dysfunction and post acute rehab placement          Amount and/or Complexity of Data Reviewed  Clinical lab tests: ordered and reviewed  Tests in the radiology section of CPT®: reviewed and ordered  Tests in the medicine section of CPT®: reviewed and ordered  Decide to obtain previous medical records or to obtain history from someone other than the patient: yes  Review and summarize past medical records: yes        Disposition  Final diagnoses:   Acute low back pain   Stress fracture of lumbar vertebra, initial encounter   Spinal stenosis     Time reflects when diagnosis was documented in both MDM as applicable and the Disposition within this note     Time User Action Codes Description Comment    10/21/2022  5:14 PM Agustin Castillo Add [X31 21] Acute low back pain     10/21/2022  5:14 PM Jeanie, [address] [place] [address] fracture of lumbar vertebra, initial encounter     10/21/2022  5:15 PM [employer] Add [I56 15] Spinal stenosis       ED Disposition     ED Disposition   Admit    Condition   Stable    Date/Time   Fri Oct 21, 2022  5:14 PM    Comment   Case was discussed with RONEY and the patient's admission status was agreed to be Admission Status: inpatient status to the service of Dr Adelina Fish   Follow-up Information    None         Current Discharge Medication List      CONTINUE these medications which have NOT CHANGED    Details   Naproxen Sodium (ALEVE PO) Take by mouth 2 (two) times a day      Docusate Sodium (COLACE PO) Take by mouth daily      methocarbamol (ROBAXIN) 500 mg tablet Take 1 PO BID PRN for pain/spasms  Qty: 30 tablet, Refills: 1    Associated Diagnoses: Chronic bilateral low back pain without sciatica; Myofascial pain syndrome      metoprolol tartrate (LOPRESSOR) 25 mg tablet Take 1 tablet (25 mg total) by mouth every 12 (twelve) hours  Qty: 180 tablet, Refills: 3    Associated Diagnoses: PVC's (premature ventricular contractions)      Mirabegron ER 25 MG TB24 Take 25 mg by mouth in the morning  Qty: 30 tablet, Refills: 5    Associated Diagnoses: Urge incontinence of urine      Multiple Vitamins-Minerals (CENTRUM SILVER 50+MEN PO) Daily      traMADol (Ultram) 50 mg tablet Take 1 PO BID PRN for pain  Qty: 14 tablet, Refills: 1    Associated Diagnoses: Chronic pain syndrome; Chronic bilateral low back pain without sciatica; Spinal stenosis of lumbar region with neurogenic claudication; Lumbar spondylosis; Myofascial pain syndrome; Sacroiliitis ([address] ); Sacroiliac joint dysfunction of both sides; Lumbar radiculopathy      verapamil (CALAN-SR) 240 mg CR tablet TAKE 1 AND 1/2 TABLETS BY MOUTH DAILY AS DIRECTED  Qty: 135 tablet, Refills: 1    Associated Diagnoses: PVC's (premature ventricular contractions)             No discharge procedures on file      PDMP Review       Value Time User    PDMP Reviewed Yes 10/17/2022  3:08 PM Sona Godoy, [address]          ED Provider  Electronically Signed by           Lola Leslie DO  10/22/22 3506

## 2022-10-21 NOTE — ASSESSMENT & PLAN NOTE
· Status post prostatectomy - 2001  · Has had normal PSA at follow up  · Outpatient follow-up with Urology

## 2022-10-21 NOTE — ASSESSMENT & PLAN NOTE
· Acute L2 compression fracture and subacute L3 fracture noted on imaging in ED  · No trauma prior to admission  · No signs of cauda equina  · Neurosurgery discussed with ER, await ongoing recommendations  · Patient has been unable to ambulate independently for two weeks prior to admission  · Prior to injections, patient was ambulating without assistive device  · PT/OT   · Wife would like to review star ratings with CM, at this point prefers [facility]

## 2022-10-21 NOTE — ASSESSMENT & PLAN NOTE
Lab Results   Component Value Date    EGFR 49 10/21/2022    EGFR 56 07/19/2022    EGFR 56 04/22/2022    CREATININE 1 32 (H) 10/21/2022    CREATININE 1 19 07/19/2022    CREATININE 1 18 04/22/2022     · Baseline Cr: 1 10- 1 20  · 1 32 on admission, slightly above baseline, receiving bolus in ED  · Continue to monitor

## 2022-10-21 NOTE — TELEPHONE ENCOUNTER
S/w wife Aurora as per Mercedes Steward consent and she stated she feels like his pain is worse  The SIJI was done on 10/7/22  He has decreased pain with lying and sitting  It is when he is ambulating that he has great difficulty  She has  to use a wheeled seated walker and she has to push him into the bathroom  The tramadol he has been taking twice a day and it is only helping a little bit  The robaxon, he is taking a whole tablet at HS and a half during the day as RUDOLPH had advised  She has been using moist heat packs to help and I did suggest alternating ice vs heat  The pain continues to be bad  She is aware that DG is out of the office and SL to advise   thanks

## 2022-10-21 NOTE — ASSESSMENT & PLAN NOTE
· Follows with Pain Management outpatient who recommended admission  · MILADYS 10/7  · 2 weeks of worsening pain prior to admission  · Outpatient uses Robaxin, tramadol (initiated 5 days prior to admission with minimal relief)  · Medrol dosepak week prior, with minimal relief  · Inpatient: Scheduled Tylenol, Robaxin, PRN tramadol, Medrol Dosepak, Lidocaine and heat

## 2022-10-21 NOTE — TELEPHONE ENCOUNTER
Caller: wife Mayra Madrigal    Doctor: estefany    Reason for call: wife reports patient is doing worse, medication is not helping at all    Call back#: [phone]

## 2022-10-21 NOTE — H&P
[place]  H&P- Lilia Steele 1940, 79 y o  male MRN: [id number]  Unit/Bed#: ED 01 Encounter: [id number]  Primary Care Provider: Paulino Avilez DO   Date and time admitted to hospital: 10/21/2022  2:23 PM    * Closed fracture of lumbar vertebra with spinal cord injury [facility])  Assessment & Plan  · Acute L2 compression fracture and subacute L3 fracture noted on imaging in ED  · No trauma prior to admission  · No signs of cauda equina  · Neurosurgery discussed with ER, await ongoing recommendations  · Patient has been unable to ambulate independently for two weeks prior to admission  · Prior to injections, patient was ambulating without assistive device  · PT/OT   · Wife would like to review star ratings with CM, at this point prefers [facility]       Chronic bilateral low back pain without sciatica  Assessment & Plan  · Follows with Pain Management outpatient who recommended admission  · SIJI 10/7  · 2 weeks of worsening pain prior to admission  · Outpatient uses Robaxin, tramadol (initiated 5 days prior to admission with minimal relief)  · Medrol dosepak week prior, with minimal relief  · Inpatient: Scheduled Tylenol, Robaxin, PRN tramadol, Medrol Dosepak, Lidocaine and heat    Stage 3a chronic kidney disease [facility])  Assessment & Plan  Lab Results   Component Value Date    EGFR 49 10/21/2022    EGFR 56 07/19/2022    EGFR 56 04/22/2022    CREATININE 1 32 (H) 10/21/2022    CREATININE 1 19 07/19/2022    CREATININE 1 18 04/22/2022     · Baseline Cr: 1 10- 1 20  · 1 32 on admission, slightly above baseline, receiving bolus in ED  · Continue to monitor    Malignant neoplasm of prostate [facility])  Assessment & Plan  · Status post prostatectomy - 2001  · Has had normal PSA at follow up  · Outpatient follow-up with Urology    PVC's (premature ventricular contractions)  Assessment & Plan  · Continue Verapamil  · Follow with Dr Matt Be outpatient      VTE Pharmacologic Prophylaxis: VTE Score: 3 Moderate Risk (Score 3-4) - Pharmacological DVT Prophylaxis Ordered: heparin  Code Status: Level 1 - Full Code Full Code  Discussion with family: Updated  (wife) at bedside  Anticipated Length of Stay: Patient will be admitted on an inpatient basis with an anticipated length of stay of greater than 2 midnights secondary to back pain with new lumbar fractures  Total Time for Visit, including Counseling / Coordination of Care: 70 minutes Greater than 50% of this total time spent on direct patient counseling and coordination of care  Chief Complaint: back pain, ambulatory dysfunction    History of Present Illness:  Boo Holcomb is a 79 y o  male with a PMH of prostate cancer s/p prostatectomy in 2001, chronic back pain with radiculopathy, CKD, PVCs who presents with worsening of chronic back pain for over a month  Patient had spinal injections performed 10/7 with worsening of pain since that time  He spoke with pain management team who recommended he come to the ER  Patient denies any fall or trauma  Spinal imaging reveals two lumbar fractures  He has been ambulating with a scooter chair and wife's assistance for the past two weeks  Neurosurgery consulted  TLSO brace placed, await X-rays to ensure appropriate alignment/placement  He denies any other symptoms including loss of sensation, bowel or bladder control, headache, N/V/D, fevers or chills  Admit for pain control, Neurosurgical recommendations, PT/OT evaluations  Review of Systems:  Review of Systems   Musculoskeletal: Positive for back pain  All other systems reviewed and are negative        Past Medical and Surgical History:   Past Medical History:   Diagnosis Date   • Astigmatism     last assessed 11/15/17   • Cataract     Resolved 11/15/17   • Gross hematuria     Last assessed 01/28/16   • Hematuria     last assessed 01/28/16   • Prostate cancer [facility])     last assessed 05/18/17   • Renal calculi     last assessed 08/16/12   • Squamous cell carcinoma    • Vitamin D deficiency     Last assessed 08/19/13       Past Surgical History:   Procedure Laterality Date   • BREAST EXCISIONAL BIOPSY N/A     Benign breast tumor unsure of side over 50 yrs ago   • CATARACT EXTRACTION Left 08/26/2019   • CATARACT EXTRACTION Right 08/12/2019   • COLONOSCOPY     • CYSTOSCOPY  01/28/2016   • ELBOW SURGERY      Bone chip   • HERNIA REPAIR  2002   • LITHOTRIPSY     • RETROPUBIC PROSTATECTOMY  11/13/2001    Radical with nerve sparing       Meds/Allergies:  Prior to Admission medications    Medication Sig Start Date End Date Taking? Authorizing Provider   Docusate Sodium (COLACE PO) Take by mouth daily    Historical Provider, MD   methocarbamol (ROBAXIN) 500 mg tablet Take 1 PO BID PRN for pain/spasms  10/17/22   Luretha Captain, MARE   metoprolol tartrate (LOPRESSOR) 25 mg tablet Take 1 tablet (25 mg total) by mouth every 12 (twelve) hours 9/26/22   Kauffman Bodily, MARE   Mirabegron ER 25 MG TB24 Take 25 mg by mouth in the morning 7/96/01   Cheryl Gomez DO   Multiple Vitamins-Minerals (CENTRUM SILVER 50+MEN PO) Daily    Historical Provider, MD   traMADol (Ultram) 50 mg tablet Take 1 PO BID PRN for pain  10/17/22   Luretha Captain, MARE   verapamil (CALAN-SR) 240 mg CR tablet TAKE 1 AND 1/2 TABLETS BY MOUTH DAILY AS DIRECTED 4/99/64   Cheryl Gomez DO     I have reviewed home medications with patient family member  Allergies:    Allergies   Allergen Reactions   • Sulfa Antibiotics Rash   • Ciprofloxacin    • Gabapentin Dizziness       Social History:  Marital Status: /Civil Union   Occupation: Retired  Patient Pre-hospital Living Situation: Home  Patient Pre-hospital Level of Mobility: walks  Patient Pre-hospital Diet Restrictions: None  Substance Use History:   Social History     Substance and Sexual Activity   Alcohol Use Yes    Comment: social     Social History     Tobacco Use   Smoking Status Never Smoker   Smokeless Tobacco Never Used Social History     Substance and Sexual Activity   Drug Use No       Family History:  Family History   Problem Relation Age of Onset   • Other Mother 76        Influenza   • Hypertension Mother    • Heart failure Father 70   • Peripheral vascular disease Father    • Hypertension Father    • Prostate cancer Brother    • Stroke Brother 48   • Hypertension Brother    • Hypertension Family        Physical Exam:     Vitals:   Blood Pressure: (!) 177/74 (10/21/22 1255)  Pulse: 69 (10/21/22 1255)  Temperature: 97 9 °F (36 6 °C) (10/21/22 1255)  Temp Source: Temporal (10/21/22 1255)  Respirations: 16 (10/21/22 1255)  Height: 5' 11" (180 3 cm) (10/21/22 1255)  Weight - Scale: 70 8 kg (156 lb) (10/21/22 1255)  SpO2: 100 % (10/21/22 1255)    Physical Exam  Vitals and nursing note reviewed  Constitutional:       General: He is not in acute distress  Appearance: Normal appearance  He is well-developed  HENT:      Head: Normocephalic and atraumatic  Eyes:      General: No scleral icterus  Conjunctiva/sclera: Conjunctivae normal    Cardiovascular:      Rate and Rhythm: Normal rate and regular rhythm  Heart sounds: No murmur heard  Pulmonary:      Effort: Pulmonary effort is normal       Breath sounds: No wheezing, rhonchi or rales  Abdominal:      General: There is no distension  Palpations: Abdomen is soft  Skin:     General: Skin is warm and dry  Neurological:      General: No focal deficit present  Mental Status: He is alert     Psychiatric:         Mood and Affect: Mood normal         Additional Data:     Lab Results:  Results from last 7 days   Lab Units 10/21/22  1518   WBC Thousand/uL 8 84   HEMOGLOBIN g/dL 12 3   HEMATOCRIT % 38 2   PLATELETS Thousands/uL 166   NEUTROS PCT % 71   LYMPHS PCT % 14   MONOS PCT % 10   EOS PCT % 2     Results from last 7 days   Lab Units 10/21/22  1518   SODIUM mmol/L 137   POTASSIUM mmol/L 5 4*   CHLORIDE mmol/L 102   CO2 mmol/L 29   BUN mg/dL 35* CREATININE mg/dL 1 32*   ANION GAP mmol/L 6   CALCIUM mg/dL 8 5   GLUCOSE RANDOM mg/dL 92                       Imaging: Reviewed radiology reports from this admission including: CT spine  CT recon only lumbar spine   Final Result by Hannah Gregg MD (10/21 8328)      Scoliosis and multilevel spondylosis as well as osteoporosis with compression fracture deformities at L2 and L3  The L2 compression fracture deformity appears to be acute with bony retropulsion of the superior endplate resulting in moderate spinal    stenosis  The L3 compression fracture deformity is less severe and appears to be chronic  Follow-up MR imaging could be utilized for additional assessment of the marrow and the distal thoracic cord/conus  Multilevel spondylosis with stable moderate to severe L4-5 spinal stenosis  Additional spondylotic changes as described above  Sclerotic lesion within the left T11 rib, presumably related to patient's history of prostate cancer  Please see the separate CT abdomen and pelvis report for additional extraspinal findings  I personally discussed this study with Soo Sol on 10/21/2022 at 4:43 PM                      Workstation performed: [id number]         CT abdomen pelvis w contrast   Final Result by Rosita Hernandez MD (10/21 0745)      Acute L2 compression deformity  See the separate CT L-spine report  No acute inflammatory changes in the abdomen or pelvis  Prominent colonic stool suggestive of constipation  Workstation performed: [id number]         XR spine lumbar 2 or 3 views injury    (Results Pending)       EKG and Other Studies Reviewed on Admission:   · EKG: No EKG obtained  ** Please Note: This note has been constructed using a voice recognition system   **

## 2022-10-21 NOTE — TELEPHONE ENCOUNTER
S/w Say Salgado and reviewed that because of the patient's symptoms and the pain is out of control that she should probably take him over to the ED to be evaluated  He is unable to ambulate but no loss of B & B  Sl aware

## 2022-10-22 LAB
ANION GAP SERPL CALCULATED.3IONS-SCNC: 6 MMOL/L (ref 4–13)
BASOPHILS # BLD AUTO: 0.04 THOUSANDS/ÂΜL (ref 0–0.1)
BASOPHILS NFR BLD AUTO: 1 % (ref 0–1)
BUN SERPL-MCNC: 26 MG/DL (ref 5–25)
CALCIUM SERPL-MCNC: 9 MG/DL (ref 8.3–10.1)
CHLORIDE SERPL-SCNC: 104 MMOL/L (ref 96–108)
CO2 SERPL-SCNC: 28 MMOL/L (ref 21–32)
CREAT SERPL-MCNC: 0.99 MG/DL (ref 0.6–1.3)
EOSINOPHIL # BLD AUTO: 0.26 THOUSAND/ÂΜL (ref 0–0.61)
EOSINOPHIL NFR BLD AUTO: 3 % (ref 0–6)
ERYTHROCYTE [DISTWIDTH] IN BLOOD BY AUTOMATED COUNT: 13 % (ref 11.6–15.1)
GFR SERPL CREATININE-BSD FRML MDRD: 70 ML/MIN/1.73SQ M
GLUCOSE SERPL-MCNC: 94 MG/DL (ref 65–140)
HCT VFR BLD AUTO: 37.1 % (ref 36.5–49.3)
HGB BLD-MCNC: 12.1 G/DL (ref 12–17)
IMM GRANULOCYTES # BLD AUTO: 0.16 THOUSAND/UL (ref 0–0.2)
IMM GRANULOCYTES NFR BLD AUTO: 2 % (ref 0–2)
LYMPHOCYTES # BLD AUTO: 1.55 THOUSANDS/ÂΜL (ref 0.6–4.47)
LYMPHOCYTES NFR BLD AUTO: 19 % (ref 14–44)
MCH RBC QN AUTO: 31 PG (ref 26.8–34.3)
MCHC RBC AUTO-ENTMCNC: 32.6 G/DL (ref 31.4–37.4)
MCV RBC AUTO: 95 FL (ref 82–98)
MONOCYTES # BLD AUTO: 0.94 THOUSAND/ÂΜL (ref 0.17–1.22)
MONOCYTES NFR BLD AUTO: 11 % (ref 4–12)
NEUTROPHILS # BLD AUTO: 5.34 THOUSANDS/ÂΜL (ref 1.85–7.62)
NEUTS SEG NFR BLD AUTO: 64 % (ref 43–75)
NRBC BLD AUTO-RTO: 0 /100 WBCS
PLATELET # BLD AUTO: 167 THOUSANDS/UL (ref 149–390)
PMV BLD AUTO: 11.6 FL (ref 8.9–12.7)
POTASSIUM SERPL-SCNC: 4.5 MMOL/L (ref 3.5–5.3)
RBC # BLD AUTO: 3.9 MILLION/UL (ref 3.88–5.62)
SODIUM SERPL-SCNC: 138 MMOL/L (ref 135–147)
WBC # BLD AUTO: 8.29 THOUSAND/UL (ref 4.31–10.16)

## 2022-10-22 RX ORDER — PANTOPRAZOLE SODIUM 40 MG/1
40 TABLET, DELAYED RELEASE ORAL
Status: DISCONTINUED | OUTPATIENT
Start: 2022-10-23 | End: 2022-10-25 | Stop reason: HOSPADM

## 2022-10-22 RX ADMIN — METHOCARBAMOL 500 MG: 500 TABLET ORAL at 13:46

## 2022-10-22 RX ADMIN — INFLUENZA A VIRUS A/VICTORIA/2570/2019 IVR-215 (H1N1) ANTIGEN (FORMALDEHYDE INACTIVATED), INFLUENZA A VIRUS A/DARWIN/9/2021 SAN-010 (H3N2) ANTIGEN (FORMALDEHYDE INACTIVATED), INFLUENZA B VIRUS B/PHUKET/3073/2013 ANTIGEN (FORMALDEHYDE INACTIVATED), AND INFLUENZA B VIRUS B/MICHIGAN/01/2021 ANTIGEN (FORMALDEHYDE INACTIVATED) 0.7 ML: 60; 60; 60; 60 INJECTION, SUSPENSION INTRAMUSCULAR at 07:13

## 2022-10-22 RX ADMIN — VERAPAMIL HYDROCHLORIDE 240 MG: 240 TABLET, FILM COATED, EXTENDED RELEASE ORAL at 10:04

## 2022-10-22 RX ADMIN — ACETAMINOPHEN 975 MG: 325 TABLET, FILM COATED ORAL at 07:12

## 2022-10-22 RX ADMIN — METHOCARBAMOL 500 MG: 500 TABLET ORAL at 07:12

## 2022-10-22 RX ADMIN — ACETAMINOPHEN 975 MG: 325 TABLET, FILM COATED ORAL at 22:32

## 2022-10-22 RX ADMIN — METHOCARBAMOL 500 MG: 500 TABLET ORAL at 22:33

## 2022-10-22 RX ADMIN — LIDOCAINE 5% 1 PATCH: 700 PATCH TOPICAL at 10:03

## 2022-10-22 RX ADMIN — METOPROLOL TARTRATE 25 MG: 25 TABLET, FILM COATED ORAL at 07:12

## 2022-10-22 RX ADMIN — METHYLPREDNISOLONE 24 MG: 4 TABLET ORAL at 10:04

## 2022-10-22 RX ADMIN — OXYBUTYNIN CHLORIDE 5 MG: 5 TABLET, EXTENDED RELEASE ORAL at 10:04

## 2022-10-22 RX ADMIN — ENOXAPARIN SODIUM 40 MG: 100 INJECTION SUBCUTANEOUS at 10:04

## 2022-10-22 RX ADMIN — ACETAMINOPHEN 975 MG: 325 TABLET, FILM COATED ORAL at 13:46

## 2022-10-22 NOTE — ASSESSMENT & PLAN NOTE
Lab Results   Component Value Date    EGFR 70 10/22/2022    EGFR 49 10/21/2022    EGFR 56 07/19/2022    CREATININE 0 99 10/22/2022    CREATININE 1 32 (H) 10/21/2022    CREATININE 1 19 07/19/2022     · Baseline Cr: 1 10- 1 20  · 1 32 on admission, slightly above baseline, receiving bolus in ED  · Continue to monitor

## 2022-10-22 NOTE — ASSESSMENT & PLAN NOTE
· Acute L2 compression fracture and subacute L3 fracture noted on imaging in ED  · No trauma prior to admission  · No signs of cauda equina  · Neurosurgery discussed with ER, Rec back braces and pain control  · Patient has been unable to ambulate independently for two weeks prior to admission  · Prior to injections, patient was ambulating without assistive device  · Pain appears much better controlled per patient and his wife  He was able to transition from bed to chair today  · Waiting for PT and OT    Continue current pain regimen

## 2022-10-22 NOTE — PROGRESS NOTES
[place]  Progress Note - Lilia Steele 1940, 79 y o  male MRN: [id number]  Unit/Bed#: -Janice Encounter: [id number]  Primary Care Provider: Paulino Avilez DO   Date and time admitted to hospital: 10/21/2022  2:23 PM    * Acute L2 compression fracture   Assessment & Plan  · Acute L2 compression fracture and subacute L3 fracture noted on imaging in ED  · No trauma prior to admission  · No signs of cauda equina  · Neurosurgery discussed with ER, Rec back braces and pain control  · Patient has been unable to ambulate independently for two weeks prior to admission  · Prior to injections, patient was ambulating without assistive device  · Pain appears much better controlled per patient and his wife  He was able to transition from bed to chair today  · Waiting for PT and OT    Continue current pain regimen    Chronic bilateral low back pain without sciatica  Assessment & Plan  · Follows with Pain Management outpatient who recommended admission  · SIJI 10/7  · 2 weeks of worsening pain prior to admission  · Outpatient uses Robaxin, tramadol (initiated 5 days prior to admission with minimal relief)  · Medrol dosepak week prior, with minimal relief  · Inpatient: Scheduled Tylenol, Robaxin, PRN tramadol, Medrol Dosepak, Lidocaine and heat    Stage 3a chronic kidney disease [facility])  Assessment & Plan  Lab Results   Component Value Date    EGFR 70 10/22/2022    EGFR 49 10/21/2022    EGFR 56 07/19/2022    CREATININE 0 99 10/22/2022    CREATININE 1 32 (H) 10/21/2022    CREATININE 1 19 07/19/2022     · Baseline Cr: 1 10- 1 20  · 1 32 on admission, slightly above baseline, receiving bolus in ED  · Continue to monitor    Malignant neoplasm of prostate [facility])  Assessment & Plan  · Status post prostatectomy - 2001  · Has had normal PSA at follow up  · Outpatient follow-up with Urology    PVC's (premature ventricular contractions)  Assessment & Plan  · Continue Verapamil  · Follow with Dr Matt Be outpatient      VTE Pharmacologic Prophylaxis:   Pharmacologic: Enoxaparin (Lovenox)  Mechanical VTE Prophylaxis in Place: Yes    Patient Centered Rounds:    Discussions with Specialists or Other Care Team Provider:     Education and Discussions with Family / Patient:  Spoke patient and wife at bedside    Time Spent for Care: 20 minutes  More than 50% of total time spent on counseling and coordination of care as described above  Current Length of Stay: 1 day(s)    Current Patient Status: Inpatient   Certification Statement: The patient will continue to require additional inpatient hospital stay due to Waiting for PT and OT    Discharge Plan:  Waiting for PT and OT    Code Status: Level 1 - Full Code      Subjective:   Patient reported that his back pain has much improved  He was able to transition to from bed to chair and was sitting in the chair when seen this afternoon  Both the pt and his wife were glad about his improvement  He had 1 bowel movement this morning  Objective:     Vitals:   Temp (24hrs), Av 7 °F (36 5 °C), Min:97 6 °F (36 4 °C), Max:97 8 °F (36 6 °C)    Temp:  [97 6 °F (36 4 °C)-97 8 °F (36 6 °C)] 97 6 °F (36 4 °C)  HR:  [54-70] 59  Resp:  [16-18] 16  BP: (135-180)/(62-81) 145/64  SpO2:  [95 %-97 %] 95 %  Body mass index is 21 76 kg/m²  Input and Output Summary (last 24 hours): Intake/Output Summary (Last 24 hours) at 10/22/2022 1522  Last data filed at 10/22/2022 1257  Gross per 24 hour   Intake 1050 ml   Output 1275 ml   Net -225 ml       Physical Exam:     Physical Exam  Constitutional:       General: He is not in acute distress  Appearance: He is not ill-appearing, toxic-appearing or diaphoretic  Eyes:      General:         Right eye: No discharge  Left eye: No discharge  Cardiovascular:      Rate and Rhythm: Normal rate and regular rhythm  Pulses: Normal pulses  Pulmonary:      Effort: Pulmonary effort is normal  No respiratory distress        Breath sounds: Normal breath sounds  Abdominal:      General: Abdomen is flat  Bowel sounds are normal       Palpations: Abdomen is soft  Musculoskeletal:         General: No swelling  Skin:     General: Skin is warm  Neurological:      Mental Status: He is oriented to person, place, and time     Psychiatric:         Mood and Affect: Mood normal          Behavior: Behavior normal          Additional Data:     Labs:    Results from last 7 days   Lab Units 10/22/22  0309   WBC Thousand/uL 8 29   HEMOGLOBIN g/dL 12 1   HEMATOCRIT % 37 1   PLATELETS Thousands/uL 167   NEUTROS PCT % 64   LYMPHS PCT % 19   MONOS PCT % 11   EOS PCT % 3     Results from last 7 days   Lab Units 10/22/22  0309   POTASSIUM mmol/L 4 5   CHLORIDE mmol/L 104   CO2 mmol/L 28   BUN mg/dL 26*   CREATININE mg/dL 0 99   CALCIUM mg/dL 9 0           Recent Cultures (last 7 days):           Last 24 Hours Medication List:   Current Facility-Administered Medications   Medication Dose Route Frequency Provider Last Rate   • acetaminophen  975 mg Oral Q8H [address] Kaylene LATHAM PA-C     • aluminum-magnesium hydroxide-simethicone  30 mL Oral Q6H PRN Kaylene LATHAM PA-C     • enoxaparin  40 mg Subcutaneous Daily Kaylene LATHAM PA-C     • HYDROmorphone  0 2 mg Intravenous Q4H PRN Oskar Augusto LATHAM PA-C     • lidocaine  1 patch Topical Daily Kaylene LATHAM PA-C     • methocarbamol  500 mg Oral Q8H [address] Kaylene LATHAM PA-C     • [START ON 10/23/2022] methylPREDNISolone  20 mg Oral Daily Kaylene LATHAM PA-C      Followed by   • [START ON 10/24/2022] methylPREDNISolone  16 mg Oral Daily Kaylene LATHAM PA-C      Followed by   • [START ON 10/25/2022] methylPREDNISolone  12 mg Oral Daily Kaylene LATHAM PA-C      Followed by   • [START ON 10/26/2022] methylPREDNISolone  8 mg Oral Daily Kaylene LATHAM PA-C      Followed by   • [START ON 10/27/2022] methylPREDNISolone  4 mg Oral Daily Kaylene LATHAM PA-C     • metoprolol tartrate  25 mg Oral Q12H Kaylene Verona LATHAM PA-C     • ondansetron  4 mg Intravenous Q6H PRN Natasha LATHAM PA-C     • oxybutynin  5 mg Oral Daily Kaylene LATHAM PA-C     • oxyCODONE  10 mg Oral Q4H PRN Natasha LATHAM PA-C     • oxyCODONE  5 mg Oral Q4H PRN Kaylene LATHAM PA-C     • polyethylene glycol  17 g Oral Daily PRN Kaylene LATHAM PA-C     • traMADol  50 mg Oral BID PRN Kaylene LATHAM PA-C     • verapamil  240 mg Oral Daily Kaylene LATHAM PA-C          Today, Patient Was Seen By: Yodit Holder    ** Please Note: Dictation voice to text software may have been used in the creation of this document   **

## 2022-10-22 NOTE — QUICK NOTE
Received TT from   Dr Merced William regarding patient P/W worsening acute on chronic Lower back pain  No signs /symptoms of MELISSA  Per provider, Exam shows  tenderness in the lumbar spine, otherwise non focal     CT of Lumbar spine wo contrast demonstrates Scoliosis and multilevel spondylosis, osteoporosis & compression  deformities of acute  L2 and less severe chronic L3  The L2 compression fracture deformity appears to be acute with bony retropulsion of the superior endplate resulting in moderate spinal  stenosis    Multilevel spondylosis with stable moderate to severe L4-5 spinal stenosis  L2 fracture is TLICS 1/2 and moderate CCS, I don't think he needs any Transfer at this time  With the patient's medical histories were reviewed-including allergies, current medications, past medical history, past surgical history, family history, social history, and current medical lists      Plan:  1  Recommend LSO brace-2 we recommend upright and L4-5 degree HOB  2  Baseline upright lumbar spine x-rays in brace  3  Bed rest until his pain controlled-and then gradually resume PT/OT, to recliner in brace  4  Multimodal pain meds  5  Urinary retention protocol and monitor Signs -symptoms of MELISSA-radicular pain, weakness, B/B issues, saddle anaesthesia, and anal sphincter tone)-with some of these development, consider MRI  6  Will review the images once it is done  7  Consider 2 weeks OP F/U if pain controlled and Baseline XR stable and patient wo neurological symptoms  8  Call with questions    A total of 15 mins spent reviewing Hx, PEx, and images  50% of the time was spent discussing care coordination and Mx planning

## 2022-10-22 NOTE — CASE MANAGEMENT
Case Management Assessment & Discharge Planning Note    Patient name Ayana Alex  Location /-71 MRN [id number]  : 1940 Date 10/22/2022       Current Admission Date: 10/21/2022  Current Admission Diagnosis:Closed fracture of lumbar vertebra with spinal cord injury [facility])   Patient Active Problem List    Diagnosis Date Noted   • Closed fracture of lumbar vertebra with spinal cord injury ([facility] [address] ) 10/21/2022   • Sacroiliitis ([facility] [address] )    • Sacroiliac joint dysfunction of both sides    • Lumbar spondylosis 10/04/2022   • Stage 3a chronic kidney disease ([facility] [address] ) 2022   • Myofascial pain syndrome 2022   • Aortic root dilatation ([facility] [address] ) 2022   • Renal cyst, acquired, left 12/10/2020   • Chronic pain syndrome 2020   • Chronic bilateral low back pain without sciatica 2020   • Spinal stenosis of lumbar region with neurogenic claudication    • Lumbar radiculopathy    • Malignant neoplasm of prostate ([facility] [address] ) 2019   • Thoracic aortic aneurysm without rupture 10/04/2018   • Nonrheumatic aortic valve insufficiency 10/04/2018   • Non-rheumatic mitral regurgitation 10/04/2018   • PVC's (premature ventricular contractions) 2018      LOS (days): 1  Geometric Mean LOS (GMLOS) (days):   Days to GMLOS:     OBJECTIVE:    Risk of Unplanned Readmission Score: 8 69         Current admission status: Inpatient       Preferred Pharmacy:   [address] [place] [address]  [address]  Phone: [phone] Fax: [phone]    Primary Care Provider: Lex Tobin DO    Primary Insurance: MEDICARE  Secondary Insurance: AARP    ASSESSMENT:  [facility], [address] [place] [address] Street Representative - Spouse   Primary Phone: [phone] (Mobile)  Home Phone: [phone]               Advance Directives  Does patient have a [address] [place] [address] Avenue?: Yes  Does patient have Advance Directives?: Yes  Advance Directives: Living will, Power of  for health care  Primary Contact: Isaac Dumont         Readmission Root Cause  30 Day Readmission: No    Patient Information  Admitted from[de-identified] Home  Mental Status: Alert  During Assessment patient was accompanied by: Parent, Son  Assessment information provided by[de-identified] Patient, Spouse, Son  Primary Caregiver: Self  Support Systems: Spouse/significant other, Son  [place] of Residence: [address] [place] [address] do you live in?: [address] [facility] entry access options   Select all that apply : No steps to enter home  Type of Current Residence: 2 story home  Upon entering residence, is there a bedroom on the main floor (no further steps)?: No  A bedroom is located on the following floor levels of residence (select all that apply):: 2nd Floor  Upon entering residence, is there a bathroom on the main floor (no further steps)?: Yes  Number of steps to 2nd floor from main floor: One Flight  In the last 12 months, was there a time when you were not able to pay the mortgage or rent on time?: No  In the last 12 months, how many places have you lived?: 1  In the last 12 months, was there a time when you did not have a steady place to sleep or slept in a shelter (including now)?: No  Living Arrangements: Lives w/ Spouse/significant other    Activities of Daily Living Prior to Admission  Functional Status: Independent  Completes ADLs independently?: Yes  Ambulates independently?: Yes  Does patient use assisted devices?: Yes  Assisted Devices (DME) used: Rollator  Does patient currently own DME?: Yes  What DME does the patient currently own?: Rollator  Does patient have a history of Outpatient Therapy (PT/OT)?: Yes  Does the patient have a history of Short-Term Rehab?: No  Does patient have a history of HHC?: No  Does patient currently have [address]?: No         Patient Information Continued  Income Source: Pension/USP  Does patient have prescription coverage?: Yes  Within the past 12 months, you worried that your food would run out before you got the money to buy more : Never true  Within the past 12 months, the food you bought just didn't last and you didn't have money to get more : Never true  Does patient receive dialysis treatments?: No  Does patient have a history of substance abuse?: No  Does patient have a history of Mental Health Diagnosis?: No         Means of Transportation  [facility]] Family transport  In the past 12 months, has lack of transportation kept you from medical appointments or from getting medications?: No  In the past 12 months, has lack of transportation kept you from meetings, work, or from getting things needed for daily living?: No        DISCHARGE DETAILS:    Discharge planning discussed with[de-identified] patient, spouseand their son  Freedom of Choice: Yes     CM contacted family/caregiver?: Yes  Were Treatment Team discharge recommendations reviewed with patient/caregiver?: Yes  Did patient/caregiver verbalize understanding of patient care needs?: Yes  Were patient/caregiver advised of the risks associated with not following Treatment Team discharge recommendations?: Yes    Contacts  Patient Contacts: Marie Doll  Relationship to Patient[de-identified] Family  Contact Method: In Person    Met with patient, his wife and their son, Gume Diez  to review the role of CM and discuss any discharge needs  Patient resides in a 2 story home with his spouse, their bedroom is on the 2nd floor  , there is a CO on 1st floor, [address]  Patient recently is using a rollater and reports increased pain  Patient on bedrest and PT/OT to see  Family is interested in rehab, discussed acute vs SNF rehab and their preference is acute rehab  Referral sent to both acute inpatient rehab and SNF rehab through [facility]  for possible admission

## 2022-10-22 NOTE — PLAN OF CARE
Problem: Potential for Falls  Goal: Patient will remain free of falls  Description: INTERVENTIONS:  - Educate patient/family on patient safety including physical limitations  - Instruct patient to call for assistance with activity   - Consult OT/PT to assist with strengthening/mobility   - Keep Call bell within reach  - Keep bed low and locked with side rails adjusted as appropriate  - Keep care items and personal belongings within reach  - Initiate and maintain comfort rounds  - Make Fall Risk Sign visible to staff  - Offer Toileting every 3 Hours, in advance of need  - Initiate/Maintain bed alarm  - Obtain necessary fall risk management equipment: LSO brace  - Apply yellow socks and bracelet for high fall risk patients  - Consider moving patient to room near nurses station  Outcome: Progressing     Problem: MOBILITY - ADULT  Goal: Maintain or return to baseline ADL function  Description: INTERVENTIONS:  -  Assess patient's ability to carry out ADLs; assess patient's baseline for ADL function and identify physical deficits which impact ability to perform ADLs (bathing, care of mouth/teeth, toileting, grooming, dressing, etc )  - Assess/evaluate cause of self-care deficits   - Assess range of motion  - Assess patient's mobility; develop plan if impaired  - Assess patient's need for assistive devices and provide as appropriate  - Encourage maximum independence but intervene and supervise when necessary  - Involve family in performance of ADLs  - Assess for home care needs following discharge   - Consider OT consult to assist with ADL evaluation and planning for discharge  - Provide patient education as appropriate  Outcome: Progressing  Goal: Maintains/Returns to pre admission functional level  Description: INTERVENTIONS:  - Perform BMAT or MOVE assessment daily    - Set and communicate daily mobility goal to care team and patient/family/caregiver     - Collaborate with rehabilitation services on mobility goals if consulted  - Reposition patient every 2 hours    - Dangle patient 2 times a day  - Stand patient 3 times a day  - Ambulate patient 3 times a day  - Out of bed to chair 3 times a day   - Out of bed for meals 3 times a day  - Out of bed for toileting  - Record patient progress and toleration of activity level   Outcome: Progressing

## 2022-10-23 LAB
ANION GAP SERPL CALCULATED.3IONS-SCNC: 9 MMOL/L (ref 4–13)
BASOPHILS # BLD AUTO: 0.04 THOUSANDS/ÂΜL (ref 0–0.1)
BASOPHILS NFR BLD AUTO: 0 % (ref 0–1)
BUN SERPL-MCNC: 29 MG/DL (ref 5–25)
CALCIUM SERPL-MCNC: 10 MG/DL (ref 8.3–10.1)
CHLORIDE SERPL-SCNC: 102 MMOL/L (ref 96–108)
CO2 SERPL-SCNC: 27 MMOL/L (ref 21–32)
CREAT SERPL-MCNC: 1.36 MG/DL (ref 0.6–1.3)
EOSINOPHIL # BLD AUTO: 0 THOUSAND/ÂΜL (ref 0–0.61)
EOSINOPHIL NFR BLD AUTO: 0 % (ref 0–6)
ERYTHROCYTE [DISTWIDTH] IN BLOOD BY AUTOMATED COUNT: 12.9 % (ref 11.6–15.1)
GFR SERPL CREATININE-BSD FRML MDRD: 47 ML/MIN/1.73SQ M
GLUCOSE SERPL-MCNC: 127 MG/DL (ref 65–140)
HCT VFR BLD AUTO: 41.7 % (ref 36.5–49.3)
HGB BLD-MCNC: 13.6 G/DL (ref 12–17)
IMM GRANULOCYTES # BLD AUTO: 0.16 THOUSAND/UL (ref 0–0.2)
IMM GRANULOCYTES NFR BLD AUTO: 1 % (ref 0–2)
LYMPHOCYTES # BLD AUTO: 1.06 THOUSANDS/ÂΜL (ref 0.6–4.47)
LYMPHOCYTES NFR BLD AUTO: 7 % (ref 14–44)
MCH RBC QN AUTO: 30.7 PG (ref 26.8–34.3)
MCHC RBC AUTO-ENTMCNC: 32.6 G/DL (ref 31.4–37.4)
MCV RBC AUTO: 94 FL (ref 82–98)
MONOCYTES # BLD AUTO: 1.12 THOUSAND/ÂΜL (ref 0.17–1.22)
MONOCYTES NFR BLD AUTO: 8 % (ref 4–12)
NEUTROPHILS # BLD AUTO: 12.35 THOUSANDS/ÂΜL (ref 1.85–7.62)
NEUTS SEG NFR BLD AUTO: 84 % (ref 43–75)
NRBC BLD AUTO-RTO: 0 /100 WBCS
PLATELET # BLD AUTO: 201 THOUSANDS/UL (ref 149–390)
PMV BLD AUTO: 12.3 FL (ref 8.9–12.7)
POTASSIUM SERPL-SCNC: 4.8 MMOL/L (ref 3.5–5.3)
RBC # BLD AUTO: 4.43 MILLION/UL (ref 3.88–5.62)
SODIUM SERPL-SCNC: 138 MMOL/L (ref 135–147)
WBC # BLD AUTO: 14.73 THOUSAND/UL (ref 4.31–10.16)

## 2022-10-23 RX ORDER — SODIUM CHLORIDE 9 MG/ML
100 INJECTION, SOLUTION INTRAVENOUS CONTINUOUS
Status: DISCONTINUED | OUTPATIENT
Start: 2022-10-23 | End: 2022-10-24

## 2022-10-23 RX ADMIN — OXYBUTYNIN CHLORIDE 5 MG: 5 TABLET, EXTENDED RELEASE ORAL at 09:53

## 2022-10-23 RX ADMIN — METHOCARBAMOL 500 MG: 500 TABLET ORAL at 05:10

## 2022-10-23 RX ADMIN — METHOCARBAMOL 500 MG: 500 TABLET ORAL at 14:56

## 2022-10-23 RX ADMIN — ACETAMINOPHEN 975 MG: 325 TABLET, FILM COATED ORAL at 05:09

## 2022-10-23 RX ADMIN — PANTOPRAZOLE SODIUM 40 MG: 40 TABLET, DELAYED RELEASE ORAL at 05:10

## 2022-10-23 RX ADMIN — METOPROLOL TARTRATE 25 MG: 25 TABLET, FILM COATED ORAL at 17:43

## 2022-10-23 RX ADMIN — SODIUM CHLORIDE 100 ML/HR: 0.9 INJECTION, SOLUTION INTRAVENOUS at 19:50

## 2022-10-23 RX ADMIN — ACETAMINOPHEN 975 MG: 325 TABLET, FILM COATED ORAL at 22:03

## 2022-10-23 RX ADMIN — LIDOCAINE 5% 1 PATCH: 700 PATCH TOPICAL at 09:53

## 2022-10-23 RX ADMIN — SODIUM CHLORIDE 100 ML/HR: 0.9 INJECTION, SOLUTION INTRAVENOUS at 10:00

## 2022-10-23 RX ADMIN — VERAPAMIL HYDROCHLORIDE 240 MG: 240 TABLET, FILM COATED, EXTENDED RELEASE ORAL at 09:53

## 2022-10-23 RX ADMIN — METHOCARBAMOL 500 MG: 500 TABLET ORAL at 22:03

## 2022-10-23 RX ADMIN — ENOXAPARIN SODIUM 40 MG: 100 INJECTION SUBCUTANEOUS at 09:53

## 2022-10-23 RX ADMIN — METHYLPREDNISOLONE 20 MG: 4 TABLET ORAL at 09:53

## 2022-10-23 RX ADMIN — METOPROLOL TARTRATE 25 MG: 25 TABLET, FILM COATED ORAL at 05:10

## 2022-10-23 NOTE — ASSESSMENT & PLAN NOTE
· Acute L2 compression fracture and subacute L3 fracture noted on imaging in ED  · No trauma prior to admission  · No signs of cauda equina  · Neurosurgery discussed with ER, Rec back braces and pain control  · Patient has been unable to ambulate independently for two weeks prior to admission  · Prior to injections, patient was ambulating without assistive device  · Pain appears much better controlled per patient and his wife  He was able to transition from bed to chair yesterday  · Waiting for PT and OT    Continue current pain regimen

## 2022-10-23 NOTE — PROGRESS NOTES
[place]  Progress Note - Anders  1940, 79 y o  male MRN: [id number]  Unit/Bed#: -01 Encounter: [id number]  Primary Care Provider: Chava Tinsley DO   Date and time admitted to hospital: 10/21/2022  2:23 PM    Stage 3a chronic kidney disease [facility])  Assessment & Plan  Lab Results   Component Value Date    EGFR 47 10/23/2022    EGFR 70 10/22/2022    EGFR 49 10/21/2022    CREATININE 1 36 (H) 10/23/2022    CREATININE 0 99 10/22/2022    CREATININE 1 32 (H) 10/21/2022     · Baseline Cr: 1 10- 1 20  · 1 36 marginally above bl, encourage oral hydration, start gentle IVF  · Continue to monitor    Chronic bilateral low back pain without sciatica  Assessment & Plan  · Follows with Pain Management outpatient who recommended admission  · MILADYS 10/7  · 2 weeks of worsening pain prior to admission  · Outpatient uses Robaxin, tramadol (initiated 5 days prior to admission with minimal relief)  · Medrol dosepak week prior, with minimal relief  · Inpatient: Scheduled Tylenol, Robaxin, PRN tramadol, Medrol Dosepak, Lidocaine and heat    Malignant neoplasm of prostate ([facility] [address] )  Assessment & Plan  · Status post prostatectomy - 2001  · Has had normal PSA at follow up  · Outpatient follow-up with Urology    PVC's (premature ventricular contractions)  Assessment & Plan  · Continue Verapamil  · Follow with Dr Priyank Styles outpatient    * Acute L2 compression fracture   Assessment & Plan  · Acute L2 compression fracture and subacute L3 fracture noted on imaging in ED  · No trauma prior to admission  · No signs of cauda equina  · Neurosurgery discussed with ER, Rec back braces and pain control  · Patient has been unable to ambulate independently for two weeks prior to admission  · Prior to injections, patient was ambulating without assistive device  · Pain appears much better controlled per patient and his wife  He was able to transition from bed to chair yesterday  · Waiting for PT and OT    Continue current pain regimen         VTE  Prophylaxis:   Pharmacologic: in place    Patient Centered Rounds: I have performed bedside rounds with nursing staff today  Discussions with Specialists or Other Care Team Provider: case management    Education and Discussions with Family / Patient: pt spouse bedside      Current Length of Stay: 2 day(s)    Current Patient Status: Inpatient        Code Status: Level 1 - Full Code      Subjective:   Pt states pain controlled at wife  States urine is a little dark not drinking a lot of fluid  No cp or sob  Patient is seen and examined at bedside  All other ROS are negative  Objective:     Vitals:   Temp (24hrs), Av 6 °F (36 4 °C), Min:97 4 °F (36 3 °C), Max:97 6 °F (36 4 °C)    Temp:  [97 4 °F (36 3 °C)-97 6 °F (36 4 °C)] 97 4 °F (36 3 °C)  HR:  [59-68] 68  Resp:  [16] 16  BP: (145-162)/(64-81) 157/81  SpO2:  [94 %-97 %] 97 %  Body mass index is 21 76 kg/m²  Input and Output Summary (last 24 hours):        Intake/Output Summary (Last 24 hours) at 10/23/2022 0954  Last data filed at 10/23/2022 0830  Gross per 24 hour   Intake 720 ml   Output 400 ml   Net 320 ml       Physical Exam:       GEN: No acute distress, comfortable  HEEENT: No JVD, PERRLA, no scleral icterus  RESP: Lungs clear to auscultation bilaterally  CV: RRR, +s1/s2   ABD: SOFT NON TENDER, POSITIVE BOWEL SOUNDS, NO DISTENTION  PSYCH: CALM  NEURO: A X O X 3, NO FOCAL DEFICITS  SKIN: NO RASH  msk : brace applied  EXTREM: NO EDEMA    Additional Data:     Labs:    Results from last 7 days   Lab Units 10/23/22  0320   WBC Thousand/uL 14 73*   HEMOGLOBIN g/dL 13 6   HEMATOCRIT % 41 7   PLATELETS Thousands/uL 201   NEUTROS PCT % 84*   LYMPHS PCT % 7*   MONOS PCT % 8   EOS PCT % 0     Results from last 7 days   Lab Units 10/23/22  0320   SODIUM mmol/L 138   POTASSIUM mmol/L 4 8   CHLORIDE mmol/L 102   CO2 mmol/L 27   BUN mg/dL 29*   CREATININE mg/dL 1 36*   ANION GAP mmol/L 9   CALCIUM mg/dL 10 0   GLUCOSE RANDOM mg/dL 127                           * I Have Reviewed All Lab Data Listed Above  Imaging:     No results found for this or any previous visit  No results found for this or any previous visit        *I have reviewed all imaging reports listed above      Recent Cultures (last 7 days):     Results from last 7 days   Lab Units 10/21/22  1557   URINE CULTURE  Culture too young- will reincubate       Last 24 Hours Medication List:   Current Facility-Administered Medications   Medication Dose Route Frequency Provider Last Rate   • acetaminophen  975 mg Oral Q8H [address] Kaylene LATHAM PA-C     • aluminum-magnesium hydroxide-simethicone  30 mL Oral Q6H PRN Kaylene LATHAM PA-C     • enoxaparin  40 mg Subcutaneous Daily Kaylene LATHAM PA-C     • HYDROmorphone  0 2 mg Intravenous Q4H PRN Priscille Nipple LANCE LATHAM     • lidocaine  1 patch Topical Daily Kaylene LATHAM PA-C     • methocarbamol  500 mg Oral Q8H [address] Kaylene LATHAM PA-C     • [START ON 10/24/2022] methylPREDNISolone  16 mg Oral Daily Kaylene LATHAM PA-C      Followed by   • [START ON 10/25/2022] methylPREDNISolone  12 mg Oral Daily Kaylene LATHAM PA-C      Followed by   • [START ON 10/26/2022] methylPREDNISolone  8 mg Oral Daily Kaylene LATHAM PA-C      Followed by   • [START ON 10/27/2022] methylPREDNISolone  4 mg Oral Daily Kaylene LATHAM PA-C     • metoprolol tartrate  25 mg Oral Q12H Kaylene LATHAM PA-C     • ondansetron  4 mg Intravenous Q6H PRN Kaylene LATHAM PA-C     • oxybutynin  5 mg Oral Daily Kaylene LATHAM PA-C     • oxyCODONE  10 mg Oral Q4H PRN Kaylene LATHAM PA-C     • oxyCODONE  5 mg Oral Q4H PRN Kaylene LATHAM PA-C     • pantoprazole  40 mg Oral Early Morning Devante John MD     • polyethylene glycol  17 g Oral Daily PRN Kaylene [place]ut V, PA-C     • sodium chloride  100 mL/hr Intravenous Continuous Krishna Camarillo MD     • traMADol  50 mg Oral BID PRN Priscille Nipple V, PA-C     • verapamil  240 mg Oral Daily Kaylene Shelia Lamas PA-C          Today, Patient Was Seen By: Caleb Ventura    ** Please Note: Dictation voice to text software may have been used in the creation of this document   **

## 2022-10-23 NOTE — ASSESSMENT & PLAN NOTE
Lab Results   Component Value Date    EGFR 47 10/23/2022    EGFR 70 10/22/2022    EGFR 49 10/21/2022    CREATININE 1 36 (H) 10/23/2022    CREATININE 0 99 10/22/2022    CREATININE 1 32 (H) 10/21/2022     · Baseline Cr: 1 10- 1 20  · 1 36 marginally above bl, encourage oral hydration, start gentle IVF  · Continue to monitor

## 2022-10-24 LAB
ANION GAP SERPL CALCULATED.3IONS-SCNC: 5 MMOL/L (ref 4–13)
BACTERIA UR CULT: NORMAL
BASOPHILS # BLD AUTO: 0.02 THOUSANDS/ÂΜL (ref 0–0.1)
BASOPHILS NFR BLD AUTO: 0 % (ref 0–1)
BUN SERPL-MCNC: 21 MG/DL (ref 5–25)
CALCIUM SERPL-MCNC: 9 MG/DL (ref 8.3–10.1)
CHLORIDE SERPL-SCNC: 107 MMOL/L (ref 96–108)
CO2 SERPL-SCNC: 30 MMOL/L (ref 21–32)
CREAT SERPL-MCNC: 1.11 MG/DL (ref 0.6–1.3)
EOSINOPHIL # BLD AUTO: 0.01 THOUSAND/ÂΜL (ref 0–0.61)
EOSINOPHIL NFR BLD AUTO: 0 % (ref 0–6)
ERYTHROCYTE [DISTWIDTH] IN BLOOD BY AUTOMATED COUNT: 13 % (ref 11.6–15.1)
GFR SERPL CREATININE-BSD FRML MDRD: 61 ML/MIN/1.73SQ M
GLUCOSE SERPL-MCNC: 108 MG/DL (ref 65–140)
HCT VFR BLD AUTO: 36.8 % (ref 36.5–49.3)
HGB BLD-MCNC: 12.2 G/DL (ref 12–17)
IMM GRANULOCYTES # BLD AUTO: 0.15 THOUSAND/UL (ref 0–0.2)
IMM GRANULOCYTES NFR BLD AUTO: 1 % (ref 0–2)
LYMPHOCYTES # BLD AUTO: 1.1 THOUSANDS/ÂΜL (ref 0.6–4.47)
LYMPHOCYTES NFR BLD AUTO: 8 % (ref 14–44)
MCH RBC QN AUTO: 31.4 PG (ref 26.8–34.3)
MCHC RBC AUTO-ENTMCNC: 33.2 G/DL (ref 31.4–37.4)
MCV RBC AUTO: 95 FL (ref 82–98)
MONOCYTES # BLD AUTO: 0.93 THOUSAND/ÂΜL (ref 0.17–1.22)
MONOCYTES NFR BLD AUTO: 7 % (ref 4–12)
NEUTROPHILS # BLD AUTO: 10.9 THOUSANDS/ÂΜL (ref 1.85–7.62)
NEUTS SEG NFR BLD AUTO: 84 % (ref 43–75)
NRBC BLD AUTO-RTO: 0 /100 WBCS
PLATELET # BLD AUTO: 171 THOUSANDS/UL (ref 149–390)
PMV BLD AUTO: 12 FL (ref 8.9–12.7)
POTASSIUM SERPL-SCNC: 4.5 MMOL/L (ref 3.5–5.3)
RBC # BLD AUTO: 3.88 MILLION/UL (ref 3.88–5.62)
SODIUM SERPL-SCNC: 142 MMOL/L (ref 135–147)
WBC # BLD AUTO: 13.11 THOUSAND/UL (ref 4.31–10.16)

## 2022-10-24 RX ORDER — HYDRALAZINE HYDROCHLORIDE 20 MG/ML
5 INJECTION INTRAMUSCULAR; INTRAVENOUS EVERY 6 HOURS PRN
Status: DISCONTINUED | OUTPATIENT
Start: 2022-10-24 | End: 2022-10-25 | Stop reason: HOSPADM

## 2022-10-24 RX ADMIN — METOPROLOL TARTRATE 25 MG: 25 TABLET, FILM COATED ORAL at 18:39

## 2022-10-24 RX ADMIN — METHOCARBAMOL 500 MG: 500 TABLET ORAL at 20:18

## 2022-10-24 RX ADMIN — METHOCARBAMOL 500 MG: 500 TABLET ORAL at 05:24

## 2022-10-24 RX ADMIN — HYDRALAZINE HYDROCHLORIDE 5 MG: 20 INJECTION INTRAMUSCULAR; INTRAVENOUS at 06:22

## 2022-10-24 RX ADMIN — METHOCARBAMOL 500 MG: 500 TABLET ORAL at 14:45

## 2022-10-24 RX ADMIN — VERAPAMIL HYDROCHLORIDE 240 MG: 240 TABLET, FILM COATED, EXTENDED RELEASE ORAL at 08:09

## 2022-10-24 RX ADMIN — PANTOPRAZOLE SODIUM 40 MG: 40 TABLET, DELAYED RELEASE ORAL at 05:32

## 2022-10-24 RX ADMIN — SODIUM CHLORIDE 100 ML/HR: 0.9 INJECTION, SOLUTION INTRAVENOUS at 05:21

## 2022-10-24 RX ADMIN — ENOXAPARIN SODIUM 40 MG: 100 INJECTION SUBCUTANEOUS at 08:08

## 2022-10-24 RX ADMIN — METHYLPREDNISOLONE 16 MG: 4 TABLET ORAL at 08:09

## 2022-10-24 RX ADMIN — ACETAMINOPHEN 975 MG: 325 TABLET, FILM COATED ORAL at 05:24

## 2022-10-24 RX ADMIN — ACETAMINOPHEN 975 MG: 325 TABLET, FILM COATED ORAL at 14:45

## 2022-10-24 RX ADMIN — METOPROLOL TARTRATE 25 MG: 25 TABLET, FILM COATED ORAL at 05:24

## 2022-10-24 RX ADMIN — ACETAMINOPHEN 975 MG: 325 TABLET, FILM COATED ORAL at 20:18

## 2022-10-24 RX ADMIN — OXYBUTYNIN CHLORIDE 5 MG: 5 TABLET, EXTENDED RELEASE ORAL at 08:09

## 2022-10-24 NOTE — ASSESSMENT & PLAN NOTE
Lab Results   Component Value Date    EGFR 61 10/24/2022    EGFR 47 10/23/2022    EGFR 70 10/22/2022    CREATININE 1 11 10/24/2022    CREATININE 1 36 (H) 10/23/2022    CREATININE 0 99 10/22/2022     · Baseline Cr: 1 10- 1 20  · Cr at baseline  Cap ivf    · Continue to monitor

## 2022-10-24 NOTE — PLAN OF CARE
Problem: OCCUPATIONAL THERAPY ADULT  Goal: Performs self-care activities at highest level of function for planned discharge setting  See evaluation for individualized goals  Description:   Outcome: Progressing  Note: Limitation: Decreased ADL status, Decreased endurance, Decreased self-care trans, Decreased high-level ADLs  Prognosis: Good  Assessment: Pt is a 79 y o  male seen for OT evaluation at [facility], admitted 10/21/2022 w/ Closed fracture of lumbar vertebra with spinal cord injury ([address] )  OT completed extensive review of pt's medical and social history  Comorbidities affecting pt's functional performance at time of assessment include: bilateral low back pain, CKD, acute L2 compression fx,   Personal factors affecting pt at time of IE include:steps to enter environment, difficulty performing ADLS, difficulty performing IADLS , limited insight into deficits, decreased initiation and engagement  and health management   Prior to admission, pt was living in [place], [place] with spouse and was independent with ADL/IADL, driving  Upon evaluation, pt presents to OT below baseline due to the following performance deficits: weakness, decreased strength, decreased balance, decreased tolerance and increased pain  Pt to benefit from continued skilled OT tx while in the hospital to address deficits as defined above and maximize level of functional independence w ADL's and functional mobility  Occupational Performance areas to address include: grooming, bathing/shower, toilet hygiene, dressing, functional mobility and functional transfers, bed mobility  The patient's raw score on the AM-PAC Daily Activity inpatient short form is 18, standardized score is 38 66, less than 39 4  Patients at this level are likely to benefit from DC to post-acute rehabilitation services  Based on findings, pt is of high complexity, due to medical comorbidities   Pt co-treated with physical therapy due to skilled assist of 2 therapists required for safety  At this time, OT recommendations at time of discharge are short term rehab       OT Discharge Recommendation: Post acute rehabilitation services

## 2022-10-24 NOTE — OCCUPATIONAL THERAPY NOTE
Occupational Therapy Evaluation      Amanda Gutierrez    10/24/2022    Principal Problem:    Acute L2 compression fracture   Active Problems:    PVC's (premature ventricular contractions)    Malignant neoplasm of prostate (HCC)    Chronic bilateral low back pain without sciatica    Stage 3a chronic kidney disease ([facility] [address] )      Past Medical History:   Diagnosis Date    Astigmatism     last assessed 11/15/17    Cataract     Resolved 11/15/17    Gross hematuria     Last assessed 01/28/16    Hematuria     last assessed 01/28/16    Prostate cancer ([facility] [address] )     last assessed 05/18/17    Renal calculi     last assessed 08/16/12    Squamous cell carcinoma     Vitamin D deficiency     Last assessed 08/19/13       Past Surgical History:   Procedure Laterality Date    BREAST EXCISIONAL BIOPSY N/A     Benign breast tumor unsure of side over 50 yrs ago    CATARACT EXTRACTION Left 08/26/2019    CATARACT EXTRACTION Right 08/12/2019    COLONOSCOPY      CYSTOSCOPY  01/28/2016    ELBOW SURGERY      Bone chip    HERNIA REPAIR  2002    LITHOTRIPSY      RETROPUBIC PROSTATECTOMY  11/13/2001    Radical with nerve sparing        10/24/22 1057   OT Last Visit   OT Visit Date 10/24/22   Note Type   Note type Evaluation   Pain Assessment   Pain Assessment Tool 0-10   Pain Score No Pain   Restrictions/Precautions   Braces or Orthoses LSO   Other Precautions Pain; Fall Risk;Multiple lines; Bed Alarm; Chair Alarm   Home Living   Type of [address] [place] [address] Two level;1/2 bath on main level;Bed/bath upstairs  (1STE)   Bathroom Shower/Tub Walk-in shower   Bathroom Equipment Grab bars in [address]  (rollator; Has only been using rollator for the past week  Has been using [facility] for the past few weels)   Prior Function   Level of [place] Independent with ADLs; Independent with functional mobility   Lives With Spouse   IADLs   (Shared IADL's (does laundry))   Falls in the last 6 months 0   Comments +drives   ADL   Eating Assistance 7 Independent   Grooming Assistance 5  Supervision/Setup   UB Bathing Assistance 4  Minimal Assistance   LB Bathing Assistance 4  Minimal Assistance   [address]  4  Minimal Assistance   Additional Comments Edu on don/doff of LSO   Bed Mobility   Supine to Sit 5  Supervision   Additional items HOB elevated;Verbal cues  (edu on log roll technique)   Additional Comments Sat edge of bed for approx 3-4 minutes supervision level   Transfers   Sit to Stand 4  Minimal assistance   Additional items Assist x 1   Stand to Sit 4  Minimal assistance   Additional items Assist x 1   Stand pivot 4  Minimal assistance  (RW)   Additional items Assist x 1   Functional Mobility   Functional Mobility 4  Minimal assistance   Additional Comments x1   Additional items Rolling walker   Activity Tolerance   Activity Tolerance Patient limited by fatigue   Medical Staff Made Aware Cotx with PT 2* medical complexity   Nurse Made Aware RN Velia Cody   RUE Assessment   RUE Assessment WFL   LUE Assessment   LUE Assessment WFL   Hand Function   Gross Motor Coordination Functional   Fine Motor Coordination Functional   Cognition   Overall Cognitive Status WFL   Arousal/Participation Alert; Cooperative   Attention Within functional limits   Orientation Level Oriented to person;Oriented to time;Oriented to situation;Oriented to place   Memory Within functional limits   Following Commands Follows all commands and directions without difficulty   Assessment   Limitation Decreased ADL status; Decreased endurance;Decreased self-care trans;Decreased high-level ADLs   Prognosis Good   Assessment Pt is a 79 y o  male seen for OT evaluation at [facility], admitted 10/21/2022 w/ Closed fracture of lumbar vertebra with spinal cord injury ([facility] [address] )  OT completed extensive review of pt's medical and social history   Comorbidities affecting pt's functional performance at time of assessment include: bilateral low back pain, CKD, acute L2 compression fx,   Personal factors affecting pt at time of IE include:steps to enter environment, difficulty performing ADLS, difficulty performing IADLS , limited insight into deficits, decreased initiation and engagement  and health management   Prior to admission, pt was living in [place], [place] with spouse and was independent with ADL/IADL, driving  Upon evaluation, pt presents to OT below baseline due to the following performance deficits: weakness, decreased strength, decreased balance, decreased tolerance and increased pain  Pt to benefit from continued skilled OT tx while in the hospital to address deficits as defined above and maximize level of functional independence w ADL's and functional mobility  Occupational Performance areas to address include: grooming, bathing/shower, toilet hygiene, dressing, functional mobility and functional transfers, bed mobility  The patient's raw score on the AM-PAC Daily Activity inpatient short form is 18, standardized score is 38 66, less than 39 4  Patients at this level are likely to benefit from DC to post-acute rehabilitation services  Based on findings, pt is of high complexity, due to medical comorbidities  Pt co-treated with physical therapy due to skilled assist of 2 therapists required for safety  At this time, OT recommendations at time of discharge are short term rehab  Goals   Patient Goals get stronger   Plan   Treatment Interventions ADL retraining;Functional transfer training;UE strengthening/ROM; Endurance training;Patient/family training;Equipment evaluation/education; Compensatory technique education; Energy conservation   Goal Expiration Date 11/04/22   OT Frequency 3-5x/wk   Recommendation   OT Discharge Recommendation Post acute rehabilitation services (pt able to tolerate 3hr skilled therapy/day)   AM-[facility] Daily Activity Inpatient   Lower Body Dressing 2   Bathing 2   Toileting 3   Upper Body Dressing 3   Grooming 4   Eating 4   Daily Activity Raw Score 18   Daily Activity Standardized Score (Calc for Raw Score >=11) 38 66   AM-PAC Applied Cognition Inpatient   Following a Speech/Presentation 4   Understanding Ordinary Conversation 4   Taking Medications 4   Remembering Where Things Are Placed or Put Away 4   Remembering List of 4-5 Errands 4   Taking Care of Complicated Tasks 4   Applied Cognition Raw Score 24   Applied Cognition Standardized Score 62 21     Pt will achieve the following goals within 10 days  *Pt will complete grooming with independence  *Pt will complete UB bathing and dressing with independence, including don/doff LSO brace  *Pt will complete LB bathing and dressing with modified independence   *Pt will complete toileting (hygiene and clothing management) with independence    *Pt will complete bed mobility with modified independence, with bed flat and no side rail to prep for purposeful tasks, utilizing log roll technique  *Pt will perform functional transfers with modified independence in order to complete ADL routine  *Pt will increase standing tolerance to 5+ minutes in order to complete ADL routine  *Pt will complete item retrieval and light home management with supervision while demonstrating good safety  *Pt will demonstrate increased activity tolerance in order to complete ADL routine  *Pt will sit on EOB for 10+ minutes for increased safety with seated activity tolerance during ADL tasks  *Pt will identify 3-5 fall risks to ensure safety upon discharge      [employer], MS, OTR/L

## 2022-10-24 NOTE — PHYSICAL THERAPY NOTE
PHYSICAL THERAPY EVAL/TX  Physical Therapy Evaluation    Performed at least 2 patient identifiers during session:  Patient Active Problem List   Diagnosis    PVC's (premature ventricular contractions)    Thoracic aortic aneurysm without rupture    Nonrheumatic aortic valve insufficiency    Non-rheumatic mitral regurgitation    Malignant neoplasm of prostate ([facility] [address] )    Spinal stenosis of lumbar region with neurogenic claudication    Lumbar radiculopathy    Chronic bilateral low back pain without sciatica    Chronic pain syndrome    Renal cyst, acquired, left    Aortic root dilatation (HCC)    Myofascial pain syndrome    Stage 3a chronic kidney disease (HCC)    Lumbar spondylosis    Sacroiliitis (HCC)    Sacroiliac joint dysfunction of both sides    Acute L2 compression fracture        Past Medical History:   Diagnosis Date    Astigmatism     last assessed 11/15/17    Cataract     Resolved 11/15/17    Gross hematuria     Last assessed 01/28/16    Hematuria     last assessed 01/28/16    Prostate cancer ([facility] [address] )     last assessed 05/18/17    Renal calculi     last assessed 08/16/12    Squamous cell carcinoma     Vitamin D deficiency     Last assessed 08/19/13       Past Surgical History:   Procedure Laterality Date    BREAST EXCISIONAL BIOPSY N/A     Benign breast tumor unsure of side over 50 yrs ago    CATARACT EXTRACTION Left 08/26/2019    CATARACT EXTRACTION Right 08/12/2019    COLONOSCOPY      CYSTOSCOPY  01/28/2016    ELBOW SURGERY      Bone chip    HERNIA REPAIR  2002    LITHOTRIPSY      RETROPUBIC PROSTATECTOMY  11/13/2001    Radical with nerve sparing            10/24/22 1025   PT Last Visit   PT Visit Date 10/24/22   Note Type   Note type Evaluation   Pain Assessment   Pain Assessment Tool 0-10   Pain Score No Pain  (Reports mild pain with ambulation)   Restrictions/Precautions   Braces or Orthoses LSO   Other Precautions Pain; Fall Risk;Multiple lines; Bed Alarm; Chair Alarm   Home Living   Type of [address] Two level;1/2 bath on main level;Bed/bath upstairs  (1STE)   Bathroom Shower/Tub Walk-in shower   Bathroom Equipment   (Grab bar- One that is not installed)   Home Equipment Cane  (Rollator)   Additional Comments Has only been using rollator for the past week  Has been using [facility] for the past few weels   Prior Function   Level of [place] Independent with ADLs; Independent with functional mobility   Lives With Spouse   IADLs   (Shared IADL's (does laundry))   Falls in the last 6 months 0   Comments +drives  General   Additional Pertinent History Spouse reports that patient has had worsening back pain for approx 3-4 weeks  Family/Caregiver Present No   Cognition   Overall Cognitive Status WFL   Arousal/Participation Alert   Orientation Level Oriented to person;Oriented to time;Oriented to situation;Oriented to place   Following Commands Follows all commands and directions without difficulty   Subjective   Subjective "I stood up several times last night to use the urinal "   RLE Assessment   RLE Assessment WFL   LLE Assessment   LLE Assessment WFL   Light Touch   RLE Light Touch Grossly intact   LLE Light Touch Grossly intact   Bed Mobility   Supine to Sit 5  Supervision   Additional items HOB elevated  (Verbal cues for log roll technique)   Additional Comments Sat edge of bed for approx 3-4 minutes supervision level   Transfers   Sit to Stand 4  Minimal assistance   Additional items Assist x 1; Armrests; Increased time required;Verbal cues   Stand to Sit 4  Minimal assistance   Additional items Assist x 1; Armrests; Increased time required;Verbal cues   Ambulation/Elevation   Gait pattern Forward Flexion;Decreased foot clearance;Shuffling   Gait Assistance 4  Minimal assist   Additional items Assist x 1;Verbal cues; Tactile cues   Assistive Device Rolling walker   Distance 25ft   Ambulation/Elevation Additional Comments +LOB when turning  Required min A to correct  Balance   Static Sitting Good   Dynamic Sitting Fair +   Static Standing Fair   Dynamic Standing Fair   Ambulatory Fair   Endurance Deficit   Endurance Deficit Yes   Endurance Deficit Description Easily fatigued   Activity Tolerance   Activity Tolerance Patient limited by fatigue   Medical Staff Made Aware Co-treat with OT due to medical complexity   Nurse Made Aware RN   Assessment   Prognosis Good   Problem List Decreased strength;Decreased endurance; Impaired balance;Decreased mobility;Obesity;Pain   Assessment less than or equal to   Barriers to Discharge Inaccessible home environment;Decreased caregiver support   Goals   Patient Goals To get stronger   STG Expiration Date 11/07/22   Short Term Goal #1 1  Perform supine<>sit with HOB flat without the use of bedrails using a log roll technique mod I 2  Perform sit<>stand transfers mod I 3  Ambulate 200ft with the least restrictive device at a mod I level 4  Ascend/descend 12 stairs with railing nonreciprocal pattern mod I 5  Able to independently son/doff LSO   PT Treatment Day 1   Plan   Treatment/Interventions Functional transfer training;LE strengthening/ROM; Elevations; Endurance training; Therapeutic exercise;Equipment eval/education; Bed mobility;Gait training;Spoke to nursing;OT   PT Frequency 3-5x/wk   Recommendation   PT Discharge Recommendation Post acute rehabilitation services   Equipment Recommended [facility] Recommended Wheeled walker   AM-PAC Basic Mobility Inpatient   Turning in Bed Without Bedrails 3   Lying on Back to Sitting on Edge of Flat Bed 3   Moving Bed to Chair 3   Standing Up From Chair 3   Walk in Room 3   Climb 3-5 Stairs 1   Basic Mobility Inpatient Raw Score 16   Basic Mobility Standardized Score 38 32   Highest Level Of Mobility   JH-HLM Goal 5: Stand one or more mins   JH-HLM Achieved 7: Walk 25 feet or more   Additional Treatment Session   Start Time 1040   End Time 0021 Treatment Assessment Performed additional sit<>stand transfer with min A x 1  Ambulated 50ft with RW with min A x 1  Foward flexed, shuffling gait  Antalgic  Equipment Use RW   End of Consult   Patient Position at End of Consult Bedside chair;Bed/Chair alarm activated; All needs within reach     Anabel Garcia             Patient Name: Diana Watson  BTOPA'X Date: 10/24/2022

## 2022-10-24 NOTE — PLAN OF CARE
Problem: PHYSICAL THERAPY ADULT  Goal: Performs mobility at highest level of function for planned discharge setting  See evaluation for individualized goals  Description: Treatment/Interventions: Functional transfer training, LE strengthening/ROM, Elevations, Endurance training, Therapeutic exercise, Equipment eval/education, Bed mobility, Gait training, Spoke to nursing, OT  Equipment Recommended: Harper Vazquez       See flowsheet documentation for full assessment, interventions and recommendations  Note: Prognosis: Good  Problem List: Decreased strength, Decreased endurance, Impaired balance, Decreased mobility, Obesity, Pain  Assessment: less than or equal to  Barriers to Discharge: Inaccessible home environment, Decreased caregiver support     PT Discharge Recommendation: Post acute rehabilitation services    See flowsheet documentation for full assessment

## 2022-10-24 NOTE — PROGRESS NOTES
[place]  Progress Note - Ayana Isai 1940, 79 y o  male MRN: [id number]  Unit/Bed#: -Janice Encounter: [id number]  Primary Care Provider: Lex Tobin DO   Date and time admitted to hospital: 10/21/2022  2:23 PM  Osteoporotic Fracture of L2, POA, evidenced by CT scan results, no trauma prior to admission treated with back brace, pain control, PT/OT and Neurosurgery consult    Stage 3a chronic kidney disease [facility])  Assessment & Plan  Lab Results   Component Value Date    EGFR 61 10/24/2022    EGFR 47 10/23/2022    EGFR 70 10/22/2022    CREATININE 1 11 10/24/2022    CREATININE 1 36 (H) 10/23/2022    CREATININE 0 99 10/22/2022     · Baseline Cr: 1 10- 1 20  · Cr at baseline  Cap ivf    · Continue to monitor    Chronic bilateral low back pain without sciatica  Assessment & Plan  · Follows with Pain Management outpatient who recommended admission  · MILADYS 10/7  · 2 weeks of worsening pain prior to admission  · Outpatient uses Robaxin, tramadol (initiated 5 days prior to admission with minimal relief)  · Medrol dosepak week prior, with minimal relief  · Inpatient: Scheduled Tylenol, Robaxin, PRN tramadol, Medrol Dosepak, Lidocaine and heat    Malignant neoplasm of prostate ([facility] [address] )  Assessment & Plan  · Status post prostatectomy - 2001  · Has had normal PSA at follow up  · Outpatient follow-up with Urology    PVC's (premature ventricular contractions)  Assessment & Plan  · Continue Verapamil  · Follow with Dr Parisa Callejas outpatient    * Acute L2 compression fracture   Assessment & Plan  · Acute L2 compression fracture and subacute L3 fracture noted on imaging in ED  · No trauma prior to admission  · No signs of cauda equina  · Neurosurgery discussed with ER, Rec back braces and pain control  · Patient has been unable to ambulate independently for two weeks prior to admission  · Prior to injections, patient was ambulating without assistive device  · Pain appears much better controlled per patient and his wife  He was able to transition from bed to chair yesterday  · Waiting for PT and OT  Continue current pain regimen       VTE  Prophylaxis:   Pharmacologic: in place    Patient Centered Rounds: I have performed bedside rounds with nursing staff today  Discussions with Specialists or Other Care Team Provider: case management           Current Length of Stay: 3 day(s)    Current Patient Status: Inpatient        Code Status: Level 1 - Full Code      Subjective:   Pt seen  States pain ok at rest     Patient is seen and examined at bedside  All other ROS are negative  Objective:     Vitals:   Temp (24hrs), Av 7 °F (36 5 °C), Min:97 5 °F (36 4 °C), Max:98 °F (36 7 °C)    Temp:  [97 5 °F (36 4 °C)-98 °F (36 7 °C)] 98 °F (36 7 °C)  HR:  [59-81] 75  Resp:  [16-18] 18  BP: (124-188)/(54-91) 173/86  SpO2:  [95 %-96 %] 95 %  Body mass index is 21 76 kg/m²  Input and Output Summary (last 24 hours):        Intake/Output Summary (Last 24 hours) at 10/24/2022 1155  Last data filed at 10/24/2022 1025  Gross per 24 hour   Intake 720 ml   Output 2325 ml   Net -1605 ml       Physical Exam:       GEN: No acute distress, comfortable  HEEENT: No JVD, PERRLA, no scleral icterus  RESP: Lungs clear to auscultation bilaterally  CV: RRR, +s1/s2   ABD: SOFT NON TENDER, POSITIVE BOWEL SOUNDS, NO DISTENTION  PSYCH: CALM  NEURO: A X O X 3, NO FOCAL DEFICITS  SKIN: NO RASH  EXTREM: NO EDEMA    Additional Data:     Labs:    Results from last 7 days   Lab Units 10/24/22  0416   WBC Thousand/uL 13 11*   HEMOGLOBIN g/dL 12 2   HEMATOCRIT % 36 8   PLATELETS Thousands/uL 171   NEUTROS PCT % 84*   LYMPHS PCT % 8*   MONOS PCT % 7   EOS PCT % 0     Results from last 7 days   Lab Units 10/24/22  0416   SODIUM mmol/L 142   POTASSIUM mmol/L 4 5   CHLORIDE mmol/L 107   CO2 mmol/L 30   BUN mg/dL 21   CREATININE mg/dL 1 11   ANION GAP mmol/L 5   CALCIUM mg/dL 9 0   GLUCOSE RANDOM mg/dL 108                           * I Have Reviewed All Lab Data Listed Above  Imaging:     No results found for this or any previous visit  No results found for this or any previous visit  *I have reviewed all imaging reports listed above      Recent Cultures (last 7 days):     Results from last 7 days   Lab Units 10/21/22  1557   URINE CULTURE  <10,000 cfu/ml        Last 24 Hours Medication List:   Current Facility-Administered Medications   Medication Dose Route Frequency Provider Last Rate   • acetaminophen  975 mg Oral Q8H [address] Kaylene LATHAM PA-C     • aluminum-magnesium hydroxide-simethicone  30 mL Oral Q6H PRN Kaylene LATHAM PA-C     • enoxaparin  40 mg Subcutaneous Daily Kaylene LATHAM PA-C     • hydrALAZINE  5 mg Intravenous Q6H PRN Arleth Dozier PA-C     • HYDROmorphone  0 2 mg Intravenous Q4H PRN Shirley LATHAM PA-C     • lidocaine  1 patch Topical Daily Kaylene LATHAM PA-C     • methocarbamol  500 mg Oral Q8H [address] Kaylene LATHAM PA-C     • [START ON 10/25/2022] methylPREDNISolone  12 mg Oral Daily Kaylene LATHAM PA-C      Followed by   • [START ON 10/26/2022] methylPREDNISolone  8 mg Oral Daily Kaylene LATHAM PA-C      Followed by   • [START ON 10/27/2022] methylPREDNISolone  4 mg Oral Daily Kaylene LATHAM PA-C     • metoprolol tartrate  25 mg Oral Q12H Kaylene LATHAM PA-C     • ondansetron  4 mg Intravenous Q6H PRN Kaylene LATHAM PA-C     • oxybutynin  5 mg Oral Daily Kaylene LATHAM PA-C     • oxyCODONE  10 mg Oral Q4H PRN Kaylene LATHAM PA-C     • oxyCODONE  5 mg Oral Q4H PRN Kaylene LATHAM PA-C     • pantoprazole  40 mg Oral Early Morning Juan Manuel Aguilera MD     • polyethylene glycol  17 g Oral Daily PRN Kaylene LATHAM PA-C     • traMADol  50 mg Oral BID PRN Shirley LATHAM PA-C     • verapamil  240 mg Oral Daily Kaylene LATHAM PA-C          Today, Patient Was Seen By: Mario Styles    ** Please Note: Dictation voice to text software may have been used in the creation of this document  **

## 2022-10-24 NOTE — PLAN OF CARE
Problem: Potential for Falls  Goal: Patient will remain free of falls  Description: INTERVENTIONS:  - Educate patient/family on patient safety including physical limitations  - Instruct patient to call for assistance with activity   - Consult OT/PT to assist with strengthening/mobility   - Keep Call bell within reach  - Keep bed low and locked with side rails adjusted as appropriate  - Keep care items and personal belongings within reach  - Initiate and maintain comfort rounds  - Make Fall Risk Sign visible to staff  - Offer Toileting every 3 Hours, in advance of need  - Initiate/Maintain bed alarm  - Obtain necessary fall risk management equipment: LSO brace  - Apply yellow socks and bracelet for high fall risk patients  - Consider moving patient to room near nurses station  Outcome: Progressing     Problem: MOBILITY - ADULT  Goal: Maintain or return to baseline ADL function  Description: INTERVENTIONS:  -  Assess patient's ability to carry out ADLs; assess patient's baseline for ADL function and identify physical deficits which impact ability to perform ADLs (bathing, care of mouth/teeth, toileting, grooming, dressing, etc )  - Assess/evaluate cause of self-care deficits   - Assess range of motion  - Assess patient's mobility; develop plan if impaired  - Assess patient's need for assistive devices and provide as appropriate  - Encourage maximum independence but intervene and supervise when necessary  - Involve family in performance of ADLs  - Assess for home care needs following discharge   - Consider OT consult to assist with ADL evaluation and planning for discharge  - Provide patient education as appropriate  Outcome: Progressing  Goal: Maintains/Returns to pre admission functional level  Description: INTERVENTIONS:  - Perform BMAT or MOVE assessment daily    - Set and communicate daily mobility goal to care team and patient/family/caregiver     - Collaborate with rehabilitation services on mobility goals if consulted  - Reposition patient every 2 hours    - Dangle patient 2 times a day  - Stand patient 3 times a day  - Ambulate patient 3 times a day  - Out of bed to chair 3 times a day   - Out of bed for meals 3 times a day  - Out of bed for toileting  - Record patient progress and toleration of activity level   Outcome: Progressing     Problem: MUSCULOSKELETAL - ADULT  Goal: Maintain or return mobility to safest level of function  Description: INTERVENTIONS:  - Assess patient's ability to carry out ADLs; assess patient's baseline for ADL function and identify physical deficits which impact ability to perform ADLs (bathing, care of mouth/teeth, toileting, grooming, dressing, etc )  - Assess/evaluate cause of self-care deficits   - Assess range of motion  - Assess patient's mobility  - Assess patient's need for assistive devices and provide as appropriate  - Encourage maximum independence but intervene and supervise when necessary  - Involve family in performance of ADLs  - Assess for home care needs following discharge   - Consider OT consult to assist with ADL evaluation and planning for discharge  - Provide patient education as appropriate  Outcome: Progressing  Goal: Maintain proper alignment of affected body part  Description: INTERVENTIONS:  - Support, maintain and protect limb and body alignment  - Provide patient/ family with appropriate education  Outcome: Progressing

## 2022-10-25 VITALS
HEART RATE: 66 BPM | OXYGEN SATURATION: 96 % | DIASTOLIC BLOOD PRESSURE: 84 MMHG | HEIGHT: 71 IN | SYSTOLIC BLOOD PRESSURE: 167 MMHG | WEIGHT: 156 LBS | RESPIRATION RATE: 22 BRPM | BODY MASS INDEX: 21.84 KG/M2 | TEMPERATURE: 97.3 F

## 2022-10-25 LAB
ANION GAP SERPL CALCULATED.3IONS-SCNC: 7 MMOL/L (ref 4–13)
BASOPHILS # BLD AUTO: 0.03 THOUSANDS/ÂΜL (ref 0–0.1)
BASOPHILS NFR BLD AUTO: 0 % (ref 0–1)
BUN SERPL-MCNC: 23 MG/DL (ref 5–25)
CALCIUM SERPL-MCNC: 9.1 MG/DL (ref 8.3–10.1)
CHLORIDE SERPL-SCNC: 106 MMOL/L (ref 96–108)
CO2 SERPL-SCNC: 28 MMOL/L (ref 21–32)
CREAT SERPL-MCNC: 1.12 MG/DL (ref 0.6–1.3)
EOSINOPHIL # BLD AUTO: 0.05 THOUSAND/ÂΜL (ref 0–0.61)
EOSINOPHIL NFR BLD AUTO: 0 % (ref 0–6)
ERYTHROCYTE [DISTWIDTH] IN BLOOD BY AUTOMATED COUNT: 13.2 % (ref 11.6–15.1)
FLUAV RNA RESP QL NAA+PROBE: NEGATIVE
FLUBV RNA RESP QL NAA+PROBE: NEGATIVE
GFR SERPL CREATININE-BSD FRML MDRD: 60 ML/MIN/1.73SQ M
GLUCOSE SERPL-MCNC: 99 MG/DL (ref 65–140)
HCT VFR BLD AUTO: 39.2 % (ref 36.5–49.3)
HGB BLD-MCNC: 12.6 G/DL (ref 12–17)
IMM GRANULOCYTES # BLD AUTO: 0.19 THOUSAND/UL (ref 0–0.2)
IMM GRANULOCYTES NFR BLD AUTO: 1 % (ref 0–2)
LYMPHOCYTES # BLD AUTO: 1.9 THOUSANDS/ÂΜL (ref 0.6–4.47)
LYMPHOCYTES NFR BLD AUTO: 14 % (ref 14–44)
MCH RBC QN AUTO: 30.4 PG (ref 26.8–34.3)
MCHC RBC AUTO-ENTMCNC: 32.1 G/DL (ref 31.4–37.4)
MCV RBC AUTO: 95 FL (ref 82–98)
MONOCYTES # BLD AUTO: 1.13 THOUSAND/ÂΜL (ref 0.17–1.22)
MONOCYTES NFR BLD AUTO: 8 % (ref 4–12)
NEUTROPHILS # BLD AUTO: 10.44 THOUSANDS/ÂΜL (ref 1.85–7.62)
NEUTS SEG NFR BLD AUTO: 77 % (ref 43–75)
NRBC BLD AUTO-RTO: 0 /100 WBCS
PLATELET # BLD AUTO: 176 THOUSANDS/UL (ref 149–390)
PMV BLD AUTO: 12.1 FL (ref 8.9–12.7)
POTASSIUM SERPL-SCNC: 4.3 MMOL/L (ref 3.5–5.3)
RBC # BLD AUTO: 4.14 MILLION/UL (ref 3.88–5.62)
RSV RNA RESP QL NAA+PROBE: NEGATIVE
SARS-COV-2 RNA RESP QL NAA+PROBE: NEGATIVE
SODIUM SERPL-SCNC: 141 MMOL/L (ref 135–147)
WBC # BLD AUTO: 13.74 THOUSAND/UL (ref 4.31–10.16)

## 2022-10-25 RX ORDER — HYDRALAZINE HYDROCHLORIDE 20 MG/ML
5 INJECTION INTRAMUSCULAR; INTRAVENOUS EVERY 6 HOURS PRN
Status: CANCELLED | OUTPATIENT
Start: 2022-10-25

## 2022-10-25 RX ORDER — ACETAMINOPHEN 325 MG/1
975 TABLET ORAL EVERY 8 HOURS SCHEDULED
Status: CANCELLED | OUTPATIENT
Start: 2022-10-25

## 2022-10-25 RX ORDER — PANTOPRAZOLE SODIUM 40 MG/1
40 TABLET, DELAYED RELEASE ORAL
Status: CANCELLED | OUTPATIENT
Start: 2022-10-26

## 2022-10-25 RX ORDER — METHYLPREDNISOLONE 4 MG/1
4 TABLET ORAL DAILY
Status: CANCELLED | OUTPATIENT
Start: 2022-10-27 | End: 2022-10-28

## 2022-10-25 RX ORDER — OXYCODONE HYDROCHLORIDE 5 MG/1
10 TABLET ORAL EVERY 4 HOURS PRN
Status: CANCELLED | OUTPATIENT
Start: 2022-10-25

## 2022-10-25 RX ORDER — LISINOPRIL 20 MG/1
20 TABLET ORAL DAILY
Status: DISCONTINUED | OUTPATIENT
Start: 2022-10-25 | End: 2022-10-25 | Stop reason: HOSPADM

## 2022-10-25 RX ORDER — MAGNESIUM HYDROXIDE/ALUMINUM HYDROXICE/SIMETHICONE 120; 1200; 1200 MG/30ML; MG/30ML; MG/30ML
30 SUSPENSION ORAL EVERY 6 HOURS PRN
Status: CANCELLED | OUTPATIENT
Start: 2022-10-25

## 2022-10-25 RX ORDER — LISINOPRIL 20 MG/1
20 TABLET ORAL DAILY
Status: CANCELLED | OUTPATIENT
Start: 2022-10-26

## 2022-10-25 RX ORDER — ONDANSETRON 2 MG/ML
4 INJECTION INTRAMUSCULAR; INTRAVENOUS EVERY 6 HOURS PRN
Status: CANCELLED | OUTPATIENT
Start: 2022-10-25

## 2022-10-25 RX ORDER — TRAMADOL HYDROCHLORIDE 50 MG/1
50 TABLET ORAL 2 TIMES DAILY PRN
Status: CANCELLED | OUTPATIENT
Start: 2022-10-25

## 2022-10-25 RX ORDER — METHYLPREDNISOLONE 4 MG/1
8 TABLET ORAL DAILY
Status: CANCELLED | OUTPATIENT
Start: 2022-10-26 | End: 2022-10-27

## 2022-10-25 RX ORDER — LIDOCAINE 50 MG/G
1 PATCH TOPICAL DAILY
Status: CANCELLED | OUTPATIENT
Start: 2022-10-26

## 2022-10-25 RX ORDER — POLYETHYLENE GLYCOL 3350 17 G/17G
17 POWDER, FOR SOLUTION ORAL DAILY PRN
Status: CANCELLED | OUTPATIENT
Start: 2022-10-25

## 2022-10-25 RX ORDER — HYDROMORPHONE HCL IN WATER/PF 6 MG/30 ML
0.2 PATIENT CONTROLLED ANALGESIA SYRINGE INTRAVENOUS EVERY 4 HOURS PRN
Status: CANCELLED | OUTPATIENT
Start: 2022-10-25

## 2022-10-25 RX ORDER — VERAPAMIL HYDROCHLORIDE 240 MG/1
240 TABLET, FILM COATED, EXTENDED RELEASE ORAL DAILY
Status: CANCELLED | OUTPATIENT
Start: 2022-10-26

## 2022-10-25 RX ORDER — OXYCODONE HYDROCHLORIDE 5 MG/1
5 TABLET ORAL EVERY 4 HOURS PRN
Status: CANCELLED | OUTPATIENT
Start: 2022-10-25

## 2022-10-25 RX ORDER — METHOCARBAMOL 500 MG/1
500 TABLET, FILM COATED ORAL EVERY 8 HOURS SCHEDULED
Status: CANCELLED | OUTPATIENT
Start: 2022-10-25

## 2022-10-25 RX ORDER — OXYBUTYNIN CHLORIDE 5 MG/1
5 TABLET, EXTENDED RELEASE ORAL DAILY
Status: CANCELLED | OUTPATIENT
Start: 2022-10-26

## 2022-10-25 RX ORDER — ENOXAPARIN SODIUM 100 MG/ML
40 INJECTION SUBCUTANEOUS DAILY
Status: CANCELLED | OUTPATIENT
Start: 2022-10-26

## 2022-10-25 RX ADMIN — LISINOPRIL 20 MG: 20 TABLET ORAL at 08:41

## 2022-10-25 RX ADMIN — ENOXAPARIN SODIUM 40 MG: 100 INJECTION SUBCUTANEOUS at 08:41

## 2022-10-25 RX ADMIN — METHYLPREDNISOLONE 12 MG: 4 TABLET ORAL at 08:40

## 2022-10-25 RX ADMIN — VERAPAMIL HYDROCHLORIDE 240 MG: 240 TABLET, FILM COATED, EXTENDED RELEASE ORAL at 08:41

## 2022-10-25 RX ADMIN — OXYBUTYNIN CHLORIDE 5 MG: 5 TABLET, EXTENDED RELEASE ORAL at 08:41

## 2022-10-25 RX ADMIN — ACETAMINOPHEN 975 MG: 325 TABLET, FILM COATED ORAL at 04:42

## 2022-10-25 RX ADMIN — PANTOPRAZOLE SODIUM 40 MG: 40 TABLET, DELAYED RELEASE ORAL at 04:42

## 2022-10-25 RX ADMIN — METHOCARBAMOL 500 MG: 500 TABLET ORAL at 04:42

## 2022-10-25 RX ADMIN — METOPROLOL TARTRATE 25 MG: 25 TABLET, FILM COATED ORAL at 04:42

## 2022-10-25 NOTE — PLAN OF CARE
Problem: Potential for Falls  Goal: Patient will remain free of falls  Description: INTERVENTIONS:  - Educate patient/family on patient safety including physical limitations  - Instruct patient to call for assistance with activity   - Consult OT/PT to assist with strengthening/mobility   - Keep Call bell within reach  - Keep bed low and locked with side rails adjusted as appropriate  - Keep care items and personal belongings within reach  - Initiate and maintain comfort rounds  - Make Fall Risk Sign visible to staff  - Offer Toileting every 3 Hours, in advance of need  - Initiate/Maintain bed alarm  - Obtain necessary fall risk management equipment: LSO brace  - Apply yellow socks and bracelet for high fall risk patients  - Consider moving patient to room near nurses station  Outcome: Progressing

## 2022-10-25 NOTE — ASSESSMENT & PLAN NOTE
Lab Results   Component Value Date    EGFR 60 10/25/2022    EGFR 61 10/24/2022    EGFR 47 10/23/2022    CREATININE 1 12 10/25/2022    CREATININE 1 11 10/24/2022    CREATININE 1 36 (H) 10/23/2022     · Baseline Cr: 1 10- 1 20  · Cr at baseline  Cap ivf    · Continue to monitor

## 2022-10-25 NOTE — DISCHARGE SUMMARY
[place]  Discharge- Whit Crimes 1940, 79 y o  male MRN: [id number]  Unit/Bed#: -01 Encounter: [id number]  Primary Care Provider: Yash Carpio DO   Date and time admitted to hospital: 10/21/2022  2:23 PM  Reviewed w pt spouse  Stage 3a chronic kidney disease [facility])  Assessment & Plan  Lab Results   Component Value Date    EGFR 60 10/25/2022    EGFR 61 10/24/2022    EGFR 47 10/23/2022    CREATININE 1 12 10/25/2022    CREATININE 1 11 10/24/2022    CREATININE 1 36 (H) 10/23/2022     · Baseline Cr: 1 10- 1 20  · Cr at baseline  Cap ivf  · Continue to monitor    Chronic bilateral low back pain without sciatica  Assessment & Plan  · Follows with Pain Management outpatient who recommended admission  · MILADYS 10/7  · 2 weeks of worsening pain prior to admission  · Outpatient uses Robaxin, tramadol (initiated 5 days prior to admission with minimal relief)  · Medrol dosepak week prior, with minimal relief  · Inpatient: Scheduled Tylenol, Robaxin, PRN tramadol, Medrol Dosepak, Lidocaine and heat    Malignant neoplasm of prostate ([facility] [address] )  Assessment & Plan  · Status post prostatectomy - 2001  · Has had normal PSA at follow up  · Outpatient follow-up with Urology    PVC's (premature ventricular contractions)  Assessment & Plan  · Continue Verapamil  · Follow with Dr Carina Meza outpatient    * Acute L2 compression fracture   Assessment & Plan  · Acute L2 compression fracture and subacute L3 fracture noted on imaging in ED  · No trauma prior to admission  · No signs of cauda equina  · Neurosurgery discussed with ER, Rec back braces and pain control  · Patient has been unable to ambulate independently for two weeks prior to admission  · Prior to injections, patient was ambulating without assistive device  · Pain appears much better controlled per patient and his wife    He was able to transition from bed to chair yesterday  · For dc today to [facility] Course:     Yesy Lan Bennett Dickson is a 79 y o  male patient who originally presented to the hospital on   Admission Orders (From admission, onward)     Ordered        10/21/22 [facility]  Once                     due to acute L2 compression fracture  Patient had slow and steady improvement of his pain  Patient was seen by Neurosurgery go consult and determined not to have any acute surgical needs  He will go to acute rehab for further reconditioning  Please see above list of diagnoses and related plan for additional information  Physical Exam:    GEN: No acute distress, comfortable  HEEENT: No JVD, PERRLA, no scleral icterus  RESP: Lungs clear to auscultation bilaterally  CV: RRR, +s1/s2   ABD: SOFT NON TENDER, POSITIVE BOWEL SOUNDS, NO DISTENTION  PSYCH: CALM  NEURO: A X O X 3, NO FOCAL DEFICITS  SKIN: NO RASH  EXTREM: NO EDEMA      Condition at Discharge:  good      Discharge instructions/Information to patient and family:   See after visit summary for information provided to patient and family  Provisions for Follow-Up Care:  See after visit summary for information related to follow-up care and any pertinent home health orders  Disposition:     Home       Discharge Statement:  I spent 36 minutes discharging the patient  This time was spent on the day of discharge  I had direct contact with the patient on the day of discharge  Greater than 50% of the total time was spent examining patient, answering all patient questions, arranging and discussing plan of care with patient as well as directly providing post-discharge instructions  Additional time then spent on discharge activities  Discharge Medications:  See after visit summary for reconciled discharge medications provided to patient and family        ** Please Note: This note has been constructed using a voice recognition system **

## 2022-10-25 NOTE — ASSESSMENT & PLAN NOTE
· Acute L2 compression fracture and subacute L3 fracture noted on imaging in ED  · No trauma prior to admission  · No signs of cauda equina  · Neurosurgery discussed with ER, Rec back braces and pain control  · Patient has been unable to ambulate independently for two weeks prior to admission  · Prior to injections, patient was ambulating without assistive device  · Pain appears much better controlled per patient and his wife    He was able to transition from bed to chair yesterday  · For dc today to [facility]

## 2022-10-25 NOTE — PHYSICAL THERAPY NOTE
PHYSICAL THERAPY EVAL  Physical Therapy Evaluation    Performed at least 2 patient identifiers during session:  Patient Active Problem List   Diagnosis    PVC's (premature ventricular contractions)    Thoracic aortic aneurysm without rupture    Nonrheumatic aortic valve insufficiency    Non-rheumatic mitral regurgitation    Malignant neoplasm of prostate ([facility] [address] )    Spinal stenosis of lumbar region with neurogenic claudication    Lumbar radiculopathy    Chronic bilateral low back pain without sciatica    Chronic pain syndrome    Renal cyst, acquired, left    Aortic root dilatation (HCC)    Myofascial pain syndrome    Stage 3a chronic kidney disease (HCC)    Lumbar spondylosis    Sacroiliitis (HCC)    Sacroiliac joint dysfunction of both sides    Acute L2 compression fracture        Past Medical History:   Diagnosis Date    Astigmatism     last assessed 11/15/17    Cataract     Resolved 11/15/17    Gross hematuria     Last assessed 01/28/16    Hematuria     last assessed 01/28/16    Prostate cancer ([facility] [address] )     last assessed 05/18/17    Renal calculi     last assessed 08/16/12    Squamous cell carcinoma     Vitamin D deficiency     Last assessed 08/19/13       Past Surgical History:   Procedure Laterality Date    BREAST EXCISIONAL BIOPSY N/A     Benign breast tumor unsure of side over 50 yrs ago    CATARACT EXTRACTION Left 08/26/2019    CATARACT EXTRACTION Right 08/12/2019    COLONOSCOPY      CYSTOSCOPY  01/28/2016    ELBOW SURGERY      Bone chip    HERNIA REPAIR  2002    LITHOTRIPSY      RETROPUBIC PROSTATECTOMY  11/13/2001    Radical with nerve sparing          10/24/22 1025   PT Last Visit   PT Visit Date 10/24/22   Note Type   Note type Evaluation   Pain Assessment   Pain Assessment Tool 0-10   Pain Score No Pain  (Reports mild pain with ambulation)   Restrictions/Precautions   Braces or Orthoses LSO   Other Precautions Pain; Fall Risk;Multiple lines; Bed Alarm; Chair Alarm   Home Living   Type of [address] Two level;1/2 bath on main level;Bed/bath upstairs  (1STE)   Bathroom Shower/Tub Walk-in shower   Bathroom Equipment   (Grab bar- One that is not installed)   Home Equipment Cane  (Rollator)   Additional Comments Has only been using rollator for the past week  Has been using [facility] for the past few weels   Prior Function   Level of [place] Independent with ADLs; Independent with functional mobility   Lives With Spouse   IADLs   (Shared IADL's (does laundry))   Falls in the last 6 months 0   Comments +drives  General   Additional Pertinent History Spouse reports that patient has had worsening back pain for approx 3-4 weeks  Family/Caregiver Present No   Cognition   Overall Cognitive Status WFL   Arousal/Participation Alert   Orientation Level Oriented to person;Oriented to time;Oriented to situation;Oriented to place   Following Commands Follows all commands and directions without difficulty   Subjective   Subjective "I stood up several times last night to use the urinal "   RLE Assessment   RLE Assessment WFL   LLE Assessment   LLE Assessment WFL   Light Touch   RLE Light Touch Grossly intact   LLE Light Touch Grossly intact   Bed Mobility   Supine to Sit 5  Supervision   Additional items HOB elevated  (Verbal cues for log roll technique)   Additional Comments Sat edge of bed for approx 3-4 minutes supervision level   Transfers   Sit to Stand 4  Minimal assistance   Additional items Assist x 1; Armrests; Increased time required;Verbal cues   Stand to Sit 4  Minimal assistance   Additional items Assist x 1; Armrests; Increased time required;Verbal cues   Ambulation/Elevation   Gait pattern Forward Flexion;Decreased foot clearance;Shuffling   Gait Assistance 4  Minimal assist   Additional items Assist x 1;Verbal cues; Tactile cues   Assistive Device Rolling walker   Distance 25ft   Ambulation/Elevation Additional Comments +LOB when turning  Required min A to correct  Balance   Static Sitting Good   Dynamic Sitting Fair +   Static Standing Fair   Dynamic Standing Fair   Ambulatory Fair   Endurance Deficit   Endurance Deficit Yes   Endurance Deficit Description Easily fatigued   Activity Tolerance   Activity Tolerance Patient limited by fatigue   Medical Staff Made Aware Co-treat with OT due to medical complexity   Nurse Made Aware RN   Assessment   Prognosis Good   Problem List Decreased strength;Decreased endurance; Impaired balance;Decreased mobility;Obesity;Pain   Assessment Patient is an 80y/o M who presented to the ED with worsening back pain  Patient with an acute L2 compression fracture  Patient resides with spouse in a 2 level home with [facility]  Patient was independent with ADL's and mobility prior to admission  Reports that he has only been using the rollator for the past week due to pain  Current medical status includes pain, LSO, pain, fall risk, bed/chair alarm, decreased strength, balance, endurance and mobility  Patient was agreeable to session and motivated  He was educated on log roll technique, proper positioning of the LSO and how to don/doff LSO  Patient required min A for transfers and ambulation  Heavy reliance on RW  Patient with unsteady gait and 1 LOB  He is deconditioned and fatigues easily  At this time, he is not at his baseline and would benefit from rehab  The patient's AM-PAC Basic Mobility Inpatient Short Form Raw Score is 16  A Raw score of less than or equal to 17 suggests the patient may benefit from discharge to post-acute rehabilitation services  Please also refer to the recommendation of the Physical Therapist for safe discharge planning  Barriers to Discharge Inaccessible home environment;Decreased caregiver support   Goals   Patient Goals To get stronger   STG Expiration Date 11/07/22   Short Term Goal #1 1   Perform supine<>sit with HOB flat without the use of bedrails using a log roll technique mod I 2  Perform sit<>stand transfers mod I 3  Ambulate 200ft with the least restrictive device at a mod I level 4  Ascend/descend 12 stairs with railing nonreciprocal pattern mod I 5  Able to independently son/doff LSO   PT Treatment Day 1   Plan   Treatment/Interventions Functional transfer training;LE strengthening/ROM; Elevations; Endurance training; Therapeutic exercise;Equipment eval/education; Bed mobility;Gait training;Spoke to nursing;OT   PT Frequency 3-5x/wk   Recommendation   PT Discharge Recommendation Post acute rehabilitation services   Equipment Recommended [facility] Recommended Wheeled walker   AM-PAC Basic Mobility Inpatient   Turning in Bed Without Bedrails 3   Lying on Back to Sitting on Edge of Flat Bed 3   Moving Bed to Chair 3   Standing Up From Chair 3   Walk in Room 3   Climb 3-5 Stairs 1   Basic Mobility Inpatient Raw Score 16   Basic Mobility Standardized Score 38 32   Highest Level Of Mobility   JH-HLM Goal 5: Stand one or more mins   JH-HLM Achieved 7: Walk 25 feet or more   Additional Treatment Session   Start Time 1040   End Time 1051   Treatment Assessment Performed additional sit<>stand transfer with min A x 1  Ambulated 50ft with RW with min A x 1  Foward flexed, shuffling gait  Antalgic  Equipment Use RW   End of Consult   Patient Position at End of Consult Bedside chair;Bed/Chair alarm activated; All needs within reach     Anabel Garcia             Patient Name: Anders Monsalve CEMIB'A Date: 10/25/2022

## 2022-10-25 NOTE — PROGRESS NOTES
PHYSICAL MEDICINE AND REHABILITATION   PREADMISSION ASSESSMENT     Projected [facility] Diagnoses:  Impairment of mobility, safety and Activities of Daily Living (ADLs) due to Orthopedic Disorders:  08 9  Other Orthopedic L2 lumbar fx     Etiologic: Degenerative changes  Levoscoliosis  L2 compression fracture  Date of Onset: 10/21/2022   Date of surgery: N/A    PATIENT INFORMATION  Name: Guevara Lobo Phone #: [phone] (home)   Address: [address] [place] [address]  YOB: 1940 Age: 79 y o  SS#   Marital Status: /Civil Union  Ethnicity: White  Employment Status: retired  Extended Emergency Contact Information  Primary Emergency Contact: Rosa M Mukherjee   [address] Phone: [phone]  Mobile Phone: [phone]  Relation: Spouse  Advance Directive: Level 1 full code (no ACP docs)    INSURANCE/COVERAGE:     Primary Payor: MEDICARE / Plan: MEDICARE A AND B / Product Type: Medicare A & B Fee for Service /   Secondary Payer: Jack Doan [id number]   Payer Contact:  Payer Contact:   Contact Phone:  Contact Phone:     Authorization #: N/A  Coverage Dates: N/A  LCD: N/A  MEDICARE #: [id number]  Medicare Days: 52-14-52  Medical Record #: [id number]    REFERRAL SOURCE:   Referring provider: Stacey Cool MD  Referring facility: [address] [place] [address]  Room: /-  PCP: Luzmaria Tadeo DO PCP phone number: [phone]    MEDICAL INFORMATION  HPI: Guevara Lobo is a 79 y o  male with a PMH of prostate cancer s/p prostatectomy in 2001, chronic back pain with radiculopathy, CKD, PVCs who presents with worsening of chronic back pain for over a month  Patient had spinal injections performed 10/7 with worsening of pain since that time  He spoke with pain management team who recommended he come to the ER  Patient denies any fall or trauma  Spinal imaging reveals two lumbar fractures   He has been ambulating with a scooter chair and wife's assistance for the past two weeks  Neurosurgery consulted  TLSO brace placed, await X-rays to ensure appropriate alignment/placement  He denies any other symptoms including loss of sensation, bowel or bladder control, headache, N/V/D, fevers or chills  CT of Lumbar spine wo contrast demonstrates Scoliosis and multilevel spondylosis, osteoporosis & compression  deformities of acute  L2 and less severe chronic L3  The L2 compression fracture deformity appears to be acute with bony retropulsion of the superior endplate resulting in moderate spinal  stenosis   Multilevel spondylosis with stable moderate to severe L4-5 spinal stenosis  L2 fracture is TLICS 1/2 and moderate CCS  Neurosurgery is recommending f/u in 2 weeks, TLSO with HOB elevated and OOB positioning  Patient has a history of stage 3 kidney disease and urinary retention,       Past Medical History:   Past Surgical History:    Allergies:     Past Medical History:   Diagnosis Date   • Astigmatism     last assessed 11/15/17   • Cataract     Resolved 11/15/17   • Gross hematuria     Last assessed 01/28/16   • Hematuria     last assessed 01/28/16   • Prostate cancer ([facility] [address] )     last assessed 05/18/17   • Renal calculi     last assessed 08/16/12   • Squamous cell carcinoma    • Vitamin D deficiency     Last assessed 08/19/13    Past Surgical History:   Procedure Laterality Date   • BREAST EXCISIONAL BIOPSY N/A     Benign breast tumor unsure of side over 50 yrs ago   • CATARACT EXTRACTION Left 08/26/2019   • CATARACT EXTRACTION Right 08/12/2019   • COLONOSCOPY     • CYSTOSCOPY  01/28/2016   • ELBOW SURGERY      Bone chip   • HERNIA REPAIR  2002   • LITHOTRIPSY     • RETROPUBIC PROSTATECTOMY  11/13/2001    Radical with nerve sparing     Allergies   Allergen Reactions   • Sulfa Antibiotics Rash   • Ciprofloxacin    • Gabapentin Dizziness         Medical/functional conditions requiring inpatient rehabilitation: Impaired balance, increased back pain fractures/stenosis, impaired mobility and impaired self care  Risk for medical/clinical complications: Risk for falls, risk for hypotensive/hypertensive episodes    Comorbidities/Surgeries in the last 100 days:  Acute L2 compression fracture   Active Problems:    PVC's (premature ventricular contractions)    Malignant neoplasm of prostate (HCC)    Chronic bilateral low back pain without sciatica    Stage 3a chronic kidney disease ([facility] [address] )      CURRENT VITAL SIGNS:   Temp:  [97 3 °F (36 3 °C)-98 2 °F (36 8 °C)] 97 3 °F (36 3 °C)  HR:  [54-68] 66  Resp:  [22] 22  BP: (132-185)/(60-88) 167/84 No intake or output data in the 24 hours ending 10/25/22 1345     LABORATORY RESULTS:      Lab Results   Component Value Date    HGB 12 6 10/25/2022    HGB 14 7 10/16/2015    HCT 39 2 10/25/2022    HCT 43 5 10/16/2015    WBC 13 74 (H) 10/25/2022    WBC 6 73 10/16/2015     Lab Results   Component Value Date    BUN 23 10/25/2022    BUN 16 04/06/2015     04/06/2015    K 4 3 10/25/2022    K 3 9 04/06/2015     10/25/2022     04/06/2015    GLUCOSE 96 04/06/2015    CREATININE 1 12 10/25/2022    CREATININE 1 04 04/06/2015     No results found for: PROTIME, INR     DIAGNOSTIC STUDIES:  XR spine lumbar 2 or 3 views injury    Result Date: 10/22/2022  Impression: Degenerative changes  Levoscoliosis  L2 compression fracture  Workstation performed: [id number]     CT recon only lumbar spine    Result Date: 10/21/2022  Impression: Scoliosis and multilevel spondylosis as well as osteoporosis with compression fracture deformities at L2 and L3  The L2 compression fracture deformity appears to be acute with bony retropulsion of the superior endplate resulting in moderate spinal stenosis  The L3 compression fracture deformity is less severe and appears to be chronic  Follow-up MR imaging could be utilized for additional assessment of the marrow and the distal thoracic cord/conus   Multilevel spondylosis with stable moderate to severe L4-5 spinal stenosis  Additional spondylotic changes as described above  Sclerotic lesion within the left T11 rib, presumably related to patient's history of prostate cancer  Please see the separate CT abdomen and pelvis report for additional extraspinal findings  I personally discussed this study with Mehdi Ryan on 10/21/2022 at 4:43 PM  Workstation performed: [id number]     CT abdomen pelvis w contrast    Result Date: 10/21/2022  Impression: Acute L2 compression deformity  See the separate CT L-spine report  No acute inflammatory changes in the abdomen or pelvis  Prominent colonic stool suggestive of constipation   Workstation performed: [id number]       PRECAUTIONS/SPECIAL NEEDS:    none, Splints/Braces: TLSO with OOB positioning, Anticoagulation:  Lovenox, Safety Concerns and Pain Management    MEDICATIONS:     Current Facility-Administered Medications:   •  acetaminophen (TYLENOL) tablet 975 mg, 975 mg, Oral, Q8H [facility], Kaylene LATHAM PA-C, 975 mg at 10/25/22 0442  •  aluminum-magnesium hydroxide-simethicone (MYLANTA) oral suspension 30 mL, 30 mL, Oral, Q6H PRN, Kaylene LATHAM PA-C  •  enoxaparin (LOVENOX) subcutaneous injection 40 mg, 40 mg, Subcutaneous, Daily, Kaylene LATHAM PA-C, 40 mg at 10/25/22 0841  •  hydrALAZINE (APRESOLINE) injection 5 mg, 5 mg, Intravenous, Q6H PRN, Shayne Uriarte PA-C, 5 mg at 10/24/22 8386  •  HYDROmorphone HCl (DILAUDID) injection 0 2 mg, 0 2 mg, Intravenous, Q4H PRN, Kaylene LATHAM PA-C  •  lidocaine (LIDODERM) 5 % patch 1 patch, 1 patch, Topical, Daily, Kaylene LATHAM PA-C, 1 patch at 10/23/22 0189  •  lisinopril (ZESTRIL) tablet 20 mg, 20 mg, Oral, Daily, Flavio Montano MD, 20 mg at 10/25/22 0841  •  methocarbamol (ROBAXIN) tablet 500 mg, 500 mg, Oral, Q8H [facility], Kaylene LATHAM PA-C, 500 mg at 10/25/22 0142  •  [COMPLETED] methylprednisolone (MEDROL) tablet 24 mg, 24 mg, Oral, Daily, 24 mg at 10/22/22 1004 **FOLLOWED BY** [COMPLETED] methylprednisolone (MEDROL) tablet 20 mg, 20 mg, Oral, Daily, 20 mg at 10/23/22 0953 **FOLLOWED BY** [COMPLETED] methylprednisolone (MEDROL) tablet 16 mg, 16 mg, Oral, Daily, 16 mg at 10/24/22 0809 **FOLLOWED BY** [COMPLETED] methylprednisolone (MEDROL) tablet 12 mg, 12 mg, Oral, Daily, 12 mg at 10/25/22 0840 **FOLLOWED BY** [START ON 10/26/2022] methylprednisolone (MEDROL) tablet 8 mg, 8 mg, Oral, Daily **FOLLOWED BY** [START ON 10/27/2022] methylprednisolone (MEDROL) tablet 4 mg, 4 mg, Oral, Daily, Kaylene LATHAM PA-C  •  metoprolol tartrate (LOPRESSOR) tablet 25 mg, 25 mg, Oral, Q12H, Kaylene LATHAM PA-C, 25 mg at 10/25/22 0442  •  ondansetron (ZOFRAN) injection 4 mg, 4 mg, Intravenous, Q6H PRN, Kaylene LATHAM PA-C  •  oxybutynin (DITROPAN-XL) 24 hr tablet 5 mg, 5 mg, Oral, Daily, Kaylene LATHAM PA-C, 5 mg at 10/25/22 6041  •  oxyCODONE (ROXICODONE) IR tablet 10 mg, 10 mg, Oral, Q4H PRN, Kaylene LATHAM PA-C  •  oxyCODONE (ROXICODONE) IR tablet 5 mg, 5 mg, Oral, Q4H PRN, Kaylene LATHAM PA-C  •  pantoprazole (PROTONIX) EC tablet 40 mg, 40 mg, Oral, Early Morning, Belem Coleman MD, 40 mg at 10/25/22 4026  •  polyethylene glycol (MIRALAX) packet 17 g, 17 g, Oral, Daily PRN, Bouchra LATHAM PA-C  •  traMADol (ULTRAM) tablet 50 mg, 50 mg, Oral, BID PRN, Bouchra LATHAM PA-C  •  verapamil (CALAN-SR) CR tablet 240 mg, 240 mg, Oral, Daily, Kaylene LATHAM PA-C, 240 mg at 10/25/22 0841    SKIN INTEGRITY:   no rashes, no erythema, no peripheral edema    PRIOR LEVEL OF FUNCTION:  He lives in [facility] single family home  Janette Lee is  and lives with their spouse  Self Care: Independent, Indoor Mobility: Independent, Stairs (in/outdoor): Needed some help and Cognition: Independent    FALLS IN THE LAST 6 MONTHS:     HOME ENVIRONMENT:  The living area: {Rehab home environment / accessibility:34774}  There are 1 to enter the home      The patient will have 24 hour supervision/physical assistance available upon discharge  PREVIOUS DME:  Equipment in home (previous DME): Grab Bars, Single [employer] and rollator    FUNCTIONAL STATUS: PT/OT  Physical Therapy Occupational Therapy Speech Therapy    10/24/22 1025   PT Last Visit   PT Visit Date 10/24/22   Note Type   Note type Evaluation   Pain Assessment   Pain Assessment Tool 0-10   Pain Score No Pain  (Reports mild pain with ambulation)   Restrictions/Precautions   Braces or Orthoses LSO   Other Precautions Pain; Fall Risk;Multiple lines; Bed Alarm; Chair Alarm   Home Living   Type of [address] [place] [address] Two level;1/2 bath on main level;Bed/bath upstairs  (1STE)   Bathroom Shower/Tub Walk-in shower   Bathroom Equipment    (Grab bar- One that is not installed)   Home Equipment Cane  (Rollator)   Additional Comments Has only been using rollator for the past week  Has been using [facility] for the past few weels   Prior Function   Level of [place] Independent with ADLs; Independent with functional mobility   Lives With Spouse   IADLs    (Shared IADL's (does laundry))   Falls in the last 6 months 0   Comments +drives  General   Additional Pertinent History Spouse reports that patient has had worsening back pain for approx 3-4 weeks     Family/Caregiver Present No   Cognition   Overall Cognitive Status WFL   Arousal/Participation Alert   Orientation Level Oriented to person;Oriented to time;Oriented to situation;Oriented to place   Following Commands Follows all commands and directions without difficulty   Subjective   Subjective "I stood up several times last night to use the urinal "   RLE Assessment   RLE Assessment WFL   LLE Assessment   LLE Assessment WFL   Light Touch   RLE Light Touch Grossly intact   LLE Light Touch Grossly intact   Bed Mobility   Supine to Sit 5  Supervision   Additional items HOB elevated  (Verbal cues for log roll technique)   Additional Comments Sat edge of bed for approx 3-4 minutes supervision level   Transfers   Sit to Stand 4  Minimal assistance   Additional items Assist x 1; Armrests; Increased time required;Verbal cues   Stand to Sit 4  Minimal assistance   Additional items Assist x 1; Armrests; Increased time required;Verbal cues   Ambulation/Elevation   Gait pattern Forward Flexion;Decreased foot clearance;Shuffling   Gait Assistance 4  Minimal assist   Additional items Assist x 1;Verbal cues; Tactile cues   Assistive Device Rolling walker   Distance 25ft   Ambulation/Elevation Additional Comments +LOB when turning  Required min A to correct  Balance   Static Sitting Good   Dynamic Sitting Fair +   Static Standing Fair   Dynamic Standing Fair   Ambulatory Fair   Endurance Deficit   Endurance Deficit Yes   Endurance Deficit Description Easily fatigued   Activity Tolerance   Activity Tolerance Patient limited by fatigue   Medical Staff Made Aware Co-treat with OT due to medical complexity   Nurse Made Aware RN   Assessment   Prognosis Good   Problem List Decreased strength;Decreased endurance; Impaired balance;Decreased mobility;Obesity;Pain   Assessment Patient is an 78y/o M who presented to the ED with worsening back pain  Patient with an acute L2 compression fracture  Patient resides with spouse in a 2 level home with [facility]  Patient was independent with ADL's and mobility prior to admission  Reports that he has only been using the rollator for the past week due to pain  Current medical status includes pain, LSO, pain, fall risk, bed/chair alarm, decreased strength, balance, endurance and mobility  Patient was agreeable to session and motivated  He was educated on log roll technique, proper positioning of the LSO and how to don/doff LSO  Patient required min A for transfers and ambulation  Heavy reliance on RW  Patient with unsteady gait and 1 LOB  He is deconditioned and fatigues easily  At this time, he is not at his baseline and would benefit from rehab  The patient's AM-PAC Basic Mobility Inpatient Short Form Raw Score is 16  A Raw score of less than or equal to 17 suggests the patient may benefit from discharge to post-acute rehabilitation services  Please also refer to the recommendation of the Physical Therapist for safe discharge planning  10/24/22 1057   OT Last Visit   OT Visit Date 10/24/22   Note Type   Note type Evaluation   Pain Assessment   Pain Assessment Tool 0-10   Pain Score No Pain   Restrictions/Precautions   Braces or Orthoses LSO   Other Precautions Pain; Fall Risk;Multiple lines; Bed Alarm; Chair Alarm   Home Living   Type of [address] [place], [place] [address] Two level;1/2 bath on main level;Bed/bath upstairs  (1STE)   Bathroom Shower/Tub Walk-in shower   Bathroom Equipment Grab bars in [address]  (rollator; Has only been using rollator for the past week  Has been using [facility] for the past few weels)   Prior Function   Level of [place] Independent with ADLs; Independent with functional mobility   Lives With Spouse   IADLs    (Shared IADL's (does laundry))   Falls in the last 6 months 0   Comments +drives   ADL   Eating Assistance 7  Independent   Grooming Assistance 5  Supervision/Setup   [address] [place] [address]  4  Minimal Assistance   Additional Comments Edu on don/doff of LSO   Bed Mobility   Supine to Sit 5  Supervision   Additional items HOB elevated;Verbal cues  (edu on log roll technique)   Additional Comments Sat edge of bed for approx 3-4 minutes supervision level   Transfers   Sit to Stand 4  Minimal assistance   Additional items Assist x 1   Stand to Sit 4  Minimal assistance   Additional items Assist x 1   Stand pivot 4  Minimal assistance  (RW)   Additional items Assist x 1   Functional Mobility   Functional Mobility 4  Minimal assistance   Additional Comments x1   Additional items Rolling walker   Activity Tolerance   Activity Tolerance Patient limited by fatigue   Medical Staff Made Aware Cotx with PT 2* medical complexity   Nurse Made Aware LUZMA Iyer   RUE Assessment   RUE Assessment WFL   LUE Assessment   LUE Assessment WFL   Hand Function   Gross Motor Coordination Functional   Fine Motor Coordination Functional   Cognition   Overall Cognitive Status WFL   Arousal/Participation Alert; Cooperative   Attention Within functional limits   Orientation Level Oriented to person;Oriented to time;Oriented to situation;Oriented to place   Memory Within functional limits   Following Commands Follows all commands and directions without difficulty   Assessment   Limitation Decreased ADL status; Decreased endurance;Decreased self-care trans;Decreased high-level ADLs   Prognosis Good   Assessment Pt is a 79 y o  male seen for OT evaluation at [facility], admitted 10/21/2022 w/ Closed fracture of lumbar vertebra with spinal cord injury ([facility] [address] )  OT completed extensive review of pt's medical and social history  Comorbidities affecting pt's functional performance at time of assessment include: bilateral low back pain, CKD, acute L2 compression fx,   Personal factors affecting pt at time of IE include:steps to enter environment, difficulty performing ADLS, difficulty performing IADLS , limited insight into deficits, decreased initiation and engagement  and health management   Prior to admission, pt was living in [place], [place] with spouse and was independent with ADL/IADL, driving  Upon evaluation, pt presents to OT below baseline due to the following performance deficits: weakness, decreased strength, decreased balance, decreased tolerance and increased pain  Pt to benefit from continued skilled OT tx while in the hospital to address deficits as defined above and maximize level of functional independence w ADL's and functional mobility   Occupational Performance areas to address include: grooming, bathing/shower, toilet hygiene, dressing, functional mobility and functional transfers, bed mobility  The patient's raw score on the AM-PAC Daily Activity inpatient short form is 18, standardized score is 38 66, less than 39 4  Patients at this level are likely to benefit from DC to post-acute rehabilitation services  Based on findings, pt is of high complexity, due to medical comorbidities  Pt co-treated with physical therapy due to skilled assist of 2 therapists required for safety  At this time, OT recommendations at time of discharge are short term rehab  CARE SCORES:  Self Care:  Eatin: Setup or clean-up assistance  Oral hygiene: 05: Setup or clean-up assistance  Toilet hygiene: 03: Partial/moderate assistance  Shower/bathing self: 03: Partial/moderate assistance  Upper body dressin: Partial/moderate assistance  Lower body dressin: Partial/moderate assistance  Putting on/taking off footwear: 03: Partial/moderate assistance  Transfers:  Roll left and right: 04: Supervision or touching  assistance  Sit to lyin: Partial/moderate assistance  Lying to sitting on side of bed: 03: Partial/moderate assistance  Sit to stand: 03: Partial/moderate assistance  Chair/bed to chair transfer: 03: Partial/moderate assistance  Toilet transfer: 03: Partial/moderate assistance  Mobility:  Walk 10 ft: 03: Partial/moderate assistance  Walk 50 ft with two turns: 88: Not attempted due to medical conditions or safety concerns  Walk 150ft: 88: Not attempted due to medical conditions or safety concerns    CURRENT GAP IN FUNCTION  Prior to Admission: Functional Status: Patient was independent with mobility/ambulation, transfers, ADL's, IADL's  Estimated length of stay: 10 to 14 days    Anticipated Post-Discharge Disposition/Treatment  Disposition: Return to previous home/apartment    Outpatient Services: PT    BARRIERS TO DISCHARGE  Weakness, Pain, Balance Difficulty, Fatigue and Equipment Needs    INTERVENTIONS FOR DISCHARGE  Adaptive equipment, Patient/Family/Caregiver Education, Arrange DME needs, Therapy exercises, Center of balance support  and Energy conservation education     REQUIRED THERAPY:  Patient will require PT and OT 90 minutes each per day, five days per week to achieve rehab goals       REQUIRED FUNCTIONAL AND MEDICAL MANAGEMENT FOR INPATIENT REHABILITATION:  Pain Management: Overall pain is moderately controlled, Nursing education and bowel/bladder management, internal medicine to manage/monitor medical conditions, PM&R to maximize function and provide medical oversight, PT/OT intervention, patient/family education/training, and any consults as needed     RECOMMENDED LEVEL OF CARE: ***

## 2022-10-25 NOTE — PROGRESS NOTES
PHYSICAL MEDICINE AND REHABILITATION   PREADMISSION ASSESSMENT     Projected [facility] Diagnoses:  Impairment of mobility, safety and Activities of Daily Living (ADLs) due to Orthopedic Disorders:  08 9  Other Orthopedic L2 lumbar fx     Etiologic: Degenerative changes  Levoscoliosis  L2 compression fracture  Date of Onset: 10/21/2022   Date of surgery: N/A    PATIENT INFORMATION  Name: Whit Aguilar Phone #: [phone] (home)   Address: [address] [place] [address]  YOB: 1940 Age: 79 y o  SS#   Marital Status: /Civil Union  Ethnicity: White  Employment Status: retired  Extended Emergency Contact Information  Primary Emergency Contact: Rosa M Mukherjee   [address] Phone: [phone]  Mobile Phone: [phone]  Relation: Spouse  Advance Directive: Level 1 full code (no ACP docs)    INSURANCE/COVERAGE:     Primary Payor: MEDICARE / Plan: MEDICARE A AND B / Product Type: Medicare A & B Fee for Service /   Secondary Payer: Landy Perry [id number]   Payer Contact:  Payer Contact:   Contact Phone:  Contact Phone:     Authorization #: N/A  Coverage Dates: N/A  LCD: N/A  MEDICARE #: [id number]  Medicare Days: [id number]  Medical Record #: [id number]    REFERRAL SOURCE:   Referring provider: Rah Horn MD  Referring facility: [address], [place] [facility] [place]  Room: /-09  PCP: Yash Carpio DO PCP phone number: [phone]    MEDICAL INFORMATION  HPI: Whit Aguilar is a 79 y o  male with a PMH of prostate cancer s/p prostatectomy in 2001, chronic back pain with radiculopathy, CKD, PVCs who presents with worsening of chronic back pain for over a month  Patient had spinal injections performed 10/7 with worsening of pain since that time  He spoke with pain management team who recommended he come to the ER  Patient denies any fall or trauma  Spinal imaging reveals two lumbar fractures   He has been ambulating with a scooter chair and wife's assistance for the past two weeks  Neurosurgery consulted  TLSO brace placed, await X-rays to ensure appropriate alignment/placement  He denies any other symptoms including loss of sensation, bowel or bladder control, headache, N/V/D, fevers or chills  CT of Lumbar spine wo contrast demonstrates Scoliosis and multilevel spondylosis, osteoporosis & compression  deformities of acute  L2 and less severe chronic L3  The L2 compression fracture deformity appears to be acute with bony retropulsion of the superior endplate resulting in moderate spinal  stenosis   Multilevel spondylosis with stable moderate to severe L4-5 spinal stenosis  L2 fracture is TLICS 1/2 and moderate CCS         Past Medical History:   Past Surgical History:    Allergies:     Past Medical History:   Diagnosis Date   • Astigmatism     last assessed 11/15/17   • Cataract     Resolved 11/15/17   • Gross hematuria     Last assessed 01/28/16   • Hematuria     last assessed 01/28/16   • Prostate cancer ([facility] [address] )     last assessed 05/18/17   • Renal calculi     last assessed 08/16/12   • Squamous cell carcinoma    • Vitamin D deficiency     Last assessed 08/19/13    Past Surgical History:   Procedure Laterality Date   • BREAST EXCISIONAL BIOPSY N/A     Benign breast tumor unsure of side over 50 yrs ago   • CATARACT EXTRACTION Left 08/26/2019   • CATARACT EXTRACTION Right 08/12/2019   • COLONOSCOPY     • CYSTOSCOPY  01/28/2016   • ELBOW SURGERY      Bone chip   • HERNIA REPAIR  2002   • LITHOTRIPSY     • RETROPUBIC PROSTATECTOMY  11/13/2001    Radical with nerve sparing     Allergies   Allergen Reactions   • Sulfa Antibiotics Rash   • Ciprofloxacin    • Gabapentin Dizziness         Medical/functional conditions requiring inpatient rehabilitation: ***    Risk for medical/clinical complications: ***    Comorbidities/Surgeries in the last 100 days:  Principal Problem:    Acute L2 compression fracture   Active Problems:    PVC's (premature ventricular contractions)    Malignant neoplasm of prostate (HCC)    Chronic bilateral low back pain without sciatica    Stage 3a chronic kidney disease ([address] )       CURRENT VITAL SIGNS:   Temp:  [97 3 °F (36 3 °C)-98 2 °F (36 8 °C)] 97 3 °F (36 3 °C)  HR:  [54-68] 66  Resp:  [22] 22  BP: (132-185)/(60-88) 167/84 No intake or output data in the 24 hours ending 10/25/22 1032     LABORATORY RESULTS:      Lab Results   Component Value Date    HGB 12 6 10/25/2022    HGB 14 7 10/16/2015    HCT 39 2 10/25/2022    HCT 43 5 10/16/2015    WBC 13 74 (H) 10/25/2022    WBC 6 73 10/16/2015     Lab Results   Component Value Date    BUN 23 10/25/2022    BUN 16 04/06/2015     04/06/2015    K 4 3 10/25/2022    K 3 9 04/06/2015     10/25/2022     04/06/2015    GLUCOSE 96 04/06/2015    CREATININE 1 12 10/25/2022    CREATININE 1 04 04/06/2015     No results found for: PROTIME, INR     DIAGNOSTIC STUDIES:  XR spine lumbar 2 or 3 views injury    Result Date: 10/22/2022  Impression: Degenerative changes  Levoscoliosis  L2 compression fracture  Workstation performed: [id number]     CT recon only lumbar spine    Result Date: 10/21/2022  Impression: Scoliosis and multilevel spondylosis as well as osteoporosis with compression fracture deformities at L2 and L3  The L2 compression fracture deformity appears to be acute with bony retropulsion of the superior endplate resulting in moderate spinal stenosis  The L3 compression fracture deformity is less severe and appears to be chronic  Follow-up MR imaging could be utilized for additional assessment of the marrow and the distal thoracic cord/conus  Multilevel spondylosis with stable moderate to severe L4-5 spinal stenosis  Additional spondylotic changes as described above  Sclerotic lesion within the left T11 rib, presumably related to patient's history of prostate cancer   Please see the separate CT abdomen and pelvis report for additional extraspinal findings  I personally discussed this study with Cuca Tinoco on 10/21/2022 at 4:43 PM  Workstation performed: [id number]     CT abdomen pelvis w contrast    Result Date: 10/21/2022  Impression: Acute L2 compression deformity  See the separate CT L-spine report  No acute inflammatory changes in the abdomen or pelvis  Prominent colonic stool suggestive of constipation   Workstation performed: [id number]       PRECAUTIONS/SPECIAL NEEDS:  {ARC PRE Precautions:75464}    MEDICATIONS:     Current Facility-Administered Medications:   •  acetaminophen (TYLENOL) tablet 975 mg, 975 mg, Oral, Q8H [facility], Kaylene LATHAM PA-C, 975 mg at 10/25/22 0442  •  aluminum-magnesium hydroxide-simethicone (MYLANTA) oral suspension 30 mL, 30 mL, Oral, Q6H PRN, Kaylene LATHAM PA-C  •  enoxaparin (LOVENOX) subcutaneous injection 40 mg, 40 mg, Subcutaneous, Daily, Kaylene LATHAM PA-C, 40 mg at 10/25/22 9430  •  hydrALAZINE (APRESOLINE) injection 5 mg, 5 mg, Intravenous, Q6H PRN, Skyler Wilder PA-C, 5 mg at 10/24/22 9337  •  HYDROmorphone HCl (DILAUDID) injection 0 2 mg, 0 2 mg, Intravenous, Q4H PRN, Kaylene LATHAM PA-C  •  lidocaine (LIDODERM) 5 % patch 1 patch, 1 patch, Topical, Daily, Kaylene LATHAM PA-C, 1 patch at 10/23/22 8295  •  lisinopril (ZESTRIL) tablet 20 mg, 20 mg, Oral, Daily, Stacey Zee MD, 20 mg at 10/25/22 0841  •  methocarbamol (ROBAXIN) tablet 500 mg, 500 mg, Oral, Q8H [facility], Kaylene LATHAM PA-C, 500 mg at 10/25/22 0442  •  [COMPLETED] methylprednisolone (MEDROL) tablet 24 mg, 24 mg, Oral, Daily, 24 mg at 10/22/22 1004 **FOLLOWED BY** [COMPLETED] methylprednisolone (MEDROL) tablet 20 mg, 20 mg, Oral, Daily, 20 mg at 10/23/22 0953 **FOLLOWED BY** [COMPLETED] methylprednisolone (MEDROL) tablet 16 mg, 16 mg, Oral, Daily, 16 mg at 10/24/22 0809 **FOLLOWED BY** [COMPLETED] methylprednisolone (MEDROL) tablet 12 mg, 12 mg, Oral, Daily, 12 mg at 10/25/22 0840 **FOLLOWED BY** [START ON 10/26/2022] methylprednisolone (MEDROL) tablet 8 mg, 8 mg, Oral, Daily **FOLLOWED BY** [START ON 10/27/2022] methylprednisolone (MEDROL) tablet 4 mg, 4 mg, Oral, Daily, Kaylene LATHAM PA-C  •  metoprolol tartrate (LOPRESSOR) tablet 25 mg, 25 mg, Oral, Q12H, Kaylene LATHAM PA-C, 25 mg at 10/25/22 0442  •  ondansetron (ZOFRAN) injection 4 mg, 4 mg, Intravenous, Q6H PRN, Kaylene LATHAM PA-C  •  oxybutynin (DITROPAN-XL) 24 hr tablet 5 mg, 5 mg, Oral, Daily, Kaylene LATHAM PA-C, 5 mg at 10/25/22 4804  •  oxyCODONE (ROXICODONE) IR tablet 10 mg, 10 mg, Oral, Q4H PRN, Kaylene LATHAM PA-C  •  oxyCODONE (ROXICODONE) IR tablet 5 mg, 5 mg, Oral, Q4H PRN, Kaylene LATHAM PA-C  •  pantoprazole (PROTONIX) EC tablet 40 mg, 40 mg, Oral, Early Morning, Shelli Silverman MD, 40 mg at 10/25/22 6607  •  polyethylene glycol (MIRALAX) packet 17 g, 17 g, Oral, Daily PRN, Briana LATHAM PA-C  •  traMADol (ULTRAM) tablet 50 mg, 50 mg, Oral, BID PRN, Briana LATHAM PA-C  •  verapamil (CALAN-SR) CR tablet 240 mg, 240 mg, Oral, Daily, Kaylene LATHAM PA-C, 240 mg at 10/25/22 0841    SKIN INTEGRITY:   no rashes, no erythema, no peripheral edema    PRIOR LEVEL OF FUNCTION:  [place] [facility]  Leigh Ann Conrad is  and lives with their spouse  Self Care: Independent, Indoor Mobility: Independent, Stairs (in/outdoor): Needed some help and Cognition: Independent    FALLS IN THE LAST 6 MONTHS:     HOME ENVIRONMENT:  The living area: {Rehab home environment / accessibility:14688}  There are {STEPS:19514} to enter the home  The patient {MUNIR WILL_WILL NOT:01132} have 24 hour {ARC Supervision/physical assistance:82451::"supervision"} available upon discharge      PREVIOUS DME:  Equipment in home (previous DME): Grab Bars, Single [employer] and rollator    FUNCTIONAL STATUS:***  Physical Therapy Occupational Therapy Speech Therapy    10/24/22 1025   PT Last Visit   PT Visit Date 10/24/22   Note Type   Note type Evaluation   Pain Assessment   Pain Assessment Tool 0-10   Pain Score No Pain  (Reports mild pain with ambulation)   Restrictions/Precautions   Braces or Orthoses LSO   Other Precautions Pain; Fall Risk;Multiple lines; Bed Alarm; Chair Alarm   Home Living   Type of [address] [place] [address] Two level;1/2 bath on main level;Bed/bath upstairs  (1STE)   Bathroom Shower/Tub Walk-in shower   Bathroom Equipment    (Grab bar- One that is not installed)   Home Equipment Cane  (Rollator)   Additional Comments Has only been using rollator for the past week  Has been using [facility] for the past few weels   Prior Function   Level of [place] Independent with ADLs; Independent with functional mobility   Lives With Spouse   IADLs    (Shared IADL's (does laundry))   Falls in the last 6 months 0   Comments +drives  General   Additional Pertinent History Spouse reports that patient has had worsening back pain for approx 3-4 weeks  Family/Caregiver Present No   Cognition   Overall Cognitive Status WFL   Arousal/Participation Alert   Orientation Level Oriented to person;Oriented to time;Oriented to situation;Oriented to place   Following Commands Follows all commands and directions without difficulty   Subjective   Subjective "I stood up several times last night to use the urinal "   RLE Assessment   RLE Assessment WFL   LLE Assessment   LLE Assessment WFL   Light Touch   RLE Light Touch Grossly intact   LLE Light Touch Grossly intact   Bed Mobility   Supine to Sit 5  Supervision   Additional items HOB elevated  (Verbal cues for log roll technique)   Additional Comments Sat edge of bed for approx 3-4 minutes supervision level   Transfers   Sit to Stand 4  Minimal assistance   Additional items Assist x 1; Armrests; Increased time required;Verbal cues   Stand to Sit 4  Minimal assistance   Additional items Assist x 1; Armrests; Increased time required;Verbal cues   Ambulation/Elevation   Gait pattern Forward Flexion;Decreased foot clearance;Shuffling   Gait Assistance 4  Minimal assist   Additional items Assist x 1;Verbal cues; Tactile cues   Assistive Device Rolling walker   Distance 25ft   Ambulation/Elevation Additional Comments +LOB when turning  Required min A to correct  Balance   Static Sitting Good   Dynamic Sitting Fair +   Static Standing Fair   Dynamic Standing Fair   Ambulatory Fair   Endurance Deficit   Endurance Deficit Yes   Endurance Deficit Description Easily fatigued   Activity Tolerance   Activity Tolerance Patient limited by fatigue   Medical Staff Made Aware Co-treat with OT due to medical complexity   Nurse Made Aware RN   Assessment   Prognosis Good   Problem List Decreased strength;Decreased endurance; Impaired balance;Decreased mobility;Obesity;Pain   Assessment Patient is an 78y/o M who presented to the ED with worsening back pain  Patient with an acute L2 compression fracture  Patient resides with spouse in a 2 level home with 1 GRACIELA  Patient was independent with ADL's and mobility prior to admission  Reports that he has only been using the rollator for the past week due to pain  Current medical status includes pain, LSO, pain, fall risk, bed/chair alarm, decreased strength, balance, endurance and mobility  Patient was agreeable to session and motivated  He was educated on log roll technique, proper positioning of the LSO and how to don/doff LSO  Patient required min A for transfers and ambulation  Heavy reliance on RW  Patient with unsteady gait and 1 LOB  He is deconditioned and fatigues easily  At this time, he is not at his baseline and would benefit from rehab  The patient's AM-PAC Basic Mobility Inpatient Short Form Raw Score is 16  A Raw score of less than or equal to 17 suggests the patient may benefit from discharge to post-acute rehabilitation services  Please also refer to the recommendation of the Physical Therapist for safe discharge planning         10/24/22 0583   OT Last Visit   OT Visit Date 10/24/22   Note Type   Note type Evaluation   Pain Assessment   Pain Assessment Tool 0-10   Pain Score No Pain   Restrictions/Precautions   Braces or Orthoses LSO   Other Precautions Pain; Fall Risk;Multiple lines; Bed Alarm; Chair Alarm   Home Living   Type of [address] [place] [address] Two level;1/2 bath on main level;Bed/bath upstairs  (1STE)   Bathroom Shower/Tub Walk-in shower   Bathroom Equipment Grab bars in [address]  (rollator; Has only been using rollator for the past week  Has been using [facility] for the past few weels)   Prior Function   Level of [place] Independent with ADLs; Independent with functional mobility   Lives With Spouse   IADLs    (Shared IADL's (does laundry))   Falls in the last 6 months 0   Comments +drives   ADL   Eating Assistance 7  Independent   Grooming Assistance 5  Supervision/Setup   UB Bathing Assistance 4  Minimal Assistance   LB Bathing Assistance 4  Minimal Assistance   [address] 4  Minimal [facility]  4  Minimal Assistance   Additional Comments Edu on don/doff of LSO   Bed Mobility   Supine to Sit 5  Supervision   Additional items HOB elevated;Verbal cues  (edu on log roll technique)   Additional Comments Sat edge of bed for approx 3-4 minutes supervision level   Transfers   Sit to Stand 4  Minimal assistance   Additional items Assist x 1   Stand to Sit 4  Minimal assistance   Additional items Assist x 1   Stand pivot 4  Minimal assistance  (RW)   Additional items Assist x 1   Functional Mobility   Functional Mobility 4  Minimal assistance   Additional Comments x1   Additional items Rolling walker   Activity Tolerance   Activity Tolerance Patient limited by fatigue   Medical Staff Made Aware Cotx with PT 2* medical complexity   Nurse Made Aware RN Gay Osgood   RUE Assessment   RUE Assessment WFL   LUE Assessment   LUE Assessment WFL   Hand Function   Gross Motor Coordination Functional   Fine Motor Coordination Functional   Cognition   Overall Cognitive Status WFL   Arousal/Participation Alert; Cooperative   Attention Within functional limits   Orientation Level Oriented to person;Oriented to time;Oriented to situation;Oriented to place   Memory Within functional limits   Following Commands Follows all commands and directions without difficulty   Assessment   Limitation Decreased ADL status; Decreased endurance;Decreased self-care trans;Decreased high-level ADLs   Prognosis Good   Assessment Pt is a 79 y o  male seen for OT evaluation at [facility], admitted 10/21/2022 w/ Closed fracture of lumbar vertebra with spinal cord injury ([facility] [address] )  OT completed extensive review of pt's medical and social history  Comorbidities affecting pt's functional performance at time of assessment include: bilateral low back pain, CKD, acute L2 compression fx,   Personal factors affecting pt at time of IE include:steps to enter environment, difficulty performing ADLS, difficulty performing IADLS , limited insight into deficits, decreased initiation and engagement  and health management   Prior to admission, pt was living in [place], [place] with spouse and was independent with ADL/IADL, driving  Upon evaluation, pt presents to OT below baseline due to the following performance deficits: weakness, decreased strength, decreased balance, decreased tolerance and increased pain  Pt to benefit from continued skilled OT tx while in the hospital to address deficits as defined above and maximize level of functional independence w ADL's and functional mobility  Occupational Performance areas to address include: grooming, bathing/shower, toilet hygiene, dressing, functional mobility and functional transfers, bed mobility  The patient's raw score on the AM-PAC Daily Activity inpatient short form is 18, standardized score is 38 66, less than 39 4  Patients at this level are likely to benefit from DC to post-acute rehabilitation services   Based on findings, pt is of high complexity, due to medical comorbidities  Pt co-treated with physical therapy due to skilled assist of 2 therapists required for safety  At this time, OT recommendations at time of discharge are short term rehab  CARE SCORES:  Self Care:  Eatin: Setup or clean-up assistance  Oral hygiene: 05: Setup or clean-up assistance  Toilet hygiene: 03: Partial/moderate assistance  Shower/bathing self: 03: Partial/moderate assistance  Upper body dressin: Partial/moderate assistance  Lower body dressin: Partial/moderate assistance  Putting on/taking off footwear: 03: Partial/moderate assistance  Transfers:  Roll left and right: 04: Supervision or touching  assistance  Sit to lyin: Partial/moderate assistance  Lying to sitting on side of bed: 03: Partial/moderate assistance  Sit to stand: 03: Partial/moderate assistance  Chair/bed to chair transfer: 03: Partial/moderate assistance  Toilet transfer: 03: Partial/moderate assistance  Mobility:  Walk 10 ft: 03: Partial/moderate assistance  Walk 50 ft with two turns: 88: Not attempted due to medical conditions or safety concerns  Walk 150ft: 88: Not attempted due to medical conditions or safety concerns    CURRENT GAP IN FUNCTION  Prior to Admission: Functional Status: Patient was independent with mobility/ambulation, transfers, ADL's, IADL's  Estimated length of stay: 10 to 14 days    Anticipated Post-Discharge Disposition/Treatment  Disposition: Return to previous home/apartment  Outpatient Services: PT    BARRIERS TO DISCHARGE  Weakness, Pain, Balance Difficulty, Fatigue and Equipment Needs    INTERVENTIONS FOR DISCHARGE  Adaptive equipment, Patient/Family/Caregiver Education, Arrange DME needs, Therapy exercises, Center of balance support  and Energy conservation education     REQUIRED THERAPY:  Patient will require PT and OT 90 minutes each per day, five days per week to achieve rehab goals       REQUIRED FUNCTIONAL AND MEDICAL MANAGEMENT FOR INPATIENT REHABILITATION:  Pain Management: Overall pain is moderately controlled, Nursing education and bowel/bladder management, internal medicine to manage/monitor medical conditions, PM&R to maximize function and provide medical oversight, PT/OT intervention, patient/family education/training, and any consults as needed     RECOMMENDED LEVEL OF CARE: ***

## 2022-10-25 NOTE — CASE MANAGEMENT
Case Management Discharge Planning Note    Patient name Shree Jenkins  Location /-42 MRN [id number]  : 1940 Date 10/25/2022       Current Admission Date: 10/21/2022  Current Admission Diagnosis:Acute L2 compression fracture    Patient Active Problem List    Diagnosis Date Noted   • Acute L2 compression fracture  10/21/2022   • Sacroiliitis ([facility] [address] )    • Sacroiliac joint dysfunction of both sides    • Lumbar spondylosis 10/04/2022   • Stage 3a chronic kidney disease ([address] ) 2022   • Myofascial pain syndrome 2022   • Aortic root dilatation ([facility] [address] ) 2022   • Renal cyst, acquired, left 12/10/2020   • Chronic pain syndrome 2020   • Chronic bilateral low back pain without sciatica 2020   • Spinal stenosis of lumbar region with neurogenic claudication    • Lumbar radiculopathy    • Malignant neoplasm of prostate ([facility] [address] ) 2019   • Thoracic aortic aneurysm without rupture 10/04/2018   • Nonrheumatic aortic valve insufficiency 10/04/2018   • Non-rheumatic mitral regurgitation 10/04/2018   • PVC's (premature ventricular contractions) 2018      LOS (days): 4  Geometric Mean LOS (GMLOS) (days): 3 40  Days to GMLOS:-0 4     OBJECTIVE:  Risk of Unplanned Readmission Score: 8 12         Current admission status: Inpatient   Preferred Pharmacy:   [address] [facility] [phone] [address]  Phone: [phone] Fax: [phone]    Primary Care Provider: Sheron Weller DO    Primary Insurance: MEDICARE  Secondary Insurance: AARP    DISCHARGE DETAILS:    Continuing to follow patient  List of approved acute rehab provided to patient and his wife and they have decided on rehab at [facility] [address] [facility] transport at 3:30 through roundtrip,  Notified patient, his wife, Mirnameaghan Portal his nurse and [facility] Acute  Rehab

## 2022-11-01 ENCOUNTER — TELEPHONE (OUTPATIENT)
Dept: CARDIOLOGY CLINIC | Facility: CLINIC | Age: 82
End: 2022-11-01

## 2022-11-01 NOTE — TELEPHONE ENCOUNTER
Wife called pt was discharged from [place] [address] today, they started him on Lisinopril 30 mg qd  Bp 147/90 before med  After med 117/61    Questioning if should be taking, advised to take as advised, take bp at home for the next few days and call with readings      11/11/2022 appt w/you/

## 2022-11-11 ENCOUNTER — OFFICE VISIT (OUTPATIENT)
Dept: CARDIOLOGY CLINIC | Facility: CLINIC | Age: 82
End: 2022-11-11

## 2022-11-11 VITALS
WEIGHT: 158.6 LBS | DIASTOLIC BLOOD PRESSURE: 62 MMHG | HEIGHT: 71 IN | BODY MASS INDEX: 22.2 KG/M2 | SYSTOLIC BLOOD PRESSURE: 148 MMHG

## 2022-11-11 DIAGNOSIS — I35.1 NONRHEUMATIC AORTIC VALVE INSUFFICIENCY: ICD-10-CM

## 2022-11-11 DIAGNOSIS — I10 PRIMARY HYPERTENSION: Primary | ICD-10-CM

## 2022-11-11 DIAGNOSIS — I34.0 NON-RHEUMATIC MITRAL REGURGITATION: ICD-10-CM

## 2022-11-11 DIAGNOSIS — I49.3 PVC'S (PREMATURE VENTRICULAR CONTRACTIONS): ICD-10-CM

## 2022-11-11 DIAGNOSIS — I77.810 AORTIC ROOT DILATATION (HCC): ICD-10-CM

## 2022-11-11 RX ORDER — ACETAMINOPHEN 650 MG/1
650 SUPPOSITORY RECTAL EVERY 4 HOURS PRN
COMMUNITY

## 2022-11-11 RX ORDER — LISINOPRIL 10 MG/1
30 TABLET ORAL DAILY
COMMUNITY
Start: 2022-11-01

## 2022-11-11 NOTE — PROGRESS NOTES
Cardiology Follow Up    Lynn Levelock  1940  [id number]  [address] [id number] [phone]    1  Primary hypertension     2  PVC's (premature ventricular contractions)     3  Non-rheumatic mitral regurgitation     4  Nonrheumatic aortic valve insufficiency     5  Aortic root dilatation (HCC)         Interval History:  Has had issues with low back pain from lumbar compression factor  No cardiovascular complaints  Denies chest pain breath orthopnea paroxysmal nocturnal dyspnea palpitations or syncope      Patient Active Problem List   Diagnosis   • PVC's (premature ventricular contractions)   • Thoracic aortic aneurysm without rupture   • Nonrheumatic aortic valve insufficiency   • Non-rheumatic mitral regurgitation   • Malignant neoplasm of prostate ([facility] [address] )   • Spinal stenosis of lumbar region with neurogenic claudication   • Lumbar radiculopathy   • Chronic bilateral low back pain without sciatica   • Chronic pain syndrome   • Renal cyst, acquired, left   • Aortic root dilatation (HCC)   • Myofascial pain syndrome   • Stage 3a chronic kidney disease (HCC)   • Lumbar spondylosis   • Sacroiliitis (HCC)   • Sacroiliac joint dysfunction of both sides   • Acute L2 compression fracture      Past Medical History:   Diagnosis Date   • Astigmatism     last assessed 11/15/17   • Cataract     Resolved 11/15/17   • Gross hematuria     Last assessed 01/28/16   • Hematuria     last assessed 01/28/16   • Prostate cancer ([facility] [address] )     last assessed 05/18/17   • Renal calculi     last assessed 08/16/12   • Squamous cell carcinoma    • Vitamin D deficiency     Last assessed 08/19/13     Social History     Socioeconomic History   • Marital status: /Civil Union     Spouse name: Not on file   • Number of children: Not on file   • Years of education: Not on file   • Highest education level: Not on file Occupational History   • Not on file   Tobacco Use   • Smoking status: Never Smoker   • Smokeless tobacco: Never Used   Vaping Use   • Vaping Use: Never used   Substance and Sexual Activity   • Alcohol use: Yes     Comment: social   • Drug use: No   • Sexual activity: Not on file   Other Topics Concern   • Not on file   Social History Narrative   • Not on file     Social Determinants of Health     Financial Resource Strain: Not on file   Food Insecurity: No Food Insecurity   • Worried About Running Out of Food in the Last Year: Never true   • Ran Out of Food in the Last Year: Never true   Transportation Needs: No Transportation Needs   • Lack of Transportation (Medical): No   • Lack of Transportation (Non-Medical):  No   Physical Activity: Not on file   Stress: Not on file   Social Connections: Not on file   Intimate Partner Violence: Not on file   Housing Stability: Low Risk    • Unable to Pay for Housing in the Last Year: No   • Number of Places Lived in the Last Year: 1   • Unstable Housing in the Last Year: No      Family History   Problem Relation Age of Onset   • Other Mother 76        Influenza   • Hypertension Mother    • Heart failure Father 70   • Peripheral vascular disease Father    • Hypertension Father    • Prostate cancer Brother    • Stroke Brother 48   • Hypertension Brother    • Hypertension Family      Past Surgical History:   Procedure Laterality Date   • BREAST EXCISIONAL BIOPSY N/A     Benign breast tumor unsure of side over 50 yrs ago   • CATARACT EXTRACTION Left 08/26/2019   • CATARACT EXTRACTION Right 08/12/2019   • COLONOSCOPY     • CYSTOSCOPY  01/28/2016   • ELBOW SURGERY      Bone chip   • HERNIA REPAIR  2002   • LITHOTRIPSY     • RETROPUBIC PROSTATECTOMY  11/13/2001    Radical with nerve sparing       Current Outpatient Medications:   •  acetaminophen (TYLENOL) 650 mg suppository, Insert 650 mg into the rectum every 4 (four) hours as needed for mild pain Every 6 hours, Disp: , Rfl:   • Docusate Sodium (COLACE PO), Take by mouth daily, Disp: , Rfl:   •  lisinopril (ZESTRIL) 10 mg tablet, Take 30 mg by mouth daily, Disp: , Rfl:   •  methocarbamol (ROBAXIN) 500 mg tablet, Take 1 PO BID PRN for pain/spasms  , Disp: 30 tablet, Rfl: 1  •  metoprolol tartrate (LOPRESSOR) 25 mg tablet, Take 1 tablet (25 mg total) by mouth every 12 (twelve) hours, Disp: 180 tablet, Rfl: 3  •  Mirabegron ER 25 MG TB24, Take 25 mg by mouth in the morning, Disp: 30 tablet, Rfl: 5  •  Multiple Vitamins-Minerals (CENTRUM SILVER 50+MEN PO), Daily, Disp: , Rfl:   •  verapamil (CALAN-SR) 240 mg CR tablet, TAKE 1 AND 1/2 TABLETS BY MOUTH DAILY AS DIRECTED (Patient taking differently: Take 240 mg by mouth in the morning), Disp: 135 tablet, Rfl: 1  •  Naproxen Sodium (ALEVE PO), Take by mouth 2 (two) times a day (Patient not taking: No sig reported), Disp: , Rfl:   Allergies   Allergen Reactions   • Sulfa Antibiotics Rash   • Ciprofloxacin    • Gabapentin Dizziness       Labs:  Admission on 10/21/2022, Discharged on 10/25/2022   Component Date Value   • WBC 10/21/2022 8 84    • RBC 10/21/2022 3 92    • Hemoglobin 10/21/2022 12 3    • Hematocrit 10/21/2022 38 2    • MCV 10/21/2022 97    • MCH 10/21/2022 31 4    • MCHC 10/21/2022 32 2    • RDW 10/21/2022 13 1    • MPV 10/21/2022 10 8    • Platelets 49/51/0424 166    • nRBC 10/21/2022 0    • Neutrophils Relative 10/21/2022 71    • Immat GRANS % 10/21/2022 2    • Lymphocytes Relative 10/21/2022 14    • Monocytes Relative 10/21/2022 10    • Eosinophils Relative 10/21/2022 2    • Basophils Relative 10/21/2022 1    • Neutrophils Absolute 10/21/2022 6 25    • Immature Grans Absolute 10/21/2022 0 21 (A)   • Lymphocytes Absolute 10/21/2022 1 22    • Monocytes Absolute 10/21/2022 0 92    • Eosinophils Absolute 10/21/2022 0 19    • Basophils Absolute 10/21/2022 0 05    • Sodium 10/21/2022 137    • Potassium 10/21/2022 5 4 (A)   • Chloride 10/21/2022 102    • CO2 10/21/2022 29    • ANION GAP 10/21/2022 6    • BUN 10/21/2022 35 (A)   • Creatinine 10/21/2022 1 32 (A)   • Glucose 10/21/2022 92    • Calcium 10/21/2022 8 5    • eGFR 10/21/2022 49    • Color, UA 10/21/2022 Yellow    • Clarity, UA 10/21/2022 Clear    • Specific Gravity, UA 10/21/2022 >=1 030    • pH, UA 10/21/2022 5 5    • Leukocytes, UA 10/21/2022 Negative    • Nitrite, UA 10/21/2022 Negative    • Protein, UA 10/21/2022 Trace (A)   • Glucose, UA 10/21/2022 Negative    • Ketones, UA 10/21/2022 Negative    • Urobilinogen, UA 10/21/2022 <2 0    • Bilirubin, UA 10/21/2022 Negative    • Occult Blood, UA 10/21/2022 Trace (A)   • RBC, UA 10/21/2022 4-10 (A)   • WBC, UA 10/21/2022 10-20 (A)   • Epithelial Cells 10/21/2022 Occasional    • Bacteria, UA 10/21/2022 Occasional    • Urine Culture 10/21/2022 <10,000 cfu/ml     • WBC 10/22/2022 8 29    • RBC 10/22/2022 3 90    • Hemoglobin 10/22/2022 12 1    • Hematocrit 10/22/2022 37 1    • MCV 10/22/2022 95    • MCH 10/22/2022 31 0    • MCHC 10/22/2022 32 6    • RDW 10/22/2022 13 0    • MPV 10/22/2022 11 6    • Platelets 01/12/1763 167    • nRBC 10/22/2022 0    • Neutrophils Relative 10/22/2022 64    • Immat GRANS % 10/22/2022 2    • Lymphocytes Relative 10/22/2022 19    • Monocytes Relative 10/22/2022 11    • Eosinophils Relative 10/22/2022 3    • Basophils Relative 10/22/2022 1    • Neutrophils Absolute 10/22/2022 5 34    • Immature Grans Absolute 10/22/2022 0 16    • Lymphocytes Absolute 10/22/2022 1 55    • Monocytes Absolute 10/22/2022 0 94    • Eosinophils Absolute 10/22/2022 0 26    • Basophils Absolute 10/22/2022 0 04    • Sodium 10/22/2022 138    • Potassium 10/22/2022 4 5    • Chloride 10/22/2022 104    • CO2 10/22/2022 28    • ANION GAP 10/22/2022 6    • BUN 10/22/2022 26 (A)   • Creatinine 10/22/2022 0 99    • Glucose 10/22/2022 94    • Calcium 10/22/2022 9 0    • eGFR 10/22/2022 70    • WBC 10/23/2022 14 73 (A)   • RBC 10/23/2022 4 43    • Hemoglobin 10/23/2022 13 6    • Hematocrit 10/23/2022 41 7    • MCV 10/23/2022 94    • MCH 10/23/2022 30 7    • MCHC 10/23/2022 32 6    • RDW 10/23/2022 12 9    • MPV 10/23/2022 12 3    • Platelets 45/09/2773 201    • nRBC 10/23/2022 0    • Neutrophils Relative 10/23/2022 84 (A)   • Immat GRANS % 10/23/2022 1    • Lymphocytes Relative 10/23/2022 7 (A)   • Monocytes Relative 10/23/2022 8    • Eosinophils Relative 10/23/2022 0    • Basophils Relative 10/23/2022 0    • Neutrophils Absolute 10/23/2022 12 35 (A)   • Immature Grans Absolute 10/23/2022 0 16    • Lymphocytes Absolute 10/23/2022 1 06    • Monocytes Absolute 10/23/2022 1 12    • Eosinophils Absolute 10/23/2022 0 00    • Basophils Absolute 10/23/2022 0 04    • Sodium 10/23/2022 138    • Potassium 10/23/2022 4 8    • Chloride 10/23/2022 102    • CO2 10/23/2022 27    • ANION GAP 10/23/2022 9    • BUN 10/23/2022 29 (A)   • Creatinine 10/23/2022 1 36 (A)   • Glucose 10/23/2022 127    • Calcium 10/23/2022 10 0    • eGFR 10/23/2022 47    • WBC 10/24/2022 13 11 (A)   • RBC 10/24/2022 3 88    • Hemoglobin 10/24/2022 12 2    • Hematocrit 10/24/2022 36 8    • MCV 10/24/2022 95    • MCH 10/24/2022 31 4    • MCHC 10/24/2022 33 2    • RDW 10/24/2022 13 0    • MPV 10/24/2022 12 0    • Platelets 06/28/2669 171    • nRBC 10/24/2022 0    • Neutrophils Relative 10/24/2022 84 (A)   • Immat GRANS % 10/24/2022 1    • Lymphocytes Relative 10/24/2022 8 (A)   • Monocytes Relative 10/24/2022 7    • Eosinophils Relative 10/24/2022 0    • Basophils Relative 10/24/2022 0    • Neutrophils Absolute 10/24/2022 10 90 (A)   • Immature Grans Absolute 10/24/2022 0 15    • Lymphocytes Absolute 10/24/2022 1 10    • Monocytes Absolute 10/24/2022 0 93    • Eosinophils Absolute 10/24/2022 0 01    • Basophils Absolute 10/24/2022 0 02    • Sodium 10/24/2022 142    • Potassium 10/24/2022 4 5    • Chloride 10/24/2022 107    • CO2 10/24/2022 30    • ANION GAP 10/24/2022 5    • BUN 10/24/2022 21    • Creatinine 10/24/2022 1 11    • Glucose 10/24/2022 108 • Calcium 10/24/2022 9 0    • eGFR 10/24/2022 61    • WBC 10/25/2022 13 74 (A)   • RBC 10/25/2022 4 14    • Hemoglobin 10/25/2022 12 6    • Hematocrit 10/25/2022 39 2    • MCV 10/25/2022 95    • MCH 10/25/2022 30 4    • MCHC 10/25/2022 32 1    • RDW 10/25/2022 13 2    • MPV 10/25/2022 12 1    • Platelets 26/93/4325 176    • nRBC 10/25/2022 0    • Neutrophils Relative 10/25/2022 77 (A)   • Immat GRANS % 10/25/2022 1    • Lymphocytes Relative 10/25/2022 14    • Monocytes Relative 10/25/2022 8    • Eosinophils Relative 10/25/2022 0    • Basophils Relative 10/25/2022 0    • Neutrophils Absolute 10/25/2022 10 44 (A)   • Immature Grans Absolute 10/25/2022 0 19    • Lymphocytes Absolute 10/25/2022 1 90    • Monocytes Absolute 10/25/2022 1 13    • Eosinophils Absolute 10/25/2022 0 05    • Basophils Absolute 10/25/2022 0 03    • Sodium 10/25/2022 141    • Potassium 10/25/2022 4 3    • Chloride 10/25/2022 106    • CO2 10/25/2022 28    • ANION GAP 10/25/2022 7    • BUN 10/25/2022 23    • Creatinine 10/25/2022 1 12    • Glucose 10/25/2022 99    • Calcium 10/25/2022 9 1    • eGFR 10/25/2022 60    • SARS-CoV-2 10/25/2022 Negative    • INFLUENZA A PCR 10/25/2022 Negative    • INFLUENZA B PCR 10/25/2022 Negative    • RSV PCR 10/25/2022 Negative    Appointment on 07/19/2022   Component Date Value   • Cholesterol 07/19/2022 214 (A)   • Triglycerides 07/19/2022 85    • HDL, Direct 07/19/2022 59    • LDL Calculated 07/19/2022 138 (A)   • Non-HDL-Chol (CHOL-HDL) 07/19/2022 155    • Sodium 07/19/2022 141    • Potassium 07/19/2022 4 3    • Chloride 07/19/2022 110 (A)   • CO2 07/19/2022 29    • ANION GAP 07/19/2022 2 (A)   • BUN 07/19/2022 26 (A)   • Creatinine 07/19/2022 1 19    • Glucose, Fasting 07/19/2022 91    • Calcium 07/19/2022 9 3    • AST 07/19/2022 19    • ALT 07/19/2022 20    • Alkaline Phosphatase 07/19/2022 58    • Total Protein 07/19/2022 7 3    • Albumin 07/19/2022 3 8    • Total Bilirubin 07/19/2022 0 60    • eGFR 07/19/2022 56    • WBC 07/19/2022 6 52    • RBC 07/19/2022 4 21    • Hemoglobin 07/19/2022 12 8    • Hematocrit 07/19/2022 40 0    • MCV 07/19/2022 95    • MCH 07/19/2022 30 4    • MCHC 07/19/2022 32 0    • RDW 07/19/2022 14 0    • MPV 07/19/2022 12 7    • Platelets 40/06/0646 163    • nRBC 07/19/2022 0    • Neutrophils Relative 07/19/2022 56    • Immat GRANS % 07/19/2022 1    • Lymphocytes Relative 07/19/2022 26    • Monocytes Relative 07/19/2022 13 (A)   • Eosinophils Relative 07/19/2022 3    • Basophils Relative 07/19/2022 1    • Neutrophils Absolute 07/19/2022 3 60    • Immature Grans Absolute 07/19/2022 0 08    • Lymphocytes Absolute 07/19/2022 1 71    • Monocytes Absolute 07/19/2022 0 87    • Eosinophils Absolute 07/19/2022 0 22    • Basophils Absolute 07/19/2022 0 04    • PSA, Diagnostic 07/19/2022 <0 1      Imaging: XR spine lumbar 2 or 3 views injury    Result Date: 10/22/2022  Narrative: LUMBAR SPINE INDICATION:   upright XR WITH LSO brace for baseline  COMPARISON:  07/13/2021 VIEWS:  XR SPINE LUMBAR 2 OR 3 VIEWS INJURY Images: 4 FINDINGS: There are 5 non rib bearing lumbar vertebral bodies  L2 compression fracture  New since plain films of 07/13/2021 Levoscoliosis  Moderate spurring and disc space narrowing throughout the lumbar spine  The pedicles appear intact  Numerous surgical clips in the pelvis  Contrast material in the kidneys and bladder from a CT scan  Impression: Degenerative changes  Levoscoliosis  L2 compression fracture  Workstation performed: [id number]     CT recon only lumbar spine    Result Date: 10/21/2022  Narrative: CT LUMBAR SPINE INDICATION:   back pain  COMPARISON:  MRI of the lumbar spine from July 13, 2021 and x-ray of the lumbar spine from that same day  TECHNIQUE: Axial CT examination of the lumbar spine was obtained utilizing reconstructed images from CT of the chest, abdomen and pelvis performed the same day  Images were reformatted in the sagittal and coronal planes  This examination, like all CT scans performed in the [facility], was performed utilizing techniques to minimize radiation dose exposure, including the use of iterative reconstruction and automated exposure control  FINDINGS: ALIGNMENT: 5 lumbar type vertebral bodies are noted  Levoscoliotic curvature to the lumbar spine with the apex at the L3 level  There is mild rightward lateral subluxation of L2 on L3 and mild leftward lateral subluxation of L4 and L5  VERTEBRAL BODIES: Diffuse osteoporosis  Compression fracture deformities are noted at the L2 and L3 levels  There is approximately 40-50% height loss at L2 with a large Schmorl's node deformity  A subchondral lucency is noted with extension to the superior endplate  There is bony retropulsion of the superior endplate resulting in moderate spinal stenosis and suggestion of ventral cord/conus indentation  There is vacuum gas phenomenon in the L2-3 and L3-4 disc spaces  There is approximately 15% left-sided L3 vertebral body height loss with a smooth superior endplate concavity and a subjacent curvilinear region of impacted trabecula  There are no associated cortical lucent areas within the L3 vertebral body  These findings are new when comparing to the MRI study of July 13, 2021  DEGENERATIVE CHANGES: T12-L1: Shallow left foraminal and far lateral protrusion  No significant spinal canal or foraminal stenosis  L1-L2: Left foraminal protrusion, bony retropulsion of the superior endplate with moderate spinal canal stenosis  No foraminal nerve root impingement  Mild bilateral facet arthropathy  L2-L3: Disc bulge with a left foraminal protrusion  Moderate bilateral facet arthropathy with mild to moderate right foraminal stenosis  Mild spinal stenosis  No foraminal stenosis  L3-L4: Disc space narrowing, intervertebral discal gas, endplate sclerosis and osteophytosis, mostly right-sided with right greater than left facet arthropathy  Circumferential disc bulge and mild buckling of ligamentum flavum resulting in mild spinal stenosis  No foraminal nerve root impingement  The right far lateral disc osteophyte complex is abutting the exited L3 nerve root  L4-L5: Intravertebral discal gas and disc bulging with bilateral facet arthropathy with right greater than left facet spurring  There is right greater than left subarticular and lateral recess stenosis  There is moderate to severe spinal stenosis, unchanged from the prior MRI study with moderate left and mild right foraminal encroachment  L5-S1: Intravertebral discal gas and left greater than right facet arthropathy with a broad central to right foraminal protrusion resulting in right inferior foraminal encroachment  Left neural foramina remains patent  Mild bilateral subarticular recess stenosis  The SI joints appear partially fused  PREVERTEBRAL AND PARASPINAL SOFT TISSUES: No prevertebral or paravertebral soft tissue abnormality  Bilateral renal cysts  Punctate nonobstructing right renal calculus  Aortic atherosclerotic disease  Please see the separate CT abdomen and pelvis study  for additional extraspinal findings  Miscellaneous: Sclerotic lesion within the left T11 rib, presumably related to the patient's history of prostate cancer  Please note that the T12 ribs are thought to be hypoplastic  Impression: Scoliosis and multilevel spondylosis as well as osteoporosis with compression fracture deformities at L2 and L3  The L2 compression fracture deformity appears to be acute with bony retropulsion of the superior endplate resulting in moderate spinal stenosis  The L3 compression fracture deformity is less severe and appears to be chronic  Follow-up MR imaging could be utilized for additional assessment of the marrow and the distal thoracic cord/conus  Multilevel spondylosis with stable moderate to severe L4-5 spinal stenosis    Additional spondylotic changes as described above  Sclerotic lesion within the left T11 rib, presumably related to patient's history of prostate cancer  Please see the separate CT abdomen and pelvis report for additional extraspinal findings  I personally discussed this study with Silvio Sever on 10/21/2022 at 4:43 PM  Workstation performed: [id number]     CT abdomen pelvis w contrast    Result Date: 10/21/2022  Narrative: CT ABDOMEN AND PELVIS WITH IV CONTRAST INDICATION:     "Pt with chronic right lower back pain, pt has been on medrol dose pack, was started on robaxin and tramadol 10/17/22 when he [facility], here with wife today after Dr Camara People recommended ER visit since pain is continued  Pt denies new injury  Denies trauma, denies loss of bowel or bladder control" COMPARISON:  None  TECHNIQUE:  CT examination of the abdomen and pelvis was performed  Axial, sagittal, and coronal 2D reformatted images were created from the source data and submitted for interpretation  Radiation dose length product (DLP) for this visit:  625 mGy-cm   This examination, like all CT scans performed in the [facility], was performed utilizing techniques to minimize radiation dose exposure, including the use of iterative reconstruction and automated exposure control  IV Contrast:  100 mL of iohexol (OMNIPAQUE) Enteric Contrast:  Enteric contrast was not administered  FINDINGS: ABDOMEN LOWER CHEST:  No clinically significant abnormality identified in the visualized lower chest  LIVER/BILIARY TREE:  Unremarkable  GALLBLADDER:  No calcified gallstones  No pericholecystic inflammatory change  SPLEEN:  Unremarkable  PANCREAS:  Unremarkable  ADRENAL GLANDS:  Unremarkable  KIDNEYS/URETERS:  Simple bilateral renal cysts  Nonobstructive 3 mm right upper pole and 2 mm left lower pole renal calculi  No hydronephrosis  STOMACH AND BOWEL:  Prominent colonic stool suggestive of constipation  No bowel obstruction or bowel pneumatosis   APPENDIX:  No findings to suggest appendicitis  ABDOMINOPELVIC CAVITY:  No ascites  No pneumoperitoneum  No lymphadenopathy  VESSELS:  Unremarkable for patient's age  PELVIS REPRODUCTIVE ORGANS:  Unremarkable for patient's age  URINARY BLADDER:  Unremarkable  ABDOMINAL WALL/INGUINAL REGIONS:  Unremarkable  OSSEOUS STRUCTURES:  Acute L2 compression deformity  See the separate CT lumbar spine report  Left posterior 11th rib bone island unchanged since 2008     Impression: Acute L2 compression deformity  See the separate CT L-spine report  No acute inflammatory changes in the abdomen or pelvis  Prominent colonic stool suggestive of constipation  Workstation performed: [id number]       Review of Systems:  Review of Systems   Constitutional: Negative for fatigue  HENT: Negative for nosebleeds  Eyes: Negative for redness  Respiratory: Negative for chest tightness and shortness of breath  Cardiovascular: Negative for chest pain, palpitations and leg swelling  Gastrointestinal: Positive for constipation  Negative for abdominal pain  Endocrine: Negative for polyuria  Genitourinary: Negative for urgency  Musculoskeletal: Positive for back pain  Negative for arthralgias  Skin: Negative for rash  Neurological: Negative for dizziness and syncope  Psychiatric/Behavioral: Negative for confusion and sleep disturbance  The patient is not nervous/anxious  Physical Exam:  Physical Exam  Vitals and nursing note reviewed  Constitutional:       Appearance: Normal appearance  HENT:      Head: Normocephalic and atraumatic  Nose: Nose normal       Mouth/Throat:      Mouth: Mucous membranes are moist    Eyes:      Conjunctiva/sclera: Conjunctivae normal    Cardiovascular:      Rate and Rhythm: Normal rate and regular rhythm  Pulmonary:      Effort: Pulmonary effort is normal       Breath sounds: Normal breath sounds  Abdominal:      Palpations: Abdomen is soft  Musculoskeletal:         General: Normal range of motion  Cervical back: Normal range of motion  Right lower leg: Edema present  Left lower leg: Edema present  Skin:     General: Skin is warm and dry  Neurological:      General: No focal deficit present  Mental Status: He is alert and oriented to person, place, and time  Psychiatric:         Mood and Affect: Mood normal          Discussion/Summary:  Blood pressure was elevated while at [facility]  He was placed on lisinopril  They brought a blood pressure diary today and his blood pressure is higher in the morning prior to taking his medications  He takes metoprolol 25 mg daily  I have asked him to take 20 mg of lisinopril in the morning and 10 in the evening in addition he takes verapamil once daily  She would also been measuring the blood pressure pre medication  I asked her to measure it an hour or 2 after he takes his medication  He is no longer on nonsteroidal   No PVCs on exam today  Known moderate aortic and mitral insufficiency from echocardiogram 322 without change normal left ventricular size and function  Stable mildly dilated ascending aorta  I will see him again in 6 months

## 2022-11-14 ENCOUNTER — OFFICE VISIT (OUTPATIENT)
Dept: PAIN MEDICINE | Facility: CLINIC | Age: 82
End: 2022-11-14

## 2022-11-14 VITALS
DIASTOLIC BLOOD PRESSURE: 68 MMHG | TEMPERATURE: 97.9 F | WEIGHT: 158 LBS | HEART RATE: 66 BPM | SYSTOLIC BLOOD PRESSURE: 132 MMHG | HEIGHT: 71 IN | BODY MASS INDEX: 22.12 KG/M2

## 2022-11-14 DIAGNOSIS — G89.4 CHRONIC PAIN SYNDROME: Primary | ICD-10-CM

## 2022-11-14 DIAGNOSIS — S32.020D COMPRESSION FRACTURE OF L2 VERTEBRA WITH ROUTINE HEALING: ICD-10-CM

## 2022-11-14 DIAGNOSIS — M54.50 CHRONIC BILATERAL LOW BACK PAIN WITHOUT SCIATICA: ICD-10-CM

## 2022-11-14 DIAGNOSIS — G89.29 CHRONIC BILATERAL LOW BACK PAIN WITHOUT SCIATICA: ICD-10-CM

## 2022-11-14 DIAGNOSIS — M79.18 MYOFASCIAL PAIN SYNDROME: ICD-10-CM

## 2022-11-14 DIAGNOSIS — M47.816 LUMBAR SPONDYLOSIS: ICD-10-CM

## 2022-11-14 RX ORDER — METHOCARBAMOL 500 MG/1
TABLET, FILM COATED ORAL
Qty: 75 TABLET | Refills: 1 | Status: SHIPPED | OUTPATIENT
Start: 2022-11-14

## 2022-11-14 NOTE — PROGRESS NOTES
Assessment:  1  Chronic pain syndrome    2  Chronic bilateral low back pain without sciatica    3  Myofascial pain syndrome    4  Compression fracture of L2 vertebra with routine healing    5  Lumbar spondylosis        Plan:  While the patient was in the office today, I did have a thorough conversation with the patient regarding their chronic pain syndrome, symptoms, medication regimen, and treatment plan  I discussed with the patient and his wife that at this point in time since it does appear that he is not a good candidate for a vertebroplasty or kyphoplasty, the best we can do is give his body and compression fracture time to heal and I explained to them that realistically it could take several months for him to see more consistent and steady improvement in his pain  The patient and his wife were agreeable and verbalized understanding  In the meantime, I explained to the patient and his wife that as long as he is not taking more than 3000 mg of Tylenol a day I feel it is reasonable appropriate, especially before physical therapy to take a 1000 mg and then again no more than 3000 mg total for the rest of the day prior to physical therapy or 1st thing in the morning and to continue to use the methocarbamol, but we will temporarily increase it to t i d  dosing as needed for pain and spasms  The patient and his wife were agreeable and verbalized understanding  I thoroughly encouraged the patient continue with physical therapy and stressed the importance of slowly and steadily increasing his activity as tolerated and to slowly and steadily over time try to wean his use or dependence on the Aspen quick draw brace  The patient and his wife verbalized an understanding  The patient will follow-up in 8 weeks for medication prescription refill and reevaluation  The patient was advised to contact the office should their symptoms worsen in the interim  The patient was agreeable and verbalized an understanding  History of Present Illness: The patient is a 79 y o  male last seen on 10/17/2022 who presents for a follow up office visit in regards to chronic low back pain secondary to new L2 compression fracture with lumbar spondylosis and myofascial pain  The patient currently reports that since his last office visit he did go to the ER and was admitted and then went to outpatient rehab for rehabilitation and although overall there is definite improvement of where his pain was at his last office visit he continues to utilize the [employer] quick draw brace all the time when he is out of bed and is still doing physical therapy 3 days a week and was doing better when he was in rehab and it seems his pain has been worse when he has returned home but he is also not been as active at home as he was in rehab  The patient and his wife report that he has been utilizing 650 mg of Tylenol twice a day as well as methocarbamol twice a day with moderate overall relief, without any significant side effects as the previously prescribed tramadol was not helpful  The patient and his wife present today for re-evaluation and to discuss treatment plan options  Current pain medications includes:  Methocarbamol 500 mg b i d  p r n  for pain and spasms  The patient reports that this regimen is providing minimal to moderate pain relief  The patient is reporting no side effects from this pain medication regimen  I have personally reviewed and/or updated the patient's past medical history, past surgical history, family history, social history, current medications, allergies, and vital signs today  Review of Systems:    Review of Systems   Respiratory: Negative for shortness of breath  Cardiovascular: Negative for chest pain  Gastrointestinal: Positive for constipation  Negative for diarrhea, nausea and vomiting  Musculoskeletal: Positive for gait problem  Negative for arthralgias, joint swelling and myalgias     Skin: Negative for rash  Neurological: Negative for dizziness, seizures and weakness  All other systems reviewed and are negative  Past Medical History:   Diagnosis Date   • Astigmatism     last assessed 11/15/17   • Cataract     Resolved 11/15/17   • Gross hematuria     Last assessed 01/28/16   • Hematuria     last assessed 01/28/16   • Prostate cancer ([facility] [address] )     last assessed 05/18/17   • Renal calculi     last assessed 08/16/12   • Squamous cell carcinoma    • Vitamin D deficiency     Last assessed 08/19/13       Past Surgical History:   Procedure Laterality Date   • BREAST EXCISIONAL BIOPSY N/A     Benign breast tumor unsure of side over 50 yrs ago   • CATARACT EXTRACTION Left 08/26/2019   • CATARACT EXTRACTION Right 08/12/2019   • COLONOSCOPY     • CYSTOSCOPY  01/28/2016   • ELBOW SURGERY      Bone chip   • HERNIA REPAIR  2002   • LITHOTRIPSY     • RETROPUBIC PROSTATECTOMY  11/13/2001    Radical with nerve sparing       Family History   Problem Relation Age of Onset   • Other Mother 76        Influenza   • Hypertension Mother    • Heart failure Father 70   • Peripheral vascular disease Father    • Hypertension Father    • Prostate cancer Brother    • Stroke Brother 48   • Hypertension Brother    • Hypertension Family        Social History     Occupational History   • Not on file   Tobacco Use   • Smoking status: Never Smoker   • Smokeless tobacco: Never Used   Vaping Use   • Vaping Use: Never used   Substance and Sexual Activity   • Alcohol use: Yes     Comment: social   • Drug use: No   • Sexual activity: Not on file         Current Outpatient Medications:   •  Docusate Sodium (COLACE PO), Take by mouth daily, Disp: , Rfl:   •  lisinopril (ZESTRIL) 10 mg tablet, Take 30 mg by mouth daily, Disp: , Rfl:   •  methocarbamol (ROBAXIN) 500 mg tablet, Take 1 PO TID PRN for pain/spasms  , Disp: 75 tablet, Rfl: 1  •  metoprolol tartrate (LOPRESSOR) 25 mg tablet, Take 1 tablet (25 mg total) by mouth every 12 (twelve) hours, Disp: 180 tablet, Rfl: 3  •  Mirabegron ER 25 MG TB24, Take 25 mg by mouth in the morning, Disp: 30 tablet, Rfl: 5  •  Multiple Vitamins-Minerals (CENTRUM SILVER 50+MEN PO), Daily, Disp: , Rfl:   •  verapamil (CALAN-SR) 240 mg CR tablet, TAKE 1 AND 1/2 TABLETS BY MOUTH DAILY AS DIRECTED (Patient taking differently: Take 240 mg by mouth in the morning), Disp: 135 tablet, Rfl: 1  •  acetaminophen (TYLENOL) 650 mg suppository, Insert 650 mg into the rectum every 4 (four) hours as needed for mild pain Every 6 hours, Disp: , Rfl:     Allergies   Allergen Reactions   • Sulfa Antibiotics Rash   • Ciprofloxacin    • Gabapentin Dizziness       Physical Exam:    /68 (BP Location: Left arm, Patient Position: Sitting, Cuff Size: Standard)   Pulse 66   Temp 97 9 °F (36 6 °C)   Ht 5' 11" (1 803 m)   Wt 71 7 kg (158 lb)   BMI 22 04 kg/m²     Constitutional:normal, well developed, well nourished, alert, in no distress and non-toxic and no overt pain behavior  Eyes:anicteric  HEENT:grossly intact  Neck:supple, symmetric, trachea midline and no masses   Pulmonary:even and unlabored  Cardiovascular:No edema or pitting edema present  Skin:Normal without rashes or lesions and well hydrated  Psychiatric:Mood and affect appropriate  Neurologic:Cranial Nerves II-XII grossly intact  Musculoskeletal:The patient's gait is slightly antalgic, but steady with the use of a rolling walker and Aspen quick draw brace  Imaging  No orders to display         No orders of the defined types were placed in this encounter

## 2022-11-22 DIAGNOSIS — I10 PRIMARY HYPERTENSION: Primary | ICD-10-CM

## 2022-11-22 RX ORDER — LISINOPRIL 10 MG/1
30 TABLET ORAL DAILY
Qty: 90 TABLET | Refills: 8 | Status: SHIPPED | OUTPATIENT
Start: 2022-11-22

## 2022-11-30 ENCOUNTER — OFFICE VISIT (OUTPATIENT)
Dept: FAMILY MEDICINE CLINIC | Facility: CLINIC | Age: 82
End: 2022-11-30

## 2022-11-30 VITALS
WEIGHT: 157 LBS | HEIGHT: 69 IN | DIASTOLIC BLOOD PRESSURE: 68 MMHG | RESPIRATION RATE: 16 BRPM | SYSTOLIC BLOOD PRESSURE: 118 MMHG | BODY MASS INDEX: 23.25 KG/M2 | OXYGEN SATURATION: 99 % | HEART RATE: 67 BPM | TEMPERATURE: 97 F

## 2022-11-30 DIAGNOSIS — N18.31 STAGE 3A CHRONIC KIDNEY DISEASE (HCC): ICD-10-CM

## 2022-11-30 DIAGNOSIS — S34.109D CLOSED FRACTURE OF LUMBAR VERTEBRA WITH ROUTINE HEALING AND SPINAL CORD INJURY, SUBSEQUENT ENCOUNTER (HCC): Primary | ICD-10-CM

## 2022-11-30 DIAGNOSIS — M48.062 SPINAL STENOSIS OF LUMBAR REGION WITH NEUROGENIC CLAUDICATION: ICD-10-CM

## 2022-11-30 DIAGNOSIS — N39.41 URGE INCONTINENCE OF URINE: ICD-10-CM

## 2022-11-30 DIAGNOSIS — S32.009D CLOSED FRACTURE OF LUMBAR VERTEBRA WITH ROUTINE HEALING AND SPINAL CORD INJURY, SUBSEQUENT ENCOUNTER (HCC): Primary | ICD-10-CM

## 2022-11-30 DIAGNOSIS — R29.898 COGWHEEL RIGIDITY: ICD-10-CM

## 2022-11-30 DIAGNOSIS — S32.020D COMPRESSION FRACTURE OF L2 VERTEBRA WITH ROUTINE HEALING: ICD-10-CM

## 2022-11-30 DIAGNOSIS — M81.6 LOCALIZED OSTEOPOROSIS, UNSPECIFIED PATHOLOGICAL FRACTURE PRESENCE: ICD-10-CM

## 2022-11-30 RX ORDER — OXYBUTYNIN CHLORIDE 5 MG/1
5 TABLET, EXTENDED RELEASE ORAL DAILY
COMMUNITY
Start: 2022-11-01 | End: 2022-11-30

## 2022-11-30 NOTE — PROGRESS NOTES
Name: Nora Lawrence      : 1940      MRN: [id number]  Encounter Provider: Danial Romero DO  Encounter Date: 2022   Encounter department: [facility] PRIMARY CARE    Assessment & Plan     1  Closed fracture of lumbar vertebra with routine healing and spinal cord injury, subsequent encounter ([facility] )    2  Compression fracture of L2 vertebra with routine healing    3  Localized osteoporosis, unspecified pathological fracture presence    4  Cogwheel rigidity    5  Stage 3a chronic kidney disease ([facility] )    6  Urge incontinence of urine  -     Mirabegron ER 25 MG TB24; Take 25 mg by mouth in the morning    7  Spinal stenosis of lumbar region with neurogenic claudication    Will speak to pain management about ? Re-evaluation for vertebroplasty/kyphoplasty        Subjective     Patient is an 23-year-old male seen today and had a TCM from hospitalization for refractory pain related to will was eventually diagnosed as acute compression fracture lumbar 2  And 3  Chief Complaint   Patient presents with   • Follow-up     Pt is here for follow up at [facility] [place]    Patient was admitted to [address] [place] [address]  -   "Nora Lawrence is a 79 y o  male patient who originally presented to the hospital on       Admission Orders (From admission, onward)       Ordered         10/21/22 1720   INPATIENT ADMISSION  Once                       due to acute L2 compression fracture  Patient had slow and steady improvement of his pain  Patient was seen by Neurosurgery go consult and determined not to have any acute surgical needs  He will go to acute rehab for further reconditioning    [facility] -10/25- 11/1---PT/OT"  Has history of pain and presented after 2 weeks of unrelenting pain---NO NEW trauma(Patient had been unable to ambulate independently for two weeks prior to admission)    IMPRESSION:CTCT LUMBAR SPINE 10/21/2022   Scoliosis and multilevel spondylosis as well as osteoporosis with compression fracture deformities at L2 and L3  The L2 compression fracture deformity appears to be acute with bony retropulsion of the superior endplate resulting in moderate spinal   stenosis  The L3 compression fracture deformity is less severe and appears to be chronic  Follow-up MR imaging could be utilized for additional assessment of the marrow and the distal thoracic cord/conus  Multilevel spondylosis with stable moderate to severe L4-5 spinal stenosis  Additional spondylotic changes as described above    Sclerotic lesion within the left T11 rib, presumably related to patient's history of prostate cancer         Saw chronic pain specialist on November 14th, Dav Fan [facility], for follow up- wearing a brace   Will be doing PT/OT 3 times week-patient only taking acetaminophen at this time  His pain is not well controlled  There was a discussion at both wife and patient recall with regards to vertebroplasty or kyphoplasty (their recollection no  very clear)>Not sure why he would not be a candidate , they are not sure they clearly understood procedures details    I did reach out to specialist and responses as follows:  Dr John Healy,   Thank you for reaching out  So when I read through the notes from his admission Neurosurgery or IR typically will deem if a patient is a kyphoplasty or vertebroplasty candidate and they did not recommend it when he as in patient  Also, when I reviewed what was entailed with those procedures and explained it to the patient and his wife, he did not seem interested  I will gladly refer him to IR for a consultation if that is what he would like to do, but in the mean time it just takes time to heal and we will continue to do our best to try to manage his pain without sig or intolerable side effects       Dav   ----- Message -----   From: Monster Hilario DO   Sent: 78/88/2901   3:47 PM EST   To: MARE Hill Subject: vertebroplasty/kyphoplasty                       Seeing Benoit Gonzalez today in follow-up   Did read your note and we did discuss vertebroplasty kyphoplasty and I am just curious why is the patient not necessarily a candidate for this?  Currently is not really feeling improvement despite the fact of continuing PT and OT 3 times a week with lacey Dottie Mo wondering if we could revisit the possibility of this procedure or if you can tell me why he would not be a candidate? Review of Systems   Constitutional: Positive for fatigue  Gastrointestinal: Constipation: some  Genitourinary: Urgency: needs refill on mirbetriq  Musculoskeletal: Positive for back pain (stiffness,slow)       Has     Past Medical History:   Diagnosis Date   • Astigmatism     last assessed 11/15/17   • Cataract     Resolved 11/15/17   • Gross hematuria     Last assessed 01/28/16   • Hematuria     last assessed 01/28/16   • Prostate cancer ([facility] [address] )     last assessed 05/18/17   • Renal calculi     last assessed 08/16/12   • Squamous cell carcinoma    • Vitamin D deficiency     Last assessed 08/19/13     Past Surgical History:   Procedure Laterality Date   • BREAST EXCISIONAL BIOPSY N/A     Benign breast tumor unsure of side over 50 yrs ago   • CATARACT EXTRACTION Left 08/26/2019   • CATARACT EXTRACTION Right 08/12/2019   • COLONOSCOPY     • CYSTOSCOPY  01/28/2016   • ELBOW SURGERY      Bone chip   • HERNIA REPAIR  2002   • LITHOTRIPSY     • RETROPUBIC PROSTATECTOMY  11/13/2001    Radical with nerve sparing     Family History   Problem Relation Age of Onset   • Other Mother 76        Influenza   • Hypertension Mother    • Heart failure Father 70   • Peripheral vascular disease Father    • Hypertension Father    • Prostate cancer Brother    • Stroke Brother 48   • Hypertension Brother    • Hypertension Family      Social History     Socioeconomic History   • Marital status: /Civil Union     Spouse name: None   • Number of children: None   • Years of education: None   • Highest education level: None   Occupational History   • None   Tobacco Use   • Smoking status: Never   • Smokeless tobacco: Never   Vaping Use   • Vaping Use: Never used   Substance and Sexual Activity   • Alcohol use: Yes     Comment: social   • Drug use: No   • Sexual activity: None   Other Topics Concern   • None   Social History Narrative   • None     Social Determinants of Health     Financial Resource Strain: Not on file   Food Insecurity: No Food Insecurity   • Worried About Running Out of Food in the Last Year: Never true   • Ran Out of Food in the Last Year: Never true   Transportation Needs: No Transportation Needs   • Lack of Transportation (Medical): No   • Lack of Transportation (Non-Medical): No   Physical Activity: Not on file   Stress: Not on file   Social Connections: Not on file   Intimate Partner Violence: Not on file   Housing Stability: Low Risk    • Unable to Pay for Housing in the Last Year: No   • Number of Places Lived in the Last Year: 1   • Unstable Housing in the Last Year: No     Current Outpatient Medications on File Prior to Visit   Medication Sig   • ACETAMINOPHEN EXTRA STRENGTH PO Take 1 capsule by mouth every 6 (six) hours as needed   • Docusate Sodium (COLACE PO) Take by mouth daily   • lisinopril (ZESTRIL) 10 mg tablet Take 3 tablets (30 mg total) by mouth daily   • methocarbamol (ROBAXIN) 500 mg tablet Take 1 PO TID PRN for pain/spasms     • metoprolol tartrate (LOPRESSOR) 25 mg tablet Take 1 tablet (25 mg total) by mouth every 12 (twelve) hours   • Multiple Vitamins-Minerals (CENTRUM SILVER 50+MEN PO) Daily   • verapamil (CALAN-SR) 240 mg CR tablet TAKE 1 AND 1/2 TABLETS BY MOUTH DAILY AS DIRECTED (Patient taking differently: Take 240 mg by mouth in the morning)     Allergies   Allergen Reactions   • Sulfa Antibiotics Rash   • Ciprofloxacin    • Gabapentin Dizziness     Immunization History   Administered Date(s) Administered   • COVID-19 MODERNA VACC 0 5 ML IM 01/21/2021, 02/19/2021   • Influenza Split High Dose Preservative Free IM 10/05/2012, 09/17/2013, 09/15/2014, 09/28/2015, 10/03/2016, 10/10/2017   • Influenza, high dose seasonal 0 7 mL 09/28/2018, 10/23/2019, 10/06/2020, 10/15/2021, 10/22/2022   • Pneumococcal Conjugate 13-Valent 10/28/2015   • Pneumococcal Polysaccharide PPV23 09/18/2008, 11/29/2018   • Zoster 08/18/2011, 04/09/2013       Objective     /68   Pulse 67   Temp (!) 97 °F (36 1 °C) (Temporal)   Resp 16   Ht 5' 8 5" (1 74 m)   Wt 71 2 kg (157 lb)   SpO2 99%   BMI 23 52 kg/m²     Physical Exam  Constitutional:       Appearance: Normal appearance  Ill appearance: chronic  Comments: 79year old appear stated age, flat facies   HENT:      Mouth/Throat:      Mouth: Mucous membranes are moist    Eyes:      Pupils: Pupils are equal, round, and reactive to light  Cardiovascular:      Rate and Rhythm: Normal rate and regular rhythm  Pulmonary:      Effort: Pulmonary effort is normal       Breath sounds: Normal breath sounds  Abdominal:      General: Bowel sounds are normal       Palpations: Abdomen is soft  Musculoskeletal:      Comments: kyphotic gait, slow , shuffling  I deferred full MSK exam with brace on   Skin:     General: Skin is warm  Neurological:      Mental Status: He is alert and oriented to person, place, and time        Gait: Gait abnormal       Comments: cogwheeling UE??/ features of parkinsonism       Otelia Judith, DO

## 2022-12-01 ENCOUNTER — TELEPHONE (OUTPATIENT)
Dept: FAMILY MEDICINE CLINIC | Facility: CLINIC | Age: 82
End: 2022-12-01

## 2022-12-01 NOTE — TELEPHONE ENCOUNTER
I called and spoke with the Pt's wife and made her aware, she is appreciative of your help, she stated her and Abraham Dobbins will discuss and she will call back and let us know their decision

## 2022-12-02 ENCOUNTER — TELEPHONE (OUTPATIENT)
Dept: NEUROSURGERY | Facility: CLINIC | Age: 82
End: 2022-12-02

## 2022-12-02 NOTE — TELEPHONE ENCOUNTER
12/2/22 - PT'S SPOUSE CALLED TO MAKE NP APPT  W/[facility] QUIGLEYPINEFRACTURE - NO REFERRAL IN CHART     12/3/22 - [address]   MRI DANNPINE     INFORMED PT  AN  WILL BE IN CONTACT

## 2022-12-03 ENCOUNTER — HOSPITAL ENCOUNTER (OUTPATIENT)
Dept: MRI IMAGING | Facility: HOSPITAL | Age: 82
Discharge: HOME | End: 2022-12-03

## 2022-12-03 DIAGNOSIS — M48.56XD: ICD-10-CM

## 2022-12-07 ENCOUNTER — TELEPHONE (OUTPATIENT)
Dept: FAMILY MEDICINE CLINIC | Facility: CLINIC | Age: 82
End: 2022-12-07

## 2022-12-07 NOTE — TELEPHONE ENCOUNTER
Saundra Emmanuel had MRI Saturday 12/3 and he has an appt on 12/12/22 with  Dr Danial Tanenr to  discuss procedure that you talk to him about

## 2022-12-07 NOTE — TELEPHONE ENCOUNTER
Patient's wife Wenceslao Adjutant did call back, stating patient's pain is the same as last week  Patient is currently taking the Tylenol that was recommended      Any questions please advise patient at [phone]

## 2022-12-09 ENCOUNTER — OFFICE VISIT (OUTPATIENT)
Dept: FAMILY MEDICINE CLINIC | Facility: CLINIC | Age: 82
End: 2022-12-09

## 2022-12-09 VITALS
DIASTOLIC BLOOD PRESSURE: 60 MMHG | WEIGHT: 158 LBS | SYSTOLIC BLOOD PRESSURE: 140 MMHG | BODY MASS INDEX: 23.4 KG/M2 | HEIGHT: 69 IN

## 2022-12-09 DIAGNOSIS — M48.062 SPINAL STENOSIS OF LUMBAR REGION WITH NEUROGENIC CLAUDICATION: ICD-10-CM

## 2022-12-09 DIAGNOSIS — R35.0 URINARY FREQUENCY: Primary | ICD-10-CM

## 2022-12-09 LAB
SL AMB  POCT GLUCOSE, UA: NEGATIVE
SL AMB LEUKOCYTE ESTERASE,UA: NEGATIVE
SL AMB POCT BILIRUBIN,UA: NEGATIVE
SL AMB POCT BLOOD,UA: NEGATIVE
SL AMB POCT CLARITY,UA: CLEAR
SL AMB POCT COLOR,UA: YELLOW
SL AMB POCT KETONES,UA: NEGATIVE
SL AMB POCT NITRITE,UA: NEGATIVE
SL AMB POCT PH,UA: 6
SL AMB POCT SPECIFIC GRAVITY,UA: 1.03
SL AMB POCT URINE PROTEIN: NEGATIVE
SL AMB POCT UROBILINOGEN: 0.2

## 2022-12-09 RX ORDER — MELATONIN
1000 DAILY
COMMUNITY

## 2022-12-09 NOTE — ASSESSMENT & PLAN NOTE
Patient urine in office is normal I will send urine for culture Patient to increase fluids Patient to contact office if there is any change

## 2022-12-09 NOTE — PROGRESS NOTES
Name: Rocio Sellers      : 1940      MRN: [id number]  Encounter Provider: Teresita Young DO  Encounter Date: 2022   Encounter department: [address] [place] [address] PRIMARY CARE    Assessment & Plan     Chief Complaint   Patient presents with   • Urinary Frequency     Also,"burning"since 2022 in pm       1  Urinary frequency  Assessment & Plan:  Patient urine in office is normal I will send urine for culture Patient to increase fluids Patient to contact office if there is any change    Orders:  -     POCT urine dip  -     Urine culture    2  Spinal stenosis of lumbar region with neurogenic claudication         Subjective      Patient is here for urinary frequency and some burning that started last night Patient has issues with his bladder and also has spinal stenosis and an L2 compression fracture Patient was up 6 times last night to urinate On 2 occasions he had a bit of burning Now this morning he only urinated one time and ti was normal Patient does not have history of UTI However he has had one or 2 int he past He has no fever or chills Patient has no nausea or vomiting Patient has no flank pain and no hematuria    Urinary Frequency   This is a new problem  Episode onset: started 12 hours ago  The problem occurs intermittently  The problem has been gradually improving  The quality of the pain is described as burning  The pain is at a severity of 1/10  The pain is mild  There has been no fever  He is not sexually active  There is no history of pyelonephritis  Associated symptoms include frequency  Pertinent negatives include no chills, discharge, flank pain, hematuria, hesitancy, nausea, sweats, urgency or vomiting  He has tried nothing for the symptoms  There is no history of catheterization, kidney stones, recurrent UTIs, a single kidney, urinary stasis or a urological procedure  Review of Systems   Constitutional: Negative for chills     Gastrointestinal: Negative for nausea and vomiting  Genitourinary: Positive for frequency  Negative for flank pain, hematuria, hesitancy and urgency  Current Outpatient Medications on File Prior to Visit   Medication Sig   • ACETAMINOPHEN EXTRA STRENGTH PO Take 1 capsule by mouth every 6 (six) hours as needed   • cholecalciferol (VITAMIN D3) 1,000 units tablet Take 1,000 Units by mouth daily 2,000 IU  daily   • Docusate Sodium (COLACE PO) Take by mouth daily   • lisinopril (ZESTRIL) 10 mg tablet Take 3 tablets (30 mg total) by mouth daily   • methocarbamol (ROBAXIN) 500 mg tablet Take 1 PO TID PRN for pain/spasms  • metoprolol tartrate (LOPRESSOR) 25 mg tablet Take 1 tablet (25 mg total) by mouth every 12 (twelve) hours   • Mirabegron ER 25 MG TB24 Take 25 mg by mouth in the morning   • Multiple Vitamins-Minerals (CENTRUM SILVER 50+MEN PO) Daily   • verapamil (CALAN-SR) 240 mg CR tablet TAKE 1 AND 1/2 TABLETS BY MOUTH DAILY AS DIRECTED (Patient taking differently: Take 240 mg by mouth in the morning)       Objective     /60 (BP Location: Right arm, Patient Position: Sitting, Cuff Size: Standard)   Ht 5' 8 5" (1 74 m)   Wt 71 7 kg (158 lb)   BMI 23 67 kg/m²     Physical Exam  Vitals and nursing note reviewed  Constitutional:       Appearance: Normal appearance  Cardiovascular:      Rate and Rhythm: Normal rate and regular rhythm  Pulmonary:      Effort: Pulmonary effort is normal       Breath sounds: Normal breath sounds  Abdominal:      General: Bowel sounds are normal  There is no distension  Palpations: Abdomen is soft  There is no mass  Tenderness: There is no abdominal tenderness  There is no right CVA tenderness, left CVA tenderness, guarding or rebound  Hernia: No hernia is present  Neurological:      General: No focal deficit present  Mental Status: He is alert and oriented to person, place, and time     Psychiatric:         Mood and Affect: Mood normal          Behavior: Behavior normal        Premium Swathi, DO

## 2022-12-10 LAB — BACTERIA UR CULT: NORMAL

## 2022-12-12 ENCOUNTER — OFFICE VISIT (OUTPATIENT)
Dept: NEUROSURGERY | Facility: CLINIC | Age: 82
End: 2022-12-12

## 2022-12-12 VITALS
WEIGHT: 158 LBS | RESPIRATION RATE: 16 BRPM | HEART RATE: 53 BPM | DIASTOLIC BLOOD PRESSURE: 80 MMHG | HEIGHT: 71 IN | TEMPERATURE: 98 F | BODY MASS INDEX: 22.12 KG/M2 | SYSTOLIC BLOOD PRESSURE: 142 MMHG

## 2022-12-12 DIAGNOSIS — S32.020D COMPRESSION FRACTURE OF L2 VERTEBRA WITH ROUTINE HEALING: Primary | ICD-10-CM

## 2022-12-12 DIAGNOSIS — M80.08XA AGE-RELATED OSTEOPOROSIS WITH CURRENT PATHOLOGICAL FRACTURE OF VERTEBRA (HCC): ICD-10-CM

## 2022-12-12 NOTE — ASSESSMENT & PLAN NOTE
Patient seen for evaluation of an L2 compression fracture   · Noted worsening LBP in October 2022  Has hx of spinal stenosis with back pain for 2-3 years  Found to have L2 compression fracture with 8/10 LBP worse after getting out of bed and moving  · Exam: focal low back TTP, HF/KF 4/5 bilaterally, KE/DF/PF 5/5 bilaterally  LT intact, 2+ DTRs, no clonus  Ambulates with walker  Imaging:  · MRI lumbar spine 12/3/22: Moderate benign-appearing compression fracture is unchanged, likely subacute  Mild chronic superior endplate compression at L3  Multilevel degenerative spondylosis with canal stenosis most severe at L4-5  · XR lumbar spine 10/21/22: Degenerative changes  Levoscoliosis  L2 compression fracture  Plan:  · Reviewed images with patient, wife and Dr Suzan Escalante  With ongoing LBP and L2 compression fracture, option of kyphoplasty discussed  · Risks/benefits reviewed extensively and informed consent obtained by Dr Suzan Escalante  · Will schedule L2 kyphoplasty  Will need clearance from PCP and PATs  States that he recently saw his Cardiologist    · Our office will be in contact for procedure date  · Call with any questions or concerns

## 2022-12-12 NOTE — PROGRESS NOTES
Neurosurgery Office Note  Sara Davis 79 y o  male MRN: [id number]      Assessment/Plan     Compression fracture of L2 vertebra with routine healing  Patient seen for evaluation of an L2 compression fracture   · Noted worsening LBP in October 2022  Has hx of spinal stenosis with back pain for 2-3 years  Found to have L2 compression fracture with 8/10 LBP worse after getting out of bed and moving  · Exam: focal low back TTP, HF/KF 4/5 bilaterally, KE/DF/PF 5/5 bilaterally  LT intact, 2+ DTRs, no clonus  Ambulates with walker  Imaging:  · MRI lumbar spine 12/3/22: Moderate benign-appearing compression fracture is unchanged, likely subacute  Mild chronic superior endplate compression at L3  Multilevel degenerative spondylosis with canal stenosis most severe at L4-5  · XR lumbar spine 10/21/22: Degenerative changes  Levoscoliosis  L2 compression fracture  Plan:  · Reviewed images with patient, wife and Dr Timothy Alicia  With ongoing LBP and L2 compression fracture, option of kyphoplasty discussed  · Risks/benefits reviewed extensively and informed consent obtained by Dr Timothy Alicia  · Will schedule L2 kyphoplasty  Will need clearance from PCP and PATs  States that he recently saw his Cardiologist    · Our office will be in contact for procedure date  · Call with any questions or concerns  There are no diagnoses linked to this encounter  I spent 40 minutes with the patient today in which >50% of the time was spent counseling/coordination of care regarding diagnosis, imaging review, symptoms and treatment plan  CHIEF COMPLAINT    Chief Complaint   Patient presents with   • Follow-up     With MRI       HISTORY    History of Present Illness     79y o  year old male     79-year-old patient seen for new evaluation of back pain  States his back pain started about 2 to 3 years ago without precipitating event  Progressively has gotten worse  The pain acutely worsened in October 2022    At worst his pain is an 8/10  Reports that his pain starts as soon as he gets out of bed and is moving around  Laying down relieves the pain  His pain is across his low back, and occasionally if the pain is very bad it will radiate down his hips and lateral thighs  Notes mild weakness in his legs and he has been doing both PT and OT multiple times per week  He is wearing an LSO brace  Has not had a DEXA scan but will be seeing endocrinology soon  History of prostatectomy, no chemo or radiation  See Discussion    REVIEW OF SYSTEMS    Review of Systems   Gastrointestinal: Positive for constipation (controlling with meds regimen)  Genitourinary: Negative  Musculoskeletal: Positive for back pain (across low back and R>L legs, not quite to knee) and gait problem (using walker as needed)  Negative for myalgias  Skin: Negative  Neurological: Positive for weakness (legs)  Negative for numbness  Psychiatric/Behavioral: Negative for sleep disturbance  All other systems reviewed and are negative  Meds/Allergies     Current Outpatient Medications   Medication Sig Dispense Refill   • ACETAMINOPHEN EXTRA STRENGTH PO Take 1 capsule by mouth every 6 (six) hours as needed     • cholecalciferol (VITAMIN D3) 1,000 units tablet Take 1,000 Units by mouth daily 2,000 IU  daily     • Docusate Sodium (COLACE PO) Take by mouth daily     • lisinopril (ZESTRIL) 10 mg tablet Take 3 tablets (30 mg total) by mouth daily 90 tablet 8   • methocarbamol (ROBAXIN) 500 mg tablet Take 1 PO TID PRN for pain/spasms   75 tablet 1   • metoprolol tartrate (LOPRESSOR) 25 mg tablet Take 1 tablet (25 mg total) by mouth every 12 (twelve) hours 180 tablet 3   • Mirabegron ER 25 MG TB24 Take 25 mg by mouth in the morning 30 tablet 5   • Multiple Vitamins-Minerals (CENTRUM SILVER 50+MEN PO) Daily     • verapamil (CALAN-SR) 240 mg CR tablet TAKE 1 AND 1/2 TABLETS BY MOUTH DAILY AS DIRECTED (Patient taking differently: Take 240 mg by mouth in the morning) 135 tablet 1     No current facility-administered medications for this visit  Allergies   Allergen Reactions   • Sulfa Antibiotics Rash   • Ciprofloxacin    • Gabapentin Dizziness   • Medical Tape Rash       PAST HISTORY    Past Medical History:   Diagnosis Date   • Astigmatism     last assessed 11/15/17   • Cataract     Resolved 11/15/17   • Gross hematuria     Last assessed 01/28/16   • Hematuria     last assessed 01/28/16   • Lumbar vertebral fracture (HCC)    • Prostate cancer ([facility] [address] )     last assessed 05/18/17   • Renal calculi     last assessed 08/16/12   • Squamous cell carcinoma    • Vitamin D deficiency     Last assessed 08/19/13       Past Surgical History:   Procedure Laterality Date   • BREAST EXCISIONAL BIOPSY N/A     Benign breast tumor unsure of side over 50 yrs ago   • CATARACT EXTRACTION Left 08/26/2019   • CATARACT EXTRACTION Right 08/12/2019   • COLONOSCOPY     • CYSTOSCOPY  01/28/2016   • ELBOW SURGERY      Bone chip   • HERNIA REPAIR  2002   • LITHOTRIPSY     • RETROPUBIC PROSTATECTOMY  11/13/2001    Radical with nerve sparing       Social History     Tobacco Use   • Smoking status: Never   • Smokeless tobacco: Never   Vaping Use   • Vaping Use: Never used   Substance Use Topics   • Alcohol use: Yes     Comment: social   • Drug use: No       Family History   Problem Relation Age of Onset   • Other Mother 76        Influenza   • Hypertension Mother    • Heart failure Father 70   • Peripheral vascular disease Father    • Hypertension Father    • Prostate cancer Brother    • Stroke Brother 48   • Hypertension Brother    • Hypertension Family          Above history personally reviewed  EXAM    Vitals:Blood pressure 142/80, pulse (!) 53, temperature 98 °F (36 7 °C), temperature source Tympanic, resp  rate 16, height 5' 11" (1 803 m), weight 71 7 kg (158 lb)  ,Body mass index is 22 04 kg/m²  Physical Exam  Vitals reviewed  Constitutional:       General: He is awake  Appearance: Normal appearance  HENT:      Head: Normocephalic and atraumatic  Eyes:      Conjunctiva/sclera: Conjunctivae normal    Cardiovascular:      Rate and Rhythm: Normal rate and regular rhythm  Heart sounds: Normal heart sounds  Pulmonary:      Effort: Pulmonary effort is normal       Breath sounds: Normal breath sounds  Abdominal:      General: There is no distension  Palpations: Abdomen is soft  Tenderness: There is no abdominal tenderness  There is no guarding  Musculoskeletal:         General: Tenderness present  Comments: Focal low back TTP   Skin:     General: Skin is warm and dry  Neurological:      Mental Status: He is alert and oriented to person, place, and time  Deep Tendon Reflexes:      Reflex Scores:       Patellar reflexes are 2+ on the right side and 2+ on the left side  Achilles reflexes are 2+ on the right side and 2+ on the left side  Psychiatric:         Attention and Perception: Attention and perception normal          Mood and Affect: Mood and affect normal          Speech: Speech normal          Behavior: Behavior normal  Behavior is cooperative  Thought Content: Thought content normal          Cognition and Memory: Cognition and memory normal          Judgment: Judgment normal          Neurologic Exam     Mental Status   Oriented to person, place, and time  Follows 2 step commands  Attention: normal  Concentration: normal    Speech: speech is normal   Level of consciousness: alert  Knowledge: good  Normal comprehension       Motor Exam     Strength   Right biceps: 5/5  Left biceps: 5/5  Right triceps: 5/5  Left triceps: 5/5  Right iliopsoas: 4/5  Left iliopsoas: 4/5  Right quadriceps: 5/5  Left quadriceps: 5/5  Right hamstrin/5  Left hamstrin/5  Right anterior tibial: 5/5  Left anterior tibial: 5/5  Right gastroc: 5/5  Left gastroc: 5/5    Sensory Exam   Right leg light touch: normal  Left leg light touch: normal    Gait, Coordination, and Reflexes     Reflexes   Right patellar: 2+  Left patellar: 2+  Right achilles: 2+  Left achilles: 2+  Right ankle clonus: absent  Left ankle clonus: absent  Ambulates with walker          MEDICAL DECISION MAKING    Imaging Studies:     MRI lumbar spine wo contrast    Result Date: 12/5/2022  Narrative: MRI LUMBAR SPINE WITHOUT CONTRAST INDICATION: M48 [id number]: Collapsed vertebra, not elsewhere classified, lumbar region, subsequent encounter for fracture with routine healing  COMPARISON:  CT dated 10/21/2022 and MRI dated 7/13/2021  TECHNIQUE:  Multiplanar, multisequence imaging of the lumbar spine was performed     IMAGE QUALITY:  Diagnostic FINDINGS: VERTEBRAL BODIES:  There are 5 lumbar type vertebral bodies  Stable lumbar levoscoliosis with apex at L3  Mild left lateral translation of L1 on L2 and L2 and L3 and right lateral translation of L4 and L5  Benign-appearing L2 compression fracture with moderate loss of height and mild bony retropulsion is stable compared with CT but new from MRI  There is marrow edema in the L2 vertebral body and this is likely subacute  Mild chronic superior endplate compression at L3 without marrow edema is also stable compared with CT but new from MRI  No other abnormal marrow signal  No new compression  SACRUM:  Normal signal within the sacrum  No evidence of insufficiency or stress fracture  DISTAL CORD AND CONUS:  Normal size and signal within the distal cord and conus  PARASPINAL SOFT TISSUES:  Paraspinal soft tissues are unremarkable  LOWER THORACIC DISC SPACES:  Mild noncompressive lower thoracic degenerative change  LUMBAR DISC SPACES: L1-L2:  Small disc bulge  Bony retropulsion of the superior endplate causing moderate canal stenosis is stable with CT  Facet arthropathy  No significant foraminal stenosis  L2-L3:  Small disc bulge  Mild facet arthropathy  Slight canal stenosis and mild right foraminal stenosis   L3-L4:  Disc bulge and facet arthropathy  Mild canal stenosis  Right subarticular stenosis without nerve root impingement  Mild/moderate right foraminal stenosis  L4-L5:  Small disc bulge and facet arthropathy  Congenitally shortened pedicles  Moderate severe canal stenosis  Bilateral subarticular/lateral recess stenosis with encroachment on the traversing nerve roots  Mild right and moderate left foraminal stenosis  L5-S1:  Small disc bulge  Facet arthropathy  No significant canal stenosis  Lateral recess/subarticular stenosis  Mild left foraminal stenosis  OTHER FINDINGS:  Left renal cysts     Impression: Moderate benign-appearing compression fracture is unchanged, likely subacute  Mild chronic superior endplate compression at L3  Multilevel degenerative spondylosis with canal stenosis most severe at L4-5  Workstation performed: [id number]       I have personally reviewed pertinent reports     and I have personally reviewed pertinent films in PACS

## 2022-12-13 ENCOUNTER — TELEPHONE (OUTPATIENT)
Dept: NEUROSURGERY | Facility: CLINIC | Age: 82
End: 2022-12-13

## 2022-12-13 RX ORDER — CEFAZOLIN SODIUM 1 G/50ML
1000 SOLUTION INTRAVENOUS ONCE
OUTPATIENT
Start: 2022-12-13 | End: 2022-12-13

## 2022-12-13 RX ORDER — SODIUM CHLORIDE 9 MG/ML
75 INJECTION, SOLUTION INTRAVENOUS CONTINUOUS
OUTPATIENT
Start: 2022-12-13

## 2022-12-13 NOTE — TELEPHONE ENCOUNTER
Chrystal Uribe PA-C  P [facility],     This patient needs a DXA scan  Dr Lisa Dickson ordered it  Can we please set this up? He will need to follow up with PCP afterwards for management       Thank you   alfredo     I called and spoke with wife she will arrange dexa scannow gonzalezs transferred over _[place]

## 2022-12-13 NOTE — ADDENDUM NOTE
Addended by: Adam Huston on: 12/13/2022 10:50 AM     Modules accepted: Orders, Level of Service, SmartSet

## 2022-12-20 DIAGNOSIS — Z79.899 ENCOUNTER FOR LONG-TERM (CURRENT) USE OF OTHER MEDICATIONS: ICD-10-CM

## 2022-12-20 DIAGNOSIS — M80.08XA AGE-RELATED OSTEOPOROSIS WITH CURRENT PATHOLOGICAL FRACTURE OF VERTEBRA (HCC): Primary | ICD-10-CM

## 2022-12-20 DIAGNOSIS — Z79.01 LONG TERM (CURRENT) USE OF ANTICOAGULANTS: ICD-10-CM

## 2022-12-21 ENCOUNTER — CONSULT (OUTPATIENT)
Dept: ENDOCRINOLOGY | Facility: CLINIC | Age: 82
End: 2022-12-21

## 2022-12-21 VITALS
SYSTOLIC BLOOD PRESSURE: 140 MMHG | WEIGHT: 155 LBS | HEIGHT: 71 IN | BODY MASS INDEX: 21.7 KG/M2 | DIASTOLIC BLOOD PRESSURE: 60 MMHG | HEART RATE: 53 BPM

## 2022-12-21 DIAGNOSIS — E55.9 VITAMIN D DEFICIENCY: Primary | ICD-10-CM

## 2022-12-21 DIAGNOSIS — S32.020D COMPRESSION FRACTURE OF L2 VERTEBRA WITH ROUTINE HEALING: ICD-10-CM

## 2022-12-21 DIAGNOSIS — R26.2 AMBULATORY DYSFUNCTION: ICD-10-CM

## 2022-12-21 DIAGNOSIS — M81.0 OSTEOPOROSIS, UNSPECIFIED OSTEOPOROSIS TYPE, UNSPECIFIED PATHOLOGICAL FRACTURE PRESENCE: ICD-10-CM

## 2022-12-21 DIAGNOSIS — M79.18 MYOFASCIAL PAIN SYNDROME: ICD-10-CM

## 2022-12-21 NOTE — PROGRESS NOTES
Deretha Lundborg 79 y o  male MRN: [id number]    Encounter: [id number]      Assessment/Plan     Problem List Items Addressed This Visit        Musculoskeletal and Integument    Compression fracture of L2 vertebra with routine healing    Myofascial pain syndrome    Relevant Orders    TSH, 3rd generation Lab Collect    T4, free Lab Collect    Osteoporosis     Given nontraumatic L2 compression fracture he clinically has osteoporosis-for now advised to take calcium and vitamin D supplementations-fall prevention  Continue PT  He has upcoming DEXA scan end of the month    We will work him up for any secondary causes of osteoporosis, discussed pharmacological therapy at next visit         Relevant Orders    Comprehensive metabolic panel Lab Collect    PTH, intact Lab Collect Lab Collect    Phosphorus Lab Collect    Protein electrophoresis, serum Lab Collect    Protein electrophoresis, urine Lab Collect       Other    Ambulatory dysfunction     He is using a walker-currently in PT, counseled on fall prevention         Vitamin D deficiency - Primary     Continue supplementations-we will check vitamin D 25-hydroxy         Relevant Orders    Vitamin D 25 hydroxy Lab Collect     CC:   Osteoporosis    History of Present Illness     HPI:  79-year-old male referred here for evaluation of osteoporosis-he has History of back pain -which worsened in october and during work-up he was found to have an L2 compression fracture  He does not recall any  Falls or trauma   Using a walker since hospitalzuation -has had intractable pain limiting ambulation and will be undergoing kyphoplasty next month  No falls   No prior fractures    5 feet 11 inches , lost 2 inches     History of vitamin D deficiency for which she is currently on Vitamin D3 2000 IU DAILY   +MVI   1-2 servings of dairy in diet     + history of kidney stones - 15 years back    No parental hip fractures   history of prostate cancer for which she underwent surgery 15 years back-    No history of being on androgen deprivation therapy    Review of Systems    Historical Information   Past Medical History:   Diagnosis Date   • Astigmatism     last assessed 11/15/17   • Cataract     Resolved 11/15/17   • Gross hematuria     Last assessed 01/28/16   • Hematuria     last assessed 01/28/16   • Lumbar vertebral fracture (HCC)    • Prostate cancer ([facility] [address] )     last assessed 05/18/17   • Renal calculi     last assessed 08/16/12   • Squamous cell carcinoma    • Vitamin D deficiency     Last assessed 08/19/13     Past Surgical History:   Procedure Laterality Date   • BREAST EXCISIONAL BIOPSY N/A     Benign breast tumor unsure of side over 50 yrs ago   • CATARACT EXTRACTION Left 08/26/2019   • CATARACT EXTRACTION Right 08/12/2019   • COLONOSCOPY     • CYSTOSCOPY  01/28/2016   • ELBOW SURGERY      Bone chip   • HERNIA REPAIR  2002   • LITHOTRIPSY     • RETROPUBIC PROSTATECTOMY  11/13/2001    Radical with nerve sparing     Social History   Social History     Substance and Sexual Activity   Alcohol Use Yes    Comment: social     Social History     Substance and Sexual Activity   Drug Use No     Social History     Tobacco Use   Smoking Status Never   Smokeless Tobacco Never     Family History:   Family History   Problem Relation Age of Onset   • Other Mother 76        Influenza   • Hypertension Mother    • Heart failure Father 70   • Peripheral vascular disease Father    • Hypertension Father    • Prostate cancer Brother    • Stroke Brother 48   • Hypertension Brother    • Hypertension Family        Meds/Allergies   Current Outpatient Medications   Medication Sig Dispense Refill   • ACETAMINOPHEN EXTRA STRENGTH PO Take 1 capsule by mouth every 6 (six) hours as needed     • cholecalciferol (VITAMIN D3) 1,000 units tablet Take by mouth daily 2,000 IU  daily     • Docusate Sodium (COLACE PO) Take by mouth daily     • lisinopril (ZESTRIL) 10 mg tablet Take 3 tablets (30 mg total) by mouth daily 90 tablet 8 • methocarbamol (ROBAXIN) 500 mg tablet Take 1 PO TID PRN for pain/spasms  75 tablet 1   • metoprolol tartrate (LOPRESSOR) 25 mg tablet Take 1 tablet (25 mg total) by mouth every 12 (twelve) hours 180 tablet 3   • Mirabegron ER 25 MG TB24 Take 25 mg by mouth in the morning 30 tablet 5   • Multiple Vitamins-Minerals (CENTRUM SILVER 50+MEN PO) Daily     • verapamil (CALAN-SR) 240 mg CR tablet TAKE 1 AND 1/2 TABLETS BY MOUTH DAILY AS DIRECTED (Patient taking differently: Take 240 mg by mouth in the morning) 135 tablet 1     No current facility-administered medications for this visit  Allergies   Allergen Reactions   • Sulfa Antibiotics Rash   • Ciprofloxacin    • Gabapentin Dizziness   • Medical Tape Rash       Objective   Vitals: Blood pressure 140/60, pulse (!) 53, height 5' 11" (1 803 m), weight 70 3 kg (155 lb)  Physical Exam  Vitals reviewed  Constitutional:       General: He is not in acute distress  Appearance: Normal appearance  He is not ill-appearing, toxic-appearing or diaphoretic  HENT:      Head: Normocephalic and atraumatic  Eyes:      General: No scleral icterus  Extraocular Movements: Extraocular movements intact  Cardiovascular:      Rate and Rhythm: Normal rate and regular rhythm  Heart sounds: Normal heart sounds  No murmur heard  Pulmonary:      Effort: Pulmonary effort is normal  No respiratory distress  Breath sounds: Normal breath sounds  No wheezing or rales  Abdominal:      General: There is no distension  Palpations: Abdomen is soft  Tenderness: There is no abdominal tenderness  Musculoskeletal:      Cervical back: Neck supple  Right lower leg: No edema  Left lower leg: No edema  Lymphadenopathy:      Cervical: No cervical adenopathy  Skin:     General: Skin is warm and dry  Neurological:      General: No focal deficit present  Mental Status: He is alert and oriented to person, place, and time        Gait: Gait abnormal  Psychiatric:         Mood and Affect: Mood normal          Behavior: Behavior normal          Thought Content: Thought content normal          Judgment: Judgment normal          The history was obtained from the review of the chart, patient and family  Lab Results:   Lab Results   Component Value Date/Time    Potassium 4 3 10/25/2022 03:50 AM    Potassium 4 5 10/24/2022 04:16 AM    Potassium 4 8 10/23/2022 03:20 AM    Chloride 106 10/25/2022 03:50 AM    Chloride 107 10/24/2022 04:16 AM    Chloride 102 10/23/2022 03:20 AM    CO2 28 10/25/2022 03:50 AM    CO2 30 10/24/2022 04:16 AM    CO2 27 10/23/2022 03:20 AM    BUN 23 10/25/2022 03:50 AM    BUN 21 10/24/2022 04:16 AM    BUN 29 (H) 10/23/2022 03:20 AM    Creatinine 1 12 10/25/2022 03:50 AM    Creatinine 1 11 10/24/2022 04:16 AM    Creatinine 1 36 (H) 10/23/2022 03:20 AM    Glucose, Fasting 91 07/19/2022 08:55 AM    Glucose, Fasting 99 04/22/2022 10:08 AM    Calcium 9 1 10/25/2022 03:50 AM    Calcium 9 0 10/24/2022 04:16 AM    Calcium 10 0 10/23/2022 03:20 AM    eGFR 60 10/25/2022 03:50 AM    eGFR 61 10/24/2022 04:16 AM    eGFR 47 10/23/2022 03:20 AM    TSH 3RD GENERATON 2 910 04/22/2022 10:08 AM         Imaging Studies:              IMPRESSION:     Moderate benign-appearing compression fracture is unchanged, likely subacute  Mild chronic superior endplate compression at L3  Multilevel degenerative spondylosis with canal stenosis most severe at L4-5  XR spine lumbar 2 or 3 views injury  Status: Final result     PACS Images     Show images for XR spine lumbar 2 or 3 views injury  Study Result    Narrative & Impression   LUMBAR SPINE     INDICATION:   upright XR WITH LSO brace for baseline      COMPARISON:  07/13/2021     VIEWS:  XR SPINE LUMBAR 2 OR 3 VIEWS INJURY  Images: 4     FINDINGS:     There are 5 non rib bearing lumbar vertebral bodies       L2 compression fracture    New since plain films of 07/13/2021     Levoscoliosis      Moderate spurring and disc space narrowing throughout the lumbar spine      The pedicles appear intact      Numerous surgical clips in the pelvis  Contrast material in the kidneys and bladder from a CT scan      IMPRESSION:     Degenerative changes  Levoscoliosis  L2 compression fracture         Workstation performed: [id number]          I have personally reviewed pertinent reports  Portions of the record may have been created with voice recognition software  Occasional wrong word or "sound a like" substitutions may have occurred due to the inherent limitations of voice recognition software  Read the chart carefully and recognize, using context, where substitutions have occurred

## 2022-12-22 ENCOUNTER — TELEPHONE (OUTPATIENT)
Dept: CARDIOLOGY CLINIC | Facility: CLINIC | Age: 82
End: 2022-12-22

## 2022-12-22 NOTE — ASSESSMENT & PLAN NOTE
Given nontraumatic L2 compression fracture he clinically has osteoporosis-for now advised to take calcium and vitamin D supplementations-fall prevention  Continue PT  He has upcoming DEXA scan end of the month    We will work him up for any secondary causes of osteoporosis, discussed pharmacological therapy at next visit

## 2022-12-27 ENCOUNTER — TELEPHONE (OUTPATIENT)
Dept: RADIOLOGY | Facility: HOSPITAL | Age: 82
End: 2022-12-27

## 2022-12-27 NOTE — PRE-PROCEDURE INSTRUCTIONS
Pre-procedure Instructions for Interventional Radiology  Thanh Raymond [address] [place] [address] [address] [phone]    You are scheduled for a/an L 2 Kyphoplasty  On Wednesday 1/4/23  Your tentative arrival time is 0700  Short stay will notify you the day before your procedure with the exact arrival time and the location to arrive  To prepare for your procedure:  1  Please arrange for someone to drive you home after the procedure and stay with you until the next morning if you are instructed to do so  This is typically for patients receiving some type of sedative or anesthetic for the procedure  2  DO NOT EAT OR DRINK ANYTHING after midnight on the evening before your procedure including candy & gum   3  ONLY SIPS OF WATER with your medications are allowed on the morning of your procedure  4  TAKE ALL OF YOUR REGULAR MEDICATIONS THE MORNING OF YOUR PROCEDURE with sips of water! We may call you to stop some of your blood sugar, blood pressure and blood thinning medications depending on the procedure  Please take all of these medications unless we instruct you to stop them  5  If you have an allergy to x-ray dye, please contact Interventional Radiology for an x-ray dye preparation which usually consists of an oral steroid and Benadryl  The day of your procedure:  1  Bring a list of the medications you take at home  2  Bring medications you take for breathing problems (such as inhalers), medications for chest pain, or both  3  Bring a case for your glasses or contacts  4  Bring your insurance card and a form of photo ID   5  Please leave all valuables such as credit cards and jewelry at home  6  Report to the registration desk in the main lobby at the [facility], Entr[address]  Ask to be directed to [facility]  7  While your procedure is being performed, your family may wait in the Radiology Waiting Room on the 1st floor in Radiology    if they need to leave, they may provide a number to be called following the procedure  8  Be prepared to stay overnight just in case  Sometimes procedures will indicate the need for further observation or treatment  9  If you are scheduled for a follow-up visit with the Interventional Radiologist after your procedure, you will be called with a date and time  Special Instructions (Medications to stop taking before your procedure etc ):  Above reviewed with his wife Kevin Sers

## 2022-12-28 ENCOUNTER — APPOINTMENT (OUTPATIENT)
Dept: LAB | Facility: CLINIC | Age: 82
End: 2022-12-28

## 2022-12-28 ENCOUNTER — HOSPITAL ENCOUNTER (OUTPATIENT)
Dept: BONE DENSITY | Facility: IMAGING CENTER | Age: 82
Discharge: HOME | End: 2022-12-28

## 2022-12-28 ENCOUNTER — LAB (OUTPATIENT)
Dept: LAB | Facility: CLINIC | Age: 82
End: 2022-12-28

## 2022-12-28 VITALS — BODY MASS INDEX: 23.92 KG/M2 | HEIGHT: 67 IN | WEIGHT: 152.4 LBS

## 2022-12-28 DIAGNOSIS — M80.08XA AGE-RELATED OSTEOPOROSIS WITH CURRENT PATHOLOGICAL FRACTURE OF VERTEBRA (HCC): ICD-10-CM

## 2022-12-28 DIAGNOSIS — Z79.899 ENCOUNTER FOR LONG-TERM (CURRENT) USE OF OTHER MEDICATIONS: ICD-10-CM

## 2022-12-28 DIAGNOSIS — I49.3 PVC'S (PREMATURE VENTRICULAR CONTRACTIONS): ICD-10-CM

## 2022-12-28 DIAGNOSIS — M79.18 MYOFASCIAL PAIN SYNDROME: ICD-10-CM

## 2022-12-28 DIAGNOSIS — E55.9 VITAMIN D DEFICIENCY: ICD-10-CM

## 2022-12-28 DIAGNOSIS — M81.0 OSTEOPOROSIS, UNSPECIFIED OSTEOPOROSIS TYPE, UNSPECIFIED PATHOLOGICAL FRACTURE PRESENCE: ICD-10-CM

## 2022-12-28 DIAGNOSIS — Z79.01 LONG TERM (CURRENT) USE OF ANTICOAGULANTS: ICD-10-CM

## 2022-12-28 LAB
25(OH)D3 SERPL-MCNC: 32.1 NG/ML (ref 30–100)
ALBUMIN SERPL BCP-MCNC: 3.8 G/DL (ref 3.5–5)
ALP SERPL-CCNC: 57 U/L (ref 46–116)
ALT SERPL W P-5'-P-CCNC: 25 U/L (ref 12–78)
ANION GAP SERPL CALCULATED.3IONS-SCNC: 5 MMOL/L (ref 4–13)
APTT PPP: 29 SECONDS (ref 23–37)
AST SERPL W P-5'-P-CCNC: 21 U/L (ref 5–45)
BASOPHILS # BLD AUTO: 0.05 THOUSANDS/ÂΜL (ref 0–0.1)
BASOPHILS NFR BLD AUTO: 1 % (ref 0–1)
BILIRUB SERPL-MCNC: 0.51 MG/DL (ref 0.2–1)
BUN SERPL-MCNC: 25 MG/DL (ref 5–25)
CALCIUM SERPL-MCNC: 9.7 MG/DL (ref 8.3–10.1)
CHLORIDE SERPL-SCNC: 109 MMOL/L (ref 96–108)
CO2 SERPL-SCNC: 28 MMOL/L (ref 21–32)
CREAT SERPL-MCNC: 1.06 MG/DL (ref 0.6–1.3)
EOSINOPHIL # BLD AUTO: 0.29 THOUSAND/ÂΜL (ref 0–0.61)
EOSINOPHIL NFR BLD AUTO: 4 % (ref 0–6)
ERYTHROCYTE [DISTWIDTH] IN BLOOD BY AUTOMATED COUNT: 14 % (ref 11.6–15.1)
EST. AVERAGE GLUCOSE BLD GHB EST-MCNC: 103 MG/DL
GFR SERPL CREATININE-BSD FRML MDRD: 64 ML/MIN/1.73SQ M
GLUCOSE P FAST SERPL-MCNC: 94 MG/DL (ref 65–99)
HBA1C MFR BLD: 5.2 %
HCT VFR BLD AUTO: 40.4 % (ref 36.5–49.3)
HGB BLD-MCNC: 12.6 G/DL (ref 12–17)
IMM GRANULOCYTES # BLD AUTO: 0.06 THOUSAND/UL (ref 0–0.2)
IMM GRANULOCYTES NFR BLD AUTO: 1 % (ref 0–2)
INR PPP: 0.94 (ref 0.84–1.19)
LYMPHOCYTES # BLD AUTO: 1.48 THOUSANDS/ÂΜL (ref 0.6–4.47)
LYMPHOCYTES NFR BLD AUTO: 21 % (ref 14–44)
MCH RBC QN AUTO: 31.5 PG (ref 26.8–34.3)
MCHC RBC AUTO-ENTMCNC: 31.2 G/DL (ref 31.4–37.4)
MCV RBC AUTO: 101 FL (ref 82–98)
MONOCYTES # BLD AUTO: 0.84 THOUSAND/ÂΜL (ref 0.17–1.22)
MONOCYTES NFR BLD AUTO: 12 % (ref 4–12)
NEUTROPHILS # BLD AUTO: 4.42 THOUSANDS/ÂΜL (ref 1.85–7.62)
NEUTS SEG NFR BLD AUTO: 61 % (ref 43–75)
NRBC BLD AUTO-RTO: 0 /100 WBCS
PHOSPHATE SERPL-MCNC: 3.1 MG/DL (ref 2.3–4.1)
PLATELET # BLD AUTO: 198 THOUSANDS/UL (ref 149–390)
PMV BLD AUTO: 12.2 FL (ref 8.9–12.7)
POTASSIUM SERPL-SCNC: 4.5 MMOL/L (ref 3.5–5.3)
PROT SERPL-MCNC: 6.5 G/DL (ref 6.4–8.4)
PROTHROMBIN TIME: 12.8 SECONDS (ref 11.6–14.5)
PTH-INTACT SERPL-MCNC: 41.8 PG/ML (ref 18.4–80.1)
RBC # BLD AUTO: 4 MILLION/UL (ref 3.88–5.62)
SODIUM SERPL-SCNC: 142 MMOL/L (ref 135–147)
T4 FREE SERPL-MCNC: 0.96 NG/DL (ref 0.76–1.46)
TSH SERPL DL<=0.05 MIU/L-ACNC: 1.91 UIU/ML (ref 0.45–4.5)
WBC # BLD AUTO: 7.14 THOUSAND/UL (ref 4.31–10.16)

## 2022-12-30 LAB
ALBUMIN SERPL ELPH-MCNC: 4.01 G/DL (ref 3.5–5)
ALBUMIN SERPL ELPH-MCNC: 61.7 % (ref 52–65)
ALBUMIN UR ELPH-MCNC: 100 %
ALPHA1 GLOB MFR UR ELPH: 0 %
ALPHA1 GLOB SERPL ELPH-MCNC: 0.29 G/DL (ref 0.1–0.4)
ALPHA1 GLOB SERPL ELPH-MCNC: 4.4 % (ref 2.5–5)
ALPHA2 GLOB MFR UR ELPH: 0 %
ALPHA2 GLOB SERPL ELPH-MCNC: 0.7 G/DL (ref 0.4–1.2)
ALPHA2 GLOB SERPL ELPH-MCNC: 10.7 % (ref 7–13)
B-GLOBULIN MFR UR ELPH: 0 %
BETA GLOB ABNORMAL SERPL ELPH-MCNC: 0.4 G/DL (ref 0.4–0.8)
BETA1 GLOB SERPL ELPH-MCNC: 6.2 % (ref 5–13)
BETA2 GLOB SERPL ELPH-MCNC: 4 % (ref 2–8)
BETA2+GAMMA GLOB SERPL ELPH-MCNC: 0.26 G/DL (ref 0.2–0.5)
GAMMA GLOB ABNORMAL SERPL ELPH-MCNC: 0.85 G/DL (ref 0.5–1.6)
GAMMA GLOB MFR UR ELPH: 0 %
GAMMA GLOB SERPL ELPH-MCNC: 13 % (ref 12–22)
IGG/ALB SER: 1.61 {RATIO} (ref 1.1–1.8)
PROT PATTERN SERPL ELPH-IMP: NORMAL
PROT PATTERN UR ELPH-IMP: NORMAL
PROT SERPL-MCNC: 6.5 G/DL (ref 6.4–8.2)
PROT UR-MCNC: 14 MG/DL

## 2023-01-03 RX ORDER — SODIUM CHLORIDE, SODIUM LACTATE, POTASSIUM CHLORIDE, CALCIUM CHLORIDE 600; 310; 30; 20 MG/100ML; MG/100ML; MG/100ML; MG/100ML
125 INJECTION, SOLUTION INTRAVENOUS CONTINUOUS
Status: CANCELLED | OUTPATIENT
Start: 2023-01-03

## 2023-01-03 RX ORDER — LIDOCAINE HYDROCHLORIDE 10 MG/ML
0.5 INJECTION, SOLUTION EPIDURAL; INFILTRATION; INTRACAUDAL; PERINEURAL ONCE AS NEEDED
Status: CANCELLED | OUTPATIENT
Start: 2023-01-03

## 2023-01-04 ENCOUNTER — ANESTHESIA EVENT (OUTPATIENT)
Dept: RADIOLOGY | Facility: HOSPITAL | Age: 83
End: 2023-01-04

## 2023-01-04 ENCOUNTER — HOSPITAL ENCOUNTER (OUTPATIENT)
Dept: RADIOLOGY | Facility: HOSPITAL | Age: 83
Discharge: HOME | End: 2023-01-04
Attending: RADIOLOGY

## 2023-01-04 ENCOUNTER — ANESTHESIA (OUTPATIENT)
Dept: RADIOLOGY | Facility: HOSPITAL | Age: 83
End: 2023-01-04

## 2023-01-04 VITALS
HEIGHT: 71 IN | HEART RATE: 62 BPM | WEIGHT: 152 LBS | SYSTOLIC BLOOD PRESSURE: 138 MMHG | BODY MASS INDEX: 21.28 KG/M2 | OXYGEN SATURATION: 96 % | RESPIRATION RATE: 16 BRPM | DIASTOLIC BLOOD PRESSURE: 67 MMHG | TEMPERATURE: 97.9 F

## 2023-01-04 DIAGNOSIS — S32.020D COMPRESSION FRACTURE OF L2 VERTEBRA WITH ROUTINE HEALING: ICD-10-CM

## 2023-01-04 DIAGNOSIS — M80.08XA AGE-RELATED OSTEOPOROSIS WITH CURRENT PATHOLOGICAL FRACTURE OF VERTEBRA (HCC): ICD-10-CM

## 2023-01-04 RX ORDER — OXYCODONE HYDROCHLORIDE 5 MG/1
5 TABLET ORAL EVERY 4 HOURS PRN
Status: DISCONTINUED | OUTPATIENT
Start: 2023-01-04 | End: 2023-01-05 | Stop reason: HOSPADM

## 2023-01-04 RX ORDER — BUPIVACAINE HYDROCHLORIDE AND EPINEPHRINE 5; 5 MG/ML; UG/ML
INJECTION, SOLUTION EPIDURAL; INTRACAUDAL; PERINEURAL AS NEEDED
Status: COMPLETED | OUTPATIENT
Start: 2023-01-04 | End: 2023-01-04

## 2023-01-04 RX ORDER — FENTANYL CITRATE 50 UG/ML
INJECTION, SOLUTION INTRAMUSCULAR; INTRAVENOUS AS NEEDED
Status: DISCONTINUED | OUTPATIENT
Start: 2023-01-04 | End: 2023-01-04

## 2023-01-04 RX ORDER — SODIUM CHLORIDE, SODIUM LACTATE, POTASSIUM CHLORIDE, CALCIUM CHLORIDE 600; 310; 30; 20 MG/100ML; MG/100ML; MG/100ML; MG/100ML
125 INJECTION, SOLUTION INTRAVENOUS CONTINUOUS
Status: DISCONTINUED | OUTPATIENT
Start: 2023-01-04 | End: 2023-01-05 | Stop reason: HOSPADM

## 2023-01-04 RX ORDER — PROPOFOL 10 MG/ML
INJECTION, EMULSION INTRAVENOUS CONTINUOUS PRN
Status: DISCONTINUED | OUTPATIENT
Start: 2023-01-04 | End: 2023-01-04

## 2023-01-04 RX ORDER — SODIUM CHLORIDE, SODIUM LACTATE, POTASSIUM CHLORIDE, CALCIUM CHLORIDE 600; 310; 30; 20 MG/100ML; MG/100ML; MG/100ML; MG/100ML
INJECTION, SOLUTION INTRAVENOUS CONTINUOUS PRN
Status: DISCONTINUED | OUTPATIENT
Start: 2023-01-04 | End: 2023-01-04

## 2023-01-04 RX ORDER — LIDOCAINE HYDROCHLORIDE 10 MG/ML
0.5 INJECTION, SOLUTION EPIDURAL; INFILTRATION; INTRACAUDAL; PERINEURAL ONCE AS NEEDED
Status: DISCONTINUED | OUTPATIENT
Start: 2023-01-04 | End: 2023-01-05 | Stop reason: HOSPADM

## 2023-01-04 RX ORDER — PROPOFOL 10 MG/ML
INJECTION, EMULSION INTRAVENOUS AS NEEDED
Status: DISCONTINUED | OUTPATIENT
Start: 2023-01-04 | End: 2023-01-04

## 2023-01-04 RX ORDER — CEFAZOLIN SODIUM 1 G/50ML
1000 SOLUTION INTRAVENOUS ONCE
Status: COMPLETED | OUTPATIENT
Start: 2023-01-04 | End: 2023-01-04

## 2023-01-04 RX ORDER — SODIUM CHLORIDE 9 MG/ML
75 INJECTION, SOLUTION INTRAVENOUS CONTINUOUS
Status: DISCONTINUED | OUTPATIENT
Start: 2023-01-04 | End: 2023-01-05 | Stop reason: HOSPADM

## 2023-01-04 RX ORDER — LIDOCAINE HYDROCHLORIDE 10 MG/ML
INJECTION, SOLUTION EPIDURAL; INFILTRATION; INTRACAUDAL; PERINEURAL AS NEEDED
Status: DISCONTINUED | OUTPATIENT
Start: 2023-01-04 | End: 2023-01-04

## 2023-01-04 RX ORDER — LIDOCAINE HYDROCHLORIDE 10 MG/ML
INJECTION, SOLUTION EPIDURAL; INFILTRATION; INTRACAUDAL; PERINEURAL AS NEEDED
Status: COMPLETED | OUTPATIENT
Start: 2023-01-04 | End: 2023-01-04

## 2023-01-04 RX ADMIN — FENTANYL CITRATE 25 MCG: 50 INJECTION INTRAMUSCULAR; INTRAVENOUS at 09:33

## 2023-01-04 RX ADMIN — SODIUM CHLORIDE, SODIUM LACTATE, POTASSIUM CHLORIDE, AND CALCIUM CHLORIDE: .6; .31; .03; .02 INJECTION, SOLUTION INTRAVENOUS at 08:11

## 2023-01-04 RX ADMIN — CEFAZOLIN SODIUM 1000 MG: 1 SOLUTION INTRAVENOUS at 07:55

## 2023-01-04 RX ADMIN — BUPIVACAINE HYDROCHLORIDE AND EPINEPHRINE BITARTRATE 10 ML: 5; .005 INJECTION, SOLUTION EPIDURAL; INTRACAUDAL; PERINEURAL at 08:52

## 2023-01-04 RX ADMIN — FENTANYL CITRATE 12.5 MCG: 50 INJECTION INTRAMUSCULAR; INTRAVENOUS at 09:17

## 2023-01-04 RX ADMIN — IOHEXOL 10 ML: 300 INJECTION, SOLUTION INTRAVENOUS at 09:46

## 2023-01-04 RX ADMIN — FENTANYL CITRATE 25 MCG: 50 INJECTION INTRAMUSCULAR; INTRAVENOUS at 09:14

## 2023-01-04 RX ADMIN — LIDOCAINE HYDROCHLORIDE 50 MG: 10 INJECTION, SOLUTION EPIDURAL; INFILTRATION; INTRACAUDAL; PERINEURAL at 08:21

## 2023-01-04 RX ADMIN — PROPOFOL 5 MG: 10 INJECTION, EMULSION INTRAVENOUS at 09:11

## 2023-01-04 RX ADMIN — FENTANYL CITRATE 25 MCG: 50 INJECTION INTRAMUSCULAR; INTRAVENOUS at 08:21

## 2023-01-04 RX ADMIN — PROPOFOL 40 MCG/KG/MIN: 10 INJECTION, EMULSION INTRAVENOUS at 08:21

## 2023-01-04 RX ADMIN — LIDOCAINE HYDROCHLORIDE 10 ML: 10 INJECTION, SOLUTION EPIDURAL; INFILTRATION; INTRACAUDAL; PERINEURAL at 08:53

## 2023-01-04 RX ADMIN — PROPOFOL 30 MG: 10 INJECTION, EMULSION INTRAVENOUS at 08:23

## 2023-01-04 RX ADMIN — PROPOFOL 5 MG: 10 INJECTION, EMULSION INTRAVENOUS at 09:13

## 2023-01-04 RX ADMIN — FENTANYL CITRATE 12.5 MCG: 50 INJECTION INTRAMUSCULAR; INTRAVENOUS at 09:09

## 2023-01-04 RX ADMIN — SODIUM CHLORIDE 75 ML/HR: 0.9 INJECTION, SOLUTION INTRAVENOUS at 07:00

## 2023-01-04 NOTE — ANESTHESIA POSTPROCEDURE EVALUATION
Post-Op Assessment Note    CV Status:  Stable  Pain Score: 0    Pain management: adequate     Mental Status:  Alert and sleepy   Hydration Status:  Euvolemic   PONV Controlled:  Controlled   Airway Patency:  Patent      Post Op Vitals Reviewed: Yes      Staff: CRNA         No notable events documented      BP   129/65   Temp      Pulse  57   Resp   16   SpO2   99

## 2023-01-04 NOTE — DISCHARGE INSTRUCTIONS
Vertebroplasty   WHAT YOU NEED TO KNOW:   Vertebroplasty is a procedure to fix broken vertebrae  Vertebrae are the round, strong bones that form your spine  You have just had treatment for one or more compression fractures  It may take several weeks for complete pain relief  Continue to use the pain medicine that has been prescribed to you as needed  DISCHARGE INSTRUCTIONS:   Resume your normal diet and medications  Small sips of flat soda will help with nausea  Contact Interventional Radiology at [phone] Laura PATIENTS: Contact Interventional Radiology at [phone]) Heidy Tom PATIENTS: Contact Interventional Radiology at [phone]) if any of the following occur: You have a fever greater than 101*  You have persistent nausea or vomiting  You have worsening pain, even after you take your medicine  You have increased trouble walking, moving around or numbness and weakness of legs  You have pain in your ribs or lower back  You have drainage from the area where your procedure was done  Upon discharge from the hospital, limit your activity for the remainder of the day  No driving, operating heavy machinery or lifting heavy objects  The following morning, you may increase your activity level as tolerated  Procedural Sedation   WHAT YOU NEED TO KNOW:   Procedural sedation is medicine used during procedures to help you feel relaxed and calm  You will remember little to none of the procedure  After sedation you may feel tired, weak, or unsteady on your feet  You may also have trouble concentrating or short-term memory loss  These symptoms should go away in 24 hours or less  DISCHARGE INSTRUCTIONS:     Call 911 or have someone else call for any of the following: You have sudden trouble breathing  You cannot be woken        Contact Interventional Radiology at [phone]   Laura PATIENTS: Contact Interventional Radiology at [phone]) Heidy Tom PATIENTS: Contact Interventional Radiology at [phone]) if any of the following occur: You have a severe headache or dizziness  Your heart is beating faster than usual     You have a fever or chills  Your skin is itchy, swollen, or you have a rash  You have nausea or are vomiting for more than 8 hours after the procedure  You have questions or concerns about your condition or care  Self-care:   Have someone stay with you for 24 hours  This person can drive you to errands and help you do things around the house  This person can also watch for problems  Rest and do quiet activities for 24 hours  Do not exercise, ride a bike, or play sports  Stand up slowly to prevent dizziness and falls  Take short walks around the house with another person  Slowly return to your usual activities the next day  Do not drive or use dangerous machines or tools for 24 hours  You may injure yourself or others  Examples include a lawnmower, saw, or drill  Do not return to work for 24 hours if you use dangerous machines or tools for work  Do not make important decisions for 24 hours  For example, do not sign important papers or invest money  Drink liquids as directed  Liquids help flush the sedation medicine out of your body  Ask how much liquid to drink each day and which liquids are best for you  Eat small, frequent meals to prevent nausea and vomiting  Start with clear liquids such as juice or broth  If you do not vomit after clear liquids, you can eat your usual foods  Do not drink alcohol or take medicines that make you drowsy  This includes medicines that help you sleep and anxiety medicines  Ask your healthcare provider if it is safe for you to take pain medicine  Follow up with your healthcare provider as directed: Write down your questions so you remember to ask them during your visits

## 2023-01-04 NOTE — SEDATION DOCUMENTATION
Kyphoplasty completed by Dr Cristhian Liu  Tolerated well   Report called to [facility]

## 2023-01-04 NOTE — ANESTHESIA PREPROCEDURE EVALUATION
Procedure:  IR KYPHOPLASTY/VERTEBROPLASTY    Patient with history of post-operative apnea    Relevant Problems   CARDIO   (+) Aortic root dilatation (HCC)   (+) Non-rheumatic mitral regurgitation   (+) Nonrheumatic aortic valve insufficiency   (+) PVC's (premature ventricular contractions)   (+) Thoracic aortic aneurysm without rupture      /RENAL   (+) Malignant neoplasm of prostate (HCC)   (+) Renal cyst, acquired, left   (+) Stage 3a chronic kidney disease (HCC)      MUSCULOSKELETAL   (+) Chronic bilateral low back pain without sciatica   (+) Lumbar spondylosis   (+) Myofascial pain syndrome   (+) Sacroiliitis (HCC)      NEURO/PSYCH   (+) Acute L2 compression fracture    (+) Chronic bilateral low back pain without sciatica   (+) Chronic pain syndrome   (+) Myofascial pain syndrome        Physical Exam    Airway    Mallampati score: II  TM Distance: >3 FB  Neck ROM: limited     Dental   upper dentures and lower dentures,     Cardiovascular  Rhythm: regular, Rate: normal, Cardiovascular exam normal    Pulmonary  Pulmonary exam normal Breath sounds clear to auscultation,     Other Findings        Anesthesia Plan  ASA Score- 3     Anesthesia Type- IV sedation with anesthesia with ASA Monitors  Additional Monitors:   Airway Plan:     Comment: Discussed risks/benefits, including medication reactions, awareness, aspiration, and serious/life threatening complications  Plan to maintain native airway with IVGA, monitored with EtCO2  Plan Factors-Exercise tolerance (METS): >4 METS  Patient summary reviewed  Patient instructed to abstain from smoking on day of procedure  Patient did not smoke on day of surgery  Induction- intravenous  Postoperative Plan-     Informed Consent- Anesthetic plan and risks discussed with patient  I personally reviewed this patient with the CRNA  Discussed and agreed on the Anesthesia Plan with the CRNA  Sheldon Sanz

## 2023-01-06 ENCOUNTER — TELEPHONE (OUTPATIENT)
Dept: NEUROSURGERY | Facility: CLINIC | Age: 83
End: 2023-01-06

## 2023-01-06 NOTE — TELEPHONE ENCOUNTER
Spoke with Polo Dueñas to see how he is doing after kyphoplasty/vertebroplasty 1/4/2023  He reports that he is doing well overall and denies any incisional issues or fevers  Right leg and hip pain that mildly improved  Advised that after 24 hours he may take a shower and allow soapy water to wash over the surgical site, but do not submerge in water for the first 48 hours  Went over incision care with patient including keeping it clean and dry and not to apply any creams or ointments to the site  The site should be monitored for s/s of infection such as swelling, redness, or drainage  Call the office if any of these develop  Advised that she should take it easy for the first few days and avoid any strenuous activity or heavy lifting >10 pounds  If needed Tylenol can be used for pain following the procedure  Verified date/time/location of his upcoming POV on 2/6/2023 and advised him to call the office with any further questions or concerns, or if any incisional issues or fevers would arise  Patient was appreciative for the call

## 2023-01-10 ENCOUNTER — OFFICE VISIT (OUTPATIENT)
Dept: PAIN MEDICINE | Facility: CLINIC | Age: 83
End: 2023-01-10

## 2023-01-10 VITALS
HEART RATE: 63 BPM | SYSTOLIC BLOOD PRESSURE: 118 MMHG | DIASTOLIC BLOOD PRESSURE: 68 MMHG | BODY MASS INDEX: 21.42 KG/M2 | HEIGHT: 71 IN | WEIGHT: 153 LBS | TEMPERATURE: 97.9 F

## 2023-01-10 DIAGNOSIS — S32.020D COMPRESSION FRACTURE OF L2 VERTEBRA WITH ROUTINE HEALING: ICD-10-CM

## 2023-01-10 DIAGNOSIS — M54.50 CHRONIC BILATERAL LOW BACK PAIN WITHOUT SCIATICA: ICD-10-CM

## 2023-01-10 DIAGNOSIS — G89.4 CHRONIC PAIN SYNDROME: Primary | ICD-10-CM

## 2023-01-10 DIAGNOSIS — M47.816 LUMBAR SPONDYLOSIS: ICD-10-CM

## 2023-01-10 DIAGNOSIS — M79.18 MYOFASCIAL PAIN SYNDROME: ICD-10-CM

## 2023-01-10 DIAGNOSIS — G89.29 CHRONIC BILATERAL LOW BACK PAIN WITHOUT SCIATICA: ICD-10-CM

## 2023-01-10 RX ORDER — SENNOSIDES 8.6 MG
1300 CAPSULE ORAL EVERY 8 HOURS PRN
Status: ON HOLD | COMMUNITY

## 2023-01-10 RX ORDER — METHOCARBAMOL 500 MG/1
TABLET, FILM COATED ORAL
Qty: 75 TABLET | Refills: 3 | Status: ON HOLD | OUTPATIENT
Start: 2023-01-10

## 2023-01-10 NOTE — PROGRESS NOTES
Assessment:  1  Chronic pain syndrome    2  Chronic bilateral low back pain without sciatica    3  Myofascial pain syndrome    4  Compression fracture of L2 vertebra with routine healing    5  Lumbar spondylosis        Plan:  While the patient was in the office today, I did have a thorough conversation with the patient regarding their chronic pain syndrome, symptoms, and medication regimen/treatment plan  I discussed with the patient and his wife that at this point time the hope is that over the next few weeks he will see continued, but slow and steady overall improvement in his pain  I encouraged him to follow-up with Dr Sarah Fabian office for his postprocedure follow-up as scheduled  The patient and his wife are agreeable and verbalized an understanding  I discussed with the patient and his wife that I feel it is in his best interest to continue with the PT and OT and to remember to slowly and steadily increase his activity, as tolerated, allowing pain to be his guide  The patient was agreeable and verbalized an understanding  With regards to his medication regimen, I feel it is reasonable and appropriate to continue the methocarbamol and as needed Tylenol and hopefully over the next few weeks as he hopefully sees slow and steady improvement he can start to wean down on his use of the medication on a consistent basis and eventually just use it as needed for flareup pain  The patient and his wife were agreeable and verbalized an understanding  The patient will follow-up in 4 months for medication prescription refill and reevaluation  The patient was advised to contact the office should their symptoms worsen in the interim  The patient was agreeable and verbalized an understanding  History of Present Illness:     The patient is a 79 y o  male last seen on 11/14/2022 who presents for a follow up office visit in regards to chronic pain syndrome, as the patient's pain has been ongoing for greater than a year, secondary to MR spondylosis with a compression fracture and myofascial pain  The patient currently reports that since his last office visit as he is status post a kyphoplasty on January 4, 2023 with Dr Bradley Angelo, that he is noting some improvement in his left-sided pain and feels that overall he is starting to see some improvement  Currently he is managing his pain symptoms with methocarbamol as prescribed by our office and as needed Tylenol twice a day at most   The patient continues with physical therapy and Occupational Therapy and feels that overall his pain is at least manageable and stable and is hopeful that given time he will see some slow and steady improvement  The patient and his wife present today for regular medication follow-up visit  Current pain medications includes: Methocarbamol 500 mg 3 times daily as needed for pain and spasms  The patient reports that this regimen is providing moderate pain relief  The patient is reporting no side effects from this pain medication regimen  I have personally reviewed and/or updated the patient's past medical history, past surgical history, family history, social history, current medications, allergies, and vital signs today  Review of Systems:    Review of Systems   Respiratory: Negative for shortness of breath  Cardiovascular: Negative for chest pain  Gastrointestinal: Negative for constipation, diarrhea, nausea and vomiting  Musculoskeletal: Positive for gait problem  Negative for arthralgias, joint swelling and myalgias  Skin: Negative for rash  Neurological: Negative for dizziness, seizures and weakness  All other systems reviewed and are negative          Past Medical History:   Diagnosis Date   • Astigmatism     last assessed 11/15/17   • Cataract     Resolved 11/15/17   • Gross hematuria     Last assessed 01/28/16   • Hematuria     last assessed 01/28/16   • Lumbar vertebral fracture [facility])    • [facility] last assessed 05/18/17   • Renal calculi     last assessed 08/16/12   • Squamous cell carcinoma    • Vitamin D deficiency     Last assessed 08/19/13       Past Surgical History:   Procedure Laterality Date   • BREAST EXCISIONAL BIOPSY N/A     Benign breast tumor unsure of side over 50 yrs ago   • CATARACT EXTRACTION Left 08/26/2019   • CATARACT EXTRACTION Right 08/12/2019   • COLONOSCOPY     • CYSTOSCOPY  01/28/2016   • ELBOW SURGERY      Bone chip   • HERNIA REPAIR  2002   • IR KYPHOPLASTY/VERTEBROPLASTY  1/4/2023   • LITHOTRIPSY     • RETROPUBIC PROSTATECTOMY  11/13/2001    Radical with nerve sparing       Family History   Problem Relation Age of Onset   • Other Mother 76        Influenza   • Hypertension Mother    • Heart failure Father 70   • Peripheral vascular disease Father    • Hypertension Father    • Prostate cancer Brother    • Stroke Brother 48   • Hypertension Brother    • Hypertension Family        Social History     Occupational History   • Not on file   Tobacco Use   • Smoking status: Never   • Smokeless tobacco: Never   Vaping Use   • Vaping Use: Never used   Substance and Sexual Activity   • Alcohol use: Yes     Comment: social   • Drug use: No   • Sexual activity: Not on file         Current Outpatient Medications:   •  acetaminophen (Acetaminophen 8 Hour) 650 mg CR tablet, Take 1,300 mg by mouth every 8 (eight) hours as needed for mild pain, Disp: , Rfl:   •  cholecalciferol (VITAMIN D3) 1,000 units tablet, Take by mouth daily 2,000 IU  daily, Disp: , Rfl:   •  Docusate Sodium (COLACE PO), Take by mouth daily, Disp: , Rfl:   •  lisinopril (ZESTRIL) 10 mg tablet, Take 3 tablets (30 mg total) by mouth daily, Disp: 90 tablet, Rfl: 8  •  methocarbamol (ROBAXIN) 500 mg tablet, Take 1 PO TID PRN for pain/spasms  , Disp: 75 tablet, Rfl: 3  •  metoprolol tartrate (LOPRESSOR) 25 mg tablet, Take 1 tablet (25 mg total) by mouth every 12 (twelve) hours, Disp: 180 tablet, Rfl: 3  •  Mirabegron ER 25 MG TB24, Take 25 mg by mouth in the morning, Disp: 30 tablet, Rfl: 5  •  Multiple Vitamins-Minerals (CENTRUM SILVER 50+MEN PO), Daily, Disp: , Rfl:   •  verapamil (CALAN-SR) 240 mg CR tablet, TAKE 1 AND 1/2 TABLETS BY MOUTH DAILY AS DIRECTED (Patient taking differently: Take 240 mg by mouth in the morning), Disp: 135 tablet, Rfl: 1  •  ACETAMINOPHEN EXTRA STRENGTH PO, Take 1 capsule by mouth every 6 (six) hours as needed (Patient not taking: Reported on 1/10/2023), Disp: , Rfl:     Allergies   Allergen Reactions   • Sulfa Antibiotics Rash   • Ciprofloxacin    • Gabapentin Dizziness   • Medical Tape Rash       Physical Exam:    /68 (BP Location: Left arm, Patient Position: Sitting, Cuff Size: Standard)   Pulse 63   Temp 97 9 °F (36 6 °C)   Ht 5' 11" (1 803 m)   Wt 69 4 kg (153 lb)   BMI 21 34 kg/m²     Constitutional:normal, well developed, well nourished, alert, in no distress and non-toxic and no overt pain behavior  Eyes:anicteric  HEENT:grossly intact  Neck:supple, symmetric, trachea midline and no masses   Pulmonary:even and unlabored  Cardiovascular:No edema or pitting edema present  Skin:Normal without rashes or lesions and well hydrated  Psychiatric:Mood and affect appropriate  Neurologic:Cranial Nerves II-XII grossly intact  Musculoskeletal:The patient's gait is slightly antalgic, but steady with the use of a rolling walker  Imaging  No orders to display         No orders of the defined types were placed in this encounter

## 2023-01-17 ENCOUNTER — OFFICE VISIT (OUTPATIENT)
Dept: DERMATOLOGY | Facility: CLINIC | Age: 83
End: 2023-01-17

## 2023-01-17 VITALS — WEIGHT: 153 LBS | BODY MASS INDEX: 21.42 KG/M2 | TEMPERATURE: 97.7 F | HEIGHT: 71 IN

## 2023-01-17 DIAGNOSIS — L81.4 LENTIGO: ICD-10-CM

## 2023-01-17 DIAGNOSIS — Z85.89 HISTORY OF SQUAMOUS CELL CARCINOMA: ICD-10-CM

## 2023-01-17 DIAGNOSIS — I49.3 PVC'S (PREMATURE VENTRICULAR CONTRACTIONS): ICD-10-CM

## 2023-01-17 DIAGNOSIS — D22.60 MULTIPLE BENIGN MELANOCYTIC NEVI OF UPPER AND LOWER EXTREMITIES AND TRUNK: ICD-10-CM

## 2023-01-17 DIAGNOSIS — R21 RASH: ICD-10-CM

## 2023-01-17 DIAGNOSIS — D18.01 CHERRY ANGIOMA: ICD-10-CM

## 2023-01-17 DIAGNOSIS — L57.0 KERATOSIS, ACTINIC: Primary | ICD-10-CM

## 2023-01-17 DIAGNOSIS — D22.5 MULTIPLE BENIGN MELANOCYTIC NEVI OF UPPER AND LOWER EXTREMITIES AND TRUNK: ICD-10-CM

## 2023-01-17 DIAGNOSIS — L82.1 SEBORRHEIC KERATOSIS: ICD-10-CM

## 2023-01-17 DIAGNOSIS — D22.70 MULTIPLE BENIGN MELANOCYTIC NEVI OF UPPER AND LOWER EXTREMITIES AND TRUNK: ICD-10-CM

## 2023-01-17 RX ORDER — VERAPAMIL HYDROCHLORIDE 240 MG/1
240 TABLET, FILM COATED, EXTENDED RELEASE ORAL
Qty: 90 TABLET | Refills: 1 | Status: SHIPPED | OUTPATIENT
Start: 2023-01-17 | End: 2023-01-18 | Stop reason: SDUPTHER

## 2023-01-17 NOTE — PATIENT INSTRUCTIONS
ACTINIC KERATOSIS    Assessment and Plan:  Based on a thorough discussion of this condition and the management approach to it (including a comprehensive discussion of the known risks, side effects and potential benefits of treatment), the patient (family) agrees to implement the following specific plan:    Cryotherapy treatment performed in office with signed consent        Actinic keratoses are very common on sites repeatedly exposed to the sun, especially the backs of the hands and the face, most often affecting the ears, nose, cheeks, upper lip, vermilion of the lower lip, temples, forehead and balding scalp  In severely chronically sun-damaged individuals, they may also be found on the upper trunk, upper and lower limbs, and dorsum of feet  We discussed the theoretical premalignant (“pre-cancerous”) nature and etiology of these growths  We discussed the prevailing notion that actinic keratoses are a reflection of abnormal skin cell development due to DNA damage by short wavelength UVB  They are more likely to appear if the immune function is poor, due to aging, recent sun exposure, predisposing disease or certain drugs  We discussed that the main concern is that actinic keratoses may predispose to squamous cell carcinoma  It is rare for a solitary actinic keratosis to evolve to squamous cell carcinoma (SCC), but the risk of SCC occurring at some stage in a patient with more than 10 actinic keratoses is thought to be about 10 to 15%  A tender, thickened, ulcerated or enlarging actinic keratosis is suspicious of SCC  Actinic keratoses may be prevented by strict sun protection  If already present, keratoses may improve with a very high sun protection factor (50+) broad-spectrum sunscreen applied at least daily to affected areas, year-round    We recommend that UPF-rated clothing and hats and sunglasses be worn whenever possible and that a sunscreen-moisturizer combination product such as Neutrogena Daily Defense be applied at least three times a day  We performed a thorough discussion of treatment options and specific risk/benefits/alternatives including but not limited to medical “field” treatment with medications such as the following:    Topical “field area” medications such as 5-fluorouracil or Aldara (specifically, the trouble with long-term compliance, blistering and local skin reaction versus the convenience of at-home therapy and that field therapy “gets what is not yet seen”)  Cryotherapy (specifically, local pain, scarring, dyspigmentation, blistering, possible superinfection, and treats “only what we see” versus directed treatment today)  Photodynamic therapy (specifically, local pain, scarring, dyspigmentation, blistering, possible superinfection, need to schedule for a later date, and time spent in the office versus field therapy that “gets what is not yet seen”)  PROCEDURE:  DESTRUCTION OF PRE-MALIGNANT LESIONS  After a thorough discussion of treatment options and risk/benefits/alternatives (including but not limited to local pain, scarring, dyspigmentation, blistering, and possible superinfection), verbal and written consent were obtained and the aforementioned lesions were treated on with cryotherapy using liquid nitrogen x 1 cycle for 5-10 seconds  The patient tolerated the procedure well, and after-care instructions were provided

## 2023-01-17 NOTE — PROGRESS NOTES
[facility] Note     Patient Name: Penny Orantes  Encounter Date: 1/17/2023    Have you been cared for by a [place] [id number] Dermatologist in the last 3 years and, if so, which description applies to you? NO  I am considered a "new" patient and must complete all patient intake questions  I am MALE/not capable of bearing children  REVIEW OF SYSTEMS:  Have you recently had or currently have any of the following? · Recent fever or chills? No  · Any non-healing wound? No   PAST MEDICAL HISTORY:  Have you personally ever had or currently have any of the following? If "YES," then please provide more detail  · Skin cancer (such as Melanoma, Basal Cell Carcinoma, Squamous Cell Carcinoma? YES, SCC ( Left  Forearm - MOHS) 5-7 years ago  · Tuberculosis, HIV/AIDS, Hepatitis B or C: No  · Systemic Immunosuppression such as Diabetes, Biologic or Immunotherapy, Chemotherapy, Organ Transplantation, Bone Marrow Transplantation No  · Radiation Treatment No   FAMILY HISTORY:  Any "first degree relatives" (parent, brother, sister, or child) with the following? • Skin Cancer, Pancreatic or Other Cancer? YES, Brothers have hx of skin cancer    PATIENT EXPERIENCE:    • Do you want the Dermatologist to perform a COMPLETE skin exam today including a clinical examination under the "bra and underwear" areas? Yes  • If necessary, do we have your permission to call and leave a detailed message on your Preferred Phone number that includes your specific medical information?   Yes      Allergies   Allergen Reactions   • Sulfa Antibiotics Rash   • Ciprofloxacin    • Gabapentin Dizziness   • Medical Tape Rash      Current Outpatient Medications:   •  acetaminophen (TYLENOL) 650 mg CR tablet, Take 1,300 mg by mouth every 8 (eight) hours as needed for mild pain, Disp: , Rfl:   •  cholecalciferol (VITAMIN D3) 1,000 units tablet, Take by mouth daily 2,000 IU  daily, Disp: , Rfl:   •  Docusate Sodium (COLACE PO), Take by mouth daily, Disp: , Rfl:   •  lisinopril (ZESTRIL) 10 mg tablet, Take 3 tablets (30 mg total) by mouth daily, Disp: 90 tablet, Rfl: 8  •  methocarbamol (ROBAXIN) 500 mg tablet, Take 1 PO TID PRN for pain/spasms  , Disp: 75 tablet, Rfl: 3  •  metoprolol tartrate (LOPRESSOR) 25 mg tablet, Take 1 tablet (25 mg total) by mouth every 12 (twelve) hours, Disp: 180 tablet, Rfl: 3  •  Mirabegron ER 25 MG TB24, Take 25 mg by mouth in the morning, Disp: 30 tablet, Rfl: 5  •  Multiple Vitamins-Minerals (CENTRUM SILVER 50+MEN PO), Daily, Disp: , Rfl:   •  ACETAMINOPHEN EXTRA STRENGTH PO, Take 1 capsule by mouth every 6 (six) hours as needed (Patient not taking: Reported on 1/10/2023), Disp: , Rfl:   •  verapamil (CALAN-SR) 240 mg CR tablet, TAKE 1 AND 1/2 TABLETS BY MOUTH DAILY AS DIRECTED (Patient taking differently: Take 240 mg by mouth in the morning), Disp: 135 tablet, Rfl: 1          • Whom besides the patient is providing clinical information about today's encounter?   o NO ADDITIONAL HISTORIAN (patient alone provided history)    Physical Exam and Assessment/Plan by Diagnosis:      HISTORY OF SQUAMOUS CELL CARCINOMA     Physical Exam:  • Anatomic Location Affected:  Left forearm   • Morphological Description of Scar:  Well healed surgical scar  • Suspected Recurrence: no  • Regional adenopathy: no    Additional History of Present Condition:  SCC removed about 5-7 years ago  Assessment and Plan:  Based on a thorough discussion of this condition and the management approach to it (including a comprehensive discussion of the known risks, side effects and potential benefits of treatment), the patient (family) agrees to implement the following specific plan:  • When outside we recommend using a wide brim hat, sunglasses, long sleeve and pants, sunscreen with SPF 38+ with reapplication every 2 hours, or SPF specific clothing  • Patient's wife states that she had    •     How can SCC be prevented?   The most important way to prevent SCC is to avoid sunburn  This is especially important in childhood and early life  Fair skinned individuals and those with a personal or family history of BCC should protect their skin from sun exposure daily, year-round and lifelong  • Stay indoors or under the shade in the middle of the day   • Wear covering clothing   • Apply high protection factor SPF50+ broad-spectrum sunscreens generously to exposed skin if outdoors   • Avoid indoor tanning (sun beds, solaria)      What is the outlook for SCC? Most SCC are cured by treatment  Cure is most likely if treatment is undertaken when the lesion is small  A small percent of SCC's can spread to lymph  nodes and long term monitoring is indicated  They are also at increased risk of other skin cancers, especially melanoma  Regular self-skin examinations and long-term annual skin checks by an experienced health professional are recommended  RASH: Favor pigmented purpura on legs, Sarthak's on flank    Physical Exam:  • (Anatomic Location); (Size and Morphological Description); (Differential Diagnosis):  o Right Flank and bilateral legs: erythematous to brown slightly scaly papules  • Pertinent Positives:  • Pertinent Negatives: Additional History of Present Condition:  Noticed during examination  Assessment and Plan:  Based on a thorough discussion of this condition and the management approach to it (including a comprehensive discussion of the known risks, side effects and potential benefits of treatment), the patient (family) agrees to implement the following specific plan:  • Start Triamcinolone 0 1% ointment twice a day for 2 weeks straight only when rash is active  ACTINIC KERATOSIS    Physical Exam:  • Anatomic Location Affected:  Left ear helix, bilateral arms, and bilateral hands     • Morphological Description:  Pink scaly macules without palpable dermal component    Additional History of Present Condition:  Noticed during examination Assessment and Plan:  Based on a thorough discussion of this condition and the management approach to it (including a comprehensive discussion of the known risks, side effects and potential benefits of treatment), the patient (family) agrees to implement the following specific plan:    • Cryotherapy treatment performed in office with signed consent  • Precancerous nature reviewed  • Recheck sites in 4 weeks and contact me if not resolved for recheck        Actinic keratoses are very common on sites repeatedly exposed to the sun, especially the backs of the hands and the face, most often affecting the ears, nose, cheeks, upper lip, vermilion of the lower lip, temples, forehead and balding scalp  In severely chronically sun-damaged individuals, they may also be found on the upper trunk, upper and lower limbs, and dorsum of feet  We discussed the theoretical premalignant (“pre-cancerous”) nature and etiology of these growths  We discussed the prevailing notion that actinic keratoses are a reflection of abnormal skin cell development due to DNA damage by short wavelength UVB  They are more likely to appear if the immune function is poor, due to aging, recent sun exposure, predisposing disease or certain drugs  We discussed that the main concern is that actinic keratoses may predispose to squamous cell carcinoma  It is rare for a solitary actinic keratosis to evolve to squamous cell carcinoma (SCC), but the risk of SCC occurring at some stage in a patient with more than 10 actinic keratoses is thought to be about 10 to 15%  A tender, thickened, ulcerated or enlarging actinic keratosis is suspicious of SCC  Actinic keratoses may be prevented by strict sun protection  If already present, keratoses may improve with a very high sun protection factor (50+) broad-spectrum sunscreen applied at least daily to affected areas, year-round    We recommend that UPF-rated clothing and hats and sunglasses be worn whenever possible and that a sunscreen-moisturizer combination product such as Neutrogena Daily Defense be applied at least three times a day  We performed a thorough discussion of treatment options and specific risk/benefits/alternatives including but not limited to medical “field” treatment with medications such as the following:    • Topical “field area” medications such as 5-fluorouracil or Aldara (specifically, the trouble with long-term compliance, blistering and local skin reaction versus the convenience of at-home therapy and that field therapy “gets what is not yet seen”)  • Cryotherapy (specifically, local pain, scarring, dyspigmentation, blistering, possible superinfection, and treats “only what we see” versus directed treatment today)  • Photodynamic therapy (specifically, local pain, scarring, dyspigmentation, blistering, possible superinfection, need to schedule for a later date, and time spent in the office versus field therapy that “gets what is not yet seen”)  PROCEDURE:  DESTRUCTION OF PRE-MALIGNANT LESIONS  After a thorough discussion of treatment options and risk/benefits/alternatives (including but not limited to local pain, scarring, dyspigmentation, blistering, and possible superinfection), verbal and written consent were obtained and the aforementioned lesions were treated on with cryotherapy using liquid nitrogen x 1 cycle for 5-10 seconds  • TOTAL NUMBER of 8 pre-malignant lesions were treated today on the ANATOMIC LOCATION: Left ear x1, left forearm x3, left dorsal hand x2, right hand x1 and right forearm x1  The patient tolerated the procedure well, and after-care instructions were provided        MELANOCYTIC NEVI ("Moles")    Physical Exam:  • Anatomic Location Affected:   Mostly on sun-exposed areas of the trunk and extremities  • Morphological Description:  Scattered, 1-4mm round to ovoid, symmetric and evenly bordered, regularly pigmented macules/papules without outliers other than if noted elsewhere in today's note  • Pertinent Positives:  • Pertinent Negatives: Additional History of Present Condition:      Assessment and Plan:  Based on a thorough discussion of this condition and the management approach to it (including a comprehensive discussion of the known risks, side effects and potential benefits of treatment), the patient (family) agrees to implement the following specific plan:  • The patient was encouraged to use an SPF30+ broad spectrum sunscreen daily and re-apply every 2-3 outdoors while outside  The importance of sun protection, self-skin exams, and sun avoidance was emphasized  An annual full body skin exam is recommended, and the patient was encouraged to return to the office sooner for any new or changing lesions of concerns  • Benign, reassured  • Annual skin check     Melanocytic Nevi  Melanocytic nevi ("moles") are tan or brown, raised or flat areas of the skin which have an increased number of melanocytes  Melanocytes are the cells in our body which make pigment and account for skin color  Some moles are present at birth (I e , "congenital nevi"), while others come up later in life (i e , "acquired nevi")  The sun can stimulate the body to make more moles  Sunburns are not the only thing that triggers more moles  Chronic sun exposure can do it too  Clinically distinguishing a healthy mole from melanoma may be difficult, even for experienced dermatologists  The "ABCDE's" of moles have been suggested as a means of helping to alert a person to a suspicious mole and the possible increased risk of melanoma  The suggestions for raising alert are as follows:    Asymmetry: Healthy moles tend to be symmetric, while melanomas are often asymmetric  Asymmetry means if you draw a line through the mole, the two halves do not match in color, size, shape, or surface texture   Asymmetry can be a result of rapid enlargement of a mole, the development of a raised area on a previously flat lesion, scaling, ulceration, bleeding or scabbing within the mole  Any mole that starts to demonstrate "asymmetry" should be examined promptly by a board certified dermatologist      Border: Healthy moles tend to have discrete, even borders  The border of a melanoma often blends into the normal skin and does not sharply delineate the mole from normal skin  Any mole that starts to demonstrate "uneven borders" should be examined promptly by a board certified dermatologist      Color: Healthy moles tend to be one color throughout  Melanomas tend to be made up of different colors ranging from dark black, blue, white, or red  Any mole that demonstrates a color change should be examined promptly by a board certified dermatologist      Diameter: Healthy moles tend to be smaller than 0 6 cm in size; an exception are "congenital nevi" that can be larger  Melanomas tend to grow and can often be greater than 0 6 cm (1/4 of an inch, or the size of a pencil eraser)  This is only a guideline, and many normal moles may be larger than 0 6 cm without being unhealthy  Any mole that starts to change in size (small to bigger or bigger to smaller) should be examined promptly by a board certified dermatologist      Evolving: Healthy moles tend to "stay the same "  Melanomas may often show signs of change or evolution such as a change in size, shape, color, or elevation  Any mole that starts to itch, bleed, crust, burn, hurt, or ulcerate or demonstrate a change or evolution should be examined promptly by a board certified dermatologist         LENTIGO    Physical Exam:  • Anatomic Location Affected:  Face, arms, shoulders  • Morphological Description:  Light brown well demarcated macules on sun exposed skin with reassuring dermoscopy  • Pertinent Positives:  • Pertinent Negatives:     Additional History of Present Condition:      Assessment and Plan:  Based on a thorough discussion of this condition and the management approach to it (including a comprehensive discussion of the known risks, side effects and potential benefits of treatment), the patient (family) agrees to implement the following specific plan:  • When outside we recommend using a wide brim hat, sunglasses, long sleeve and pants, sunscreen with SPF 30+ with reapplication every 2 hours, or SPF specific clothing       What is a lentigo? A lentigo is a pigmented flat or slightly raised lesion with a clearly defined edge  Unlike an ephelis (freckle), it does not fade in the winter months  There are several kinds of lentigo  The name lentigo originally referred to its appearance resembling a small lentil  The plural of lentigo is lentigines, although “lentigos” is also in common use  Who gets lentigines? Lentigines can affect males and females of all ages and races  Solar lentigines are especially prevalent in fair skinned adults  Lentigines associated with syndromes are present at birth or arise during childhood  What causes lentigines? Common forms of lentigo are due to exposure to ultraviolet radiation:  • Sun damage including sunburn   • Indoor tanning   • Phototherapy, especially photochemotherapy (PUVA)    Ionizing radiation, eg radiation therapy, can also cause lentigines  Several familial syndromes associated with widespread lentigines originate from mutations in Juancarlos-MAP kinase, mTOR signaling and PTEN pathways  What is the treatment for lentigines? Most lentigines are left alone  Attempts to lighten them may not be successful  The following approaches are used:  • SPF 50+ broad-spectrum sunscreen   • Hydroquinone bleaching cream   • Alpha hydroxy acids   • Vitamin C   • Retinoids   • Azelaic acid   • Chemical peels  Individual lesions can be permanently removed using:  • Cryotherapy   • Intense pulsed light   • Pigment lasers    How can lentigines be prevented?   Lentigines associated with exposure ultraviolet radiation can be prevented by very careful sun protection  Clothing is more successful at preventing new lentigines than are sunscreens  What is the outlook for lentigines? Lentigines usually persist  They may increase in number with age and sun exposure  Some in sun-protected sites may fade and disappear  GONZAELZ ANGIOMAS    Physical Exam:  • Anatomic Location Affected:  trunk  • Morphological Description:  Scattered cherry red, variably sized papules  • Pertinent Positives:  • Pertinent Negatives: Additional History of Present Condition:      Assessment and Plan:  Based on a thorough discussion of this condition and the management approach to it (including a comprehensive discussion of the known risks, side effects and potential benefits of treatment), the patient (family) agrees to implement the following specific plan:  • Monitor for changes  • Benign, reassured  •     Assessment and Plan:    Cherry angioma, also known as [place] spots, are benign vascular skin lesions  A "cherry angioma" is a firm red, blue or purple papule, 0 1-1 cm in diameter  When thrombosed, they can appear black in colour until evaluated with a dermatoscope when the red or purple colour is more easily seen  Cherry angioma may develop on any part of the body but most often appear on the scalp, face, lips and trunk  An angioma is due to proliferating endothelial cells; these are the cells that line the inside of a blood vessel  Angiomas can arise in early life or later in life; the most common type of angioma is a cherry angioma  Cherry angiomas are very common in males and females of any age or race  They are more noticeable in white skin than in skin of colour  They markedly increase in number from about the age of 36  There may be a family history of similar lesions  Eruptive cherry angiomas have been rarely reported to be associated with internal malignancy  The cause of angiomas is unknown   Genetic analysis of cherry angiomas has shown that they frequently carry specific somatic missense mutations in the GNAQ and GNA11 (Q209H) genes, which are involved in other vascular and melanocytic proliferations  SEBORRHEIC KERATOSIS; NON-INFLAMED    Physical Exam:  • Anatomic Location Affected:  trunk  • Morphological Description:  Brown waxy variably sized "stuck-on" appearing papules with reassuring dermoscopy  • Pertinent Positives:  • Pertinent Negatives: Additional History of Present Condition:      Assessment and Plan:  Based on a thorough discussion of this condition and the management approach to it (including a comprehensive discussion of the known risks, side effects and potential benefits of treatment), the patient (family) agrees to implement the following specific plan:  • Monitor for changes  • Benign, reassured  •     Seborrheic Keratosis  A seborrheic keratosis is a harmless warty spot that appears during adult life as a common sign of skin aging  Seborrheic keratoses can arise on any area of skin, covered or uncovered, with the usual exception of the palms and soles  They do not arise from mucous membranes  Seborrheic keratoses can have highly variable appearance  Seborrheic keratoses are extremely common  It has been estimated that over 90% of adults over the age of 61 years have one or more of them  They occur in males and females of all races, typically beginning to erupt in the 35s or 45s  They are uncommon under the age of 21 years  The precise cause of seborrhoeic keratoses is not known  Seborrhoeic keratoses are considered degenerative in nature  As time goes by, seborrheic keratoses tend to become more numerous  Some people inherit a tendency to develop a very large number of them; some people may have hundreds of them  There is no easy way to remove multiple lesions on a single occasion    Unless a specific lesion is "inflamed" and is causing pain or stinging/burning or is bleeding, most insurance companies do not authorize treatment        Scribe Attestation    I,:  Kendra Larose am acting as a scribe while in the presence of the attending physician :       I,:  Darcie Portillo MD personally performed the services described in this documentation    as scribed in my presence :

## 2023-01-18 ENCOUNTER — TELEPHONE (OUTPATIENT)
Dept: CARDIOLOGY CLINIC | Facility: CLINIC | Age: 83
End: 2023-01-18

## 2023-01-18 DIAGNOSIS — M79.18 MYOFASCIAL PAIN SYNDROME: ICD-10-CM

## 2023-01-18 DIAGNOSIS — M54.50 CHRONIC BILATERAL LOW BACK PAIN WITHOUT SCIATICA: ICD-10-CM

## 2023-01-18 DIAGNOSIS — I49.3 PVC'S (PREMATURE VENTRICULAR CONTRACTIONS): ICD-10-CM

## 2023-01-18 DIAGNOSIS — G89.29 CHRONIC BILATERAL LOW BACK PAIN WITHOUT SCIATICA: ICD-10-CM

## 2023-01-18 RX ORDER — VERAPAMIL HYDROCHLORIDE 240 MG/1
240 TABLET, FILM COATED, EXTENDED RELEASE ORAL
Qty: 90 TABLET | Refills: 1 | Status: SHIPPED | OUTPATIENT
Start: 2023-01-18

## 2023-01-18 NOTE — TELEPHONE ENCOUNTER
Clarification made for Verapamil is qd  Per clinical notes on 11/11/22   [facility]

## 2023-01-20 ENCOUNTER — HOSPITAL ENCOUNTER (OUTPATIENT)
Dept: RADIOLOGY | Facility: HOSPITAL | Age: 83
End: 2023-01-20
Attending: RADIOLOGY

## 2023-01-20 ENCOUNTER — TELEPHONE (OUTPATIENT)
Dept: NEUROSURGERY | Facility: CLINIC | Age: 83
End: 2023-01-20

## 2023-01-20 DIAGNOSIS — M54.16 LUMBAR RADICULOPATHY: ICD-10-CM

## 2023-01-20 DIAGNOSIS — M80.08XA AGE-RELATED OSTEOPOROSIS WITH CURRENT PATHOLOGICAL FRACTURE OF VERTEBRA (HCC): ICD-10-CM

## 2023-01-20 DIAGNOSIS — S32.020D COMPRESSION FRACTURE OF L2 VERTEBRA WITH ROUTINE HEALING: ICD-10-CM

## 2023-01-20 DIAGNOSIS — M54.16 LUMBAR RADICULOPATHY: Primary | ICD-10-CM

## 2023-01-20 NOTE — TELEPHONE ENCOUNTER
Received a call from [address] wife reporting that he has been having increasing right lower back and buttock pain  Returned call and spoke with Vance Braga directly  He states that the pain sharp, the same as prior to surgery but has increased in severity affecting his ability to ambulate or even stand upright  He denies any falls, trauma, heavy lifting  etc  He has attempted taking acetaminophen but this does not help  Left sided back and leg pain has resolved  He had recent kypho 1/6/2023 and scheduled for up 2/6/2023  Will route message to provider and contact him back with additional input

## 2023-01-20 NOTE — TELEPHONE ENCOUNTER
Callback received from Amee Carter she stated the appt for next Friday will work for them  Also asked if he should wear his brace and continue PT  Advised that if the brace provides comfort he can wear this, and since he is having such pain hold off on PT until he sees Dr Ayad Lloyd

## 2023-01-20 NOTE — TELEPHONE ENCOUNTER
Attempted to call Ortega Sarmiento or Maria L Merida to offer OV for Friday 1/27/2023 1145  Left a detailed message and encouraged a call to my direct line with questions or to discuss further

## 2023-01-20 NOTE — TELEPHONE ENCOUNTER
Received the following response from Dr Jesus Duncan:     Matt Keene MD  to Me    MO    1:10 PM  Let get him a Lumbar XR and bring him in for review    Contacted the patient back to advise  Order placed for xray they will complete this now and await call back to discuss next steps

## 2023-01-21 ENCOUNTER — TELEPHONE (OUTPATIENT)
Dept: OTHER | Facility: HOSPITAL | Age: 83
End: 2023-01-21

## 2023-01-21 ENCOUNTER — HOSPITAL ENCOUNTER (EMERGENCY)
Facility: HOSPITAL | Age: 83
Discharge: HOME | End: 2023-01-21
Attending: EMERGENCY MEDICINE

## 2023-01-21 ENCOUNTER — TELEPHONE (OUTPATIENT)
Dept: OTHER | Facility: OTHER | Age: 83
End: 2023-01-21

## 2023-01-21 ENCOUNTER — APPOINTMENT (EMERGENCY)
Dept: CT IMAGING | Facility: HOSPITAL | Age: 83
End: 2023-01-21

## 2023-01-21 ENCOUNTER — NURSE TRIAGE (OUTPATIENT)
Dept: OTHER | Facility: OTHER | Age: 83
End: 2023-01-21

## 2023-01-21 VITALS
BODY MASS INDEX: 21.42 KG/M2 | DIASTOLIC BLOOD PRESSURE: 78 MMHG | HEIGHT: 71 IN | RESPIRATION RATE: 18 BRPM | HEART RATE: 80 BPM | OXYGEN SATURATION: 98 % | SYSTOLIC BLOOD PRESSURE: 143 MMHG | TEMPERATURE: 97.9 F | WEIGHT: 153 LBS

## 2023-01-21 DIAGNOSIS — M47.816 LUMBAR SPONDYLOSIS: ICD-10-CM

## 2023-01-21 DIAGNOSIS — S32.010A CLOSED COMPRESSION FRACTURE OF BODY OF L1 VERTEBRA (HCC): Primary | ICD-10-CM

## 2023-01-21 DIAGNOSIS — M41.80 LEVOSCOLIOSIS: ICD-10-CM

## 2023-01-21 RX ORDER — PREGABALIN 50 MG/1
50 CAPSULE ORAL 3 TIMES DAILY
Qty: 30 CAPSULE | Refills: 0 | Status: SHIPPED | OUTPATIENT
Start: 2023-01-21 | End: 2023-02-07

## 2023-01-21 RX ORDER — KETOROLAC TROMETHAMINE 30 MG/ML
15 INJECTION, SOLUTION INTRAMUSCULAR; INTRAVENOUS ONCE
Status: DISCONTINUED | OUTPATIENT
Start: 2023-01-21 | End: 2023-01-21

## 2023-01-21 RX ORDER — PREGABALIN 25 MG/1
50 CAPSULE ORAL ONCE
Status: COMPLETED | OUTPATIENT
Start: 2023-01-21 | End: 2023-01-21

## 2023-01-21 RX ORDER — LIDOCAINE 50 MG/G
2 PATCH TOPICAL ONCE
Status: DISCONTINUED | OUTPATIENT
Start: 2023-01-21 | End: 2023-01-21 | Stop reason: HOSPADM

## 2023-01-21 RX ORDER — KETOROLAC TROMETHAMINE 30 MG/ML
15 INJECTION, SOLUTION INTRAMUSCULAR; INTRAVENOUS ONCE
Status: COMPLETED | OUTPATIENT
Start: 2023-01-21 | End: 2023-01-21

## 2023-01-21 RX ORDER — METHOCARBAMOL 500 MG/1
500 TABLET, FILM COATED ORAL ONCE
Status: COMPLETED | OUTPATIENT
Start: 2023-01-21 | End: 2023-01-21

## 2023-01-21 RX ADMIN — PREGABALIN 50 MG: 25 CAPSULE ORAL at 19:45

## 2023-01-21 RX ADMIN — METHOCARBAMOL 500 MG: 500 TABLET ORAL at 16:59

## 2023-01-21 RX ADMIN — KETOROLAC TROMETHAMINE 15 MG: 30 INJECTION, SOLUTION INTRAMUSCULAR; INTRAVENOUS at 16:59

## 2023-01-21 RX ADMIN — LIDOCAINE 5% 2 PATCH: 700 PATCH TOPICAL at 16:59

## 2023-01-21 NOTE — TELEPHONE ENCOUNTER
Regarding: Severe right side back pain  ----- Message from Svitlana Silvestre sent at 1/21/2023  8:09 AM EST -----  "My  has severe right side back pain worse for several days and he had an xray yesterday but I want to know if we should take him to the ED  I don't know what we should do   I am not sure if my phone has call block but please also try [phone] "

## 2023-01-21 NOTE — ED PROVIDER NOTES
History  Chief Complaint   Patient presents with   • Back Pain     Pt to ED c/o back pain  Pt states he has chronic back pain after a fracture in October but lower R back pain has gotten worse over the past week  Pt denies fall or trauma to area  Xray done yesterday  Denies incontinance issues  No numbness or tingling in extremities  Pain is the worst when changing positions  This is an 78 y/o male with PMH HTN, L2 compression fracture s/p kyphoplasty/vertebroplasty on 1/4 who presents to the ER today with his wife for worsening low back pain  Patient states 3 months ago he was in the ER for a L2 compression fracture and has had pain since then  Denies any improvement after the surgery on 1/4  States this Wednesday he started having severe pain  He states it is a 10/10 pain, is in the low back and is worse on the right than the left  He admits to difficulty walking and moving his legs due to the pain  Denies any radiation down the legs  He denies any numbness, tingling, bowel or bladder incontinence, fevers, chest pain, shortness of breath, abdominal pain, nausea, vomiting  Has been taking tylenol and robaxin at home  Last dose at 3:30 PM 1000 mg  History provided by:  Patient   used: No    Back Pain  Location:  Lumbar spine  Quality:  Stabbing  Radiates to:  Does not radiate  Pain severity:  Moderate  Onset quality:  Gradual  Timing:  Constant  Chronicity:  New  Ineffective treatments:  OTC medications  Associated symptoms: no abdominal pain, no chest pain, no fever, no headaches, no numbness and no weakness        Prior to Admission Medications   Prescriptions Last Dose Informant Patient Reported? Taking?    ACETAMINOPHEN EXTRA STRENGTH PO   Yes No   Sig: Take 1 capsule by mouth every 6 (six) hours as needed   Patient not taking: Reported on 1/10/2023   Docusate Sodium (COLACE PO)   Yes No   Sig: Take by mouth daily   Mirabegron ER 25 MG TB24   No No   Sig: Take 25 mg by mouth in the morning   Multiple Vitamins-Minerals (CENTRUM SILVER 50+MEN PO)   Yes No   Sig: Daily   acetaminophen (TYLENOL) 650 mg CR tablet   Yes No   Sig: Take 1,300 mg by mouth every 8 (eight) hours as needed for mild pain   cholecalciferol (VITAMIN D3) 1,000 units tablet   Yes No   Sig: Take by mouth daily 2,000 IU  daily   lisinopril (ZESTRIL) 10 mg tablet   No No   Sig: Take 3 tablets (30 mg total) by mouth daily   methocarbamol (ROBAXIN) 500 mg tablet   No No   Sig: Take 1 PO TID PRN for pain/spasms  metoprolol tartrate (LOPRESSOR) 25 mg tablet   No No   Sig: Take 1 tablet (25 mg total) by mouth every 12 (twelve) hours   triamcinolone (KENALOG) 0 1 % ointment   No No   Sig: Apply topically 2 (two) times a day For 2 weeks straight to right flank and legs only when rash is active     verapamil (CALAN-SR) 240 mg CR tablet   No No   Sig: Take 1 tablet (240 mg total) by mouth daily at bedtime      Facility-Administered Medications: None       Past Medical History:   Diagnosis Date   • Astigmatism     last assessed 11/15/17   • Cataract     Resolved 11/15/17   • Gross hematuria     Last assessed 01/28/16   • Hematuria     last assessed 01/28/16   • Lumbar vertebral fracture (HCC)    • Prostate cancer ([facility] [address] )     last assessed 05/18/17   • Renal calculi     last assessed 08/16/12   • Squamous cell carcinoma    • Vitamin D deficiency     Last assessed 08/19/13       Past Surgical History:   Procedure Laterality Date   • BREAST EXCISIONAL BIOPSY N/A     Benign breast tumor unsure of side over 50 yrs ago   • CATARACT EXTRACTION Left 08/26/2019   • CATARACT EXTRACTION Right 08/12/2019   • COLONOSCOPY     • CYSTOSCOPY  01/28/2016   • ELBOW SURGERY      Bone chip   • HERNIA REPAIR  2002   • IR KYPHOPLASTY/VERTEBROPLASTY  1/4/2023   • LITHOTRIPSY     • RETROPUBIC PROSTATECTOMY  11/13/2001    Radical with nerve sparing       Family History   Problem Relation Age of Onset   • Other Mother 76        Influenza   • Hypertension Mother    • Heart failure Father 70   • Peripheral vascular disease Father    • Hypertension Father    • Prostate cancer Brother    • Stroke Brother 48   • Hypertension Brother    • Hypertension Family      I have reviewed and agree with the history as documented  E-Cigarette/Vaping   • E-Cigarette Use Never User      E-Cigarette/Vaping Substances   • Nicotine No    • THC No    • CBD No    • Flavoring No    • Other No    • Unknown No      Social History     Tobacco Use   • Smoking status: Never   • Smokeless tobacco: Never   Vaping Use   • Vaping Use: Never used   Substance Use Topics   • Alcohol use: Yes     Comment: social   • Drug use: No       Review of Systems   Constitutional: Negative for chills and fever  Respiratory: Negative for chest tightness and shortness of breath  Cardiovascular: Negative for chest pain  Gastrointestinal: Negative for abdominal pain, constipation, diarrhea, nausea and vomiting  Genitourinary: Negative for difficulty urinating  Musculoskeletal: Positive for back pain  Skin: Negative for color change  Neurological: Negative for weakness, numbness and headaches  Psychiatric/Behavioral: Negative for behavioral problems and sleep disturbance  All other systems reviewed and are negative  Physical Exam  Physical Exam  Vitals and nursing note reviewed  Constitutional:       General: He is awake  Appearance: Normal appearance  He is well-developed  HENT:      Head: Normocephalic and atraumatic  Right Ear: External ear normal       Left Ear: External ear normal       Nose: Nose normal    Eyes:      General: No scleral icterus  Extraocular Movements: Extraocular movements intact  Conjunctiva/sclera: Conjunctivae normal    Cardiovascular:      Rate and Rhythm: Normal rate and regular rhythm  Heart sounds: Normal heart sounds, S1 normal and S2 normal  No murmur heard  No gallop     Pulmonary:      Effort: Pulmonary effort is normal       Breath sounds: Normal breath sounds  No wheezing, rhonchi or rales  Genitourinary:     Comments: No saddle anesthesia  Musculoskeletal:      Cervical back: Normal range of motion  Lumbar back: Tenderness present  No swelling, edema or deformity  Decreased range of motion  Back:       Comments: NV and sensation intact  Bilateral DP +2  5/5 strength dorsi and plantar flexion  Patient has back brace on  Skin:     General: Skin is warm and dry  Neurological:      General: No focal deficit present  Mental Status: He is alert  Comments: Full sensation, strength and motor function in all 4 extremities   Psychiatric:         Attention and Perception: Attention and perception normal          Mood and Affect: Mood normal          Behavior: Behavior normal  Behavior is cooperative  Vital Signs  ED Triage Vitals [01/21/23 1406]   Temperature Pulse Respirations Blood Pressure SpO2   97 9 °F (36 6 °C) 70 18 154/74 98 %      Temp Source Heart Rate Source Patient Position - Orthostatic VS BP Location FiO2 (%)   Temporal Monitor Lying Left arm --      Pain Score       7           Vitals:    01/21/23 1406 01/21/23 1700 01/21/23 1800   BP: 154/74 148/70 143/78   Pulse: 70 78 80   Patient Position - Orthostatic VS: Lying           Visual Acuity      ED Medications  Medications   lidocaine (LIDODERM) 5 % patch 2 patch (2 patches Topical Medication Applied 1/21/23 1659)   methocarbamol (ROBAXIN) tablet 500 mg (500 mg Oral Given 1/21/23 1659)   ketorolac (TORADOL) injection 15 mg (15 mg Intramuscular Given 1/21/23 1659)   pregabalin (LYRICA) capsule 50 mg (50 mg Oral Given 1/21/23 1945)       Diagnostic Studies  Results Reviewed     None                 CT lumbar spine without contrast   Final Result by Linde Gottron, MD (01/21 1948)   1  Mild L1 superior endplate vertebral compression deformity, new from prior CT study     2   Stable L2 kyphoplasty; stable minimal L3 superior endplate vertebral compression deformity  3   Moderate multilevel spondylotic changes of the lumbar spine as detailed above  4   Moderate levoscoliosis, apex at L3  Workstation performed: [id number]                    Procedures  Procedures         ED Course  ED Course as of 01/21/23 2010   Sat Jan 21, 2023   1759 TT on call neurosurgery   1837 XR done yesterday outpatient final read shows stable L2 kyphoplasty and new L1 compression fracture  Discussed this with Love Kuhn neurosurgery advanced practitioner  She recommends brace, adding lyrica to pain regimen and getting CT in ED  Medical Decision Making  78 y/o male here for low back pain, surgery on 1/4  Differential diagnosis: surgical complication, new fracture, DDD, spondylolithesis, bulging disc  Assessment: see ED course  No differences in CT read compared to xray read  Patient started on lyrica as recommended by neurosurgery, sent prescription to patients pharmacy  Patient states he has an appointment scheduled on Friday, but ambulatory referral placed so neurosurgery aware of ED visit and to plan accordingly  Patient was given strict return to ER precautions both verbally and in discharge papers  Patient verbalized understanding and agrees with plan  Closed compression fracture of body of L1 vertebra ([address] ): acute illness or injury  Levoscoliosis: acute illness or injury  Lumbar spondylosis: self-limited or minor problem  Amount and/or Complexity of Data Reviewed  Radiology: ordered  Risk  Prescription drug management            Disposition  Final diagnoses:   Closed compression fracture of body of L1 vertebra (HCC)   Levoscoliosis   Lumbar spondylosis     Time reflects when diagnosis was documented in both MDM as applicable and the Disposition within this note     Time User Action Codes Description Comment    1/21/2023  7:54 PM Willian Dunlap Add [S32 010A] Closed compression fracture of body of L1 vertebra ([address] )     1/21/2023  7:55 PM Ailyn Waldrop Simmie [place] [M41 80] Levoscoliosis     1/21/2023  7:55 PM [facility] Add [Q46 728] Lumbar spondylosis       ED Disposition     ED Disposition   Discharge    Condition   Stable    Date/Time   Sat Jan 21, 2023  7:54 PM    Comment   Stoney Calvo discharge to home/self care  Follow-up Information     Follow up With Specialties Details Why Contact Info Additional Information    Ian Zuñiga MD Neurosurgery Go to   [address] [place] [address]  [phone]        [address] [facility] Emergency Department Emergency Medicine Go to  if you develop intense back pain that is worse or different than before, cant feel or move your legs, numbness, weakness, develop pelvic numbness, bowel or bladder incontinence, fevers, chest pain, shortness of breath [address] [facility] Emergency Department, [address] [facility] Jagdeep Miranda Luige [address]          Discharge Medication List as of 1/21/2023  7:58 PM      START taking these medications    Details   pregabalin (LYRICA) 50 mg capsule Take 1 capsule (50 mg total) by mouth 3 (three) times a day for 10 days, Starting Sat 1/21/2023, Until Tue 1/31/2023, Normal         CONTINUE these medications which have NOT CHANGED    Details   acetaminophen (TYLENOL) 650 mg CR tablet Take 1,300 mg by mouth every 8 (eight) hours as needed for mild pain, Historical Med      ACETAMINOPHEN EXTRA STRENGTH PO Take 1 capsule by mouth every 6 (six) hours as needed, Historical Med      cholecalciferol (VITAMIN D3) 1,000 units tablet Take by mouth daily 2,000 IU  daily, Historical Med      Docusate Sodium (COLACE PO) Take by mouth daily, Historical Med      lisinopril (ZESTRIL) 10 mg tablet Take 3 tablets (30 mg total) by mouth daily, Starting Tue 11/22/2022, Normal      methocarbamol (ROBAXIN) 500 mg tablet Take 1 PO TID PRN for pain/spasms  , Normal      metoprolol tartrate (LOPRESSOR) 25 mg tablet Take 1 tablet (25 mg total) by mouth every 12 (twelve) hours, Starting Wed 12/28/2022, Normal      Mirabegron ER 25 MG TB24 Take 25 mg by mouth in the morning, Starting Wed 11/30/2022, Normal      Multiple Vitamins-Minerals (CENTRUM SILVER 50+MEN PO) Daily, Historical Med      triamcinolone (KENALOG) 0 1 % ointment Apply topically 2 (two) times a day For 2 weeks straight to right flank and legs only when rash is active , Starting Tue 1/17/2023, Normal      verapamil (CALAN-SR) 240 mg CR tablet Take 1 tablet (240 mg total) by mouth daily at bedtime, Starting Wed 1/18/2023, Normal                 PDMP Review       Value Time User    PDMP Reviewed  Yes 10/17/2022  3:08 PM Laurie Ball, [facility] Provider  Electronically Signed by           Debora Lancaster PA-C  01/21/23 2010

## 2023-01-21 NOTE — TELEPHONE ENCOUNTER
Patients wife is requesting an urgent call back from on call provider   Her  has severe back pain and she wants to know if she should take him to the ED

## 2023-01-21 NOTE — TELEPHONE ENCOUNTER
I received a call from , regarding patient having severe right side back pain, s/p L2 KP by Dr Patricio Barboza  Pain is stabbing and non radiating, localized in the right lower back , difficulty walking due to pain  Had Lumbar XR no official report, seems the Cement is in the bone, but on lateral view it seems there slight extra osseous  No previous images to compare with  Patient taking Robaxin, Tylenol without much help  Discussed with Dr Patricio Barboza and he recommends patient can go to ER for evaluation, imaging  and pain control

## 2023-01-22 NOTE — DISCHARGE INSTRUCTIONS
Take 50 mg lyrica every 8 hours  Take robaxin every 8 hours   Take ibuprofen or tylenol every 4-6 hours for pain  Can alternate them every 3 hours as needed  Max 3200 mg for ibuprofen in 24 hours and 4000 mg tylenol in one day  Rest, use heating pads, ice on your back   Avoid heavy lifting for atleast a week, use back brace  Lidocaine patches for 12 hours on and 12 hours off   Follow up with your PCP if symptoms persist despite these treatments  Return to the ER if you develop intense back pain that is worse or different than before, cant feel or move your legs, numbness, weakness, develop pelvic numbness, bowel or bladder incontinence, fevers, chest pain, shortness of breath

## 2023-01-23 ENCOUNTER — APPOINTMENT (EMERGENCY)
Dept: MRI IMAGING | Facility: HOSPITAL | Age: 83
End: 2023-01-23

## 2023-01-23 ENCOUNTER — HOSPITAL ENCOUNTER (INPATIENT)
Facility: HOSPITAL | Age: 83
LOS: 1 days | End: 2023-01-25
Attending: EMERGENCY MEDICINE | Admitting: INTERNAL MEDICINE

## 2023-01-23 DIAGNOSIS — M54.9 BACK PAIN: ICD-10-CM

## 2023-01-23 DIAGNOSIS — S32.010A COMPRESSION FRACTURE OF L1 VERTEBRA, INITIAL ENCOUNTER (HCC): ICD-10-CM

## 2023-01-23 DIAGNOSIS — I10 HTN (HYPERTENSION): ICD-10-CM

## 2023-01-23 DIAGNOSIS — R33.9 URINARY RETENTION: Primary | ICD-10-CM

## 2023-01-23 PROBLEM — R00.1 BRADYCARDIA: Status: ACTIVE | Noted: 2023-01-23

## 2023-01-23 PROBLEM — D75.89 MACROCYTOSIS: Status: ACTIVE | Noted: 2023-01-23

## 2023-01-23 PROBLEM — R06.89 ACUTE RESPIRATORY INSUFFICIENCY: Status: ACTIVE | Noted: 2023-01-23

## 2023-01-23 PROBLEM — D72.10 EOSINOPHILIA: Status: ACTIVE | Noted: 2023-01-23

## 2023-01-23 LAB
ALBUMIN SERPL BCP-MCNC: 3.4 G/DL (ref 3.5–5)
ALP SERPL-CCNC: 94 U/L (ref 46–116)
ALT SERPL W P-5'-P-CCNC: 19 U/L (ref 12–78)
ANION GAP SERPL CALCULATED.3IONS-SCNC: 6 MMOL/L (ref 4–13)
AST SERPL W P-5'-P-CCNC: 18 U/L (ref 5–45)
BASOPHILS # BLD MANUAL: 0 THOUSAND/UL (ref 0–0.1)
BASOPHILS NFR MAR MANUAL: 0 % (ref 0–1)
BILIRUB SERPL-MCNC: 0.5 MG/DL (ref 0.2–1)
BILIRUB UR QL STRIP: NEGATIVE
BUN SERPL-MCNC: 27 MG/DL (ref 5–25)
CALCIUM ALBUM COR SERPL-MCNC: 9.9 MG/DL (ref 8.3–10.1)
CALCIUM SERPL-MCNC: 9.4 MG/DL (ref 8.3–10.1)
CHLORIDE SERPL-SCNC: 103 MMOL/L (ref 96–108)
CLARITY UR: CLEAR
CO2 SERPL-SCNC: 30 MMOL/L (ref 21–32)
COLOR UR: YELLOW
CREAT SERPL-MCNC: 1.23 MG/DL (ref 0.6–1.3)
CRP SERPL QL: 21.9 MG/L
EOSINOPHIL # BLD MANUAL: 0.45 THOUSAND/UL (ref 0–0.4)
EOSINOPHIL NFR BLD MANUAL: 5 % (ref 0–6)
ERYTHROCYTE [DISTWIDTH] IN BLOOD BY AUTOMATED COUNT: 13.1 % (ref 11.6–15.1)
ERYTHROCYTE [SEDIMENTATION RATE] IN BLOOD: 11 MM/HOUR (ref 0–19)
GFR SERPL CREATININE-BSD FRML MDRD: 53 ML/MIN/1.73SQ M
GLUCOSE SERPL-MCNC: 104 MG/DL (ref 65–140)
GLUCOSE UR STRIP-MCNC: NEGATIVE MG/DL
HCT VFR BLD AUTO: 39.5 % (ref 36.5–49.3)
HGB BLD-MCNC: 12.5 G/DL (ref 12–17)
HGB UR QL STRIP.AUTO: NEGATIVE
KETONES UR STRIP-MCNC: NEGATIVE MG/DL
LEUKOCYTE ESTERASE UR QL STRIP: NEGATIVE
LYMPHOCYTES # BLD AUTO: 2.33 THOUSAND/UL (ref 0.6–4.47)
LYMPHOCYTES # BLD AUTO: 26 % (ref 14–44)
MCH RBC QN AUTO: 31.6 PG (ref 26.8–34.3)
MCHC RBC AUTO-ENTMCNC: 31.6 G/DL (ref 31.4–37.4)
MCV RBC AUTO: 100 FL (ref 82–98)
MONOCYTES # BLD AUTO: 0.45 THOUSAND/UL (ref 0–1.22)
MONOCYTES NFR BLD: 5 % (ref 4–12)
NEUTROPHILS # BLD MANUAL: 5.29 THOUSAND/UL (ref 1.85–7.62)
NEUTS SEG NFR BLD AUTO: 59 % (ref 43–75)
NITRITE UR QL STRIP: NEGATIVE
PH UR STRIP.AUTO: 5.5 [PH]
PLATELET # BLD AUTO: 171 THOUSANDS/UL (ref 149–390)
PLATELET BLD QL SMEAR: ADEQUATE
PMV BLD AUTO: 11.7 FL (ref 8.9–12.7)
POTASSIUM SERPL-SCNC: 4.2 MMOL/L (ref 3.5–5.3)
PROT SERPL-MCNC: 7 G/DL (ref 6.4–8.4)
PROT UR STRIP-MCNC: NEGATIVE MG/DL
RBC # BLD AUTO: 3.96 MILLION/UL (ref 3.88–5.62)
SODIUM SERPL-SCNC: 139 MMOL/L (ref 135–147)
SP GR UR STRIP.AUTO: 1.01 (ref 1–1.03)
TSH SERPL DL<=0.05 MIU/L-ACNC: 2.42 UIU/ML (ref 0.45–4.5)
UROBILINOGEN UR STRIP-ACNC: <2 MG/DL
VARIANT LYMPHS # BLD AUTO: 5 %
WBC # BLD AUTO: 8.96 THOUSAND/UL (ref 4.31–10.16)

## 2023-01-23 RX ORDER — LISINOPRIL 20 MG/1
20 TABLET ORAL DAILY
Status: DISCONTINUED | OUTPATIENT
Start: 2023-01-24 | End: 2023-01-25 | Stop reason: HOSPADM

## 2023-01-23 RX ORDER — DOCUSATE SODIUM 100 MG/1
100 CAPSULE, LIQUID FILLED ORAL 2 TIMES DAILY
Status: DISCONTINUED | OUTPATIENT
Start: 2023-01-23 | End: 2023-01-23

## 2023-01-23 RX ORDER — LISINOPRIL 10 MG/1
10 TABLET ORAL
Status: DISCONTINUED | OUTPATIENT
Start: 2023-01-23 | End: 2023-01-25 | Stop reason: HOSPADM

## 2023-01-23 RX ORDER — METHOCARBAMOL 500 MG/1
500 TABLET, FILM COATED ORAL 2 TIMES DAILY
Status: DISCONTINUED | OUTPATIENT
Start: 2023-01-23 | End: 2023-01-25

## 2023-01-23 RX ORDER — ONDANSETRON 2 MG/ML
4 INJECTION INTRAMUSCULAR; INTRAVENOUS EVERY 6 HOURS PRN
Status: DISCONTINUED | OUTPATIENT
Start: 2023-01-23 | End: 2023-01-25 | Stop reason: HOSPADM

## 2023-01-23 RX ORDER — VERAPAMIL HYDROCHLORIDE 240 MG/1
240 TABLET, FILM COATED, EXTENDED RELEASE ORAL
Status: DISCONTINUED | OUTPATIENT
Start: 2023-01-23 | End: 2023-01-25 | Stop reason: HOSPADM

## 2023-01-23 RX ORDER — HYDROMORPHONE HCL/PF 1 MG/ML
0.5 SYRINGE (ML) INJECTION ONCE
Status: COMPLETED | OUTPATIENT
Start: 2023-01-23 | End: 2023-01-23

## 2023-01-23 RX ORDER — DOCUSATE SODIUM 100 MG/1
100 CAPSULE, LIQUID FILLED ORAL DAILY
Status: DISCONTINUED | OUTPATIENT
Start: 2023-01-24 | End: 2023-01-25 | Stop reason: HOSPADM

## 2023-01-23 RX ORDER — CALCIUM CARBONATE 200(500)MG
1000 TABLET,CHEWABLE ORAL DAILY PRN
Status: DISCONTINUED | OUTPATIENT
Start: 2023-01-23 | End: 2023-01-25 | Stop reason: HOSPADM

## 2023-01-23 RX ORDER — ONDANSETRON 2 MG/ML
4 INJECTION INTRAMUSCULAR; INTRAVENOUS ONCE
Status: COMPLETED | OUTPATIENT
Start: 2023-01-23 | End: 2023-01-23

## 2023-01-23 RX ORDER — ACETAMINOPHEN 325 MG/1
650 TABLET ORAL EVERY 6 HOURS PRN
Status: DISCONTINUED | OUTPATIENT
Start: 2023-01-23 | End: 2023-01-25

## 2023-01-23 RX ORDER — MELATONIN
1000 DAILY
Status: DISCONTINUED | OUTPATIENT
Start: 2023-01-24 | End: 2023-01-25

## 2023-01-23 RX ORDER — HYDROMORPHONE HCL/PF 1 MG/ML
0.5 SYRINGE (ML) INJECTION
Status: DISCONTINUED | OUTPATIENT
Start: 2023-01-23 | End: 2023-01-25

## 2023-01-23 RX ORDER — PREGABALIN 25 MG/1
50 CAPSULE ORAL 3 TIMES DAILY
Status: DISCONTINUED | OUTPATIENT
Start: 2023-01-23 | End: 2023-01-25

## 2023-01-23 RX ORDER — OXYCODONE HYDROCHLORIDE 5 MG/1
5 TABLET ORAL EVERY 8 HOURS
Status: DISCONTINUED | OUTPATIENT
Start: 2023-01-23 | End: 2023-01-24

## 2023-01-23 RX ORDER — OXYBUTYNIN CHLORIDE 5 MG/1
5 TABLET, EXTENDED RELEASE ORAL DAILY
Status: DISCONTINUED | OUTPATIENT
Start: 2023-01-24 | End: 2023-01-25 | Stop reason: HOSPADM

## 2023-01-23 RX ORDER — ENOXAPARIN SODIUM 100 MG/ML
40 INJECTION SUBCUTANEOUS DAILY
Status: DISCONTINUED | OUTPATIENT
Start: 2023-01-23 | End: 2023-01-25 | Stop reason: HOSPADM

## 2023-01-23 RX ORDER — OXYCODONE HYDROCHLORIDE 5 MG/1
5 TABLET ORAL EVERY 6 HOURS PRN
Status: DISCONTINUED | OUTPATIENT
Start: 2023-01-23 | End: 2023-01-23

## 2023-01-23 RX ADMIN — HYDROMORPHONE HYDROCHLORIDE 0.5 MG: 1 INJECTION, SOLUTION INTRAMUSCULAR; INTRAVENOUS; SUBCUTANEOUS at 06:24

## 2023-01-23 RX ADMIN — LISINOPRIL 10 MG: 10 TABLET ORAL at 21:20

## 2023-01-23 RX ADMIN — OXYCODONE HYDROCHLORIDE 5 MG: 5 TABLET ORAL at 18:07

## 2023-01-23 RX ADMIN — METOPROLOL TARTRATE 25 MG: 25 TABLET, FILM COATED ORAL at 18:07

## 2023-01-23 RX ADMIN — GADOBUTROL 6 ML: 604.72 INJECTION INTRAVENOUS at 10:48

## 2023-01-23 RX ADMIN — VERAPAMIL HYDROCHLORIDE 240 MG: 240 TABLET ORAL at 21:21

## 2023-01-23 RX ADMIN — ENOXAPARIN SODIUM 40 MG: 100 INJECTION SUBCUTANEOUS at 15:24

## 2023-01-23 RX ADMIN — ONDANSETRON 4 MG: 2 INJECTION INTRAMUSCULAR; INTRAVENOUS at 06:24

## 2023-01-23 RX ADMIN — METHOCARBAMOL TABLETS 500 MG: 500 TABLET, COATED ORAL at 21:21

## 2023-01-23 RX ADMIN — PREGABALIN 50 MG: 25 CAPSULE ORAL at 18:07

## 2023-01-23 RX ADMIN — PREGABALIN 50 MG: 25 CAPSULE ORAL at 21:22

## 2023-01-23 RX ADMIN — HYDROMORPHONE HYDROCHLORIDE 0.5 MG: 1 INJECTION, SOLUTION INTRAMUSCULAR; INTRAVENOUS; SUBCUTANEOUS at 09:36

## 2023-01-23 RX ADMIN — ACETAMINOPHEN 650 MG: 325 TABLET ORAL at 21:22

## 2023-01-23 NOTE — ED NOTES
Patient noted to have 02 down to 87% when he sleeps, 2L applied sats 95% with 2L       Laila Avila, LUZMA  01/23/23 7782

## 2023-01-23 NOTE — CASE MANAGEMENT
Case Management Assessment & Discharge Planning Note    Patient name Anabel Kearney  Location ED 11/ED 11 MRN [id number]  : 1940 Date 2023       Current Admission Date: 2023  Current Admission Diagnosis:Compression fracture of L1 vertebra [facility])   Patient Active Problem List    Diagnosis Date Noted   • Bradycardia 2023   • Acute respiratory insufficiency 2023   • Macrocytosis 2023   • Eosinophilia 2023   • Vitamin D deficiency 2022   • Osteoporosis 2022   • Ambulatory dysfunction 2022   • Urinary frequency 2022   • Compression fracture of L1 vertebra ([facility] [address] ) 2022   • Acute L2 compression fracture  10/21/2022   • Sacroiliitis (HCC)    • Sacroiliac joint dysfunction of both sides    • Lumbar spondylosis 10/04/2022   • Stage 3a chronic kidney disease ([facility] [address] ) 2022   • Myofascial pain syndrome 2022   • Aortic root dilatation ([address] ) 2022   • Renal cyst, acquired, left 12/10/2020   • Chronic pain syndrome 2020   • Chronic bilateral low back pain without sciatica 2020   • Spinal stenosis of lumbar region with neurogenic claudication    • Lumbar radiculopathy    • Malignant neoplasm of prostate ([facility] [address] ) 2019   • Thoracic aortic aneurysm without rupture 10/04/2018   • Nonrheumatic aortic valve insufficiency 10/04/2018   • Non-rheumatic mitral regurgitation 10/04/2018   • PVC's (premature ventricular contractions) 2018      LOS (days): 0  Geometric Mean LOS (GMLOS) (days):   Days to GMLOS:     OBJECTIVE:              Current admission status: Observation       Preferred Pharmacy:   [address] [address]  Phone: [phone] Fax: [phone]    Primary Care Provider: Derrick Castelan DO    Primary Insurance: MEDICARE  Secondary Insurance: AARP    ASSESSMENT:  [address] Representative - Spouse   Primary Phone: [phone] (Mobile)  Home Phone: [phone]               Advance Directives  Does patient have a [address]?: Yes  Does patient have Advance Directives?: No  Was patient offered paperwork?: Yes (declined)  Primary Contact: Jv Pierre ( son Dustin Mcduffie is POA)         Readmission Root Cause  30 Day Readmission: No    Patient Information  Admitted from[de-identified] Home  Mental Status: Alert  During Assessment patient was accompanied by: Spouse  Assessment information provided by[de-identified] Spouse, Patient  Primary Caregiver: Self  Support Systems: Spouse/significant other, Son  [place] of Residence: [email or web address] do you live in?: [address] [facility] entry access options   Select all that apply : Stairs  Number of steps to enter home : 2  Do the steps have railings?: No  Type of Current Residence: [address] [place] home  Upon entering residence, is there a bedroom on the main floor (no further steps)?: No  A bedroom is located on the following floor levels of residence (select all that apply):: 2nd Floor  Upon entering residence, is there a bathroom on the main floor (no further steps)?: Yes  Number of steps to 2nd floor from main floor: One Flight  In the last 12 months, was there a time when you were not able to pay the mortgage or rent on time?: No  In the last 12 months, how many places have you lived?: 1  In the last 12 months, was there a time when you did not have a steady place to sleep or slept in a shelter (including now)?: No  Living Arrangements: Lives w/ Spouse/significant other  Is patient a ?: Yes  Is patient active with [facility])?: No    Activities of Daily Living Prior to Admission  Functional Status: Independent  Completes ADLs independently?: Yes  Ambulates independently?: Yes  Does patient use assisted devices?: Yes  Assisted Devices (DME) used: Walker, Shower Chair, Bedside Commode  Does patient currently own DME?: Yes  What DME does the patient currently own?: Bedside Commode, Shower Chair, Walker  Does patient have a history of Outpatient Therapy (PT/OT)?: Yes  Does the patient have a history of Short-Term Rehab?: Yes  Does patient have a history of HHC?: No  Does patient currently have [facility]?: No         Patient Information Continued  Income Source: Pension/FCI  Does patient have prescription coverage?: Yes  Within the past 12 months, you worried that your food would run out before you got the money to buy more : Never true  Within the past 12 months, the food you bought just didn't last and you didn't have money to get more : Never true  Food insecurity resource given?: N/A  Does patient receive dialysis treatments?: No  Does patient have a history of substance abuse?: No  Does patient have a history of Mental Health Diagnosis?: No         Means of Transportation  Means of Transport to Appts[de-identified] Family transport  In the past 12 months, has lack of transportation kept you from medical appointments or from getting medications?: No  In the past 12 months, has lack of transportation kept you from meetings, work, or from getting things needed for daily living?: No  Was application for public transport provided?: N/A        DISCHARGE DETAILS:    Discharge planning discussed with[de-identified] patient and his wife in ED  Freedom of Choice: Yes  Comments - Freedom of Choice: Discussed DC planning and role of CM  CM contacted family/caregiver?: Yes  Were Treatment Team discharge recommendations reviewed with patient/caregiver?: Yes  Did patient/caregiver verbalize understanding of patient care needs?: Yes       Contacts  Patient Contacts: Rj Diaz  Relationship to Patient[de-identified] Family  Contact Method: In Person  Reason/Outcome: Discharge Planning, Continuity of [facility]         Is the patient interested in [facility] at discharge?: No (not at this time   If recommended will disucss)    DME Referral Provided  Referral made for DME?: No    Other Referral/Resources/Interventions Provided:  Interventions: None Indicated, HHC, DME  Referral Comments: Pt needs TBD  Pt on O2 in ED but not n O2 at baseline   Wife states she is pts caregiver and has been doing so            Discharge Destination Plan[de-identified] Home, Home with Walkerd at Discharge : Family                                      Additional Comments: Pt is Obs  Pt and wife present for interview  Pt resides with his wife in a 2sth with 2ste  Pt uses a walker to ambulate  He has not been driving since his back injury  Pt is on O2 in the ED but not at baseline  Pt has no hx of HHC  Pt has been to STR at [facility]  Pt is currently invovled with Op PT/Ot through [facility]  Pt has no hx of MH or DA  Pts POA is his son Valentin Schlatter but wife is primary caregiver at home  Pt sees [facility] PCP  Pt uses [facility]  Needs TBD  No interest in HHc at this time  Further work up needed  Pts o2 needs being followed as again he is not on O2 at baseline

## 2023-01-23 NOTE — H&P
[place]  H&P- Nina Melo 1940, 79 y o  male MRN: [id number]  Unit/Bed#: -01 Encounter: [id number]  Primary Care Provider: Magali Temple DO   Date and time admitted to hospital: 1/23/2023  6:00 AM    * Compression fracture of L1 vertebra [facility])  Assessment & Plan  - Patient was reviewed in the ED for back pain on 1/21/2023 when he was found to have closed L1 vertebral fracture  He received lidocaine patch, methocarbamol, ketorolac and he was commenced on pregabalin  - However, patient presented today on account of worsening pain especially with movement  - CT-spine done 1/21/23 showed mild L1 superior endplate vertebral compression deformity  - MRI done 1/23/2023 showed there is a new mild L1 compression fracture present when compared with the prior MRI performed 7 weeks ago  - Neurosurgery consult appreciated with thanks   - X-ray lumbar spine pending  - Ensure analgesia with scheduled oxycodone and Dilaudid for breakthrough pain  - Bowel regimen with Colace    Acute respiratory insufficiency  Assessment & Plan  Patient was requiring 2 L of oxygen  Will wean as appropriate    Eosinophilia  Assessment & Plan  Eosinophils elevated at 0 45  Monitor CBC closely    Macrocytosis  Assessment & Plan  MCV was elevated at 100  Vitamin B12 and folate pending MCV was elevated at 100    Bradycardia  Assessment & Plan  Heart rate was bradycardic at 59  Ensure to give metoprolol with hold parameter    Osteoporosis  Assessment & Plan  Patient has a history of osteoporosis  Recommend DEXA scan as outpatient    Vitamin D deficiency  Assessment & Plan  Continue vitamin D supplementation   Check vitamin D levels      VTE Pharmacologic Prophylaxis: VTE Score: 6 Moderate Risk (Score 3-4) - Pharmacological DVT Prophylaxis Ordered: enoxaparin (Lovenox)  Code Status: Level 1 - Full Code   Discussion with family: Updated  (wife) at bedside      Anticipated Length of Stay: Patient will be admitted on an inpatient basis with an anticipated length of stay of greater than 2 midnights secondary to Pain control  Total Time for Visit, including Counseling / Coordination of Care: 90 minutes Greater than 50% of this total time spent on direct patient counseling and coordination of care  Chief Complaint: Back pain    History of Present Illness:  Deretha Lundborg is a 79 y o  male with a PMH of recent L1 fracture and kyphoplasty/vertebroplasty, remote history of prostate cancer(about 20 years ago s/p prostate resection) who presents with severe back pain  Patient presented to the emergency department on account of severely excruciating back pain particularly got worse with movement  He was recently seen in the ED 1/21/2023 on account of back pain during which he was found to have L1 compression fracture  He was reviewed, given some medications and discharged home  However the pain became worse  Patient had difficulty with moving his back especially with turning the pain was said to be of severity of 10/10  There was no prior trauma  There was no urinary or fecal incontinence  There is no weakness in his lower extremity  Patient does not have any chest pain, shortness of breath, nausea or vomiting  He does not have any urinary symptoms  He does have constipation which is not new for him  Patient will be admitted for pain management and reviewed by physiotherapy for possible rehabilitation  Review of Systems:  Review of Systems   Constitutional: Negative for chills and fever  HENT: Negative for ear pain and sore throat  Eyes: Negative for pain and visual disturbance  Respiratory: Negative for cough and shortness of breath  Cardiovascular: Negative for chest pain and palpitations  Gastrointestinal: Positive for constipation  Negative for abdominal pain and vomiting  Genitourinary: Negative for dysuria and hematuria  Musculoskeletal: Positive for back pain   Negative for arthralgias  Skin: Negative for color change and rash  Neurological: Negative for seizures and syncope  All other systems reviewed and are negative  Past Medical and Surgical History:   Past Medical History:   Diagnosis Date   • Astigmatism     last assessed 11/15/17   • Cataract     Resolved 11/15/17   • Gross hematuria     Last assessed 01/28/16   • Hematuria     last assessed 01/28/16   • Lumbar vertebral fracture ([facility] [address] )    • Prostate cancer ([facility] [address] )     last assessed 05/18/17   • Renal calculi     last assessed 08/16/12   • Squamous cell carcinoma    • Vitamin D deficiency     Last assessed 08/19/13       Past Surgical History:   Procedure Laterality Date   • BREAST EXCISIONAL BIOPSY N/A     Benign breast tumor unsure of side over 50 yrs ago   • CATARACT EXTRACTION Left 08/26/2019   • CATARACT EXTRACTION Right 08/12/2019   • COLONOSCOPY     • CYSTOSCOPY  01/28/2016   • ELBOW SURGERY      Bone chip   • HERNIA REPAIR  2002   • IR KYPHOPLASTY/VERTEBROPLASTY  1/4/2023   • LITHOTRIPSY     • RETROPUBIC PROSTATECTOMY  11/13/2001    Radical with nerve sparing       Meds/Allergies:  Prior to Admission medications    Medication Sig Start Date End Date Taking? Authorizing Provider   acetaminophen (TYLENOL) 650 mg CR tablet Take 1,300 mg by mouth every 8 (eight) hours as needed for mild pain    Historical Provider, MD   cholecalciferol (VITAMIN D3) 1,000 units tablet Take by mouth daily 2,000 IU  daily    Historical Provider, MD   Docusate Sodium (COLACE PO) Take by mouth daily    Historical Provider, MD   lisinopril (ZESTRIL) 10 mg tablet Take 3 tablets (30 mg total) by mouth daily  Patient taking differently: Take 30 mg by mouth 2 (two) times a day 20mg in a m  and 10mg in evening 11/22/22   Mitzi Mena MD   methocarbamol (ROBAXIN) 500 mg tablet Take 1 PO TID PRN for pain/spasms   1/10/23   MARE Serna   metoprolol tartrate (LOPRESSOR) 25 mg tablet Take 1 tablet (25 mg total) by mouth every 12 (twelve) hours 12/28/22   Geovany Miller MD   Mirabegron ER 25 MG TB24 Take 25 mg by mouth in the morning 48/39/87   Tcio Cuellar, DO   Multiple Vitamins-Minerals (CENTRUM SILVER 50+MEN PO) Daily    Historical Provider, MD   pregabalin (LYRICA) 50 mg capsule Take 1 capsule (50 mg total) by mouth 3 (three) times a day for 10 days 1/21/23 1/31/23  Edward Boyce PA-C   triamcinolone (KENALOG) 0 1 % ointment Apply topically 2 (two) times a day For 2 weeks straight to right flank and legs only when rash is active  1/17/23   Devyn Andrade MD   verapamil (CALAN-SR) 240 mg CR tablet Take 1 tablet (240 mg total) by mouth daily at bedtime 1/18/23   Lucina Starks MD   ACETAMINOPHEN EXTRA STRENGTH PO Take 1 capsule by mouth every 6 (six) hours as needed  Patient not taking: Reported on 1/10/2023  1/23/23  Historical Provider, MD     I have reviewed home medications with patient personally  Allergies:    Allergies   Allergen Reactions   • Sulfa Antibiotics Rash   • Ciprofloxacin    • Gabapentin Dizziness   • Medical Tape Rash       Social History:  Marital Status: /Civil Union   Occupation: Retired  Patient Pre-hospital Living Situation: Home  Patient Pre-hospital Level of Mobility: walks with walker  Patient Pre-hospital Diet Restrictions: None  Substance Use History: None    Social History     Substance and Sexual Activity   Alcohol Use Yes    Comment: social     Social History     Tobacco Use   Smoking Status Never   Smokeless Tobacco Never     Social History     Substance and Sexual Activity   Drug Use No       Family History:  Family History   Problem Relation Age of Onset   • Other Mother 76        Influenza   • Hypertension Mother    • Heart failure Father 70   • Peripheral vascular disease Father    • Hypertension Father    • Prostate cancer Brother    • Stroke Brother 48   • Hypertension Brother    • Hypertension Family        Physical Exam:     Vitals:   Blood Pressure: (!) 188/77 (01/23/23 1454)  Pulse: 59 (01/23/23 1454)  Temperature: 97 9 °F (36 6 °C) (01/23/23 1454)  Temp Source: Oral (01/23/23 1454)  Respirations: 16 (01/23/23 1454)  Height: 5' 11" (180 3 cm) (01/23/23 0601)  Weight - Scale: 69 4 kg (153 lb) (01/23/23 0601)  SpO2: 100 % (01/23/23 1454)    Physical Exam  Vitals and nursing note reviewed  Constitutional:       General: He is not in acute distress  Appearance: He is well-developed  HENT:      Head: Normocephalic and atraumatic  Eyes:      Conjunctiva/sclera: Conjunctivae normal    Cardiovascular:      Rate and Rhythm: Normal rate and regular rhythm  Heart sounds: No murmur heard  Pulmonary:      Effort: Pulmonary effort is normal  No respiratory distress  Breath sounds: Normal breath sounds  Abdominal:      Palpations: Abdomen is soft  Tenderness: There is no abdominal tenderness  Musculoskeletal:         General: No swelling  Cervical back: Neck supple  Comments: Examination limited due to severe pain   Skin:     General: Skin is warm and dry  Capillary Refill: Capillary refill takes less than 2 seconds  Neurological:      Mental Status: He is alert and oriented to person, place, and time     Psychiatric:         Mood and Affect: Mood normal             Additional Data:     Lab Results:  Results from last 7 days   Lab Units 01/23/23  0635   WBC Thousand/uL 8 96   HEMOGLOBIN g/dL 12 5   HEMATOCRIT % 39 5   PLATELETS Thousands/uL 171   LYMPHO PCT % 26   MONO PCT % 5   EOS PCT % 5     Results from last 7 days   Lab Units 01/23/23  0635   SODIUM mmol/L 139   POTASSIUM mmol/L 4 2   CHLORIDE mmol/L 103   CO2 mmol/L 30   BUN mg/dL 27*   CREATININE mg/dL 1 23   ANION GAP mmol/L 6   CALCIUM mg/dL 9 4   ALBUMIN g/dL 3 4*   TOTAL BILIRUBIN mg/dL 0 50   ALK PHOS U/L 94   ALT U/L 19   AST U/L 18   GLUCOSE RANDOM mg/dL 104                       Lines/Drains:  Invasive Devices     Peripheral Intravenous Line  Duration           Peripheral IV 01/23/23 Left Antecubital <1 day                    MRI lumbar spine w wo contrast   Final Result by Rico Yoon DO (01/23 1117)      There is a new mild L1 compression fracture present when compared with the prior MRI performed 7 weeks ago  Previously treated L2 compression fracture with PMMA cement placement since prior MRI, grossly unchanged in configuration  Stable multilevel lumbar spondylitic degenerative change with mild to moderate canal stenosis and foraminal narrowing  Canal stenosis is most pronounced at L4-5, series 6 image 23  This is unchanged  Workstation performed: [id number]         XR spine lumbar 2 or 3 views injury    (Results Pending)       ** Please Note: This note has been constructed using a voice recognition system   **

## 2023-01-23 NOTE — PLAN OF CARE
Problem: Potential for Falls  Goal: Patient will remain free of falls  Description: INTERVENTIONS:  - Educate patient/family on patient safety including physical limitations  - Instruct patient to call for assistance with activity   - Consult OT/PT to assist with strengthening/mobility   - Keep Call bell within reach  - Keep bed low and locked with side rails adjusted as appropriate  - Keep care items and personal belongings within reach  - Initiate and maintain comfort rounds  - Make Fall Risk Sign visible to staff  - Offer Toileting every 2 Hours, in advance of need  - Initiate/Maintain bed alarm  - Obtain necessary fall risk management equipment:   - Apply yellow socks and bracelet for high fall risk patients  - Consider moving patient to room near nurses station  Outcome: Progressing     Problem: PAIN - ADULT  Goal: Verbalizes/displays adequate comfort level or baseline comfort level  Description: Interventions:  - Encourage patient to monitor pain and request assistance  - Assess pain using appropriate pain scale  - Administer analgesics based on type and severity of pain and evaluate response  - Implement non-pharmacological measures as appropriate and evaluate response  - Consider cultural and social influences on pain and pain management  - Notify physician/advanced practitioner if interventions unsuccessful or patient reports new pain  Outcome: Progressing     Problem: INFECTION - ADULT  Goal: Absence or prevention of progression during hospitalization  Description: INTERVENTIONS:  - Assess and monitor for signs and symptoms of infection  - Monitor lab/diagnostic results  - Monitor all insertion sites, i e  indwelling lines, tubes, and drains  - Monitor endotracheal if appropriate and nasal secretions for changes in amount and color  - [place] appropriate cooling/warming therapies per order  - Administer medications as ordered  - Instruct and encourage patient and family to use good hand hygiene technique  - Identify and instruct in appropriate isolation precautions for identified infection/condition  Outcome: Progressing  Goal: Absence of fever/infection during neutropenic period  Description: INTERVENTIONS:  - Monitor WBC    Outcome: Progressing     Problem: SAFETY ADULT  Goal: Patient will remain free of falls  Description: INTERVENTIONS:  - Educate patient/family on patient safety including physical limitations  - Instruct patient to call for assistance with activity   - Consult OT/PT to assist with strengthening/mobility   - Keep Call bell within reach  - Keep bed low and locked with side rails adjusted as appropriate  - Keep care items and personal belongings within reach  - Initiate and maintain comfort rounds  - Make Fall Risk Sign visible to staff  - Offer Toileting every 2 Hours, in advance of need  - Initiate/Maintain bed alarm  - Obtain necessary fall risk management equipment:   - Apply yellow socks and bracelet for high fall risk patients  - Consider moving patient to room near nurses station  Outcome: Progressing  Goal: Maintain or return to baseline ADL function  Description: INTERVENTIONS:  -  Assess patient's ability to carry out ADLs; assess patient's baseline for ADL function and identify physical deficits which impact ability to perform ADLs (bathing, care of mouth/teeth, toileting, grooming, dressing, etc )  - Assess/evaluate cause of self-care deficits   - Assess range of motion  - Assess patient's mobility; develop plan if impaired  - Assess patient's need for assistive devices and provide as appropriate  - Encourage maximum independence but intervene and supervise when necessary  - Involve family in performance of ADLs  - Assess for home care needs following discharge   - Consider OT consult to assist with ADL evaluation and planning for discharge  - Provide patient education as appropriate  Outcome: Progressing  Goal: Maintains/Returns to pre admission functional level  Description: INTERVENTIONS:  - Perform BMAT or MOVE assessment daily    - Set and communicate daily mobility goal to care team and patient/family/caregiver  - Collaborate with rehabilitation services on mobility goals if consulted  - Perform Range of Motion 2 times a day  - Reposition patient every 2 hours  - Dangle patient 2 times a day  - Stand patient 2 times a day  - Ambulate patient 2 times a day  - Out of bed to chair 2 times a day   - Out of bed for meals 2 times a day  - Out of bed for toileting  - Record patient progress and toleration of activity level   Outcome: Progressing     Problem: DISCHARGE PLANNING  Goal: Discharge to home or other facility with appropriate resources  Description: INTERVENTIONS:  - Identify barriers to discharge w/patient and caregiver  - Arrange for needed discharge resources and transportation as appropriate  - Identify discharge learning needs (meds, wound care, etc )  - Arrange for interpretive services to assist at discharge as needed  - Refer to Case Management Department for coordinating discharge planning if the patient needs post-hospital services based on physician/advanced practitioner order or complex needs related to functional status, cognitive ability, or social support system  Outcome: Progressing     Problem: Knowledge Deficit  Goal: Patient/family/caregiver demonstrates understanding of disease process, treatment plan, medications, and discharge instructions  Description: Complete learning assessment and assess knowledge base    Interventions:  - Provide teaching at level of understanding  - Provide teaching via preferred learning methods  Outcome: Progressing     Problem: GENITOURINARY - ADULT  Goal: Maintains or returns to baseline urinary function  Description: INTERVENTIONS:  - Assess urinary function  - Encourage oral fluids to ensure adequate hydration if ordered  - Administer IV fluids as ordered to ensure adequate hydration  - Administer ordered medications as needed  - Offer frequent toileting  - Follow urinary retention protocol if ordered  Outcome: Progressing  Goal: Absence of urinary retention  Description: INTERVENTIONS:  - Assess patient's ability to void and empty bladder  - Monitor I/O  - Bladder scan as needed  - Discuss with physician/AP medications to alleviate retention as needed  - Discuss catheterization for long term situations as appropriate  Outcome: Progressing     Problem: SKIN/TISSUE INTEGRITY - ADULT  Goal: Skin Integrity remains intact(Skin Breakdown Prevention)  Description: Assess:  -Perform Barrett assessment   -Clean and moisturize skin  -Inspect skin when repositioning, toileting, and assisting with ADLS  -Assess under medical devices  -Assess extremities for adequate circulation and sensation     Bed Management:  -Have minimal linens on bed & keep smooth, unwrinkled  -Change linens as needed when moist or perspiring  -Avoid sitting or lying in one position for more than 2 hours while in bed  -Keep HOB at 30 degrees     Toileting:  -Offer bedside commode  -Assess for incontinence   -Use incontinent care products after each incontinent episode     Activity:  -Mobilize patient 2 times a day  -Encourage activity and walks on unit  -Encourage or provide ROM exercises   -Turn and reposition patient every 2 Hours  -Use appropriate equipment to lift or move patient in bed  -Instruct/ Assist with weight shifting every 2 when out of bed in chair  -Consider limitation of chair time 2 hour intervals    Skin Care:  -Avoid use of baby powder, tape, friction and shearing, hot water or constrictive clothing  -Relieve pressure over bony prominences   -Do not massage red bony areas    Next Steps:  -Teach patient strategies to minimize risks   -Consider consults to  interdisciplinary teams   Outcome: Progressing     Problem: MUSCULOSKELETAL - ADULT  Goal: Maintain or return mobility to safest level of function  Description: INTERVENTIONS:  - Assess patient's ability to carry out ADLs; assess patient's baseline for ADL function and identify physical deficits which impact ability to perform ADLs (bathing, care of mouth/teeth, toileting, grooming, dressing, etc )  - Assess/evaluate cause of self-care deficits   - Assess range of motion  - Assess patient's mobility  - Assess patient's need for assistive devices and provide as appropriate  - Encourage maximum independence but intervene and supervise when necessary  - Involve family in performance of ADLs  - Assess for home care needs following discharge   - Consider OT consult to assist with ADL evaluation and planning for discharge  - Provide patient education as appropriate  Outcome: Progressing

## 2023-01-23 NOTE — TELEMEDICINE
e-Consult (IPC)     Inpatient consult to Neurosurgery  Consult performed by: Remedios Hernandez PA-C  Consult ordered by: Sherman Nieves MD           Contacted by Johann Santos at [facility]  Alisha Chacko 79 y o  male MRN: [id number]  Unit/Bed#: ED 11 Encounter: [id number]    Reason for Consult    Per provider report, patient presents with back pain and difficulty urinating  He is known to [facility], s/p L2 kyphoplasty with Dr Jesus Duncan on 1/4/2023  MRI with new L1 fracture ([facility] 1)  Available past medical history,social history, surgical history, medication list, drug allergies and review of systems were reviewed  /63   Pulse (!) 53   Temp 97 5 °F (36 4 °C) (Temporal)   Resp 18   Ht 5' 11" (1 803 m)   Wt 69 4 kg (153 lb)   SpO2 95%   BMI 21 34 kg/m²      Clinical exam per provider report, urinating better (250cc in bladder scan), having weakness in legs with significant pain    Imaging personally reviewed  · MRI lumbar spine w wo contrast 1/23/2023:  There is a new mild L1 compression fracture present when compared with the prior MRI performed 7 weeks ago  Previously treated L2 compression fracture with PMMA cement placement since prior MRI, grossly unchanged in configuration  Stable multilevel lumbar spondylitic degenerative change with mild to moderate canal stenosis and foraminal narrowing  Canal stenosis is most pronounced at L4-5, series 6 image 23  This is unchanged  Assessment and Recommendations  1  Imaging reviewed, no surgical indication at this time, no need for transfer  Mild L1 fracture should not cause nik weakness, there is no retropulsion or significant loss of height  2  Should be admitted to [facility] for pain control  3  TLSO brace when OOB and HOB greater than 45 degrees  4  Should obtain new upright lumbar spine XR in brace  5  Continue multimodal pain regimen for pain control  6   If pain remains intractable, consider IR consult for possible kyphoplasty at [facility]  7  Will review imaging once completed  8  Please call with questions or concerns    All questions answered  Provider is in agreement with the course of action  5-10 minutes, >50% of the total time devoted to medical consultative verbal/EMR discussion between providers  Written report will be generated in the EMR

## 2023-01-23 NOTE — ED PROVIDER NOTES
History  Chief Complaint   Patient presents with   • Urinary Retention     EMS from home; dx with L1 fx Saturday, woke up this AM and unable to urinate but feels like he has to     78 yo M with PMH of recent L1 fracture (1/21) and kyphoplasty/vertebroplasty 1/4/2023 (IR) BIBA from home for worsening back pain and difficulty urinating  No fevers/chills  No cp/sob or n/v  No new injury/trauma  No numbness/tingling/weakness  No h/o urinary retention in the past  Denies sacral numbness/tingling as well  History provided by:  Patient and medical records   used: No    Difficulty Urinating  Presenting symptoms: no dysuria    Relieved by:  Nothing  Worsened by:  Nothing  Ineffective treatments:  None tried  Associated symptoms: urinary retention    Associated symptoms: no abdominal pain, no diarrhea, no fever, no hematuria, no nausea, no urinary frequency and no vomiting        Prior to Admission Medications   Prescriptions Last Dose Informant Patient Reported? Taking? ACETAMINOPHEN EXTRA STRENGTH PO   Yes No   Sig: Take 1 capsule by mouth every 6 (six) hours as needed   Patient not taking: Reported on 1/10/2023   Docusate Sodium (COLACE PO)   Yes No   Sig: Take by mouth daily   Mirabegron ER 25 MG TB24   No No   Sig: Take 25 mg by mouth in the morning   Multiple Vitamins-Minerals (CENTRUM SILVER 50+MEN PO)   Yes No   Sig: Daily   acetaminophen (TYLENOL) 650 mg CR tablet   Yes No   Sig: Take 1,300 mg by mouth every 8 (eight) hours as needed for mild pain   cholecalciferol (VITAMIN D3) 1,000 units tablet   Yes No   Sig: Take by mouth daily 2,000 IU  daily   lisinopril (ZESTRIL) 10 mg tablet   No No   Sig: Take 3 tablets (30 mg total) by mouth daily   methocarbamol (ROBAXIN) 500 mg tablet   No No   Sig: Take 1 PO TID PRN for pain/spasms     metoprolol tartrate (LOPRESSOR) 25 mg tablet   No No   Sig: Take 1 tablet (25 mg total) by mouth every 12 (twelve) hours   pregabalin (LYRICA) 50 mg capsule   No No   Sig: Take 1 capsule (50 mg total) by mouth 3 (three) times a day for 10 days   triamcinolone (KENALOG) 0 1 % ointment   No No   Sig: Apply topically 2 (two) times a day For 2 weeks straight to right flank and legs only when rash is active  verapamil (CALAN-SR) 240 mg CR tablet   No No   Sig: Take 1 tablet (240 mg total) by mouth daily at bedtime      Facility-Administered Medications: None       Past Medical History:   Diagnosis Date   • Astigmatism     last assessed 11/15/17   • Cataract     Resolved 11/15/17   • Gross hematuria     Last assessed 01/28/16   • Hematuria     last assessed 01/28/16   • Lumbar vertebral fracture (HCC)    • Prostate cancer ([facility] [address] )     last assessed 05/18/17   • Renal calculi     last assessed 08/16/12   • Squamous cell carcinoma    • Vitamin D deficiency     Last assessed 08/19/13       Past Surgical History:   Procedure Laterality Date   • BREAST EXCISIONAL BIOPSY N/A     Benign breast tumor unsure of side over 50 yrs ago   • CATARACT EXTRACTION Left 08/26/2019   • CATARACT EXTRACTION Right 08/12/2019   • COLONOSCOPY     • CYSTOSCOPY  01/28/2016   • ELBOW SURGERY      Bone chip   • HERNIA REPAIR  2002   • IR KYPHOPLASTY/VERTEBROPLASTY  1/4/2023   • LITHOTRIPSY     • RETROPUBIC PROSTATECTOMY  11/13/2001    Radical with nerve sparing       Family History   Problem Relation Age of Onset   • Other Mother 76        Influenza   • Hypertension Mother    • Heart failure Father 70   • Peripheral vascular disease Father    • Hypertension Father    • Prostate cancer Brother    • Stroke Brother 48   • Hypertension Brother    • Hypertension Family      I have reviewed and agree with the history as documented      E-Cigarette/Vaping   • E-Cigarette Use Never User      E-Cigarette/Vaping Substances   • Nicotine No    • THC No    • CBD No    • Flavoring No    • Other No    • Unknown No      Social History     Tobacco Use   • Smoking status: Never   • Smokeless tobacco: Never   Vaping Use   • Vaping Use: Never used   Substance Use Topics   • Alcohol use: Yes     Comment: social   • Drug use: No       Review of Systems   Constitutional: Negative for chills, diaphoresis, fatigue, fever and unexpected weight change  HENT: Negative for congestion, ear pain, rhinorrhea, sore throat, trouble swallowing and voice change  Eyes: Negative for pain and visual disturbance  Respiratory: Negative for cough, chest tightness and shortness of breath  Cardiovascular: Negative for chest pain, palpitations and leg swelling  Gastrointestinal: Negative for abdominal pain, blood in stool, constipation, diarrhea, nausea and vomiting  Genitourinary: Positive for difficulty urinating  Negative for dysuria, frequency and hematuria  Musculoskeletal: Positive for back pain  Negative for arthralgias and neck pain  Skin: Negative for rash  Neurological: Negative for dizziness, syncope, light-headedness and headaches  Psychiatric/Behavioral: Negative for confusion and suicidal ideas  The patient is not nervous/anxious  Physical Exam  Physical Exam  Vitals and nursing note reviewed  Constitutional:       General: He is not in acute distress  Appearance: He is well-developed  He is not ill-appearing, toxic-appearing or diaphoretic  HENT:      Head: Normocephalic and atraumatic  Right Ear: External ear normal       Left Ear: External ear normal       Nose: Nose normal       Mouth/Throat:      Mouth: Mucous membranes are moist       Pharynx: Oropharynx is clear  Eyes:      General: No scleral icterus  Right eye: No discharge  Left eye: No discharge  Conjunctiva/sclera: Conjunctivae normal       Pupils: Pupils are equal, round, and reactive to light  Neck:      Vascular: No JVD  Trachea: No tracheal deviation  Cardiovascular:      Rate and Rhythm: Normal rate and regular rhythm  Heart sounds: Normal heart sounds  No murmur heard  No friction rub  No gallop  Pulmonary:      Effort: Pulmonary effort is normal  No respiratory distress  Breath sounds: Normal breath sounds  No stridor  No wheezing or rales  Chest:      Chest wall: No tenderness  Abdominal:      General: Bowel sounds are normal  There is no distension  Palpations: Abdomen is soft  Tenderness: There is no abdominal tenderness  There is no right CVA tenderness, left CVA tenderness, guarding or rebound  Musculoskeletal:         General: No tenderness or deformity  Normal range of motion  Cervical back: Normal, normal range of motion and neck supple  Thoracic back: Normal       Lumbar back: Normal       Right lower leg: No edema  Left lower leg: No edema  Comments: No reproducible TTP on palpation  No erythema/drainage  Surgical incision well healed  Lymphadenopathy:      Cervical: No cervical adenopathy  Skin:     General: Skin is warm and dry  Findings: No rash  Neurological:      General: No focal deficit present  Mental Status: He is alert and oriented to person, place, and time  GCS: GCS eye subscore is 4  GCS verbal subscore is 5  GCS motor subscore is 6  Cranial Nerves: No cranial nerve deficit  Sensory: Sensation is intact  No sensory deficit  Motor: Motor function is intact  No weakness or abnormal muscle tone  Coordination: Coordination is intact  Coordination normal       Comments: 5/5 strength B/L LE  Normal sensation to light touch   Gait not tested due to pain   Psychiatric:         Behavior: Behavior normal          Vital Signs  ED Triage Vitals [01/23/23 0601]   Temperature Pulse Respirations Blood Pressure SpO2   97 5 °F (36 4 °C) (!) 54 18 147/65 95 %      Temp Source Heart Rate Source Patient Position - Orthostatic VS BP Location FiO2 (%)   Temporal Monitor Lying Left arm --      Pain Score       10 - Worst Possible Pain           Vitals:    01/23/23 0601 01/23/23 0630   BP: 147/65 117/58   Pulse: (!) 54 (!) 48 Patient Position - Orthostatic VS: Lying          Visual Acuity      ED Medications  Medications   HYDROmorphone (DILAUDID) injection 0 5 mg (has no administration in time range)   HYDROmorphone (DILAUDID) injection 0 5 mg (0 5 mg Intravenous Given 1/23/23 0624)   ondansetron (ZOFRAN) injection 4 mg (4 mg Intravenous Given 1/23/23 0624)       Diagnostic Studies  Results Reviewed     Procedure Component Value Units Date/Time    CBC and differential [[id number]]  (Abnormal) Collected: 01/23/23 0635    Lab Status: Preliminary result Specimen: Blood from Arm, Left Updated: 01/23/23 0646     WBC 8 96 Thousand/uL      RBC 3 96 Million/uL      Hemoglobin 12 5 g/dL      Hematocrit 39 5 %       fL      MCH 31 6 pg      MCHC 31 6 g/dL      RDW 13 1 %      MPV 11 7 fL      Platelets 661 Thousands/uL     Sedimentation rate, automated [[id number]] Collected: 01/23/23 0635    Lab Status: In process Specimen: Blood from Arm, Left Updated: 01/23/23 0644    Comprehensive metabolic panel [[id number]] Collected: 01/23/23 0635    Lab Status: In process Specimen: Blood from Arm, Left Updated: 01/23/23 0644    C-reactive protein [[id number]] Collected: 01/23/23 0635    Lab Status: In process Specimen: Blood from Arm, Left Updated: 01/23/23 0644    UA w Reflex to Microscopic w Reflex to Culture [[id number]] Collected: 01/23/23 0624    Lab Status: Final result Specimen: Urine, Clean Catch Updated: 01/23/23 0634     Color, UA Yellow     Clarity, UA Clear     Specific Gravity, UA 1 010     pH, UA 5 5     Leukocytes, UA Negative     Nitrite, UA Negative     Protein, UA Negative mg/dl      Glucose, UA Negative mg/dl      Ketones, UA Negative mg/dl      Urobilinogen, UA <2 0 mg/dl      Bilirubin, UA Negative     Occult Blood, UA Negative                 MRI ED order    (Results Pending)              Procedures  Procedures         ED Course  ED Course as of 01/23/23 0659   Mon Jan 23, 2023   3523 Discussed w/ Hilary Pace (Nsx)   Recommends MRI lumbar w/ and w/out  Pt and wife updated  4068 Pt s/o to Dr Ngo Stage at end of shift  Going for MRI shortly  Touch base with Nsx when resulted  MDM    Disposition  Final diagnoses:   Urinary retention   Back pain     Time reflects when diagnosis was documented in both MDM as applicable and the Disposition within this note     Time User Action Codes Description Comment    1/23/2023  6:59 AM Tricia COLEMAN Add [R33 9] Urinary retention     1/23/2023  6:59 AM Hue Chan Add [M54 9] Back pain       ED Disposition     None      Follow-up Information    None         Patient's Medications   Discharge Prescriptions    No medications on file       No discharge procedures on file      PDMP Review       Value Time User    PDMP Reviewed  Yes 10/17/2022  3:08 PM Arminda Martínez, 10 [facility]          ED Provider  Electronically Signed by           Mindy Benavidez MD  01/23/23 0744

## 2023-01-23 NOTE — ED NOTES
Pt  Stating he has the urge to pee and urinated 325ml of clear yellow urine  Post void residual bladder scan of 216ml   Dr Basim Santamaria made aware     Monserrat Baker RN  01/23/23 1095

## 2023-01-23 NOTE — ED NOTES
Patient transported to [address] [place], [place] [address], RN  01/23/23 1010

## 2023-01-23 NOTE — ASSESSMENT & PLAN NOTE
- Patient was reviewed in the ED for back pain on 1/21/2023 when he was found to have closed L1 vertebral fracture   He received lidocaine patch, methocarbamol, ketorolac and he was commenced on pregabalin  - However, patient presented today on account of worsening pain especially with movement  - CT-spine done 1/21/23 showed mild L1 superior endplate vertebral compression deformity  - MRI done 1/23/2023 showed there is a new mild L1 compression fracture present when compared with the prior MRI performed 7 weeks ago  - Neurosurgery consult appreciated with thanks   - X-ray lumbar spine pending  - Ensure analgesia with scheduled oxycodone and Dilaudid for breakthrough pain  - Bowel regimen with Colace

## 2023-01-24 ENCOUNTER — TELEPHONE (OUTPATIENT)
Dept: ENDOCRINOLOGY | Facility: CLINIC | Age: 83
End: 2023-01-24

## 2023-01-24 ENCOUNTER — APPOINTMENT (OUTPATIENT)
Dept: RADIOLOGY | Facility: HOSPITAL | Age: 83
End: 2023-01-24

## 2023-01-24 ENCOUNTER — TELEPHONE (OUTPATIENT)
Dept: NEUROSURGERY | Facility: CLINIC | Age: 83
End: 2023-01-24

## 2023-01-24 LAB
ALBUMIN SERPL BCP-MCNC: 3 G/DL (ref 3.5–5)
ALP SERPL-CCNC: 81 U/L (ref 46–116)
ALT SERPL W P-5'-P-CCNC: 19 U/L (ref 12–78)
ANION GAP SERPL CALCULATED.3IONS-SCNC: 5 MMOL/L (ref 4–13)
AST SERPL W P-5'-P-CCNC: 18 U/L (ref 5–45)
B BURGDOR IGG+IGM SER-ACNC: 0.3 AI
BASOPHILS # BLD AUTO: 0.02 THOUSANDS/ÂΜL (ref 0–0.1)
BASOPHILS NFR BLD AUTO: 0 % (ref 0–1)
BILIRUB SERPL-MCNC: 0.5 MG/DL (ref 0.2–1)
BUN SERPL-MCNC: 25 MG/DL (ref 5–25)
CALCIUM ALBUM COR SERPL-MCNC: 9.7 MG/DL (ref 8.3–10.1)
CALCIUM SERPL-MCNC: 8.9 MG/DL (ref 8.3–10.1)
CHLORIDE SERPL-SCNC: 105 MMOL/L (ref 96–108)
CO2 SERPL-SCNC: 32 MMOL/L (ref 21–32)
CREAT SERPL-MCNC: 1.16 MG/DL (ref 0.6–1.3)
EOSINOPHIL # BLD AUTO: 0.3 THOUSAND/ÂΜL (ref 0–0.61)
EOSINOPHIL NFR BLD AUTO: 4 % (ref 0–6)
ERYTHROCYTE [DISTWIDTH] IN BLOOD BY AUTOMATED COUNT: 13 % (ref 11.6–15.1)
FOLATE SERPL-MCNC: >20 NG/ML (ref 3.1–17.5)
GFR SERPL CREATININE-BSD FRML MDRD: 57 ML/MIN/1.73SQ M
GLUCOSE SERPL-MCNC: 90 MG/DL (ref 65–140)
HCT VFR BLD AUTO: 36.1 % (ref 36.5–49.3)
HGB BLD-MCNC: 11.4 G/DL (ref 12–17)
IMM GRANULOCYTES # BLD AUTO: 0.07 THOUSAND/UL (ref 0–0.2)
IMM GRANULOCYTES NFR BLD AUTO: 1 % (ref 0–2)
LYMPHOCYTES # BLD AUTO: 1.43 THOUSANDS/ÂΜL (ref 0.6–4.47)
LYMPHOCYTES NFR BLD AUTO: 21 % (ref 14–44)
MCH RBC QN AUTO: 31.8 PG (ref 26.8–34.3)
MCHC RBC AUTO-ENTMCNC: 31.6 G/DL (ref 31.4–37.4)
MCV RBC AUTO: 101 FL (ref 82–98)
MONOCYTES # BLD AUTO: 0.87 THOUSAND/ÂΜL (ref 0.17–1.22)
MONOCYTES NFR BLD AUTO: 13 % (ref 4–12)
NEUTROPHILS # BLD AUTO: 4.28 THOUSANDS/ÂΜL (ref 1.85–7.62)
NEUTS SEG NFR BLD AUTO: 61 % (ref 43–75)
NRBC BLD AUTO-RTO: 0 /100 WBCS
PLATELET # BLD AUTO: 155 THOUSANDS/UL (ref 149–390)
PMV BLD AUTO: 11.5 FL (ref 8.9–12.7)
POTASSIUM SERPL-SCNC: 4.5 MMOL/L (ref 3.5–5.3)
PROT SERPL-MCNC: 6.1 G/DL (ref 6.4–8.4)
RBC # BLD AUTO: 3.58 MILLION/UL (ref 3.88–5.62)
SODIUM SERPL-SCNC: 142 MMOL/L (ref 135–147)
VIT B12 SERPL-MCNC: 516 PG/ML (ref 100–900)
WBC # BLD AUTO: 6.97 THOUSAND/UL (ref 4.31–10.16)

## 2023-01-24 RX ORDER — OXYCODONE HYDROCHLORIDE 5 MG/1
5 TABLET ORAL EVERY 6 HOURS PRN
Status: DISCONTINUED | OUTPATIENT
Start: 2023-01-24 | End: 2023-01-25

## 2023-01-24 RX ORDER — OXYCODONE HYDROCHLORIDE 5 MG/1
5 TABLET ORAL EVERY 6 HOURS PRN
Status: DISCONTINUED | OUTPATIENT
Start: 2023-01-24 | End: 2023-01-24

## 2023-01-24 RX ADMIN — ACETAMINOPHEN 650 MG: 325 TABLET ORAL at 11:35

## 2023-01-24 RX ADMIN — VERAPAMIL HYDROCHLORIDE 240 MG: 240 TABLET ORAL at 22:10

## 2023-01-24 RX ADMIN — OXYBUTYNIN CHLORIDE 5 MG: 5 TABLET, EXTENDED RELEASE ORAL at 08:09

## 2023-01-24 RX ADMIN — PREGABALIN 50 MG: 25 CAPSULE ORAL at 08:09

## 2023-01-24 RX ADMIN — METOPROLOL TARTRATE 25 MG: 25 TABLET, FILM COATED ORAL at 06:04

## 2023-01-24 RX ADMIN — METHOCARBAMOL TABLETS 500 MG: 500 TABLET, COATED ORAL at 08:08

## 2023-01-24 RX ADMIN — LISINOPRIL 20 MG: 20 TABLET ORAL at 08:09

## 2023-01-24 RX ADMIN — PREGABALIN 50 MG: 25 CAPSULE ORAL at 17:43

## 2023-01-24 RX ADMIN — ENOXAPARIN SODIUM 40 MG: 100 INJECTION SUBCUTANEOUS at 08:08

## 2023-01-24 RX ADMIN — METHOCARBAMOL TABLETS 500 MG: 500 TABLET, COATED ORAL at 22:09

## 2023-01-24 RX ADMIN — OXYCODONE HYDROCHLORIDE 5 MG: 5 TABLET ORAL at 08:08

## 2023-01-24 RX ADMIN — PREGABALIN 50 MG: 25 CAPSULE ORAL at 22:10

## 2023-01-24 RX ADMIN — LISINOPRIL 10 MG: 10 TABLET ORAL at 22:09

## 2023-01-24 RX ADMIN — OXYCODONE HYDROCHLORIDE 5 MG: 5 TABLET ORAL at 01:45

## 2023-01-24 RX ADMIN — METOPROLOL TARTRATE 25 MG: 25 TABLET, FILM COATED ORAL at 17:43

## 2023-01-24 RX ADMIN — DOCUSATE SODIUM 100 MG: 100 CAPSULE, LIQUID FILLED ORAL at 08:08

## 2023-01-24 RX ADMIN — Medication 1000 UNITS: at 08:09

## 2023-01-24 NOTE — PLAN OF CARE
Problem: Potential for Falls  Goal: Patient will remain free of falls  Description: INTERVENTIONS:  - Educate patient/family on patient safety including physical limitations  - Instruct patient to call for assistance with activity   - Consult OT/PT to assist with strengthening/mobility   - Keep Call bell within reach  - Keep bed low and locked with side rails adjusted as appropriate  - Keep care items and personal belongings within reach  - Initiate and maintain comfort rounds  - Make Fall Risk Sign visible to staff  - Offer Toileting every 2 Hours, in advance of need  - Initiate/Maintain bed alarm  - Obtain necessary fall risk management equipment:   - Apply yellow socks and bracelet for high fall risk patients  - Consider moving patient to room near nurses station  Outcome: Progressing     Problem: PAIN - ADULT  Goal: Verbalizes/displays adequate comfort level or baseline comfort level  Description: Interventions:  - Encourage patient to monitor pain and request assistance  - Assess pain using appropriate pain scale  - Administer analgesics based on type and severity of pain and evaluate response  - Implement non-pharmacological measures as appropriate and evaluate response  - Consider cultural and social influences on pain and pain management  - Notify physician/advanced practitioner if interventions unsuccessful or patient reports new pain  Outcome: Progressing     Problem: INFECTION - ADULT  Goal: Absence or prevention of progression during hospitalization  Description: INTERVENTIONS:  - Assess and monitor for signs and symptoms of infection  - Monitor lab/diagnostic results  - Monitor all insertion sites, i e  indwelling lines, tubes, and drains  - Monitor endotracheal if appropriate and nasal secretions for changes in amount and color  - [place] appropriate cooling/warming therapies per order  - Administer medications as ordered  - Instruct and encourage patient and family to use good hand hygiene technique  - Identify and instruct in appropriate isolation precautions for identified infection/condition  Outcome: Progressing  Goal: Absence of fever/infection during neutropenic period  Description: INTERVENTIONS:  - Monitor WBC    Outcome: Progressing     Problem: SAFETY ADULT  Goal: Patient will remain free of falls  Description: INTERVENTIONS:  - Educate patient/family on patient safety including physical limitations  - Instruct patient to call for assistance with activity   - Consult OT/PT to assist with strengthening/mobility   - Keep Call bell within reach  - Keep bed low and locked with side rails adjusted as appropriate  - Keep care items and personal belongings within reach  - Initiate and maintain comfort rounds  - Make Fall Risk Sign visible to staff  - Offer Toileting every 2 Hours, in advance of need  - Initiate/Maintain bed alarm  - Obtain necessary fall risk management equipment:   - Apply yellow socks and bracelet for high fall risk patients  - Consider moving patient to room near nurses station  Outcome: Progressing  Goal: Maintain or return to baseline ADL function  Description: INTERVENTIONS:  -  Assess patient's ability to carry out ADLs; assess patient's baseline for ADL function and identify physical deficits which impact ability to perform ADLs (bathing, care of mouth/teeth, toileting, grooming, dressing, etc )  - Assess/evaluate cause of self-care deficits   - Assess range of motion  - Assess patient's mobility; develop plan if impaired  - Assess patient's need for assistive devices and provide as appropriate  - Encourage maximum independence but intervene and supervise when necessary  - Involve family in performance of ADLs  - Assess for home care needs following discharge   - Consider OT consult to assist with ADL evaluation and planning for discharge  - Provide patient education as appropriate  Outcome: Progressing  Goal: Maintains/Returns to pre admission functional level  Description: INTERVENTIONS:  - Perform BMAT or MOVE assessment daily    - Set and communicate daily mobility goal to care team and patient/family/caregiver  - Collaborate with rehabilitation services on mobility goals if consulted  - Perform Range of Motion 2 times a day  - Reposition patient every 2 hours  - Dangle patient 2 times a day  - Stand patient 2 times a day  - Ambulate patient 2 times a day  - Out of bed to chair 2 times a day   - Out of bed for meals 2 times a day  - Out of bed for toileting  - Record patient progress and toleration of activity level   Outcome: Progressing     Problem: DISCHARGE PLANNING  Goal: Discharge to home or other facility with appropriate resources  Description: INTERVENTIONS:  - Identify barriers to discharge w/patient and caregiver  - Arrange for needed discharge resources and transportation as appropriate  - Identify discharge learning needs (meds, wound care, etc )  - Arrange for interpretive services to assist at discharge as needed  - Refer to Case Management Department for coordinating discharge planning if the patient needs post-hospital services based on physician/advanced practitioner order or complex needs related to functional status, cognitive ability, or social support system  Outcome: Progressing     Problem: Knowledge Deficit  Goal: Patient/family/caregiver demonstrates understanding of disease process, treatment plan, medications, and discharge instructions  Description: Complete learning assessment and assess knowledge base    Interventions:  - Provide teaching at level of understanding  - Provide teaching via preferred learning methods  Outcome: Progressing     Problem: GENITOURINARY - ADULT  Goal: Maintains or returns to baseline urinary function  Description: INTERVENTIONS:  - Assess urinary function  - Encourage oral fluids to ensure adequate hydration if ordered  - Administer IV fluids as ordered to ensure adequate hydration  - Administer ordered medications as needed  - Offer frequent toileting  - Follow urinary retention protocol if ordered  Outcome: Progressing  Goal: Absence of urinary retention  Description: INTERVENTIONS:  - Assess patient's ability to void and empty bladder  - Monitor I/O  - Bladder scan as needed  - Discuss with physician/AP medications to alleviate retention as needed  - Discuss catheterization for long term situations as appropriate  Outcome: Progressing     Problem: SKIN/TISSUE INTEGRITY - ADULT  Goal: Skin Integrity remains intact(Skin Breakdown Prevention)  Description: Assess:  -Perform Barrett assessment   -Clean and moisturize skin  -Inspect skin when repositioning, toileting, and assisting with ADLS  -Assess under medical devices  -Assess extremities for adequate circulation and sensation     Bed Management:  -Have minimal linens on bed & keep smooth, unwrinkled  -Change linens as needed when moist or perspiring  -Avoid sitting or lying in one position for more than 2 hours while in bed  -Keep HOB at 30 degrees     Toileting:  -Offer bedside commode  -Assess for incontinence   -Use incontinent care products after each incontinent episode     Activity:  -Mobilize patient 2 times a day  -Encourage activity and walks on unit  -Encourage or provide ROM exercises   -Turn and reposition patient every 2 Hours  -Use appropriate equipment to lift or move patient in bed  -Instruct/ Assist with weight shifting every 2 when out of bed in chair  -Consider limitation of chair time 2 hour intervals    Skin Care:  -Avoid use of baby powder, tape, friction and shearing, hot water or constrictive clothing  -Relieve pressure over bony prominences   -Do not massage red bony areas    Next Steps:  -Teach patient strategies to minimize risks   -Consider consults to  interdisciplinary teams   Outcome: Progressing     Problem: MUSCULOSKELETAL - ADULT  Goal: Maintain or return mobility to safest level of function  Description: INTERVENTIONS:  - Assess patient's ability to carry out ADLs; assess patient's baseline for ADL function and identify physical deficits which impact ability to perform ADLs (bathing, care of mouth/teeth, toileting, grooming, dressing, etc )  - Assess/evaluate cause of self-care deficits   - Assess range of motion  - Assess patient's mobility  - Assess patient's need for assistive devices and provide as appropriate  - Encourage maximum independence but intervene and supervise when necessary  - Involve family in performance of ADLs  - Assess for home care needs following discharge   - Consider OT consult to assist with ADL evaluation and planning for discharge  - Provide patient education as appropriate  Outcome: Progressing     Problem: Prexisting or High Potential for Compromised Skin Integrity  Goal: Skin integrity is maintained or improved  Description: INTERVENTIONS:  - Identify patients at risk for skin breakdown  - Assess and monitor skin integrity  - Assess and monitor nutrition and hydration status  - Monitor labs   - Assess for incontinence   - Turn and reposition patient  - Assist with mobility/ambulation  - Relieve pressure over bony prominences  - Avoid friction and shearing  - Provide appropriate hygiene as needed including keeping skin clean and dry  - Evaluate need for skin moisturizer/barrier cream  - Collaborate with interdisciplinary team   - Patient/family teaching  - Consider wound care consult   Outcome: Progressing     Problem: MOBILITY - ADULT  Goal: Maintain or return to baseline ADL function  Description: INTERVENTIONS:  -  Assess patient's ability to carry out ADLs; assess patient's baseline for ADL function and identify physical deficits which impact ability to perform ADLs (bathing, care of mouth/teeth, toileting, grooming, dressing, etc )  - Assess/evaluate cause of self-care deficits   - Assess range of motion  - Assess patient's mobility; develop plan if impaired  - Assess patient's need for assistive devices and provide as appropriate  - Encourage maximum independence but intervene and supervise when necessary  - Involve family in performance of ADLs  - Assess for home care needs following discharge   - Consider OT consult to assist with ADL evaluation and planning for discharge  - Provide patient education as appropriate  Outcome: Progressing  Goal: Maintains/Returns to pre admission functional level  Description: INTERVENTIONS:  - Perform BMAT or MOVE assessment daily    - Set and communicate daily mobility goal to care team and patient/family/caregiver  - Collaborate with rehabilitation services on mobility goals if consulted  - Perform Range of Motion 2 times a day  - Reposition patient every 2 hours    - Dangle patient 2 times a day  - Stand patient 2 times a day  - Ambulate patient 2 times a day  - Out of bed to chair 2 times a day   - Out of bed for meals 2 times a day  - Out of bed for toileting  - Record patient progress and toleration of activity level   Outcome: Progressing

## 2023-01-24 NOTE — PLAN OF CARE
Problem: OCCUPATIONAL THERAPY ADULT  Goal: Performs self-care activities at highest level of function for planned discharge setting  See evaluation for individualized goals  Description: Treatment Interventions: ADL retraining, Functional transfer training, UE strengthening/ROM, Endurance training, Cognitive reorientation, Patient/family training, Equipment evaluation/education, Compensatory technique education, Continued evaluation          See flowsheet documentation for full assessment, interventions and recommendations  Note: Limitation: Decreased ADL status, Decreased cognition, Decreased Safe judgement during ADL, Decreased self-care trans, Decreased high-level ADLs, Decreased endurance  Prognosis: Fair  Assessment: Pt is a 79 y o  male seen for OT evaluation at [facility], admitted 1/23/2023 w/ Compression fracture of L1 vertebra ([facility] [address] )  OT completed extensive review of pt's medical and social history  Comorbidities affecting pt's functional performance at time of assessment include: osteoporosis, bradycardia, acute respiratory insufficiency, macrocytosis, eosinophilia, chronic pain syndrome, recent L2 compression fx, spinal stenosis of lumbar region, ambulatory dysfunction, urinary retention & frequency, sacroiliitis  Personal factors affecting pt at time of IE include: steps to enter environment, limited home support, behavioral pattern, difficulty performing ADLS, difficulty performing IADLS , limited insight into deficits, flat affect, decreased initiation and engagement  and environment  Prior to admission, pt was living with his spouse in a [facility] with 2ST & full flight to bed & bath  Pt was I w/  ADLS and A w/ IADLS, (-) drove, & required use of walker PTA   Upon evaluation: Pt requires Mod Ax2 for bed mobility, Mod Ax1-2 for functional mobility/transfers, Mod A for UB ADLs and Max A for LB ADLS 2* the following deficits impacting occupational performance: weakness, decreased strength, decreased balance, decreased tolerance, impaired GMC, impaired attention, impaired sequencing, impaired problem solving, decreased safety awareness, increased pain and orthopedic restrictions  Full objective findings from OT assessment regarding body systems outlined above  Pt to benefit from continued skilled OT tx while in the hospital to address deficits as defined above and maximize level of functional independence w/ ADL's and functional mobility  Occupational Performance areas to address include: grooming, bathing/shower, toilet hygiene, dressing, health maintenance, functional mobility and clothing management  Based on findings, pt is of high complexity  The patient's raw score on the AM-PAC Daily Activity inpatient short form is 15, standardized score is 34 69, less than 39 4  Patients at this level are likely to benefit from DC to post-acute rehabilitation services, which does coincide with current above OT recommendations  However, please refer to therapist recommendation for discharge planning given other factors that may influence destination  At this time, OT recommendations at time of discharge are post-acute rehabilitation services  Pt would likely tolerate therapy 3 hrs/day       OT Discharge Recommendation: Post acute rehabilitation services (Pt would likely tolerate therapy 3hrs day)

## 2023-01-24 NOTE — TELEPHONE ENCOUNTER
01/26/2023-CALLED [facility] [phone] (2nd FLOOR), SPOKE ANGELINA, CONFIRMED 01/27/2023 APT IN [place] [facility]     01/25/2023-PT STILL IN [facility]  PER BOO: Just keep for now  I’m not sure when he is leaving yet and jed is aware   He also has a new fracture         01/24/2023-PT [facility]  01/27/2023 APT W/OSELKIN-worsening pain s/p kypho 1/6 with xray  02/06/2023 APT W/OSELKIN -4 WEEK F/U TO L2 KYPHOPLASTY

## 2023-01-24 NOTE — CASE MANAGEMENT
Case Management Progress Note    Patient name Hilda Cuevas  Location /-03 MRN [id number]  : 1940 Date 2023       LOS (days): 0  Geometric Mean LOS (GMLOS) (days): 2 80  Days to GMLOS:2 7        OBJECTIVE:        Current admission status: Inpatient  Preferred Pharmacy:   [address] [address]  Phone: [phone] Fax: [phone]    Primary Care Provider: Can Funez DO    Primary Insurance: MEDICARE  Secondary Insurance: [employer]    PROGRESS NOTE:  [facility] following patient  They can accept pending neurosurgery recommendations s/p lumbar xray and physician progress notes  Patient for tentative discharge tomorrow

## 2023-01-24 NOTE — PLAN OF CARE
Problem: PHYSICAL THERAPY ADULT  Goal: Performs mobility at highest level of function for planned discharge setting  See evaluation for individualized goals  Description: Treatment/Interventions: Functional transfer training, LE strengthening/ROM, Therapeutic exercise, Endurance training, Patient/family training, Bed mobility, Equipment eval/education, Gait training  Equipment Recommended: Beatriz Cox       See flowsheet documentation for full assessment, interventions and recommendations  Note:    Problem List: Decreased strength, Decreased range of motion, Decreased endurance, Impaired balance, Decreased mobility, Decreased coordination, Decreased cognition, Impaired judgement, Decreased safety awareness, Pain, Orthopedic restrictions  Assessment: Pt is a 82y  o  male seen for PT evaluation s/p admit to [address]  [facility] on 8/18/2022 w/ Compression fracture of L1 vertebra ([facility] [address] )  Order placed for PT  Comorbidities affecting pt's physical performance at time of assessment include: limited cognition  Personal factors affecting pt at time of IE include: steps to enter environment, multi-level environment, limited home support, advanced age, inability to perform IADLs, inability to perform ADLs and inability to ambulate household distances  Prior to admission, pt was was independent w/ all functional mobility w/ RW, lived in multi-level home, had 2 GRACIELA (+) railing and lived with wife  Upon evaluation: Pt requires mod A x2 for bed mobility, mod A x2 for sit to stand, and mod A x1 for ambulation with RW  (Please find full objective findings from PT assessment regarding body systems outlined above)  Impairments and limitations also listed above, especially due to  weakness, decreased ROM, impaired balance, decreased endurance, impaired coordination, gait deviations, pain, decreased activity tolerance, decreased safety awareness, impaired judgement, fall risk and decreased cognition    Pt's clinical presentation is currently unstable/unpredictable seen in pt's presentation of new spinal precautions, new spine brace, fall risk, and significant decline in functional mobility compared to baseline  Pt to benefit from continued skilled PT tx while in hospital and upon DC to address deficits as defined above and maximize level of functional mobility  From PT/mobility standpoint, recommendation at time of d/c would be inpatient rehab  Recommend progression of ambulation and initiation of HEP as appropriate  PT Discharge Recommendation: Post acute rehabilitation services    See flowsheet documentation for full assessment

## 2023-01-24 NOTE — PROGRESS NOTES
[place]  Progress Note - Mago Myrick 1940, 79 y o  male MRN: [id number]  Unit/Bed#: -01 Encounter: [id number]  Primary Care Provider: Cj Santiago DO   Date and time admitted to hospital: 1/23/2023  6:00 AM    * Compression fracture of L1 vertebra [facility])  Assessment & Plan  - Patient was reviewed in the ED for back pain on 1/21/2023 when he was found to have closed L1 vertebral fracture   He received lidocaine patch, methocarbamol, ketorolac and he was commenced on pregabalin  - However, patient presented today on account of worsening pain especially with movement  - CT-spine done 1/21/23 showed mild L1 superior endplate vertebral compression deformity  - MRI done 1/23/2023 showed there is a new mild L1 compression fracture present when compared with the prior MRI performed 7 weeks ago  - Neurosurgery consult appreciated with thanks   - X-ray lumbar spine done showed a somewhat limited study due to osteopenia and poor penetration with L1 compression deformity with mild further loss of vertebral body height compared to 1/20/2023  - Ensure analgesia with scheduled oxycodone and Dilaudid for breakthrough pain  - Bowel regimen with Colace  - Patient still having severe pain and will consider for discharge tomorrow    Acute respiratory insufficiency  Assessment & Plan  Patient was requiring 2 L of oxygen  Will wean as appropriate    Eosinophilia  Assessment & Plan  Eosinophils elevated at 0 45  Monitor CBC closely    Macrocytosis  Assessment & Plan  MCV was elevated at 100  Vitamin B12 was normal and folate > 20 pending     Bradycardia  Assessment & Plan  Heart rate was bradycardic at 59  Ensure to give metoprolol with hold parameter    Osteoporosis  Assessment & Plan  Patient has a history of osteoporosis  Recommend DEXA scan as outpatient    Vitamin D deficiency  Assessment & Plan  Continue vitamin D supplementation   Check vitamin D levels          VTE Pharmacologic Prophylaxis: VTE Score: 6 Moderate Risk (Score 3-4) - Pharmacological DVT Prophylaxis Ordered: enoxaparin (Lovenox)  Education and Discussions with Family / Patient: Updated  (wife) via phone  Time Spent for Care: 70 minutes  More than 50% of total time spent on counseling and coordination of care as described above  Current Length of Stay: 0 day(s)  Current Patient Status: Inpatient   Certification Statement: The patient will continue to require additional inpatient hospital stay due to Needing pain control  Discharge Plan: Anticipate discharge in 24-48 hrs to rehab facility  Code Status: Level 1 - Full Code    Subjective:   No Fresh complaint    Objective:   Patient looks well    Vitals:   Temp (24hrs), Av 8 °F (36 6 °C), Min:97 1 °F (36 2 °C), Max:98 5 °F (36 9 °C)    Temp:  [97 1 °F (36 2 °C)-98 5 °F (36 9 °C)] 98 5 °F (36 9 °C)  HR:  [55-60] 56  Resp:  [12-17] 12  BP: (122-153)/(60-83) 131/60  SpO2:  [95 %-98 %] 98 %  Body mass index is 20 75 kg/m²  Input and Output Summary (last 24 hours): Intake/Output Summary (Last 24 hours) at 2023 1558  Last data filed at 2023 0701  Gross per 24 hour   Intake 840 ml   Output 775 ml   Net 65 ml       Physical Exam:   Physical Exam  Vitals and nursing note reviewed  Constitutional:       General: He is not in acute distress  Appearance: He is well-developed  HENT:      Head: Normocephalic and atraumatic  Eyes:      Conjunctiva/sclera: Conjunctivae normal    Cardiovascular:      Rate and Rhythm: Normal rate and regular rhythm  Heart sounds: No murmur heard  Pulmonary:      Effort: Pulmonary effort is normal  No respiratory distress  Breath sounds: Normal breath sounds  Abdominal:      Palpations: Abdomen is soft  Tenderness: There is no abdominal tenderness  Musculoskeletal:         General: No swelling  Cervical back: Neck supple  Skin:     General: Skin is warm and dry        Capillary Refill: Capillary refill takes less than 2 seconds  Neurological:      General: No focal deficit present  Mental Status: He is alert and oriented to person, place, and time     Psychiatric:         Mood and Affect: Mood normal             Additional Data:     Labs:  Results from last 7 days   Lab Units 01/24/23  0207   WBC Thousand/uL 6 97   HEMOGLOBIN g/dL 11 4*   HEMATOCRIT % 36 1*   PLATELETS Thousands/uL 155   NEUTROS PCT % 61   LYMPHS PCT % 21   MONOS PCT % 13*   EOS PCT % 4     Results from last 7 days   Lab Units 01/24/23  0207   SODIUM mmol/L 142   POTASSIUM mmol/L 4 5   CHLORIDE mmol/L 105   CO2 mmol/L 32   BUN mg/dL 25   CREATININE mg/dL 1 16   ANION GAP mmol/L 5   CALCIUM mg/dL 8 9   ALBUMIN g/dL 3 0*   TOTAL BILIRUBIN mg/dL 0 50   ALK PHOS U/L 81   ALT U/L 19   AST U/L 18   GLUCOSE RANDOM mg/dL 90                       Lines/Drains:  Invasive Devices     Peripheral Intravenous Line  Duration           Peripheral IV 01/23/23 Left Antecubital 1 day                    Recent Cultures (last 7 days):         Last 24 Hours Medication List:   Current Facility-Administered Medications   Medication Dose Route Frequency Provider Last Rate   • acetaminophen  650 mg Oral Q6H PRN Kira Kelly MD     • calcium carbonate  1,000 mg Oral Daily PRN Kira Kelly MD     • cholecalciferol  1,000 Units Oral Daily Kira Kelly MD     • docusate sodium  100 mg Oral Daily Kira Kelly MD     • enoxaparin  40 mg Subcutaneous Daily Kira Kelly MD     • HYDROmorphone  0 5 mg Intravenous Q3H PRN Kira Kelly MD     • lisinopril  10 mg Oral HS Kira Kelly MD     • lisinopril  20 mg Oral Daily Kira Kelly MD     • methocarbamol  500 mg Oral BID Kira Kelly MD     • metoprolol tartrate  25 mg Oral Q12H Kira Kelly MD     • ondansetron  4 mg Intravenous Q6H PRN Beau Cruz MD     • oxybutynin  5 mg Oral Daily Beau Cruz MD     • oxyCODONE  5 mg Oral Q8H Beau Cruz MD     • pregabalin  50 mg Oral TID Beau Cruz MD     • verapamil  240 mg Oral HS Beau Cruz MD          Today, Patient Was Seen By: Beau Cruz MD    **Please Note: This note may have been constructed using a voice recognition system  **

## 2023-01-24 NOTE — PHYSICAL THERAPY NOTE
PHYSICAL THERAPY EVALUATION  NAME: Mari Pimentel  AGE:   79 y o  MRN:  [id number]  ADMIT DX: Urinary retention [R33 9]  Back pain [M54 9]  Urine retention [R33 9]  Compression fracture of L1 vertebra, initial encounter ([facility] ) [[id number]]    PMH:   Past Medical History:   Diagnosis Date    Astigmatism     last assessed 11/15/17    Cataract     Resolved 11/15/17    Gross hematuria     Last assessed 01/28/16    Hematuria     last assessed 01/28/16    Lumbar vertebral fracture ([facility] )     Prostate cancer ([place] [address] )     last assessed 05/18/17    Renal calculi     last assessed 08/16/12    Squamous cell carcinoma     Vitamin D deficiency     Last assessed 08/19/13     LENGTH OF STAY: 0       01/24/23 1042   PT Last Visit   PT Visit Date 01/24/23   Note Type   Note type Evaluation;Orthotic Management/Training   Type of Brace   Brace Applied [employer] Back Pack TLSO   Additional Brace Ordered No   Patient Position When Brace Applied Seated   Education   Education Provided Yes;Family or social support of family present for education by provider  (Handout provided; education provided on donning/doffing brace, wearing schedule, spinal precautions, and skin checks)   End of Consult   Patient Position at End of Consult Bedside chair; All needs within reach;Bed/Chair alarm activated   Nurse Communication Nurse aware of consult, application of brace   Pain Assessment   Pain Assessment Tool 0-10   Pain Score 10 - Worst Possible Pain  ("12/10")   Pain Location/Orientation Orientation: Lower; Location: Back   Hospital Pain Intervention(s) Repositioned; Ambulation/increased activity   Restrictions/Precautions   Weight Bearing Precautions Per Order No   Braces or Orthoses (S)  TLSO  (backpack TLSO when upright or HOB >45*)   Other Precautions Cognitive; Chair Alarm; Bed Alarm; Fall Risk;Pain  (Spinal precautions)   Home Living   Type of [address] [place] [address] Two level;1/2 bath on main level;Stairs to enter with rails  (2 GRACIELA) Bathroom Shower/Tub Walk-in shower   Bathroom Equipment Built-in shower seat; Shower chair   Home Equipment Walker;Cane  (2 walkers, 1 on each level)   Additional Comments Ambulates with mod I and RW at baseline  Wife provides supervision and assist as needed  Prior Function   Level of [place] Independent with ADLs; Independent with functional mobility; Needs assistance with IADLS   Lives With Spouse   Receives Help From Family   IADLs Independent with medication management; Family/Friend/Other provides meals; Family/Friend/Other provides transportation   Falls in the last 6 months 0   Vocational Retired   General   Family/Caregiver Present Yes   Cognition   Overall Cognitive Status Impaired   Arousal/Participation Cooperative   Orientation Level Disoriented to time;Oriented to place;Oriented to person   Memory Decreased recall of precautions;Decreased recall of recent events;Decreased short term memory   Following Commands Follows one step commands with increased time or repetition   Comments Pt identified by name and   Subjective   Subjective Agrees to PT evaluation, "Just let me stand a minute "   RLE Assessment   RLE Assessment X   Strength RLE   RLE Overall Strength 3+/5  (functionally)   LLE Assessment   LLE Assessment X   Strength LLE   LLE Overall Strength 3+/5  (functionally)   Coordination   Movements are Fluid and Coordinated 0   Coordination and Movement Description tremulous with all mobility   Bed Mobility   Rolling R 3  Moderate assistance   Additional items Assist x 2;Bedrails; Increased time required;Verbal cues;LE management  (log rolling technique)   Supine to Sit 3  Moderate assistance   Additional items Assist x 2;Bedrails; Increased time required;Verbal cues;LE management  (log rolling technique)   Sit to Supine Unable to assess  (left OOB in chair post session with alarm intact)   Transfers   Sit to Stand 3  Moderate assistance   Additional items Assist x 2; Increased time required;Verbal cues   Stand to Sit 3  Moderate assistance   Additional items Assist x 2; Increased time required;Verbal cues;Armrests   Additional Comments Able to perform standing marches with mod Ax1 and use of RW; initially retropulsive  Balance   Static Sitting Fair -   Dynamic Sitting Poor +   Static Standing Poor   Dynamic Standing Poor   Ambulatory Poor   Endurance Deficit   Endurance Deficit Yes   Endurance Deficit Description limited ambulation distance, fatigue, pain   Activity Tolerance   Activity Tolerance Patient limited by pain; Patient limited by fatigue   Nurse Made Aware Per RN, pt appropriate to evaluate   Assessment   Problem List Decreased strength;Decreased range of motion;Decreased endurance; Impaired balance;Decreased mobility; Decreased coordination;Decreased cognition; Impaired judgement;Decreased safety awareness;Pain;Orthopedic restrictions   Assessment Pt is a 82y  o  male seen for PT evaluation s/p admit to [address]  [facility] on 8/18/2022 w/ Compression fracture of L1 vertebra ([facility] [address] )  Order placed for PT  Comorbidities affecting pt's physical performance at time of assessment include: limited cognition  Personal factors affecting pt at time of IE include: steps to enter environment, multi-level environment, limited home support, advanced age, inability to perform IADLs, inability to perform ADLs and inability to ambulate household distances  Prior to admission, pt was was independent w/ all functional mobility w/ RW, lived in multi-level home, had 2 GRACIELA (+) railing and lived with wife  Upon evaluation: Pt requires mod A x2 for bed mobility, mod A x2 for sit to stand, and mod A x1 for ambulation with RW  (Please find full objective findings from PT assessment regarding body systems outlined above)   Impairments and limitations also listed above, especially due to  weakness, decreased ROM, impaired balance, decreased endurance, impaired coordination, gait deviations, pain, decreased activity tolerance, decreased safety awareness, impaired judgement, fall risk and decreased cognition  Pt's clinical presentation is currently unstable/unpredictable seen in pt's presentation of new spinal precautions, new spine brace, fall risk, and significant decline in functional mobility compared to baseline  Pt to benefit from continued skilled PT tx while in hospital and upon DC to address deficits as defined above and maximize level of functional mobility  From PT/mobility standpoint, recommendation at time of d/c would be inpatient rehab  Recommend progression of ambulation and initiation of HEP as appropriate  Goals   Patient Goals to have less pain   STG Expiration Date 02/03/23   Short Term Goal #1 Pt will be able to: (1) perform bed mobility with min A to promote upright posture and OOB mobility (2) perform sit to stand with min A to decrease burden of care (3) ambulate at least 100` with min A and RW to increase activity tolerance (4) increase standing balance by 1 grade to decrease risk of falls   PT Treatment Day 1   Plan   Treatment/Interventions Functional transfer training;LE strengthening/ROM; Therapeutic exercise; Endurance training;Patient/family training;Bed mobility; Equipment eval/education;Gait training   PT Frequency 3-5x/wk   Recommendation   PT Discharge Recommendation Post acute rehabilitation services   Equipment Recommended [address] [facility] Recommended Wheeled walker   AM-PAC Basic Mobility Inpatient   Turning in Flat Bed Without Bedrails 2   Lying on Back to Sitting on Edge of Flat Bed Without Bedrails 2   Moving Bed to Chair 2   Standing Up From Chair Using Arms 2   Walk in Room 2   Climb 3-5 Stairs With Railing 1   Basic Mobility Inpatient Raw Score 11   Basic Mobility Standardized Score 30 25   Highest Level Of Mobility   -HL Goal 4: Move to chair/commode   JH-HLM Achieved 5: Stand (1 or more minutes)   Additional Treatment Session   Start Time 1030   End Time 1042 Treatment Assessment Pt agrees to further mobility after evaluation  Able to ambulate ~7` x1 with RW and mod A x1   2nd assist standby with chair setup for safety  Significantly slow gait speed with flexed knees, shuffling, inconsistent gait, ataxia, and difficulty negotiating RW noted  x1 soft knee buckle + x1 hard knee buckle on LLE noted  (+) Gait degradation noted  Will continue to benefit from ongoing skilled PT to maximize his functional mobility and increase his level of independence  Equipment Use RW   Additional Treatment Day 1   End of Consult   Patient Position at End of Consult Bedside chair;Bed/Chair alarm activated; All needs within reach   Pt was seen for a co-eval with OT due to potential need for significant physical assist, poor pain control, impaired mental status, limiting behaviors, and poor adherence to precautions  The patient's -[facility] Basic Mobility Inpatient Short Form Raw Score is 11, Standardized Score is 30 25  A Raw Score of less than 16 suggests the patient may benefit from discharge to post-acute rehabilitation services, which DOES coincide with CURRENT above PT recommendations  However please refer to therapist recommendation for discharge planning given other factors that may influence destination  Adapted from [employer] -[facility] “6-Clicks” Basic Mobility and Daily Activity Scores With Discharge Destination  Physical Therapy, 2021;101:1-9   DOI: [email or web address]      Soo Jang, PT,DPT

## 2023-01-24 NOTE — OCCUPATIONAL THERAPY NOTE
Occupational Therapy Evaluation ([id number]) & Tx ([id number])     Patient Name: Deretha Lundborg  OHKZF'K Date: 1/24/2023  Problem List  Principal Problem:    Compression fracture of L1 vertebra ([address] )  Active Problems:    Vitamin D deficiency    Osteoporosis    Bradycardia    Acute respiratory insufficiency    Macrocytosis    Eosinophilia    Past Medical History  Past Medical History:   Diagnosis Date    Astigmatism     last assessed 11/15/17    Cataract     Resolved 11/15/17    Gross hematuria     Last assessed 01/28/16    Hematuria     last assessed 01/28/16    Lumbar vertebral fracture ([address] )     Prostate cancer ([address] )     last assessed 05/18/17    Renal calculi     last assessed 08/16/12    Squamous cell carcinoma     Vitamin D deficiency     Last assessed 08/19/13     Past Surgical History  Past Surgical History:   Procedure Laterality Date    BREAST EXCISIONAL BIOPSY N/A     Benign breast tumor unsure of side over 50 yrs ago    CATARACT EXTRACTION Left 08/26/2019    CATARACT EXTRACTION Right 08/12/2019    COLONOSCOPY      CYSTOSCOPY  01/28/2016    ELBOW SURGERY      Bone chip    HERNIA REPAIR  2002    IR KYPHOPLASTY/VERTEBROPLASTY  1/4/2023    LITHOTRIPSY      RETROPUBIC PROSTATECTOMY  11/13/2001    Radical with nerve sparing             01/24/23 1041   OT Last Visit   OT Visit Date 01/24/23   Note Type   Note type Evaluation   Pain Assessment   Pain Assessment Tool 0-10   Pain Score No Pain  (When lying down - however, pain increases to "12/10" when sitting up, per pt)   Effect of Pain on Daily Activities Ability to perform txfers/mobility & ADLs   Patient's Stated Pain Goal No pain   Restrictions/Precautions   Weight Bearing Precautions Per Order No   Braces or Orthoses (S)  TLSO  (When HOB >45 degrees or OOB)   Other Precautions Fall Risk;Pain; Chair Alarm; Bed Alarm   Home Living   Type of [address] [place] [address] Two level;1/2 bath on main level;Bed/bath upstairs  (2STE)   Bathroom Shower/Tub Walk-in shower Bathroom Equipment Grab bars in shower; Shower chair   Home Equipment Walker;Cane  (2 standard walkers, one upstairs & one downstairs)   Prior Function   Level of [place] Independent with ADLs; Independent with functional mobility; Needs assistance with IADLS   Lives With Spouse   Receives Help From Family   IADLs Independent with medication management; Family/Friend/Other provides meals; Family/Friend/Other provides transportation   Falls in the last 6 months 0   Vocational Retired   Comments Pt performs ADLs with supervision from wife  Has not driven since first fx 3 months ago  Wife cooks, does laundry, and has cleaning service come 1x/wk  Pt able to manage meds independently  Lifestyle   [place] Independent with ADLs, though wife reports this has become difficult & requires increased time   Reciprocal Relationships Supportive spouse who is retired RN   Service to [employer]   Additional Pertinent History Recently at [facility] for lumbar fx of L2 vertebra, now with new L1 vertebra fx  OP PT 3x/wk since DC from [facility]   Family/Caregiver Present Yes   Subjective   Subjective "I'm confused " Wife & pt report pt just recently received pain meds   ADL   Eating Assistance 7  Independent   Grooming Assistance 5  Supervision/Setup   UB Bathing Assistance 4  Minimal Assistance   LB Bathing Assistance 2  Maximal Assistance   UB Dressing Assistance 3  Moderate Assistance   LB Dressing Assistance 2  Maximal [address]  2  Maximal Assistance   Bed Mobility   Rolling R 3  Moderate assistance   Additional items Assist x 2; Increased time required;Verbal cues;LE management; Bedrails  (Educated on log roll technique)   Supine to Sit 3  Moderate assistance   Additional items Assist x 2; Increased time required;Verbal cues;LE management   Additional Comments Pt sat EOB for approx 7 min with Min A for TLSO brace fitting   Pt & wife edu on donning/doffing TLSO   Transfers   Sit to Stand 3  Moderate assistance   Additional items Assist x 2; Increased time required;Verbal cues  (VC's for hand placement  Mildly retropulsive upon standing)   Additional Comments See tx session for additional txfers & functional mobility   Balance   Static Sitting Fair -   Dynamic Sitting Fair -   Static Standing Poor +   Dynamic Standing Poor   Ambulatory Poor   Activity Tolerance   Activity Tolerance Patient limited by pain; Patient limited by fatigue   Medical Staff Made Aware Co-treat with PT Zac Lora 2* medical complexity   Nurse Made Aware LUZMA Varner   RUE Assessment   RUE Assessment   (Not formally assessed  Functional during txfers & bed mobility)   LUE Assessment   LUE Assessment   (Not formally assessed  Functional during txfers & bed mobility)   Hand Function   Gross Motor Coordination Impaired   Cognition   Overall Cognitive Status Impaired   Arousal/Participation Alert   Attention Within functional limits   Orientation Level Oriented X4   Memory Decreased recall of precautions   Following Commands Follows one step commands with increased time or repetition   Assessment   Limitation Decreased ADL status; Decreased cognition;Decreased Safe judgement during ADL;Decreased self-care trans;Decreased high-level ADLs; Decreased endurance   Prognosis Fair   Assessment Pt is a 79 y o  male seen for OT evaluation at [facility], admitted 1/23/2023 w/ Compression fracture of L1 vertebra ([facility] )  OT completed extensive review of pt's medical and social history  Comorbidities affecting pt's functional performance at time of assessment include: osteoporosis, bradycardia, acute respiratory insufficiency, macrocytosis, eosinophilia, chronic pain syndrome, recent L2 compression fx, spinal stenosis of lumbar region, ambulatory dysfunction, urinary retention & frequency, sacroiliitis   Personal factors affecting pt at time of IE include: steps to enter environment, limited home support, behavioral pattern, difficulty performing ADLS, difficulty performing IADLS , limited insight into deficits, flat affect, decreased initiation and engagement  and environment  Prior to admission, pt was living with his spouse in a [facility] with 2ST & full flight to bed & bath  Pt was I w/  ADLS and A w/ IADLS, (-) drove, & required use of walker PTA  Upon evaluation: Pt requires Mod Ax2 for bed mobility, Mod Ax1-2 for functional mobility/transfers, Mod A for UB ADLs and Max A for LB ADLS 2* the following deficits impacting occupational performance: weakness, decreased strength, decreased balance, decreased tolerance, impaired GMC, impaired attention, impaired sequencing, impaired problem solving, decreased safety awareness, increased pain and orthopedic restrictions  Full objective findings from OT assessment regarding body systems outlined above  Pt to benefit from continued skilled OT tx while in the hospital to address deficits as defined above and maximize level of functional independence w/ ADL's and functional mobility  Occupational Performance areas to address include: grooming, bathing/shower, toilet hygiene, dressing, health maintenance, functional mobility and clothing management  Based on findings, pt is of high complexity  The patient's raw score on the AM-PAC Daily Activity inpatient short form is 15, standardized score is 34 69, less than 39 4  Patients at this level are likely to benefit from DC to post-acute rehabilitation services, which does coincide with current above OT recommendations  However, please refer to therapist recommendation for discharge planning given other factors that may influence destination  At this time, OT recommendations at time of discharge are post-acute rehabilitation services  Pt would likely tolerate therapy 3 hrs/day  Goals   Patient Goals Pt wants to have less pain   Plan   Treatment Interventions ADL retraining;Functional transfer training;UE strengthening/ROM; Endurance training;Cognitive reorientation;Patient/family training;Equipment evaluation/education; Compensatory technique education;Continued evaluation   Goal Expiration Date 02/03/23   OT Treatment Day 0   OT Frequency 3-5x/wk   Recommendation   OT Discharge Recommendation Post acute rehabilitation services  (Pt would likely tolerate therapy 3hrs day)   AM-PAC Daily Activity Inpatient   Lower Body Dressing 2   Bathing 2   Toileting 2   Upper Body Dressing 2   Grooming 3   Eating 4   Daily Activity Raw Score 15   Daily Activity Standardized Score (Calc for Raw Score >=11) 34 69   AM-PAC Applied Cognition Inpatient   Following a Speech/Presentation 2   Understanding Ordinary Conversation 4   Taking Medications 3   Remembering Where Things Are Placed or Put Away 3   Remembering List of 4-5 Errands 2   Taking Care of Complicated Tasks 2   Applied Cognition Raw Score 16   Applied Cognition Standardized Score 35 03   Additional Treatment Session   Start Time 1031   End Time 1041   Treatment Assessment Pt stood for approx 1-2 min with Mod Ax2 prior to mobility  Pt completed functional mobility with Mod Ax1 & RW for approx 7 feet  Extremely shuffled gait, mildly ataxic  Impaired motor planning  Pt required verbal/tactile cues for sequencing  Assistance for RW management  B/L knee buckling x2  Required Mod Ax2 to correct  Pt performed stand > sit with Mod Ax2 & hand over hand assist for hand placement  Poor eccentric control  Pt left seated in recliner with chair alarm on, all needs in reach  Pt educated to call for assistance  Wife present & states she will ensure pt rings for assistance  RN made aware   End of Consult   Education Provided Yes;Family or social support of family present for education by provider   Patient Position at End of Consult Bedside chair;Bed/Chair alarm activated; All needs within reach   Nurse Communication Nurse aware of consult     Pt will achieve the following goals within 10 days  *Pt will complete grooming with set-up      *Pt will complete UB bathing and dressing with set-up  *Pt will complete LB bathing and dressing with S & DME PRN  *Pt will complete bed mobility with S & VC's PRN, with use of side rails while utilizing log roll technique  *Pt will perform functional transfers with on/off all surfaces with Min Ax1 using DME as needed w/ G balance/safety  *Pt will improve functional mobility during ADL/IADL/leisure tasks to Min Ax1 using DME as needed w/ G balance/safety  *Pt will demonstrate 100% carryover of precautions s/p review w/o cues w/ Mod I w/ G tolerance/participation t/o functional ADL/IADL/leisure tasks      Melly Sampson, OTR/L

## 2023-01-24 NOTE — TELEPHONE ENCOUNTER
Scheduled as a virtual
fractured a vertebrae  Is in hospital is going to be in rehab    Tomorrow not able to make it in can you give her a call her cell is [phone]
EMT/paramedic

## 2023-01-24 NOTE — ASSESSMENT & PLAN NOTE
- Patient was reviewed in the ED for back pain on 1/21/2023 when he was found to have closed L1 vertebral fracture   He received lidocaine patch, methocarbamol, ketorolac and he was commenced on pregabalin  - However, patient presented today on account of worsening pain especially with movement  - CT-spine done 1/21/23 showed mild L1 superior endplate vertebral compression deformity  - MRI done 1/23/2023 showed there is a new mild L1 compression fracture present when compared with the prior MRI performed 7 weeks ago  - Neurosurgery consult appreciated with thanks   - X-ray lumbar spine done showed a somewhat limited study due to osteopenia and poor penetration with L1 compression deformity with mild further loss of vertebral body height compared to 1/20/2023  - Ensure analgesia with scheduled oxycodone and Dilaudid for breakthrough pain  - Bowel regimen with Colace  - Patient still having severe pain and will consider for discharge tomorrow

## 2023-01-24 NOTE — PLAN OF CARE
Problem: Potential for Falls  Goal: Patient will remain free of falls  Description: INTERVENTIONS:  - Educate patient/family on patient safety including physical limitations  - Instruct patient to call for assistance with activity   - Consult OT/PT to assist with strengthening/mobility   - Keep Call bell within reach  - Keep bed low and locked with side rails adjusted as appropriate  - Keep care items and personal belongings within reach  - Initiate and maintain comfort rounds  - Make Fall Risk Sign visible to staff  - Offer Toileting every 2 Hours, in advance of need  - Initiate/Maintain bed alarm  - Obtain necessary fall risk management equipment:   - Apply yellow socks and bracelet for high fall risk patients  - Consider moving patient to room near nurses station  Outcome: Progressing     Problem: PAIN - ADULT  Goal: Verbalizes/displays adequate comfort level or baseline comfort level  Description: Interventions:  - Encourage patient to monitor pain and request assistance  - Assess pain using appropriate pain scale  - Administer analgesics based on type and severity of pain and evaluate response  - Implement non-pharmacological measures as appropriate and evaluate response  - Consider cultural and social influences on pain and pain management  - Notify physician/advanced practitioner if interventions unsuccessful or patient reports new pain  Outcome: Progressing     Problem: INFECTION - ADULT  Goal: Absence or prevention of progression during hospitalization  Description: INTERVENTIONS:  - Assess and monitor for signs and symptoms of infection  - Monitor lab/diagnostic results  - Monitor all insertion sites, i e  indwelling lines, tubes, and drains  - Monitor endotracheal if appropriate and nasal secretions for changes in amount and color  - [place] appropriate cooling/warming therapies per order  - Administer medications as ordered  - Instruct and encourage patient and family to use good hand hygiene technique  - Identify and instruct in appropriate isolation precautions for identified infection/condition  Outcome: Progressing  Goal: Absence of fever/infection during neutropenic period  Description: INTERVENTIONS:  - Monitor WBC    Outcome: Progressing     Problem: SAFETY ADULT  Goal: Patient will remain free of falls  Description: INTERVENTIONS:  - Educate patient/family on patient safety including physical limitations  - Instruct patient to call for assistance with activity   - Consult OT/PT to assist with strengthening/mobility   - Keep Call bell within reach  - Keep bed low and locked with side rails adjusted as appropriate  - Keep care items and personal belongings within reach  - Initiate and maintain comfort rounds  - Make Fall Risk Sign visible to staff  - Offer Toileting every 2 Hours, in advance of need  - Initiate/Maintain bed alarm  - Obtain necessary fall risk management equipment:   - Apply yellow socks and bracelet for high fall risk patients  - Consider moving patient to room near nurses station  Outcome: Progressing  Goal: Maintain or return to baseline ADL function  Description: INTERVENTIONS:  -  Assess patient's ability to carry out ADLs; assess patient's baseline for ADL function and identify physical deficits which impact ability to perform ADLs (bathing, care of mouth/teeth, toileting, grooming, dressing, etc )  - Assess/evaluate cause of self-care deficits   - Assess range of motion  - Assess patient's mobility; develop plan if impaired  - Assess patient's need for assistive devices and provide as appropriate  - Encourage maximum independence but intervene and supervise when necessary  - Involve family in performance of ADLs  - Assess for home care needs following discharge   - Consider OT consult to assist with ADL evaluation and planning for discharge  - Provide patient education as appropriate  Outcome: Progressing  Goal: Maintains/Returns to pre admission functional level  Description: INTERVENTIONS:  - Perform BMAT or MOVE assessment daily    - Set and communicate daily mobility goal to care team and patient/family/caregiver  - Collaborate with rehabilitation services on mobility goals if consulted  - Perform Range of Motion 2 times a day  - Reposition patient every 2 hours  - Dangle patient 2 times a day  - Stand patient 2 times a day  - Ambulate patient 2 times a day  - Out of bed to chair 2 times a day   - Out of bed for meals 2 times a day  - Out of bed for toileting  - Record patient progress and toleration of activity level   Outcome: Progressing     Problem: DISCHARGE PLANNING  Goal: Discharge to home or other facility with appropriate resources  Description: INTERVENTIONS:  - Identify barriers to discharge w/patient and caregiver  - Arrange for needed discharge resources and transportation as appropriate  - Identify discharge learning needs (meds, wound care, etc )  - Arrange for interpretive services to assist at discharge as needed  - Refer to Case Management Department for coordinating discharge planning if the patient needs post-hospital services based on physician/advanced practitioner order or complex needs related to functional status, cognitive ability, or social support system  Outcome: Progressing     Problem: Knowledge Deficit  Goal: Patient/family/caregiver demonstrates understanding of disease process, treatment plan, medications, and discharge instructions  Description: Complete learning assessment and assess knowledge base    Interventions:  - Provide teaching at level of understanding  - Provide teaching via preferred learning methods  Outcome: Progressing     Problem: GENITOURINARY - ADULT  Goal: Maintains or returns to baseline urinary function  Description: INTERVENTIONS:  - Assess urinary function  - Encourage oral fluids to ensure adequate hydration if ordered  - Administer IV fluids as ordered to ensure adequate hydration  - Administer ordered medications as needed  - Offer frequent toileting  - Follow urinary retention protocol if ordered  Outcome: Progressing  Goal: Absence of urinary retention  Description: INTERVENTIONS:  - Assess patient's ability to void and empty bladder  - Monitor I/O  - Bladder scan as needed  - Discuss with physician/AP medications to alleviate retention as needed  - Discuss catheterization for long term situations as appropriate  Outcome: Progressing     Problem: SKIN/TISSUE INTEGRITY - ADULT  Goal: Skin Integrity remains intact(Skin Breakdown Prevention)  Description: Assess:  -Perform Barrett assessment   -Clean and moisturize skin  -Inspect skin when repositioning, toileting, and assisting with ADLS  -Assess under medical devices  -Assess extremities for adequate circulation and sensation     Bed Management:  -Have minimal linens on bed & keep smooth, unwrinkled  -Change linens as needed when moist or perspiring  -Avoid sitting or lying in one position for more than 2 hours while in bed  -Keep HOB at 30 degrees     Toileting:  -Offer bedside commode  -Assess for incontinence   -Use incontinent care products after each incontinent episode     Activity:  -Mobilize patient 2 times a day  -Encourage activity and walks on unit  -Encourage or provide ROM exercises   -Turn and reposition patient every 2 Hours  -Use appropriate equipment to lift or move patient in bed  -Instruct/ Assist with weight shifting every 2 when out of bed in chair  -Consider limitation of chair time 2 hour intervals    Skin Care:  -Avoid use of baby powder, tape, friction and shearing, hot water or constrictive clothing  -Relieve pressure over bony prominences   -Do not massage red bony areas    Next Steps:  -Teach patient strategies to minimize risks   -Consider consults to  interdisciplinary teams   Outcome: Progressing     Problem: MUSCULOSKELETAL - ADULT  Goal: Maintain or return mobility to safest level of function  Description: INTERVENTIONS:  - Assess patient's ability to carry out ADLs; assess patient's baseline for ADL function and identify physical deficits which impact ability to perform ADLs (bathing, care of mouth/teeth, toileting, grooming, dressing, etc )  - Assess/evaluate cause of self-care deficits   - Assess range of motion  - Assess patient's mobility  - Assess patient's need for assistive devices and provide as appropriate  - Encourage maximum independence but intervene and supervise when necessary  - Involve family in performance of ADLs  - Assess for home care needs following discharge   - Consider OT consult to assist with ADL evaluation and planning for discharge  - Provide patient education as appropriate  Outcome: Progressing     Problem: Prexisting or High Potential for Compromised Skin Integrity  Goal: Skin integrity is maintained or improved  Description: INTERVENTIONS:  - Identify patients at risk for skin breakdown  - Assess and monitor skin integrity  - Assess and monitor nutrition and hydration status  - Monitor labs   - Assess for incontinence   - Turn and reposition patient  - Assist with mobility/ambulation  - Relieve pressure over bony prominences  - Avoid friction and shearing  - Provide appropriate hygiene as needed including keeping skin clean and dry  - Evaluate need for skin moisturizer/barrier cream  - Collaborate with interdisciplinary team   - Patient/family teaching  - Consider wound care consult   Outcome: Progressing     Problem: MOBILITY - ADULT  Goal: Maintain or return to baseline ADL function  Description: INTERVENTIONS:  -  Assess patient's ability to carry out ADLs; assess patient's baseline for ADL function and identify physical deficits which impact ability to perform ADLs (bathing, care of mouth/teeth, toileting, grooming, dressing, etc )  - Assess/evaluate cause of self-care deficits   - Assess range of motion  - Assess patient's mobility; develop plan if impaired  - Assess patient's need for assistive devices and provide as appropriate  - Encourage maximum independence but intervene and supervise when necessary  - Involve family in performance of ADLs  - Assess for home care needs following discharge   - Consider OT consult to assist with ADL evaluation and planning for discharge  - Provide patient education as appropriate  Outcome: Progressing  Goal: Maintains/Returns to pre admission functional level  Description: INTERVENTIONS:  - Perform BMAT or MOVE assessment daily    - Set and communicate daily mobility goal to care team and patient/family/caregiver  - Collaborate with rehabilitation services on mobility goals if consulted  - Perform Range of Motion 2 times a day  - Reposition patient every 2 hours    - Dangle patient 2 times a day  - Stand patient 2 times a day  - Ambulate patient 2 times a day  - Out of bed to chair 2 times a day   - Out of bed for meals 2 times a day  - Out of bed for toileting  - Record patient progress and toleration of activity level   Outcome: Progressing

## 2023-01-24 NOTE — CASE MANAGEMENT
Case Management Progress Note    Patient name Edgar Lopez  Location [address] 222/-[id number] MRN [id number]  : 1940 Date 2023       LOS (days): 0  Geometric Mean LOS (GMLOS) (days):   Days to GMLOS:        OBJECTIVE:        Current admission status: Observation  Preferred Pharmacy:   [address] [address]  Phone: [phone] Fax: [phone]    Primary Care Provider: Adam Cage DO    Primary Insurance: MEDICARE  Secondary Insurance: AARP    PROGRESS NOTE:  PT/OT recommending post acute rehab  Met with patient to discuss same  Patient is agreeable to rehab, but will only go to [place] [address]  Referral via Aidin  Await bed availability

## 2023-01-24 NOTE — PROGRESS NOTES
-- Patient: Kanchan Denson  -- MRN: [id number]  -- Aidin Request ID: [id number]  -- Level of care reserved: Inpatient [facility]  -- Partner Reserved: [place], [place], [address]  [address] includes the [facility] [phone]  -- Clinical needs requested:  -- Geography searched: [place] miles around [phone]  -- Start of Service:  -- Request sent: 10:51am EST on 1/24/2023 by Ashu Castellanos  -- Partner reserved: 12:22pm EST on 1/24/2023 by Ashu Castellanos  -- Choice list shared: 12:22pm EST on 1/24/2023 by Ashu Castellanos

## 2023-01-25 VITALS
SYSTOLIC BLOOD PRESSURE: 166 MMHG | BODY MASS INDEX: 20.25 KG/M2 | HEART RATE: 70 BPM | HEIGHT: 71 IN | RESPIRATION RATE: 16 BRPM | OXYGEN SATURATION: 97 % | TEMPERATURE: 97.4 F | WEIGHT: 144.62 LBS | DIASTOLIC BLOOD PRESSURE: 77 MMHG

## 2023-01-25 PROBLEM — R06.89 ACUTE RESPIRATORY INSUFFICIENCY: Status: RESOLVED | Noted: 2023-01-23 | Resolved: 2023-01-25

## 2023-01-25 PROBLEM — D72.10 EOSINOPHILIA: Status: RESOLVED | Noted: 2023-01-23 | Resolved: 2023-01-25

## 2023-01-25 LAB
FLUAV RNA RESP QL NAA+PROBE: NEGATIVE
FLUBV RNA RESP QL NAA+PROBE: NEGATIVE
RSV RNA RESP QL NAA+PROBE: NEGATIVE
SARS-COV-2 RNA RESP QL NAA+PROBE: NEGATIVE

## 2023-01-25 RX ORDER — PREGABALIN 50 MG/1
50 CAPSULE ORAL 2 TIMES DAILY
Qty: 20 CAPSULE | Refills: 0 | Status: SHIPPED | OUTPATIENT
Start: 2023-01-25 | End: 2023-02-07

## 2023-01-25 RX ORDER — CALCIUM CARBONATE 500(1250)
2 TABLET ORAL
Status: DISCONTINUED | OUTPATIENT
Start: 2023-01-26 | End: 2023-01-25 | Stop reason: HOSPADM

## 2023-01-25 RX ORDER — MELATONIN
2000 DAILY
Status: DISCONTINUED | OUTPATIENT
Start: 2023-01-26 | End: 2023-01-25 | Stop reason: HOSPADM

## 2023-01-25 RX ORDER — LIDOCAINE 50 MG/G
1 PATCH TOPICAL DAILY
Status: DISCONTINUED | OUTPATIENT
Start: 2023-01-25 | End: 2023-01-25 | Stop reason: HOSPADM

## 2023-01-25 RX ORDER — TIZANIDINE 2 MG/1
2 TABLET ORAL EVERY 8 HOURS PRN
Refills: 0
Start: 2023-01-25

## 2023-01-25 RX ORDER — LISINOPRIL 10 MG/1
10 TABLET ORAL
Refills: 0
Start: 2023-01-25

## 2023-01-25 RX ORDER — PREGABALIN 25 MG/1
50 CAPSULE ORAL 2 TIMES DAILY
Status: DISCONTINUED | OUTPATIENT
Start: 2023-01-25 | End: 2023-01-25 | Stop reason: HOSPADM

## 2023-01-25 RX ORDER — MELATONIN
1000 ONCE
Status: COMPLETED | OUTPATIENT
Start: 2023-01-25 | End: 2023-01-25

## 2023-01-25 RX ORDER — ACETAMINOPHEN 325 MG/1
975 TABLET ORAL EVERY 8 HOURS SCHEDULED
Refills: 0 | Status: ON HOLD
Start: 2023-01-25 | End: 2023-02-07 | Stop reason: SDUPTHER

## 2023-01-25 RX ORDER — CALCIUM CARBONATE 500(1250)
2 TABLET ORAL
Refills: 0
Start: 2023-01-26

## 2023-01-25 RX ORDER — ACETAMINOPHEN 325 MG/1
975 TABLET ORAL EVERY 8 HOURS SCHEDULED
Status: DISCONTINUED | OUTPATIENT
Start: 2023-01-25 | End: 2023-01-25 | Stop reason: HOSPADM

## 2023-01-25 RX ORDER — METHOCARBAMOL 500 MG/1
500 TABLET, FILM COATED ORAL EVERY 6 HOURS PRN
Status: DISCONTINUED | OUTPATIENT
Start: 2023-01-25 | End: 2023-01-25 | Stop reason: HOSPADM

## 2023-01-25 RX ORDER — LIDOCAINE 50 MG/G
1 PATCH TOPICAL DAILY
Refills: 0
Start: 2023-01-26

## 2023-01-25 RX ORDER — LISINOPRIL 20 MG/1
20 TABLET ORAL DAILY
Refills: 0
Start: 2023-01-26 | End: 2023-02-07

## 2023-01-25 RX ADMIN — ENOXAPARIN SODIUM 40 MG: 100 INJECTION SUBCUTANEOUS at 08:53

## 2023-01-25 RX ADMIN — PREGABALIN 50 MG: 25 CAPSULE ORAL at 08:53

## 2023-01-25 RX ADMIN — METHOCARBAMOL TABLETS 500 MG: 500 TABLET, COATED ORAL at 08:54

## 2023-01-25 RX ADMIN — OXYBUTYNIN CHLORIDE 5 MG: 5 TABLET, EXTENDED RELEASE ORAL at 08:54

## 2023-01-25 RX ADMIN — METOPROLOL TARTRATE 25 MG: 25 TABLET, FILM COATED ORAL at 04:49

## 2023-01-25 RX ADMIN — Medication 1000 UNITS: at 08:54

## 2023-01-25 RX ADMIN — Medication 1000 UNITS: at 11:20

## 2023-01-25 RX ADMIN — ACETAMINOPHEN 650 MG: 325 TABLET ORAL at 11:19

## 2023-01-25 RX ADMIN — DOCUSATE SODIUM 100 MG: 100 CAPSULE, LIQUID FILLED ORAL at 08:54

## 2023-01-25 RX ADMIN — LIDOCAINE 5% 1 PATCH: 700 PATCH TOPICAL at 12:30

## 2023-01-25 RX ADMIN — LISINOPRIL 20 MG: 20 TABLET ORAL at 08:54

## 2023-01-25 NOTE — CASE MANAGEMENT
Case Management Discharge Planning Note    Patient name Darian Marr  Location [address] 222/-42 MRN [id number]  : 1940 Date 2023       Current Admission Date: 2023  Current Admission Diagnosis:Compression fracture of L1 vertebra [facility])   Patient Active Problem List    Diagnosis Date Noted   • Bradycardia 2023   • Macrocytosis 2023   • Vitamin D deficiency 2022   • Osteoporosis 2022   • Ambulatory dysfunction 2022   • Urinary frequency 2022   • Compression fracture of L1 vertebra ([facility] [address] ) 2022   • Acute L2 compression fracture  10/21/2022   • Sacroiliitis (HCC)    • Sacroiliac joint dysfunction of both sides    • Lumbar spondylosis 10/04/2022   • Stage 3a chronic kidney disease ([address] ) 2022   • Myofascial pain syndrome 2022   • Aortic root dilatation ([facility] [address] ) 2022   • Renal cyst, acquired, left 12/10/2020   • Chronic pain syndrome 2020   • Chronic bilateral low back pain without sciatica 2020   • Spinal stenosis of lumbar region with neurogenic claudication    • Lumbar radiculopathy    • Malignant neoplasm of prostate ([facility] [address] ) 2019   • Thoracic aortic aneurysm without rupture 10/04/2018   • Nonrheumatic aortic valve insufficiency 10/04/2018   • Non-rheumatic mitral regurgitation 10/04/2018   • PVC's (premature ventricular contractions) 2018      LOS (days): 1  Geometric Mean LOS (GMLOS) (days): 2 80  Days to GMLOS:1 9     OBJECTIVE:  Risk of Unplanned Readmission Score: 14 76         Current admission status: Inpatient   Preferred Pharmacy:   [address], [address] [place] [address]  Phone: [phone] Fax: [phone]    Primary Care Provider: Mirna Steele DO    Primary Insurance: MEDICARE  Secondary Insurance: AARP    DISCHARGE DETAILS:    Additional Comments: CM was informed that pt is medically stable for dc today   CM spoke to [facility] liasion who states pt is accepted and a bed is available today  CM requested a 2pm  via roundtrip; awaiting confirmation of  time  Facility transfer form and CMN completed  CMN signed and placed in medical record bin      Accepting Facility Name, [facility] : [facility] room 858M  Receiving Facility/Agency Phone Number: [phone]  Facility/Agency Fax Number: [phone]

## 2023-01-25 NOTE — PLAN OF CARE
Problem: PAIN - ADULT  Goal: Verbalizes/displays adequate comfort level or baseline comfort level  Description: Interventions:  - Encourage patient to monitor pain and request assistance  - Assess pain using appropriate pain scale  - Administer analgesics based on type and severity of pain and evaluate response  - Implement non-pharmacological measures as appropriate and evaluate response  - Consider cultural and social influences on pain and pain management  - Notify physician/advanced practitioner if interventions unsuccessful or patient reports new pain  Outcome: Progressing     Problem: INFECTION - ADULT  Goal: Absence or prevention of progression during hospitalization  Description: INTERVENTIONS:  - Assess and monitor for signs and symptoms of infection  - Monitor lab/diagnostic results  - Monitor all insertion sites, i e  indwelling lines, tubes, and drains  - Monitor endotracheal if appropriate and nasal secretions for changes in amount and color  - [place] appropriate cooling/warming therapies per order  - Administer medications as ordered  - Instruct and encourage patient and family to use good hand hygiene technique  - Identify and instruct in appropriate isolation precautions for identified infection/condition  Outcome: Progressing  Goal: Absence of fever/infection during neutropenic period  Description: INTERVENTIONS:  - Monitor WBC    Outcome: Progressing     Problem: DISCHARGE PLANNING  Goal: Discharge to home or other facility with appropriate resources  Description: INTERVENTIONS:  - Identify barriers to discharge w/patient and caregiver  - Arrange for needed discharge resources and transportation as appropriate  - Identify discharge learning needs (meds, wound care, etc )  - Arrange for interpretive services to assist at discharge as needed  - Refer to Case Management Department for coordinating discharge planning if the patient needs post-hospital services based on physician/advanced practitioner order or complex needs related to functional status, cognitive ability, or social support system  Outcome: Progressing     Problem: Knowledge Deficit  Goal: Patient/family/caregiver demonstrates understanding of disease process, treatment plan, medications, and discharge instructions  Description: Complete learning assessment and assess knowledge base    Interventions:  - Provide teaching at level of understanding  - Provide teaching via preferred learning methods  Outcome: Progressing

## 2023-01-25 NOTE — DISCHARGE INSTR - AVS FIRST PAGE
Discharge Instructions - Neurosurgery    LSO brace to be worn when out of bed of head of bed greater than 45 degrees  May be removed for showering  No heavy lifting  No strenuous activities  No Driving  **Please notify MD immediately if you have increased back or leg pain  New numbness, tingling and/or weakness in your legs  **

## 2023-01-25 NOTE — TREATMENT PLAN
Imaging:  · XR lumbar spine 1/24/2023:  Somewhat limited study due to osteopenia and poor penetration  Within the limitations, L1 compression deformity with mild further loss of vertebral body height compared to 1/20/2023  Plan:  · Imaging reviewed, mild worsening loss of height, not uncommon for compression fracture in the setting of known osteoporosis (per DEXA)  Overall alignment adequate  · Would not recommend transfer  · Continue with conservative management for now with brace and multimodal pain regimen  · Will need to discuss with PCP in the outpatient setting about starting treatment for osteoporosis given multiple fractures    · If patient has better pain control and is able to dc, he will follow up as scheduled with Dr Rashaun Packer on Friday  · If patient is unable to dc from hospital due to ongoing pain, consider IR consult at [address] for possible kyphoplasty while inpatient  · Rest of care per primary team  · Signed off, please call with questions or concerns

## 2023-01-25 NOTE — ASSESSMENT & PLAN NOTE
Patient has a history of osteoporosis  Pt had DEXA scan in December 2022, confirming osteoporosis  Follow up with PCP/Endo

## 2023-01-25 NOTE — ASSESSMENT & PLAN NOTE
Continue vitamin D supplementation   Vitamin  D levels pending on discharge, STR physician to follow-up  Of note, pt has confirmed osteoporosis

## 2023-01-25 NOTE — ASSESSMENT & PLAN NOTE
Patient was requiring 2 L of oxygen, likely secondary to pain/atelectasis  On room air now  Resolved

## 2023-01-25 NOTE — PLAN OF CARE
Problem: INFECTION - ADULT  Goal: Absence or prevention of progression during hospitalization  Description: INTERVENTIONS:  - Assess and monitor for signs and symptoms of infection  - Monitor lab/diagnostic results  - Monitor all insertion sites, i e  indwelling lines, tubes, and drains  - Monitor endotracheal if appropriate and nasal secretions for changes in amount and color  - [place] appropriate cooling/warming therapies per order  - Administer medications as ordered  - Instruct and encourage patient and family to use good hand hygiene technique  - Identify and instruct in appropriate isolation precautions for identified infection/condition  Outcome: Progressing  Goal: Absence of fever/infection during neutropenic period  Description: INTERVENTIONS:  - Monitor WBC    Outcome: Progressing     Problem: DISCHARGE PLANNING  Goal: Discharge to home or other facility with appropriate resources  Description: INTERVENTIONS:  - Identify barriers to discharge w/patient and caregiver  - Arrange for needed discharge resources and transportation as appropriate  - Identify discharge learning needs (meds, wound care, etc )  - Arrange for interpretive services to assist at discharge as needed  - Refer to Case Management Department for coordinating discharge planning if the patient needs post-hospital services based on physician/advanced practitioner order or complex needs related to functional status, cognitive ability, or social support system  Outcome: Progressing     Problem: Knowledge Deficit  Goal: Patient/family/caregiver demonstrates understanding of disease process, treatment plan, medications, and discharge instructions  Description: Complete learning assessment and assess knowledge base    Interventions:  - Provide teaching at level of understanding  - Provide teaching via preferred learning methods  Outcome: Progressing     Problem: GENITOURINARY - ADULT  Goal: Maintains or returns to baseline urinary function  Description: INTERVENTIONS:  - Assess urinary function  - Encourage oral fluids to ensure adequate hydration if ordered  - Administer IV fluids as ordered to ensure adequate hydration  - Administer ordered medications as needed  - Offer frequent toileting  - Follow urinary retention protocol if ordered  Outcome: Progressing  Goal: Absence of urinary retention  Description: INTERVENTIONS:  - Assess patient's ability to void and empty bladder  - Monitor I/O  - Bladder scan as needed  - Discuss with physician/AP medications to alleviate retention as needed  - Discuss catheterization for long term situations as appropriate  Outcome: Progressing     Problem: SKIN/TISSUE INTEGRITY - ADULT  Goal: Skin Integrity remains intact(Skin Breakdown Prevention)  Description: Assess:  -Perform Barrett assessment   -Clean and moisturize skin  -Inspect skin when repositioning, toileting, and assisting with ADLS  -Assess under medical devices  -Assess extremities for adequate circulation and sensation     Bed Management:  -Have minimal linens on bed & keep smooth, unwrinkled  -Change linens as needed when moist or perspiring  -Avoid sitting or lying in one position for more than 2 hours while in bed  -Keep HOB at 30 degrees     Toileting:  -Offer bedside commode  -Assess for incontinence   -Use incontinent care products after each incontinent episode     Activity:  -Mobilize patient 2 times a day  -Encourage activity and walks on unit  -Encourage or provide ROM exercises   -Turn and reposition patient every 2 Hours  -Use appropriate equipment to lift or move patient in bed  -Instruct/ Assist with weight shifting every 2 when out of bed in chair  -Consider limitation of chair time 2 hour intervals    Skin Care:  -Avoid use of baby powder, tape, friction and shearing, hot water or constrictive clothing  -Relieve pressure over bony prominences   -Do not massage red bony areas    Next Steps:  -Teach patient strategies to minimize risks   -Consider consults to  interdisciplinary teams   Outcome: Progressing     Problem: Prexisting or High Potential for Compromised Skin Integrity  Goal: Skin integrity is maintained or improved  Description: INTERVENTIONS:  - Identify patients at risk for skin breakdown  - Assess and monitor skin integrity  - Assess and monitor nutrition and hydration status  - Monitor labs   - Assess for incontinence   - Turn and reposition patient  - Assist with mobility/ambulation  - Relieve pressure over bony prominences  - Avoid friction and shearing  - Provide appropriate hygiene as needed including keeping skin clean and dry  - Evaluate need for skin moisturizer/barrier cream  - Collaborate with interdisciplinary team   - Patient/family teaching  - Consider wound care consult   Outcome: Progressing

## 2023-01-25 NOTE — DISCHARGE SUMMARY
[place]  Discharge- Anabel Ellsworthjaylen 1940, 79 y o  male MRN: [id number]  Unit/Bed#: -01 Encounter: [id number]  Primary Care Provider: Derrick Castelan DO   Date and time admitted to hospital: 1/23/2023  6:00 AM    * Compression fracture of L1 vertebra [facility])  Assessment & Plan  - Patient was reviewed in the ED for back pain on 1/21/2023 when he was found to have closed L1 vertebral fracture  He received lidocaine patch, methocarbamol, ketorolac and he was commenced on pregabalin  - However, patient presented today on account of worsening pain especially with movement  - CT-spine done 1/21/23 showed mild L1 superior endplate vertebral compression deformity  - MRI done 1/23/2023 showed there is a new mild L1 compression fracture present when compared with the prior MRI performed 7 weeks ago  - X-ray lumbar spine done showed a somewhat limited study due to osteopenia and poor penetration with L1 compression deformity with mild further loss of vertebral body height compared to 1/20/2023  - Pt did not tolerate oxycodone 5mg due to significant confusion/lethargy  Family requests discontinuation of narcotics  - For pain control: Scheduled tylenol, lidocaine patch, and Lyrica 50mg BID  Robaxin PRN     - Bowel regimen with Colace  - Discharge to STR [employer])  - Continue brace and pain control  - Follow up with Dr Sruthi Bee on Friday  - Follow up with PCP for management of osteoporosis    Macrocytosis  Assessment & Plan  MCV was elevated at 100  Vitamin B12 was normal and folate > 20    Bradycardia  Assessment & Plan  Heart rate was bradycardic as low as 46  Continue metoprolol with hold parameter    Osteoporosis  Assessment & Plan  Patient has a history of osteoporosis  Pt had DEXA scan in December 2022, confirming osteoporosis  Follow up with PCP/Endo    Vitamin D deficiency  Assessment & Plan  Continue vitamin D supplementation   Vitamin  D levels pending on discharge, STR physician to follow-up  Of note, pt has confirmed osteoporosis    Eosinophilia-resolved as of 1/25/2023  Assessment & Plan  Eosinophils elevated at 0 45  Resolved    Acute respiratory insufficiency-resolved as of 1/25/2023  Assessment & Plan  Patient was requiring 2 L of oxygen, likely secondary to pain/atelectasis  On room air now  Resolved  Medical Problems     Resolved Problems  Date Reviewed: 1/25/2023          Resolved    Acute respiratory insufficiency 1/25/2023     Resolved by  Carlos Chen PA-C    Eosinophilia 1/25/2023     Resolved by  Carlos Chen PA-C        Discharging Physician / Practitioner: Carlos Chen PA-C  PCP: Ann-Marie Raymond DO  Admission Date:   Admission Orders (From admission, onward)     Ordered        01/24/23 1359  Inpatient Admission  Once            01/23/23 1327  Place in Observation  Once                      Discharge Date: 01/25/23    Consultations During Hospital Stay:  · Neurosurgery    Procedures Performed:   · None    Significant Findings / Test Results:   · CT lumbar spine:  1  Mild L1 superior endplate vertebral compression deformity, new from prior CT study  2  Stable L2 kyphoplasty; stable minimal L3 superior endplate vertebral compression deformity  3  Moderate multilevel spondylotic changes of the lumbar spine as detailed above  4  Moderate levoscoliosis, apex at L3   · MRI lumbar spine:  1  There is a new mild L1 compression fracture present when compared with the prior MRI performed 7 weeks ago  2  Previously treated L2 compression fracture with PMMA cement placement since prior MRI, grossly unchanged in configuration  3  Stable multilevel lumbar spondylitic degenerative change with mild to moderate canal stenosis and foraminal narrowing  Canal stenosis is most pronounced at L4-5, series 6 image 23  This is unchanged  · Xray lumbar spine:  1  Somewhat limited study due to osteopenia and poor penetration    Within the limitations, L1 compression deformity with mild further loss of vertebral body height compared to 1/20/2023  Incidental Findings:   · None    Test Results Pending at Discharge (will require follow up): · Vitamin D level (Facility physician can follow-up)     Outpatient Tests Requested:  · None    Complications: None    Reason for Admission: Pain control    Hospital Course:   Keke Epps is a 79 y o  male patient with PMH prostate cancer s/p prostatectomy, recent L1 fracture status post vertebroplasty/kyphoplasty who originally presented to the hospital on 1/23/2023 due to excruciating back pain, which was worse with movements  He was seen in the emergency department 2 days prior to admission where he was found to have an L1 compression fracture, and returned due to worsening pain  He underwent CT and MRI of his lumbar spine (see results above), and was started on pain control  Of note, patient did not tolerate narcotics very well, however on discharge his pain is well controlled on Tylenol, lidocaine patch, Lyrica, Robaxin PRN  He was evaluated by neurosurgery who recommended brace, pain control, follow-up with IR for further evaluation for new vertebroplasty, follow-up with PCP for management of his osteoporosis  He was evaluated PT/OT who recommended short-term rehab and patient will be discharged to [facility]  Please see above list of diagnoses and related plan for additional information  Condition at Discharge: fair    Discharge Day Visit / Exam:   Subjective: Patient reports mild pain in his back  Otherwise, no acute events overnight  Vitals: Blood Pressure: 137/65 (01/25/23 0811)  Pulse: 59 (01/25/23 0811)  Temperature: 98 3 °F (36 8 °C) (01/25/23 0811)  Temp Source: Oral (01/25/23 0811)  Respirations: 15 (01/25/23 0811)  Height: 5' 11" (180 3 cm) (01/23/23 0601)  Weight - Scale: 65 6 kg (144 lb 10 oz) (01/25/23 0449)  SpO2: 98 % (01/24/23 1548)  Exam:   Physical Exam  Vitals and nursing note reviewed  Constitutional:       Appearance: Normal appearance  HENT:      Head: Normocephalic and atraumatic  Mouth/Throat:      Mouth: Mucous membranes are moist       Pharynx: Oropharynx is clear  No oropharyngeal exudate  Eyes:      Extraocular Movements: Extraocular movements intact  Cardiovascular:      Rate and Rhythm: Normal rate and regular rhythm  Pulses: Normal pulses  Heart sounds: Normal heart sounds  No murmur heard  No friction rub  No gallop  Pulmonary:      Effort: Pulmonary effort is normal  No respiratory distress  Breath sounds: Normal breath sounds  No stridor  No wheezing or rales  Abdominal:      General: Abdomen is flat  Bowel sounds are normal  There is no distension  Palpations: Abdomen is soft  Tenderness: There is no abdominal tenderness  Musculoskeletal:         General: Tenderness ( Back pain) present  Right lower leg: No edema  Left lower leg: No edema  Skin:     General: Skin is warm and dry  Neurological:      General: No focal deficit present  Mental Status: He is alert and oriented to person, place, and time  Discussion with Family: Updated  (wife) at bedside  Discharge instructions/Information to patient and family:   See after visit summary for information provided to patient and family  Provisions for Follow-Up Care:  See after visit summary for information related to follow-up care and any pertinent home health orders  Disposition:   Acute Rehab at [facility]    Planned Readmission: None     Discharge Statement:  I spent 60 minutes discharging the patient  This time was spent on the day of discharge  I had direct contact with the patient on the day of discharge  Greater than 50% of the total time was spent examining patient, answering all patient questions, arranging and discussing plan of care with patient as well as directly providing post-discharge instructions    Additional time then spent on discharge activities  Discharge Medications:  See after visit summary for reconciled discharge medications provided to patient and/or family        **Please Note: This note may have been constructed using a voice recognition system**

## 2023-01-25 NOTE — CASE MANAGEMENT
Case Management Discharge Planning Note    Patient name Anastasiia Lombardo  Location [address] 222/-99 MRN [id number]  : 1940 Date 2023       Current Admission Date: 2023  Current Admission Diagnosis:Compression fracture of L1 vertebra [facility])   Patient Active Problem List    Diagnosis Date Noted   • Bradycardia 2023   • Macrocytosis 2023   • Vitamin D deficiency 2022   • Osteoporosis 2022   • Ambulatory dysfunction 2022   • Urinary frequency 2022   • Compression fracture of L1 vertebra ([facility] [address] ) 2022   • Acute L2 compression fracture  10/21/2022   • Sacroiliitis (HCC)    • Sacroiliac joint dysfunction of both sides    • Lumbar spondylosis 10/04/2022   • Stage 3a chronic kidney disease ([address] ) 2022   • Myofascial pain syndrome 2022   • Aortic root dilatation ([facility] [address] ) 2022   • Renal cyst, acquired, left 12/10/2020   • Chronic pain syndrome 2020   • Chronic bilateral low back pain without sciatica 2020   • Spinal stenosis of lumbar region with neurogenic claudication    • Lumbar radiculopathy    • Malignant neoplasm of prostate ([facility] [address] ) 2019   • Thoracic aortic aneurysm without rupture 10/04/2018   • Nonrheumatic aortic valve insufficiency 10/04/2018   • Non-rheumatic mitral regurgitation 10/04/2018   • PVC's (premature ventricular contractions) 2018      LOS (days): 1  Geometric Mean LOS (GMLOS) (days): 2 80  Days to GMLOS:1 7     OBJECTIVE:  Risk of Unplanned Readmission Score: 14 76         Current admission status: Inpatient   Preferred Pharmacy:   [address], [address] [place] [address] [address]  Phone: [phone] Fax: [phone]    Primary Care Provider: Harriet Velazquez DO    Primary Insurance: MEDICARE  Secondary Insurance: AARP    DISCHARGE DETAILS:    Treatment Team Recommendation: Acute Rehab  Discharge Destination Plan[de-identified] Acute Rehab  Transport at Discharge : BLS Ambulance     Number/Name of Dispatcher: Syl Kelley fani Torres and Unit #): [phone]  ETA of Transport (Date): 01/25/23  ETA of Transport (Time): 1700     Transfer Mode: Stretcher     Additional Comments: Transport time confirmed for a 5pm dc to [facility] rehab via [employer]  CM notified pt, pt's bedside LUZMA Bui, and [address] liasion of dc time

## 2023-01-25 NOTE — ASSESSMENT & PLAN NOTE
- Patient was reviewed in the ED for back pain on 1/21/2023 when he was found to have closed L1 vertebral fracture  He received lidocaine patch, methocarbamol, ketorolac and he was commenced on pregabalin  - However, patient presented today on account of worsening pain especially with movement  - CT-spine done 1/21/23 showed mild L1 superior endplate vertebral compression deformity  - MRI done 1/23/2023 showed there is a new mild L1 compression fracture present when compared with the prior MRI performed 7 weeks ago  - X-ray lumbar spine done showed a somewhat limited study due to osteopenia and poor penetration with L1 compression deformity with mild further loss of vertebral body height compared to 1/20/2023  - Pt did not tolerate oxycodone 5mg due to significant confusion/lethargy  Family requests discontinuation of narcotics  - For pain control: Scheduled tylenol, lidocaine patch, and Lyrica 50mg BID  Robaxin PRN     - Bowel regimen with [facility]  - Discharge to [facility]  - Continue brace and pain control  - Follow up with Dr Seema Ames on Friday  - Follow up with PCP for management of osteoporosis

## 2023-01-26 LAB — 1,25(OH)2D3 SERPL-MCNC: 44.7 PG/ML (ref 24.8–81.5)

## 2023-01-27 ENCOUNTER — OFFICE VISIT (OUTPATIENT)
Dept: NEUROSURGERY | Facility: CLINIC | Age: 83
End: 2023-01-27

## 2023-01-27 VITALS
DIASTOLIC BLOOD PRESSURE: 74 MMHG | SYSTOLIC BLOOD PRESSURE: 132 MMHG | HEIGHT: 71 IN | TEMPERATURE: 97.3 F | HEART RATE: 91 BPM | BODY MASS INDEX: 20.17 KG/M2

## 2023-01-27 DIAGNOSIS — M80.08XA AGE-RELATED OSTEOPOROSIS WITH CURRENT PATHOLOGICAL FRACTURE OF VERTEBRA (HCC): ICD-10-CM

## 2023-01-27 DIAGNOSIS — S32.010A COMPRESSION FRACTURE OF L1 VERTEBRA (HCC): Primary | ICD-10-CM

## 2023-01-27 DIAGNOSIS — S32.010A CLOSED COMPRESSION FRACTURE OF BODY OF L1 VERTEBRA (HCC): ICD-10-CM

## 2023-01-27 RX ORDER — SENNA PLUS 8.6 MG/1
2 TABLET ORAL
COMMUNITY

## 2023-01-27 RX ORDER — ENOXAPARIN SODIUM 100 MG/ML
0.4 INJECTION SUBCUTANEOUS DAILY
COMMUNITY
End: 2023-02-07

## 2023-01-27 RX ORDER — HYDROCODONE BITARTRATE AND ACETAMINOPHEN 5; 325 MG/1; MG/1
0.5 TABLET ORAL EVERY 4 HOURS PRN
Status: ON HOLD | COMMUNITY
End: 2023-02-07 | Stop reason: SDUPTHER

## 2023-01-27 RX ORDER — LANOLIN ALCOHOL/MO/W.PET/CERES
3 CREAM (GRAM) TOPICAL
COMMUNITY

## 2023-01-27 NOTE — PROGRESS NOTES
Assessment/Plan:     Diagnoses and all orders for this visit:    Compression fracture of L1 vertebra (HCC)    Closed compression fracture of body of L1 vertebra ([facility] [address] )  -     Ambulatory Referral to Neurosurgery    Age-related osteoporosis with current pathological fracture of vertebra ([facility] [address] )  -     IR KYPHOPLASTY/VERTEBROPLASTY; Future    Other orders  -     HYDROcodone-acetaminophen (NORCO) 5-325 mg per tablet; Take 0 5 tablets by mouth every 4 (four) hours as needed for pain  -     bisacodyl (FLEET) 10 MG/30ML ENEM; Insert 10 mg into the rectum daily as needed for constipation  -     senna (SENOKOT) 8 6 MG tablet; Take 2 tablets by mouth daily at bedtime  -     melatonin 3 mg; Take 3 mg by mouth daily at bedtime as needed (insomnia)  -     enoxaparin (LOVENOX) 40 mg/0 4 mL; Inject 0 4 mL under the skin in the morning  -     Diet NPO; Sips with meds; Standing  -     Nursing communication Apply gown prior to procedure; Standing  -     Have Patient Void On Call to Procedure Room; Standing  -     Insert and Maintain IV; Standing  -     sodium chloride 0 9 % infusion  -     ceFAZolin (ANCEF) IVPB (premix in dextrose) 1,000 mg 50 mL          Discussion Summary:   Jennifer Walsh unfortunately has suffered a new L1 fracture  His pain is quite severe and bracing and pain medications are unable to control the pain  He would benefit from a kyphoplasty at L1  He understands the risks and has elected to proceed  Chief Complaint: Follow-up      Patient ID: Hilda Cuevas is a 79 y o  male    HPI  Mr Hema Mckeon returns for follow up of his back pain  He is approximately 3 weeks status post L2 kyphoplasty  Pain was improved after the procedure but approximately 2 weeks later he suddenly developed new acute pain that he immediately knew was a fracture  He presented to the [address] [place] [address] ED where he was found to have a new L1 fracture   Pain was reasonably controlled on oral opioid regimen, able to be discharged, however was having side effects on Lyrica and Oxycodone  The dose has been reduced, now without side effects, however the pain is now intractable  He is unable to ambulate due to the pain  He is awaiting endocrinology consultation for osteoporosis  Review of Systems   Gastrointestinal: Positive for constipation (controlling with meds regimen)  Genitourinary: Negative  Musculoskeletal: Positive for back pain (across low back and R>L legs, doesnt always radaiate but when it does goes to his knees) and gait problem (using walker as needed)  Negative for myalgias  TLSO brace     vicodin and tylenol medication with mild relief    Skin: Negative  Neurological: Negative for weakness and numbness  Psychiatric/Behavioral: Negative for sleep disturbance  All other systems reviewed and are negative  I have personally reviewed the MA's review of systems and made changes as necessary      The following portions of the patient's history were reviewed and updated as appropriate: allergies, current medications, past family history, past medical history, past social history, past surgical history and problem list       Active Ambulatory Problems     Diagnosis Date Noted   • PVC's (premature ventricular contractions) 08/13/2018   • Thoracic aortic aneurysm without rupture 10/04/2018   • Nonrheumatic aortic valve insufficiency 10/04/2018   • Non-rheumatic mitral regurgitation 10/04/2018   • Malignant neoplasm of prostate ([facility] [address] ) 02/27/2019   • Spinal stenosis of lumbar region with neurogenic claudication    • Lumbar radiculopathy    • Chronic bilateral low back pain without sciatica 08/27/2020   • Chronic pain syndrome 08/28/2020   • Renal cyst, acquired, left 12/10/2020   • Aortic root dilatation (HCC) 02/13/2022   • Myofascial pain syndrome 05/09/2022   • Stage 3a chronic kidney disease ([facility] [address] ) 08/31/2022   • Lumbar spondylosis 10/04/2022   • Sacroiliitis (HCC)    • Sacroiliac joint dysfunction of both sides    • Acute L2 compression fracture 10/21/2022   • Compression fracture of L1 vertebra (HCC) 11/14/2022   • Urinary frequency 12/09/2022   • Vitamin D deficiency 12/21/2022   • Osteoporosis 12/21/2022   • Ambulatory dysfunction 12/21/2022   • Bradycardia 01/23/2023   • Macrocytosis 01/23/2023     Resolved Ambulatory Problems     Diagnosis Date Noted   • History of prostate cancer 08/13/2018   • Plantar fasciitis of left foot 10/01/2018   • Strain of calf muscle 05/15/2019   • Gross hematuria 05/06/2022   • Acute right-sided thoracic back pain 05/09/2022   • Acute respiratory insufficiency 01/23/2023   • Eosinophilia 01/23/2023     Past Medical History:   Diagnosis Date   • Astigmatism    • Cataract    • Hematuria    • Lumbar vertebral fracture ([address] )    • Prostate cancer ([facility] [address] )    • Renal calculi    • Squamous cell carcinoma        Past Surgical History:   Procedure Laterality Date   • BREAST EXCISIONAL BIOPSY N/A     Benign breast tumor unsure of side over 50 yrs ago   • CATARACT EXTRACTION Left 08/26/2019   • CATARACT EXTRACTION Right 08/12/2019   • COLONOSCOPY     • CYSTOSCOPY  01/28/2016   • ELBOW SURGERY      Bone chip   • HERNIA REPAIR  2002   • IR KYPHOPLASTY/VERTEBROPLASTY  1/4/2023   • LITHOTRIPSY     • RETROPUBIC PROSTATECTOMY  11/13/2001    Radical with nerve sparing       Current Outpatient Medications   Medication Sig Dispense Refill   • acetaminophen (TYLENOL) 325 mg tablet Take 3 tablets (975 mg total) by mouth every 8 (eight) hours (Patient taking differently: Take 1,000 mg by mouth every 8 (eight) hours)  0   • bisacodyl (FLEET) 10 MG/30ML ENEM Insert 10 mg into the rectum daily as needed for constipation     • calcium carbonate (OYSTER SHELL,OSCAL) 500 mg Take 2 tablets by mouth daily with breakfast Do not start before January 26, 2023   0   • cholecalciferol (VITAMIN D3) 1,000 units tablet Take by mouth daily 2,000 IU  daily     • Docusate Sodium (COLACE PO) Take by mouth daily     • enoxaparin (LOVENOX) 40 mg/0 4 mL Inject 0 4 mL under the skin in the morning     • HYDROcodone-acetaminophen (NORCO) 5-325 mg per tablet Take 0 5 tablets by mouth every 4 (four) hours as needed for pain     • lidocaine (LIDODERM) 5 % Apply 1 patch topically over 12 hours daily Remove & Discard patch within 12 hours or as directed by MD Do not start before January 26, 2023   0   • lisinopril (ZESTRIL) 10 mg tablet Take 1 tablet (10 mg total) by mouth daily at bedtime  0   • lisinopril (ZESTRIL) 20 mg tablet Take 1 tablet (20 mg total) by mouth daily Do not start before January 26, 2023   0   • melatonin 3 mg Take 3 mg by mouth daily at bedtime as needed (insomnia)     • metoprolol tartrate (LOPRESSOR) 25 mg tablet Take 1 tablet (25 mg total) by mouth every 12 (twelve) hours 180 tablet 3   • Mirabegron ER 25 MG TB24 Take 25 mg by mouth in the morning 30 tablet 5   • Multiple Vitamins-Minerals (CENTRUM SILVER 50+MEN PO) Daily     • senna (SENOKOT) 8 6 MG tablet Take 2 tablets by mouth daily at bedtime     • tiZANidine (ZANAFLEX) 2 mg tablet Take 1 tablet (2 mg total) by mouth every 8 (eight) hours as needed for muscle spasms  0   • verapamil (CALAN-SR) 240 mg CR tablet Take 1 tablet (240 mg total) by mouth daily at bedtime 90 tablet 1   • pregabalin (LYRICA) 50 mg capsule Take 1 capsule (50 mg total) by mouth 3 (three) times a day for 10 days (Patient not taking: Reported on 1/27/2023) 30 capsule 0   • pregabalin (LYRICA) 50 mg capsule Take 1 capsule (50 mg total) by mouth 2 (two) times a day for 10 days 20 capsule 0   • triamcinolone (KENALOG) 0 1 % ointment Apply topically 2 (two) times a day For 2 weeks straight to right flank and legs only when rash is active  (Patient not taking: Reported on 1/27/2023) 80 g 0     No current facility-administered medications for this visit  There were no vitals filed for this visit        Objective:    Physical Exam  Neurologic Exam    Results/Data:  I have reviewed the results and images from the MRI in detail with the patient

## 2023-01-28 RX ORDER — CEFAZOLIN SODIUM 1 G/50ML
1000 SOLUTION INTRAVENOUS ONCE
Status: CANCELLED | OUTPATIENT
Start: 2023-01-28 | End: 2023-01-28

## 2023-01-28 RX ORDER — SODIUM CHLORIDE 9 MG/ML
75 INJECTION, SOLUTION INTRAVENOUS CONTINUOUS
Status: CANCELLED | OUTPATIENT
Start: 2023-01-28

## 2023-01-30 ENCOUNTER — TELEMEDICINE (OUTPATIENT)
Dept: ENDOCRINOLOGY | Facility: CLINIC | Age: 83
End: 2023-01-30

## 2023-01-30 DIAGNOSIS — E55.9 VITAMIN D DEFICIENCY: Primary | ICD-10-CM

## 2023-01-30 DIAGNOSIS — M80.00XA OSTEOPOROSIS WITH CURRENT PATHOLOGICAL FRACTURE, UNSPECIFIED OSTEOPOROSIS TYPE, INITIAL ENCOUNTER: ICD-10-CM

## 2023-01-30 NOTE — PROGRESS NOTES
Virtual Regular Visit    Verification of patient location:    Patient is located in the following state in which I hold an active license [place]      Assessment/Plan:    Problem List Items Addressed This Visit        Musculoskeletal and Integument    Osteoporosis     Severe osteoporosis with recurrent vertebral compression [id number] for secondary causes has been unrevealing  For now continue calcium supplementations, increase vitamin D 3  Discussed starting osteo anabolic therapy with [facility] and patient and wife are agreeable  He has no history of radiation to his bones  No personal or family history of unusual bone cancers  Other    Vitamin D deficiency - Primary     Increase vitamin D3 to 4000 international units daily                 Reason for visit is   Chief Complaint   Patient presents with   • Virtual Regular Visit        Encounter provider Petros Morin MD    Provider located at [facility] [address] [address] [place]      Recent Visits  Date Type Provider Dept   01/24/23 Telephone Petros Morin MD [facility]   Showing recent visits within past 7 days and meeting all other requirements  Today's Visits  Date Type Provider Dept   01/30/23 Telemedicine Petros Morin MD [facility] [address] today's visits and meeting all other requirements  Future Appointments  No visits were found meeting these conditions  Showing future appointments within next 150 days and meeting all other requirements       The patient was identified by name and date of birth  Gibran Rodriguez was informed that this is a telemedicine visit and that the visit is being conducted through the [employer]  He agrees to proceed     My office door was closed  No one else was in the room    He acknowledged consent and understanding of privacy and security of the video platform  The patient has agreed to participate and understands they can discontinue the visit at any time  Patient is aware this is a billable service  Subjective  Alisha Chacko is a 79 y o  male osteoporosis and vertebral compression fracture  HPI   69-year-old male with severe osteoporosis, who underwent L2 kyphoplasty 3 weeks back-last week he suddenly developed acute pain and presented to the ED and was found to have a new L1 fracture    [facility] is currently at [facility] and will be undergoing another kyphoplasty in the next few weeks   He is getting calcium and vitamin D supplementations    Past Medical History:   Diagnosis Date   • Astigmatism     last assessed 11/15/17   • Cataract     Resolved 11/15/17   • Gross hematuria     Last assessed 01/28/16   • Hematuria     last assessed 01/28/16   • Lumbar vertebral fracture ([address] )    • Prostate cancer ([address] )     last assessed 05/18/17   • Renal calculi     last assessed 08/16/12   • Squamous cell carcinoma    • Vitamin D deficiency     Last assessed 08/19/13       Past Surgical History:   Procedure Laterality Date   • BREAST EXCISIONAL BIOPSY N/A     Benign breast tumor unsure of side over 50 yrs ago   • CATARACT EXTRACTION Left 08/26/2019   • CATARACT EXTRACTION Right 08/12/2019   • COLONOSCOPY     • CYSTOSCOPY  01/28/2016   • ELBOW SURGERY      Bone chip   • HERNIA REPAIR  2002   • IR KYPHOPLASTY/VERTEBROPLASTY  1/4/2023   • LITHOTRIPSY     • RETROPUBIC PROSTATECTOMY  11/13/2001    Radical with nerve sparing       Current Outpatient Medications   Medication Sig Dispense Refill   • bisacodyl (FLEET) 10 MG/30ML ENEM Insert 10 mg into the rectum daily as needed for constipation     • calcium carbonate (OYSTER SHELL,OSCAL) 500 mg Take 2 tablets by mouth daily with breakfast Do not start before January 26, 2023   0   • cholecalciferol (VITAMIN D3) 1,000 units tablet Take by mouth daily 2,000 IU  daily     • Docusate Sodium (COLACE PO) Take by mouth daily     • enoxaparin (LOVENOX) 40 mg/0 4 mL Inject 0 4 mL under the skin in the morning     • HYDROcodone-acetaminophen (NORCO) 5-325 mg per tablet Take 0 5 tablets by mouth every 4 (four) hours as needed for pain     • lidocaine (LIDODERM) 5 % Apply 1 patch topically over 12 hours daily Remove & Discard patch within 12 hours or as directed by MD Do not start before January 26, 2023   0   • lisinopril (ZESTRIL) 10 mg tablet Take 1 tablet (10 mg total) by mouth daily at bedtime  0   • lisinopril (ZESTRIL) 20 mg tablet Take 1 tablet (20 mg total) by mouth daily Do not start before January 26, 2023   0   • melatonin 3 mg Take 3 mg by mouth daily at bedtime as needed (insomnia)     • metoprolol tartrate (LOPRESSOR) 25 mg tablet Take 1 tablet (25 mg total) by mouth every 12 (twelve) hours 180 tablet 3   • Mirabegron ER 25 MG TB24 Take 25 mg by mouth in the morning 30 tablet 5   • Multiple Vitamins-Minerals (CENTRUM SILVER 50+MEN PO) Daily     • pregabalin (LYRICA) 50 mg capsule Take 1 capsule (50 mg total) by mouth 3 (three) times a day for 10 days 30 capsule 0   • senna (SENOKOT) 8 6 MG tablet Take 2 tablets by mouth daily at bedtime     • tiZANidine (ZANAFLEX) 2 mg tablet Take 1 tablet (2 mg total) by mouth every 8 (eight) hours as needed for muscle spasms  0   • triamcinolone (KENALOG) 0 1 % ointment Apply topically 2 (two) times a day For 2 weeks straight to right flank and legs only when rash is active   80 g 0   • verapamil (CALAN-SR) 240 mg CR tablet Take 1 tablet (240 mg total) by mouth daily at bedtime 90 tablet 1   • acetaminophen (TYLENOL) 325 mg tablet Take 3 tablets (975 mg total) by mouth every 8 (eight) hours (Patient taking differently: Take 1,000 mg by mouth every 8 (eight) hours)  0   • pregabalin (LYRICA) 50 mg capsule Take 1 capsule (50 mg total) by mouth 2 (two) times a day for 10 days 20 capsule 0     No current facility-administered medications for this visit  Allergies   Allergen Reactions   • Sulfa Antibiotics Rash   • Oxycodone Confusion   • Ciprofloxacin    • Gabapentin Dizziness   • Medical Tape Rash       Review of Systems    Video Exam    There were no vitals filed for this visit  Physical Exam  Constitutional:       General: He is not in acute distress  Appearance: Normal appearance  He is not ill-appearing, toxic-appearing or diaphoretic  HENT:      Head: Normocephalic and atraumatic  Pulmonary:      Effort: Pulmonary effort is normal  No respiratory distress  Breath sounds: No wheezing  Neurological:      General: No focal deficit present  Mental Status: He is alert  Psychiatric:         Mood and Affect: Mood normal          Behavior: Behavior normal          Thought Content: Thought content normal          Judgment: Judgment normal         Study Result    Narrative & Impression   LUMBAR SPINE     INDICATION:   follow up lspine fracture      COMPARISON:  Lumbar spine radiograph 1/20/2023  CT lumbar spine 1/21/2023      VIEWS:  XR SPINE LUMBAR 2 OR 3 VIEWS INJURY        FINDINGS:  Somewhat limited study due to osteopenia and poor penetration      There are 5 non rib bearing lumbar vertebral bodies       L1 compression deformity again noted with mild further loss of vertebral body height compared to 1/20/2023  L2 vertebroplasty      Alignment is unremarkable       Age-appropriate lumbar degenerative changes are seen      The pedicles appear intact      Soft tissues are unremarkable      IMPRESSION:     Somewhat limited study due to osteopenia and poor penetration  Within the limitations, L1 compression deformity with mild further loss of vertebral body height compared to 1/20/2023         I spent 15 minutes directly with the patient during this visit

## 2023-01-30 NOTE — ASSESSMENT & PLAN NOTE
Severe osteoporosis with recurrent vertebral compression [id number] for secondary causes has been unrevealing  For now continue calcium supplementations, increase vitamin D 3  Discussed starting osteo anabolic therapy with [facility] and patient and wife are agreeable  He has no history of radiation to his bones  No personal or family history of unusual bone cancers

## 2023-01-31 ENCOUNTER — TELEPHONE (OUTPATIENT)
Dept: ENDOCRINOLOGY | Facility: CLINIC | Age: 83
End: 2023-01-31

## 2023-01-31 NOTE — TELEPHONE ENCOUNTER
----- Message from Enoc Vazquez MD sent at 1/31/2023 10:00 AM EST -----  Not sure if I remembered to send a message yesterday - please start process for [facility]

## 2023-02-01 DIAGNOSIS — M80.00XA OSTEOPOROSIS WITH CURRENT PATHOLOGICAL FRACTURE, UNSPECIFIED OSTEOPOROSIS TYPE, INITIAL ENCOUNTER: Primary | ICD-10-CM

## 2023-02-01 RX ORDER — TERIPARATIDE 250 UG/ML
20 INJECTION, SOLUTION SUBCUTANEOUS DAILY
Qty: 2.4 ML | Refills: 11 | Status: SHIPPED | OUTPATIENT
Start: 2023-02-01

## 2023-02-05 ENCOUNTER — APPOINTMENT (EMERGENCY)
Dept: RADIOLOGY | Facility: HOSPITAL | Age: 83
End: 2023-02-05

## 2023-02-05 ENCOUNTER — HOSPITAL ENCOUNTER (INPATIENT)
Facility: HOSPITAL | Age: 83
LOS: 2 days | End: 2023-02-07
Attending: EMERGENCY MEDICINE | Admitting: INTERNAL MEDICINE

## 2023-02-05 DIAGNOSIS — S32.010A CLOSED COMPRESSION FRACTURE OF BODY OF L1 VERTEBRA (HCC): Primary | ICD-10-CM

## 2023-02-05 DIAGNOSIS — S32.010A COMPRESSION FRACTURE OF L1 VERTEBRA, INITIAL ENCOUNTER (HCC): ICD-10-CM

## 2023-02-05 PROBLEM — I10 HYPERTENSION: Status: ACTIVE | Noted: 2023-02-05

## 2023-02-05 LAB
ALBUMIN SERPL BCP-MCNC: 3.3 G/DL (ref 3.5–5)
ALP SERPL-CCNC: 96 U/L (ref 46–116)
ALT SERPL W P-5'-P-CCNC: 20 U/L (ref 12–78)
ANION GAP SERPL CALCULATED.3IONS-SCNC: 2 MMOL/L (ref 4–13)
APTT PPP: 33 SECONDS (ref 23–37)
AST SERPL W P-5'-P-CCNC: 14 U/L (ref 5–45)
ATRIAL RATE: 59 BPM
BASOPHILS # BLD AUTO: 0.05 THOUSANDS/ÂΜL (ref 0–0.1)
BASOPHILS NFR BLD AUTO: 1 % (ref 0–1)
BILIRUB SERPL-MCNC: 0.38 MG/DL (ref 0.2–1)
BUN SERPL-MCNC: 27 MG/DL (ref 5–25)
CALCIUM ALBUM COR SERPL-MCNC: 10 MG/DL (ref 8.3–10.1)
CALCIUM SERPL-MCNC: 9.4 MG/DL (ref 8.3–10.1)
CHLORIDE SERPL-SCNC: 108 MMOL/L (ref 96–108)
CO2 SERPL-SCNC: 27 MMOL/L (ref 21–32)
CREAT SERPL-MCNC: 0.99 MG/DL (ref 0.6–1.3)
EOSINOPHIL # BLD AUTO: 0.14 THOUSAND/ÂΜL (ref 0–0.61)
EOSINOPHIL NFR BLD AUTO: 2 % (ref 0–6)
ERYTHROCYTE [DISTWIDTH] IN BLOOD BY AUTOMATED COUNT: 12.3 % (ref 11.6–15.1)
GFR SERPL CREATININE-BSD FRML MDRD: 70 ML/MIN/1.73SQ M
GLUCOSE SERPL-MCNC: 90 MG/DL (ref 65–140)
HCT VFR BLD AUTO: 40 % (ref 36.5–49.3)
HGB BLD-MCNC: 12.8 G/DL (ref 12–17)
IMM GRANULOCYTES # BLD AUTO: 0.14 THOUSAND/UL (ref 0–0.2)
IMM GRANULOCYTES NFR BLD AUTO: 2 % (ref 0–2)
INR PPP: 0.94 (ref 0.84–1.19)
LYMPHOCYTES # BLD AUTO: 1.12 THOUSANDS/ÂΜL (ref 0.6–4.47)
LYMPHOCYTES NFR BLD AUTO: 16 % (ref 14–44)
MAGNESIUM SERPL-MCNC: 2.3 MG/DL (ref 1.6–2.6)
MCH RBC QN AUTO: 31.7 PG (ref 26.8–34.3)
MCHC RBC AUTO-ENTMCNC: 32 G/DL (ref 31.4–37.4)
MCV RBC AUTO: 99 FL (ref 82–98)
MONOCYTES # BLD AUTO: 0.81 THOUSAND/ÂΜL (ref 0.17–1.22)
MONOCYTES NFR BLD AUTO: 12 % (ref 4–12)
NEUTROPHILS # BLD AUTO: 4.58 THOUSANDS/ÂΜL (ref 1.85–7.62)
NEUTS SEG NFR BLD AUTO: 67 % (ref 43–75)
NRBC BLD AUTO-RTO: 0 /100 WBCS
P AXIS: 79 DEGREES
PHOSPHATE SERPL-MCNC: 3.1 MG/DL (ref 2.3–4.1)
PLATELET # BLD AUTO: 194 THOUSANDS/UL (ref 149–390)
PMV BLD AUTO: 11.7 FL (ref 8.9–12.7)
POTASSIUM SERPL-SCNC: 4.9 MMOL/L (ref 3.5–5.3)
PR INTERVAL: 202 MS
PROT SERPL-MCNC: 6.5 G/DL (ref 6.4–8.4)
PROTHROMBIN TIME: 12.8 SECONDS (ref 11.6–14.5)
QRS AXIS: 16 DEGREES
QRSD INTERVAL: 88 MS
QT INTERVAL: 438 MS
QTC INTERVAL: 433 MS
RBC # BLD AUTO: 4.04 MILLION/UL (ref 3.88–5.62)
SODIUM SERPL-SCNC: 137 MMOL/L (ref 135–147)
T WAVE AXIS: 59 DEGREES
VENTRICULAR RATE: 59 BPM
WBC # BLD AUTO: 6.84 THOUSAND/UL (ref 4.31–10.16)

## 2023-02-05 RX ORDER — VERAPAMIL HYDROCHLORIDE 240 MG/1
240 TABLET, FILM COATED, EXTENDED RELEASE ORAL
Status: DISCONTINUED | OUTPATIENT
Start: 2023-02-05 | End: 2023-02-07 | Stop reason: HOSPADM

## 2023-02-05 RX ORDER — HEPARIN SODIUM 5000 [USP'U]/ML
5000 INJECTION, SOLUTION INTRAVENOUS; SUBCUTANEOUS EVERY 8 HOURS SCHEDULED
Status: DISPENSED | OUTPATIENT
Start: 2023-02-05 | End: 2023-02-06

## 2023-02-05 RX ORDER — SENNOSIDES 8.6 MG
2 TABLET ORAL
Status: DISCONTINUED | OUTPATIENT
Start: 2023-02-05 | End: 2023-02-07 | Stop reason: HOSPADM

## 2023-02-05 RX ORDER — LIDOCAINE 50 MG/G
1 PATCH TOPICAL DAILY
Status: DISCONTINUED | OUTPATIENT
Start: 2023-02-05 | End: 2023-02-07 | Stop reason: HOSPADM

## 2023-02-05 RX ORDER — MELATONIN
2000 DAILY
Status: DISCONTINUED | OUTPATIENT
Start: 2023-02-05 | End: 2023-02-07 | Stop reason: HOSPADM

## 2023-02-05 RX ORDER — LANOLIN ALCOHOL/MO/W.PET/CERES
3 CREAM (GRAM) TOPICAL
Status: DISCONTINUED | OUTPATIENT
Start: 2023-02-05 | End: 2023-02-07 | Stop reason: HOSPADM

## 2023-02-05 RX ORDER — HYDROMORPHONE HCL/PF 1 MG/ML
0.5 SYRINGE (ML) INJECTION
Status: DISCONTINUED | OUTPATIENT
Start: 2023-02-05 | End: 2023-02-05

## 2023-02-05 RX ORDER — LISINOPRIL 10 MG/1
10 TABLET ORAL
Status: DISCONTINUED | OUTPATIENT
Start: 2023-02-05 | End: 2023-02-07 | Stop reason: HOSPADM

## 2023-02-05 RX ORDER — TIZANIDINE 2 MG/1
2 TABLET ORAL EVERY 8 HOURS PRN
Status: DISCONTINUED | OUTPATIENT
Start: 2023-02-05 | End: 2023-02-07 | Stop reason: HOSPADM

## 2023-02-05 RX ORDER — PREGABALIN 50 MG/1
50 CAPSULE ORAL 2 TIMES DAILY
Status: DISCONTINUED | OUTPATIENT
Start: 2023-02-05 | End: 2023-02-05

## 2023-02-05 RX ORDER — OXYCODONE HYDROCHLORIDE 5 MG/1
2.5 TABLET ORAL EVERY 4 HOURS PRN
Status: DISCONTINUED | OUTPATIENT
Start: 2023-02-05 | End: 2023-02-06

## 2023-02-05 RX ORDER — HYDROMORPHONE HCL/PF 1 MG/ML
0.5 SYRINGE (ML) INJECTION ONCE
Status: COMPLETED | OUTPATIENT
Start: 2023-02-05 | End: 2023-02-05

## 2023-02-05 RX ORDER — OXYCODONE HYDROCHLORIDE 5 MG/1
5 TABLET ORAL EVERY 4 HOURS PRN
Status: DISCONTINUED | OUTPATIENT
Start: 2023-02-05 | End: 2023-02-05

## 2023-02-05 RX ORDER — ONDANSETRON 2 MG/ML
4 INJECTION INTRAMUSCULAR; INTRAVENOUS EVERY 6 HOURS PRN
Status: DISCONTINUED | OUTPATIENT
Start: 2023-02-05 | End: 2023-02-07 | Stop reason: HOSPADM

## 2023-02-05 RX ORDER — CALCIUM CARBONATE 500(1250)
2 TABLET ORAL
Status: DISCONTINUED | OUTPATIENT
Start: 2023-02-06 | End: 2023-02-07 | Stop reason: HOSPADM

## 2023-02-05 RX ORDER — OXYBUTYNIN CHLORIDE 5 MG/1
5 TABLET, EXTENDED RELEASE ORAL DAILY
Status: DISCONTINUED | OUTPATIENT
Start: 2023-02-05 | End: 2023-02-07 | Stop reason: HOSPADM

## 2023-02-05 RX ORDER — DOCUSATE SODIUM 100 MG/1
100 CAPSULE, LIQUID FILLED ORAL DAILY
Status: DISCONTINUED | OUTPATIENT
Start: 2023-02-05 | End: 2023-02-06

## 2023-02-05 RX ORDER — OXYCODONE HYDROCHLORIDE 10 MG/1
10 TABLET ORAL EVERY 4 HOURS PRN
Status: DISCONTINUED | OUTPATIENT
Start: 2023-02-05 | End: 2023-02-05

## 2023-02-05 RX ORDER — ACETAMINOPHEN 325 MG/1
975 TABLET ORAL EVERY 8 HOURS SCHEDULED
Status: DISCONTINUED | OUTPATIENT
Start: 2023-02-05 | End: 2023-02-06

## 2023-02-05 RX ORDER — HYDROMORPHONE HCL IN WATER/PF 6 MG/30 ML
0.2 PATIENT CONTROLLED ANALGESIA SYRINGE INTRAVENOUS
Status: DISCONTINUED | OUTPATIENT
Start: 2023-02-05 | End: 2023-02-07 | Stop reason: HOSPADM

## 2023-02-05 RX ADMIN — OXYCODONE HYDROCHLORIDE 2.5 MG: 5 TABLET ORAL at 15:11

## 2023-02-05 RX ADMIN — LISINOPRIL 10 MG: 10 TABLET ORAL at 22:08

## 2023-02-05 RX ADMIN — VERAPAMIL HYDROCHLORIDE 240 MG: 240 TABLET ORAL at 22:09

## 2023-02-05 RX ADMIN — ACETAMINOPHEN 975 MG: 325 TABLET ORAL at 22:08

## 2023-02-05 RX ADMIN — SENNOSIDES 17.2 MG: 8.6 TABLET, FILM COATED ORAL at 22:08

## 2023-02-05 RX ADMIN — HYDROMORPHONE HYDROCHLORIDE 0.5 MG: 1 INJECTION, SOLUTION INTRAMUSCULAR; INTRAVENOUS; SUBCUTANEOUS at 12:56

## 2023-02-05 RX ADMIN — TIZANIDINE 2 MG: 2 TABLET ORAL at 15:12

## 2023-02-05 RX ADMIN — METOPROLOL TARTRATE 25 MG: 25 TABLET, FILM COATED ORAL at 23:57

## 2023-02-05 RX ADMIN — HEPARIN SODIUM 5000 UNITS: 5000 INJECTION INTRAVENOUS; SUBCUTANEOUS at 22:08

## 2023-02-05 NOTE — ASSESSMENT & PLAN NOTE
-Continue vitamin D and calcium supplementation  -Follows with endocrinology as an outpatient  -Wife to bring in home [employer]

## 2023-02-05 NOTE — ASSESSMENT & PLAN NOTE
- Status post radical prostatectomy 20 years ago  -Has yearly maintenance checks with urology; no concerns from that standpoint  -There has been no evidence of metastatic disease on any of his scans

## 2023-02-05 NOTE — ED PROVIDER NOTES
History  Chief Complaint   Patient presents with   • Back Pain     Intractable back pain s/p L1 fracture  Scheduled for kyphoplasty by Dr Sriram Lazcano 2/6/23     Patient is a 79-year-old male presenting for back pain in setting of L1 fracture most likely result of patient's osteoporosis  Patient scheduled for kyphoplasty with Dr Sriram Lazcano tomorrow  [facility] HTN, L2 compression fracture s/p kyphoplasty/vertebroplasty on 1/4, L1 compression fracture diagnosed on 1/21  Nonnie Senters Patient states that he was sent over by Dr Sriram Lazcano for admission for preop for surgical plans tomorrow  Patient denies any acute complaints at this time aside from requesting medication for pain control for his lower back pain  Patient is neurologically intact, denies any saddle anesthesia, lower extremity weakness, loss of bowel or bladder control  Denies any falls or trauma to his back  Prior to Admission Medications   Prescriptions Last Dose Informant Patient Reported? Taking?    Docusate Sodium (COLACE PO)   Yes No   Sig: Take by mouth daily   HYDROcodone-acetaminophen (NORCO) 5-325 mg per tablet   Yes No   Sig: Take 0 5 tablets by mouth every 4 (four) hours as needed for pain   Insulin Pen Needle 32G X 4 MM MISC   No No   Sig: Use 1 daily with Forteo   Mirabegron ER 25 MG TB24   No No   Sig: Take 25 mg by mouth in the morning   Multiple Vitamins-Minerals (CENTRUM SILVER 50+MEN PO)   Yes No   Sig: Daily   Teriparatide, Recombinant, (Forteo) 600 MCG/2 4ML SOPN   No No   Sig: Inject 20 mcg under the skin daily   acetaminophen (TYLENOL) 325 mg tablet   No No   Sig: Take 3 tablets (975 mg total) by mouth every 8 (eight) hours   Patient taking differently: Take 1,000 mg by mouth every 8 (eight) hours   bisacodyl (FLEET) 10 MG/30ML ENEM   Yes No   Sig: Insert 10 mg into the rectum daily as needed for constipation   calcium carbonate (OYSTER SHELL,OSCAL) 500 mg   No No   Sig: Take 2 tablets by mouth daily with breakfast Do not start before January 26, 2023  cholecalciferol (VITAMIN D3) 1,000 units tablet   Yes No   Sig: Take by mouth daily 2,000 IU  daily   enoxaparin (LOVENOX) 40 mg/0 4 mL   Yes No   Sig: Inject 0 4 mL under the skin in the morning   lidocaine (LIDODERM) 5 %   No No   Sig: Apply 1 patch topically over 12 hours daily Remove & Discard patch within 12 hours or as directed by MD Do not start before January 26, 2023  lisinopril (ZESTRIL) 10 mg tablet   No No   Sig: Take 1 tablet (10 mg total) by mouth daily at bedtime   lisinopril (ZESTRIL) 20 mg tablet   No No   Sig: Take 1 tablet (20 mg total) by mouth daily Do not start before January 26, 2023  melatonin 3 mg   Yes No   Sig: Take 3 mg by mouth daily at bedtime as needed (insomnia)   metoprolol tartrate (LOPRESSOR) 25 mg tablet   No No   Sig: Take 1 tablet (25 mg total) by mouth every 12 (twelve) hours   pregabalin (LYRICA) 50 mg capsule   No No   Sig: Take 1 capsule (50 mg total) by mouth 3 (three) times a day for 10 days   pregabalin (LYRICA) 50 mg capsule   No No   Sig: Take 1 capsule (50 mg total) by mouth 2 (two) times a day for 10 days   senna (SENOKOT) 8 6 MG tablet   Yes No   Sig: Take 2 tablets by mouth daily at bedtime   tiZANidine (ZANAFLEX) 2 mg tablet   No No   Sig: Take 1 tablet (2 mg total) by mouth every 8 (eight) hours as needed for muscle spasms   triamcinolone (KENALOG) 0 1 % ointment   No No   Sig: Apply topically 2 (two) times a day For 2 weeks straight to right flank and legs only when rash is active     verapamil (CALAN-SR) 240 mg CR tablet   No No   Sig: Take 1 tablet (240 mg total) by mouth daily at bedtime      Facility-Administered Medications: None       Past Medical History:   Diagnosis Date   • Astigmatism     last assessed 11/15/17   • Cataract     Resolved 11/15/17   • Gross hematuria     Last assessed 01/28/16   • Hematuria     last assessed 01/28/16   • Kidney stones     Patient states he has kidney stonesin esch kidney   • Lumbar vertebral fracture ([facility] [address] ) • Prostate cancer ([facility] [address] )     last assessed 05/18/17   • Renal calculi     last assessed 08/16/12   • Squamous cell carcinoma    • Vitamin D deficiency     Last assessed 08/19/13       Past Surgical History:   Procedure Laterality Date   • BREAST EXCISIONAL BIOPSY N/A     Benign breast tumor unsure of side over 50 yrs ago   • CATARACT EXTRACTION Left 08/26/2019   • CATARACT EXTRACTION Right 08/12/2019   • COLONOSCOPY     • CYSTOSCOPY  01/28/2016   • ELBOW SURGERY      Bone chip   • HERNIA REPAIR  2002   • IR KYPHOPLASTY/VERTEBROPLASTY  1/4/2023   • LITHOTRIPSY     • RETROPUBIC PROSTATECTOMY  11/13/2001    Radical with nerve sparing       Family History   Problem Relation Age of Onset   • Other Mother 76        Influenza   • Hypertension Mother    • Heart failure Father 70   • Peripheral vascular disease Father    • Hypertension Father    • Prostate cancer Brother    • Stroke Brother 48   • Hypertension Brother    • Hypertension Family      I have reviewed and agree with the history as documented  E-Cigarette/Vaping   • E-Cigarette Use Never User      E-Cigarette/Vaping Substances   • Nicotine No    • THC No    • CBD No    • Flavoring No    • Other No    • Unknown No      Social History     Tobacco Use   • Smoking status: Never   • Smokeless tobacco: Never   Vaping Use   • Vaping Use: Never used   Substance Use Topics   • Alcohol use: Not Currently     Alcohol/week: 4 0 standard drinks     Types: 4 Glasses of wine per week     Comment: social   • Drug use: No        Review of Systems   Constitutional: Negative  HENT: Negative  Eyes: Negative  Respiratory: Negative  Cardiovascular: Negative  Gastrointestinal: Negative  Endocrine: Negative  Genitourinary: Negative  Musculoskeletal: Positive for back pain  L1 compression fracture  Skin: Negative  Allergic/Immunologic: Negative  Neurological: Negative  Negative for weakness and numbness  Hematological: Negative  Psychiatric/Behavioral: Negative  Physical Exam  ED Triage Vitals [02/05/23 1145]   Temperature Pulse Respirations Blood Pressure SpO2   98 °F (36 7 °C) 62 16 (!) 172/74 98 %      Temp src Heart Rate Source Patient Position - Orthostatic VS BP Location FiO2 (%)   -- -- -- -- --      Pain Score       --             Orthostatic Vital Signs  Vitals:    02/05/23 1145   BP: (!) 172/74   Pulse: 62       Physical Exam  Vitals and nursing note reviewed  Constitutional:       Appearance: Normal appearance  He is normal weight  HENT:      Head: Normocephalic and atraumatic  Right Ear: Tympanic membrane, ear canal and external ear normal       Left Ear: Tympanic membrane, ear canal and external ear normal       Nose: Nose normal       Mouth/Throat:      Mouth: Mucous membranes are moist       Pharynx: Oropharynx is clear  Eyes:      Extraocular Movements: Extraocular movements intact  Conjunctiva/sclera: Conjunctivae normal       Pupils: Pupils are equal, round, and reactive to light  Cardiovascular:      Rate and Rhythm: Normal rate and regular rhythm  Pulses: Normal pulses  Heart sounds: Normal heart sounds  Pulmonary:      Effort: Pulmonary effort is normal       Breath sounds: Normal breath sounds  Abdominal:      General: Abdomen is flat  Bowel sounds are normal       Palpations: Abdomen is soft  Musculoskeletal:         General: Signs of injury present  Cervical back: Normal range of motion and neck supple  Comments: Compression fracture of L1 vertebra  Patient denies any lower extremity weakness, sensory deficits, loss of bowel or bladder control  Full range of motion as limited by pain  Skin:     General: Skin is warm and dry  Capillary Refill: Capillary refill takes less than 2 seconds  Neurological:      General: No focal deficit present  Mental Status: He is alert and oriented to person, place, and time     Psychiatric:         Mood and Affect: Mood normal          Behavior: Behavior normal          Thought Content: Thought content normal          Judgment: Judgment normal          ED Medications  Medications   HYDROmorphone (DILAUDID) injection 0 5 mg (has no administration in time range)       Diagnostic Studies  Results Reviewed     Procedure Component Value Units Date/Time    CBC and differential [[id number]]     Lab Status: No result Specimen: Blood     Protime-INR [[id number]]     Lab Status: No result Specimen: Blood     APTT [[id number]]     Lab Status: No result Specimen: Blood     Comprehensive metabolic panel [[id number]]     Lab Status: No result Specimen: Blood     Magnesium [[id number]]     Lab Status: No result Specimen: Blood     Phosphorus [[id number]]     Lab Status: No result Specimen: Blood                  XR chest 1 view portable    (Results Pending)         Procedures  Procedures      ED Course               Identification of Seniors at Risk    Flowsheet Row Most Recent Value   (ISAR) Identification of Seniors at Risk    Before the illness or injury that brought you to the Emergency, did you need someone to help you on a regular basis? 1 Filed at: 02/05/2023 1159   In the last 24 hours, have you needed more help than usual? 1 Filed at: 02/05/2023 1159   Have you been hospitalized for one or more nights during the past 6 months? 1 Filed at: 02/05/2023 1159   In general, do you see well? 0 Filed at: 02/05/2023 1159   In general, do you have serious problems with your memory? 0 Filed at: 02/05/2023 1159   Do you take more than three different medications every day? 1 Filed at: 02/05/2023 1159   ISAR Score 4 Filed at: 02/05/2023 1159                              Medical Decision Making  Patient is a 42-year-old male presenting for back pain in setting of L1 vertebral fracture, needing to be admitted for preop for surgery tomorrow  DDx: L1 vertebral fracture  Plan for admission and preop work-up and pain control, surgery scheduled for tomorrow    No acute complaints at this time  Closed compression fracture of body of L1 vertebra ([facility] [address] ): acute illness or injury  Amount and/or Complexity of Data Reviewed  Labs: ordered  Radiology: ordered  Risk  Prescription drug management  Disposition  Final diagnoses:   Closed compression fracture of body of L1 vertebra ([facility] [address] )     Time reflects when diagnosis was documented in both MDM as applicable and the Disposition within this note     Time User Action Codes Description Comment    2/5/2023 12:29 PM Rk Scale Add [S32 010A] Closed compression fracture of body of L1 vertebra [facility])       ED Disposition     None      Follow-up Information    None         Patient's Medications   Discharge Prescriptions    No medications on file     No discharge procedures on file  PDMP Review       Value Time User    PDMP Reviewed  Yes 10/17/2022  3:08 PM Catherine Marin, [address]           ED Provider  Attending physically available and evaluated Cintia Sentxochitl  I managed the patient along with the ED Attending      Electronically Signed by         Grecia Cordero MD  02/05/23 9017

## 2023-02-05 NOTE — ASSESSMENT & PLAN NOTE
- Initially presented to [address] ER on 1/21 with acute back pain; found to have L1 compression fracture on CT, and later confirmed on MRI 1/23  -Presented to neurosurgery as an outpatient with intractable pain due to his compression fracture; kyphoplasty initially planned for next week  -Patient currently at [facility], however sent to [facility] due to intractable pain    -N p o  after midnight for possible surgical intervention tomorrow  -Neurosurgery consultation; I did discuss the case with Dr Rachell Bender is hoping for opening in the OR tomorrow for kyphoplasty  -If unable to obtain OR time, will need to consider IR consultation  -Subcu heparin for DVT prophylaxis; will hold tomorrow a m  for intervention  -Multimodal pain control with  Tylenol 975mg Q8h sched, lidocaine patch, Zanaflex as needed  Oxycodone 2 5 mg as needed moderate/severe pain  IV Dilaudid 0 2 mg every 3 hours for breakthrough pain   -Patient is very sensitive to opioids and gabapentin/Lyrica  Do not give gabapentin/Lyrica  Cautious use of opioids

## 2023-02-05 NOTE — PLAN OF CARE
Problem: Potential for Falls  Goal: Patient will remain free of falls  Description: INTERVENTIONS:  - Educate patient/family on patient safety including physical limitations  - Instruct patient to call for assistance with activity   - Consult OT/PT to assist with strengthening/mobility   - Keep Call bell within reach  - Keep bed low and locked with side rails adjusted as appropriate  - Keep care items and personal belongings within reach  - Initiate and maintain comfort rounds  - Make Fall Risk Sign visible to staff  - Offer Toileting every *** Hours, in advance of need  - Initiate/Maintain ***alarm  - Obtain necessary fall risk management equipment: ***  - Apply yellow socks and bracelet for high fall risk patients  - Consider moving patient to room near nurses station  Outcome: Progressing     Problem: MOBILITY - ADULT  Goal: Maintain or return to baseline ADL function  Description: INTERVENTIONS:  -  Assess patient's ability to carry out ADLs; assess patient's baseline for ADL function and identify physical deficits which impact ability to perform ADLs (bathing, care of mouth/teeth, toileting, grooming, dressing, etc )  - Assess/evaluate cause of self-care deficits   - Assess range of motion  - Assess patient's mobility; develop plan if impaired  - Assess patient's need for assistive devices and provide as appropriate  - Encourage maximum independence but intervene and supervise when necessary  - Involve family in performance of ADLs  - Assess for home care needs following discharge   - Consider OT consult to assist with ADL evaluation and planning for discharge  - Provide patient education as appropriate  Outcome: Progressing  Goal: Maintains/Returns to pre admission functional level  Description: INTERVENTIONS:  - Perform BMAT or MOVE assessment daily    - Set and communicate daily mobility goal to care team and patient/family/caregiver     - Collaborate with rehabilitation services on mobility goals if consulted  - Perform Range of Motion *** times a day  - Reposition patient every *** hours    - Dangle patient *** times a day  - Stand patient *** times a day  - Ambulate patient *** times a day  - Out of bed to chair *** times a day   - Out of bed for meals *** times a day  - Out of bed for toileting  - Record patient progress and toleration of activity level   Outcome: Progressing     Problem: PAIN - ADULT  Goal: Verbalizes/displays adequate comfort level or baseline comfort level  Description: Interventions:  - Encourage patient to monitor pain and request assistance  - Assess pain using appropriate pain scale  - Administer analgesics based on type and severity of pain and evaluate response  - Implement non-pharmacological measures as appropriate and evaluate response  - Consider cultural and social influences on pain and pain management  - Notify physician/advanced practitioner if interventions unsuccessful or patient reports new pain  Outcome: Progressing

## 2023-02-05 NOTE — H&P
[facility]  H&P- Leah Yoon 1940, 79 y o  male MRN: [id number]  Unit/Bed#: ED 11 Encounter: [id number]  Primary Care Provider: Gogo Perry DO   Date and time admitted to hospital: 2/5/2023 11:42 AM    * Compression fracture of L1 vertebra [facility])  Assessment & Plan  - Initially presented to [address] ER on 1/21 with acute back pain; found to have L1 compression fracture on CT, and later confirmed on MRI 1/23  -Presented to neurosurgery as an outpatient with intractable pain due to his compression fracture; kyphoplasty initially planned for next week  -Patient currently at [facility] rehab, however sent to [facility] due to intractable pain    -N p o  after midnight for possible surgical intervention tomorrow  -Neurosurgery consultation; I did discuss the case with Dr Rachell Bender is hoping for opening in the OR tomorrow for kyphoplasty  -If unable to obtain OR time, will need to consider IR consultation  -Subcu heparin for DVT prophylaxis; will hold tomorrow a m  for intervention  -Multimodal pain control with  Tylenol 975mg Q8h sched, lidocaine patch, Zanaflex as needed  Oxycodone 2 5 mg as needed moderate/severe pain  IV Dilaudid 0 2 mg every 3 hours for breakthrough pain   -Patient is very sensitive to opioids and gabapentin/Lyrica  Do not give gabapentin/Lyrica  Cautious use of opioids      Hypertension  Assessment & Plan  - Monitor on home metoprolol, verapamil, and lisinopril    Osteoporosis  Assessment & Plan  -Continue vitamin D and calcium supplementation  -Follows with endocrinology as an outpatient  -Wife to bring in home Forteo    Urinary frequency  Assessment & Plan  - Oxybutynin substituted for home Myrbetriq    Stage 3a chronic kidney disease ([facility] [address] )  Assessment & Plan  - Renal function at baseline; monitor    Malignant neoplasm of prostate ([facility] [address] )  Assessment & Plan  - Status post radical prostatectomy 20 years ago  -Has yearly maintenance checks with urology; no concerns from that standpoint  -There has been no evidence of metastatic disease on any of his scans    VTE Pharmacologic Prophylaxis: VTE Score: 6 High Risk (Score >/= 5) - Pharmacological DVT Prophylaxis Ordered: heparin  Sequential Compression Devices Ordered  Code Status: Level 1 - Full Code   Discussion with family: Updated  (wife) at bedside  Anticipated Length of Stay: Patient will be admitted on an inpatient basis with an anticipated length of stay of greater than 2 midnights secondary to Intractable back pain secondary to lumbar compression fracture  Total Time for Visit, including Counseling / Coordination of Care: 60 minutes Greater than 50% of this total time spent on direct patient counseling and coordination of care  Chief Complaint: Low back pain    History of Present Illness:  Edgar Lopez is a 79 y o  male with a PMH of prostate cancer status post radical prostatectomy, hypertension, vitamin D deficiency, osteoporosis, CKD 3, urinary frequency, who presents to the [facility] ER with intractable low back pain  Patient has a history of osteoporosis and most recently has been suffering with lumbar compression fractures  He was first diagnosed with an L2 compression fracture in October and is now status post kyphoplasty and resolution of his symptoms  However in January he again experienced acute onset of low back pain  In the [facility] ER he was found to have a compression fracture on CT, also later confirmed on MRI, of his L1 vertebrae  He was discharged home with pain control with plans to follow-up with neurosurgery  Patient saw neurosurgery on outpatient basis on 1/27, and plans were made for elective kyphoplasty planned for next week  For the last 10 days or so he has been at [facility] rehab due to his pain and difficulty ambulating    Unfortunately his pain has become intractable and he has no longer able to participate in rehab effectively  Therefore he was sent to the ER for further evaluation  REVIEW OF SYSTEMS  General Denies fevers or chills  Denies generalized weakness or fatigue  HEENT Denies hearing or vision changes  Cardiovascular Denies chest pain  Denies LE swelling  Denies palpitations  Denies dyspnea on exertion  Respiratory Denies cough  Denies difficulty breathing  Denies shortness of breath  Genitourinary Denies hematuria  Denies dysuria  Denies difficulty voiding  Denies incontinence  Gastrointestinal Denies nausea, vomiting, or diarrhea  Denies hematochezia, melena, or hematemesis  Musculoskeletal Denies arthralgias or myalgias  Denies joint swelling  Positive for intractable low back pain  Psychiatric  Denies changes in mood  Denies anxiety or depression  Neurologic Denies headache  Denies lightheadedness/dizziness  Denies numbness/tingling  Denies weakness  Endocrine Denies weight loss or weight gain  Denies excessive thirst, sweating, urination         Past Medical and Surgical History:   Past Medical History:   Diagnosis Date   • Astigmatism     last assessed 11/15/17   • Cataract     Resolved 11/15/17   • Gross hematuria     Last assessed 01/28/16   • Hematuria     last assessed 01/28/16   • Kidney stones     Patient states he has kidney stonesin [facility] kidney   • Lumbar vertebral fracture ([facility] [address] )    • Prostate cancer ([facility] [address] )     last assessed 05/18/17   • Renal calculi     last assessed 08/16/12   • Squamous cell carcinoma    • Vitamin D deficiency     Last assessed 08/19/13       Past Surgical History:   Procedure Laterality Date   • BREAST EXCISIONAL BIOPSY N/A     Benign breast tumor unsure of side over 50 yrs ago   • CATARACT EXTRACTION Left 08/26/2019   • CATARACT EXTRACTION Right 08/12/2019   • COLONOSCOPY     • CYSTOSCOPY  01/28/2016   • ELBOW SURGERY      Bone chip   • HERNIA REPAIR  2002   • IR KYPHOPLASTY/VERTEBROPLASTY  1/4/2023   • LITHOTRIPSY     • RETROPUBIC PROSTATECTOMY 11/13/2001    Radical with nerve sparing       Meds/Allergies:  Prior to Admission medications    Medication Sig Start Date End Date Taking?  Authorizing Provider   acetaminophen (TYLENOL) 325 mg tablet Take 3 tablets (975 mg total) by mouth every 8 (eight) hours  Patient taking differently: Take 1,000 mg by mouth every 8 (eight) hours 1/25/23   Jeaneth Rosales PA-C   bisacodyl (FLEET) 10 MG/30ML ENEM Insert 10 mg into the rectum daily as needed for constipation    Historical Provider, MD   calcium carbonate (OYSTER SHELL,OSCAL) 500 mg Take 2 tablets by mouth daily with breakfast Do not start before January 26, 2023 1/26/23   Jeaneth Rosales PA-C   cholecalciferol (VITAMIN D3) 1,000 units tablet Take by mouth daily 2,000 IU  daily    Historical Provider, MD   Docusate Sodium (COLACE PO) Take by mouth daily    Historical Provider, MD   enoxaparin (LOVENOX) 40 mg/0 4 mL Inject 0 4 mL under the skin in the morning    Historical Provider, MD   HYDROcodone-acetaminophen ([facility]) 5-325 mg per tablet Take 0 5 tablets by mouth every 4 (four) hours as needed for pain    Historical Provider, MD   Insulin Pen Needle 32G X 4 MM MISC Use 1 daily with Ricardo Daniels 2/1/23   Jason eZe MD   lidocaine (LIDODERM) 5 % Apply 1 patch topically over 12 hours daily Remove & Discard patch within 12 hours or as directed by MD Do not start before January 26, 2023 1/26/23   Jeaneth Rosales PA-C   lisinopril (ZESTRIL) 10 mg tablet Take 1 tablet (10 mg total) by mouth daily at bedtime 1/25/23   Jeaneth Rosales PA-C   lisinopril (ZESTRIL) 20 mg tablet Take 1 tablet (20 mg total) by mouth daily Do not start before January 26, 2023 1/26/23   Jeaneth Rosales PA-C   melatonin 3 mg Take 3 mg by mouth daily at bedtime as needed (insomnia)    Historical Provider, MD   metoprolol tartrate (LOPRESSOR) 25 mg tablet Take 1 tablet (25 mg total) by mouth every 12 (twelve) hours 12/28/22   Lea Mcfadden MD   Mirabegron ER 25 MG TB24 Take 25 mg by mouth in the morning 93/21/21   Yariel Or, DO   Multiple Vitamins-Minerals (CENTRUM SILVER 50+MEN PO) Daily    Historical Provider, MD   pregabalin (LYRICA) 50 mg capsule Take 1 capsule (50 mg total) by mouth 3 (three) times a day for 10 days 1/21/23 1/31/23  Delmar Ricketts PA-C   pregabalin (LYRICA) 50 mg capsule Take 1 capsule (50 mg total) by mouth 2 (two) times a day for 10 days 1/25/23 2/4/23  Chani Amaya PA-C   senna (SENOKOT) 8 6 MG tablet Take 2 tablets by mouth daily at bedtime    Historical Provider, MD   Teriparatide, Recombinant, (Forteo) 600 MCG/2 4ML SOPN Inject 20 mcg under the skin daily 2/1/23   Sirisha Norris MD   tiZANidine (ZANAFLEX) 2 mg tablet Take 1 tablet (2 mg total) by mouth every 8 (eight) hours as needed for muscle spasms 1/25/23   Chani Amaya PA-C   triamcinolone (KENALOG) 0 1 % ointment Apply topically 2 (two) times a day For 2 weeks straight to right flank and legs only when rash is active  1/17/23   Dorie Sierra MD   verapamil (CALAN-SR) 240 mg CR tablet Take 1 tablet (240 mg total) by mouth daily at bedtime 1/18/23   Mery Tierney MD     I have reviewed home medications with patient family member  Allergies:    Allergies   Allergen Reactions   • Sulfa Antibiotics Rash   • Oxycodone Confusion   • Ciprofloxacin    • Gabapentin Dizziness   • Medical Tape Rash       Social History:  Marital Status: /Civil Union   Occupation:   Patient Pre-hospital Living Situation: Home  Patient Pre-hospital Level of Mobility: walks  Patient Pre-hospital Diet Restrictions: none  Substance Use History:   Social History     Substance and Sexual Activity   Alcohol Use Not Currently   • Alcohol/week: 4 0 standard drinks   • Types: 4 Glasses of wine per week    Comment: social     Social History     Tobacco Use   Smoking Status Never   Smokeless Tobacco Never     Social History     Substance and Sexual Activity   Drug Use No       Family History:  Family History Problem Relation Age of Onset   • Other Mother 76        Influenza   • Hypertension Mother    • Heart failure Father 70   • Peripheral vascular disease Father    • Hypertension Father    • Prostate cancer Brother    • Stroke Brother 48   • Hypertension Brother    • Hypertension Family        Physical Exam:     Vitals:   Blood Pressure: (!) 172/74 (02/05/23 1145)  Pulse: 62 (02/05/23 1145)  Temperature: 98 °F (36 7 °C) (02/05/23 1145)  Respirations: 16 (02/05/23 1145)  SpO2: 98 % (02/05/23 1145)    PHYSICAL EXAM:    Vitals signs reviewed  Constitutional   Awake and cooperative  NAD  Head/Neck   Normocephalic  Atraumatic  HEENT   No scleral icterus  EOMI  Heart   Regular rate and rhythm  No murmers  Lungs   Clear to auscultation bilaterally  Respirations unlaboured  Abdomen   Soft  Nontender  Nondistended  Skin   Skin color normal  No rashes  Extremities   No deformities  No peripheral edema  Neuro   Alert and oriented  No new deficits  Psych   Mood stable   Affect normal        Additional Data:     Lab Results:  Results from last 7 days   Lab Units 02/05/23  1257   WBC Thousand/uL 6 84   HEMOGLOBIN g/dL 12 8   HEMATOCRIT % 40 0   PLATELETS Thousands/uL 194   NEUTROS PCT % 67   LYMPHS PCT % 16   MONOS PCT % 12   EOS PCT % 2     Results from last 7 days   Lab Units 02/05/23  1257   SODIUM mmol/L 137   POTASSIUM mmol/L 4 9   CHLORIDE mmol/L 108   CO2 mmol/L 27   BUN mg/dL 27*   CREATININE mg/dL 0 99   ANION GAP mmol/L 2*   CALCIUM mg/dL 9 4   ALBUMIN g/dL 3 3*   TOTAL BILIRUBIN mg/dL 0 38   ALK PHOS U/L 96   ALT U/L 20   AST U/L 14   GLUCOSE RANDOM mg/dL 90     Results from last 7 days   Lab Units 02/05/23  1257   INR  0 94                   Lines/Drains:  Invasive Devices     Peripheral Intravenous Line  Duration           Peripheral IV 02/05/23 Left Forearm <1 day                    Imaging: Reviewed radiology reports from this admission including: CT and MRI of the spine  XR chest 1 view portable (Results Pending)     ** Please Note: This note has been constructed using a voice recognition system   **

## 2023-02-06 ENCOUNTER — ANESTHESIA (INPATIENT)
Dept: RADIOLOGY | Facility: HOSPITAL | Age: 83
End: 2023-02-06

## 2023-02-06 ENCOUNTER — APPOINTMENT (INPATIENT)
Dept: RADIOLOGY | Facility: HOSPITAL | Age: 83
End: 2023-02-06
Attending: RADIOLOGY

## 2023-02-06 ENCOUNTER — ANESTHESIA EVENT (INPATIENT)
Dept: RADIOLOGY | Facility: HOSPITAL | Age: 83
End: 2023-02-06

## 2023-02-06 ENCOUNTER — TELEPHONE (OUTPATIENT)
Dept: NEUROSURGERY | Facility: CLINIC | Age: 83
End: 2023-02-06

## 2023-02-06 LAB
ABO GROUP BLD: NORMAL
ANION GAP SERPL CALCULATED.3IONS-SCNC: 4 MMOL/L (ref 4–13)
BUN SERPL-MCNC: 27 MG/DL (ref 5–25)
CALCIUM SERPL-MCNC: 9.3 MG/DL (ref 8.3–10.1)
CHLORIDE SERPL-SCNC: 105 MMOL/L (ref 96–108)
CO2 SERPL-SCNC: 28 MMOL/L (ref 21–32)
CREAT SERPL-MCNC: 0.96 MG/DL (ref 0.6–1.3)
FLUAV RNA RESP QL NAA+PROBE: NEGATIVE
FLUBV RNA RESP QL NAA+PROBE: NEGATIVE
GFR SERPL CREATININE-BSD FRML MDRD: 72 ML/MIN/1.73SQ M
GLUCOSE SERPL-MCNC: 89 MG/DL (ref 65–140)
INR PPP: 1 (ref 0.84–1.19)
POTASSIUM SERPL-SCNC: 4.3 MMOL/L (ref 3.5–5.3)
PROTHROMBIN TIME: 13.4 SECONDS (ref 11.6–14.5)
RH BLD: POSITIVE
RSV RNA RESP QL NAA+PROBE: NEGATIVE
SARS-COV-2 RNA RESP QL NAA+PROBE: NEGATIVE
SODIUM SERPL-SCNC: 137 MMOL/L (ref 135–147)

## 2023-02-06 PROCEDURE — 0QS03ZZ REPOSITION LUMBAR VERTEBRA, PERCUTANEOUS APPROACH: ICD-10-PCS | Performed by: RADIOLOGY

## 2023-02-06 PROCEDURE — 0QU03JZ SUPPLEMENT LUMBAR VERTEBRA WITH SYNTHETIC SUBSTITUTE, PERCUTANEOUS APPROACH: ICD-10-PCS | Performed by: RADIOLOGY

## 2023-02-06 RX ORDER — FENTANYL CITRATE 50 UG/ML
INJECTION, SOLUTION INTRAMUSCULAR; INTRAVENOUS AS NEEDED
Status: DISCONTINUED | OUTPATIENT
Start: 2023-02-06 | End: 2023-02-06

## 2023-02-06 RX ORDER — CEFAZOLIN SODIUM 1 G/3ML
INJECTION, POWDER, FOR SOLUTION INTRAMUSCULAR; INTRAVENOUS AS NEEDED
Status: DISCONTINUED | OUTPATIENT
Start: 2023-02-06 | End: 2023-02-06

## 2023-02-06 RX ORDER — PROPOFOL 10 MG/ML
INJECTION, EMULSION INTRAVENOUS AS NEEDED
Status: DISCONTINUED | OUTPATIENT
Start: 2023-02-06 | End: 2023-02-06

## 2023-02-06 RX ORDER — SODIUM CHLORIDE, SODIUM LACTATE, POTASSIUM CHLORIDE, CALCIUM CHLORIDE 600; 310; 30; 20 MG/100ML; MG/100ML; MG/100ML; MG/100ML
INJECTION, SOLUTION INTRAVENOUS CONTINUOUS PRN
Status: DISCONTINUED | OUTPATIENT
Start: 2023-02-06 | End: 2023-02-06

## 2023-02-06 RX ORDER — PROPOFOL 10 MG/ML
INJECTION, EMULSION INTRAVENOUS CONTINUOUS PRN
Status: DISCONTINUED | OUTPATIENT
Start: 2023-02-06 | End: 2023-02-06

## 2023-02-06 RX ORDER — ACETAMINOPHEN 325 MG/1
650 TABLET ORAL EVERY 6 HOURS SCHEDULED
Status: DISCONTINUED | OUTPATIENT
Start: 2023-02-06 | End: 2023-02-07 | Stop reason: HOSPADM

## 2023-02-06 RX ORDER — BUPIVACAINE HYDROCHLORIDE AND EPINEPHRINE 5; 5 MG/ML; UG/ML
INJECTION, SOLUTION EPIDURAL; INTRACAUDAL; PERINEURAL AS NEEDED
Status: COMPLETED | OUTPATIENT
Start: 2023-02-06 | End: 2023-02-06

## 2023-02-06 RX ORDER — HYDROCODONE BITARTRATE AND ACETAMINOPHEN 5; 325 MG/1; MG/1
1 TABLET ORAL EVERY 6 HOURS PRN
Status: DISCONTINUED | OUTPATIENT
Start: 2023-02-06 | End: 2023-02-07 | Stop reason: HOSPADM

## 2023-02-06 RX ORDER — DOCUSATE SODIUM 100 MG/1
100 CAPSULE, LIQUID FILLED ORAL 2 TIMES DAILY
Status: DISCONTINUED | OUTPATIENT
Start: 2023-02-06 | End: 2023-02-07 | Stop reason: HOSPADM

## 2023-02-06 RX ORDER — LIDOCAINE HYDROCHLORIDE 10 MG/ML
INJECTION, SOLUTION EPIDURAL; INFILTRATION; INTRACAUDAL; PERINEURAL AS NEEDED
Status: COMPLETED | OUTPATIENT
Start: 2023-02-06 | End: 2023-02-06

## 2023-02-06 RX ADMIN — Medication 2 TABLET: at 09:34

## 2023-02-06 RX ADMIN — OXYBUTYNIN 5 MG: 5 TABLET, FILM COATED, EXTENDED RELEASE ORAL at 09:34

## 2023-02-06 RX ADMIN — SODIUM CHLORIDE, SODIUM LACTATE, POTASSIUM CHLORIDE, AND CALCIUM CHLORIDE: .6; .31; .03; .02 INJECTION, SOLUTION INTRAVENOUS at 11:54

## 2023-02-06 RX ADMIN — BUPIVACAINE HYDROCHLORIDE AND EPINEPHRINE BITARTRATE 15 ML: 5; .005 INJECTION, SOLUTION EPIDURAL; INTRACAUDAL; PERINEURAL at 12:17

## 2023-02-06 RX ADMIN — FENTANYL CITRATE 25 MCG: 50 INJECTION INTRAMUSCULAR; INTRAVENOUS at 12:19

## 2023-02-06 RX ADMIN — SENNOSIDES 17.2 MG: 8.6 TABLET, FILM COATED ORAL at 21:06

## 2023-02-06 RX ADMIN — ACETAMINOPHEN 650 MG: 325 TABLET ORAL at 13:23

## 2023-02-06 RX ADMIN — LIDOCAINE HYDROCHLORIDE 10 ML: 10 INJECTION, SOLUTION EPIDURAL; INFILTRATION; INTRACAUDAL; PERINEURAL at 12:16

## 2023-02-06 RX ADMIN — PROPOFOL 30 MCG/KG/MIN: 10 INJECTION, EMULSION INTRAVENOUS at 12:07

## 2023-02-06 RX ADMIN — CEFAZOLIN 1000 MG: 1 INJECTION, POWDER, FOR SOLUTION INTRAMUSCULAR; INTRAVENOUS at 12:10

## 2023-02-06 RX ADMIN — DOCUSATE SODIUM 100 MG: 100 CAPSULE, LIQUID FILLED ORAL at 09:34

## 2023-02-06 RX ADMIN — PROPOFOL 30 MG: 10 INJECTION, EMULSION INTRAVENOUS at 12:07

## 2023-02-06 RX ADMIN — METOPROLOL TARTRATE 25 MG: 25 TABLET, FILM COATED ORAL at 13:23

## 2023-02-06 RX ADMIN — DOCUSATE SODIUM 100 MG: 100 CAPSULE, LIQUID FILLED ORAL at 17:08

## 2023-02-06 RX ADMIN — FENTANYL CITRATE 25 MCG: 50 INJECTION INTRAMUSCULAR; INTRAVENOUS at 12:23

## 2023-02-06 RX ADMIN — LIDOCAINE 5% 1 PATCH: 700 PATCH TOPICAL at 09:34

## 2023-02-06 RX ADMIN — Medication 2000 UNITS: at 09:34

## 2023-02-06 RX ADMIN — VERAPAMIL HYDROCHLORIDE 240 MG: 240 TABLET ORAL at 21:05

## 2023-02-06 RX ADMIN — LISINOPRIL 10 MG: 10 TABLET ORAL at 21:07

## 2023-02-06 NOTE — PHYSICAL THERAPY NOTE
Physical Therapy Cancellation Note    PT orders received and chart reviewed  Pt pending OR for kyphoplasty  Will defer PT eval at this time and continue to follow and evaluate as appropriate post op      Mary Quintanilla, PT, DPT

## 2023-02-06 NOTE — SEDATION DOCUMENTATION
L1 kyphoplasty performed by Dr Deidra Wallace  Procedure tolerated well, anesthesia present throughout the case  IR Procedure Bedrest Start Time is 1240

## 2023-02-06 NOTE — ASSESSMENT & PLAN NOTE
· Continue vitamin D and calcium supplementation  · Follows with endocrinology as an outpatient  · Wife to bring in home Forteo

## 2023-02-06 NOTE — PROGRESS NOTES
[facility]  Progress Note - Mikhail Ware 1940, 79 y o  male MRN: [id number]  Unit/Bed#: [facility] 609-01 Encounter: [id number]  Primary Care Provider: Ashly Ahmdai DO   Date and time admitted to hospital: 2/5/2023 11:42 AM    * Compression fracture of L1 vertebra [facility])  Assessment & Plan  Initially presented to [facility] ER on 1/21 with acute back pain; found to have L1 compression fracture on CT, and later confirmed on MRI 1/23  Saw neurosurgery as an outpatient with intractable pain due to his compression fracture; kyphoplasty was scheduled for next week  Went to [facility] but was sent to [facility] 2/2 intractable pain  · Discussed with neurosurgery, plan for IR kyphoplasty today  Currently NPO  · Pain control with ATC APAP, lidocaine patch, Zanaflex as needed, Ransom PRN  · Per wife, patient is very sensitive to opioids specifically oxycodone and gabapentin/Lyrica  Do not give gabapentin/Lyrica  Cautious use of opioids, wife okay with Ransom   · PT/OT evaluations    Hypertension  Assessment & Plan  · Blood pressure acceptable  Continue lisinopril, Lopressor and verapamil  · Monitor    Malignant neoplasm of prostate [facility])  Assessment & Plan  · Status post radical prostatectomy 20 years ago  · Has yearly maintenance checks with urology; no concerns from that standpoint  · There has been no evidence of metastatic disease on any of his scans    Osteoporosis  Assessment & Plan  · Continue vitamin D and calcium supplementation  · Follows with endocrinology as an outpatient  · Wife to bring in home Forteo    Urinary frequency  Assessment & Plan  · Chronic  Oxybutynin substituted for home Myrbetriq    Stage 3a chronic kidney disease ([facility] [address] )  Assessment & Plan  · Renal function at baseline; monitor as needed        VTE Pharmacologic Prophylaxis: VTE Score: 6 Moderate Risk (Score 3-4) - Pharmacological DVT Prophylaxis Contraindicated   Sequential Compression Devices Ordered  Patient Centered Rounds: I performed bedside rounds with nursing staff today  Discussions with Specialists or Other Care Team Provider: nursing, case management, neurosurgery     Education and Discussions with Family / Patient: Updated  (wife) at bedside  Time Spent for Care: 30 minutes  More than 50% of total time spent on counseling and coordination of care as described above  Current Length of Stay: 1 day(s)  Current Patient Status: Inpatient   Certification Statement: The patient will continue to require additional inpatient hospital stay due to pain control, kypho with neurosurgery, PT/OT evals  Discharge Plan: Anticipate discharge in 48-72 hrs to discharge location to be determined pending rehab evaluations  Code Status: Level 1 - Full Code    Subjective:   Continues to complain of low back pain, Agreeable for kypho today  Denies constipation, had BM day before yesterday  Discussed pain regimen with wife at bedside in detail  Objective:     Vitals:   Temp (24hrs), Av 3 °F (36 8 °C), Min:98 °F (36 7 °C), Max:98 8 °F (37 1 °C)    Temp:  [98 °F (36 7 °C)-98 8 °F (37 1 °C)] 98 8 °F (37 1 °C)  HR:  [60-79] 60  Resp:  [16-18] 18  BP: (102-172)/(55-86) 158/61  SpO2:  [95 %-99 %] 99 %  There is no height or weight on file to calculate BMI  Input and Output Summary (last 24 hours): Intake/Output Summary (Last 24 hours) at 2023 1798  Last data filed at 2023 0758  Gross per 24 hour   Intake --   Output 1150 ml   Net -1150 ml       Physical Exam:   Physical Exam  Vitals and nursing note reviewed  Constitutional:       General: He is not in acute distress  Cardiovascular:      Rate and Rhythm: Normal rate  Pulmonary:      Breath sounds: Normal breath sounds  Abdominal:      General: Bowel sounds are normal       Palpations: Abdomen is soft  Tenderness: There is no abdominal tenderness  Musculoskeletal:         General: Tenderness (low back) present  No swelling  Skin:     General: Skin is warm  Coloration: Skin is pale  Neurological:      Mental Status: He is alert  Mental status is at baseline  Comments: Oriented x 2-3, forgetful   Psychiatric:         Mood and Affect: Mood normal           Additional Data:     Labs:  Results from last 7 days   Lab Units 02/05/23  1257   WBC Thousand/uL 6 84   HEMOGLOBIN g/dL 12 8   HEMATOCRIT % 40 0   PLATELETS Thousands/uL 194   NEUTROS PCT % 67   LYMPHS PCT % 16   MONOS PCT % 12   EOS PCT % 2     Results from last 7 days   Lab Units 02/06/23  0429 02/05/23  1257   SODIUM mmol/L 137 137   POTASSIUM mmol/L 4 3 4 9   CHLORIDE mmol/L 105 108   CO2 mmol/L 28 27   BUN mg/dL 27* 27*   CREATININE mg/dL 0 96 0 99   ANION GAP mmol/L 4 2*   CALCIUM mg/dL 9 3 9 4   ALBUMIN g/dL  --  3 3*   TOTAL BILIRUBIN mg/dL  --  0 38   ALK PHOS U/L  --  96   ALT U/L  --  20   AST U/L  --  14   GLUCOSE RANDOM mg/dL 89 90     Results from last 7 days   Lab Units 02/06/23  0429   INR  1 00                   Lines/Drains:  Invasive Devices     Peripheral Intravenous Line  Duration           Peripheral IV 02/05/23 Left Forearm <1 day          Drain  Duration           External Urinary Catheter Small <1 day                      Imaging: No pertinent imaging reviewed      Recent Cultures (last 7 days):         Last 24 Hours Medication List:   Current Facility-Administered Medications   Medication Dose Route Frequency Provider Last Rate   • acetaminophen  975 mg Oral Q8H [facility],      • calcium carbonate  2 tablet Oral Daily With Breakfast Corry Miranda, DO     • cholecalciferol  2,000 Units Oral Daily Corry Miranda, DO     • docusate sodium  100 mg Oral Daily Corry Miranda, DO     • HYDROmorphone  0 2 mg Intravenous Q3H PRN Corry Miranda, DO     • lidocaine  1 patch Topical Daily Corry Miranda, DO     • lisinopril  10 mg Oral HS Corry Miranda, DO     • melatonin  3 mg Oral HS PRN Blackwater Lamp Starsinic, DO     • metoprolol tartrate  25 mg Oral Q12H Amador Lamp Starsinic, DO     • ondansetron  4 mg Intravenous Q6H PRN Amador Lamp Starsinic, DO     • oxybutynin  5 mg Oral Daily Amador Lamp Starsinic, DO     • oxyCODONE  2 5 mg Oral Q4H PRN Blackwater Lamp Starsinic, DO     • senna  2 tablet Oral HS Amador Lamp Starsinic, DO     • tiZANidine  2 mg Oral Q8H PRN Blackwater Lamp Starsinic, DO     • verapamil  240 mg Oral HS Amador Lamp Starsinic, DO          Today, Patient Was Seen By: MARE Summers    **Please Note: This note may have been constructed using a voice recognition system  **

## 2023-02-06 NOTE — ASSESSMENT & PLAN NOTE
[facility] [address] [place] [address] ER on 1/21 with acute back pain; found to have L1 compression fracture on CT, and later confirmed on MRI 1/23  Saw neurosurgery as an outpatient with intractable pain due to his compression fracture; kyphoplasty was scheduled for next week  Went to [facility] but was sent to [facility] 2/2 intractable pain  · Discussed with neurosurgery, plan for IR kyphoplasty today  Currently NPO  · Pain control with ATC APAP, lidocaine patch, Zanaflex as needed, as needed oxycodone with IV Dilaudid for breakthrough  · Per wife, patient is very sensitive to opioids and gabapentin/Lyrica  Do not give gabapentin/Lyrica  Cautious use of opioids    · PT/OT evaluations

## 2023-02-06 NOTE — BRIEF OP NOTE (RAD/CATH)
IR KYPHOPLASTY/VERTEBROPLASTY Procedure Note    PATIENT NAME: Dnae Lorenz  : 1940  MRN: [id number]    Pre-op Diagnosis:   1  Closed compression fracture of body of L1 vertebra (HCC)      Post-op Diagnosis:   1   Closed compression fracture of body of L1 vertebra [facility])        Surgeon:   Mary Brooks MD  Assistants:     No qualified resident was available, Resident is only observing    Estimated Blood Loss: 10 cc  Findings:   Successful L1 kyphoplasty    Specimens: none    Complications:  none    Anesthesia: MAC sedation    Mary Brooks MD     Date: 2023  Time: 12:38 PM

## 2023-02-06 NOTE — OCCUPATIONAL THERAPY NOTE
Occupational Therapy         Patient Name: Cherylene Lazar [id number] Date: 2/6/2023 02/06/23 1100   OT Last Visit   OT Visit Date 02/06/23   Note Type   Note type Evaluation; Cancelled Session   Cancel Reasons Other   Additional Comments pending OR for kyphoplasty - NS request to defer mobility until post procedure       [facility]

## 2023-02-06 NOTE — DISCHARGE INSTRUCTIONS
Vertebroplasty   WHAT YOU NEED TO KNOW:   Vertebroplasty is a procedure to fix broken vertebrae  Vertebrae are the round, strong bones that form your spine  You have just had treatment for one or more compression fractures  It may take several weeks for complete pain relief  Continue to use the pain medicine that has been prescribed to you as needed  DISCHARGE INSTRUCTIONS:   Resume your normal diet and medications  Small sips of flat soda will help with nausea  Contact Interventional Radiology at [phone] Laura PATIENTS: Contact Interventional Radiology at [phone]) Chidi Ramires PATIENTS: Contact Interventional Radiology at [phone]) if any of the following occur: You have a fever greater than 101*  You have persistent nausea or vomiting  You have worsening pain, even after you take your medicine  You have increased trouble walking, moving around or numbness and weakness of legs  You have pain in your ribs or lower back  You have drainage from the area where your procedure was done  Upon discharge from the hospital, limit your activity for the remainder of the day  No driving, operating heavy machinery or lifting heavy objects  The following morning, you may increase your activity level as tolerated

## 2023-02-06 NOTE — ANESTHESIA POSTPROCEDURE EVALUATION
Post-Op Assessment Note    CV Status:  Stable  Pain Score: 0    Pain management: adequate     Mental Status:  Awake   PONV Controlled:  Controlled   Airway Patency:  Patent      Post Op Vitals Reviewed: Yes      Staff: CRNA         No notable events documented      BP   119/65   Temp   98 5   Pulse  67   Resp   14   SpO2   99

## 2023-02-06 NOTE — CASE MANAGEMENT
Case Management Assessment & Discharge Planning Note    Patient name Acosta Palacios  Location [facility] 609/[facility] 689-24 MRN [id number]  : 1940 Date 2023       Current Admission Date: 2023  Current Admission Diagnosis:Compression fracture of L1 vertebra [facility])   Patient Active Problem List    Diagnosis Date Noted   • Hypertension 2023   • Bradycardia 2023   • Macrocytosis 2023   • Vitamin D deficiency 2022   • Osteoporosis 2022   • Ambulatory dysfunction 2022   • Urinary frequency 2022   • Compression fracture of L1 vertebra (HCC) 2022   • Acute L2 compression fracture  10/21/2022   • Sacroiliitis (HCC)    • Sacroiliac joint dysfunction of both sides    • Lumbar spondylosis 10/04/2022   • Stage 3a chronic kidney disease ([facility] ) 2022   • Myofascial pain syndrome 2022   • Aortic root dilatation ([facility] ) 2022   • Renal cyst, acquired, left 12/10/2020   • Chronic pain syndrome 2020   • Chronic bilateral low back pain without sciatica 2020   • Spinal stenosis of lumbar region with neurogenic claudication    • Lumbar radiculopathy    • Malignant neoplasm of prostate ([facility] ) 2019   • Thoracic aortic aneurysm without rupture 10/04/2018   • Nonrheumatic aortic valve insufficiency 10/04/2018   • Non-rheumatic mitral regurgitation 10/04/2018   • PVC's (premature ventricular contractions) 2018      LOS (days): 1  Geometric Mean LOS (GMLOS) (days): 2 80  Days to GMLOS:1 9     OBJECTIVE:  PATIENT READMITTED TO HOSPITAL  Risk of Unplanned Readmission Score: 24 19   Current admission status: Inpatient       Preferred Pharmacy:   [address], [address] [place] [address] [address] [address]  Phone: [phone] Fax: [phone]    Primary Care Provider: Fe Jones DO    Primary Insurance: MEDICARE  Secondary Insurance: AARP    ASSESSMENT:  Luis Fernando Quick [facility] Representative - Spouse   Primary Phone: [phone] (Mobile)  Home Phone: [phone]               Advance Directives  Does patient have a [address]?: Yes  Does patient have Advance Directives?: Yes  Advance Directives: Living will, Power of  for health care    Readmission Root Cause  30 Day Readmission: Yes  Who directed you to return to the hospital?: Specialist  Did you understand whom to contact if you had questions or problems?: Yes  Did you get your prescriptions before you left the hospital?: Yes  Were you able to get your prescriptions filled when you left the hospital?: Yes  Did you take your medications as prescribed?: Yes  Were you able to get to your follow-up appointments?: Yes  During previous admission, was a post-acute recommendation made?: Yes  What post-acute resources were offered?: STR  Patient was readmitted due to: compression fracture lumbar vertebrae, increased pain  Action Plan: awaiting medical stability/ therapy recs    Patient Information  Admitted from[de-identified] Facility (was at [facility] for STR)  Mental Status: Alert  During Assessment patient was accompanied by: Not accompanied during assessment  Assessment information provided by[de-identified] Other - please comment, Spouse (old records)  Primary Caregiver: Self  Support Systems: Spouse/significant other, [address] of Residence: [address] do you live in?: [address] [place] [address] entry access options   Select all that apply : Stairs  Number of steps to enter home : 2  Do the steps have railings?: Yes  Type of Current Residence: 2 story home  Upon entering residence, is there a bedroom on the main floor (no further steps)?: No  A bedroom is located on the following floor levels of residence (select all that apply):: 2nd Floor  Upon entering residence, is there a bathroom on the main floor (no further steps)?: Yes  Number of steps to 2nd floor from main floor: One Flight  In the last 12 months, was there a time when you were not able to pay the mortgage or rent on time?: No  In the last 12 months, how many places have you lived?: 1  In the last 12 months, was there a time when you did not have a steady place to sleep or slept in a shelter (including now)?: No  Homeless/housing insecurity resource given?: N/A  Living Arrangements: Lives w/ Spouse/significant other  Is patient a ?: Yes  Is patient active with [facility])?: No    Activities of Daily Living Prior to Admission  Functional Status: Independent  Completes ADLs independently?: Yes  Ambulates independently?: Yes  Does patient use assisted devices?: Yes  Does patient currently own DME?: Yes  What DME does the patient currently own?: Bedside Commode, Shower Chair, Walker  Does patient have a history of Outpatient Therapy (PT/OT)?: Yes  Does the patient have a history of Short-Term Rehab?: Yes  Does patient have a history of HHC?: No  Does patient currently have [address]?: No    Patient Information Continued  Income Source: Pension/penitentiary  Does patient have prescription coverage?: Yes  Within the past 12 months, you worried that your food would run out before you got the money to buy more : Never true  Within the past 12 months, the food you bought just didn't last and you didn't have money to get more : Never true  Food insecurity resource given?: N/A  Does patient receive dialysis treatments?: No  Does patient have a history of substance abuse?: No  Does patient have a history of Mental Health Diagnosis?: No    Means of Transportation  Means of Transport to Appts[de-identified] Family transport  In the past 12 months, has lack of transportation kept you from medical appointments or from getting medications?: No  In the past 12 months, has lack of transportation kept you from meetings, work, or from getting things needed for daily living?: No  Was application for public transport provided?: N/A        DISCHARGE DETAILS:    Discharge planning discussed with[de-identified] Patient  Freedom of Choice: Yes  Comments - Freedom of Choice: Discussed FOC  CM contacted family/caregiver?: Yes (left VM for wife, Bevely Laurent)    Contacts  Patient Contacts: Kvng Lock  Relationship to Patient[de-identified] Family  Contact Method: Phone  Reason/Outcome: Discharge Planning, Continuity of Care, Emergency Contact     Other Referral/Resources/Interventions Provided:  Referral Comments: Patient and wife would like a referral back to [facility], entered in [employer] [employer] reviewed d/c planning process including the following: identifying help at home, patient preference for d/c planning needs, Discharge Lounge, [facility] Meds to Bed program, availability of treatment team to discuss questions or concerns patient and/or family may have regarding understanding medications and recognizing signs and symptoms once discharged  CM also encouraged patient to follow up with all recommended appointments after discharge  Patient advised of importance for patient and family to participate in managing patient’s medical well being  Information obtained from CM assessment done previously  Patient was sent from [facility] to [address] for NS work up  Patient has a history of OP therapy through [address] [place] [address] [employer] as well    Vaccinated for covid

## 2023-02-06 NOTE — CONSULTS
[address] [facility] 1940, 79 y o  male MRN: [id number]  Unit/Bed#: [facility] 609-01 Encounter: [id number]  Primary Care Provider: Gogo Perry DO   Date and time admitted to hospital: 2/5/2023 11:42 AM    Inpatient consult to Neurosurgery  Consult performed by: Efrain Tanner PA-C  Consult ordered by: Licia Apley, DO      consult completed on 2/6/2023 at 0915    * Compression fracture of L1 vertebra [facility])  Assessment & Plan  L1 compression fracture in the setting of osteoporosis (TLICS 1)  · Known to NSX, prior L2 compression fracture s/p kyphoplasty 1/4/2023 with Dr Rachell Bender  · New fracture noted on MRI lumbar spine with ongoing back pain and difficulty walking  · Presented from [place] [address] due to intractable pain    Imaging reviewed personally and with attending, results are as follows:  · MRI lumbar spine w wo contrast 1/23/2023:  There is a new mild L1 compression fracture present when compared with the prior MRI performed 7 weeks ago  Previously treated L2 compression fracture with PMMA cement placement since prior MRI, grossly unchanged in configuration  Plan:  • Continue to monitor neuro exam  • Plan for IR kyphoplasty today with Dr Racehll Bender  o NPO currently  o Hold ppx and therapeutic doses of AC / AP therapy  o Labs reviewed, coags WNL  o Patient and wife updated at the bedside, they are agreeable to intervention today  • Medical management and pain control per primary team  • DVT ppx:  SCDs only  • Hold off on mobilization, will have PT / OT eval tomorrow likely  • Will hold off on brace for now, likely will use this PRN comfort after kyphoplasty    Neurosurgery will continue to follow  Please call with questions or concerns      Osteoporosis  Assessment & Plan  Started Forteo 4 days ago      History of Present Illness   HPI: Leah Yoon is a 79y o  year old male with PMH including osteoporosis and started on Forteo, history of L2 compression fracture status post kyphoplasty January 2023, prostate cancer, squamous cell carcinoma who presents with complaint of intractable back pain in the setting of known L1 compression fracture  Patient is known to the neurosurgery office  Suffered L2 compression fracture secondary to osteoporosis  Underwent kyphoplasty in January 2023 with Dr Rachell Bender  He states about 2 weeks later he continued with intractable back pain and difficulty ambulating  He was admitted to [facility] due to back pain and difficulty urinating  MRI was completed which demonstrated a new mild L1 compression fracture  He was managed conservatively with TLSO brace and serial imaging  He was discharged to [facility] rehab  He did see Dr Rachell eBnder in the office on [address] and was consented for L1 kyphoplasty on 2/15/2023 however unfortunately while at rehab patient continued with intractable back pain and was sent to the hospital for evaluation in hopes of getting kyphoplasty sooner  Currently he continues with mechanical back pain without radiation into his lower extremities, no numbness/tingling/weakness, bowel or bladder issues, saddle anesthesia  Review of Systems   Constitutional: Positive for activity change  Negative for chills and fever  HENT: Negative for hearing loss and trouble swallowing  Eyes: Negative for visual disturbance  Respiratory: Negative for chest tightness and shortness of breath  Cardiovascular: Negative for chest pain  Gastrointestinal: Negative for abdominal pain, constipation, diarrhea, nausea and vomiting  Genitourinary: Negative for difficulty urinating  Musculoskeletal: Positive for back pain and gait problem  Negative for neck pain  Skin: Negative for wound  Allergic/Immunologic: Negative for environmental allergies and food allergies  Neurological: Negative for dizziness, facial asymmetry, speech difficulty, weakness, numbness and headaches     Hematological: Does not bruise/bleed easily  Psychiatric/Behavioral: Negative for confusion         Historical Information   Past Medical History:   Diagnosis Date   • Astigmatism     last assessed 11/15/17   • Cataract     Resolved 11/15/17   • Gross hematuria     Last assessed 01/28/16   • Hematuria     last assessed 01/28/16   • Kidney stones     Patient states he has kidney stonesin esch kidney   • Lumbar vertebral fracture ([facility] [address] )    • Prostate cancer ([facility] [address] )     last assessed 05/18/17   • Renal calculi     last assessed 08/16/12   • Squamous cell carcinoma    • Vitamin D deficiency     Last assessed 08/19/13     Past Surgical History:   Procedure Laterality Date   • BREAST EXCISIONAL BIOPSY N/A     Benign breast tumor unsure of side over 50 yrs ago   • CATARACT EXTRACTION Left 08/26/2019   • CATARACT EXTRACTION Right 08/12/2019   • COLONOSCOPY     • CYSTOSCOPY  01/28/2016   • ELBOW SURGERY      Bone chip   • HERNIA REPAIR  2002   • IR KYPHOPLASTY/VERTEBROPLASTY  1/4/2023   • LITHOTRIPSY     • RETROPUBIC PROSTATECTOMY  11/13/2001    Radical with nerve sparing     Social History     Substance and Sexual Activity   Alcohol Use Not Currently   • Alcohol/week: 4 0 standard drinks   • Types: 4 Glasses of wine per week    Comment: social     Social History     Substance and Sexual Activity   Drug Use No     Social History     Tobacco Use   Smoking Status Never   Smokeless Tobacco Never     Family History   Problem Relation Age of Onset   • Other Mother 76        Influenza   • Hypertension Mother    • Heart failure Father 70   • Peripheral vascular disease Father    • Hypertension Father    • Prostate cancer Brother    • Stroke Brother 48   • Hypertension Brother    • Hypertension Family        Meds/Allergies   all current active meds have been reviewed, current meds:   Current Facility-Administered Medications   Medication Dose Route Frequency   • acetaminophen (TYLENOL) tablet 650 mg  650 mg Oral Q6H [facility]   • calcium carbonate (OYSTER SHELL,OSCAL) 500 mg tablet 2 tablet  2 tablet Oral Daily With Breakfast   • cholecalciferol (VITAMIN D3) tablet 2,000 Units  2,000 Units Oral Daily   • docusate sodium (COLACE) capsule 100 mg  100 mg Oral Daily   • HYDROcodone-acetaminophen (NORCO) 5-325 mg per tablet 1 tablet  1 tablet Oral Q6H PRN   • HYDROmorphone HCl (DILAUDID) injection 0 2 mg  0 2 mg Intravenous Q3H PRN   • lidocaine (LIDODERM) 5 % patch 1 patch  1 patch Topical Daily   • lisinopril (ZESTRIL) tablet 10 mg  10 mg Oral HS   • melatonin tablet 3 mg  3 mg Oral HS PRN   • metoprolol tartrate (LOPRESSOR) tablet 25 mg  25 mg Oral Q12H   • ondansetron (ZOFRAN) injection 4 mg  4 mg Intravenous Q6H PRN   • oxybutynin (DITROPAN-XL) 24 hr tablet 5 mg  5 mg Oral Daily   • senna (SENOKOT) tablet 17 2 mg  2 tablet Oral HS   • tiZANidine (ZANAFLEX) tablet 2 mg  2 mg Oral Q8H PRN   • verapamil (CALAN-SR) CR tablet 240 mg  240 mg Oral HS    and PTA meds:   Prior to Admission Medications   Prescriptions Last Dose Informant Patient Reported? Taking?    Docusate Sodium (COLACE PO)   Yes No   Sig: Take by mouth daily   HYDROcodone-acetaminophen (NORCO) 5-325 mg per tablet   Yes No   Sig: Take 0 5 tablets by mouth every 4 (four) hours as needed for pain   Insulin Pen Needle 32G X 4 MM MISC   No No   Sig: Use 1 daily with Forteo   Mirabegron ER 25 MG TB24   No No   Sig: Take 25 mg by mouth in the morning   Multiple Vitamins-Minerals (CENTRUM SILVER 50+MEN PO)   Yes No   Sig: Daily   Teriparatide, Recombinant, (Forteo) 600 MCG/2 4ML SOPN   No No   Sig: Inject 20 mcg under the skin daily   acetaminophen (TYLENOL) 325 mg tablet   No No   Sig: Take 3 tablets (975 mg total) by mouth every 8 (eight) hours   Patient taking differently: Take 1,000 mg by mouth every 8 (eight) hours   bisacodyl (FLEET) 10 MG/30ML ENEM   Yes No   Sig: Insert 10 mg into the rectum daily as needed for constipation   calcium carbonate (OYSTER SHELL,OSCAL) 500 mg   No No   Sig: Take 2 tablets by mouth daily with breakfast Do not start before January 26, 2023  cholecalciferol (VITAMIN D3) 1,000 units tablet   Yes No   Sig: Take by mouth daily 2,000 IU  daily   enoxaparin (LOVENOX) 40 mg/0 4 mL   Yes No   Sig: Inject 0 4 mL under the skin in the morning   lidocaine (LIDODERM) 5 %   No No   Sig: Apply 1 patch topically over 12 hours daily Remove & Discard patch within 12 hours or as directed by MD Do not start before January 26, 2023  lisinopril (ZESTRIL) 10 mg tablet   No No   Sig: Take 1 tablet (10 mg total) by mouth daily at bedtime   lisinopril (ZESTRIL) 20 mg tablet   No No   Sig: Take 1 tablet (20 mg total) by mouth daily Do not start before January 26, 2023  melatonin 3 mg   Yes No   Sig: Take 3 mg by mouth daily at bedtime as needed (insomnia)   metoprolol tartrate (LOPRESSOR) 25 mg tablet   No No   Sig: Take 1 tablet (25 mg total) by mouth every 12 (twelve) hours   pregabalin (LYRICA) 50 mg capsule   No No   Sig: Take 1 capsule (50 mg total) by mouth 3 (three) times a day for 10 days   pregabalin (LYRICA) 50 mg capsule   No No   Sig: Take 1 capsule (50 mg total) by mouth 2 (two) times a day for 10 days   senna (SENOKOT) 8 6 MG tablet   Yes No   Sig: Take 2 tablets by mouth daily at bedtime   tiZANidine (ZANAFLEX) 2 mg tablet 2/5/2023  No Yes   Sig: Take 1 tablet (2 mg total) by mouth every 8 (eight) hours as needed for muscle spasms   triamcinolone (KENALOG) 0 1 % ointment   No No   Sig: Apply topically 2 (two) times a day For 2 weeks straight to right flank and legs only when rash is active     verapamil (CALAN-SR) 240 mg CR tablet   No No   Sig: Take 1 tablet (240 mg total) by mouth daily at bedtime      Facility-Administered Medications: None     Allergies   Allergen Reactions   • Sulfa Antibiotics Rash   • Oxycodone Confusion   • Ciprofloxacin    • Gabapentin Dizziness   • Medical Tape Rash       Objective   I/O       02/04 0701 02/05 0700 02/05 0701 02/06 0700 02/06 0701 02/07 0700 Urine  800 350    Total Output  800 350    Net  -800 -350           Unmeasured Urine Occurrence  1 x           Physical Exam  Constitutional:       General: He is awake  Appearance: Normal appearance  HENT:      Head: Normocephalic and atraumatic  Eyes:      Extraocular Movements: Extraocular movements intact and EOM normal       Conjunctiva/sclera: Conjunctivae normal    Cardiovascular:      Rate and Rhythm: Normal rate  Pulmonary:      Effort: Pulmonary effort is normal  No respiratory distress  Musculoskeletal:      Comments: LBP noted but no nik TTP in the lumbar spine   Skin:     General: Skin is warm and dry  Neurological:      Mental Status: He is alert and oriented to person, place, and time  Motor: Motor strength is normal       Coordination: Finger-Nose-Finger Test normal    Psychiatric:         Attention and Perception: Attention and perception normal          Mood and Affect: Mood and affect normal          Speech: Speech normal          Behavior: Behavior normal  Behavior is cooperative  Thought Content: Thought content normal          Cognition and Memory: Cognition and memory normal          Judgment: Judgment normal        Neurologic Exam     Mental Status   Oriented to person, place, and time  Follows 1 step commands  Attention: normal  Concentration: normal    Speech: speech is normal   Level of consciousness: alert  Knowledge: good  Normal comprehension       Cranial Nerves     CN III, IV, VI   Extraocular motions are normal    CN III: no CN III palsy  CN VI: no CN VI palsy  Nystagmus: none   Diplopia: none  Ophthalmoparesis: none  Upgaze: normal  Downgaze: normal  Conjugate gaze: present    CN V   Right facial sensation deficit: none  Left facial sensation deficit: none    CN VII   Right facial weakness: none  Left facial weakness: none    CN VIII   Hearing: intact    CN IX, X   CN IX normal    CN X normal      CN XI   Right trapezius strength: normal  Left trapezius strength: normal    CN XII   CN XII normal      Motor Exam   Muscle bulk: normal  Overall muscle tone: normal  Right arm pronator drift: absent  Left arm pronator drift: absent    Strength   Strength 5/5 throughout  Sensory Exam   Light touch normal      Gait, Coordination, and Reflexes     Coordination   Finger to nose coordination: normal    Tremor   Resting tremor: absent  Intention tremor: absent  Action tremor: absent    Reflexes   Right : 2+  Left : 2+  Right Chacon: absent  Left Chacon: absent  Right ankle clonus: absent  Left ankle clonus: absent      Vitals:Blood pressure 158/61, pulse 60, temperature 98 8 °F (37 1 °C), resp  rate 18, SpO2 99 %  ,There is no height or weight on file to calculate BMI  Lab Results:   Results from last 7 days   Lab Units 02/05/23  1257   WBC Thousand/uL 6 84   HEMOGLOBIN g/dL 12 8   HEMATOCRIT % 40 0   PLATELETS Thousands/uL 194   NEUTROS PCT % 67   MONOS PCT % 12     Results from last 7 days   Lab Units 02/06/23  0429 02/05/23  1257   POTASSIUM mmol/L 4 3 4 9   CHLORIDE mmol/L 105 108   CO2 mmol/L 28 27   BUN mg/dL 27* 27*   CREATININE mg/dL 0 96 0 99   CALCIUM mg/dL 9 3 9 4   ALK PHOS U/L  --  96   ALT U/L  --  20   AST U/L  --  14     Results from last 7 days   Lab Units 02/05/23  1257   MAGNESIUM mg/dL 2 3     Results from last 7 days   Lab Units 02/05/23  1257   PHOSPHORUS mg/dL 3 1     Results from last 7 days   Lab Units 02/06/23  0429 02/05/23  1257   INR  1 00 0 94   PTT seconds  --  33       Imaging Studies: I have personally reviewed pertinent reports  and I have personally reviewed pertinent films in PACS    XR chest 1 view portable    Result Date: 2/5/2023  Impression: No acute cardiopulmonary disease  Workstation performed: [id number]     EKG, Pathology, and Other Studies: I have personally reviewed pertinent reports        VTE Prophylaxis: Sequential compression device (Venodyne)  and Reason for no pharmacologic prophylaxis - kypho today    Code Status: Level 1 - Full Code  Advance Directive and Living Will:      Power of :    POLST:      Counseling / Coordination of Care  I spent 20 minutes with the patient

## 2023-02-06 NOTE — ASSESSMENT & PLAN NOTE
· Status post radical prostatectomy 20 years ago  · Has yearly maintenance checks with urology; no concerns from that standpoint  · There has been no evidence of metastatic disease on any of his scans

## 2023-02-06 NOTE — ANESTHESIA PREPROCEDURE EVALUATION
Procedure:  IR KYPHOPLASTY/VERTEBROPLASTY    Relevant Problems   CARDIO   (+) Aortic root dilatation (HCC)   (+) Hypertension   (+) Non-rheumatic mitral regurgitation   (+) Nonrheumatic aortic valve insufficiency   (+) PVC's (premature ventricular contractions)   (+) Thoracic aortic aneurysm without rupture      /RENAL   (+) Malignant neoplasm of prostate (HCC)   (+) Renal cyst, acquired, left   (+) Stage 3a chronic kidney disease (HCC)      MUSCULOSKELETAL   (+) Chronic bilateral low back pain without sciatica   (+) Lumbar spondylosis   (+) Myofascial pain syndrome   (+) Sacroiliitis (HCC)      NEURO/PSYCH   (+) Acute L2 compression fracture    (+) Chronic bilateral low back pain without sciatica   (+) Chronic pain syndrome   (+) Myofascial pain syndrome      Sinus bradycardia  Possible Left atrial enlargement  Septal infarct , age undetermined  Abnormal ECG  When compared with ECG of 08-JUN-2009 10:58,  Septal infarct is now Present  Confirmed by [facility] Officer ([id number]) on 2/5/2023 1:30:30 PM    1  The patient was in sinus rhythm throughout the duration of the study  2  The average heart rate was 61 bpm    3  Occasional ventricular ectopy, totaling 1 9% of monitored beats, comprised almost entirely of single ectopics without any ventricular tachycardia  This has significantly lessened since prior study 4/11/2019, which recorded 9 0% ventricular ectopy  4  ST-segment deviation varying between 1 0 mm to 1 5 mm throughout entirety of study  5  No symptoms reported  Physical Exam    Airway    Mallampati score: II         Dental   upper dentures and lower dentures,     Cardiovascular  Rhythm: regular, Rate: normal,     Pulmonary  Breath sounds clear to auscultation,     Other Findings        Anesthesia Plan  ASA Score- 3     Anesthesia Type- general with ASA Monitors  Additional Monitors:   Airway Plan: ETT  Plan Factors-Exercise tolerance (METS): >4 METS  Chart reviewed  EKG reviewed   Patient summary reviewed  Patient is not a current smoker  Induction- intravenous  Postoperative Plan- Plan for postoperative opioid use  Planned trial extubation    Informed Consent- Anesthetic plan and risks discussed with patient and spouse  I personally reviewed this patient with the CRNA  Discussed and agreed on the Anesthesia Plan with the CRNA  Cristo Avila

## 2023-02-06 NOTE — TELEPHONE ENCOUNTER
02/08/2023-PT DISCHARGED TO [facility] rehab     02/07/2023-PT [facility] 25 02/06/2023-PT Krist[facility]er 25 03/17/2023 F/U APT Katherine Sweeney FOR [facility]    Miri Hoskins PA-C   3-4 week follow up with Dr Sruthi Bee, no imaging   Underwent L1 kyphoplasty 2/6/2023

## 2023-02-06 NOTE — PLAN OF CARE
Problem: Potential for Falls  Goal: Patient will remain free of falls  Description: INTERVENTIONS:  - Educate patient/family on patient safety including physical limitations  - Instruct patient to call for assistance with activity   - Consult OT/PT to assist with strengthening/mobility   - Keep Call bell within reach  - Keep bed low and locked with side rails adjusted as appropriate  - Keep care items and personal belongings within reach  - Initiate and maintain comfort rounds  - Make Fall Risk Sign visible to staff  - Apply yellow socks and bracelet for high fall risk patients  - Consider moving patient to room near nurses station  Outcome: Progressing     Problem: MOBILITY - ADULT  Goal: Maintain or return to baseline ADL function  Description: INTERVENTIONS:  -  Assess patient's ability to carry out ADLs; assess patient's baseline for ADL function and identify physical deficits which impact ability to perform ADLs (bathing, care of mouth/teeth, toileting, grooming, dressing, etc )  - Assess/evaluate cause of self-care deficits   - Assess range of motion  - Assess patient's mobility; develop plan if impaired  - Assess patient's need for assistive devices and provide as appropriate  - Encourage maximum independence but intervene and supervise when necessary  - Involve family in performance of ADLs  - Assess for home care needs following discharge   - Consider OT consult to assist with ADL evaluation and planning for discharge  - Provide patient education as appropriate  Outcome: Progressing     Problem: PAIN - ADULT  Goal: Verbalizes/displays adequate comfort level or baseline comfort level  Description: Interventions:  - Encourage patient to monitor pain and request assistance  - Assess pain using appropriate pain scale  - Administer analgesics based on type and severity of pain and evaluate response  - Implement non-pharmacological measures as appropriate and evaluate response  - Consider cultural and social influences on pain and pain management  - Notify physician/advanced practitioner if interventions unsuccessful or patient reports new pain  Outcome: Progressing     Problem: INFECTION - ADULT  Goal: Absence or prevention of progression during hospitalization  Description: INTERVENTIONS:  - Assess and monitor for signs and symptoms of infection  - Monitor lab/diagnostic results  - Monitor all insertion sites, i e  indwelling lines, tubes, and drains  - Monitor endotracheal if appropriate and nasal secretions for changes in amount and color  - [place] appropriate cooling/warming therapies per order  - Administer medications as ordered  - Instruct and encourage patient and family to use good hand hygiene technique  - Identify and instruct in appropriate isolation precautions for identified infection/condition  Outcome: Progressing     Problem: SAFETY ADULT  Goal: Patient will remain free of falls  Description: INTERVENTIONS:  - Educate patient/family on patient safety including physical limitations  - Instruct patient to call for assistance with activity   - Consult OT/PT to assist with strengthening/mobility   - Keep Call bell within reach  - Keep bed low and locked with side rails adjusted as appropriate  - Keep care items and personal belongings within reach  - Initiate and maintain comfort rounds  - Make Fall Risk Sign visible to staff  - Apply yellow socks and bracelet for high fall risk patients  - Consider moving patient to room near nurses station  Outcome: Progressing  Goal: Maintain or return to baseline ADL function  Description: INTERVENTIONS:  -  Assess patient's ability to carry out ADLs; assess patient's baseline for ADL function and identify physical deficits which impact ability to perform ADLs (bathing, care of mouth/teeth, toileting, grooming, dressing, etc )  - Assess/evaluate cause of self-care deficits   - Assess range of motion  - Assess patient's mobility; develop plan if impaired  - Assess patient's need for assistive devices and provide as appropriate  - Encourage maximum independence but intervene and supervise when necessary  - Involve family in performance of ADLs  - Assess for home care needs following discharge   - Consider OT consult to assist with ADL evaluation and planning for discharge  - Provide patient education as appropriate  Outcome: Progressing

## 2023-02-06 NOTE — ASSESSMENT & PLAN NOTE
L1 compression fracture in the setting of osteoporosis (TLICS 1)  · Known to [facility], prior L2 compression fracture s/p kyphoplasty 1/4/2023 with Dr Hugh Ruelas  · New fracture noted on MRI lumbar spine with ongoing back pain and difficulty walking  · Presented from [place] [address] due to intractable pain    Imaging reviewed personally and with attending, results are as follows:  · MRI lumbar spine w wo contrast 1/23/2023:  There is a new mild L1 compression fracture present when compared with the prior MRI performed 7 weeks ago  Previously treated L2 compression fracture with PMMA cement placement since prior MRI, grossly unchanged in configuration  Plan:  • Continue to monitor neuro exam  • Plan for IR kyphoplasty today with Dr Hugh Ruelas  o NPO currently  o Hold ppx and therapeutic doses of AC / AP therapy  o Labs reviewed, coags WNL  o Patient and wife updated at the bedside, they are agreeable to intervention today  • Medical management and pain control per primary team  • DVT ppx:  SCDs only  • Hold off on mobilization, will have PT / OT eval tomorrow likely  • Will hold off on brace for now, likely will use this PRN comfort after kyphoplasty    Neurosurgery will continue to follow  Please call with questions or concerns

## 2023-02-07 VITALS
RESPIRATION RATE: 16 BRPM | HEART RATE: 82 BPM | DIASTOLIC BLOOD PRESSURE: 66 MMHG | OXYGEN SATURATION: 96 % | TEMPERATURE: 97.5 F | SYSTOLIC BLOOD PRESSURE: 126 MMHG

## 2023-02-07 LAB
ANION GAP SERPL CALCULATED.3IONS-SCNC: 6 MMOL/L (ref 4–13)
BASOPHILS # BLD AUTO: 0.03 THOUSANDS/ÂΜL (ref 0–0.1)
BASOPHILS NFR BLD AUTO: 0 % (ref 0–1)
BUN SERPL-MCNC: 27 MG/DL (ref 5–25)
CALCIUM SERPL-MCNC: 9.3 MG/DL (ref 8.3–10.1)
CHLORIDE SERPL-SCNC: 105 MMOL/L (ref 96–108)
CO2 SERPL-SCNC: 29 MMOL/L (ref 21–32)
CREAT SERPL-MCNC: 0.95 MG/DL (ref 0.6–1.3)
EOSINOPHIL # BLD AUTO: 0.13 THOUSAND/ÂΜL (ref 0–0.61)
EOSINOPHIL NFR BLD AUTO: 2 % (ref 0–6)
ERYTHROCYTE [DISTWIDTH] IN BLOOD BY AUTOMATED COUNT: 12.4 % (ref 11.6–15.1)
GFR SERPL CREATININE-BSD FRML MDRD: 73 ML/MIN/1.73SQ M
GLUCOSE SERPL-MCNC: 85 MG/DL (ref 65–140)
HCT VFR BLD AUTO: 36.8 % (ref 36.5–49.3)
HGB BLD-MCNC: 12 G/DL (ref 12–17)
IMM GRANULOCYTES # BLD AUTO: 0.12 THOUSAND/UL (ref 0–0.2)
IMM GRANULOCYTES NFR BLD AUTO: 1 % (ref 0–2)
LYMPHOCYTES # BLD AUTO: 1.23 THOUSANDS/ÂΜL (ref 0.6–4.47)
LYMPHOCYTES NFR BLD AUTO: 15 % (ref 14–44)
MCH RBC QN AUTO: 31.7 PG (ref 26.8–34.3)
MCHC RBC AUTO-ENTMCNC: 32.6 G/DL (ref 31.4–37.4)
MCV RBC AUTO: 97 FL (ref 82–98)
MONOCYTES # BLD AUTO: 1.07 THOUSAND/ÂΜL (ref 0.17–1.22)
MONOCYTES NFR BLD AUTO: 13 % (ref 4–12)
NEUTROPHILS # BLD AUTO: 5.74 THOUSANDS/ÂΜL (ref 1.85–7.62)
NEUTS SEG NFR BLD AUTO: 69 % (ref 43–75)
NRBC BLD AUTO-RTO: 0 /100 WBCS
PLATELET # BLD AUTO: 199 THOUSANDS/UL (ref 149–390)
PMV BLD AUTO: 12 FL (ref 8.9–12.7)
POTASSIUM SERPL-SCNC: 4.5 MMOL/L (ref 3.5–5.3)
RBC # BLD AUTO: 3.78 MILLION/UL (ref 3.88–5.62)
SODIUM SERPL-SCNC: 140 MMOL/L (ref 135–147)
WBC # BLD AUTO: 8.32 THOUSAND/UL (ref 4.31–10.16)

## 2023-02-07 RX ORDER — DOCUSATE SODIUM 100 MG/1
100 CAPSULE, LIQUID FILLED ORAL 2 TIMES DAILY
Refills: 0
Start: 2023-02-07

## 2023-02-07 RX ORDER — HYDROCODONE BITARTRATE AND ACETAMINOPHEN 5; 325 MG/1; MG/1
1 TABLET ORAL EVERY 4 HOURS PRN
Qty: 10 TABLET | Refills: 0 | Status: SHIPPED | OUTPATIENT
Start: 2023-02-07 | End: 2023-02-17

## 2023-02-07 RX ORDER — HYDROCODONE BITARTRATE AND ACETAMINOPHEN 5; 325 MG/1; MG/1
1 TABLET ORAL EVERY 4 HOURS PRN
Qty: 10 TABLET | Refills: 0 | Status: SHIPPED | OUTPATIENT
Start: 2023-02-07 | End: 2023-02-07 | Stop reason: SDUPTHER

## 2023-02-07 RX ORDER — ACETAMINOPHEN 325 MG/1
650 TABLET ORAL EVERY 8 HOURS SCHEDULED
Refills: 0
Start: 2023-02-07

## 2023-02-07 RX ADMIN — METOPROLOL TARTRATE 25 MG: 25 TABLET, FILM COATED ORAL at 01:26

## 2023-02-07 RX ADMIN — Medication 2000 UNITS: at 08:39

## 2023-02-07 RX ADMIN — DOCUSATE SODIUM 100 MG: 100 CAPSULE, LIQUID FILLED ORAL at 08:39

## 2023-02-07 RX ADMIN — OXYBUTYNIN 5 MG: 5 TABLET, FILM COATED, EXTENDED RELEASE ORAL at 08:39

## 2023-02-07 RX ADMIN — Medication 2 TABLET: at 08:39

## 2023-02-07 RX ADMIN — LIDOCAINE 5% 1 PATCH: 700 PATCH TOPICAL at 08:39

## 2023-02-07 NOTE — PROGRESS NOTES
-- Patient: Leslee Menjivar  -- MRN: [id number]  -- Aidin Request ID: [id number]  -- Level of care reserved: Inpatient [facility]  -- Partner Reserved: [facility], [place], [address]  [facility] [phone]  -- Clinical needs requested:  -- Geography searched: [place] [phone]  -- Start of Service:  -- Request sent: 11:39am EST on 2/6/2023 by Fausto Boland  -- Partner reserved: 1:39pm EST on 2/7/2023 by Fausto Boland  -- Choice list shared:

## 2023-02-07 NOTE — PROGRESS NOTES
[facility]  Progress Note - Nina Melo 1940, 79 y o  male MRN: [id number]  [facility] Encounter: [id number]  Primary Care Provider: Magali Temple DO   Date and time admitted to hospital: 2/5/2023 11:42 AM    * Compression fracture of L1 vertebra [facility])  Assessment & Plan  PPD#1 L1 kyphoplasty (Dr Juany Vera 2/6/2023)  · L1 compression fracture in the setting of osteoporosis (TLICS 1)  · Known to [address] [place] [address], prior L2 compression fracture s/p kyphoplasty 1/4/2023 with Dr Juany Vera  · New fracture noted on MRI lumbar spine with ongoing back pain and difficulty walking  · Presented from [place] [address] due to intractable pain    Imaging reviewed personally and with attending, results are as follows:  · MRI lumbar spine w wo contrast 1/23/2023:  There is a new mild L1 compression fracture present when compared with the prior MRI performed 7 weeks ago  Previously treated L2 compression fracture with PMMA cement placement since prior MRI, grossly unchanged in configuration  Plan:  • Continue to monitor neuro exam, thankfully is doing very well after kyphoplasty with 0 pain  • Brace PRN comfort if needed  • Medical management and pain control per primary team  • DVT ppx:  SCDs, okay for dvt ppx this afternoon if warranted  • Okay to mobilize with physical therapy    Neurosurgery will sign off, he is okay to discharge from neurosurgical standpoint  Outpatient follow-up in 3 to 4 weeks with Dr Juany Vera, no imaging  please call with questions or concerns  Osteoporosis  Assessment & Plan  Started Forteo 4 days ago      Subjective/Objective   Chief Complaint: " I have no back pain"    Subjective: Patient states he is doing well  He has no back pain at this time  He is very happy with his results  Per primary team he may be going back to rehab today  No other complaints      Objective: Laying in bed comfortably, NAD    I/O       02/05 0701 02/06 0700 02/06 0701 02/07 0700 02/07 0701 02/08 0700    P  O   220     I V   150     Total Intake  370     Urine 800 500     Total Output 800 500     Net -800 -130            Unmeasured Urine Occurrence 1 x            Invasive Devices     Peripheral Intravenous Line  Duration           Peripheral IV 02/05/23 Left Forearm 1 day          Drain  Duration           External Urinary Catheter Small 1 day                Physical Exam:  Vitals: Blood pressure 126/66, pulse 82, temperature 97 5 °F (36 4 °C), resp  rate 16, SpO2 96 %  ,There is no height or weight on file to calculate BMI        General appearance: alert, appears stated age, cooperative and no distress  Head: Normocephalic, without obvious abnormality, atraumatic  Eyes: Conjugate gaze  Neck: supple, symmetrical, trachea midline and NT  Back: Dressing over incision intact, no tenderness to palpation  Lungs: non labored breathing  Heart: regular heart rate  Neurologic:   Mental status: Alert, oriented x 3, follows commands, thought content appropriate  Cranial nerves: grossly intact (Cranial nerves II-XII)  Sensory: normal to LT  Motor: moving all extremities without focal weakness  Coordination: finger to nose abnormal bilaterally with mild dysmetria, no drift bilaterally      Lab Results:  Results from last 7 days   Lab Units 02/07/23  0441 02/05/23  1257   WBC Thousand/uL 8 32 6 84   HEMOGLOBIN g/dL 12 0 12 8   HEMATOCRIT % 36 8 40 0   PLATELETS Thousands/uL 199 194   NEUTROS PCT % 69 67   MONOS PCT % 13* 12     Results from last 7 days   Lab Units 02/07/23  0441 02/06/23  0429 02/05/23  1257   POTASSIUM mmol/L 4 5 4 3 4 9   CHLORIDE mmol/L 105 105 108   CO2 mmol/L 29 28 27   BUN mg/dL 27* 27* 27*   CREATININE mg/dL 0 95 0 96 0 99   CALCIUM mg/dL 9 3 9 3 9 4   ALK PHOS U/L  --   --  96   ALT U/L  --   --  20   AST U/L  --   --  14     Results from last 7 days   Lab Units 02/05/23  1257   MAGNESIUM mg/dL 2 3     Results from last 7 days   Lab Units 02/05/23  1257 PHOSPHORUS mg/dL 3 1     Results from last 7 days   Lab Units 02/06/23  0429 02/05/23  1257   INR  1 00 0 94   PTT seconds  --  33       Imaging Studies: I have personally reviewed pertinent reports  and I have personally reviewed pertinent films in PACS    XR chest 1 view portable    Result Date: 2/5/2023  Impression: No acute cardiopulmonary disease  Workstation performed: [id number]       EKG, Pathology, and Other Studies: I have personally reviewed pertinent reports        VTE Pharmacologic Prophylaxis: none ordered    VTE Mechanical Prophylaxis: sequential compression device

## 2023-02-07 NOTE — RESTORATIVE TECHNICIAN NOTE
Restorative Technician Note      Patient Name: Alisha Chacko     [facility] Tech Visit Date: 02/07/23  Note Type: Mobility  Patient Position Upon Consult: Supine  Activity Performed: Repositioned  Assistive Device: -- (boost)  Patient Position at End of Consult: Supine;  All needs within reach      Florencio Cohen Restorative Technician

## 2023-02-07 NOTE — CASE MANAGEMENT
Case Management Discharge Planning Note    Patient name Leah Yoon  [facility] MRN [id number]  : 1940 Date 2023       Current Admission Date: 2023  Current Admission Diagnosis:Compression fracture of L1 vertebra [facility])   Patient Active Problem List    Diagnosis Date Noted   • Hypertension 2023   • Bradycardia 2023   • Macrocytosis 2023   • Vitamin D deficiency 2022   • Osteoporosis 2022   • Ambulatory dysfunction 2022   • Urinary frequency 2022   • Compression fracture of L1 vertebra ([facility] [address] ) 2022   • Acute L2 compression fracture  10/21/2022   • Sacroiliitis (HCC)    • Sacroiliac joint dysfunction of both sides    • Lumbar spondylosis 10/04/2022   • Stage 3a chronic kidney disease ([address] ) 2022   • Myofascial pain syndrome 2022   • Aortic root dilatation ([facility] [address] ) 2022   • Renal cyst, acquired, left 12/10/2020   • Chronic pain syndrome 2020   • Chronic bilateral low back pain without sciatica 2020   • Spinal stenosis of lumbar region with neurogenic claudication    • Lumbar radiculopathy    • Malignant neoplasm of prostate ([facility] [address] ) 2019   • Thoracic aortic aneurysm without rupture 10/04/2018   • Nonrheumatic aortic valve insufficiency 10/04/2018   • Non-rheumatic mitral regurgitation 10/04/2018   • PVC's (premature ventricular contractions) 2018      LOS (days): 2  Geometric Mean LOS (GMLOS) (days): 2 80  Days to GMLOS:1     OBJECTIVE:  Risk of Unplanned Readmission Score: 24 51         Current admission status: Inpatient   Preferred Pharmacy:   [address], [address] [place] [address] [address] [place] [address] [address] [address]  Phone: [phone] Fax: [phone]    Primary Care Provider: Gogo Perry DO    Primary Insurance: MEDICARE  Secondary Insurance: AARP    DISCHARGE DETAILS:    Other Referral/Resources/Interventions Provided:  Referral Comments: [facility] to accept patient back today     Transport at Discharge : [facility]  Dispatcher Contacted: Yes  Number/Name of Dispatcher: [facility]  Transported by [employer] and Unit #): [facility] [employer] of Transport (Date): 02/07/23  ETA of Transport (Time): [address] [facility] [address] : [facility] [place], operated by [facility]  Receiving Facility/Agency Phone Number: [phone]  Facility/Agency Fax Number: [phone]

## 2023-02-07 NOTE — ASSESSMENT & PLAN NOTE
PPD#1 L1 kyphoplasty (Dr Charles Quintero 2/6/2023)  · L1 compression fracture in the setting of osteoporosis (TLICS 1)  · Known to [address] [place] [address], prior L2 compression fracture s/p kyphoplasty 1/4/2023 with Dr Charles Quintero  · New fracture noted on MRI lumbar spine with ongoing back pain and difficulty walking  · Presented from [place] [address] due to intractable pain    Imaging reviewed personally and with attending, results are as follows:  · MRI lumbar spine w wo contrast 1/23/2023:  There is a new mild L1 compression fracture present when compared with the prior MRI performed 7 weeks ago  Previously treated L2 compression fracture with PMMA cement placement since prior MRI, grossly unchanged in configuration  Plan:  • Continue to monitor neuro exam, thankfully is doing very well after kyphoplasty with 0 pain  • Brace PRN comfort if needed  • Medical management and pain control per primary team  • DVT ppx:  SCDs, okay for dvt ppx this afternoon if warranted  • Okay to mobilize with physical therapy    Neurosurgery will sign off, he is okay to discharge from neurosurgical standpoint  Outpatient follow-up in 3 to 4 weeks with Dr Charles Quintero, no imaging  please call with questions or concerns

## 2023-02-07 NOTE — DISCHARGE SUMMARY
Discharge Summary - [address] [facility]    Patient Information: Pretty Pike 79 y o  male MRN: [id number]  Unit/Bed#: [facility] 609-01 Encounter: [id number]    Discharging Physician / Practitioner: MARE Campos  PCP: Dina Proctor DO  Admission Date: 2/5/2023  Discharge Date: 02/07/23    Reason for Admission: L1 compression fracture s/p L1 kyphoplasty     Discharge Diagnoses:     Principal Problem:    Compression fracture of L1 vertebra ([facility] [address] )  Active Problems:    Hypertension    Malignant neoplasm of prostate ([facility] [address] )    Osteoporosis    Urinary frequency    Stage 3a chronic kidney disease ([facility] [address] )  Resolved Problems:    * No resolved hospital problems  *      Consultations During Hospital Stay:  · Neurosurgery    Procedures Performed:     · L1 kyphoplasty by Dr Magalie Lofton 2/6/23    Significant Findings / Test Results:     · CXR negative    Incidental Findings:   · none     Test Results Pending at Discharge (will require follow up):   · none     Outpatient Tests Requested:  · Outpatient f/u with PCP  · Outpatient f/u with Neurosurgery as scheduled 3/17 with Dr Magalie Lofton     Complications:  None     Hospital Course:     Pretty Pike is a 79 y o  male patient with a PMHX of osteoporosis, compression fractures with prior kyphoplasty, HTN, urinary frequency/overactive bladder, CKD 3, prostate cancer status post radical prostatectomy 20 years ago who originally presented to the hospital on 2/5/2023 due to back pain  Pathologic Vertebral Fracture due to Osteoporosis, evidenced by L1 compression fracture without trauma and diagnosis of osteoporosis, treated with Neurosurgery consult, Forteo, vitamin D and Calcium supplements  Neurosurgery recommended kyphoplasty, this was done by Dr Magalie Lofton 2/6  Postprocedural course was uncomplicated  Patient with no pain  Plan to discharge back to rehab  Has f/u scheduled 3/17 with Dr Magalie Lofton  Can wear  brace for comfort/PRN         Condition at Discharge: stable Discharge Day Visit / Exam:     Subjective:  No complaints  Denies pain  Feels great  Vitals: Blood Pressure: 126/66 (02/07/23 0845)  Pulse: 82 (02/07/23 0845)  Temperature: 97 5 °F (36 4 °C) (02/07/23 0845)  Temp Source: Oral (02/05/23 1533)  Respirations: 16 (02/07/23 0845)  SpO2: 96 % (02/07/23 0845)     Exam:   Physical Exam  Vitals and nursing note reviewed  Constitutional:       General: He is not in acute distress  Cardiovascular:      Rate and Rhythm: Normal rate  Pulmonary:      Breath sounds: Normal breath sounds  Abdominal:      Tenderness: There is no abdominal tenderness  Musculoskeletal:         General: No swelling  Skin:     General: Skin is warm  Comments: Lumbar dressing CDI   Neurological:      Mental Status: He is alert and oriented to person, place, and time  Mental status is at baseline  Psychiatric:         Mood and Affect: Mood normal          Discussion with Family: Patient and wife over phone     Discharge instructions/Information to patient and family:   See after visit summary for information provided to patient and family  Provisions for Follow-Up Care:  See after visit summary for information related to follow-up care and any pertinent home health orders  Disposition:     Acute Rehab at [facility]    For Discharges to [facility] SNF:   · Not Applicable to this Patient - Not Applicable to this Patient    Planned Readmission: no     Discharge Statement:  I spent 40 minutes discharging the patient  This time was spent on the day of discharge  I had direct contact with the patient on the day of discharge  Greater than 50% of the total time was spent examining patient, answering all patient questions, arranging and discussing plan of care with patient as well as directly providing post-discharge instructions  Additional time then spent on discharge activities      Discharge Medications:  See after visit summary for reconciled discharge medications provided to patient and family        ** Please Note: This note has been constructed using a voice recognition system **

## 2023-02-07 NOTE — PLAN OF CARE
Problem: Potential for Falls  Goal: Patient will remain free of falls  Description: INTERVENTIONS:  - Educate patient/family on patient safety including physical limitations  - Instruct patient to call for assistance with activity   - Consult OT/PT to assist with strengthening/mobility   - Keep Call bell within reach  - Keep bed low and locked with side rails adjusted as appropriate  - Keep care items and personal belongings within reach  - Initiate and maintain comfort rounds  - Make Fall Risk Sign visible to staff  - Apply yellow socks and bracelet for high fall risk patients  - Consider moving patient to room near nurses station  2/7/2023 1058 by Fredrick Grier RN  Outcome: Adequate for Discharge  2/7/2023 1058 by Fredrick Grier RN  Outcome: Progressing     Problem: MOBILITY - ADULT  Goal: Maintain or return to baseline ADL function  Description: INTERVENTIONS:  - Educate patient/family on patient safety including physical limitations  - Instruct patient to call for assistance with activity   - Consult OT/PT to assist with strengthening/mobility   - Keep Call bell within reach  - Keep bed low and locked with side rails adjusted as appropriate  - Keep care items and personal belongings within reach  - Initiate and maintain comfort rounds  - Make Fall Risk Sign visible to staff  - Apply yellow socks and bracelet for high fall risk patients  - Consider moving patient to room near nurses station  2/7/2023 1058 by Fredrick Grier RN  Outcome: Adequate for Discharge  2/7/2023 1058 by Fredrick Grier RN  Outcome: Progressing  Goal: Maintains/Returns to pre admission functional level  Description: INTERVENTIONS:  - Perform BMAT or MOVE assessment daily    - Set and communicate daily mobility goal to care team and patient/family/caregiver     - Collaborate with rehabilitation services on mobility goals if consulted  Problem: PAIN - ADULT  Goal: Verbalizes/displays adequate comfort level or baseline comfort level  Description: Interventions:  - Encourage patient to monitor pain and request assistance  - Assess pain using appropriate pain scale  - Administer analgesics based on type and severity of pain and evaluate response  - Implement non-pharmacological measures as appropriate and evaluate response  - Consider cultural and social influences on pain and pain management  - Notify physician/advanced practitioner if interventions unsuccessful or patient reports new pain  2/7/2023 1058 by Ray Manley RN  Outcome: Adequate for Discharge  2/7/2023 1058 by Ray Manley RN  Outcome: Progressing     Problem: INFECTION - ADULT  Goal: Absence or prevention of progression during hospitalization  Description: INTERVENTIONS:  - Assess and monitor for signs and symptoms of infection  - Monitor lab/diagnostic results  - Monitor all insertion sites, i e  indwelling lines, tubes, and drains  - Monitor endotracheal if appropriate and nasal secretions for changes in amount and color  - [place] appropriate cooling/warming therapies per order  - Administer medications as ordered  - Instruct and encourage patient and family to use good hand hygiene technique  - Identify and instruct in appropriate isolation precautions for identified infection/condition  2/7/2023 1058 by Ray Manley RN  Outcome: Adequate for Discharge  2/7/2023 1058 by Ray Manley RN  Outcome: Progressing     Problem: DISCHARGE PLANNING  Goal: Discharge to home or other facility with appropriate resources  Description: INTERVENTIONS:  - Identify barriers to discharge w/patient and caregiver  - Arrange for needed discharge resources and transportation as appropriate  - Identify discharge learning needs (meds, wound care, etc )  - Arrange for interpretive services to assist at discharge as needed  - Refer to Case Management Department for coordinating discharge planning if the patient needs post-hospital services based on physician/advanced practitioner order or complex needs related to functional status, cognitive ability, or social support system  2/7/2023 1058 by Marcelino Liu RN  Outcome: Adequate for Discharge  2/7/2023 1058 by Marcelino Liu RN  Outcome: Progressing     Problem: Knowledge Deficit  Goal: Patient/family/caregiver demonstrates understanding of disease process, treatment plan, medications, and discharge instructions  Description: Complete learning assessment and assess knowledge base    Interventions:  - Provide teaching at level of understanding  - Provide teaching via preferred learning methods  2/7/2023 1058 by Marcelino Liu RN  Outcome: Adequate for Discharge  2/7/2023 1058 by Marcelino Liu RN  Outcome: Progressing     Problem: Prexisting or High Potential for Compromised Skin Integrity  Goal: Skin integrity is maintained or improved  Description: INTERVENTIONS:  - Identify patients at risk for skin breakdown  - Assess and monitor skin integrity  - Assess and monitor nutrition and hydration status  - Monitor labs   - Assess for incontinence   - Turn and reposition patient  - Assist with mobility/ambulation  - Relieve pressure over bony prominences  - Avoid friction and shearing  - Provide appropriate hygiene as needed including keeping skin clean and dry  - Evaluate need for skin moisturizer/barrier cream  - Collaborate with interdisciplinary team   - Patient/family teaching  - Consider wound care consult   2/7/2023 1058 by Marcelino Liu RN  Outcome: Adequate for Discharge  2/7/2023 1058 by Marcelino Liu RN  Outcome: Progressing     - Out of bed for toileting  - Record patient progress and toleration of activity level   2/7/2023 1058 by Marcelino Liu RN  Outcome: Adequate for Discharge  2/7/2023 1058 by Marcelino Liu RN  Outcome: Progressing

## 2023-02-08 ENCOUNTER — TELEPHONE (OUTPATIENT)
Dept: PAIN MEDICINE | Facility: CLINIC | Age: 83
End: 2023-02-08

## 2023-02-08 NOTE — TELEPHONE ENCOUNTER
LVM to reschedule OVL with Micheal Holcomb per Mickie Escobar leaving office    OV on 5/2/23 cancelled

## 2023-02-09 NOTE — TELEPHONE ENCOUNTER
1st attempt - called Mago Myrick on primary number s/p kypho performed 2/6/2023  There was no answer left message to call back direct line  Will make second attempt if no callback is received

## 2023-02-12 NOTE — ED ATTENDING ATTESTATION
2/5/2023  I, Nicole Vazquez MD, saw and evaluated the patient  I have discussed the patient with the resident/non-physician practitioner and agree with the resident's/non-physician practitioner's findings, Plan of Care, and MDM as documented in the resident's/non-physician practitioner's note, except where noted  All available labs and Radiology studies were reviewed  I was present for key portions of any procedure(s) performed by the resident/non-physician practitioner and I was immediately available to provide assistance  At this point I agree with the current assessment done in the Emergency Department  I have conducted an independent evaluation of this patient a history and physical is as follows:      Patient is an 11-year-old male who presents to the emergency department with back pain that is intractable in nature  Patient has a recent L1 fracture and is scheduled for kyphoplasty with neurosurgery on 2/6/2023  Due to significant amount of pain patient discussed case with his neurosurgeon today and was referred to the emergency department for evaluation pain control and admission to expedite his surgical care  Patient is neurologically intact denies any saddle anesthesia lower extremity weakness bowel or bladder incontinence  Denies any new or recent falls or trauma  Vital signs reviewed  Physical examination remarkable for tenderness in the lumbar back  Neuro exam is grossly intact at this time         Impression low back pain secondary to L1 compression fracture  This is an established diagnosis with worsening pain  We will send CBC, coags, CMP mag and Phos chest x-ray for preop evaluation    We will treat patient's pain with IV hydromorphone  Case was discussed with medicine as well as neurosurgery regarding disposition and need for admission to hospital     Patient will be admitted to medicine service  Patient updated regarding plan of care    ED Course   Labs reviewed CBC within normal limits  PT/INR within normal limits  CMP remarkable for mild azotemia otherwise within normal limits serum magnesium and phosphorus levels within normal limits  Chest x-ray interpreted by me no acute cardiopulmonary disease            Critical Care Time  Procedures

## 2023-02-13 ENCOUNTER — TELEPHONE (OUTPATIENT)
Dept: ENDOCRINOLOGY | Facility: CLINIC | Age: 83
End: 2023-02-13

## 2023-02-13 ENCOUNTER — TRANSITIONAL CARE MANAGEMENT (OUTPATIENT)
Dept: FAMILY MEDICINE CLINIC | Facility: CLINIC | Age: 83
End: 2023-02-13

## 2023-02-13 NOTE — TELEPHONE ENCOUNTER
Received a call from Pt spouse , Pt's started his Forteo on 02/07/2023 , c/o diarrhea, not able to control his bowels , Pt's spouse she did check side effects and this is on of the side effects    Per Pt spouse Pt suffers normally from constipation and this is really abnormal for him

## 2023-02-17 ENCOUNTER — PATIENT OUTREACH (OUTPATIENT)
Dept: CASE MANAGEMENT | Facility: HOSPITAL | Age: 83
End: 2023-02-17

## 2023-02-17 DIAGNOSIS — N18.31 STAGE 3A CHRONIC KIDNEY DISEASE (HCC): Primary | ICD-10-CM

## 2023-02-17 NOTE — PROGRESS NOTES
Outpatient Care Manager Note: Patient identified for SNF/[facility]  BMP order placed and PCP notified  Chart review completed  Patient discharged from [facility] on 2/7/23 to home with outpatient PT  Chart notes reviewed and call made to patient's wife, Pedrito Reilly who is an RN and LU[facility] pathway interventions reviewed  including:  • PCP visit within 1 week- seeing Monday  • Avoiding NSAIDs  • Adequate nutrition and hydration  • Keeping BP >130 if normal BP not lower- checks at home and at PT and has been "good"  • Checking temperature  • Assessing for weight gain or loss and edema changes since home  • Medication Review- done;  • BMP- Will discuss timing with PCP at visit  Feels patient does not have CKD, but has had bump in Cr with illness at times  Patient going to outpatient PT with [facility] several times per week  States focus is going slow and not aggressive measures as his osteoporosis is "bad"  Denies concerns or care needs  Will follow

## 2023-02-17 NOTE — TELEPHONE ENCOUNTER
S/w Pt to reschedule ov with another NP per Dav    Pt states he is not rescheduling with no further explanation     Dav leaving office    Please reschedule Pt on or before 5/2/23 for his 16w f/u

## 2023-02-21 ENCOUNTER — TELEPHONE (OUTPATIENT)
Dept: NEUROSURGERY | Facility: CLINIC | Age: 83
End: 2023-02-21

## 2023-02-21 NOTE — TELEPHONE ENCOUNTER
Received a call from Ladonna Wynne reporting that Bobby Schwartz woke in the middle of the night with severe back pain  The pain is localized to the same region as prior to the kyphoplasty and is rated a 6/10  There have been no exacerbating incidents such as falls or trauma  Will send a message to Dr Nickie Ortiz for input and contact them back to advise

## 2023-02-23 ENCOUNTER — TRANSCRIBE ORDERS (OUTPATIENT)
Dept: NEUROSURGERY | Facility: CLINIC | Age: 83
End: 2023-02-23

## 2023-02-23 DIAGNOSIS — S34.109D CLOSED FRACTURE OF LUMBAR VERTEBRA WITH ROUTINE HEALING AND SPINAL CORD INJURY, SUBSEQUENT ENCOUNTER (HCC): Primary | ICD-10-CM

## 2023-02-23 DIAGNOSIS — S32.009D CLOSED FRACTURE OF LUMBAR VERTEBRA WITH ROUTINE HEALING AND SPINAL CORD INJURY, SUBSEQUENT ENCOUNTER (HCC): Primary | ICD-10-CM

## 2023-02-23 DIAGNOSIS — S32.010G COMPRESSION FRACTURE OF L1 VERTEBRA WITH DELAYED HEALING, SUBSEQUENT ENCOUNTER: ICD-10-CM

## 2023-02-24 ENCOUNTER — HOSPITAL ENCOUNTER (OUTPATIENT)
Dept: RADIOLOGY | Facility: HOSPITAL | Age: 83
End: 2023-02-24
Attending: RADIOLOGY

## 2023-02-24 DIAGNOSIS — S32.010G COMPRESSION FRACTURE OF L1 VERTEBRA WITH DELAYED HEALING, SUBSEQUENT ENCOUNTER: ICD-10-CM

## 2023-02-24 DIAGNOSIS — S32.009D CLOSED FRACTURE OF LUMBAR VERTEBRA WITH ROUTINE HEALING AND SPINAL CORD INJURY, SUBSEQUENT ENCOUNTER (HCC): ICD-10-CM

## 2023-02-24 DIAGNOSIS — S34.109D CLOSED FRACTURE OF LUMBAR VERTEBRA WITH ROUTINE HEALING AND SPINAL CORD INJURY, SUBSEQUENT ENCOUNTER (HCC): ICD-10-CM

## 2023-02-24 NOTE — TELEPHONE ENCOUNTER
This nurse was unexpectedly out of the office for the last 2 days but contacted patient upon return  Abiola Kam states she contacted the office when she did not hear back from me and was able to get order placed for xray and reschedule OV for this Monday  She was appreciative of the confirmation

## 2023-02-27 ENCOUNTER — OFFICE VISIT (OUTPATIENT)
Dept: NEUROSURGERY | Facility: CLINIC | Age: 83
End: 2023-02-27

## 2023-02-27 VITALS
SYSTOLIC BLOOD PRESSURE: 148 MMHG | HEIGHT: 71 IN | BODY MASS INDEX: 20.17 KG/M2 | DIASTOLIC BLOOD PRESSURE: 62 MMHG | TEMPERATURE: 97.6 F

## 2023-02-27 DIAGNOSIS — S32.010A COMPRESSION FRACTURE OF L1 VERTEBRA (HCC): Primary | ICD-10-CM

## 2023-02-27 NOTE — TELEPHONE ENCOUNTER
Pt's spouse call today to let you know that Isabella Mosley restarted taking his Forteo injection , he was doing ok but on the 4th day he started with the Diarrhea  Pt's spouse would like to know if they have another choice  Pt has a X-ray done on Friday possible another Vertebrae broke, Pt has an appointment  Today with his Neurosurgery

## 2023-02-27 NOTE — PROGRESS NOTES
Assessment/Plan:     Diagnoses and all orders for this visit:    Compression fracture of L1 vertebra ([address] )  -     MRI lumbar spine without contrast; Future        Discussion Summary:   Gilford Blower has a suspected new fracture although XR is essentially negative  He will obtain a new MRI of the lumbar spine and return to clinic for review  We discussed the challenges of performing a third kyphoplasty if this MRI does in fact revealed a new fracture  Chief Complaint: Follow-up      Patient ID: Cherylene Lazar is a 79 y o  male    HPI  Mr Reyna Gustafson returns for follow-up of his multiple compression fractures  Underwent L1 kyphoplasty on 2/6/23 and was pain free for exactly 2 weeks  Then last Tuesday woke up in the middle of night with acute severe pain  Pain is in same spot as piror but now feels "more intense " No leg radiating pain or numbness  Sitting for long periods of time and laying in bed exacerbates the pain  Tried wearing a brace, makes symptoms worse  Started Forteo, has been developing diarrhea and will need to be transitioned to another medication  Review of Systems   Gastrointestinal: Negative  Genitourinary: Negative  Musculoskeletal: Positive for back pain (lower back pain radiates up his back a little bit ), gait problem (very short distance due to back pain , uses a walker, presents in a wheelchair ) and myalgias (muscle tightness in the back )  Negative for neck pain  Neurological: Positive for weakness (bilateral leg weakness )  Negative for tremors and numbness  I have personally reviewed the MA's review of systems and made changes as necessary      The following portions of the patient's history were reviewed and updated as appropriate: allergies, current medications, past family history, past medical history, past social history, past surgical history and problem list       Active Ambulatory Problems     Diagnosis Date Noted   • PVC's (premature ventricular contractions) 08/13/2018   • Thoracic aortic aneurysm without rupture 10/04/2018   • Nonrheumatic aortic valve insufficiency 10/04/2018   • Non-rheumatic mitral regurgitation 10/04/2018   • Malignant neoplasm of prostate ([facility] ) 02/27/2019   • Spinal stenosis of lumbar region with neurogenic claudication    • Lumbar radiculopathy    • Chronic bilateral low back pain without sciatica 08/27/2020   • Chronic pain syndrome 08/28/2020   • Renal cyst, acquired, left 12/10/2020   • Aortic root dilatation ([facility] ) 02/13/2022   • Myofascial pain syndrome 05/09/2022   • Stage 3a chronic kidney disease ([facility] ) 08/31/2022   • Lumbar spondylosis 10/04/2022   • Sacroiliitis ([facility])    • Sacroiliac joint dysfunction of both sides    • Acute L2 compression fracture  10/21/2022   • Compression fracture of L1 vertebra ([facility] ) 11/14/2022   • Urinary frequency 12/09/2022   • Vitamin D deficiency 12/21/2022   • Osteoporosis 12/21/2022   • Ambulatory dysfunction 12/21/2022   • Bradycardia 01/23/2023   • Macrocytosis 01/23/2023   • Hypertension 02/05/2023     Resolved Ambulatory Problems     Diagnosis Date Noted   • History of prostate cancer 08/13/2018   • Plantar fasciitis of left foot 10/01/2018   • Strain of calf muscle 05/15/2019   • Gross hematuria 05/06/2022   • Acute right-sided thoracic back pain 05/09/2022   • Acute respiratory insufficiency 01/23/2023   • Eosinophilia 01/23/2023     Past Medical History:   Diagnosis Date   • Astigmatism    • Cataract    • Hematuria    • Kidney stones    • Lumbar vertebral fracture ([facility] )    • Prostate cancer ([facility] )    • Renal calculi    • Squamous cell carcinoma        Past Surgical History:   Procedure Laterality Date   • BREAST EXCISIONAL BIOPSY N/A     Benign breast tumor unsure of side over 50 yrs ago   • CATARACT EXTRACTION Left 08/26/2019   • CATARACT EXTRACTION Right 08/12/2019   • COLONOSCOPY     • CYSTOSCOPY  01/28/2016   • ELBOW SURGERY      Bone chip   • HERNIA REPAIR  2002   • IR KYPHOPLASTY/VERTEBROPLASTY  1/4/2023   • IR KYPHOPLASTY/VERTEBROPLASTY  2/6/2023   • LITHOTRIPSY     • RETROPUBIC PROSTATECTOMY  11/13/2001    Radical with nerve sparing       Current Outpatient Medications   Medication Sig Dispense Refill   • acetaminophen (TYLENOL) 325 mg tablet Take 2 tablets (650 mg total) by mouth every 8 (eight) hours  0   • calcium carbonate (OYSTER SHELL,OSCAL) 500 mg Take 2 tablets by mouth daily with breakfast Do not start before January 26, 2023   0   • cholecalciferol (VITAMIN D3) 1,000 units tablet Take by mouth daily 2,000 IU  daily     • lisinopril (ZESTRIL) 10 mg tablet Take 1 tablet (10 mg total) by mouth daily at bedtime (Patient taking differently: Take 10 mg by mouth daily at bedtime 20mg AM, 10mg PM)  0   • metoprolol tartrate (LOPRESSOR) 25 mg tablet Take 1 tablet (25 mg total) by mouth every 12 (twelve) hours 180 tablet 3   • Mirabegron ER 25 MG TB24 Take 25 mg by mouth in the morning 30 tablet 5   • Multiple Vitamins-Minerals (CENTRUM SILVER 50+MEN PO) Daily     • triamcinolone (KENALOG) 0 1 % ointment Apply topically 2 (two) times a day For 2 weeks straight to right flank and legs only when rash is active   80 g 0   • verapamil (CALAN-SR) 240 mg CR tablet Take 1 tablet (240 mg total) by mouth daily at bedtime 90 tablet 1   • bisacodyl (FLEET) 10 MG/30ML ENEM Insert 10 mg into the rectum daily as needed for constipation     • docusate sodium (Colace) 100 mg capsule Take 1 capsule (100 mg total) by mouth 2 (two) times a day (Patient not taking: Reported on 2/27/2023)  0   • Insulin Pen Needle 32G X 4 MM MISC Use 1 daily with Forteo 100 each 3   • lidocaine (LIDODERM) 5 % Apply 1 patch topically over 12 hours daily Remove & Discard patch within 12 hours or as directed by MD Do not start before January 26, 2023   0   • senna (SENOKOT) 8 6 MG tablet Take 2 tablets by mouth daily at bedtime     • Teriparatide, Recombinant, (Forteo) 600 MCG/2 4ML SOPN Inject 20 mcg under the skin daily (Patient not taking: Reported on 2/27/2023) 2 4 mL 11   • tiZANidine (ZANAFLEX) 2 mg tablet Take 1 tablet (2 mg total) by mouth every 8 (eight) hours as needed for muscle spasms  0     No current facility-administered medications for this visit  Vitals:    02/27/23 1308   BP: 148/62   Temp: 97 6 °F (36 4 °C)         Objective:    Physical Exam  Constitutional:       Appearance: Normal appearance  Eyes:      Extraocular Movements: Extraocular movements intact  Pupils: Pupils are equal, round, and reactive to light  Musculoskeletal:         General: Tenderness present  Comments: Midline pain to palpation extending from the region of approximately L2 and lower  Skin:     General: Skin is warm and dry  Neurological:      Mental Status: He is alert and oriented to person, place, and time  Mental status is at baseline  Motor: Weakness present  Gait: Gait abnormal    Psychiatric:         Mood and Affect: Mood normal          Behavior: Behavior normal          Thought Content: Thought content normal          Judgment: Judgment normal        Neurologic Exam     Mental Status   Oriented to person, place, and time  Cranial Nerves     CN III, IV, VI   Pupils are equal, round, and reactive to light  Results/Data:  I have reviewed the results and images from the XR in detail with the patient

## 2023-02-28 DIAGNOSIS — M80.00XA OSTEOPOROSIS WITH CURRENT PATHOLOGICAL FRACTURE, UNSPECIFIED OSTEOPOROSIS TYPE, INITIAL ENCOUNTER: Primary | ICD-10-CM

## 2023-02-28 RX ORDER — ABALOPARATIDE 2000 UG/ML
INJECTION, SOLUTION SUBCUTANEOUS
Qty: 1.56 ML | Refills: 3 | Status: SHIPPED | OUTPATIENT
Start: 2023-02-28

## 2023-03-01 ENCOUNTER — PATIENT OUTREACH (OUTPATIENT)
Dept: CASE MANAGEMENT | Facility: HOSPITAL | Age: 83
End: 2023-03-01

## 2023-03-01 NOTE — PROGRESS NOTES
SNF/[facility]IS Pathway Outpatient Care Manager Note: Chart notes reviewed and call made to patient's wife and left message

## 2023-03-02 ENCOUNTER — HOSPITAL ENCOUNTER (OUTPATIENT)
Dept: MRI IMAGING | Facility: HOSPITAL | Age: 83
End: 2023-03-02
Attending: RADIOLOGY

## 2023-03-02 DIAGNOSIS — S32.010A COMPRESSION FRACTURE OF L1 VERTEBRA (HCC): ICD-10-CM

## 2023-03-03 ENCOUNTER — TELEPHONE (OUTPATIENT)
Dept: ENDOCRINOLOGY | Facility: CLINIC | Age: 83
End: 2023-03-03

## 2023-03-03 NOTE — TELEPHONE ENCOUNTER
Yani BRUMFIELD Case ID: [id number] - Rx #: [id number]  Need help? Call us at [phone]  Status   Sent to [facility]  Drug    Tymlos 3120MCG/1 56ML pen-injectors   Form    [employer] Medicare Part D Electronic Prior Authorization Form (2017 NCPDP)           Original Claim Info    78,910 OVOV Requires Prior Authorization

## 2023-03-03 NOTE — TELEPHONE ENCOUNTER
Hardik BRUMFIELD Case ID: [id number] - Rx #: [id number]  Need help? Call us at [phone]  Outcome   Approvedtoday  Request Reference Number: [id number]  Rj Tarango is approved through 12/31/2023  Your patient may now fill this prescription and it will be covered    Drug    Tymlos 3120MCG/1 56ML pen-injectors   Form    [employer] Medicare Part D Electronic Prior Authorization Form (2017 NCPDP)           Original Claim Info    80,083 Drug Requires Prior Authorization

## 2023-03-06 ENCOUNTER — PATIENT OUTREACH (OUTPATIENT)
Dept: CASE MANAGEMENT | Facility: HOSPITAL | Age: 83
End: 2023-03-06

## 2023-03-06 ENCOUNTER — TELEPHONE (OUTPATIENT)
Dept: LAB | Facility: HOSPITAL | Age: 83
End: 2023-03-06

## 2023-03-06 NOTE — PROGRESS NOTES
SNF/LUIS Pathway Outpatient Care Manager Note: Call received from patient's wife, Kevin Segura requesting home lab draw as patient experiencing "a lot of back pain, especially getting in and out of car and then sitting and waiting  Mobile lab called to schedule with wife

## 2023-03-07 NOTE — TELEPHONE ENCOUNTER
Pt's wife calling in stating when she went to  rx, she was told by pharmacy that they are unable to get the medication, it is completely unavailable  Please advise

## 2023-03-07 NOTE — TELEPHONE ENCOUNTER
Spoke with pt's wife, she states she was told that it is a supplier issue, so they don't think any other pharmacies are able to get it either

## 2023-03-09 ENCOUNTER — APPOINTMENT (OUTPATIENT)
Dept: LAB | Facility: HOSPITAL | Age: 83
End: 2023-03-09

## 2023-03-09 DIAGNOSIS — N18.31 STAGE 3A CHRONIC KIDNEY DISEASE (HCC): ICD-10-CM

## 2023-03-09 LAB
ANION GAP SERPL CALCULATED.3IONS-SCNC: 4 MMOL/L (ref 4–13)
BUN SERPL-MCNC: 22 MG/DL (ref 5–25)
CALCIUM SERPL-MCNC: 9.6 MG/DL (ref 8.3–10.1)
CHLORIDE SERPL-SCNC: 105 MMOL/L (ref 96–108)
CO2 SERPL-SCNC: 28 MMOL/L (ref 21–32)
CREAT SERPL-MCNC: 1.23 MG/DL (ref 0.6–1.3)
GFR SERPL CREATININE-BSD FRML MDRD: 53 ML/MIN/1.73SQ M
GLUCOSE P FAST SERPL-MCNC: 107 MG/DL (ref 65–99)
POTASSIUM SERPL-SCNC: 4.3 MMOL/L (ref 3.5–5.3)
SODIUM SERPL-SCNC: 137 MMOL/L (ref 135–147)

## 2023-03-10 ENCOUNTER — OFFICE VISIT (OUTPATIENT)
Dept: NEUROSURGERY | Facility: CLINIC | Age: 83
End: 2023-03-10

## 2023-03-10 VITALS
WEIGHT: 155 LBS | HEIGHT: 71 IN | SYSTOLIC BLOOD PRESSURE: 152 MMHG | DIASTOLIC BLOOD PRESSURE: 74 MMHG | BODY MASS INDEX: 21.7 KG/M2

## 2023-03-10 DIAGNOSIS — M80.08XA AGE-RELATED OSTEOPOROSIS WITH CURRENT PATHOLOGICAL FRACTURE OF VERTEBRA (HCC): ICD-10-CM

## 2023-03-10 DIAGNOSIS — S22.080A T12 COMPRESSION FRACTURE, INITIAL ENCOUNTER (HCC): Primary | ICD-10-CM

## 2023-03-10 RX ORDER — SODIUM CHLORIDE 9 MG/ML
75 INJECTION, SOLUTION INTRAVENOUS CONTINUOUS
OUTPATIENT
Start: 2023-03-10

## 2023-03-10 RX ORDER — CEFAZOLIN SODIUM 1 G/50ML
1000 SOLUTION INTRAVENOUS ONCE
OUTPATIENT
Start: 2023-03-10 | End: 2023-03-10

## 2023-03-10 NOTE — PROGRESS NOTES
Assessment/Plan:     Diagnoses and all orders for this visit:    T12 compression fracture, initial encounter [facility])    Age-related osteoporosis with current pathological fracture of vertebra [facility])        Discussion Summary:   Ulysses Wolff unfortunately has another fracture confirmed on MRI  We discussed how undergoing another kyphoplasty will not prevent another fracture and may in fact lead to more without physiological control of his osteoporosis  He states he is in severe pain and would like to proceed despite these risks  Plan is for T12 kyphoplasty  I have also reached out to Dr Amilcar Amaya to see if therapy alternatives can be considered if Tymlos is not available  Chief Complaint: Follow-up      Patient ID: Anastasiia Lombardo is a 79 y o  male    HPI   Mr Gume Munoz continues to have significant back pain  He is unable to ambulate because of his pain  No new leg weakness or numbness  Has not been able to continue osteoporosis treatment yet due to medication availability  Review of Systems   Gastrointestinal: Negative  Genitourinary: Negative  Musculoskeletal: Positive for back pain (lower back pain radiates up his back a little bit ), gait problem (very short distance due to back pain , uses a walker, presents in a wheelchair ) and myalgias (muscle tightness in the back )  Negative for neck pain  Neurological: Positive for weakness (bilateral leg weakness )  Negative for tremors, speech difficulty and numbness  I have personally reviewed the MA's review of systems and made changes as necessary      The following portions of the patient's history were reviewed and updated as appropriate: allergies, current medications, past family history, past medical history, past social history, past surgical history and problem list       Active Ambulatory Problems     Diagnosis Date Noted   • PVC's (premature ventricular contractions) 08/13/2018   • Thoracic aortic aneurysm without rupture 10/04/2018   • Nonrheumatic aortic valve insufficiency 10/04/2018   • Non-rheumatic mitral regurgitation 10/04/2018   • Malignant neoplasm of prostate ([facility] ) 02/27/2019   • Spinal stenosis of lumbar region with neurogenic claudication    • Lumbar radiculopathy    • Chronic bilateral low back pain without sciatica 08/27/2020   • Chronic pain syndrome 08/28/2020   • Renal cyst, acquired, left 12/10/2020   • Aortic root dilatation ([facility] ) 02/13/2022   • Myofascial pain syndrome 05/09/2022   • Stage 3a chronic kidney disease ([facility] ) 08/31/2022   • Lumbar spondylosis 10/04/2022   • Sacroiliitis (HCC)    • Sacroiliac joint dysfunction of both sides    • Acute L2 compression fracture  10/21/2022   • Compression fracture of L1 vertebra ([facility] ) 11/14/2022   • Urinary frequency 12/09/2022   • Vitamin D deficiency 12/21/2022   • Osteoporosis 12/21/2022   • Ambulatory dysfunction 12/21/2022   • Bradycardia 01/23/2023   • Macrocytosis 01/23/2023   • Hypertension 02/05/2023     Resolved Ambulatory Problems     Diagnosis Date Noted   • History of prostate cancer 08/13/2018   • Plantar fasciitis of left foot 10/01/2018   • Strain of calf muscle 05/15/2019   • Gross hematuria 05/06/2022   • Acute right-sided thoracic back pain 05/09/2022   • Acute respiratory insufficiency 01/23/2023   • Eosinophilia 01/23/2023     Past Medical History:   Diagnosis Date   • Astigmatism    • Cataract    • Hematuria    • Kidney stones    • Lumbar vertebral fracture ([facility] )    • Prostate cancer ([facility] )    • Renal calculi    • Squamous cell carcinoma        Past Surgical History:   Procedure Laterality Date   • BREAST EXCISIONAL BIOPSY N/A     Benign breast tumor unsure of side over 50 yrs ago   • CATARACT EXTRACTION Left 08/26/2019   • CATARACT EXTRACTION Right 08/12/2019   • COLONOSCOPY     • CYSTOSCOPY  01/28/2016   • ELBOW SURGERY      Bone chip   • HERNIA REPAIR  2002   • IR KYPHOPLASTY/VERTEBROPLASTY  1/4/2023   • IR KYPHOPLASTY/VERTEBROPLASTY  2/6/2023   • LITHOTRIPSY     • RETROPUBIC PROSTATECTOMY  11/13/2001    Radical with nerve sparing       Current Outpatient Medications   Medication Sig Dispense Refill   • acetaminophen (TYLENOL) 325 mg tablet Take 2 tablets (650 mg total) by mouth every 8 (eight) hours  0   • calcium carbonate (OYSTER SHELL,OSCAL) 500 mg Take 2 tablets by mouth daily with breakfast Do not start before January 26, 2023   0   • cholecalciferol (VITAMIN D3) 1,000 units tablet Take by mouth daily 2,000 IU  daily     • docusate sodium (Colace) 100 mg capsule Take 1 capsule (100 mg total) by mouth 2 (two) times a day  0   • Insulin Pen Needle 32G X 4 MM MISC Use 1 daily with Forteo 100 each 3   • lisinopril (ZESTRIL) 10 mg tablet Take 1 tablet (10 mg total) by mouth daily at bedtime (Patient taking differently: Take 10 mg by mouth daily at bedtime 20mg AM, 10mg PM)  0   • metoprolol tartrate (LOPRESSOR) 25 mg tablet Take 1 tablet (25 mg total) by mouth every 12 (twelve) hours 180 tablet 3   • Mirabegron ER 25 MG TB24 Take 25 mg by mouth in the morning 30 tablet 5   • Multiple Vitamins-Minerals (CENTRUM SILVER 50+MEN PO) Daily     • triamcinolone (KENALOG) 0 1 % ointment Apply topically 2 (two) times a day For 2 weeks straight to right flank and legs only when rash is active   80 g 0   • verapamil (CALAN-SR) 240 mg CR tablet Take 1 tablet (240 mg total) by mouth daily at bedtime 90 tablet 1   • Abaloparatide (Tymlos) 3120 MCG/1 56ML SOPN 80 mcg daily (Patient not taking: Reported on 3/10/2023) 1 56 mL 3   • bisacodyl (FLEET) 10 MG/30ML ENEM Insert 10 mg into the rectum daily as needed for constipation     • lidocaine (LIDODERM) 5 % Apply 1 patch topically over 12 hours daily Remove & Discard patch within 12 hours or as directed by MD Do not start before January 26, 2023   0   • senna (SENOKOT) 8 6 MG tablet Take 2 tablets by mouth daily at bedtime     • tiZANidine (ZANAFLEX) 2 mg tablet Take 1 tablet (2 mg total) by mouth every 8 (eight) hours as needed for muscle spasms  0     No current facility-administered medications for this visit  Vitals:    03/10/23 1149   BP: 152/74         Objective:    Physical Exam  Constitutional:       Appearance: Normal appearance  HENT:      Head: Normocephalic and atraumatic  Eyes:      Pupils: Pupils are equal, round, and reactive to light  Pulmonary:      Effort: Pulmonary effort is normal    Neurological:      Mental Status: He is alert and oriented to person, place, and time  Mental status is at baseline  Motor: Weakness present  Psychiatric:         Mood and Affect: Mood normal          Behavior: Behavior normal          Thought Content: Thought content normal          Judgment: Judgment normal        Neurologic Exam     Mental Status   Oriented to person, place, and time  Cranial Nerves     CN III, IV, VI   Pupils are equal, round, and reactive to light  Results/Data:  I have reviewed the results and images from the MRI in detail with the patient

## 2023-03-13 ENCOUNTER — TELEPHONE (OUTPATIENT)
Dept: ENDOCRINOLOGY | Facility: CLINIC | Age: 83
End: 2023-03-13

## 2023-03-13 ENCOUNTER — PATIENT OUTREACH (OUTPATIENT)
Dept: CASE MANAGEMENT | Facility: HOSPITAL | Age: 83
End: 2023-03-13

## 2023-03-13 NOTE — TELEPHONE ENCOUNTER
I called a few different pharmacies  Pt's [facility] that [facility] in [place] may have some, but that is a far distance for the pt to travel  The [facility] said that would be a specialty medication for them and would have to come from their mail order pharmacy  The pharmacist at [facility] told me that it is on backorder from the supplier

## 2023-03-13 NOTE — PROGRESS NOTES
Outpatient Care Manager Note: Chart notes reviewed  Post SNF BMP shows stable CKD 3a  SNF/LUIS Pathway completed and Complex episode resolved

## 2023-03-13 NOTE — TELEPHONE ENCOUNTER
Spouse called stating the pt does not want to take Tymlos  He wants to go ahead w/ Prolia  Reviewed cost and coverage w/ them  Please confirm ok so we can get him scheduled      Thanks

## 2023-03-14 NOTE — TELEPHONE ENCOUNTER
Verification received  No PA required  No OOP cost for Prolia and no admin fee once yearly Medicare deductible is met

## 2023-03-15 ENCOUNTER — CLINICAL SUPPORT (OUTPATIENT)
Dept: ENDOCRINOLOGY | Facility: CLINIC | Age: 83
End: 2023-03-15

## 2023-03-15 ENCOUNTER — TELEPHONE (OUTPATIENT)
Dept: ENDOCRINOLOGY | Facility: CLINIC | Age: 83
End: 2023-03-15

## 2023-03-15 ENCOUNTER — TELEPHONE (OUTPATIENT)
Dept: RADIOLOGY | Facility: HOSPITAL | Age: 83
End: 2023-03-15

## 2023-03-15 VITALS — SYSTOLIC BLOOD PRESSURE: 118 MMHG | DIASTOLIC BLOOD PRESSURE: 60 MMHG | HEART RATE: 58 BPM

## 2023-03-15 DIAGNOSIS — M80.08XA AGE-RELATED OSTEOPOROSIS WITH CURRENT PATHOLOGICAL FRACTURE OF VERTEBRA, INITIAL ENCOUNTER (HCC): Primary | ICD-10-CM

## 2023-03-15 DIAGNOSIS — M80.08XA AGE-RELATED OSTEOPOROSIS WITH CURRENT PATHOLOGICAL FRACTURE OF VERTEBRA, INITIAL ENCOUNTER (HCC): ICD-10-CM

## 2023-03-15 RX ORDER — METOPROLOL TARTRATE 50 MG/1
50 TABLET, FILM COATED ORAL EVERY 12 HOURS
COMMUNITY
Start: 2023-02-10

## 2023-03-15 NOTE — PROGRESS NOTES
Assessment/Plan:    Keke Epps came into the [place] [address] [facility]ocr[facility]ology Office today 03/15/23 to receive Prolia injection  Verbal consent obtained  Consent given by: patient    patient states patient has been medically healthy with no underlining concerns/complications  Keke Epps presents with no symptoms today  All insturctions were reviewed with the patient  If the patient should have any questions/concerns, advised patient to contacted [place] [address] Endocrinology Office  Subjective:     History provided by: patient    Patient ID: Keke Epps is a 79 y o  male      Objective:    Vitals:    03/15/23 1503   BP: 118/60   BP Location: Left arm   Patient Position: Sitting   Cuff Size: Standard   Pulse: 58       Patient tolerated the injection well without any complications  Injection site/s left arm  Medication was provided by the office  Patient signed consent form yes   Patient signed Rahul  form yes (If no patient is not a medicare patient)  Patient waited 15 minutes after injection yes (This only applies to patient's receiving first time injection)         Last Visit: 3/15/2023  Next visit:Visit date not found

## 2023-03-16 ENCOUNTER — TELEPHONE (OUTPATIENT)
Dept: RADIOLOGY | Facility: HOSPITAL | Age: 83
End: 2023-03-16

## 2023-03-16 NOTE — PRE-PROCEDURE INSTRUCTIONS
Pre-procedure Instructions for Interventional Radiology  [address]  [place] [address] [phone]    You are scheduled for a/an Kyphoplasty T-12  On Thursday 3/23/23    Your tentative arrival time is 0700  Short stay will notify you the day before your procedure with the exact arrival time and the location to arrive  To prepare for your procedure:  1  Please arrange for someone to drive you home after the procedure and stay with you until the next morning if you are instructed to do so  This is typically for patients receiving some type of sedative or anesthetic for the procedure  2  DO NOT EAT OR DRINK ANYTHING after midnight on the evening before your procedure including candy & gum   3  ONLY SIPS OF WATER with your medications are allowed on the morning of your procedure  4  TAKE ALL OF YOUR REGULAR MEDICATIONS THE MORNING OF YOUR PROCEDURE with sips of water! We may call you to stop some of your blood sugar, blood pressure and blood thinning medications depending on the procedure  Please take all of these medications unless we instruct you to stop them  5  If you have an allergy to x-ray dye, please contact Interventional Radiology for an x-ray dye preparation which usually consists of an oral steroid and Benadryl  The day of your procedure:  1  Bring a list of the medications you take at home  2  Bring medications you take for breathing problems (such as inhalers), medications for chest pain, or both  3  Bring a case for your glasses or contacts  4  Bring your insurance card and a form of photo ID   5  Please leave all valuables such as credit cards and jewelry at home  6  Report [facility] the [facility]  Ask to be directed to [facility]  7  While your procedure is being performed, your family may wait in the Radiology Waiting Room on the 1st floor in Radiology    if they need to leave, they may provide a number to be called following the procedure  8  Be prepared to stay overnight just in case  Sometimes procedures will indicate the need for further observation or treatment  9  If you are scheduled for a follow-up visit with the Interventional Radiologist after your procedure, you will be called with a date and time  Special Instructions (Medications to stop taking before your procedure etc ):  Above reviewed with his wife Kwabena Benson

## 2023-03-21 DIAGNOSIS — I10 HTN (HYPERTENSION): ICD-10-CM

## 2023-03-21 RX ORDER — LISINOPRIL 10 MG/1
10 TABLET ORAL
Qty: 90 TABLET | Refills: 3 | Status: SHIPPED | OUTPATIENT
Start: 2023-03-21

## 2023-03-21 RX ORDER — LISINOPRIL 20 MG/1
20 TABLET ORAL EVERY MORNING
Qty: 90 TABLET | Refills: 3 | Status: SHIPPED | OUTPATIENT
Start: 2023-03-21

## 2023-03-23 ENCOUNTER — HOSPITAL ENCOUNTER (OUTPATIENT)
Dept: RADIOLOGY | Facility: HOSPITAL | Age: 83
Discharge: HOME | End: 2023-03-23
Attending: RADIOLOGY

## 2023-03-23 ENCOUNTER — ANESTHESIA EVENT (OUTPATIENT)
Dept: RADIOLOGY | Facility: HOSPITAL | Age: 83
End: 2023-03-23

## 2023-03-23 ENCOUNTER — ANESTHESIA (OUTPATIENT)
Dept: RADIOLOGY | Facility: HOSPITAL | Age: 83
End: 2023-03-23

## 2023-03-23 VITALS
OXYGEN SATURATION: 95 % | HEIGHT: 71 IN | SYSTOLIC BLOOD PRESSURE: 140 MMHG | DIASTOLIC BLOOD PRESSURE: 68 MMHG | HEART RATE: 65 BPM | WEIGHT: 145 LBS | BODY MASS INDEX: 20.3 KG/M2 | TEMPERATURE: 99 F | RESPIRATION RATE: 16 BRPM

## 2023-03-23 DIAGNOSIS — S22.080A T12 COMPRESSION FRACTURE, INITIAL ENCOUNTER (HCC): ICD-10-CM

## 2023-03-23 DIAGNOSIS — M80.08XA AGE-RELATED OSTEOPOROSIS WITH CURRENT PATHOLOGICAL FRACTURE OF VERTEBRA (HCC): ICD-10-CM

## 2023-03-23 RX ORDER — FENTANYL CITRATE 50 UG/ML
INJECTION, SOLUTION INTRAMUSCULAR; INTRAVENOUS AS NEEDED
Status: DISCONTINUED | OUTPATIENT
Start: 2023-03-23 | End: 2023-03-23

## 2023-03-23 RX ORDER — SODIUM CHLORIDE, SODIUM LACTATE, POTASSIUM CHLORIDE, CALCIUM CHLORIDE 600; 310; 30; 20 MG/100ML; MG/100ML; MG/100ML; MG/100ML
INJECTION, SOLUTION INTRAVENOUS CONTINUOUS PRN
Status: DISCONTINUED | OUTPATIENT
Start: 2023-03-23 | End: 2023-03-23

## 2023-03-23 RX ORDER — SODIUM CHLORIDE 9 MG/ML
75 INJECTION, SOLUTION INTRAVENOUS CONTINUOUS
Status: DISCONTINUED | OUTPATIENT
Start: 2023-03-23 | End: 2023-03-24 | Stop reason: HOSPADM

## 2023-03-23 RX ORDER — LIDOCAINE HYDROCHLORIDE 10 MG/ML
INJECTION, SOLUTION EPIDURAL; INFILTRATION; INTRACAUDAL; PERINEURAL AS NEEDED
Status: COMPLETED | OUTPATIENT
Start: 2023-03-23 | End: 2023-03-23

## 2023-03-23 RX ORDER — LIDOCAINE HYDROCHLORIDE 10 MG/ML
INJECTION, SOLUTION EPIDURAL; INFILTRATION; INTRACAUDAL; PERINEURAL AS NEEDED
Status: DISCONTINUED | OUTPATIENT
Start: 2023-03-23 | End: 2023-03-23

## 2023-03-23 RX ORDER — BUPIVACAINE HYDROCHLORIDE AND EPINEPHRINE 5; 5 MG/ML; UG/ML
INJECTION, SOLUTION EPIDURAL; INTRACAUDAL; PERINEURAL AS NEEDED
Status: COMPLETED | OUTPATIENT
Start: 2023-03-23 | End: 2023-03-23

## 2023-03-23 RX ORDER — CEFAZOLIN SODIUM 1 G/50ML
1000 SOLUTION INTRAVENOUS ONCE
Status: COMPLETED | OUTPATIENT
Start: 2023-03-23 | End: 2023-03-23

## 2023-03-23 RX ORDER — PROPOFOL 10 MG/ML
INJECTION, EMULSION INTRAVENOUS CONTINUOUS PRN
Status: DISCONTINUED | OUTPATIENT
Start: 2023-03-23 | End: 2023-03-23

## 2023-03-23 RX ORDER — PROPOFOL 10 MG/ML
INJECTION, EMULSION INTRAVENOUS AS NEEDED
Status: DISCONTINUED | OUTPATIENT
Start: 2023-03-23 | End: 2023-03-23

## 2023-03-23 RX ADMIN — PROPOFOL 50 MCG/KG/MIN: 10 INJECTION, EMULSION INTRAVENOUS at 08:43

## 2023-03-23 RX ADMIN — PROPOFOL 30 MG: 10 INJECTION, EMULSION INTRAVENOUS at 09:18

## 2023-03-23 RX ADMIN — BUPIVACAINE HYDROCHLORIDE AND EPINEPHRINE BITARTRATE 15 ML: 5; .005 INJECTION, SOLUTION EPIDURAL; INTRACAUDAL; PERINEURAL at 09:04

## 2023-03-23 RX ADMIN — LIDOCAINE HYDROCHLORIDE 5 ML: 10 INJECTION, SOLUTION EPIDURAL; INFILTRATION; INTRACAUDAL; PERINEURAL at 09:04

## 2023-03-23 RX ADMIN — SODIUM CHLORIDE 75 ML/HR: 0.9 INJECTION, SOLUTION INTRAVENOUS at 07:15

## 2023-03-23 RX ADMIN — FENTANYL CITRATE 25 MCG: 50 INJECTION INTRAMUSCULAR; INTRAVENOUS at 09:06

## 2023-03-23 RX ADMIN — FENTANYL CITRATE 25 MCG: 50 INJECTION INTRAMUSCULAR; INTRAVENOUS at 09:18

## 2023-03-23 RX ADMIN — FENTANYL CITRATE 50 MCG: 50 INJECTION INTRAMUSCULAR; INTRAVENOUS at 08:43

## 2023-03-23 RX ADMIN — LIDOCAINE HYDROCHLORIDE 50 MG: 10 INJECTION, SOLUTION EPIDURAL; INFILTRATION; INTRACAUDAL; PERINEURAL at 08:43

## 2023-03-23 RX ADMIN — CEFAZOLIN SODIUM 1000 MG: 1 SOLUTION INTRAVENOUS at 08:25

## 2023-03-23 RX ADMIN — SODIUM CHLORIDE, SODIUM LACTATE, POTASSIUM CHLORIDE, AND CALCIUM CHLORIDE: .6; .31; .03; .02 INJECTION, SOLUTION INTRAVENOUS at 08:33

## 2023-03-23 NOTE — ANESTHESIA PREPROCEDURE EVALUATION
Procedure:  IR KYPHOPLASTY/VERTEBROPLASTY    Relevant Problems   CARDIO   (+) Aortic root dilatation (HCC)   (+) Hypertension   (+) Non-rheumatic mitral regurgitation   (+) Nonrheumatic aortic valve insufficiency   (+) PVC's (premature ventricular contractions)   (+) Thoracic aortic aneurysm without rupture      /RENAL   (+) Malignant neoplasm of prostate (HCC)   (+) Renal cyst, acquired, left   (+) Stage 3a chronic kidney disease (HCC)      MUSCULOSKELETAL   (+) Chronic bilateral low back pain without sciatica   (+) Lumbar spondylosis   (+) Myofascial pain syndrome   (+) Sacroiliitis (HCC)      NEURO/PSYCH   (+) Acute L2 compression fracture    (+) Chronic bilateral low back pain without sciatica   (+) Chronic pain syndrome   (+) Myofascial pain syndrome      Echo 3/23/22:   •  Left Ventricle: Left ventricular cavity size is normal  Wall thickness is moderately increased  The left ventricular ejection fraction is 60% by visual estimation  Systolic function is normal  Wall motion is normal  Diastolic function is moderately abnormal, consistent with grade II (pseudonormal) relaxation  •  Right Ventricle: Right ventricular cavity size is dilated  •  Left Atrium: The atrium is moderately dilated (42-48 mL/m2)  •  Aortic Valve: There is moderate regurgitation  •  Mitral Valve: There is moderate regurgitation  •  Tricuspid Valve: There is mild to moderate regurgitation  The right ventricular systolic pressure is moderately elevated  The estimated right ventricular systolic pressure is 34 35 mmHg  •  Aorta: The aortic root is mildly dilated at 4 4 cm  The ascending aorta is normal in size  Physical Exam    Airway    Mallampati score: II  TM Distance: >3 FB  Neck ROM: full     Dental   Comment: Edentulous  ,     Cardiovascular      Pulmonary      Other Findings        Anesthesia Plan  ASA Score- 3     Anesthesia Type- IV sedation with anesthesia with ASA Monitors           Additional Monitors:   Airway Plan: Plan Factors-Exercise tolerance (METS): <4 METS  Chart reviewed  EKG reviewed  Existing labs reviewed  Patient summary reviewed  Patient is not a current smoker  Obstructive sleep apnea risk education given perioperatively  Induction- intravenous  Postoperative Plan-     Informed Consent- Anesthetic plan and risks discussed with patient  I personally reviewed this patient with the CRNA  Discussed and agreed on the Anesthesia Plan with the AG Doherty

## 2023-03-23 NOTE — DISCHARGE INSTRUCTIONS
Vertebroplasty   WHAT YOU NEED TO KNOW:   Vertebroplasty is a procedure to fix broken vertebrae  Vertebrae are the round, strong bones that form your spine  You have just had treatment for one or more compression fractures  It may take several weeks for complete pain relief  Continue to use the pain medicine that has been prescribed to you as needed  DISCHARGE INSTRUCTIONS:   Resume your normal diet and medications  Small sips of flat soda will help with nausea  Contact Interventional Radiology at [phone] Laura PATIENTS: Contact Interventional Radiology at [phone]) J Carlos Albarado PATIENTS: Contact Interventional Radiology at [phone]) if any of the following occur: You have a fever greater than 101*  You have persistent nausea or vomiting  You have worsening pain, even after you take your medicine  You have increased trouble walking, moving around or numbness and weakness of legs  You have pain in your ribs or lower back  You have drainage from the area where your procedure was done  Upon discharge from the hospital, limit your activity for the remainder of the day  No driving, operating heavy machinery or lifting heavy objects  The following morning, you may increase your activity level as tolerated

## 2023-03-23 NOTE — SEDATION DOCUMENTATION
Kyphophlasty completed by Dr Rinku Wagoner  Tolerated well  Report called to [facility]

## 2023-03-23 NOTE — ANESTHESIA POSTPROCEDURE EVALUATION
Post-Op Assessment Note    CV Status:  Stable  Pain Score: 0    Pain management: adequate     Mental Status:  Alert and awake   Hydration Status:  Euvolemic   PONV Controlled:  Controlled   Airway Patency:  Patent      Post Op Vitals Reviewed: Yes      Staff: Anesthesiologist, CRNA         No notable events documented      BP   133/67   Temp   n/a   Pulse 71   Resp   16   SpO2   95
Verbal/written post procedure instructions were given to patient/caregiver

## 2023-03-23 NOTE — BRIEF OP NOTE (RAD/CATH)
IR KYPHOPLASTY/VERTEBROPLASTY Procedure Note    PATIENT NAME: Stacye Roy  : 1940  MRN: [id number]    Pre-op Diagnosis:   1  T12 compression fracture, initial encounter ([facility] )    2  Age-related osteoporosis with current pathological fracture of vertebra ([facility])      Post-op Diagnosis:   1  T12 compression fracture, initial encounter ([facility] )    2  Age-related osteoporosis with current pathological fracture of vertebra [facility])        Surgeon:   Ramila Vaca MD  Assistants:     No qualified resident was available, Resident is only observing    Estimated Blood Loss: 5 cc  Findings:   Successful T12 kyphoplasty      Specimens: None    Complications: None    Anesthesia: MAC sedation    Ramila Vaca MD     Date: 3/23/2023  Time: 9:47 AM

## 2023-03-28 ENCOUNTER — TELEPHONE (OUTPATIENT)
Dept: NEUROSURGERY | Facility: CLINIC | Age: 83
End: 2023-03-28

## 2023-03-28 NOTE — TELEPHONE ENCOUNTER
Spoke with Elpidio Romano the wife of Evonne Swartz to see how he is doing after kyphoplasty/vertebroplasty 3/23/2023  She reports that he is doing well overall and denies any incisional issues or fevers  He is currently sleeping but reports that he mentioned feeling like he was making progress earlier in the day  Continues to wear brace  Advised that he may take a shower and allow soapy water to wash over the surgical site  The site should be monitored for s/s of infection such as swelling, redness, or drainage  Call the office if any of these develop  Advised that she should take it easy for the first few days and avoid any strenuous activity or heavy lifting >10 pounds  If needed Tylenol can be used for pain following the procedure  Verified date/time/location of his upcoming POV on 4/21/2023 and advised her to call the office with any further questions or concerns, or if any incisional issues or fevers would arise  Patient was appreciative for the call

## 2023-04-21 ENCOUNTER — OFFICE VISIT (OUTPATIENT)
Dept: NEUROSURGERY | Facility: CLINIC | Age: 83
End: 2023-04-21

## 2023-04-21 VITALS
TEMPERATURE: 97.6 F | BODY MASS INDEX: 20.3 KG/M2 | SYSTOLIC BLOOD PRESSURE: 112 MMHG | HEART RATE: 57 BPM | DIASTOLIC BLOOD PRESSURE: 60 MMHG | HEIGHT: 71 IN | OXYGEN SATURATION: 99 % | RESPIRATION RATE: 16 BRPM | WEIGHT: 145 LBS

## 2023-04-21 DIAGNOSIS — S32.010A COMPRESSION FRACTURE OF L1 VERTEBRA (HCC): ICD-10-CM

## 2023-04-21 DIAGNOSIS — S34.109A: Primary | ICD-10-CM

## 2023-04-21 DIAGNOSIS — S32.008A: Primary | ICD-10-CM

## 2023-04-21 NOTE — PROGRESS NOTES
Assessment/Plan:     Diagnoses and all orders for this visit:    Closed fracture of lumbar vertebra with spinal cord injury [facility])  -     Ambulatory Referral to Physical Therapy; Future    Compression fracture of L1 vertebra (HCC)  -     Ambulatory Referral to Physical Therapy; Future      Discussion Summary:     Monica Archuletay has improved considerably since kyphoplasty  Residual pain though significant, is consistent with his prior chronic pain  I have referred him to PT, he prefers [facility]  Hopefully this will improve his symptoms, if not he may benefit from pain management referral as well  Chief Complaint: No chief complaint on file  Patient ID: Maria De Jesus Edwards is a 79 y o  male    HPI    Pain is improved since kyphoplasty  However still has lower back pain with prolonged sitting and standing  Takes tylenol and robaxin once daily  Has not needed it as much since  Rates residual pain is lower than prior fracture regions  This pain has been present for years, preceding the fractures  Has not been very active  Review of Systems   Gastrointestinal: Negative  Genitourinary: Negative  Musculoskeletal: Positive for back pain (lower back pain, non-radiating ), gait problem (very short distance due to back pain , uses a walker, presents in a wheelchair ) and myalgias (muscle tightness in the back )  Negative for neck pain  Neurological: Positive for weakness (bilateral leg weakness )  Negative for tremors, syncope, speech difficulty and numbness  I have personally reviewed the MA's review of systems and made changes as necessary      The following portions of the patient's history were reviewed and updated as appropriate: allergies, current medications, past family history, past medical history, past social history, past surgical history and problem list       Active Ambulatory Problems     Diagnosis Date Noted   • PVC's (premature ventricular contractions) 08/13/2018   • Thoracic aortic aneurysm without rupture ([facility] ) 10/04/2018   • Nonrheumatic aortic valve insufficiency 10/04/2018   • Non-rheumatic mitral regurgitation 10/04/2018   • Malignant neoplasm of prostate ([facility] ) 02/27/2019   • Spinal stenosis of lumbar region with neurogenic claudication    • Lumbar radiculopathy    • Chronic bilateral low back pain without sciatica 08/27/2020   • Chronic pain syndrome 08/28/2020   • Renal cyst, acquired, left 12/10/2020   • Aortic root dilatation (HCC) 02/13/2022   • Myofascial pain syndrome 05/09/2022   • Stage 3a chronic kidney disease ([facility] ) 08/31/2022   • Lumbar spondylosis 10/04/2022   • Sacroiliitis ([facility])    • Sacroiliac joint dysfunction of both sides    • Acute L2 compression fracture  10/21/2022   • Compression fracture of L1 vertebra ([facility] ) 11/14/2022   • Urinary frequency 12/09/2022   • Vitamin D deficiency 12/21/2022   • Osteoporosis 12/21/2022   • Ambulatory dysfunction 12/21/2022   • Bradycardia 01/23/2023   • Macrocytosis 01/23/2023   • Hypertension 02/05/2023   • Age-related osteoporosis with current pathological fracture of vertebra ([facility] ) 03/10/2023   • T12 compression fracture, initial encounter ([place] [address] ) 03/10/2023     Resolved Ambulatory Problems     Diagnosis Date Noted   • History of prostate cancer 08/13/2018   • Plantar fasciitis of left foot 10/01/2018   • Strain of calf muscle 05/15/2019   • Gross hematuria 05/06/2022   • Acute right-sided thoracic back pain 05/09/2022   • Acute respiratory insufficiency 01/23/2023   • Eosinophilia 01/23/2023     Past Medical History:   Diagnosis Date   • Astigmatism    • Cataract    • Hematuria    • Kidney stones    • Lumbar vertebral fracture ([facility] )    • Prostate cancer ([place] [address] )    • Renal calculi    • Squamous cell carcinoma        Past Surgical History:   Procedure Laterality Date   • BREAST EXCISIONAL BIOPSY N/A     Benign breast tumor unsure of side over 50 yrs ago   • CATARACT EXTRACTION Left 08/26/2019   • CATARACT EXTRACTION Right 08/12/2019   • COLONOSCOPY     • CYSTOSCOPY  01/28/2016   • ELBOW SURGERY      Bone chip   • HERNIA REPAIR  2002   • IR KYPHOPLASTY/VERTEBROPLASTY  1/4/2023   • IR KYPHOPLASTY/VERTEBROPLASTY  2/6/2023   • IR KYPHOPLASTY/VERTEBROPLASTY  3/23/2023   • LITHOTRIPSY     • RETROPUBIC PROSTATECTOMY  11/13/2001    Radical with nerve sparing       Current Outpatient Medications   Medication Sig Dispense Refill   • acetaminophen (TYLENOL) 325 mg tablet Take 2 tablets (650 mg total) by mouth every 8 (eight) hours  0   • calcium carbonate (OYSTER SHELL,OSCAL) 500 mg Take 2 tablets by mouth daily with breakfast Do not start before January 26, 2023   0   • cholecalciferol (VITAMIN D3) 1,000 units tablet Take by mouth daily 2,000 IU  daily     • docusate sodium (Colace) 100 mg capsule Take 1 capsule (100 mg total) by mouth 2 (two) times a day  0   • lisinopril (ZESTRIL) 10 mg tablet Take 1 tablet (10 mg total) by mouth daily at bedtime 90 tablet 3   • lisinopril (ZESTRIL) 20 mg tablet Take 1 tablet (20 mg total) by mouth every morning 90 tablet 3   • methocarbamol (ROBAXIN) 500 mg tablet Take 1 tablet (500 mg total) by mouth 4 (four) times a day as needed for muscle spasms 120 tablet 0   • metoprolol tartrate (LOPRESSOR) 50 mg tablet Take 50 mg by mouth every 12 (twelve) hours     • Mirabegron ER 25 MG TB24 Take 25 mg by mouth in the morning 30 tablet 5   • Multiple Vitamins-Minerals (CENTRUM SILVER 50+MEN PO) Daily     • triamcinolone (KENALOG) 0 1 % ointment Apply topically 2 (two) times a day For 2 weeks straight to right flank and legs only when rash is active   80 g 0   • verapamil (CALAN-SR) 240 mg CR tablet Take 1 tablet (240 mg total) by mouth daily at bedtime 90 tablet 1     Current Facility-Administered Medications   Medication Dose Route Frequency Provider Last Rate Last Admin   • denosumab (PROLIA) subcutaneous injection 60 mg  60 mg Subcutaneous Q6 Months Nicole Edwards MD   60 mg at 03/15/23 1504       There were no vitals filed for this visit  Objective:    Physical Exam  Constitutional:       Appearance: Normal appearance  Eyes:      Extraocular Movements: Extraocular movements intact  Pupils: Pupils are equal, round, and reactive to light  Pulmonary:      Effort: Pulmonary effort is normal    Musculoskeletal:         General: Tenderness present  Comments: SI joint tenderness   Neurological:      Mental Status: He is alert and oriented to person, place, and time  Mental status is at baseline  Motor: Weakness present  Gait: Gait abnormal    Psychiatric:         Mood and Affect: Mood normal          Behavior: Behavior normal          Thought Content: Thought content normal          Judgment: Judgment normal        Neurologic Exam     Mental Status   Oriented to person, place, and time  Cranial Nerves     CN III, IV, VI   Pupils are equal, round, and reactive to light  Results/Data:  I have reviewed the results and images from the kyphoplasty in detail with the patient

## 2023-05-01 ENCOUNTER — OFFICE VISIT (OUTPATIENT)
Dept: FAMILY MEDICINE CLINIC | Facility: CLINIC | Age: 83
End: 2023-05-01

## 2023-05-01 VITALS
HEIGHT: 71 IN | HEART RATE: 61 BPM | OXYGEN SATURATION: 97 % | DIASTOLIC BLOOD PRESSURE: 72 MMHG | TEMPERATURE: 97.1 F | BODY MASS INDEX: 20.86 KG/M2 | SYSTOLIC BLOOD PRESSURE: 118 MMHG | WEIGHT: 149 LBS

## 2023-05-01 DIAGNOSIS — R19.4 BOWEL HABIT CHANGES: ICD-10-CM

## 2023-05-01 DIAGNOSIS — I10 PRIMARY HYPERTENSION: ICD-10-CM

## 2023-05-01 DIAGNOSIS — M80.08XA AGE-RELATED OSTEOPOROSIS WITH CURRENT PATHOLOGICAL FRACTURE OF VERTEBRA, INITIAL ENCOUNTER (HCC): Primary | ICD-10-CM

## 2023-05-01 DIAGNOSIS — S22.080A T12 COMPRESSION FRACTURE, INITIAL ENCOUNTER (HCC): ICD-10-CM

## 2023-05-03 PROBLEM — S34.109A CLOSED FRACTURE OF LUMBAR VERTEBRA WITH SPINAL CORD INJURY (HCC): Status: RESOLVED | Noted: 2022-10-21 | Resolved: 2023-05-03

## 2023-05-03 PROBLEM — R00.1 BRADYCARDIA: Status: RESOLVED | Noted: 2023-01-23 | Resolved: 2023-05-03

## 2023-05-03 PROBLEM — S32.008A CLOSED FRACTURE OF LUMBAR VERTEBRA WITH SPINAL CORD INJURY (HCC): Status: RESOLVED | Noted: 2022-10-21 | Resolved: 2023-05-03

## 2023-05-03 NOTE — PROGRESS NOTES
Assessment/Plan: Blood pressure stable  Difficult case  Lengthy history  Likely has received some benefit from recent kyphoplasty's  Has had issues with loose stools but also constipated stools  Discussed issues with regards to constipation such as dietary measures, Colace, MiraLAX, milk of magnesia versus loose stools where we would like to avoid dairy, citrus, tomato meat type products  Brat diet reviewed  Wife will continue to monitor and keep us updated  We will actually see them on a social event this weekend and some sure she will keep us updated  Continue normal routine visits as scheduled with labs as scheduled  Time spent today 35 minutes with greater than 50% counseling    No problem-specific Assessment & Plan notes found for this encounter  Diagnoses and all orders for this visit:    Age-related osteoporosis with current pathological fracture of vertebra, initial encounter ([facility] )    Bowel habit changes    T12 compression fracture, initial encounter ([facility] )    Primary hypertension        1  Age-related osteoporosis with current pathological fracture of vertebra, initial encounter ([facility] )        2  Bowel habit changes        3  T12 compression fracture, initial encounter ([facility] )        4  Primary hypertension            Subjective:        Patient ID: Juan Montiel is a 79 y o  male  Chief Complaint   Patient presents with    Constipation       Acute office visit regarding bowel changes  Has been using Colace intermittently  Some stools have been loose some stools have been harder  Recently underwent 3 kyphoplasty's  Doing better  The following portions of the patient's history were reviewed and updated as appropriate: past medical history, past surgical history and problem list       Review of Systems   Constitutional: Negative for chills, fatigue and fever  Eyes: Negative for visual disturbance  Respiratory: Negative for cough and shortness of breath      Cardiovascular: Negative "for chest pain, palpitations and leg swelling  Gastrointestinal: Positive for constipation  Negative for abdominal pain  Genitourinary: Negative for difficulty urinating and dysuria  Neurological: Negative for dizziness and headaches  Psychiatric/Behavioral: The patient is not nervous/anxious  Objective:  /72   Pulse 61   Temp (!) 97 1 °F (36 2 °C)   Ht 5' 11\" (1 803 m)   Wt 67 6 kg (149 lb)   SpO2 97%   BMI 20 78 kg/m²        Physical Exam  Vitals and nursing note reviewed  Constitutional:       General: He is not in acute distress  Appearance: Normal appearance  He is not diaphoretic  HENT:      Head: Normocephalic and atraumatic  Eyes:      General: No scleral icterus  Conjunctiva/sclera: Conjunctivae normal       Pupils: Pupils are equal, round, and reactive to light  Neck:      Thyroid: No thyromegaly  Cardiovascular:      Rate and Rhythm: Normal rate and regular rhythm  Pulses:           Carotid pulses are 0 on the right side and 0 on the left side  Heart sounds: Normal heart sounds  No murmur heard  Pulmonary:      Effort: Pulmonary effort is normal  No respiratory distress  Breath sounds: Normal breath sounds  No wheezing  Abdominal:      General: There is no distension  Palpations: Abdomen is soft  Musculoskeletal:      Cervical back: Normal range of motion and neck supple  Right lower leg: No edema  Left lower leg: No edema  Lymphadenopathy:      Cervical: No cervical adenopathy  Skin:     General: Skin is warm  Coloration: Skin is not pale  Neurological:      General: No focal deficit present  Mental Status: He is alert and oriented to person, place, and time  Cranial Nerves: No cranial nerve deficit  Deep Tendon Reflexes: Reflexes are normal and symmetric  Comments: Patient overall over the last 2 to 3 years is definitely changing  Questionable early mild cognitive issues     Psychiatric:       " Mood and Affect: Mood normal          Behavior: Behavior normal          Thought Content:  Thought content normal          Judgment: Judgment normal

## 2023-05-19 ENCOUNTER — OFFICE VISIT (OUTPATIENT)
Dept: CARDIOLOGY CLINIC | Facility: CLINIC | Age: 83
End: 2023-05-19

## 2023-05-19 VITALS
HEIGHT: 71 IN | WEIGHT: 149.2 LBS | SYSTOLIC BLOOD PRESSURE: 124 MMHG | BODY MASS INDEX: 20.89 KG/M2 | HEART RATE: 60 BPM | DIASTOLIC BLOOD PRESSURE: 60 MMHG

## 2023-05-19 DIAGNOSIS — I49.3 PVC'S (PREMATURE VENTRICULAR CONTRACTIONS): Primary | ICD-10-CM

## 2023-05-19 DIAGNOSIS — I35.1 NONRHEUMATIC AORTIC VALVE INSUFFICIENCY: ICD-10-CM

## 2023-05-19 DIAGNOSIS — I34.0 NON-RHEUMATIC MITRAL REGURGITATION: ICD-10-CM

## 2023-05-19 DIAGNOSIS — I77.810 AORTIC ROOT DILATATION (HCC): ICD-10-CM

## 2023-05-19 DIAGNOSIS — I10 PRIMARY HYPERTENSION: ICD-10-CM

## 2023-05-19 RX ORDER — METOPROLOL SUCCINATE 50 MG/1
50 TABLET, EXTENDED RELEASE ORAL DAILY
Qty: 90 TABLET | Refills: 3 | Status: SHIPPED | OUTPATIENT
Start: 2023-05-19

## 2023-05-19 RX ORDER — IBUPROFEN 200 MG
1 CAPSULE ORAL DAILY
COMMUNITY

## 2023-05-19 NOTE — PROGRESS NOTES
Cardiology Follow Up    Walker Dangelo  1940  [id number]  [address] [place] [id number] [phone]    1  PVC's (premature ventricular contractions)        2  Primary hypertension        3  Non-rheumatic mitral regurgitation        4  Nonrheumatic aortic valve insufficiency        5  Aortic root dilatation (HCC)            Interval History: No cardiovascular complaints  He denies chest pain shortness of breath orthopnea paroxysmal nocturnal dyspnea or syncope  Functional capacity limited secondary to back pain      Patient Active Problem List   Diagnosis   • PVC's (premature ventricular contractions)   • Thoracic aortic aneurysm without rupture ([facility] )   • Nonrheumatic aortic valve insufficiency   • Non-rheumatic mitral regurgitation   • Malignant neoplasm of prostate ([facility] )   • Spinal stenosis of lumbar region with neurogenic claudication   • Lumbar radiculopathy   • Chronic bilateral low back pain without sciatica   • Chronic pain syndrome   • Renal cyst, acquired, left   • Aortic root dilatation (HCC)   • Myofascial pain syndrome   • Stage 3a chronic kidney disease (HCC)   • Lumbar spondylosis   • Sacroiliitis (HCC)   • Sacroiliac joint dysfunction of both sides   • Compression fracture of L1 vertebra (HCC)   • Urinary frequency   • Vitamin D deficiency   • Osteoporosis   • Ambulatory dysfunction   • Macrocytosis   • Hypertension   • Age-related osteoporosis with current pathological fracture of vertebra ([facility])   • T12 compression fracture, initial encounter [facility])     Past Medical History:   Diagnosis Date   • Astigmatism     last assessed 11/15/17   • Cataract     Resolved 11/15/17   • Gross hematuria     Last assessed 01/28/16   • Hematuria     last assessed 01/28/16   • Kidney stones     Patient states he has kidney stonesin esch kidney   • Lumbar vertebral fracture ([facility] )    • Prostate cancer ([facility] ) last assessed 05/18/17   • Renal calculi     last assessed 08/16/12   • Squamous cell carcinoma    • Vitamin D deficiency     Last assessed 08/19/13     Social History     Socioeconomic History   • Marital status: /Civil Union     Spouse name: Not on file   • Number of children: Not on file   • Years of education: Not on file   • Highest education level: Not on file   Occupational History   • Not on file   Tobacco Use   • Smoking status: Never   • Smokeless tobacco: Never   Vaping Use   • Vaping Use: Never used   Substance and Sexual Activity   • Alcohol use: Not Currently     Alcohol/week: 4 0 standard drinks     Types: 4 Glasses of wine per week     Comment: social   • Drug use: No   • Sexual activity: Yes     Partners: Female   Other Topics Concern   • Not on file   Social History Narrative   • Not on file     Social Determinants of Health     Financial Resource Strain: Not on file   Food Insecurity: No Food Insecurity   • Worried About Running Out of Food in the Last Year: Never true   • Ran Out of Food in the Last Year: Never true   Transportation Needs: No Transportation Needs   • Lack of Transportation (Medical): No   • Lack of Transportation (Non-Medical):  No   Physical Activity: Not on file   Stress: Not on file   Social Connections: Not on file   Intimate Partner Violence: Not on file   Housing Stability: Low Risk    • Unable to Pay for Housing in the Last Year: No   • Number of Places Lived in the Last Year: 1   • Unstable Housing in the Last Year: No      Family History   Problem Relation Age of Onset   • Other Mother 76        Influenza   • Hypertension Mother    • Heart failure Father 70   • Peripheral vascular disease Father    • Hypertension Father    • Prostate cancer Brother    • Stroke Brother 48   • Hypertension Brother    • Hypertension Family      Past Surgical History:   Procedure Laterality Date   • BREAST EXCISIONAL BIOPSY N/A     Benign breast tumor unsure of side over 50 yrs ago • CATARACT EXTRACTION Left 08/26/2019   • CATARACT EXTRACTION Right 08/12/2019   • COLONOSCOPY     • CYSTOSCOPY  01/28/2016   • ELBOW SURGERY      Bone chip   • HERNIA REPAIR  2002   • IR KYPHOPLASTY/VERTEBROPLASTY  1/4/2023   • IR KYPHOPLASTY/VERTEBROPLASTY  2/6/2023   • IR KYPHOPLASTY/VERTEBROPLASTY  3/23/2023   • LITHOTRIPSY     • RETROPUBIC PROSTATECTOMY  11/13/2001    Radical with nerve sparing       Current Outpatient Medications:   •  acetaminophen (TYLENOL) 325 mg tablet, Take 2 tablets (650 mg total) by mouth every 8 (eight) hours (Patient taking differently: Take 650 mg by mouth every 8 (eight) hours Once daily), Disp: , Rfl: 0  •  calcium citrate (CALCITRATE) 950 (200 Ca) MG tablet, Take 1 tablet by mouth daily 1,200 mg daily, Disp: , Rfl:   •  cholecalciferol (VITAMIN D3) 1,000 units tablet, Take 3,000 Units by mouth daily 2,000 IU  daily, Disp: , Rfl:   •  lisinopril (ZESTRIL) 10 mg tablet, Take 1 tablet (10 mg total) by mouth daily at bedtime, Disp: 90 tablet, Rfl: 3  •  lisinopril (ZESTRIL) 20 mg tablet, Take 1 tablet (20 mg total) by mouth every morning, Disp: 90 tablet, Rfl: 3  •  methocarbamol (ROBAXIN) 500 mg tablet, Take 1 tablet (500 mg total) by mouth 4 (four) times a day as needed for muscle spasms, Disp: 120 tablet, Rfl: 0  •  metoprolol tartrate (LOPRESSOR) 50 mg tablet, Take 50 mg by mouth every 12 (twelve) hours Pt currently taking 25mg BID, Disp: , Rfl:   •  Mirabegron ER 25 MG TB24, Take 25 mg by mouth in the morning, Disp: 30 tablet, Rfl: 5  •  verapamil (CALAN-SR) 240 mg CR tablet, Take 1 tablet (240 mg total) by mouth daily at bedtime, Disp: 90 tablet, Rfl: 1  •  docusate sodium (Colace) 100 mg capsule, Take 1 capsule (100 mg total) by mouth 2 (two) times a day (Patient not taking: Reported on 4/21/2023), Disp: , Rfl: 0  •  triamcinolone (KENALOG) 0 1 % ointment, Apply topically 2 (two) times a day For 2 weeks straight to right flank and legs only when rash is active   (Patient not taking: Reported on 4/21/2023), Disp: 80 g, Rfl: 0    Current Facility-Administered Medications:   •  denosumab (PROLIA) subcutaneous injection 60 mg, 60 mg, Subcutaneous, Q6 Months, Marcella Pedersen MD, 60 mg at 03/15/23 1504  Allergies   Allergen Reactions   • Sulfa Antibiotics Rash   • Oxycodone Confusion   • Ciprofloxacin    • Gabapentin Dizziness   • Lyrica [Pregabalin] Hallucinations     Blurry vision , hallucinations, confusion    • Medical Tape Rash       Labs:  No visits with results within 2 Month(s) from this visit  Latest known visit with results is:   Appointment on 03/09/2023   Component Date Value   • Sodium 03/09/2023 137    • Potassium 03/09/2023 4 3    • Chloride 03/09/2023 105    • CO2 03/09/2023 28    • ANION GAP 03/09/2023 4    • BUN 03/09/2023 22    • Creatinine 03/09/2023 1 23    • Glucose, Fasting 03/09/2023 107 (H)    • Calcium 03/09/2023 9 6    • eGFR 03/09/2023 53      Imaging: No results found  Review of Systems:  Review of Systems   Constitutional: Negative for fatigue  HENT: Negative for nosebleeds  Eyes: Negative for redness  Respiratory: Negative for chest tightness and shortness of breath  Cardiovascular: Negative for chest pain, palpitations and leg swelling  Gastrointestinal: Positive for constipation  Negative for abdominal pain  Endocrine: Negative for polyuria  Genitourinary: Negative for urgency  Musculoskeletal: Positive for back pain  Negative for arthralgias  Skin: Negative for rash  Neurological: Negative for dizziness and syncope  Psychiatric/Behavioral: Negative for confusion and sleep disturbance  The patient is not nervous/anxious  Physical Exam:  Physical Exam  Vitals and nursing note reviewed  Constitutional:       Appearance: Normal appearance  HENT:      Head: Normocephalic and atraumatic        Nose: Nose normal       Mouth/Throat:      Mouth: Mucous membranes are moist    Eyes:      Conjunctiva/sclera: Conjunctivae normal  Cardiovascular:      Rate and Rhythm: Normal rate and regular rhythm  Pulmonary:      Effort: Pulmonary effort is normal       Breath sounds: Normal breath sounds  Abdominal:      Palpations: Abdomen is soft  Musculoskeletal:         General: Normal range of motion  Cervical back: Normal range of motion  Right lower leg: Edema present  Left lower leg: Edema present  Skin:     General: Skin is warm and dry  Neurological:      General: No focal deficit present  Mental Status: He is alert and oriented to person, place, and time  Psychiatric:         Mood and Affect: Mood normal          Discussion/Summary: History of PVCs  None noted on exam today  He is maintained on verapamil metoprolol and lisinopril with good blood pressure control  He has known mild aortic and mitral insufficiency  He also has mild aortic root dilatation  He has no cardiovascular symptoms otherwise noted his functional capacity is limited as he is quite debilitated by his osteoporosis and back pain  His last echocardiogram was March 2022  We will check another one  In 2024 for surveillance of his valvular issues although it is unlikely he will ever be a candidate for heart surgery  I will see him again in 6 months

## 2023-07-18 ENCOUNTER — OFFICE VISIT (OUTPATIENT)
Dept: PAIN MEDICINE | Facility: CLINIC | Age: 83
End: 2023-07-18
Payer: MEDICARE

## 2023-07-18 VITALS
TEMPERATURE: 98 F | BODY MASS INDEX: 28.51 KG/M2 | DIASTOLIC BLOOD PRESSURE: 70 MMHG | HEIGHT: 61 IN | WEIGHT: 151 LBS | HEART RATE: 51 BPM | SYSTOLIC BLOOD PRESSURE: 122 MMHG

## 2023-07-18 DIAGNOSIS — R26.2 AMBULATORY DYSFUNCTION: ICD-10-CM

## 2023-07-18 DIAGNOSIS — M48.062 SPINAL STENOSIS OF LUMBAR REGION WITH NEUROGENIC CLAUDICATION: Primary | ICD-10-CM

## 2023-07-18 DIAGNOSIS — M47.816 LUMBAR SPONDYLOSIS: ICD-10-CM

## 2023-07-18 PROCEDURE — 99214 OFFICE O/P EST MOD 30 MIN: CPT | Performed by: ANESTHESIOLOGY

## 2023-07-18 NOTE — PROGRESS NOTES
Assessment  1. Spinal stenosis of lumbar region with neurogenic claudication    2. Lumbar spondylosis    3. Ambulatory dysfunction        Plan  The patient's pain persists despite time, relative rest, activity modification and therapy. I believe that Dina Shepard may benefit from a lumbar epidural steroid injection to diminish any inflammatory component of his pain. I will initially use an translaminar approach. If the patient does not receive significant pain relief following the initial injection, I may need to repeat using a transforaminal approach that may allow for better concentrate of the steroid along the affected nerve root. In the office today, we reviewed the nature of the patient's pathology in depth using diagrams and models. I discussed the approach I would use for the epidural steroid injection and provided literature for home review. The patient understands the risks associated with the procedure including but not limited to bleeding, infection, tissue injury, exacerbation of symptoms, allergic reaction, decreased immunity secondary to steroid injection, spinal headache, and paralysis and provided verbal consent today. My impressions and treatment recommendations were discussed in detail with the patient who verbalized understanding and had no further questions. Discharge instructions were provided. I personally saw and examined the patient and I agree with the above discussed plan of care. Orders Placed This Encounter   Procedures   • FL spine and pain procedure     Order Specific Question:   Reason for Exam:     Answer:   LESI     Order Specific Question:   Anticoagulant hold needed? Answer:   no   • FL spine and pain procedure     Standing Status:   Future     Standing Expiration Date:   7/18/2027     Order Specific Question:   Reason for Exam:     Answer:   LESI     Order Specific Question:   Anticoagulant hold needed?      Answer:   no         History of Present Illness    Krystal diaz a 79 y.o. male who presents for reevaluation. He has persistent low back pain which is significant impairing with his daily living activities. He is continuingly going to physical therapy. He rates his pain as 5 out of 10 the visual analog scale. He is admitted back pain is significantly improved after vertebroplasty. He ambulates with a walker. Pain is worse with standing and walking completely relieved with sitting. Denies any loss of bowel or bladder function. I have personally reviewed and/or updated the patient's past medical history, past surgical history, family history, social history, current medications, allergies, and vital signs today. Review of Systems   Constitutional: Negative for fever and unexpected weight change. HENT: Negative for trouble swallowing. Eyes: Negative for visual disturbance. Respiratory: Negative for shortness of breath and wheezing. Cardiovascular: Negative for chest pain and palpitations. Gastrointestinal: Negative for constipation, diarrhea, nausea and vomiting. Endocrine: Negative for cold intolerance, heat intolerance and polydipsia. Genitourinary: Negative for difficulty urinating and frequency. Musculoskeletal: Positive for gait problem. Negative for arthralgias, joint swelling and myalgias. Skin: Negative for rash. Neurological: Negative for dizziness, seizures, syncope, weakness and headaches. Hematological: Does not bruise/bleed easily. Psychiatric/Behavioral: Negative for dysphoric mood. All other systems reviewed and are negative.       Patient Active Problem List   Diagnosis   • PVC's (premature ventricular contractions)   • Thoracic aortic aneurysm without rupture (HCC)   • Nonrheumatic aortic valve insufficiency   • Non-rheumatic mitral regurgitation   • Malignant neoplasm of prostate ([address])   • Spinal stenosis of lumbar region with neurogenic claudication   • Lumbar radiculopathy   • Chronic bilateral low back pain without sciatica   • Chronic pain syndrome   • Renal cyst, acquired, left   • Aortic root dilatation (HCC)   • Myofascial pain syndrome   • Stage 3a chronic kidney disease (HCC)   • Lumbar spondylosis   • Sacroiliitis (HCC)   • Sacroiliac joint dysfunction of both sides   • Compression fracture of L1 vertebra (HCC)   • Urinary frequency   • Vitamin D deficiency   • Osteoporosis   • Ambulatory dysfunction   • Macrocytosis   • Hypertension   • Age-related osteoporosis with current pathological fracture of vertebra (HCC)   • T12 compression fracture, initial encounter ([address])       Past Medical History:   Diagnosis Date   • Astigmatism     last assessed 11/15/17   • Cataract     Resolved 11/15/17   • Gross hematuria     Last assessed 01/28/16   • Hematuria     last assessed 01/28/16   • Kidney stones     Patient states he has kidney stonesin esch kidney   • Lumbar vertebral fracture ([address])    • Prostate cancer ([address])     last assessed 05/18/17   • Renal calculi     last assessed 08/16/12   • Squamous cell carcinoma    • Vitamin D deficiency     Last assessed 08/19/13       Past Surgical History:   Procedure Laterality Date   • BREAST EXCISIONAL BIOPSY N/A     Benign breast tumor unsure of side over 50 yrs ago   • CATARACT EXTRACTION Left 08/26/2019   • CATARACT EXTRACTION Right 08/12/2019   • COLONOSCOPY     • CYSTOSCOPY  01/28/2016   • ELBOW SURGERY      Bone chip   • HERNIA REPAIR  2002   • IR KYPHOPLASTY/VERTEBROPLASTY  1/4/2023   • IR KYPHOPLASTY/VERTEBROPLASTY  2/6/2023   • IR KYPHOPLASTY/VERTEBROPLASTY  3/23/2023   • LITHOTRIPSY     • RETROPUBIC PROSTATECTOMY  11/13/2001    Radical with nerve sparing       Family History   Problem Relation Age of Onset   • Other Mother 76        Influenza   • Hypertension Mother    • Heart failure Father 70   • Peripheral vascular disease Father    • Hypertension Father    • Prostate cancer Brother    • Stroke Brother 48   • Hypertension Brother    • Hypertension Family        Social History Occupational History   • Not on file   Tobacco Use   • Smoking status: Never   • Smokeless tobacco: Never   Vaping Use   • Vaping Use: Never used   Substance and Sexual Activity   • Alcohol use: Not Currently     Alcohol/week: 4.0 standard drinks of alcohol     Types: 4 Glasses of wine per week     Comment: social   • Drug use: No   • Sexual activity: Yes     Partners: Female       Current Outpatient Medications on File Prior to Visit   Medication Sig   • acetaminophen (TYLENOL) 325 mg tablet Take 2 tablets (650 mg total) by mouth every 8 (eight) hours (Patient taking differently: Take 650 mg by mouth every 8 (eight) hours Once daily)   • calcium citrate (CALCITRATE) 950 (200 Ca) MG tablet Take 1 tablet by mouth daily 1,200 mg daily   • docusate sodium (Colace) 100 mg capsule Take 1 capsule (100 mg total) by mouth 2 (two) times a day   • lisinopril (ZESTRIL) 10 mg tablet Take 1 tablet (10 mg total) by mouth daily at bedtime   • lisinopril (ZESTRIL) 20 mg tablet Take 1 tablet (20 mg total) by mouth every morning   • metoprolol succinate (TOPROL-XL) 50 mg 24 hr tablet Take 1 tablet (50 mg total) by mouth daily   • Mirabegron ER 25 MG TB24 Take 25 mg by mouth in the morning   • verapamil (CALAN-SR) 240 mg CR tablet Take 1 tablet (240 mg total) by mouth daily at bedtime   • cholecalciferol (VITAMIN D3) 1,000 units tablet Take 3,000 Units by mouth daily 2,000 IU  daily   • methocarbamol (ROBAXIN) 500 mg tablet Take 1 tablet (500 mg total) by mouth 4 (four) times a day as needed for muscle spasms   • triamcinolone (KENALOG) 0.1 % ointment Apply topically 2 (two) times a day For 2 weeks straight to right flank and legs only when rash is active.  (Patient not taking: Reported on 4/21/2023)     Current Facility-Administered Medications on File Prior to Visit   Medication   • denosumab (PROLIA) subcutaneous injection 60 mg       Allergies   Allergen Reactions   • Sulfa Antibiotics Rash   • Oxycodone Confusion   • Ciprofloxacin    • Gabapentin Dizziness   • Lyrica [Pregabalin] Hallucinations     Blurry vision , hallucinations, confusion    • Medical Tape Rash       Physical Exam    /70 (BP Location: Left arm, Patient Position: Sitting, Cuff Size: Standard)   Pulse (!) 51   Temp 98 °F (36.7 °C)   Ht 5' 1" (1.549 m)   Wt 68.5 kg (151 lb)   BMI 28.53 kg/m²     Constitutional: normal, well developed, well nourished, alert, in no distress and non-toxic and no overt pain behavior. Eyes: anicteric  HEENT: grossly intact  Neck: supple, symmetric, trachea midline and no masses   Pulmonary:even and unlabored  Cardiovascular:No edema or pitting edema present  Skin:Normal without rashes or lesions and well hydrated  Psychiatric:Mood and affect appropriate  Neurologic:Cranial Nerves II-XII grossly intact  Musculoskeletal: Ambulates with a walker, difficulty going from sitting to standing sitting position; no obvious skin lesions or erythema lumbar sacral spine; mild tenderness in lumbar paravertebrals, no sacroiliac or greater trochanteric tenderness bilateral; deep tendon reflexes are diminished but symmetrical bilateral patellar and achilles; no focal motor deficit appreciated lower limbs; negative bilateral straight leg raising. Imaging  MRI LUMBAR SPINE WITHOUT CONTRAST @  3-2-23     INDICATION: S32.010A: Wedge compression fracture of first lumbar vertebra, initial encounter for closed fracture.     COMPARISON:  2/24/2023 and 1/23/2023     TECHNIQUE:  Multiplanar, multisequence imaging of the lumbar spine was performed. .          IMAGE QUALITY:  Diagnostic     FINDINGS:     VERTEBRAL BODIES:  There are 5 lumbar type vertebral bodies. There is right lateral listhesis of L2 on L3 and L3-L4. There is mild left lateral listhesis of L4-L5.       The patient is status post vertebroplasty of the L1 and L2 vertebral bodies. There is moderate loss of height of the L1 vertebral body, progressed since prior MRI.   There is moderate to severe loss of height of the L2 vertebral body, stable. There is   mild bony retropulsion of the superior endplate of L2.     There has been interval development of an acute fracture along the inferior endplate of C39 with microtrabecular edema and mild loss of height.     SACRUM:  Normal signal within the sacrum. No evidence of insufficiency or stress fracture.     DISTAL CORD AND CONUS:  Normal size and signal within the distal cord and conus.     PARASPINAL SOFT TISSUES:  Paraspinal soft tissues are unremarkable.     LOWER THORACIC DISC SPACES:  Normal disc height and signal.  No disc herniation, canal stenosis or foraminal narrowing.     LUMBAR DISC SPACES:     There are multilevel degenerative changes of the lumbar spine as previously described.     OTHER FINDINGS:  There are Tarlov cysts noted. There are bilateral renal cysts.     IMPRESSION:     Interval development of a fracture along the inferior endplate of F04 with mild loss of height and associated microtrabecular edema.     Status post vertebroplasty of the L1 vertebral body with moderate loss of height, progressed.     Stable moderate to severe loss of height of the L2 vertebral body status post vertebroplasty.     The study was marked in EPIC for immediate notification.       I have personally reviewed pertinent films in PACS and my interpretation is mulitlevel foraminal stenosis and spondylosis.

## 2023-07-20 ENCOUNTER — HOSPITAL ENCOUNTER (OUTPATIENT)
Dept: RADIOLOGY | Facility: CLINIC | Age: 83
Discharge: HOME | End: 2023-07-20
Admitting: ANESTHESIOLOGY
Payer: MEDICARE

## 2023-07-20 VITALS
RESPIRATION RATE: 18 BRPM | OXYGEN SATURATION: 96 % | TEMPERATURE: 97.5 F | SYSTOLIC BLOOD PRESSURE: 139 MMHG | DIASTOLIC BLOOD PRESSURE: 89 MMHG | HEART RATE: 57 BPM

## 2023-07-20 PROCEDURE — 62323 NJX INTERLAMINAR LMBR/SAC: CPT | Performed by: ANESTHESIOLOGY

## 2023-07-20 RX ORDER — METHYLPREDNISOLONE ACETATE 80 MG/ML
160 INJECTION, SUSPENSION INTRA-ARTICULAR; INTRALESIONAL; INTRAMUSCULAR; PARENTERAL; SOFT TISSUE ONCE
Status: COMPLETED | OUTPATIENT
Start: 2023-07-20 | End: 2023-07-20

## 2023-07-20 RX ADMIN — METHYLPREDNISOLONE ACETATE 160 MG: 80 INJECTION, SUSPENSION INTRA-ARTICULAR; INTRALESIONAL; INTRAMUSCULAR; SOFT TISSUE at 11:11

## 2023-07-20 RX ADMIN — IOHEXOL 1 ML: 300 INJECTION, SOLUTION INTRAVENOUS at 11:10

## 2023-07-20 NOTE — DISCHARGE INSTRUCTIONS
Epidural Steroid Injection   WHAT YOU NEED TO KNOW:   An epidural steroid injection (ROBBI) is a procedure to inject steroid medicine into the epidural space. The epidural space is between your spinal cord and vertebrae. Steroids reduce inflammation and fluid buildup in your spine that may be causing pain. You may be given pain medicine along with the steroids. ACTIVITY  Do not drive or operate machinery today. No strenuous activity today - bending, lifting, etc.  You may resume normal activites starting tomorrow - start slowly and as tolerated. You may shower today, but no tub baths or hot tubs. You may have numbness for several hours from the local anesthetic. Please use caution and common sense, especially with weight-bearing activities. CARE OF THE INJECTION SITE  If you have soreness or pain, apply ice to the area today (20 minutes on/20 minutes off). Starting tomorrow, you may use warm, moist heat or ice if needed. You may have an increase or change in your discomfort for 36-48 hours after your treatment. Apply ice and continue with any pain medication you have been prescribed. Notify the [facility] if you have any of the following: redness, drainage, swelling, headache, stiff neck or fever above 100°F.    SPECIAL INSTRUCTIONS  Our office will contact you in approximately 7 days for a progress report. MEDICATIONS  Continue to take all routine medications. Our office may have instructed you to hold some medications. As no general anesthesia was used in today's procedure, you should not experience any side effects related to anesthesia. If you are diabetic, the steroids used in today's injection may temporarily increase your blood sugar levels after the first few days after your injection. Please keep a close eye on your sugars and alert the doctor who manages your diabetes if your sugars are significantly high from your baseline or you are symptomatic.      If you have a problem specifically related to your procedure, please call our office at [phone]. Problems not related to your procedure should be directed to your primary care physician.

## 2023-07-20 NOTE — H&P
History of Present Illness: The patient is a 79 y.o. male who presents with complaints of low back and leg pain.     Past Medical History:   Diagnosis Date   • Astigmatism     last assessed 11/15/17   • Cataract     Resolved 11/15/17   • Gross hematuria     Last assessed 01/28/16   • Hematuria     last assessed 01/28/16   • Kidney stones     Patient states he has kidney stonesin esc kidney   • Lumbar vertebral fracture ([address])    • Prostate cancer ([address])     last assessed 05/18/17   • Renal calculi     last assessed 08/16/12   • Squamous cell carcinoma    • Vitamin D deficiency     Last assessed 08/19/13       Past Surgical History:   Procedure Laterality Date   • BREAST EXCISIONAL BIOPSY N/A     Benign breast tumor unsure of side over 50 yrs ago   • CATARACT EXTRACTION Left 08/26/2019   • CATARACT EXTRACTION Right 08/12/2019   • COLONOSCOPY     • CYSTOSCOPY  01/28/2016   • ELBOW SURGERY      Bone chip   • HERNIA REPAIR  2002   • IR KYPHOPLASTY/VERTEBROPLASTY  1/4/2023   • IR KYPHOPLASTY/VERTEBROPLASTY  2/6/2023   • IR KYPHOPLASTY/VERTEBROPLASTY  3/23/2023   • LITHOTRIPSY     • RETROPUBIC PROSTATECTOMY  11/13/2001    Radical with nerve sparing         Current Outpatient Medications:   •  acetaminophen (TYLENOL) 325 mg tablet, Take 2 tablets (650 mg total) by mouth every 8 (eight) hours (Patient taking differently: Take 650 mg by mouth every 8 (eight) hours Once daily), Disp: , Rfl: 0  •  calcium citrate (CALCITRATE) 950 (200 Ca) MG tablet, Take 1 tablet by mouth daily 1,200 mg daily, Disp: , Rfl:   •  cholecalciferol (VITAMIN D3) 1,000 units tablet, Take 3,000 Units by mouth daily 2,000 IU  daily, Disp: , Rfl:   •  docusate sodium (Colace) 100 mg capsule, Take 1 capsule (100 mg total) by mouth 2 (two) times a day, Disp: , Rfl: 0  •  lisinopril (ZESTRIL) 10 mg tablet, Take 1 tablet (10 mg total) by mouth daily at bedtime, Disp: 90 tablet, Rfl: 3  •  lisinopril (ZESTRIL) 20 mg tablet, Take 1 tablet (20 mg total) by mouth every morning, Disp: 90 tablet, Rfl: 3  •  methocarbamol (ROBAXIN) 500 mg tablet, Take 1 tablet (500 mg total) by mouth 4 (four) times a day as needed for muscle spasms, Disp: 120 tablet, Rfl: 0  •  metoprolol succinate (TOPROL-XL) 50 mg 24 hr tablet, Take 1 tablet (50 mg total) by mouth daily, Disp: 90 tablet, Rfl: 3  •  Mirabegron ER 25 MG TB24, Take 25 mg by mouth in the morning, Disp: 30 tablet, Rfl: 5  •  triamcinolone (KENALOG) 0.1 % ointment, Apply topically 2 (two) times a day For 2 weeks straight to right flank and legs only when rash is active.  (Patient not taking: Reported on 4/21/2023), Disp: 80 g, Rfl: 0  •  verapamil (CALAN-SR) 240 mg CR tablet, Take 1 tablet (240 mg total) by mouth daily at bedtime, Disp: 90 tablet, Rfl: 1    Current Facility-Administered Medications:   •  denosumab (PROLIA) subcutaneous injection 60 mg, 60 mg, Subcutaneous, Q6 Months, Earl Lozano MD, 60 mg at 03/15/23 1504  •  iohexol (OMNIPAQUE) 300 mg/mL injection 1 mL, 1 mL, Epidural, Once, Franky Wallace DO  •  methylPREDNISolone acetate (DEPO-MEDROL) injection 160 mg, 160 mg, Epidural, Once, Franky Wallace DO    Allergies   Allergen Reactions   • Sulfa Antibiotics Rash   • Oxycodone Confusion   • Ciprofloxacin    • Gabapentin Dizziness   • Lyrica [Pregabalin] Hallucinations     Blurry vision , hallucinations, confusion    • Medical Tape Rash       Physical Exam:   General: Awake, Alert, Oriented x 3, Mood and affect appropriate  Respiratory: Respirations even and unlabored  Cardiovascular: Peripheral pulses intact; no edema  Musculoskeletal Exam: Ambulates with a walker    ASA Score: II         Assessment: Lumbar spinal stenosis with neurogenic claudication    Plan: MELVI

## 2023-07-27 ENCOUNTER — TELEPHONE (OUTPATIENT)
Dept: PAIN MEDICINE | Facility: CLINIC | Age: 83
End: 2023-07-27

## 2023-07-27 NOTE — TELEPHONE ENCOUNTER
Caller: Yareli Villeda   Doctor/officeRoz Tan  CB#: [phone]    % of improvement: 10%  Pain Scale (1-10): 6/10

## 2023-08-03 ENCOUNTER — HOSPITAL ENCOUNTER (OUTPATIENT)
Dept: RADIOLOGY | Facility: CLINIC | Age: 83
Discharge: HOME | End: 2023-08-03
Admitting: ANESTHESIOLOGY
Payer: MEDICARE

## 2023-08-03 VITALS
OXYGEN SATURATION: 97 % | RESPIRATION RATE: 18 BRPM | TEMPERATURE: 97.5 F | SYSTOLIC BLOOD PRESSURE: 146 MMHG | DIASTOLIC BLOOD PRESSURE: 62 MMHG | HEART RATE: 63 BPM

## 2023-08-03 DIAGNOSIS — M48.062 SPINAL STENOSIS OF LUMBAR REGION WITH NEUROGENIC CLAUDICATION: ICD-10-CM

## 2023-08-03 PROCEDURE — 64483 NJX AA&/STRD TFRM EPI L/S 1: CPT | Performed by: ANESTHESIOLOGY

## 2023-08-03 RX ORDER — METHYLPREDNISOLONE ACETATE 80 MG/ML
160 INJECTION, SUSPENSION INTRA-ARTICULAR; INTRALESIONAL; INTRAMUSCULAR; PARENTERAL; SOFT TISSUE ONCE
Status: COMPLETED | OUTPATIENT
Start: 2023-08-03 | End: 2023-08-03

## 2023-08-03 RX ADMIN — METHYLPREDNISOLONE ACETATE 160 MG: 80 INJECTION, SUSPENSION INTRA-ARTICULAR; INTRALESIONAL; INTRAMUSCULAR; SOFT TISSUE at 11:37

## 2023-08-03 RX ADMIN — IOHEXOL 2 ML: 300 INJECTION, SOLUTION INTRAVENOUS at 11:37

## 2023-08-03 NOTE — DISCHARGE INSTRUCTIONS
Epidural Steroid Injection   WHAT YOU NEED TO KNOW:   An epidural steroid injection (ROBBI) is a procedure to inject steroid medicine into the epidural space. The epidural space is between your spinal cord and vertebrae. Steroids reduce inflammation and fluid buildup in your spine that may be causing pain. You may be given pain medicine along with the steroids. ACTIVITY  Do not drive or operate machinery today. No strenuous activity today - bending, lifting, etc.  You may resume normal activites starting tomorrow - start slowly and as tolerated. You may shower today, but no tub baths or hot tubs. You may have numbness for several hours from the local anesthetic. Please use caution and common sense, especially with weight-bearing activities. CARE OF THE INJECTION SITE  If you have soreness or pain, apply ice to the area today (20 minutes on/20 minutes off). Starting tomorrow, you may use warm, moist heat or ice if needed. You may have an increase or change in your discomfort for 36-48 hours after your treatment. Apply ice and continue with any pain medication you have been prescribed. Notify the [facility] if you have any of the following: redness, drainage, swelling, headache, stiff neck or fever above 100°F.    SPECIAL INSTRUCTIONS  Our office will contact you in approximately 7 days for a progress report. MEDICATIONS  Continue to take all routine medications. Our office may have instructed you to hold some medications. As no general anesthesia was used in today's procedure, you should not experience any side effects related to anesthesia. If you are diabetic, the steroids used in today's injection may temporarily increase your blood sugar levels after the first few days after your injection. Please keep a close eye on your sugars and alert the doctor who manages your diabetes if your sugars are significantly high from your baseline or you are symptomatic.      If you have a problem specifically related to your procedure, please call our office at [phone]. Problems not related to your procedure should be directed to your primary care physician.

## 2023-08-03 NOTE — H&P
History of Present Illness: The patient is a 79 y.o. male who presents with complaints of low back and leg pain.     Past Medical History:   Diagnosis Date   • Astigmatism     last assessed 11/15/17   • Cataract     Resolved 11/15/17   • Gross hematuria     Last assessed 01/28/16   • Hematuria     last assessed 01/28/16   • Kidney stones     Patient states he has kidney stonesin esc kidney   • Lumbar vertebral fracture ([address])    • Prostate cancer ([address])     last assessed 05/18/17   • Renal calculi     last assessed 08/16/12   • Squamous cell carcinoma    • Vitamin D deficiency     Last assessed 08/19/13       Past Surgical History:   Procedure Laterality Date   • BREAST EXCISIONAL BIOPSY N/A     Benign breast tumor unsure of side over 50 yrs ago   • CATARACT EXTRACTION Left 08/26/2019   • CATARACT EXTRACTION Right 08/12/2019   • COLONOSCOPY     • CYSTOSCOPY  01/28/2016   • ELBOW SURGERY      Bone chip   • HERNIA REPAIR  2002   • IR KYPHOPLASTY/VERTEBROPLASTY  1/4/2023   • IR KYPHOPLASTY/VERTEBROPLASTY  2/6/2023   • IR KYPHOPLASTY/VERTEBROPLASTY  3/23/2023   • LITHOTRIPSY     • RETROPUBIC PROSTATECTOMY  11/13/2001    Radical with nerve sparing         Current Outpatient Medications:   •  acetaminophen (TYLENOL) 325 mg tablet, Take 2 tablets (650 mg total) by mouth every 8 (eight) hours (Patient taking differently: Take 650 mg by mouth every 8 (eight) hours Once daily), Disp: , Rfl: 0  •  calcium citrate (CALCITRATE) 950 (200 Ca) MG tablet, Take 1 tablet by mouth daily 1,200 mg daily, Disp: , Rfl:   •  cholecalciferol (VITAMIN D3) 1,000 units tablet, Take 3,000 Units by mouth daily 2,000 IU  daily, Disp: , Rfl:   •  docusate sodium (Colace) 100 mg capsule, Take 1 capsule (100 mg total) by mouth 2 (two) times a day, Disp: , Rfl: 0  •  lisinopril (ZESTRIL) 10 mg tablet, Take 1 tablet (10 mg total) by mouth daily at bedtime, Disp: 90 tablet, Rfl: 3  •  lisinopril (ZESTRIL) 20 mg tablet, Take 1 tablet (20 mg total) by mouth every morning, Disp: 90 tablet, Rfl: 3  •  methocarbamol (ROBAXIN) 500 mg tablet, Take 1 tablet (500 mg total) by mouth 4 (four) times a day as needed for muscle spasms, Disp: 120 tablet, Rfl: 0  •  metoprolol succinate (TOPROL-XL) 50 mg 24 hr tablet, Take 1 tablet (50 mg total) by mouth daily, Disp: 90 tablet, Rfl: 3  •  Mirabegron ER 25 MG TB24, Take 25 mg by mouth in the morning, Disp: 30 tablet, Rfl: 5  •  triamcinolone (KENALOG) 0.1 % ointment, Apply topically 2 (two) times a day For 2 weeks straight to right flank and legs only when rash is active. (Patient not taking: Reported on 4/21/2023), Disp: 80 g, Rfl: 0  •  verapamil (CALAN-SR) 240 mg CR tablet, Take 1 tablet (240 mg total) by mouth daily at bedtime, Disp: 90 tablet, Rfl: 1    Current Facility-Administered Medications:   •  denosumab (PROLIA) subcutaneous injection 60 mg, 60 mg, Subcutaneous, Q6 Months, Adolfo Caldwell MD, 60 mg at 03/15/23 1504  •  iohexol (OMNIPAQUE) 300 mg/mL injection 2 mL, 2 mL, Epidural, Once, Franky Wallace DO  •  methylPREDNISolone acetate (DEPO-MEDROL) injection 160 mg, 160 mg, Epidural, Once, Franky Wallace DO    Allergies   Allergen Reactions   • Sulfa Antibiotics Rash   • Oxycodone Confusion   • Ciprofloxacin    • Gabapentin Dizziness   • Lyrica [Pregabalin] Hallucinations     Blurry vision , hallucinations, confusion    • Medical Tape Rash       Physical Exam:   General: Awake, Alert, Oriented x 3, Mood and affect appropriate  Respiratory: Respirations even and unlabored  Cardiovascular: Peripheral pulses intact; no edema  Musculoskeletal Exam: Ambulates with a cane    ASA Score: II         Assessment:   1.  Spinal stenosis of lumbar region with neurogenic claudication        Plan: B/L S1 TFESI [id number]

## 2023-08-04 DIAGNOSIS — N39.41 URGE INCONTINENCE OF URINE: ICD-10-CM

## 2023-08-04 DIAGNOSIS — I49.3 PVC'S (PREMATURE VENTRICULAR CONTRACTIONS): ICD-10-CM

## 2023-08-04 RX ORDER — MIRABEGRON 25 MG/1
25 TABLET, FILM COATED, EXTENDED RELEASE ORAL EVERY MORNING
Qty: 30 TABLET | Refills: 5 | Status: SHIPPED | OUTPATIENT
Start: 2023-08-04

## 2023-08-08 ENCOUNTER — TELEPHONE (OUTPATIENT)
Dept: CARDIOLOGY CLINIC | Facility: CLINIC | Age: 83
End: 2023-08-08

## 2023-08-08 RX ORDER — VERAPAMIL HYDROCHLORIDE 240 MG/1
240 TABLET, FILM COATED, EXTENDED RELEASE ORAL
Qty: 90 TABLET | Refills: 1 | Status: SHIPPED | OUTPATIENT
Start: 2023-08-08

## 2023-08-08 NOTE — TELEPHONE ENCOUNTER
Spouse called tot refill this medication.       Made a follow up appointment while speaking to her     verapamil (CALAN-SR) 240 mg CR tablet [[id number]

## 2023-08-10 ENCOUNTER — TELEPHONE (OUTPATIENT)
Dept: PAIN MEDICINE | Facility: CLINIC | Age: 83
End: 2023-08-10

## 2023-08-10 NOTE — TELEPHONE ENCOUNTER
Pt reports no improvement post inj   Pain level 4/10  Pt aware I will call next week for an update      B/L S1 TFESI 8/3, 9/13 F/u

## 2023-08-11 ENCOUNTER — OFFICE VISIT (OUTPATIENT)
Dept: FAMILY MEDICINE CLINIC | Facility: CLINIC | Age: 83
End: 2023-08-11
Payer: MEDICARE

## 2023-08-11 VITALS
HEART RATE: 57 BPM | HEIGHT: 61 IN | BODY MASS INDEX: 27.38 KG/M2 | DIASTOLIC BLOOD PRESSURE: 72 MMHG | OXYGEN SATURATION: 98 % | SYSTOLIC BLOOD PRESSURE: 130 MMHG | TEMPERATURE: 96.9 F | WEIGHT: 145 LBS

## 2023-08-11 DIAGNOSIS — E66.3 OVERWEIGHT (BMI 25.0-29.9): ICD-10-CM

## 2023-08-11 DIAGNOSIS — I10 PRIMARY HYPERTENSION: ICD-10-CM

## 2023-08-11 DIAGNOSIS — N18.31 STAGE 3A CHRONIC KIDNEY DISEASE (HCC): ICD-10-CM

## 2023-08-11 DIAGNOSIS — R41.89 COGNITIVE IMPAIRMENT: Primary | ICD-10-CM

## 2023-08-11 DIAGNOSIS — R35.0 URINARY FREQUENCY: ICD-10-CM

## 2023-08-11 DIAGNOSIS — M46.1 SACROILIITIS (HCC): ICD-10-CM

## 2023-08-11 DIAGNOSIS — Z79.899 OTHER LONG TERM (CURRENT) DRUG THERAPY: ICD-10-CM

## 2023-08-11 DIAGNOSIS — R73.09 OTHER ABNORMAL GLUCOSE: ICD-10-CM

## 2023-08-11 LAB
SL AMB  POCT GLUCOSE, UA: ABNORMAL
SL AMB LEUKOCYTE ESTERASE,UA: ABNORMAL
SL AMB POCT BILIRUBIN,UA: ABNORMAL
SL AMB POCT BLOOD,UA: ABNORMAL
SL AMB POCT CLARITY,UA: CLEAR
SL AMB POCT COLOR,UA: YELLOW
SL AMB POCT KETONES,UA: 0.5
SL AMB POCT NITRITE,UA: ABNORMAL
SL AMB POCT PH,UA: 5
SL AMB POCT SPECIFIC GRAVITY,UA: 1.02
SL AMB POCT URINE PROTEIN: 0.15
SL AMB POCT UROBILINOGEN: 3.5

## 2023-08-11 PROCEDURE — 99214 OFFICE O/P EST MOD 30 MIN: CPT | Performed by: NURSE PRACTITIONER

## 2023-08-11 PROCEDURE — 87086 URINE CULTURE/COLONY COUNT: CPT | Performed by: NURSE PRACTITIONER

## 2023-08-11 PROCEDURE — 81002 URINALYSIS NONAUTO W/O SCOPE: CPT | Performed by: NURSE PRACTITIONER

## 2023-08-11 RX ORDER — DENOSUMAB 60 MG/ML
INJECTION SUBCUTANEOUS
COMMUNITY
End: 2023-08-26

## 2023-08-11 NOTE — PROGRESS NOTES
Name: Jovita Celestin      : 1940      MRN: [id number]  Encounter Provider: MARE Saldaña  Encounter Date: 2023   Encounter department: [address] [place] [address]     1. Cognitive impairment  -     CBC and differential; Future  -     Comprehensive metabolic panel; Future  -     C-reactive protein; Future  -     Hemoglobin A1C; Future  -     Magnesium; Future  -     Vitamin D 25 hydroxy; Future  -     Vitamin B1, whole blood; Future  -     Vitamin B12; Future  -     TSH, 3rd generation with Free T4 reflex; Future  -     Sedimentation rate, automated; Future  -     Phosphorus; Future  -     POCT urine dip  -     Urine culture; Future  -     Urine culture    2. Sacroiliitis ([address])    3. Primary hypertension  -     CBC and differential; Future  -     Comprehensive metabolic panel; Future  -     C-reactive protein; Future  -     Hemoglobin A1C; Future  -     TSH, 3rd generation with Free T4 reflex; Future  -     Sedimentation rate, automated; Future  -     Phosphorus; Future    4. Urinary frequency  -     POCT urine dip  -     Urine culture; Future  -     Urine culture    5. Other abnormal glucose  -     Hemoglobin A1C; Future    6. Overweight (BMI 25.0-29.9)  -     Vitamin D 25 hydroxy; Future  -     Vitamin B1, whole blood; Future  -     Vitamin B12; Future    7. Stage 3a chronic kidney disease (HCC)  -     Vitamin D 25 hydroxy; Future  -     Vitamin B1, whole blood; Future  -     Vitamin B12; Future    8. Other long term (current) drug therapy  -     Vitamin B12; Future        MMSE 28/30-no cognitive impairment. Discussed differentials with patient and his wife. At this time we will have him complete labs and will check urine to rule out any infection and other contributing causes. If labs and urine testing normal we could initiate low-dose SSRI. If memory seem to decline anymore would recommend Senior care evaluation.   He was advised to call if any worsening symptoms no improvement or other questions. BMI Counseling: Body mass index is 27.4 kg/m². The BMI is above normal. Nutrition recommendations include limiting drinks that contain sugar and reducing intake of cholesterol. Exercise recommendations include exercising 3-5 times per week and strength training exercises. Rationale for BMI follow-up plan is due to patient being overweight or obese. Depression Screening and Follow-up Plan: Patient's depression screening was positive with a PHQ-2 score of 6. Their PHQ-9 score was 6. Patient assessed for underlying major depression. Brief counseling provided and recommend additional follow-up/re-evaluation next office visit. Will rule out other contributing causes/ possible causes- will f/u with results of testing prior to treatment. Patient agreeable        Subjective      Patient presents to the office today to discuss memory issues. He states that he is finding he forgets people's names at times. This is newer for him in the past month or so. He states that he has a hard time remembering names and sometimes events but more so names. Patient has been following with pain management due to chronic disc disease had injections without much relief. Takes Tylenol as needed. He states he has no history of getting lost/dangerous situations that his memory impairment factored into. He denies any dysuria fevers or chills. His mood has been a little bit lower but he states this is related to forgetting people's names and not really wanting to do anything. He does physical therapy. When discussing with his wife she states that its more so depressed mood lack of motivation to do anything. Review of Systems   Constitutional: Negative for chills and fever. Eyes: Negative for discharge. Respiratory: Negative for shortness of breath. Cardiovascular: Negative for chest pain. Gastrointestinal: Negative for constipation and diarrhea.    Genitourinary: Negative for difficulty urinating. Musculoskeletal: Negative for joint swelling. Skin: Negative for rash. Neurological: Negative for headaches. Forgetful   Hematological: Negative for adenopathy. Psychiatric/Behavioral: The patient is not nervous/anxious. Current Outpatient Medications on File Prior to Visit   Medication Sig   • acetaminophen (TYLENOL) 325 mg tablet Take 2 tablets (650 mg total) by mouth every 8 (eight) hours (Patient taking differently: Take 650 mg by mouth every 8 (eight) hours Once daily)   • calcium citrate (CALCITRATE) 950 (200 Ca) MG tablet Take 1 tablet by mouth daily 1,200 mg daily   • cholecalciferol (VITAMIN D3) 1,000 units tablet Take 3,000 Units by mouth daily 2,000 IU  daily   • denosumab (Prolia) 60 mg/mL Prolia   Yearly, 03-15-23 and 09-19-23. • docusate sodium (Colace) 100 mg capsule Take 1 capsule (100 mg total) by mouth 2 (two) times a day   • lisinopril (ZESTRIL) 10 mg tablet Take 1 tablet (10 mg total) by mouth daily at bedtime   • lisinopril (ZESTRIL) 20 mg tablet Take 1 tablet (20 mg total) by mouth every morning   • metoprolol succinate (TOPROL-XL) 50 mg 24 hr tablet Take 1 tablet (50 mg total) by mouth daily   • Myrbetriq 25 MG TB24 TAKE ONE TABLET BY MOUTH EVERY DAY IN THE MORNING   • verapamil (CALAN-SR) 240 mg CR tablet TAKE 1 TABLET BY MOUTH DAILY AT BEDTIME   • methocarbamol (ROBAXIN) 500 mg tablet Take 1 tablet (500 mg total) by mouth 4 (four) times a day as needed for muscle spasms (Patient not taking: Reported on 8/11/2023)   • triamcinolone (KENALOG) 0.1 % ointment Apply topically 2 (two) times a day For 2 weeks straight to right flank and legs only when rash is active.  (Patient not taking: Reported on 4/21/2023)       Objective     /72 (BP Location: Left arm, Patient Position: Sitting, Cuff Size: Adult)   Pulse 57   Temp (!) 96.9 °F (36.1 °C) (Temporal)   Ht 5' 1" (1.549 m)   Wt 65.8 kg (145 lb)   SpO2 98%   BMI 27.40 kg/m²     Physical Exam  Vitals and nursing note reviewed. Constitutional:       General: He is not in acute distress. Appearance: He is well-developed. He is not diaphoretic. HENT:      Head: Normocephalic and atraumatic. Right Ear: External ear normal.      Left Ear: External ear normal.   Eyes:      General: Lids are normal.         Right eye: No discharge. Left eye: No discharge. Conjunctiva/sclera: Conjunctivae normal.   Cardiovascular:      Rate and Rhythm: Normal rate and regular rhythm. Heart sounds: No murmur heard. Pulmonary:      Effort: Pulmonary effort is normal. No respiratory distress. Breath sounds: Normal breath sounds. No wheezing. Musculoskeletal:         General: No deformity. Cervical back: Neck supple. Skin:     General: Skin is warm and dry. Neurological:      General: No focal deficit present. Mental Status: He is alert and oriented to person, place, and time. Psychiatric:         Speech: Speech normal.         Behavior: Behavior normal.         Thought Content:  Thought content normal.         Judgment: Judgment normal.       MARE Chen

## 2023-08-11 NOTE — PATIENT INSTRUCTIONS
Complete labs. We will follow-up with lab results and urine testing results. If everything comes back normal could discuss medication options to help with mood. Please call the office if you are experiencing any worsening of symptoms or no symptom improvement.

## 2023-08-13 LAB — BACTERIA UR CULT: NORMAL

## 2023-08-14 ENCOUNTER — TELEPHONE (OUTPATIENT)
Dept: LAB | Facility: HOSPITAL | Age: 83
End: 2023-08-14

## 2023-08-21 ENCOUNTER — APPOINTMENT (OUTPATIENT)
Dept: LAB | Facility: HOSPITAL | Age: 83
End: 2023-08-21
Payer: MEDICARE

## 2023-08-21 DIAGNOSIS — Z79.899 OTHER LONG TERM (CURRENT) DRUG THERAPY: ICD-10-CM

## 2023-08-21 DIAGNOSIS — I10 PRIMARY HYPERTENSION: ICD-10-CM

## 2023-08-21 DIAGNOSIS — E66.3 OVERWEIGHT (BMI 25.0-29.9): ICD-10-CM

## 2023-08-21 DIAGNOSIS — N18.31 STAGE 3A CHRONIC KIDNEY DISEASE (HCC): ICD-10-CM

## 2023-08-21 DIAGNOSIS — R41.89 COGNITIVE IMPAIRMENT: ICD-10-CM

## 2023-08-21 DIAGNOSIS — R73.09 OTHER ABNORMAL GLUCOSE: ICD-10-CM

## 2023-08-21 LAB
25(OH)D3 SERPL-MCNC: 59.6 NG/ML (ref 30–100)
ALBUMIN SERPL BCP-MCNC: 3.5 G/DL (ref 3.5–5)
ALP SERPL-CCNC: 46 U/L (ref 46–116)
ALT SERPL W P-5'-P-CCNC: 22 U/L (ref 12–78)
ANION GAP SERPL CALCULATED.3IONS-SCNC: 4 MMOL/L
AST SERPL W P-5'-P-CCNC: 12 U/L (ref 5–45)
BASOPHILS # BLD MANUAL: 0 THOUSAND/UL (ref 0–0.1)
BASOPHILS NFR MAR MANUAL: 0 % (ref 0–1)
BILIRUB SERPL-MCNC: 0.6 MG/DL (ref 0.2–1)
BUN SERPL-MCNC: 38 MG/DL (ref 5–25)
CALCIUM SERPL-MCNC: 9.3 MG/DL (ref 8.3–10.1)
CHLORIDE SERPL-SCNC: 106 MMOL/L (ref 96–108)
CO2 SERPL-SCNC: 26 MMOL/L (ref 21–32)
CREAT SERPL-MCNC: 1.28 MG/DL (ref 0.6–1.3)
CRP SERPL QL: <3 MG/L
DIFFERENTIAL COMMENT: ABNORMAL
EOSINOPHIL # BLD MANUAL: 0.12 THOUSAND/UL (ref 0–0.4)
EOSINOPHIL NFR BLD MANUAL: 1 % (ref 0–6)
ERYTHROCYTE [DISTWIDTH] IN BLOOD BY AUTOMATED COUNT: 13.1 % (ref 11.6–15.1)
ERYTHROCYTE [SEDIMENTATION RATE] IN BLOOD: 6 MM/HOUR (ref 0–19)
EST. AVERAGE GLUCOSE BLD GHB EST-MCNC: 126 MG/DL
GFR SERPL CREATININE-BSD FRML MDRD: 51 ML/MIN/1.73SQ M
GLUCOSE SERPL-MCNC: 69 MG/DL (ref 65–140)
HBA1C MFR BLD: 6 %
HCT VFR BLD AUTO: 42.3 % (ref 36.5–49.3)
HGB BLD-MCNC: 13.3 G/DL (ref 12–17)
LYMPHOCYTES # BLD AUTO: 1.41 THOUSAND/UL (ref 0.6–4.47)
LYMPHOCYTES # BLD AUTO: 8 % (ref 14–44)
MAGNESIUM SERPL-MCNC: 2.5 MG/DL (ref 1.6–2.6)
MCH RBC QN AUTO: 31.6 PG (ref 26.8–34.3)
MCHC RBC AUTO-ENTMCNC: 31.4 G/DL (ref 31.4–37.4)
MCV RBC AUTO: 101 FL (ref 82–98)
MONOCYTES # BLD AUTO: 0.47 THOUSAND/UL (ref 0–1.22)
MONOCYTES NFR BLD: 4 % (ref 4–12)
NEUTROPHILS # BLD MANUAL: 9.74 THOUSAND/UL (ref 1.85–7.62)
NEUTS BAND NFR BLD MANUAL: 1 % (ref 0–8)
NEUTS SEG NFR BLD AUTO: 82 % (ref 43–75)
PHOSPHATE SERPL-MCNC: 2.4 MG/DL (ref 2.3–4.1)
PLATELET # BLD AUTO: 168 THOUSANDS/UL (ref 149–390)
PLATELET BLD QL SMEAR: ADEQUATE
PMV BLD AUTO: 11.6 FL (ref 8.9–12.7)
POTASSIUM SERPL-SCNC: 5.4 MMOL/L (ref 3.5–5.3)
PROT SERPL-MCNC: 6.7 G/DL (ref 6.4–8.4)
RBC # BLD AUTO: 4.21 MILLION/UL (ref 3.88–5.62)
RBC MORPH BLD: NORMAL
SODIUM SERPL-SCNC: 136 MMOL/L (ref 135–147)
TSH SERPL DL<=0.05 MIU/L-ACNC: 2.19 UIU/ML (ref 0.45–4.5)
VARIANT LYMPHS # BLD AUTO: 4 %
VIT B12 SERPL-MCNC: 335 PG/ML (ref 180–914)
WBC # BLD AUTO: 11.73 THOUSAND/UL (ref 4.31–10.16)

## 2023-08-21 PROCEDURE — 84443 ASSAY THYROID STIM HORMONE: CPT

## 2023-08-21 PROCEDURE — 83735 ASSAY OF MAGNESIUM: CPT

## 2023-08-21 PROCEDURE — 85652 RBC SED RATE AUTOMATED: CPT

## 2023-08-21 PROCEDURE — 82607 VITAMIN B-12: CPT

## 2023-08-21 PROCEDURE — 85027 COMPLETE CBC AUTOMATED: CPT

## 2023-08-21 PROCEDURE — 84425 ASSAY OF VITAMIN B-1: CPT

## 2023-08-21 PROCEDURE — 83036 HEMOGLOBIN GLYCOSYLATED A1C: CPT

## 2023-08-21 PROCEDURE — 85007 BL SMEAR W/DIFF WBC COUNT: CPT

## 2023-08-21 PROCEDURE — 82306 VITAMIN D 25 HYDROXY: CPT

## 2023-08-21 PROCEDURE — 36415 COLL VENOUS BLD VENIPUNCTURE: CPT

## 2023-08-21 PROCEDURE — 86140 C-REACTIVE PROTEIN: CPT

## 2023-08-21 PROCEDURE — 80053 COMPREHEN METABOLIC PANEL: CPT

## 2023-08-21 PROCEDURE — 84100 ASSAY OF PHOSPHORUS: CPT

## 2023-08-24 ENCOUNTER — APPOINTMENT (EMERGENCY)
Dept: RADIOLOGY | Facility: HOSPITAL | Age: 83
DRG: 563 | End: 2023-08-24
Payer: MEDICARE

## 2023-08-24 ENCOUNTER — HOSPITAL ENCOUNTER (INPATIENT)
Facility: HOSPITAL | Age: 83
LOS: 2 days | DRG: 563 | End: 2023-08-26
Attending: EMERGENCY MEDICINE | Admitting: INTERNAL MEDICINE
Payer: MEDICARE

## 2023-08-24 ENCOUNTER — APPOINTMENT (EMERGENCY)
Dept: CT IMAGING | Facility: HOSPITAL | Age: 83
DRG: 563 | End: 2023-08-24
Attending: EMERGENCY MEDICINE
Payer: MEDICARE

## 2023-08-24 DIAGNOSIS — H00.019 STYE: ICD-10-CM

## 2023-08-24 DIAGNOSIS — I49.3 PVC'S (PREMATURE VENTRICULAR CONTRACTIONS): ICD-10-CM

## 2023-08-24 DIAGNOSIS — M79.18 MYOFASCIAL PAIN SYNDROME: ICD-10-CM

## 2023-08-24 DIAGNOSIS — S82.891A CLOSED FRACTURE OF RIGHT ANKLE, INITIAL ENCOUNTER: Primary | ICD-10-CM
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
## 2023-08-24 DIAGNOSIS — I10 PRIMARY HYPERTENSION: ICD-10-CM

## 2023-08-24 DIAGNOSIS — S32.010A COMPRESSION FRACTURE OF L1 VERTEBRA, INITIAL ENCOUNTER (HCC): ICD-10-CM

## 2023-08-24 DIAGNOSIS — R77.8 ELEVATED TROPONIN: ICD-10-CM

## 2023-08-24 DIAGNOSIS — R35.0 URINARY FREQUENCY: ICD-10-CM

## 2023-08-24 DIAGNOSIS — R53.1 GENERALIZED WEAKNESS: ICD-10-CM

## 2023-08-24 PROBLEM — S82.891D CLOSED FRACTURE OF RIGHT ANKLE WITH ROUTINE HEALING: Status: ACTIVE | Noted: 2023-08-24

## 2023-08-24 PROBLEM — R79.89 ELEVATED TROPONIN: Status: ACTIVE | Noted: 2023-08-24

## 2023-08-24 LAB
2HR DELTA HS TROPONIN: 2 NG/L
4HR DELTA HS TROPONIN: -2 NG/L
ABO GROUP BLD: NORMAL
ALBUMIN SERPL BCP-MCNC: 3.7 G/DL (ref 3.5–5)
ALP SERPL-CCNC: 37 U/L (ref 34–104)
ALT SERPL W P-5'-P-CCNC: 12 U/L (ref 7–52)
ANION GAP SERPL CALCULATED.3IONS-SCNC: 6 MMOL/L
AST SERPL W P-5'-P-CCNC: 13 U/L (ref 13–39)
BASOPHILS # BLD MANUAL: 0 THOUSAND/UL (ref 0–0.1)
BASOPHILS NFR MAR MANUAL: 0 % (ref 0–1)
BILIRUB SERPL-MCNC: 0.58 MG/DL (ref 0.2–1)
BILIRUB UR QL STRIP: NEGATIVE
BLD GP AB SCN SERPL QL: NEGATIVE
BUN SERPL-MCNC: 39 MG/DL (ref 5–25)
CALCIUM SERPL-MCNC: 9.3 MG/DL (ref 8.4–10.2)
CARDIAC TROPONIN I PNL SERPL HS: 57 NG/L
CARDIAC TROPONIN I PNL SERPL HS: 59 NG/L
CARDIAC TROPONIN I PNL SERPL HS: 61 NG/L
CHLORIDE SERPL-SCNC: 103 MMOL/L (ref 96–108)
CLARITY UR: CLEAR
CO2 SERPL-SCNC: 25 MMOL/L (ref 21–32)
COLOR UR: YELLOW
CREAT SERPL-MCNC: 1.25 MG/DL (ref 0.6–1.3)
EOSINOPHIL # BLD MANUAL: 0 THOUSAND/UL (ref 0–0.4)
EOSINOPHIL NFR BLD MANUAL: 0 % (ref 0–6)
ERYTHROCYTE [DISTWIDTH] IN BLOOD BY AUTOMATED COUNT: 12.6 % (ref 11.6–15.1)
FLUAV RNA RESP QL NAA+PROBE: NEGATIVE
FLUBV RNA RESP QL NAA+PROBE: NEGATIVE
GFR SERPL CREATININE-BSD FRML MDRD: 52 ML/MIN/1.73SQ M
GLUCOSE SERPL-MCNC: 95 MG/DL (ref 65–140)
GLUCOSE UR STRIP-MCNC: NEGATIVE MG/DL
HCT VFR BLD AUTO: 40.7 % (ref 36.5–49.3)
HGB BLD-MCNC: 13.3 G/DL (ref 12–17)
HGB UR QL STRIP.AUTO: NEGATIVE
KETONES UR STRIP-MCNC: NEGATIVE MG/DL
LEUKOCYTE ESTERASE UR QL STRIP: NEGATIVE
LYMPHOCYTES # BLD AUTO: 1.64 THOUSAND/UL (ref 0.6–4.47)
LYMPHOCYTES # BLD AUTO: 15 % (ref 14–44)
MCH RBC QN AUTO: 31.7 PG (ref 26.8–34.3)
MCHC RBC AUTO-ENTMCNC: 32.7 G/DL (ref 31.4–37.4)
MCV RBC AUTO: 97 FL (ref 82–98)
MONOCYTES # BLD AUTO: 1.03 THOUSAND/UL (ref 0–1.22)
MONOCYTES NFR BLD: 10 % (ref 4–12)
MYELOCYTES NFR BLD MANUAL: 1 % (ref 0–1)
NEUTROPHILS # BLD MANUAL: 7.48 THOUSAND/UL (ref 1.85–7.62)
NEUTS SEG NFR BLD AUTO: 73 % (ref 43–75)
NITRITE UR QL STRIP: NEGATIVE
PH UR STRIP.AUTO: 6 [PH]
PLATELET # BLD AUTO: 148 THOUSANDS/UL (ref 149–390)
PLATELET BLD QL SMEAR: ADEQUATE
PMV BLD AUTO: 11.3 FL (ref 8.9–12.7)
POTASSIUM SERPL-SCNC: 5.2 MMOL/L (ref 3.5–5.3)
PROT SERPL-MCNC: 6.2 G/DL (ref 6.4–8.4)
PROT UR STRIP-MCNC: NEGATIVE MG/DL
RBC # BLD AUTO: 4.2 MILLION/UL (ref 3.88–5.62)
RBC MORPH BLD: PRESENT
RH BLD: POSITIVE
RSV RNA RESP QL NAA+PROBE: NEGATIVE
SARS-COV-2 RNA RESP QL NAA+PROBE: NEGATIVE
SODIUM SERPL-SCNC: 134 MMOL/L (ref 135–147)
SP GR UR STRIP.AUTO: 1.01 (ref 1–1.03)
SPECIMEN EXPIRATION DATE: NORMAL
UROBILINOGEN UR STRIP-ACNC: <2 MG/DL
VARIANT LYMPHS # BLD AUTO: 1 %
WBC # BLD AUTO: 10.25 THOUSAND/UL (ref 4.31–10.16)

## 2023-08-24 PROCEDURE — 99285 EMERGENCY DEPT VISIT HI MDM: CPT | Performed by: EMERGENCY MEDICINE

## 2023-08-24 PROCEDURE — 99285 EMERGENCY DEPT VISIT HI MDM: CPT

## 2023-08-24 PROCEDURE — 71045 X-RAY EXAM CHEST 1 VIEW: CPT

## 2023-08-24 PROCEDURE — 86901 BLOOD TYPING SEROLOGIC RH(D): CPT | Performed by: EMERGENCY MEDICINE

## 2023-08-24 PROCEDURE — 70450 CT HEAD/BRAIN W/O DYE: CPT

## 2023-08-24 PROCEDURE — 86900 BLOOD TYPING SEROLOGIC ABO: CPT | Performed by: EMERGENCY MEDICINE

## 2023-08-24 PROCEDURE — 73610 X-RAY EXAM OF ANKLE: CPT

## 2023-08-24 PROCEDURE — 85027 COMPLETE CBC AUTOMATED: CPT | Performed by: EMERGENCY MEDICINE

## 2023-08-24 PROCEDURE — 29515 APPLICATION SHORT LEG SPLINT: CPT | Performed by: EMERGENCY MEDICINE

## 2023-08-24 PROCEDURE — 84484 ASSAY OF TROPONIN QUANT: CPT | Performed by: EMERGENCY MEDICINE

## 2023-08-24 PROCEDURE — 99223 1ST HOSP IP/OBS HIGH 75: CPT | Performed by: INTERNAL MEDICINE

## 2023-08-24 PROCEDURE — 81003 URINALYSIS AUTO W/O SCOPE: CPT | Performed by: EMERGENCY MEDICINE

## 2023-08-24 PROCEDURE — 0241U HB NFCT DS VIR RESP RNA 4 TRGT: CPT | Performed by: EMERGENCY MEDICINE

## 2023-08-24 PROCEDURE — 99222 1ST HOSP IP/OBS MODERATE 55: CPT | Performed by: PHYSICIAN ASSISTANT

## 2023-08-24 PROCEDURE — 80053 COMPREHEN METABOLIC PANEL: CPT | Performed by: EMERGENCY MEDICINE

## 2023-08-24 PROCEDURE — 36415 COLL VENOUS BLD VENIPUNCTURE: CPT | Performed by: EMERGENCY MEDICINE

## 2023-08-24 PROCEDURE — 85007 BL SMEAR W/DIFF WBC COUNT: CPT | Performed by: EMERGENCY MEDICINE

## 2023-08-24 PROCEDURE — 93005 ELECTROCARDIOGRAM TRACING: CPT

## 2023-08-24 PROCEDURE — 86850 RBC ANTIBODY SCREEN: CPT | Performed by: EMERGENCY MEDICINE

## 2023-08-24 RX ORDER — ACETAMINOPHEN 325 MG/1
975 TABLET ORAL ONCE
Status: COMPLETED | OUTPATIENT
Start: 2023-08-24 | End: 2023-08-24

## 2023-08-24 RX ORDER — HYDROMORPHONE HCL IN WATER/PF 6 MG/30 ML
0.2 PATIENT CONTROLLED ANALGESIA SYRINGE INTRAVENOUS EVERY 4 HOURS PRN
Status: DISCONTINUED | OUTPATIENT
Start: 2023-08-24 | End: 2023-08-26 | Stop reason: HOSPADM

## 2023-08-24 RX ORDER — DOCUSATE SODIUM 100 MG/1
100 CAPSULE, LIQUID FILLED ORAL 2 TIMES DAILY
Status: DISCONTINUED | OUTPATIENT
Start: 2023-08-24 | End: 2023-08-26 | Stop reason: HOSPADM

## 2023-08-24 RX ORDER — ACETAMINOPHEN 325 MG/1
975 TABLET ORAL EVERY 6 HOURS SCHEDULED
Status: DISCONTINUED | OUTPATIENT
Start: 2023-08-24 | End: 2023-08-26 | Stop reason: HOSPADM

## 2023-08-24 RX ORDER — LISINOPRIL 10 MG/1
10 TABLET ORAL
Status: DISCONTINUED | OUTPATIENT
Start: 2023-08-24 | End: 2023-08-26 | Stop reason: HOSPADM

## 2023-08-24 RX ORDER — OXYBUTYNIN CHLORIDE 5 MG/1
10 TABLET, EXTENDED RELEASE ORAL DAILY
Status: DISCONTINUED | OUTPATIENT
Start: 2023-08-24 | End: 2023-08-26 | Stop reason: HOSPADM

## 2023-08-24 RX ORDER — OXYCODONE HYDROCHLORIDE 5 MG/1
5 TABLET ORAL EVERY 4 HOURS PRN
Status: DISCONTINUED | OUTPATIENT
Start: 2023-08-24 | End: 2023-08-26 | Stop reason: HOSPADM

## 2023-08-24 RX ORDER — ENOXAPARIN SODIUM 100 MG/ML
40 INJECTION SUBCUTANEOUS DAILY
Status: DISCONTINUED | OUTPATIENT
Start: 2023-08-24 | End: 2023-08-26 | Stop reason: HOSPADM

## 2023-08-24 RX ORDER — CALCIUM CARBONATE 500(1250)
2 TABLET ORAL
Status: DISCONTINUED | OUTPATIENT
Start: 2023-08-25 | End: 2023-08-26 | Stop reason: HOSPADM

## 2023-08-24 RX ORDER — MELATONIN
3000 DAILY
Status: DISCONTINUED | OUTPATIENT
Start: 2023-08-24 | End: 2023-08-26 | Stop reason: HOSPADM

## 2023-08-24 RX ORDER — VERAPAMIL HYDROCHLORIDE 240 MG/1
240 TABLET, FILM COATED, EXTENDED RELEASE ORAL
Status: DISCONTINUED | OUTPATIENT
Start: 2023-08-24 | End: 2023-08-26 | Stop reason: HOSPADM

## 2023-08-24 RX ORDER — LISINOPRIL 20 MG/1
20 TABLET ORAL EVERY MORNING
Status: DISCONTINUED | OUTPATIENT
Start: 2023-08-25 | End: 2023-08-26 | Stop reason: HOSPADM

## 2023-08-24 RX ORDER — METOPROLOL SUCCINATE 50 MG/1
50 TABLET, EXTENDED RELEASE ORAL DAILY
Status: DISCONTINUED | OUTPATIENT
Start: 2023-08-24 | End: 2023-08-26 | Stop reason: HOSPADM

## 2023-08-24 RX ORDER — POLYETHYLENE GLYCOL 3350 17 G/17G
17 POWDER, FOR SOLUTION ORAL DAILY
Status: DISCONTINUED | OUTPATIENT
Start: 2023-08-24 | End: 2023-08-26 | Stop reason: HOSPADM

## 2023-08-24 RX ORDER — METHOCARBAMOL 500 MG/1
500 TABLET, FILM COATED ORAL EVERY 6 HOURS PRN
Status: DISCONTINUED | OUTPATIENT
Start: 2023-08-24 | End: 2023-08-26 | Stop reason: HOSPADM

## 2023-08-24 RX ORDER — ERYTHROMYCIN 5 MG/G
0.5 OINTMENT OPHTHALMIC EVERY 12 HOURS SCHEDULED
Status: DISCONTINUED | OUTPATIENT
Start: 2023-08-24 | End: 2023-08-26 | Stop reason: HOSPADM

## 2023-08-24 RX ORDER — LIDOCAINE 50 MG/G
1 PATCH TOPICAL ONCE
Status: COMPLETED | OUTPATIENT
Start: 2023-08-24 | End: 2023-08-25

## 2023-08-24 RX ORDER — ONDANSETRON 2 MG/ML
4 INJECTION INTRAMUSCULAR; INTRAVENOUS EVERY 6 HOURS PRN
Status: DISCONTINUED | OUTPATIENT
Start: 2023-08-24 | End: 2023-08-26 | Stop reason: HOSPADM

## 2023-08-24 RX ADMIN — LISINOPRIL 10 MG: 10 TABLET ORAL at 21:22

## 2023-08-24 RX ADMIN — VERAPAMIL HYDROCHLORIDE 240 MG: 240 TABLET, FILM COATED, EXTENDED RELEASE ORAL at 21:21

## 2023-08-24 RX ADMIN — OXYBUTYNIN CHLORIDE 10 MG: 5 TABLET, EXTENDED RELEASE ORAL at 17:12

## 2023-08-24 RX ADMIN — Medication 3000 UNITS: at 17:11

## 2023-08-24 RX ADMIN — METOPROLOL SUCCINATE 50 MG: 50 TABLET, EXTENDED RELEASE ORAL at 17:12

## 2023-08-24 RX ADMIN — LIDOCAINE 1 PATCH: 50 PATCH TOPICAL at 12:27

## 2023-08-24 RX ADMIN — DOCUSATE SODIUM 100 MG: 100 CAPSULE, LIQUID FILLED ORAL at 17:12

## 2023-08-24 RX ADMIN — ERYTHROMYCIN 0.5 INCH: 5 OINTMENT OPHTHALMIC at 21:23

## 2023-08-24 RX ADMIN — ACETAMINOPHEN 325MG 975 MG: 325 TABLET ORAL at 12:27

## 2023-08-24 RX ADMIN — ENOXAPARIN SODIUM 40 MG: 100 INJECTION SUBCUTANEOUS at 17:12

## 2023-08-24 RX ADMIN — ASPIRIN 81 MG: 81 TABLET, COATED ORAL at 17:12

## 2023-08-24 NOTE — ASSESSMENT & PLAN NOTE
History of Premature ventricular contractions and follows up with Dr. Severiano Gear. Denies any history of coronary artery disease. Continue with outpatient dose of verapamil and metoprolol.

## 2023-08-24 NOTE — CONSULTS
Consultation - Leno Aguero 79 y.o. male MRN: [id number]  Unit/Bed#: ED 04 Encounter: [id number]      Assessment/Plan     Assessment:  Right nondisplaced distal fibula fracture    Plan:  Nondisplaced fracture can be treated conservatively at this time with short leg splint. Nonweightbearing to right lower extremity  Keep splint clean, dry and intact  Ice and elevation to right lower extremity  Pain control as needed  Mr. Yvan Bran is an established patient with foot and ankle specialist Dr. Jax Raymundo. Recommend follow-up with Dr. Jax Raymundo in 1 to 2 weeks as outpatient. History of Present Illness   Physician Requesting Consult: Ty Singh MD  Reason for Consult / Principal Problem: right ankle pain  HPI: Lisa Sauer is a 79y.o. year old male who presents with right ankle pain after experiencing a mechanical fall in his home this morning. He is accompanied by his wife. Patient is a community ambulator that normally uses a walker or cane for assistance. He states he got up from his bed this morning to use the bathroom. He walked to the bathroom with no assistance and states he suddenly became weak which caused him to fall to the ground. He was unable to get up due to weakness. EMS was called and patient states they noticed swelling of the lateral aspect of his ankle. He did then notice he had pain in that area. He was brought to the emergency room and x-rays revealed a right nondisplaced distal fibula fracture. Short leg splint was applied in the emergency room. He has been admitted to the medical service for cardiac monitoring. Orthopedics was consulted. He denies any prior injury to the right ankle. Inpatient consult to Orthopedic Surgery  Consult performed by: Ava Cuello PA-C  Consult ordered by: Ty Singh MD          Review of Systems   Constitutional: Negative. HENT: Negative. Eyes: Negative. Respiratory: Negative. Cardiovascular: Negative. Gastrointestinal: Negative. Endocrine: Negative. Genitourinary: Negative. Musculoskeletal: Positive for arthralgias, gait problem and joint swelling. Skin: Negative. Allergic/Immunologic: Negative. Neurological: Positive for weakness. Hematological: Negative. Psychiatric/Behavioral: Negative.         Historical Information   Past Medical History:   Diagnosis Date   • Astigmatism     last assessed 11/15/17   • Cataract     Resolved 11/15/17   • Gross hematuria     Last assessed 01/28/16   • Hematuria     last assessed 01/28/16   • Kidney stones     Patient states he has kidney stonesin esch kidney   • Lumbar vertebral fracture ([address])    • Prostate cancer ([address])     last assessed 05/18/17   • Renal calculi     last assessed 08/16/12   • Squamous cell carcinoma    • Vitamin D deficiency     Last assessed 08/19/13     Past Surgical History:   Procedure Laterality Date   • BREAST EXCISIONAL BIOPSY N/A     Benign breast tumor unsure of side over 50 yrs ago   • CATARACT EXTRACTION Left 08/26/2019   • CATARACT EXTRACTION Right 08/12/2019   • COLONOSCOPY     • CYSTOSCOPY  01/28/2016   • ELBOW SURGERY      Bone chip   • HERNIA REPAIR  2002   • IR KYPHOPLASTY/VERTEBROPLASTY  1/4/2023   • IR KYPHOPLASTY/VERTEBROPLASTY  2/6/2023   • IR KYPHOPLASTY/VERTEBROPLASTY  3/23/2023   • LITHOTRIPSY     • RETROPUBIC PROSTATECTOMY  11/13/2001    Radical with nerve sparing     Social History   Social History     Substance and Sexual Activity   Alcohol Use Not Currently   • Alcohol/week: 4.0 standard drinks of alcohol   • Types: 4 Glasses of wine per week    Comment: social     Social History     Substance and Sexual Activity   Drug Use No     E-Cigarette/Vaping   • E-Cigarette Use Never User      E-Cigarette/Vaping Substances   • Nicotine No    • THC No    • CBD No    • Flavoring No    • Other No    • Unknown No      Social History     Tobacco Use   Smoking Status Never   Smokeless Tobacco Never     Family History: non-contributory    Meds/Allergies   all current active meds have been reviewed  Allergies   Allergen Reactions   • Sulfa Antibiotics Rash   • Oxycodone Confusion   • Ciprofloxacin    • Gabapentin Dizziness   • Lyrica [Pregabalin] Hallucinations     Blurry vision , hallucinations, confusion    • Medical Tape Rash       Objective   Vitals: Blood pressure 102/55, pulse 71, temperature 97.7 °F (36.5 °C), temperature source Oral, resp. rate 20, height 5' 10" (1.778 m), weight 70.3 kg (155 lb), SpO2 94 %. ,Body mass index is 22.24 kg/m². Intake/Output Summary (Last 24 hours) at 8/24/2023 1338  Last data filed at 8/24/2023 0825  Gross per 24 hour   Intake --   Output 200 ml   Net -200 ml     I/O last 24 hours: In: -   Out: 200 [Urine:200]    Invasive Devices     Peripheral Intravenous Line  Duration           Peripheral IV 08/24/23 Right Antecubital <1 day          Drain  Duration           External Urinary Catheter Small 199 days                Physical Exam  Vitals and nursing note reviewed. Constitutional:       General: He is not in acute distress. Appearance: He is well-developed. HENT:      Head: Normocephalic and atraumatic. Eyes:      Conjunctiva/sclera: Conjunctivae normal.   Cardiovascular:      Rate and Rhythm: Normal rate and regular rhythm. Heart sounds: No murmur heard. Pulmonary:      Effort: Pulmonary effort is normal. No respiratory distress. Breath sounds: Normal breath sounds. Abdominal:      Palpations: Abdomen is soft. Tenderness: There is no abdominal tenderness. Musculoskeletal:         General: Signs of injury present. Cervical back: Neck supple. Skin:     General: Skin is warm and dry. Capillary Refill: Capillary refill takes less than 2 seconds. Neurological:      Mental Status: He is alert and oriented to person, place, and time.    Psychiatric:         Mood and Affect: Mood normal.       Right Ankle Exam     Other   Erythema: absent  Sensation: normal     Comments:  Short leg splint intact. No skin breakdown or loosening  Able to move all toes  Capillary refill brisk  Nontender over proximal fibula            Lab Results: I have personally reviewed pertinent lab results.   Imaging Studies: I have personally reviewed pertinent films in PACS  X-rays right ankle: Subtle nondisplaced oblique Moseley B distal fibula fracture

## 2023-08-24 NOTE — H&P
[address] [facility]  H&P  Name: Otto Castillo 79 y.o. male I MRN: [id number]  Unit/Bed#: -01 I Date of Admission: 8/24/2023   Date of Service: 8/24/2023 I Hospital Day: 0      Assessment/Plan   * Closed fracture of right ankle with routine healing  Assessment & Plan  Patient presented with a fall. Trauma work-up including x-rays of the foot shows There is diffuse soft tissue swelling anteriorly and laterally at the ankle. . Minimal cortical irregularity in the distal fibula concerning for a nondisplaced fracture. Splinted in the emergency room  Orthopedic consulted  Ortho recommending that nondisplaced fracture can be treated conservatively at this time with short leg splint. Continue with nonweightbearing to right lower extremity. PT evaluation will be obtained  Keep splint clean, dry and intact  Ice and elevation to right lower extremity  Pain control as needed  Mr. Pernell Finnegan is an established patient with foot and ankle specialist Dr. Saskia Newton. Recommend follow-up with Dr. Saskia Newton in 1 to 2 weeks as outpatient. Elevated troponin  Assessment & Plan  Present on admission. Patient denies any chest pain or cardiac symptoms. EKG on admission showed normal sinus rhythm with frequent PVCs  Likely non-MI troponin elevation. Low concern for ACS  Potassium within normal limits. Follow-up on magnesium. Continue with telemetry monitoring  Cardiology evaluation will be obtained  Follow-up on echocardiogram  Follow-up on lipid panel    Fall  Assessment & Plan  Presented with a fall at home. Denies any syncopal event. Complains of generalized weakness. Trauma work-up shows nondisplaced right fibular fracture. CT head unremarkable  Nonweightbearing right lower extremity per orthopedic evaluation  Maintain fall precaution  PT OT evaluation will be obtained    Compression fracture of L1 vertebra [facility])  Assessment & Plan  S/p kyphoplasty. Has chronic pain. Continue with scheduled Tylenol. As needed muscle relaxant and aqua K-pad. PT evaluation will be obtained. Non-rheumatic mitral regurgitation  Assessment & Plan  We will follow-up on repeat echocardiogram    Nonrheumatic aortic valve insufficiency  Assessment & Plan  Follow-up on repeat echocardiogram    PVC's (premature ventricular contractions)  Assessment & Plan  History of Premature ventricular contractions and follows up with Dr. Tatyana Chandra. Denies any history of coronary artery disease. Continue with outpatient dose of verapamil and metoprolol. VTE Pharmacologic Prophylaxis: VTE Score: 8 High Risk (Score >/= 5) - Pharmacological DVT Prophylaxis Ordered: enoxaparin (Lovenox). Sequential Compression Devices Ordered. Code Status: Level 1 - Full Code   Discussion with family: Updated  (wife) at bedside. Anticipated Length of Stay: Patient will be admitted on an inpatient basis with an anticipated length of stay of greater than 2 midnights secondary to Management of elevated troponin and fibular fracture. Total Time Spent on Date of Encounter in care of patient: 75 minutes This time was spent on one or more of the following: performing physical exam; counseling and coordination of care; obtaining or reviewing history; documenting in the medical record; reviewing/ordering tests, medications or procedures; communicating with other healthcare professionals and discussing with patient's family/caregivers. Chief Complaint: S/p fall    History of Present Illness:  Ady Bajwa is a 79 y.o. male with a PMH of hypertension, PVCs, chronic back pain, osteoporosis,who presents after experiencing a mechanical fall at home. Patient usually ambulates with a walker. He stated that this morning he got out of bed to go to the bathroom. He certainly felt to be weak and fell to the ground and was unable to get up. Wife had to call EMS and they noticed swelling of the right ankle.   Patient was subsequently brought to the emergency room. Patient denied any chest pain, palpitations, lightheadedness dizziness or syncopal event. He complains of pain in the right ankle. Also complains of chronic back pain. Review of Systems:  Review of Systems   Constitutional: Positive for activity change. HENT: Negative. Eyes: Negative. Respiratory: Negative. Cardiovascular: Negative. Gastrointestinal: Negative. Endocrine: Negative. Genitourinary: Negative. Musculoskeletal: Positive for arthralgias, back pain and gait problem. Allergic/Immunologic: Negative. Neurological: Positive for weakness. Hematological: Negative. Psychiatric/Behavioral: Negative. Past Medical and Surgical History:   Past Medical History:   Diagnosis Date   • Astigmatism     last assessed 11/15/17   • Cataract     Resolved 11/15/17   • Gross hematuria     Last assessed 01/28/16   • Hematuria     last assessed 01/28/16   • Kidney stones     Patient states he has kidney stonesin esch kidney   • Lumbar vertebral fracture ([address])    • Prostate cancer ([address])     last assessed 05/18/17   • Renal calculi     last assessed 08/16/12   • Squamous cell carcinoma    • Vitamin D deficiency     Last assessed 08/19/13       Past Surgical History:   Procedure Laterality Date   • BREAST EXCISIONAL BIOPSY N/A     Benign breast tumor unsure of side over 50 yrs ago   • CATARACT EXTRACTION Left 08/26/2019   • CATARACT EXTRACTION Right 08/12/2019   • COLONOSCOPY     • CYSTOSCOPY  01/28/2016   • ELBOW SURGERY      Bone chip   • HERNIA REPAIR  2002   • IR KYPHOPLASTY/VERTEBROPLASTY  1/4/2023   • IR KYPHOPLASTY/VERTEBROPLASTY  2/6/2023   • IR KYPHOPLASTY/VERTEBROPLASTY  3/23/2023   • LITHOTRIPSY     • RETROPUBIC PROSTATECTOMY  11/13/2001    Radical with nerve sparing       Meds/Allergies:  Prior to Admission medications    Medication Sig Start Date End Date Taking?  Authorizing Provider   acetaminophen (TYLENOL) 325 mg tablet Take 2 tablets (650 mg total) by mouth every 8 (eight) hours  Patient taking differently: Take 650 mg by mouth every 8 (eight) hours Once daily 2/7/23   MARE Hutson   calcium citrate (CALCITRATE) 950 (200 Ca) MG tablet Take 1 tablet by mouth daily 1,200 mg daily    Historical Provider, MD   cholecalciferol (VITAMIN D3) 1,000 units tablet Take 3,000 Units by mouth daily 2,000 IU  daily    Historical Provider, MD   denosumab (Prolia) 60 mg/mL Prolia   Yearly, 03-15-23 and 09-19-23. Historical Provider, MD   docusate sodium (Colace) 100 mg capsule Take 1 capsule (100 mg total) by mouth 2 (two) times a day 2/7/23   MARE Hanna   lisinopril (ZESTRIL) 10 mg tablet Take 1 tablet (10 mg total) by mouth daily at bedtime 3/21/23   Jesus Hawkins MD   lisinopril (ZESTRIL) 20 mg tablet Take 1 tablet (20 mg total) by mouth every morning 3/21/23   Jesus Hawkins MD   methocarbamol (ROBAXIN) 500 mg tablet Take 1 tablet (500 mg total) by mouth 4 (four) times a day as needed for muscle spasms  Patient not taking: Reported on 8/11/2023 3/23/23 5/19/23  Araceli Cervantes MD   metoprolol succinate (TOPROL-XL) 50 mg 24 hr tablet Take 1 tablet (50 mg total) by mouth daily 5/19/23   Jesus Hawkins MD   Myrbetriq 25 MG TB24 TAKE ONE TABLET BY MOUTH EVERY DAY IN THE MORNING 9/2/53   Jamel Chung DO   triamcinolone (KENALOG) 0.1 % ointment Apply topically 2 (two) times a day For 2 weeks straight to right flank and legs only when rash is active. Patient not taking: Reported on 4/21/2023 1/17/23   Ian Paulson MD   verapamil (CALAN-SR) 240 mg CR tablet TAKE 1 TABLET BY MOUTH DAILY AT BEDTIME 8/8/23   Jesus Hawkins MD     I have reviewed home medications with patient personally. Allergies:    Allergies   Allergen Reactions   • Sulfa Antibiotics Rash   • Oxycodone Confusion   • Ciprofloxacin    • Gabapentin Dizziness   • Lyrica [Pregabalin] Hallucinations     Blurry vision , hallucinations, confusion    • Medical Tape Rash       Social History:  Marital Status: /Civil Union   Substance Use History:   Social History     Substance and Sexual Activity   Alcohol Use Not Currently   • Alcohol/week: 4.0 standard drinks of alcohol   • Types: 4 Glasses of wine per week    Comment: social     Social History     Tobacco Use   Smoking Status Never   Smokeless Tobacco Never     Social History     Substance and Sexual Activity   Drug Use No       Family History:  Family History   Problem Relation Age of Onset   • Other Mother 76        Influenza   • Hypertension Mother    • Heart failure Father 70   • Peripheral vascular disease Father    • Hypertension Father    • Prostate cancer Brother    • Stroke Brother 48   • Hypertension Brother    • Hypertension Family        Physical Exam:     Vitals:   Blood Pressure: 158/65 (08/24/23 1540)  Pulse: 76 (08/24/23 1540)  Temperature: 97.8 °F (36.6 °C) (08/24/23 1540)  Temp Source: Oral (08/24/23 0930)  Respirations: 18 (08/24/23 1540)  Height: 5' 10" (177.8 cm) (08/24/23 0734)  Weight - Scale: 70.3 kg (155 lb) (08/24/23 0734)  SpO2: 98 % (08/24/23 1540)    Physical Exam  Constitutional:       General: He is in acute distress. HENT:      Head: Normocephalic. Eyes:      General:         Right eye: Discharge present. Comments: Right eye stye   Cardiovascular:      Rate and Rhythm: Normal rate. Rhythm irregular. Pulmonary:      Effort: Pulmonary effort is normal.      Breath sounds: Normal breath sounds. Abdominal:      General: Abdomen is flat. Bowel sounds are normal.      Palpations: Abdomen is soft. Musculoskeletal:      Cervical back: Neck supple. Comments: Right lower extremity splinting in place   Skin:     General: Skin is warm. Neurological:      General: No focal deficit present. Mental Status: He is oriented to person, place, and time. Mental status is at baseline.    Psychiatric:         Mood and Affect: Mood normal.         Additional Data:     Lab Results:  Results from last 7 days   Lab Units 08/24/23  0754 08/21/23  0711   WBC Thousand/uL 10.25* 11.73*   HEMOGLOBIN g/dL 13.3 13.3   HEMATOCRIT % 40.7 42.3   PLATELETS Thousands/uL 148* 168   BANDS PCT %  --  1   LYMPHO PCT % 15 8*   MONO PCT % 10 4   EOS PCT % 0 1     Results from last 7 days   Lab Units 08/24/23  0913   SODIUM mmol/L 134*   POTASSIUM mmol/L 5.2   CHLORIDE mmol/L 103   CO2 mmol/L 25   BUN mg/dL 39*   CREATININE mg/dL 1.25   ANION GAP mmol/L 6   CALCIUM mg/dL 9.3   ALBUMIN g/dL 3.7   TOTAL BILIRUBIN mg/dL 0.58   ALK PHOS U/L 37   ALT U/L 12   AST U/L 13   GLUCOSE RANDOM mg/dL 95             Results from last 7 days   Lab Units 08/21/23  0711   HEMOGLOBIN A1C % 6.0*           Lines/Drains:  Invasive Devices     Peripheral Intravenous Line  Duration           Peripheral IV 08/24/23 Right Antecubital <1 day          Drain  Duration           External Urinary Catheter Small 199 days                    Imaging: Reviewed radiology reports from this admission including: xray(s)  TRAUMA - CT head wo contrast   Final Result by Michele Montgomery MD (08/24 7860)      No acute intracranial abnormality. The study was marked in [facility] for immediate notification. Workstation performed: [id number]         XR ankle 3+ vw right   Final Result by Dillon Khan MD (08/24 5373)         1. There is diffuse soft tissue swelling anteriorly and laterally at the ankle. 2. Minimal cortical irregularity in the distal fibula concerning for a nondisplaced fracture. The study was marked in [employer] for significant notification. Workstation performed: [id number]         XR chest portable   Final Result by Candelario Rubalcava MD (08/24 4298)      Pulmonary hyperinflation. No acute cardiopulmonary disease. Workstation performed: [id number]             EKG and Other Studies Reviewed on Admission:   · EKG: NSR. HR 80 with frequent PVCs.     ** Please Note: This note has been constructed using a voice recognition system.  **

## 2023-08-24 NOTE — ASSESSMENT & PLAN NOTE
Present on admission. Patient denies any chest pain or cardiac symptoms. EKG on admission showed normal sinus rhythm with frequent PVCs  Likely non-MI troponin elevation. Low concern for ACS  Potassium within normal limits. Follow-up on magnesium.   Continue with telemetry monitoring  Cardiology evaluation will be obtained  Follow-up on echocardiogram  Follow-up on lipid panel

## 2023-08-24 NOTE — ASSESSMENT & PLAN NOTE
Presented with a fall at home. Denies any syncopal event. Complains of generalized weakness. Trauma work-up shows nondisplaced right fibular fracture.   CT head unremarkable  Nonweightbearing right lower extremity per orthopedic evaluation  Maintain fall precaution  PT OT evaluation will be obtained

## 2023-08-24 NOTE — DISCHARGE INSTR - AVS FIRST PAGE
Discharge Instructions - Orthopedics  Charis Mccarthy 79 y.o. male MRN: [id number]  Unit/Bed#: ED 04    Weight Bearing Status:                                           NWB to RLE with assistance  Keep splint clean and dry. Must cover to bathe. DO NOT REMOVE. Pain:  Continue analgesics as directed        Appt Instructions: If you do not have your appointment, please call the clinic at [phone] to schedule appointment with Dr. Ky Pereira in 1-2 weeks  Otherwise follow up as scheduled. Contact the office sooner if you experience any increased numbness/tingling in the extremities.       Miscellaneous:  Ice and elevation to RLE

## 2023-08-24 NOTE — CASE MANAGEMENT
Case Management Assessment & Discharge Planning Note    Patient name Leonel Manjarrez  Location ED 04/ED 04 MRN [id number]  : 1940 Date 2023       Current Admission Date: 2023  Current Admission Diagnosis:Unspecified multiple injuries, initial encounter   Patient Active Problem List    Diagnosis Date Noted   • Age-related osteoporosis with current pathological fracture of vertebra ([address]) 03/10/2023   • T12 compression fracture, initial encounter ([address]) 03/10/2023   • Hypertension 2023   • Macrocytosis 2023   • Vitamin D deficiency 2022   • Osteoporosis 2022   • Ambulatory dysfunction 2022   • Urinary frequency 2022   • Compression fracture of L1 vertebra ([address]) 2022   • Sacroiliitis (HCC)    • Sacroiliac joint dysfunction of both sides    • Lumbar spondylosis 10/04/2022   • Stage 3a chronic kidney disease ([address]) 2022   • Myofascial pain syndrome 2022   • Aortic root dilatation ([address]) 2022   • Renal cyst, acquired, left 12/10/2020   • Chronic pain syndrome 2020   • Chronic bilateral low back pain without sciatica 2020   • Spinal stenosis of lumbar region with neurogenic claudication    • Lumbar radiculopathy    • Malignant neoplasm of prostate ([address]) 2019   • Thoracic aortic aneurysm without rupture ([address]) 10/04/2018   • Nonrheumatic aortic valve insufficiency 10/04/2018   • Non-rheumatic mitral regurgitation 10/04/2018   • PVC's (premature ventricular contractions) 2018      LOS (days): 0  Geometric Mean LOS (GMLOS) (days):   Days to GMLOS:     OBJECTIVE:    Risk of Unplanned Readmission Score: 13.24         Current admission status: Inpatient       Preferred Pharmacy:   [facility] [address] [facility], [address] [place] [address] [place] [address]  Phone: [phone] Fax: [phone]    Primary Care Provider: Ma Koyanagi, DO    Primary Insurance: MEDICARE  Secondary Insurance: [facility]    ASSESSMENT:  [address], [address] - Spouse   Primary Phone: [phone] [facility]  Home Phone: [phone]               Advance Directives  Does patient have a [address]?: Yes  Does patient have Advance Directives?: No  Was patient offered paperwork?: Yes (declined)  Primary Contact: Rosalinda Cole         Readmission Root Cause  30 Day Readmission: No    Patient Information  Admitted from[de-identified] Home  Mental Status: Alert  During Assessment patient was accompanied by: Spouse  Assessment information provided by[de-identified] Patient, Spouse  Primary Caregiver: Self  Support Systems: Spouse/significant other  [facility]: [address] [place] [address] do you live in?: [address] [facility] entry access options. Select all that apply.: Stairs  Number of steps to enter home.: 2  Do the steps have railings?: Yes  Type of Current Residence: 2 story home  Upon entering residence, is there a bedroom on the main floor (no further steps)?: No  A bedroom is located on the following floor levels of residence (select all that apply):: 2nd Floor  Upon entering residence, is there a bathroom on the main floor (no further steps)?: Yes  Number of steps to 2nd floor from main floor: One Flight  In the last 12 months, was there a time when you were not able to pay the mortgage or rent on time?: No  In the last 12 months, how many places have you lived?: 1  In the last 12 months, was there a time when you did not have a steady place to sleep or slept in a shelter (including now)?: No  Homeless/housing insecurity resource given?: N/A  Living Arrangements: Lives w/ Spouse/significant other  Is patient a ?: Yes  Is patient active with [facility])?: No    Activities of Daily Living Prior to Admission  Functional Status: Independent  Completes ADLs independently?: Yes  Ambulates independently?: Yes  Does patient use assisted devices?: Yes  Assisted Devices (DME) used:  Shower Chair, Rolando Simmons  Does patient currently own DME?: Yes  What DME does the patient currently own?: Shower Rolando Ferrer  Does patient have a history of Outpatient Therapy (PT/OT)?: Yes  Does the patient have a history of Short-Term Rehab?: Yes  Does patient have a history of HHC?: No  Does patient currently have [address]?: No         Patient Information Continued  Income Source: Pension/prison  Does patient have prescription coverage?: Yes  Within the past 12 months, you worried that your food would run out before you got the money to buy more.: Never true  Within the past 12 months, the food you bought just didn't last and you didn't have money to get more.: Never true  Food insecurity resource given?: N/A  Does patient receive dialysis treatments?: No  Does patient have a history of substance abuse?: No  Does patient have a history of Mental Health Diagnosis?: No         Means of Transportation  Means of Transport to Appts[de-identified] Family transport  In the past 12 months, has lack of transportation kept you from medical appointments or from getting medications?: No  In the past 12 months, has lack of transportation kept you from meetings, work, or from getting things needed for daily living?: No  Was application for public transport provided?: N/A        DISCHARGE DETAILS:    Discharge planning discussed with[de-identified] patient and wife at bedside in the ED  Freedom of Choice: Yes  Comments - Freedom of Choice: discussed dc planning and role of CM  CM contacted family/caregiver?: Yes  Were Treatment Team discharge recommendations reviewed with patient/caregiver?: Yes  Did patient/caregiver verbalize understanding of patient care needs?: Yes       Contacts  Patient Contacts: Eri Mcmanus  Relationship to Patient[de-identified] Family  Contact Method: Phone  Phone Number: [phone]  Reason/Outcome: Discharge Planning, Continuity of Care, Emergency Contact    Requested [address]         Is the patient interested in [address] at discharge?: No    DME Referral Provided  Referral made for DME?: No    Other Referral/Resources/Interventions Provided:  Interventions: Outpatient PT  Referral Comments: Pt wants to continue his Op PT with Reymundo Husain. Other needs TBD            Discharge Destination Plan[de-identified] Home  Transport at Discharge : Family                             IMM Given (Date):: 08/24/23  IMM Given to[de-identified] Family  Family notified[de-identified] Amara Barroso  Additional Comments: Cm met with pt and his wife at bedside in the ED. Pt states that he and his wife live in a [facility] with 2 lissy with a landign between stairs. Pt has a flight of stairs to bed and shower. Pt uses a walker and sc at home. Pt has a walker for downstairs and one for upstairs. Pt has never had [facility] services. He has been to [place] for a STR stay. He is current Pt/Ot GS and wants ot continue to do so. pt has no MH/DA hx. Pts wife is the  in the household. Until this encourter pt was doing well at home and improving in his OP PT also. pts POA is his wife Veryl Lakia. Ortho to consutl for fall. No anticipated needs at this time.

## 2023-08-24 NOTE — ASSESSMENT & PLAN NOTE
Patient presented with a fall. Trauma work-up including x-rays of the foot shows There is diffuse soft tissue swelling anteriorly and laterally at the ankle. . Minimal cortical irregularity in the distal fibula concerning for a nondisplaced fracture. Splinted in the emergency room  Orthopedic consulted  Ortho recommending that nondisplaced fracture can be treated conservatively at this time with short leg splint. Continue with nonweightbearing to right lower extremity. PT evaluation will be obtained  Keep splint clean, dry and intact  Ice and elevation to right lower extremity  Pain control as needed  Mr. Guilherme Avila is an established patient with foot and ankle specialist Dr. Eliana Infante. Recommend follow-up with Dr. Eliana Infante in 1 to 2 weeks as outpatient.

## 2023-08-24 NOTE — ED PROVIDER NOTES
Emergency Department Trauma Note  Otto Castillo 79 y.o. male MRN: [id number]  Unit/Bed#: /-01 Encounter: [id number]      Trauma Alert: Trauma Acuity: Trauma Evaluation  Model of Arrival: Mode of Arrival: BLS via    Trauma Team: Current Providers  Attending Provider: Garth Mao DO  Attending Provider: Mario Gonzales MD  Registered Nurse: Eulalia Mcpherson  Consulting Physician: Jasbir Barnes DO  Patient Care Assistant: Ed Go  Registered Nurse: Anjali Thomas RN  Consulting Physician: Noble Hanna MD  Consultants:     None      History of Present Illness     Chief Complaint:   Chief Complaint   Patient presents with   • Fall     Pt got light headed and then lost his balance causing him to fall on his bottom. Pt felt a pop in his ankle, swelling noted in the right ankle. HPI:  Otto Castillo is a 79 y.o. male who presents with fall. Mechanism:Details of Incident: pt lost his balance and fell on his bottom Injury Date: 08/24/23 Injury Time: 0700      History from patient and paramedics. Brought in by ambulance from home where he fell this morning he said he felt lightheaded when he was walking to the bathroom he got weak all over and went down. He injured his right ankle twisted it he has right ankle pain he did not hit his head or pass out no blood thinners. No confusion. He said that he had difficulty getting up because he felt so weak. GCS 15. Breath sounds equal. No crepitus or chest wall tenderness with compression over the chest. No pain or instability with compression over the pelvis. No bedside imaging required. Patient is stable to proceed to CT scan. Review of Systems   Constitutional: Negative for chills and fever. HENT: Negative for rhinorrhea and sore throat. Respiratory: Negative for shortness of breath. Cardiovascular: Negative for chest pain. Gastrointestinal: Negative for constipation, diarrhea, nausea and vomiting. Genitourinary: Negative for dysuria and frequency. Skin: Negative for rash. Neurological: Positive for weakness and light-headedness. All other systems reviewed and are negative.       Historical Information     Immunizations:   Immunization History   Administered Date(s) Administered   • COVID-19 MODERNA VACC 0.5 ML IM 01/21/2021, 02/19/2021   • INFLUENZA 10/01/2022   • Influenza Split High Dose Preservative Free IM 10/05/2012, 09/17/2013, 09/15/2014, 09/28/2015, 10/03/2016, 10/10/2017   • Influenza, high dose seasonal 0.7 mL 09/28/2018, 10/23/2019, 10/06/2020, 10/15/2021, 10/22/2022   • Pneumococcal Conjugate 13-Valent 10/28/2015   • Pneumococcal Polysaccharide PPV23 09/18/2008, 11/29/2018   • Zoster 08/18/2011, 04/09/2013       Past Medical History:   Diagnosis Date   • Astigmatism     last assessed 11/15/17   • Cataract     Resolved 11/15/17   • Gross hematuria     Last assessed 01/28/16   • Hematuria     last assessed 01/28/16   • Kidney stones     Patient states he has kidney stonesin esc kidney   • Lumbar vertebral fracture (HCC)    • Prostate cancer ([address])     last assessed 05/18/17   • Renal calculi     last assessed 08/16/12   • Squamous cell carcinoma    • Vitamin D deficiency     Last assessed 08/19/13       Family History   Problem Relation Age of Onset   • Other Mother 76        Influenza   • Hypertension Mother    • Heart failure Father 70   • Peripheral vascular disease Father    • Hypertension Father    • Prostate cancer Brother    • Stroke Brother 48   • Hypertension Brother    • Hypertension Family      Past Surgical History:   Procedure Laterality Date   • BREAST EXCISIONAL BIOPSY N/A     Benign breast tumor unsure of side over 50 yrs ago   • CATARACT EXTRACTION Left 08/26/2019   • CATARACT EXTRACTION Right 08/12/2019   • COLONOSCOPY     • CYSTOSCOPY  01/28/2016   • ELBOW SURGERY      Bone chip   • HERNIA REPAIR  2002   • IR KYPHOPLASTY/VERTEBROPLASTY  1/4/2023   • IR KYPHOPLASTY/VERTEBROPLASTY  2/6/2023   • IR KYPHOPLASTY/VERTEBROPLASTY  3/23/2023   • LITHOTRIPSY     • RETROPUBIC PROSTATECTOMY  11/13/2001    Radical with nerve sparing     Social History     Tobacco Use   • Smoking status: Never   • Smokeless tobacco: Never   Vaping Use   • Vaping Use: Never used   Substance Use Topics   • Alcohol use: Not Currently     Alcohol/week: 4.0 standard drinks of alcohol     Types: 4 Glasses of wine per week     Comment: social   • Drug use: No     E-Cigarette/Vaping   • E-Cigarette Use Never User      E-Cigarette/Vaping Substances   • Nicotine No    • THC No    • CBD No    • Flavoring No    • Other No    • Unknown No        Family History: non-contributory    Meds/Allergies   Prior to Admission Medications   Prescriptions Last Dose Informant Patient Reported? Taking? Myrbetriq 25 MG TB24   No No   Sig: TAKE ONE TABLET BY MOUTH EVERY DAY IN THE MORNING   acetaminophen (TYLENOL) 325 mg tablet  Spouse/Significant Other No No   Sig: Take 2 tablets (650 mg total) by mouth every 8 (eight) hours   Patient taking differently: Take 650 mg by mouth every 8 (eight) hours Once daily   calcium citrate (CALCITRATE) 950 (200 Ca) MG tablet  Spouse/Significant Other Yes No   Sig: Take 1 tablet by mouth daily 1,200 mg daily   cholecalciferol (VITAMIN D3) 1,000 units tablet  Spouse/Significant Other Yes No   Sig: Take 3,000 Units by mouth daily 2,000 IU  daily   denosumab (Prolia) 60 mg/mL   Yes No   Sig: Prolia   Yearly, 03-15-23 and 09-19-23.    docusate sodium (Colace) 100 mg capsule  Spouse/Significant Other No No   Sig: Take 1 capsule (100 mg total) by mouth 2 (two) times a day   lisinopril (ZESTRIL) 10 mg tablet  Spouse/Significant Other No No   Sig: Take 1 tablet (10 mg total) by mouth daily at bedtime   lisinopril (ZESTRIL) 20 mg tablet  Spouse/Significant Other No No   Sig: Take 1 tablet (20 mg total) by mouth every morning   methocarbamol (ROBAXIN) 500 mg tablet  Spouse/Significant Other No No Sig: Take 1 tablet (500 mg total) by mouth 4 (four) times a day as needed for muscle spasms   Patient not taking: Reported on 8/11/2023   metoprolol succinate (TOPROL-XL) 50 mg 24 hr tablet  Spouse/Significant Other No No   Sig: Take 1 tablet (50 mg total) by mouth daily   triamcinolone (KENALOG) 0.1 % ointment  Spouse/Significant Other No No   Sig: Apply topically 2 (two) times a day For 2 weeks straight to right flank and legs only when rash is active. Patient not taking: Reported on 4/21/2023   verapamil (CALAN-SR) 240 mg CR tablet   No No   Sig: TAKE 1 TABLET BY MOUTH DAILY AT BEDTIME      Facility-Administered Medications Last Administration Doses Remaining   denosumab (PROLIA) subcutaneous injection 60 mg 3/15/2023  3:04 PM           Allergies   Allergen Reactions   • Sulfa Antibiotics Rash   • Oxycodone Confusion   • Ciprofloxacin    • Gabapentin Dizziness   • Lyrica [Pregabalin] Hallucinations     Blurry vision , hallucinations, confusion    • Medical Tape Rash       PHYSICAL EXAM    PE limited by: nothing    Objective   Vitals:   First set: Temperature: 97.7 °F (36.5 °C) (08/24/23 0734)  Pulse: 60 (08/24/23 0730)  Respirations: 18 (08/24/23 0730)  Blood Pressure: 156/70 (08/24/23 0729)  SpO2: 95 % (08/24/23 0730)    Primary Survey:   (A) Airway: patent  (B) Breathing: clear  (C) Circulation: Pulses:   normal  (D) Disabliity:  GCS Total:  15  (E) Expose:  Completed    Secondary Survey: (Click on Physical Exam tab above)  Physical Exam  Vitals and nursing note reviewed. Constitutional:       Appearance: He is well-developed. HENT:      Head: Normocephalic and atraumatic. Right Ear: External ear normal.      Left Ear: External ear normal.      Nose: Nose normal.   Eyes:      Conjunctiva/sclera: Conjunctivae normal.      Pupils: Pupils are equal, round, and reactive to light. Comments: Stye right upper eyelid   Cardiovascular:      Rate and Rhythm: Normal rate and regular rhythm.       Heart sounds: Normal heart sounds. Pulmonary:      Effort: Pulmonary effort is normal. No respiratory distress. Breath sounds: Normal breath sounds. No wheezing. Abdominal:      General: Bowel sounds are normal. There is no distension. Palpations: Abdomen is soft. Tenderness: There is no abdominal tenderness. Musculoskeletal:         General: No deformity. Normal range of motion. Cervical back: Normal range of motion and neck supple. No bony tenderness. No spinous process tenderness. Thoracic back: No bony tenderness. Lumbar back: No bony tenderness. Right ankle: Swelling present. Tenderness present over the lateral malleolus. Skin:     General: Skin is warm and dry. Findings: No rash. Neurological:      General: No focal deficit present. Mental Status: He is alert. GCS: GCS eye subscore is 4. GCS verbal subscore is 5. GCS motor subscore is 6. Sensory: No sensory deficit. Psychiatric:         Mood and Affect: Mood normal.         Cervical spine cleared by clinical criteria?  Yes     Invasive Devices     Peripheral Intravenous Line  Duration           Peripheral IV 08/24/23 Right Antecubital <1 day          Drain  Duration           External Urinary Catheter Small 199 days                Lab Results:   Results Reviewed     Procedure Component Value Units Date/Time    HS Troponin I 4hr [[id number]]  (Abnormal) Collected: 08/24/23 1523    Lab Status: Final result Specimen: Blood from Arm, Right Updated: 08/24/23 1551     hs TnI 4hr 57 ng/L      Delta 4hr hsTnI -2 ng/L     HS Troponin I 2hr [[id number]]  (Abnormal) Collected: 08/24/23 1008    Lab Status: Final result Specimen: Blood from Arm, Right Updated: 08/24/23 1036     hs TnI 2hr 61 ng/L      Delta 2hr hsTnI 2 ng/L     RBC Morphology Reflex Test [[id number]] Collected: 08/24/23 0754    Lab Status: Final result Specimen: Blood from Arm, Right Updated: 08/24/23 1001    Comprehensive metabolic panel [[id number]] (Abnormal) Collected: 08/24/23 0913    Lab Status: Final result Specimen: Blood from Arm, Right Updated: 08/24/23 0932     Sodium 134 mmol/L      Potassium 5.2 mmol/L      Chloride 103 mmol/L      CO2 25 mmol/L      ANION GAP 6 mmol/L      BUN 39 mg/dL      Creatinine 1.25 mg/dL      Glucose 95 mg/dL      Calcium 9.3 mg/dL      AST 13 U/L      ALT 12 U/L      Alkaline Phosphatase 37 U/L      Total Protein 6.2 g/dL      Albumin 3.7 g/dL      Total Bilirubin 0.58 mg/dL      eGFR 52 ml/min/1.73sq m     Narrative:      [facility] guidelines for Chronic Kidney Disease (CKD):   •  Stage 1 with normal or high GFR (GFR > 90 mL/min/1.73 square meters)  •  Stage 2 Mild CKD (GFR = 60-89 mL/min/1.73 square meters)  •  Stage 3A Moderate CKD (GFR = 45-59 mL/min/1.73 square meters)  •  Stage 3B Moderate CKD (GFR = 30-44 mL/min/1.73 square meters)  •  Stage 4 Severe CKD (GFR = 15-29 mL/min/1.73 square meters)  •  Stage 5 End Stage CKD (GFR <15 mL/min/1.73 square meters)  Note: GFR calculation is accurate only with a steady state creatinine    Manual Differential(PHLEBS Do Not Order) [[id number]]  (Abnormal) Collected: 08/24/23 0754    Lab Status: Final result Specimen: Blood from Arm, Right Updated: 08/24/23 0911     Segmented % 73 %      Lymphocytes % 15 %      Monocytes % 10 %      Eosinophils, % 0 %      Basophils % 0 %      Myelocytes % 1 %      Atypical Lymphocytes % 1 %      Absolute Neutrophils 7.48 Thousand/uL      Lymphocytes Absolute 1.64 Thousand/uL      Monocytes Absolute 1.03 Thousand/uL      Eosinophils Absolute 0.00 Thousand/uL      Basophils Absolute 0.00 Thousand/uL      Total Counted --     RBC Morphology Present     Platelet Estimate Adequate    CBC and differential [[id number]]  (Abnormal) Collected: 08/24/23 0754    Lab Status: Final result Specimen: Blood from Arm, Right Updated: 08/24/23 0911     WBC 10.25 Thousand/uL      RBC 4.20 Million/uL      Hemoglobin 13.3 g/dL      Hematocrit 40.7 %      MCV 97 fL      MCH 31.7 pg      MCHC 32.7 g/dL      RDW 12.6 %      MPV 11.3 fL      Platelets 628 Thousands/uL     Narrative: This is an appended report. These results have been appended to a previously verified report. FLU/RSV/COVID - if FLU/RSV clinically relevant [[id number]]  (Normal) Collected: 08/24/23 0754    Lab Status: Final result Specimen: Nares from Nose Updated: 08/24/23 0882     SARS-CoV-2 Negative     INFLUENZA A PCR Negative     INFLUENZA B PCR Negative     RSV PCR Negative    Narrative:      FOR PEDIATRIC PATIENTS - copy/paste COVID Guidelines URL to browser: [email or web address] ashx    SARS-CoV-2 assay is a Nucleic Acid Amplification assay intended for the  qualitative detection of nucleic acid from SARS-CoV-2 in nasopharyngeal  swabs. Results are for the presumptive identification of SARS-CoV-2 RNA. Positive results are indicative of infection with SARS-CoV-2, the virus  causing COVID-19, but do not rule out bacterial infection or co-infection  with other viruses. Laboratories within the [facility] and its  territories are required to report all positive results to the appropriate  public health authorities. Negative results do not preclude SARS-CoV-2  infection and should not be used as the sole basis for treatment or other  patient management decisions. Negative results must be combined with  clinical observations, patient history, and epidemiological information. This test has not been FDA cleared or approved. This test has been authorized by FDA under an Emergency Use Authorization  (EUA). This test is only authorized for the duration of time the  declaration that circumstances exist justifying the authorization of the  emergency use of an in vitro diagnostic tests for detection of SARS-CoV-2  virus and/or diagnosis of COVID-19 infection under section 564(b)(1) of  the Act, 21 U. S.C. 835LVE-2(F)(5), unless the authorization is terminated  or revoked sooner. The test has been validated but independent review by FDA  and CLIA is pending. Test performed using [employer] GeneXpert: This RT-PCR assay targets N2,  a region unique to SARS-CoV-2. A conserved region in the E-gene was chosen  for pan-Sarbecovirus detection which includes SARS-CoV-2. According to CMS-2020-01-R, this platform meets the definition of high-throughput technology. UA w Reflex to Microscopic w Reflex to Culture [[id number]] Collected: 08/24/23 0813    Lab Status: Final result Specimen: Urine, Clean Catch Updated: 08/24/23 0837     Color, UA Yellow     Clarity, UA Clear     Specific Gravity, UA 1.010     pH, UA 6.0     Leukocytes, UA Negative     Nitrite, UA Negative     Protein, UA Negative mg/dl      Glucose, UA Negative mg/dl      Ketones, UA Negative mg/dl      Urobilinogen, UA <2.0 mg/dl      Bilirubin, UA Negative     Occult Blood, UA Negative    HS Troponin 0hr (reflex protocol) [[id number]]  (Abnormal) Collected: 08/24/23 0754    Lab Status: Final result Specimen: Blood from Arm, Right Updated: 08/24/23 0824     hs TnI 0hr 59 ng/L                  Imaging Studies:   Direct to CT: Yes  TRAUMA - CT head wo contrast   Final Result by Jorge Hernandez MD (08/24 3948)      No acute intracranial abnormality. The study was marked in [facility] for immediate notification. Workstation performed: [id number]         XR ankle 3+ vw right   Final Result by Juliana Yeager MD (08/24 4074)         1. There is diffuse soft tissue swelling anteriorly and laterally at the ankle. 2. Minimal cortical irregularity in the distal fibula concerning for a nondisplaced fracture. The study was marked in EPIC for significant notification. Workstation performed: [id number]         XR chest portable   Final Result by Isac Trinidad MD (08/24 2892)      Pulmonary hyperinflation. No acute cardiopulmonary disease.                   Workstation performed: [id number]               Procedures  ECG 12 Lead Documentation Only    Date/Time: 8/24/2023 4:35 PM    Performed by: Chen Hadley DO  Authorized by: Chen Hadley DO    Indications / Diagnosis:  Lightheaded, fall  ECG reviewed by me, the ED Provider: yes    Patient location:  ED  Previous ECG:     Previous ECG:  Compared to current    Comparison ECG info:  2-23    Similarity:  Changes noted  Interpretation:     Interpretation: non-specific    Rate:     ECG rate:  61    ECG rate assessment: normal    Rhythm:     Rhythm: sinus rhythm    Ectopy:     Ectopy: PVCs      PVCs:  Frequent  QRS:     QRS axis:  Normal    QRS intervals:  Normal  Conduction:     Conduction: normal    ST segments:     ST segments:  Normal  T waves:     T waves: normal    Comments: This EKG was interpreted by me. Orthopedic injury treatment    Date/Time: 8/24/2023 10:42 AM    Performed by: Chen Hadley DO  Authorized by: Chen Hadley DO    Patient Location:  ED  [place] Protocol:  Consent: Verbal consent obtained. Risks and benefits: risks, benefits and alternatives were discussed  Consent given by: patient  Patient understanding: patient states understanding of the procedure being performed      Injury location:  Ankle  Location details:  Right ankle  Injury type:  Fracture  Fracture type: lateral malleolus    Fracture type: lateral malleolus    Neurovascular status: Neurovascularly intact    Distal perfusion: normal    Neurological function: normal    Range of motion: reduced    Manipulation performed?: No    Immobilization:  Splint  Splint type:  Short leg  Supplies used:  Ortho-Glass  Neurovascular status: Neurovascularly intact    Distal perfusion: normal    Neurological function: normal    Range of motion: unchanged               ED Course           Medical Decision Making  Differential includes vasovagal reaction, less likely acute coronary syndrome or arrhythmia. Also, right ankle fracture present.   Less likely intracranial hemorrhage or electrolyte abnl, less likely anemia or infection. Amount and/or Complexity of Data Reviewed  Labs: ordered. Radiology: ordered. Risk  OTC drugs. Prescription drug management. Decision regarding hospitalization. Disposition  [facility] Transfer: No  Final diagnoses:   Closed fracture of right ankle, initial encounter   Fall, initial encounter   Elevated troponin   Generalized weakness     Time reflects when diagnosis was documented in both MDM as applicable and the Disposition within this note     Time User Action Codes Description Comment    8/24/2023  8:35 AM Cristo [place] [H08.661R] Closed fracture of right ankle, initial encounter     8/24/2023  8:36 AM Cristo [place] [W19. XXXA] Fall, initial encounter     8/24/2023  8:56 AM Cristo [place] [R77.8] Elevated troponin     8/24/2023  9:45 AM Cristo [place] [R53.1] Generalized weakness       ED Disposition     ED Disposition   Admit    Condition   Stable    Date/Time   Thu Aug 24, 2023  9:45 AM    Comment   Case was discussed with Jose Luis Rendon and the patient's admission status was agreed to be Admission Status: inpatient status to the service of Dr. Jose Luis Rendon* .            Follow-up Information     Follow up With Specialties Details Why Contact Info    Sofi Beyer MD Orthopedic Surgery Follow up in 2 week(s)  [address] [place] [address]  [phone]          Current Discharge Medication List      CONTINUE these medications which have NOT CHANGED    Details   acetaminophen (TYLENOL) 325 mg tablet Take 2 tablets (650 mg total) by mouth every 8 (eight) hours  Refills: 0    Associated Diagnoses: Compression fracture of L1 vertebra, initial encounter ([facility])      calcium citrate (CALCITRATE) 950 (200 Ca) MG tablet Take 1 tablet by mouth daily 1,200 mg daily      cholecalciferol (VITAMIN D3) 1,000 units tablet Take 3,000 Units by mouth daily 2,000 IU  daily      denosumab (Prolia) 60 mg/mL Prolia Yearly, 03-15-23 and 09-19-23. docusate sodium (Colace) 100 mg capsule Take 1 capsule (100 mg total) by mouth 2 (two) times a day  Refills: 0    Associated Diagnoses: Closed compression fracture of body of L1 vertebra ([facility])      ! ! lisinopril (ZESTRIL) 10 mg tablet Take 1 tablet (10 mg total) by mouth daily at bedtime  Qty: 90 tablet, Refills: 3    Associated Diagnoses: HTN (hypertension)      !! lisinopril (ZESTRIL) 20 mg tablet Take 1 tablet (20 mg total) by mouth every morning  Qty: 90 tablet, Refills: 3    Associated Diagnoses: HTN (hypertension)      methocarbamol (ROBAXIN) 500 mg tablet Take 1 tablet (500 mg total) by mouth 4 (four) times a day as needed for muscle spasms  Qty: 120 tablet, Refills: 0    Associated Diagnoses: T12 compression fracture, initial encounter ([facility])      metoprolol succinate (TOPROL-XL) 50 mg 24 hr tablet Take 1 tablet (50 mg total) by mouth daily  Qty: 90 tablet, Refills: 3    Associated Diagnoses: PVC's (premature ventricular contractions)      Myrbetriq 25 MG TB24 TAKE ONE TABLET BY MOUTH EVERY DAY IN THE MORNING  Qty: 30 tablet, Refills: 5    Associated Diagnoses: Urge incontinence of urine      triamcinolone (KENALOG) 0.1 % ointment Apply topically 2 (two) times a day For 2 weeks straight to right flank and legs only when rash is active. Qty: 80 g, Refills: 0    Associated Diagnoses: Rash      verapamil (CALAN-SR) 240 mg CR tablet TAKE 1 TABLET BY MOUTH DAILY AT BEDTIME  Qty: 90 tablet, Refills: 1    Associated Diagnoses: PVC's (premature ventricular contractions)       ! ! - Potential duplicate medications found. Please discuss with provider. No discharge procedures on file.     PDMP Review       Value Time User    PDMP Reviewed  Yes 10/17/2022  3:08 PM Ramsey King, [address] [place] [address]          ED Provider  Electronically Signed by         Renee العراقي DO  08/24/23 [phone]

## 2023-08-25 ENCOUNTER — TELEPHONE (OUTPATIENT)
Dept: CARDIOLOGY CLINIC | Facility: CLINIC | Age: 83
End: 2023-08-25

## 2023-08-25 ENCOUNTER — APPOINTMENT (INPATIENT)
Dept: NON INVASIVE DIAGNOSTICS | Facility: HOSPITAL | Age: 83
DRG: 563 | End: 2023-08-25
Payer: MEDICARE

## 2023-08-25 LAB
ANION GAP SERPL CALCULATED.3IONS-SCNC: 5 MMOL/L
AORTIC ROOT: 4.4 CM
APICAL FOUR CHAMBER EJECTION FRACTION: 65 %
ASCENDING AORTA: 3.3 CM
ATRIAL RATE: 61 BPM
AV REGURGITATION PRESSURE HALF TIME: 641 MS
BUN SERPL-MCNC: 43 MG/DL (ref 5–25)
CALCIUM SERPL-MCNC: 8.5 MG/DL (ref 8.4–10.2)
CHLORIDE SERPL-SCNC: 105 MMOL/L (ref 96–108)
CHOLEST SERPL-MCNC: 171 MG/DL
CO2 SERPL-SCNC: 23 MMOL/L (ref 21–32)
CREAT SERPL-MCNC: 1.32 MG/DL (ref 0.6–1.3)
DOP CALC LVOT AREA: 3.8 CM2
DOP CALC LVOT DIAMETER: 2.2 CM
E WAVE DECELERATION TIME: 415 MS
ERYTHROCYTE [DISTWIDTH] IN BLOOD BY AUTOMATED COUNT: 12.9 % (ref 11.6–15.1)
FRACTIONAL SHORTENING: 41 (ref 28–44)
GFR SERPL CREATININE-BSD FRML MDRD: 49 ML/MIN/1.73SQ M
GLUCOSE SERPL-MCNC: 100 MG/DL (ref 65–140)
HCT VFR BLD AUTO: 35.2 % (ref 36.5–49.3)
HDLC SERPL-MCNC: 48 MG/DL
HGB BLD-MCNC: 11.7 G/DL (ref 12–17)
INTERVENTRICULAR SEPTUM IN DIASTOLE (PARASTERNAL SHORT AXIS VIEW): 1.1 CM
INTERVENTRICULAR SEPTUM: 1.1 CM (ref 0.6–1.1)
LAAS-AP2: 15.8 CM2
LAAS-AP4: 18.3 CM2
LDLC SERPL CALC-MCNC: 97 MG/DL (ref 0–100)
LEFT ATRIUM AREA SYSTOLE SINGLE PLANE A4C: 18 CM2
LEFT ATRIUM SIZE: 3.6 CM
LEFT ATRIUM VOLUME (MOD BIPLANE): 60 ML
LEFT INTERNAL DIMENSION IN SYSTOLE: 2.7 CM (ref 2.1–4)
LEFT VENTRICULAR INTERNAL DIMENSION IN DIASTOLE: 4.6 CM (ref 3.5–6)
LEFT VENTRICULAR POSTERIOR WALL IN END DIASTOLE: 1 CM
LEFT VENTRICULAR STROKE VOLUME: 67 ML
LVSV (TEICH): 67 ML
MCH RBC QN AUTO: 32 PG (ref 26.8–34.3)
MCHC RBC AUTO-ENTMCNC: 33.2 G/DL (ref 31.4–37.4)
MCV RBC AUTO: 96 FL (ref 82–98)
MITRAL REGURGITATION PEAK VELOCITY: 5.75 M/S
MITRAL VALVE MEAN INFLOW VELOCITY: 4.41 M/S
MITRAL VALVE REGURGITANT PEAK GRADIENT: 132 MMHG
MV E'TISSUE VEL-SEP: 6 CM/S
MV PEAK A VEL: 0.9 M/S
MV PEAK E VEL: 60 CM/S
MV STENOSIS PRESSURE HALF TIME: 120 MS
MV VALVE AREA P 1/2 METHOD: 1.83
NONHDLC SERPL-MCNC: 123 MG/DL
NRBC BLD AUTO-RTO: 0 /100 WBCS
P AXIS: 99 DEGREES
PLATELET # BLD AUTO: 128 THOUSANDS/UL (ref 149–390)
PMV BLD AUTO: 10.7 FL (ref 8.9–12.7)
POTASSIUM SERPL-SCNC: 4.9 MMOL/L (ref 3.5–5.3)
PR INTERVAL: 210 MS
QRS AXIS: 69 DEGREES
QRSD INTERVAL: 88 MS
QT INTERVAL: 410 MS
QTC INTERVAL: 412 MS
RA PRESSURE ESTIMATED: 3 MMHG
RBC # BLD AUTO: 3.66 MILLION/UL (ref 3.88–5.62)
RIGHT ATRIUM AREA SYSTOLE A4C: 25 CM2
RIGHT VENTRICLE ID DIMENSION: 4.7 CM
RV PSP: 35 MMHG
SL CV AV DECELERATION TIME RETROGRADE: 2210 MS
SL CV AV PEAK GRADIENT RETROGRADE: 24 MMHG
SL CV DOP CALC MV VTI RETROGRADE: 188 CM
SL CV LEFT ATRIUM LENGTH A2C: 3.3 CM
SL CV LV EF: 65
SL CV MV MEAN GRADIENT RETROGRADE: 89 MMHG
SL CV PED ECHO LEFT VENTRICLE DIASTOLIC VOLUME (MOD BIPLANE) 2D: 96 ML
SL CV PED ECHO LEFT VENTRICLE SYSTOLIC VOLUME (MOD BIPLANE) 2D: 28 ML
SL CV TR MAX PG ANTEGRADE: 30 MMHG
SODIUM SERPL-SCNC: 133 MMOL/L (ref 135–147)
T WAVE AXIS: 68 DEGREES
TR MAX PG: 32 MMHG
TR PEAK VELOCITY: 2.8 M/S
TRICUSPID ANNULAR PLANE SYSTOLIC EXCURSION: 2.2 CM
TRICUSPID VALVE PEAK REGURGITATION VELOCITY: 2.82 M/S
TRIGL SERPL-MCNC: 128 MG/DL
TV MEAN GRADIENT: 19 MMHG
TV MV D: 2.03 M/S
TV VTI: 80.17 CM
VENTRICULAR RATE: 61 BPM
VIT B1 BLD-SCNC: 131.3 NMOL/L (ref 66.5–200)
WBC # BLD AUTO: 9.09 THOUSAND/UL (ref 4.31–10.16)

## 2023-08-25 PROCEDURE — 99232 SBSQ HOSP IP/OBS MODERATE 35: CPT | Performed by: INTERNAL MEDICINE

## 2023-08-25 PROCEDURE — 85025 COMPLETE CBC W/AUTO DIFF WBC: CPT | Performed by: STUDENT IN AN ORGANIZED HEALTH CARE EDUCATION/TRAINING PROGRAM

## 2023-08-25 PROCEDURE — 97167 OT EVAL HIGH COMPLEX 60 MIN: CPT

## 2023-08-25 PROCEDURE — 80048 BASIC METABOLIC PNL TOTAL CA: CPT | Performed by: STUDENT IN AN ORGANIZED HEALTH CARE EDUCATION/TRAINING PROGRAM

## 2023-08-25 PROCEDURE — 80061 LIPID PANEL: CPT | Performed by: INTERNAL MEDICINE

## 2023-08-25 PROCEDURE — 93306 TTE W/DOPPLER COMPLETE: CPT

## 2023-08-25 PROCEDURE — 93010 ELECTROCARDIOGRAM REPORT: CPT | Performed by: INTERNAL MEDICINE

## 2023-08-25 PROCEDURE — 97530 THERAPEUTIC ACTIVITIES: CPT

## 2023-08-25 PROCEDURE — 97163 PT EVAL HIGH COMPLEX 45 MIN: CPT

## 2023-08-25 PROCEDURE — 99222 1ST HOSP IP/OBS MODERATE 55: CPT | Performed by: INTERNAL MEDICINE

## 2023-08-25 PROCEDURE — 93306 TTE W/DOPPLER COMPLETE: CPT | Performed by: INTERNAL MEDICINE

## 2023-08-25 RX ADMIN — ACETAMINOPHEN 325MG 975 MG: 325 TABLET ORAL at 23:50

## 2023-08-25 RX ADMIN — VERAPAMIL HYDROCHLORIDE 240 MG: 240 TABLET, FILM COATED, EXTENDED RELEASE ORAL at 21:10

## 2023-08-25 RX ADMIN — LISINOPRIL 20 MG: 20 TABLET ORAL at 08:32

## 2023-08-25 RX ADMIN — METOPROLOL SUCCINATE 50 MG: 50 TABLET, EXTENDED RELEASE ORAL at 08:32

## 2023-08-25 RX ADMIN — ACETAMINOPHEN 325MG 975 MG: 325 TABLET ORAL at 05:23

## 2023-08-25 RX ADMIN — OXYBUTYNIN CHLORIDE 10 MG: 5 TABLET, EXTENDED RELEASE ORAL at 08:32

## 2023-08-25 RX ADMIN — ENOXAPARIN SODIUM 40 MG: 100 INJECTION SUBCUTANEOUS at 08:32

## 2023-08-25 RX ADMIN — ERYTHROMYCIN 0.5 INCH: 5 OINTMENT OPHTHALMIC at 08:37

## 2023-08-25 RX ADMIN — ACETAMINOPHEN 325MG 975 MG: 325 TABLET ORAL at 00:12

## 2023-08-25 RX ADMIN — ASPIRIN 81 MG: 81 TABLET, COATED ORAL at 08:32

## 2023-08-25 RX ADMIN — ACETAMINOPHEN 325MG 975 MG: 325 TABLET ORAL at 17:36

## 2023-08-25 RX ADMIN — ERYTHROMYCIN 0.5 INCH: 5 OINTMENT OPHTHALMIC at 21:11

## 2023-08-25 RX ADMIN — DOCUSATE SODIUM 100 MG: 100 CAPSULE, LIQUID FILLED ORAL at 17:36

## 2023-08-25 RX ADMIN — Medication 3000 UNITS: at 08:32

## 2023-08-25 RX ADMIN — DOCUSATE SODIUM 100 MG: 100 CAPSULE, LIQUID FILLED ORAL at 08:32

## 2023-08-25 RX ADMIN — POLYETHYLENE GLYCOL 3350 17 G: 17 POWDER, FOR SOLUTION ORAL at 08:32

## 2023-08-25 RX ADMIN — CALCIUM 2 TABLET: 500 TABLET ORAL at 08:32

## 2023-08-25 RX ADMIN — LISINOPRIL 10 MG: 10 TABLET ORAL at 21:10

## 2023-08-25 RX ADMIN — ACETAMINOPHEN 325MG 975 MG: 325 TABLET ORAL at 11:43

## 2023-08-25 NOTE — TELEPHONE ENCOUNTER
----- Message from Abiola Brewer PA-C sent at 8/25/2023 10:38 AM EDT -----  Regarding: Molly Chase can Mr. Guilherme Avila get a 1 week ZIO monitor? Indications is frequent PVC's and syncope. Monitor is ordered.   Thank you  Inessa Wilson
Zio ordered
room air

## 2023-08-25 NOTE — PLAN OF CARE
Problem: OCCUPATIONAL THERAPY ADULT  Goal: Performs self-care activities at highest level of function for planned discharge setting. See evaluation for individualized goals. Description: Treatment Interventions: ADL retraining, Functional transfer training, Endurance training, Patient/family training, Equipment evaluation/education, Compensatory technique education, Continued evaluation, UE strengthening/ROM          See flowsheet documentation for full assessment, interventions and recommendations. Note: Limitation: Decreased ADL status, Decreased UE strength, Decreased endurance, Decreased self-care trans, Decreased high-level ADLs  Prognosis: Good  Assessment: Pt is a 79 y.o. male seen for OT evaluation at [facility], admitted 8/24/2023 w/ Closed fracture of right ankle with routine healing s/p fall. Ortho treating nonoperatively. Pt is NWB to E with splint. OT completed extensive review of pt's medical and social history. Comorbidities affecting pt's functional performance at time of assessment include: PVCs, h/o compression fx of L1 & T12 vertebra, elevated troponin, chronic LBP, spinal stenosis of lumbar region, lumbar radiculopathy/spondylosis, osteoporosis, sacroiliitis, h/o kyphoplasty. Personal factors affecting pt at time of IE include: steps to enter environment, limited home support, difficulty performing ADLS, difficulty performing IADLS  and environment. Prior to admission, pt was living with his spouse in a [facility] with 2 GRACIELA. Pt was S w/  ADLS and A w/ IADLS, (-) drove, & required use of shower chair, walker and rollator PTA. Upon evaluation: Pt requires Mod Ax2 for bed mobility, Min-Mod Ax2 for functional mobility/transfers, Min A for UB ADLs and Max A for LB ADLS 2* the following deficits impacting occupational performance: weakness, decreased strength, decreased balance, decreased tolerance, impaired GMC, increased pain and orthopedic restrictions.  Full objective findings from OT assessment regarding body systems outlined above. Pt to benefit from continued skilled OT tx while in the hospital to address deficits as defined above and maximize level of functional independence w/ ADL's and functional mobility. Occupational Performance areas to address include: bathing/shower, toilet hygiene, dressing, functional mobility and clothing management. Based on findings, pt is of high complexity. The patient's raw score on the AM-PAC Daily Activity inpatient short form is 16, standardized score is 35.96, less than 39.4. Patients at this level are likely to benefit from DC to post-acute rehabilitation services. However, please refer to therapist recommendation for discharge planning given other factors that may influence destination. At this time, OT recommendations at time of discharge are DC with Level I resources.

## 2023-08-25 NOTE — PLAN OF CARE
Problem: Potential for Falls  Goal: Patient will remain free of falls  Description: INTERVENTIONS:  - Educate patient/family on patient safety including physical limitations  - Instruct patient to call for assistance with activity   - Consult OT/PT to assist with strengthening/mobility   - Keep Call bell within reach  - Keep bed low and locked with side rails adjusted as appropriate  - Keep care items and personal belongings within reach  - Initiate and maintain comfort rounds  - Make Fall Risk Sign visible to staff  - Offer Toileting every 2 Hours, in advance of need  - Initiate/Maintain 2alarm  - Obtain necessary fall risk management equipment: 2  - Apply yellow socks and bracelet for high fall risk patients  - Consider moving patient to room near nurses station  Outcome: Progressing     Problem: MOBILITY - ADULT  Goal: Maintain or return to baseline ADL function  Description: INTERVENTIONS:  -  Assess patient's ability to carry out ADLs; assess patient's baseline for ADL function and identify physical deficits which impact ability to perform ADLs (bathing, care of mouth/teeth, toileting, grooming, dressing, etc.)  - Assess/evaluate cause of self-care deficits   - Assess range of motion  - Assess patient's mobility; develop plan if impaired  - Assess patient's need for assistive devices and provide as appropriate  - Encourage maximum independence but intervene and supervise when necessary  - Involve family in performance of ADLs  - Assess for home care needs following discharge   - Consider OT consult to assist with ADL evaluation and planning for discharge  - Provide patient education as appropriate  Outcome: Progressing

## 2023-08-25 NOTE — PHYSICAL THERAPY NOTE
PHYSICAL THERAPY EVALUATION NOTE      Patient Name: Uriah Hannah  QRWSS'I Date: 2023    AGE:   79 y.o. Mrn:   [id number]  ADMIT DX:  Elevated troponin [R77.8]  Generalized weakness [R53.1]  Closed fracture of right ankle, initial encounter [S82.891A]  Fall, initial encounter [W19. XXXA]  Unspecified multiple injuries, initial encounter [T07. XXXA]    Past Medical History:   Diagnosis Date    Astigmatism     last assessed 11/15/17    Cataract     Resolved 11/15/17    Gross hematuria     Last assessed 16    Hematuria     last assessed 16    Kidney stones     Patient states he has kidney stonesin [facility] kidney    Lumbar vertebral fracture ([address])     Prostate cancer ([address])     last assessed 17    Renal calculi     last assessed 12    Squamous cell carcinoma     Vitamin D deficiency     Last assessed 13     Length Of Stay: 1  PHYSICAL THERAPY EVALUATION :   Patient's identity confirmed via 2 patient identifiers (full name and ) at start of session       23 1235   PT Last Visit   PT Visit Date 23   Note Type   Note type Evaluation   Pain Assessment   Pain Assessment Tool 0-10   Pain Score 3   Pain Location/Orientation Orientation: Lower; Location: Back  (chronic)   Restrictions/Precautions   Weight Bearing Precautions Per Order Yes   RLE Weight Bearing Per Order (S)  NWB   Braces or Orthoses Splint  (ankle appears inverted - wife verbalizing concerns, per Ortho AP Rodena Branch, splint appropriately placed)   Other Precautions Chair Alarm; Bed Alarm; Fall Risk;Pain   Home Living   Type of [address] [place] [address] Two level;Stairs to enter with rails;Bed/bath upstairs  (2 GRACIELA)   Home Equipment Walker  (rollator)   Additional Comments Pt reports using rollator PTA   Prior Function   Level of [place] Independent with ADLs; Independent with functional mobility   Lives With Spouse   Receives Help From Outpatient therapy  (OPPT @ [facility] 3x/wk for balance/strength)   Falls in the last 6 months 1 to 4  (1, reason for admission, pt denies)   Vocational Retired   General   Additional Pertinent History Per ortho, pt is non-op   Family/Caregiver Present Yes  (Wife, Milford Nyhan)   Cognition   Arousal/Participation Alert   Orientation Level Oriented X4   Memory Within functional limits   Following Commands Follows multistep commands with increased time or repetition   Comments Pt cooperative   RLE Assessment   RLE Assessment WFL   Strength RLE   RLE Overall Strength 3/5   LLE Assessment   LLE Assessment WFL   Strength LLE   LLE Overall Strength 4-/5   Bed Mobility   Supine to Sit 3  Moderate assistance   Additional items Assist x 2; Increased time required;Verbal cues;LE management;HOB elevated   Transfers   Sit to Stand 3  Moderate assistance   Additional items Assist x 2; Increased time required  (to 75% stand)   Stand to Sit 3  Moderate assistance   Additional items Assist x 2; Increased time required;Verbal cues   Additional Comments Pt performed STS x 2 trials, once with RW and second B HHA w/ L knee block. Pt requires tactile cues for NWB   Ambulation/Elevation   Ambulation/Elevation Additional Comments see additional treatment note for successful slide board transfer   Balance   Static Sitting Fair   Static Standing Zero  (Ax2)   Activity Tolerance   Activity Tolerance Patient tolerated treatment well   Medical Staff Made Aware MAKENNA Person   Nurse Made Aware LUZMA King   Assessment   Problem List Decreased strength;Decreased endurance; Impaired balance;Decreased mobility;Pain;Orthopedic restrictions   Assessment Inessa Barrios is a 79 y.o. Male who presents to [address] [place] [address] on 8/24/2023 from home w/ c/o syncope and fall and diagnosis of closed fracture of R ankle w/ routine healing. Orders for PT eval and treat received, w/ activity orders of NWB R LE.  Pt presents w/ comorbidities of L1 compression fx s/p kyphoplasty, hx of prostaste cancer, CPS, CKD Stage 3. At baseline, pt mobilizes modified I w/ rollator, and reports 1 falls in the last 6 months. Upon evaluation, pt presents w/ the following deficits: weakness, impaired balance and decreased endurance. Upon eval, pt requires mod A x 2 for bed mobility, mod A x 2 for transfers. Based on this PT evaluation today, patient's discharge recommendation is for Level I. During this admission, pt would benefit from continued skilled inpatient PT in the acute care setting in order to address the abovementioned deficits to maximize function and mobility before DC from acute care. Goals   Patient Goals to get stronger   STG Expiration Date 09/04/23   Short Term Goal #1 Patient will: Perform all bed mobility tasks w/ minx1 to improve pt's independence w/ repositioning for decrease risk of skin breakdown, Perform all transfers w/ modx1 consistently from various height surfaces in order to improve I w/ engagement w/ real-world environments/situations, Increase all balance 1/2 grade to decrease risk for falls, Complete exercise program independently, Tolerate 3 hr OOB to faciliate upright tolerance and Propel in wheelchair for at least 100 ft w/ w/ supervision over tile surface in order to be able to use WC as alternate means of mobility   PT Treatment Day 0   Plan   Treatment/Interventions Functional transfer training;LE strengthening/ROM; Therapeutic exercise; Endurance training;Patient/family training;Equipment eval/education; Bed mobility;Gait training   PT Frequency 3-5x/wk   Recommendation   UB Rehab Discharge Recommendation (PT/OT) Level 1   Equipment Recommended Wheelchair   Wheelchair Package Recommended Lightweight   AM-PAC Basic Mobility Inpatient   Turning in Flat Bed Without Bedrails 2   Lying on Back to Sitting on Edge of Flat Bed Without Bedrails 1   Moving Bed to Chair 1   Standing Up From Chair Using Arms 1   Walk in Room 1   Climb 3-5 Stairs With Railing 1   Basic Mobility Inpatient Raw Score 7   Turning Head Towards Sound 3   Follow Simple Instructions 3   Low Function Basic Mobility Raw Score  13   Low Function Basic Mobility Standardized Score  20.14   Highest Level Of Mobility   -[facility] Goal 2: Bed activities/Dependent transfer   -HL Achieved 4: Move to chair/commode   Additional Treatment Session   Start Time 1248   End Time 1258   Treatment Assessment Pt seated EOB. Given inability to maintain a full static stand, educated pt and wife on slide board transfers. Pt actually responds well to education and performs slide board transfers to his L w/ min A x 2, with A at RLE for NWB. Pt seated OOB in recliner chair at end of session   Equipment Use Slide board   Additional Treatment Day 1   End of Consult   Patient Position at End of Consult Bedside chair;Bed/Chair alarm activated; All needs within reach         The patient's AM-PAC Basic Mobility Inpatient Short Form Raw Score is 7, Standardized Score is  . A standardized score less than 38.32 (raw score of 16) suggests the patient may benefit from discharge to post-acute rehabilitation services which may not coincide with above PT recommendations. However please refer to therapist recommendation for discharge planning given other factors that may influence destination.       Pt would benefit from skilled inpatient PT during this admission in order to facilitate progress towards goals to maximize functional independence    Nora Vazquez, PT, DPT

## 2023-08-25 NOTE — OCCUPATIONAL THERAPY NOTE
Occupational Therapy Evaluation & Tx     Patient Name: Timothy Gutierrez  BBXTM'G Date: 8/25/2023  Problem List  Principal Problem:    Closed fracture of right ankle with routine healing  Active Problems:    PVC's (premature ventricular contractions)    Nonrheumatic aortic valve insufficiency    Non-rheumatic mitral regurgitation    Compression fracture of L1 vertebra (HCC)    Elevated troponin    Fall    Past Medical History  Past Medical History:   Diagnosis Date    Astigmatism     last assessed 11/15/17    Cataract     Resolved 11/15/17    Gross hematuria     Last assessed 01/28/16    Hematuria     last assessed 01/28/16    Kidney stones     Patient states he has kidney stones[facility]    Lumbar vertebral fracture ([address])     Prostate cancer ([address])     last assessed 05/18/17    Renal calculi     last assessed 08/16/12    Squamous cell carcinoma     Vitamin D deficiency     Last assessed 08/19/13     Past Surgical History  Past Surgical History:   Procedure Laterality Date    BREAST EXCISIONAL BIOPSY N/A     Benign breast tumor unsure of side over 50 yrs ago    CATARACT EXTRACTION Left 08/26/2019    CATARACT EXTRACTION Right 08/12/2019    COLONOSCOPY      CYSTOSCOPY  01/28/2016    ELBOW SURGERY      Bone chip    HERNIA REPAIR  2002    IR KYPHOPLASTY/VERTEBROPLASTY  1/4/2023    IR KYPHOPLASTY/VERTEBROPLASTY  2/6/2023    IR KYPHOPLASTY/VERTEBROPLASTY  3/23/2023    LITHOTRIPSY      RETROPUBIC PROSTATECTOMY  11/13/2001    Radical with nerve sparing           08/25/23 1257   OT Last Visit   OT Visit Date 08/25/23   Note Type   Note type Evaluation   Pain Assessment   Pain Assessment Tool 0-10   Pain Score 3   Pain Location/Orientation Orientation: Lower; Location: Back   Restrictions/Precautions   Weight Bearing Precautions Per Order Yes   RLE Weight Bearing Per Order (S)  NWB   Braces or Orthoses Splint   Other Precautions Chair Alarm; Bed Alarm; Fall Risk;WBS;Pain   Home Living   Type of [address] [place] [address] Dr Two level;Stairs to enter with rails;Bed/bath upstairs  (2 GRACIELA)   Bathroom Shower/Tub Walk-in shower   Bathroom Equipment Shower chair   Home Equipment Other (Comment)  (Rollator)   Prior Function   Level of [place] Independent with ADLs; Independent with functional mobility   Lives With Spouse   Receives Help From Outpatient therapy  (3x/wk)   IADLs Family/Friend/Other provides transportation; Family/Friend/Other provides meals; Family/Friend/Other provides medication management   Falls in the last 6 months 1 to 4   Vocational Retired   Comments Wife provides supervision for ADLs   General   Additional Pertinent History NWB to RLE - non-operative treatment   Family/Caregiver Present Yes   Additional General Comments Spouse   Subjective   Subjective "Okay let's do it"   ADL   Eating Assistance 7  Independent   Grooming Assistance 5  Supervision/Setup   UB Bathing Assistance 5  Supervision/Setup   LB Bathing Assistance 2  Maximal Assistance   UB Dressing Assistance 4  Minimal Assistance   LB [address] Final 2  Maximal [address] [place]  2  Maximal Assistance   Bed Mobility   Supine to Sit 3  Moderate assistance   Additional items Assist x 2; Increased time required;Verbal cues;LE management; Bedrails;HOB elevated   Transfers   Sit to Stand 3  Moderate assistance   Additional items Assist x 2; Increased time required;Verbal cues  (2 STS performed, one with RW & one with hand hold assist. Pt unable to come to complete stand or keep RLE off ground both attempts. Required assistance to maintain NWB)   Stand to Sit 3  Moderate assistance   Additional items Assist x 2; Increased time required;Verbal cues   Additional Comments See additional tx session for slide board txfer   Balance   Static Sitting Fair   Dynamic Sitting Fair -   Static Standing Poor   Dynamic Standing Poor -   Ambulatory Zero   Activity Tolerance   Activity Tolerance Patient tolerated treatment well   Medical Staff Made Aware PT [place] Nurse Made Aware LUZMA King   RUE Assessment   RUE Assessment WFL   LUE Assessment   LUE Assessment WFL   Hand Function   Gross Motor Coordination   (Pt bradykinetic)   Cognition   Overall Cognitive Status Impaired   Arousal/Participation Alert   Attention Within functional limits   Orientation Level Oriented to person;Oriented to place   Memory Decreased recall of precautions;Decreased recall of recent events   Following Commands Follows one step commands with increased time or repetition   Assessment   Limitation Decreased ADL status; Decreased UE strength;Decreased endurance;Decreased self-care trans;Decreased high-level ADLs   Prognosis Good   Assessment Pt is a 79 y.o. male seen for OT evaluation at [facility], admitted 8/24/2023 w/ Closed fracture of right ankle with routine healing s/p fall. Ortho treating nonoperatively. Pt is NWB to RLE with splint. OT completed extensive review of pt's medical and social history. Comorbidities affecting pt's functional performance at time of assessment include: PVCs, h/o compression fx of L1 & T12 vertebra, elevated troponin, chronic LBP, spinal stenosis of lumbar region, lumbar radiculopathy/spondylosis, osteoporosis, sacroiliitis, h/o kyphoplasty. Personal factors affecting pt at time of IE include: steps to enter environment, limited home support, difficulty performing ADLS, difficulty performing IADLS  and environment. Prior to admission, pt was living with his spouse in a [address] [place] [address],[address] with 2 GRACIELA. Pt was S w/  ADLS and A w/ IADLS, (-) drove, & required use of shower chair, walker and rollator PTA. Upon evaluation: Pt requires Mod Ax2 for bed mobility, Min-Mod Ax2 for functional mobility/transfers, Min A for UB ADLs and Max A for LB ADLS 2* the following deficits impacting occupational performance: weakness, decreased strength, decreased balance, decreased tolerance, impaired GMC, increased pain and orthopedic restrictions.  Full objective findings from OT assessment regarding body systems outlined above. Pt to benefit from continued skilled OT tx while in the hospital to address deficits as defined above and maximize level of functional independence w/ ADL's and functional mobility. Occupational Performance areas to address include: bathing/shower, toilet hygiene, dressing, functional mobility and clothing management. Based on findings, pt is of high complexity. The patient's raw score on the -PAC Daily Activity inpatient short form is 16, standardized score is 35.96, less than 39.4. Patients at this level are likely to benefit from DC to post-acute rehabilitation services. However, please refer to therapist recommendation for discharge planning given other factors that may influence destination. At this time, OT recommendations at time of discharge are DC with Level I resources. Goals   Patient Goals Pt wants to get stronger   Plan   Treatment Interventions ADL retraining;Functional transfer training; Endurance training;Patient/family training;Equipment evaluation/education; Compensatory technique education;Continued evaluation;UE strengthening/ROM   Goal Expiration Date 09/04/23   OT Treatment Day 0   OT Frequency 3-5x/wk   Recommendation   UB Rehab Discharge Recommendation (PT/OT) Level 1   AM-PAC Daily Activity Inpatient   Lower Body Dressing 2   Bathing 2   Toileting 2   Upper Body Dressing 3   Grooming 3   Eating 4   Daily Activity Raw Score 16   Daily Activity Standardized Score (Calc for Raw Score >=11) 35.96   AM-PAC Applied Cognition Inpatient   Following a Speech/Presentation 3   Understanding Ordinary Conversation 4   Taking Medications 4   Remembering Where Things Are Placed or Put Away 3   Remembering List of 4-5 Errands 3   Taking Care of Complicated Tasks 3   Applied Cognition Raw Score 20   Applied Cognition Standardized Score 41.76   Additional Treatment Session   Start Time 1248   End Time 1257   Treatment Assessment Pt performed slide board txfer to recliner with Min Ax2. Pt demonstrated good UB strength during txfer. Pt required Min Ax1 for boosting & Min A of another to assist in maintaining RLE NWB. Pt able to boost self back in recliner. Pt left seated in recliner with (+) chair alarm, all needs in reach. RN present for slide board txfer. End of Consult   Education Provided Yes;Family or social support of family present for education by provider   Patient Position at End of Consult Bedside chair;Bed/Chair alarm activated; All needs within reach   Nurse Communication Nurse aware of consult     Pt will achieve the following goals within 10 days. *Pt will complete UB bathing and dressing with S & DME PRN. *Pt will complete LB bathing and dressing with Min A & DME PRN. *Pt will complete toileting w/ Min A w/ G hygiene/thoroughness using DME PRN    *Pt will complete bed mobility with Min Ax1, with HOB elevated & use of side rails PRN to prep for purposeful tasks    *Pt will perform functional transfers with on/off all surfaces with S using DME as needed w/ G balance/safety. *Pt will participate in UE therapeutic exercise in order to maximize strength for ADL transfers. *Pt will demonstrate 100% carryover of precautions s/p review w/o cues w/ Mod I w/ G tolerance/participation t/o functional ADL/IADL/leisure tasks.     James Mercado, OTR/L

## 2023-08-25 NOTE — PLAN OF CARE
Problem: Potential for Falls  Goal: Patient will remain free of falls  Description: INTERVENTIONS:  - Educate patient/family on patient safety including physical limitations  - Instruct patient to call for assistance with activity   - Consult OT/PT to assist with strengthening/mobility   - Keep Call bell within reach  - Keep bed low and locked with side rails adjusted as appropriate  - Keep care items and personal belongings within reach  - Initiate and maintain comfort rounds  - Make Fall Risk Sign visible to staff  - Offer Toileting every x Hours, in advance of need  - Initiate/Maintain xalarm  - Obtain necessary fall risk management equipment: x  - Apply yellow socks and bracelet for high fall risk patients  - Consider moving patient to room near nurses station  Outcome: Progressing     Problem: MOBILITY - ADULT  Goal: Maintain or return to baseline ADL function  Description: INTERVENTIONS:  -  Assess patient's ability to carry out ADLs; assess patient's baseline for ADL function and identify physical deficits which impact ability to perform ADLs (bathing, care of mouth/teeth, toileting, grooming, dressing, etc.)  - Assess/evaluate cause of self-care deficits   - Assess range of motion  - Assess patient's mobility; develop plan if impaired  - Assess patient's need for assistive devices and provide as appropriate  - Encourage maximum independence but intervene and supervise when necessary  - Involve family in performance of ADLs  - Assess for home care needs following discharge   - Consider OT consult to assist with ADL evaluation and planning for discharge  - Provide patient education as appropriate  Outcome: Progressing  Goal: Maintains/Returns to pre admission functional level  Description: INTERVENTIONS:  - Perform BMAT or MOVE assessment daily.   - Set and communicate daily mobility goal to care team and patient/family/caregiver.    - Collaborate with rehabilitation services on mobility goals if consulted  - Perform Range of Motion x times a day. - Reposition patient every x hours.   - Dangle patient x times a day  - Stand patient x times a day  - Ambulate patient x times a day  - Out of bed to chair x times a day   - Out of bed for meals x times a day  - Out of bed for toileting  - Record patient progress and toleration of activity level   Outcome: Progressing     Problem: Prexisting or High Potential for Compromised Skin Integrity  Goal: Skin integrity is maintained or improved  Description: INTERVENTIONS:  - Identify patients at risk for skin breakdown  - Assess and monitor skin integrity  - Assess and monitor nutrition and hydration status  - Monitor labs   - Assess for incontinence   - Turn and reposition patient  - Assist with mobility/ambulation  - Relieve pressure over bony prominences  - Avoid friction and shearing  - Provide appropriate hygiene as needed including keeping skin clean and dry  - Evaluate need for skin moisturizer/barrier cream  - Collaborate with interdisciplinary team   - Patient/family teaching  - Consider wound care consult   Outcome: Progressing

## 2023-08-25 NOTE — CASE MANAGEMENT
Case Management Discharge Planning Note    Patient name Blanca Smallwoodt  Location /-59 MRN [id number]  : 1940 Date 2023       Current Admission Date: 2023  Current Admission Diagnosis:Closed fracture of right ankle with routine healing   Patient Active Problem List    Diagnosis Date Noted   • Elevated troponin 2023   • Closed fracture of right ankle with routine healing 2023   • Fall 2023   • Age-related osteoporosis with current pathological fracture of vertebra ([address]) 03/10/2023   • T12 compression fracture, initial encounter ([address]) 03/10/2023   • Hypertension 2023   • Macrocytosis 2023   • Vitamin D deficiency 2022   • Osteoporosis 2022   • Ambulatory dysfunction 2022   • Urinary frequency 2022   • Compression fracture of L1 vertebra (HCC) 2022   • Sacroiliitis (HCC)    • Sacroiliac joint dysfunction of both sides    • Lumbar spondylosis 10/04/2022   • Stage 3a chronic kidney disease ([address]) 2022   • Myofascial pain syndrome 2022   • Aortic root dilatation ([address]) 2022   • Renal cyst, acquired, left 12/10/2020   • Chronic pain syndrome 2020   • Chronic bilateral low back pain without sciatica 2020   • Spinal stenosis of lumbar region with neurogenic claudication    • Lumbar radiculopathy    • Malignant neoplasm of prostate ([address]) 2019   • Thoracic aortic aneurysm without rupture ([address]) 10/04/2018   • Nonrheumatic aortic valve insufficiency 10/04/2018   • Non-rheumatic mitral regurgitation 10/04/2018   • PVC's (premature ventricular contractions) 2018      LOS (days): 1  Geometric Mean LOS (GMLOS) (days): 2.70  Days to GMLOS:1.5     OBJECTIVE:  Risk of Unplanned Readmission Score: 20.66         Current admission status: Inpatient   Preferred Pharmacy:   [facility] [address] [facility],[address] Floor, [address] [place] [id number]  Phone: [phone] Fax: [phone]    Primary Care Provider: Carnell Schwab, DO    Primary Insurance: MEDICARE  Secondary Insurance: AARP    DISCHARGE DETAILS:  Additional Comments: Met with Pt and Pt's wife re: discharge planning. Pt and Pt's wife are adamantly requesting [facility] as Pt has been at acute rehab in past. Referral sent to [facility] via [employer]. Per [facility], facility at capacity and no beds available this weekend. [facility] liaison informed CM that she will reach out in case beds open up over weekend and will follow up on Monday. Met with Pt and Pt's wife. Informed Pt and Pt's wife that there are currently no beds at [facility]. Pt and Pt's wife agreeable for referral to [facility] but [facility]. Referral sent to [facility] via [employer]. CM to follow.

## 2023-08-25 NOTE — CASE MANAGEMENT
Case Management Discharge Planning Note    Patient name Ady [place]  Location /-28 MRN [id number]  : 1940 Date 2023       Current Admission Date: 2023  Current Admission Diagnosis:Closed fracture of right ankle with routine healing   Patient Active Problem List    Diagnosis Date Noted   • Elevated troponin 2023   • Closed fracture of right ankle with routine healing 2023   • Fall 2023   • Age-related osteoporosis with current pathological fracture of vertebra ([address]) 03/10/2023   • T12 compression fracture, initial encounter ([address]) 03/10/2023   • Hypertension 2023   • Macrocytosis 2023   • Vitamin D deficiency 2022   • Osteoporosis 2022   • Ambulatory dysfunction 2022   • Urinary frequency 2022   • Compression fracture of L1 vertebra (HCC) 2022   • Sacroiliitis (HCC)    • Sacroiliac joint dysfunction of both sides    • Lumbar spondylosis 10/04/2022   • Stage 3a chronic kidney disease ([address]) 2022   • Myofascial pain syndrome 2022   • Aortic root dilatation ([address]) 2022   • Renal cyst, acquired, left 12/10/2020   • Chronic pain syndrome 2020   • Chronic bilateral low back pain without sciatica 2020   • Spinal stenosis of lumbar region with neurogenic claudication    • Lumbar radiculopathy    • Malignant neoplasm of prostate ([address]) 2019   • Thoracic aortic aneurysm without rupture ([address]) 10/04/2018   • Nonrheumatic aortic valve insufficiency 10/04/2018   • Non-rheumatic mitral regurgitation 10/04/2018   • PVC's (premature ventricular contractions) 2018      LOS (days): 1  Geometric Mean LOS (GMLOS) (days): 2.70  Days to GMLOS:1.4     OBJECTIVE:  Risk of Unplanned Readmission Score: 20.5         Current admission status: Inpatient   Preferred Pharmacy:   [facility] [address] [facility],[address], [address] [place] [id number]  Phone: [phone] Fax: [phone]    Primary Care Provider: Emory Joe DO    Primary Insurance: MEDICARE  Secondary Insurance: AARP    DISCHARGE DETAILS:    Additional Comments: [facility]. Pt and Pt's wife in agreement. [facility] on 8/26/23. Pickup is 11am. CM informed Maulik Ware at [facility], Pt, Pt's wife and Pt's nurse(Christine) of discharge time for 8/26. All in agreement. [facility] report number is [phone] or [phone]. Fax number is [phone]. Pt will be going to room 215. CM to follow.

## 2023-08-25 NOTE — ARC ADMISSION
Referral was received for consideration of patient for inpatient acute rehab at [facility].   We will review for possible [facility] placement

## 2023-08-25 NOTE — CONSULTS
Consult - Cardiology   Willian Rosenthal 79 y.o. male MRN: [id number]  Unit/Bed#: -01 Encounter: [id number]        Reason For Consult: Abnormal troponin  Outpatient Cardiologist: Dr. Rylie Neal:  1. Mechanical fall with subsequent right nondisplaced distal fibula fracture  2. Non-MI troponin elevation 59 --> 61 --> 57  a. EKG shows normal sinus rhythm with PVCs  b. Elevated troponin likely secondary to demand from fibular fracture, and subsequent increased pain and catecholamine response  3. Valvular heart disease  a. 3/2022 Echo: LVEF 45%, grade 2 diastolic dysfunction, moderately residual dilation, moderate aortic insufficiency, moderate mitral insufficiency, mild to moderate tricuspid insufficiency  4. History of PVCs  5. History of lumbar compression fractures  6. Chronic pain from severe spine disease  7. Aortic root dilation, 44 mm on echo March 2022    PLAN/ DISCUSSION:     • 1 week outpatient ZIO monitor (ordered)  • Continue verapamil  • Continue Toprol-XL  • Continue lisinopril  • Serial outpatient imaging for dilated aortic root  • Echo ordered by primary team, if unremarkable we will sign off    History Of Present Illness:  Shanae Myers is a pleasant 79year-old currently admitted having sustained a mechanical fall with subsequent distal right fibula fracture. He is being seen by orthopedics and this is being managed conservatively without surgery. He has not had any chest pain or chest pressure however troponins were checked in the ER which were slightly elevated but flat. As such we have been asked see him in consultation.     Past Medical History:        Past Medical History:   Diagnosis Date   • Astigmatism     last assessed 11/15/17   • Cataract     Resolved 11/15/17   • Gross hematuria     Last assessed 01/28/16   • Hematuria     last assessed 01/28/16   • Kidney stones     Patient states he has kidney stonesin [facility] kidney   • Lumbar vertebral fracture (HCC)    • Prostate cancer ([address])     last assessed 05/18/17   • Renal calculi     last assessed 08/16/12   • Squamous cell carcinoma    • Vitamin D deficiency     Last assessed 08/19/13      Past Surgical History:   Procedure Laterality Date   • BREAST EXCISIONAL BIOPSY N/A     Benign breast tumor unsure of side over 50 yrs ago   • CATARACT EXTRACTION Left 08/26/2019   • CATARACT EXTRACTION Right 08/12/2019   • COLONOSCOPY     • CYSTOSCOPY  01/28/2016   • ELBOW SURGERY      Bone chip   • HERNIA REPAIR  2002   • IR KYPHOPLASTY/VERTEBROPLASTY  1/4/2023   • IR KYPHOPLASTY/VERTEBROPLASTY  2/6/2023   • IR KYPHOPLASTY/VERTEBROPLASTY  3/23/2023   • LITHOTRIPSY     • RETROPUBIC PROSTATECTOMY  11/13/2001    Radical with nerve sparing        Allergy:        Allergies   Allergen Reactions   • Sulfa Antibiotics Rash   • Oxycodone Confusion   • Ciprofloxacin    • Gabapentin Dizziness   • Lyrica [Pregabalin] Hallucinations     Blurry vision , hallucinations, confusion    • Medical Tape Rash       Medications:       Prior to Admission medications    Medication Sig Start Date End Date Taking? Authorizing Provider   acetaminophen (TYLENOL) 325 mg tablet Take 2 tablets (650 mg total) by mouth every 8 (eight) hours  Patient taking differently: Take 650 mg by mouth every 8 (eight) hours Once daily 2/7/23   MARE Hutson   calcium citrate (CALCITRATE) 950 (200 Ca) MG tablet Take 1 tablet by mouth daily 1,200 mg daily    Historical Provider, MD   cholecalciferol (VITAMIN D3) 1,000 units tablet Take 3,000 Units by mouth daily 2,000 IU  daily    Historical Provider, MD   denosumab (Prolia) 60 mg/mL Prolia   Yearly, 03-15-23 and 09-19-23.     Historical Provider, MD   docusate sodium (Colace) 100 mg capsule Take 1 capsule (100 mg total) by mouth 2 (two) times a day 2/7/23   MARE Sanchez   lisinopril (ZESTRIL) 10 mg tablet Take 1 tablet (10 mg total) by mouth daily at bedtime 3/21/23   Carmelo Key MD   lisinopril (ZESTRIL) 20 mg tablet Take 1 tablet (20 mg total) by mouth every morning 3/21/23   Kelly Olivier MD   methocarbamol (ROBAXIN) 500 mg tablet Take 1 tablet (500 mg total) by mouth 4 (four) times a day as needed for muscle spasms  Patient not taking: Reported on 8/11/2023 3/23/23 5/19/23  Graciela Ordaz MD   metoprolol succinate (TOPROL-XL) 50 mg 24 hr tablet Take 1 tablet (50 mg total) by mouth daily 5/19/23   Kelly Olivier MD   Myrbetriq 25 MG TB24 TAKE ONE TABLET BY MOUTH EVERY DAY IN THE MORNING 0/2/58   Thomas Leavitt DO   triamcinolone (KENALOG) 0.1 % ointment Apply topically 2 (two) times a day For 2 weeks straight to right flank and legs only when rash is active.   Patient not taking: Reported on 4/21/2023 1/17/23   Jayla Rod MD   verapamil (CALAN-SR) 240 mg CR tablet TAKE 1 TABLET BY MOUTH DAILY AT BEDTIME 8/8/23   Kelly Olivier MD       Family History:     Family History   Problem Relation Age of Onset   • Other Mother 76        Influenza   • Hypertension Mother    • Heart failure Father 70   • Peripheral vascular disease Father    • Hypertension Father    • Prostate cancer Brother    • Stroke Brother 48   • Hypertension Brother    • Hypertension Family         Social History:       Social History     Socioeconomic History   • Marital status: /Civil Union     Spouse name: None   • Number of children: None   • Years of education: None   • Highest education level: None   Occupational History   • None   Tobacco Use   • Smoking status: Never   • Smokeless tobacco: Never   Vaping Use   • Vaping Use: Never used   Substance and Sexual Activity   • Alcohol use: Not Currently     Alcohol/week: 4.0 standard drinks of alcohol     Types: 4 Glasses of wine per week     Comment: social   • Drug use: No   • Sexual activity: Yes     Partners: Female   Other Topics Concern   • None   Social History Narrative   • None     Social Determinants of Health     Financial Resource Strain: Not on file   Food Insecurity: No Food Insecurity (8/24/2023)    Hunger Vital Sign    • Worried About Running Out of Food in the Last Year: Never true    • Ran Out of Food in the Last Year: Never true   Transportation Needs: No Transportation Needs (8/24/2023)    PRAPARE - Transportation    • Lack of Transportation (Medical): No    • Lack of Transportation (Non-Medical): No   Physical Activity: Not on file   Stress: Not on file   Social Connections: Not on file   Intimate Partner Violence: Not on file   Housing Stability: Low Risk  (8/24/2023)    Housing Stability Vital Sign    • Unable to Pay for Housing in the Last Year: No    • Number of Places Lived in the Last Year: 1    • Unstable Housing in the Last Year: No       ROS:  14 point ROS negative except as outlined above  Remainder review of systems is negative    Exam:  General:  alert, oriented and in no distress, cooperative  Head: Normocephalic, atraumatic. Eyes:  EOMI. Pupils - equal, round, reactive to accomodation. No icterus. Normal Conjunctiva. Oropharynx: moist and normal-appearing mucosa  Neck: supple, symmetrical, trachea midline and no JVD  Heart:  RRR, No: murmer, rub or gallop, S1 & S2 normal   Respiratory effort / Chest Inspection: unlabored  Lungs:  normal air entry, lungs clear to auscultation and no rales, rhonchi or wheezing   Abdomen: flat, normal findings: bowel sounds normal and soft, non-tender  Lower Limbs:  no pitting edema  Pulses[de-identified]  RLE - DP: present 2+                 LLE - DP: present 2+  Musculoskeletal: ROM grossly normal        DATA:      ECG:                     Telemetry: Normal sinus rhythm, , bradycardic. HR 50-70          Echocardiogram:           Ischemic Testing:         Weights: Wt Readings from Last 3 Encounters:   08/24/23 70.3 kg (155 lb)   08/11/23 65.8 kg (145 lb)   07/18/23 68.5 kg (151 lb)   , Body mass index is 22.24 kg/m².          Lab Studies:           Results from last 7 days   Lab Units 08/25/23  0522   TRIGLYCERIDES mg/dL 128   HDL mg/dL 48     Results from last 7 days   Lab Units 08/25/23  0522 08/24/23  0754 08/21/23  0711   WBC Thousand/uL 9.09 10.25* 11.73*   HEMOGLOBIN g/dL 11.7* 13.3 13.3   HEMATOCRIT % 35.2* 40.7 42.3   PLATELETS Thousands/uL 128* 148* 168   ,   Results from last 7 days   Lab Units 08/25/23  0522 08/24/23  0913 08/21/23  0711   POTASSIUM mmol/L 4.9 5.2 5.4*   CHLORIDE mmol/L 105 103 106   CO2 mmol/L 23 25 26   BUN mg/dL 43* 39* 38*   CREATININE mg/dL 1.32* 1.25 1.28   CALCIUM mg/dL 8.5 9.3 9.3   ALK PHOS U/L  --  37 46   ALT U/L  --  12 22   AST U/L  --  13 12

## 2023-08-25 NOTE — ASSESSMENT & PLAN NOTE
Presented with a fall at home. Denies any syncopal event. Complains of generalized weakness. Trauma work-up shows nondisplaced right fibular fracture. CT head unremarkable  Nonweightbearing right lower extremity per orthopedic evaluation  Maintain fall precaution  PT OT evaluation appreciated. Recommended acute rehab.   Patient will be nonweightbearing in right lower extremity

## 2023-08-25 NOTE — ASSESSMENT & PLAN NOTE
History of Premature ventricular contractions and follows up with Dr. Kristofer Solano. Denies any history of coronary artery disease. Continue with outpatient dose of verapamil and metoprolol.

## 2023-08-25 NOTE — PHYSICAL THERAPY NOTE
Physical Therapy Cancellation Note       08/25/23 1033   PT Last Visit   PT Visit Date 08/25/23   Note Type   Note type Cancelled Session   Cancel Reasons Patient off floor/test   Additional Comments Currently undergoing echo. Will continue to follow     Kelton Welch.  Jose

## 2023-08-25 NOTE — CASE MANAGEMENT
Case Management Discharge Planning Note    Patient name Ady Willet  Location /-81 MRN [id number]  : 1940 Date 2023       Current Admission Date: 2023  Current Admission Diagnosis:Closed fracture of right ankle with routine healing   Patient Active Problem List    Diagnosis Date Noted   • Elevated troponin 2023   • Closed fracture of right ankle with routine healing 2023   • Fall 2023   • Age-related osteoporosis with current pathological fracture of vertebra ([address]) 03/10/2023   • T12 compression fracture, initial encounter ([address]) 03/10/2023   • Hypertension 2023   • Macrocytosis 2023   • Vitamin D deficiency 2022   • Osteoporosis 2022   • Ambulatory dysfunction 2022   • Urinary frequency 2022   • Compression fracture of L1 vertebra (HCC) 2022   • Sacroiliitis (HCC)    • Sacroiliac joint dysfunction of both sides    • Lumbar spondylosis 10/04/2022   • Stage 3a chronic kidney disease ([address]) 2022   • Myofascial pain syndrome 2022   • Aortic root dilatation ([address]) 2022   • Renal cyst, acquired, left 12/10/2020   • Chronic pain syndrome 2020   • Chronic bilateral low back pain without sciatica 2020   • Spinal stenosis of lumbar region with neurogenic claudication    • Lumbar radiculopathy    • Malignant neoplasm of prostate ([address]) 2019   • Thoracic aortic aneurysm without rupture ([address]) 10/04/2018   • Nonrheumatic aortic valve insufficiency 10/04/2018   • Non-rheumatic mitral regurgitation 10/04/2018   • PVC's (premature ventricular contractions) 2018      LOS (days): 1  Geometric Mean LOS (GMLOS) (days): 2.70  Days to GMLOS:1.4     OBJECTIVE:  Risk of Unplanned Readmission Score: 20.5         Current admission status: Inpatient   Preferred Pharmacy:   [facility] [address] [facility],[address], [address] [place] [id number]  Phone: [phone] Fax: [phone]    Primary Care Provider: Eduardo Spatz, DO    Primary Insurance: MEDICARE  Secondary Insurance: AARP    DISCHARGE DETAILS:    Additional Comments: [facility]. Pt and Pt's wife in agreement. [facility] on 8/26/23. Pickup is 11am. CM informed Diane Castillo at [facility], Pt, Pt's wife and Pt's nurse(Christine) of discharge time for 8/26. All in agreement. [facility] report number is [phone] or [phone]. Fax number is [phone]. Pt is in room 215. CM to follow.

## 2023-08-25 NOTE — ASSESSMENT & PLAN NOTE
S/p kyphoplasty. Has chronic pain. Continue with scheduled Tylenol. As needed muscle relaxant and aqua K-pad. PT evaluation will be obtained.

## 2023-08-25 NOTE — PROGRESS NOTES
[facility]  Progress Note  Name: Cyrus Field  MRN: [id number]  Unit/Bed#: -55 I Date of Admission: 8/24/2023   Date of Service: 8/25/2023 I Hospital Day: 1    Assessment/Plan   * Closed fracture of right ankle with routine healing  Assessment & Plan  Patient presented with a fall. Trauma work-up including x-rays of the foot shows There is diffuse soft tissue swelling anteriorly and laterally at the ankle. . Minimal cortical irregularity in the distal fibula concerning for a nondisplaced fracture. Splinted in the emergency room  Orthopedic consulted  Ortho recommending that nondisplaced fracture can be treated conservatively at this time with short leg splint. Continue with nonweightbearing to right lower extremity. PT evaluation appreciated. Recommended acute rehab keep splint clean, dry and intact  Ice and elevation to right lower extremity  Pain control as needed  Mr. Zachary Cross is an established patient with foot and ankle specialist Dr. Lizeth Santana. Recommend follow-up with Dr. Lizeth Santana in 1 to 2 weeks as outpatient. Elevated troponin  Assessment & Plan  Present on admission. Patient denies any chest pain or cardiac symptoms. EKG on admission showed normal sinus rhythm with frequent PVCs  Likely non-MI troponin elevation in the setting of fall and fibular fracture. Low concern for ACS  Cardiology input appreciated. Recommended 1 week of outpatient Zio patch. Echocardiogram with normal EF grade 1 diastolic dysfunction and moderate mitral regurgitation and mild aortic and tricuspid regurgitation   Cardiology input appreciated    [address]  Presented with a fall at home. Denies any syncopal event. Complains of generalized weakness. Trauma work-up shows nondisplaced right fibular fracture. CT head unremarkable  Nonweightbearing right lower extremity per orthopedic evaluation  Maintain fall precaution  PT OT evaluation appreciated.   Recommended acute rehab. Patient will be nonweightbearing in right lower extremity    Compression fracture of L1 vertebra [facility])  Assessment & Plan  S/p kyphoplasty. Has chronic pain. Continue with scheduled Tylenol. As needed muscle relaxant and aqua K-pad. PT evaluation will be obtained. Non-rheumatic mitral regurgitation  Assessment & Plan  Noted on repeat echocardiogram    Nonrheumatic aortic valve insufficiency  Assessment & Plan  Follow-up on repeat echocardiogram    PVC's (premature ventricular contractions)  Assessment & Plan  History of Premature ventricular contractions and follows up with Dr. Mehran East. Denies any history of coronary artery disease. Continue with outpatient dose of verapamil and metoprolol. VTE Pharmacologic Prophylaxis: VTE Score: 8 Moderate Risk (Score 3-4) - Pharmacological DVT Prophylaxis Ordered: enoxaparin (Lovenox). Patient Centered Rounds: I performed bedside rounds with nursing staff today. Discussions with Specialists or Other Care Team Provider: Discussed with case management    Education and Discussions with Family / Patient: Updated  (wife) at bedside. Total Time Spent on Date of Encounter in care of patient: 35 minutes This time was spent on one or more of the following: performing physical exam; counseling and coordination of care; obtaining or reviewing history; documenting in the medical record; reviewing/ordering tests, medications or procedures; communicating with other healthcare professionals and discussing with patient's family/caregivers. Current Length of Stay: 1 day(s)  Current Patient Status: Inpatient   Certification Statement: The patient will continue to require additional inpatient hospital stay due to Await rehab placement  Discharge Plan: Anticipate discharge in 24-48 hrs to rehab facility. Code Status: Level 1 - Full Code    Subjective:   Seen and examined at bedside. Denies any pain in the right ankle.   Denies any chest pain or shortness of breath. No acute events overnight. Objective:     Vitals:   Temp (24hrs), Av.7 °F (36.5 °C), Min:97.6 °F (36.4 °C), Max:97.8 °F (36.6 °C)    Temp:  [97.6 °F (36.4 °C)-97.8 °F (36.6 °C)] 97.6 °F (36.4 °C)  HR:  [54-76] 69  Resp:  [18-19] 19  BP: (105-158)/(59-65) 155/65  SpO2:  [96 %-98 %] 98 %  Body mass index is 22.24 kg/m². Input and Output Summary (last 24 hours): Intake/Output Summary (Last 24 hours) at 2023 1416  Last data filed at 2023 1201  Gross per 24 hour   Intake 240 ml   Output 600 ml   Net -360 ml       Physical Exam:   Physical Exam  Constitutional:       General: He is not in acute distress. HENT:      Head: Normocephalic. Nose: Nose normal.      Mouth/Throat:      Mouth: Mucous membranes are moist.   Eyes:      Extraocular Movements: Extraocular movements intact. Pupils: Pupils are equal, round, and reactive to light. Cardiovascular:      Rate and Rhythm: Normal rate and regular rhythm. Pulmonary:      Effort: Pulmonary effort is normal.      Breath sounds: Normal breath sounds. Abdominal:      General: Abdomen is flat. Bowel sounds are normal.      Palpations: Abdomen is soft. Musculoskeletal:      Cervical back: Neck supple. Comments: Right foot splint in place   Skin:     General: Skin is warm. Neurological:      General: No focal deficit present. Mental Status: He is alert and oriented to person, place, and time. Mental status is at baseline.    Psychiatric:         Mood and Affect: Mood normal.          Additional Data:     Labs:  Results from last 7 days   Lab Units 23  0522 23  0754 23  0711   WBC Thousand/uL 9.09 10.25* 11.73*   HEMOGLOBIN g/dL 11.7* 13.3 13.3   HEMATOCRIT % 35.2* 40.7 42.3   PLATELETS Thousands/uL 128* 148* 168   BANDS PCT %  --   --  1   LYMPHO PCT %  --  15 8*   MONO PCT %  --  10 4   EOS PCT %  --  0 1     Results from last 7 days   Lab Units 23  0522 23  0913   SODIUM mmol/L 133* 134*   POTASSIUM mmol/L 4.9 5.2   CHLORIDE mmol/L 105 103   CO2 mmol/L 23 25   BUN mg/dL 43* 39*   CREATININE mg/dL 1.32* 1.25   ANION GAP mmol/L 5 6   CALCIUM mg/dL 8.5 9.3   ALBUMIN g/dL  --  3.7   TOTAL BILIRUBIN mg/dL  --  0.58   ALK PHOS U/L  --  37   ALT U/L  --  12   AST U/L  --  13   GLUCOSE RANDOM mg/dL 100 95             Results from last 7 days   Lab Units 08/21/23  0711   HEMOGLOBIN A1C % 6.0*           Lines/Drains:  Invasive Devices     Peripheral Intravenous Line  Duration           Peripheral IV 08/24/23 Right Antecubital 1 day          Drain  Duration           External Urinary Catheter Small 200 days                  Telemetry:  Telemetry Orders (From admission, onward)             24 Hour Telemetry Monitoring  Continuous x 24 Hours (Telem)        Expiring   Question:  Reason for 24 Hour Telemetry  Answer:  PCI/EP study (including pacer and ICD implementation), Cardiac surgery, MI, abnormal cardiac cath, and chest pain- rule out MI                 Telemetry Reviewed: PVCs  Indication for Continued Telemetry Use: No indication for continued use. Will discontinue. Imaging: No pertinent imaging reviewed.     Recent Cultures (last 7 days):         Last 24 Hours Medication List:   Current Facility-Administered Medications   Medication Dose Route Frequency Provider Last Rate   • acetaminophen  975 mg Oral Q6H Monika Berg MD     • aspirin  81 mg Oral Daily Xenia العلي MD     • calcium carbonate  2 tablet Oral Daily With Alexia Hylton MD     • cholecalciferol  3,000 Units Oral Daily Xenia العلي MD     • docusate sodium  100 mg Oral BID Xenia العلي MD     • enoxaparin  40 mg Subcutaneous Daily Xenia العلي MD     • erythromycin  0.5 inch Right Eye Q12H Monika Berg MD     • HYDROmorphone  0.2 mg Intravenous Q4H PRN Xenia العلي MD     • lisinopril  10 mg Oral HS Xenia العلي MD     • lisinopril  20 mg Oral Latasha Wilder MD     • methocarbamol  500 mg Oral Q6H PRN Ally Shafer MD     • metoprolol succinate  50 mg Oral Daily Ally Shafer MD     • ondansetron  4 mg Intravenous Q6H PRN Ally Shafer MD     • oxybutynin  10 mg Oral Daily Ally Shafer MD     • oxyCODONE  5 mg Oral Q4H PRN Ally Shafer MD     • oxyCODONE  2.5 mg Oral Q4H PRN Ally Shafer MD     • polyethylene glycol  17 g Oral Daily Ally Shafer MD     • verapamil  240 mg Oral HS Ally Shafer MD          Today, Patient Was Seen By: Ally Shafer MD    **Please Note: This note may have been constructed using a voice recognition system. **

## 2023-08-25 NOTE — ASSESSMENT & PLAN NOTE
Patient presented with a fall. Trauma work-up including x-rays of the foot shows There is diffuse soft tissue swelling anteriorly and laterally at the ankle. . Minimal cortical irregularity in the distal fibula concerning for a nondisplaced fracture. Splinted in the emergency room  Orthopedic consulted  Ortho recommending that nondisplaced fracture can be treated conservatively at this time with short leg splint. Continue with nonweightbearing to right lower extremity. PT evaluation appreciated. Recommended acute rehab keep splint clean, dry and intact  Ice and elevation to right lower extremity  Pain control as needed  Mr. Monae Conway is an established patient with foot and ankle specialist Dr. Sania Trevino. Recommend follow-up with Dr. Sania Trevino in 1 to 2 weeks as outpatient.

## 2023-08-25 NOTE — PHYSICAL THERAPY NOTE
PHYSICAL THERAPY EVALUATION NOTE    Patient Name: Uriah Hannah  [id number] Date: 2023    AGE:   79 y.o. Mrn:   [id number]  ADMIT DX:  Elevated troponin [R77.8]  Generalized weakness [R53.1]  Closed fracture of right ankle, initial encounter [S82.897R]  Fall, initial encounter [W19. XXXA]  Unspecified multiple injuries, initial encounter [T07. XXXA]    Past Medical History:   Diagnosis Date    Astigmatism     last assessed 11/15/17    Cataract     Resolved 11/15/17    Gross hematuria     Last assessed 16    Hematuria     last assessed 16    Kidney stones     Patient states he has kidney stonesin [facility] kidney    Lumbar vertebral fracture ([address])     Prostate cancer ([address])     last assessed 17    Renal calculi     last assessed 12    Squamous cell carcinoma     Vitamin D deficiency     Last assessed 13     Length Of Stay: 1  PHYSICAL THERAPY EVALUATION :   Patient's identity confirmed via 2 patient identifiers (full name and ) at start of session       23 1235   PT Last Visit   PT Visit Date 23   Note Type   Note type Evaluation   Pain Assessment   Pain Assessment Tool 0-10   Pain Score 3   Pain Location/Orientation Orientation: Lower; Location: Back  (chronic)   Restrictions/Precautions   Weight Bearing Precautions Per Order Yes   RLE Weight Bearing Per Order (S)  NWB   Braces or Orthoses Splint  (ankle appears inverted - wife verbalizing concerns, per Ortho AP Rodena Branch, splint appropriately placed)   Other Precautions Chair Alarm; Bed Alarm; Fall Risk;Pain   Home Living   Type of [address] [place] [address] Two level;Stairs to enter with rails;Bed/bath upstairs  (2 GRACIELA)   Home Equipment Walker  (rollator)   Additional Comments Pt reports using rollator PTA   Prior Function   Level of [place] Independent with ADLs; Independent with functional mobility   Lives With Spouse   Receives Help From Outpatient therapy  ([facility]PT @ [facility] 3x/wk for balance/strength)   Falls in the last 6 months 1 to 4  (1, reason for admission, pt denies)   Vocational Retired   General   Additional Pertinent History Per ortho, pt is non-op   Family/Caregiver Present Yes  (Wife, Patricia Barakat)   Cognition   Arousal/Participation Alert   Orientation Level Oriented X4   Memory Within functional limits   Following Commands Follows multistep commands with increased time or repetition   Comments Pt cooperative   RLE Assessment   RLE Assessment WFL   Strength RLE   RLE Overall Strength 3/5   LLE Assessment   LLE Assessment WFL   Strength LLE   LLE Overall Strength 4-/5   Bed Mobility   Supine to Sit 3  Moderate assistance   Additional items Assist x 2; Increased time required;Verbal cues;LE management;HOB elevated   Transfers   Sit to Stand 3  Moderate assistance   Additional items Assist x 2; Increased time required  (to 75% stand)   Stand to Sit 3  Moderate assistance   Additional items Assist x 2; Increased time required;Verbal cues   Additional Comments Pt performed STS x 2 trials, once with RW and second B HHA w/ L knee block. Pt requires tactile cues for NWB   Ambulation/Elevation   Ambulation/Elevation Additional Comments see additional treatment note for successful slide board transfer   Balance   Static Sitting Fair   Static Standing Zero  (Ax2)   Activity Tolerance   Activity Tolerance Patient tolerated treatment well   Medical Staff Made Aware MAKENNA Person   Nurse Made Aware LUZMA King   Assessment   Problem List Decreased strength;Decreased endurance; Impaired balance;Decreased mobility;Pain;Orthopedic restrictions   Assessment see additional treatment note for successful slide board transfer   Goals   Patient Goals to get stronger   STG Expiration Date 09/04/23   Short Term Goal #1 Patient will: Perform all bed mobility tasks w/ minx1 to improve pt's independence w/ repositioning for decrease risk of skin breakdown, Perform all transfers w/ modx1 consistently from various height surfaces in order to improve I w/ engagement w/ real-world environments/situations, Increase all balance 1/2 grade to decrease risk for falls, Complete exercise program independently, Tolerate 3 hr OOB to faciliate upright tolerance and Propel in wheelchair for at least 100 ft w/ w/ supervision over tile surface in order to be able to use WC as alternate means of mobility   PT Treatment Day 0   Plan   Treatment/Interventions Functional transfer training;LE strengthening/ROM; Therapeutic exercise; Endurance training;Patient/family training;Equipment eval/education; Bed mobility;Gait training   PT Frequency 3-5x/wk   Recommendation   UB Rehab Discharge Recommendation (PT/OT) Level 1   Equipment Recommended Wheelchair   Wheelchair Package Recommended Lightweight   AM-PAC Basic Mobility Inpatient   Turning in Flat Bed Without Bedrails 2   Lying on Back to Sitting on Edge of Flat Bed Without Bedrails 1   Moving Bed to Chair 1   Standing Up From Chair Using Arms 1   Walk in Room 1   Climb 3-5 Stairs With Railing 1   Basic Mobility Inpatient Raw Score 7   Turning Head Towards Sound 3   Follow Simple Instructions 3   Low Function Basic Mobility Raw Score  13   Low Function Basic Mobility Standardized Score  20.14   Highest Level Of Mobility   JH-HLM Goal 2: Bed activities/Dependent transfer   JH-HLM Achieved 4: Move to chair/commode   Additional Treatment Session   Start Time 1248   End Time 1258   Treatment Assessment Pt seated EOB. Given inability to maintain a full static stand, educated pt and wife on slide board transfers. Pt actually responds well to education and performs slide board transfers to his L w/ min A x 2, with A at RLE for NWB. Pt seated OOB in recliner chair at end of session   Equipment Use Slide board   Additional Treatment Day 1   End of Consult   Patient Position at End of Consult Bedside chair;Bed/Chair alarm activated; All needs within reach         The patient's AM-PAC Basic Mobility Inpatient Short Form Raw Score is 7, Standardized Score is  . A standardized score less than 38.32 (raw score of 16) suggests the patient may benefit from discharge to post-acute rehabilitation services which may not coincide with above PT recommendations. However please refer to therapist recommendation for discharge planning given other factors that may influence destination.     Pt would benefit from skilled inpatient PT during this admission in order to facilitate progress towards goals to maximize functional independence    Alexsandra Diez, PT, DPT

## 2023-08-25 NOTE — ASSESSMENT & PLAN NOTE
Present on admission. Patient denies any chest pain or cardiac symptoms. EKG on admission showed normal sinus rhythm with frequent PVCs  Likely non-MI troponin elevation in the setting of fall and fibular fracture. Low concern for ACS  Cardiology input appreciated. Recommended 1 week of outpatient Zio patch.   Echocardiogram with normal EF grade 1 diastolic dysfunction and moderate mitral regurgitation and mild aortic and tricuspid regurgitation   Cardiology input appreciated

## 2023-08-26 VITALS
HEART RATE: 51 BPM | HEIGHT: 70 IN | DIASTOLIC BLOOD PRESSURE: 56 MMHG | WEIGHT: 155 LBS | RESPIRATION RATE: 16 BRPM | OXYGEN SATURATION: 96 % | SYSTOLIC BLOOD PRESSURE: 143 MMHG | TEMPERATURE: 97.6 F | BODY MASS INDEX: 22.19 KG/M2

## 2023-08-26 LAB
ANION GAP SERPL CALCULATED.3IONS-SCNC: 6 MMOL/L
BUN SERPL-MCNC: 50 MG/DL (ref 5–25)
CALCIUM SERPL-MCNC: 8.6 MG/DL (ref 8.4–10.2)
CHLORIDE SERPL-SCNC: 102 MMOL/L (ref 96–108)
CO2 SERPL-SCNC: 22 MMOL/L (ref 21–32)
CREAT SERPL-MCNC: 1.32 MG/DL (ref 0.6–1.3)
GFR SERPL CREATININE-BSD FRML MDRD: 49 ML/MIN/1.73SQ M
GLUCOSE SERPL-MCNC: 109 MG/DL (ref 65–140)
POTASSIUM SERPL-SCNC: 4.8 MMOL/L (ref 3.5–5.3)
SODIUM SERPL-SCNC: 130 MMOL/L (ref 135–147)

## 2023-08-26 PROCEDURE — 99239 HOSP IP/OBS DSCHRG MGMT >30: CPT | Performed by: FAMILY MEDICINE

## 2023-08-26 PROCEDURE — 80048 BASIC METABOLIC PNL TOTAL CA: CPT | Performed by: FAMILY MEDICINE

## 2023-08-26 RX ORDER — OXYCODONE HYDROCHLORIDE 5 MG/1
2.5 TABLET ORAL EVERY 4 HOURS PRN
Qty: 7 TABLET | Refills: 0 | Status: SHIPPED | OUTPATIENT
Start: 2023-08-26 | End: 2023-09-05

## 2023-08-26 RX ORDER — METHOCARBAMOL 500 MG/1
500 TABLET, FILM COATED ORAL EVERY 6 HOURS PRN
Refills: 0
Start: 2023-08-26

## 2023-08-26 RX ORDER — ERYTHROMYCIN 5 MG/G
0.5 OINTMENT OPHTHALMIC EVERY 12 HOURS SCHEDULED
Qty: 3.5 G | Refills: 0 | Status: SHIPPED | OUTPATIENT
Start: 2023-08-26

## 2023-08-26 RX ORDER — OXYBUTYNIN CHLORIDE 10 MG/1
10 TABLET, EXTENDED RELEASE ORAL DAILY
Refills: 0
Start: 2023-08-27

## 2023-08-26 RX ORDER — ACETAMINOPHEN 325 MG/1
650 TABLET ORAL EVERY 8 HOURS SCHEDULED
Start: 2023-08-26 | End: 2023-08-26

## 2023-08-26 RX ORDER — OXYCODONE HYDROCHLORIDE 5 MG/1
5 TABLET ORAL EVERY 4 HOURS PRN
Qty: 7 TABLET | Refills: 0 | Status: SHIPPED | OUTPATIENT
Start: 2023-08-26 | End: 2023-09-05

## 2023-08-26 RX ORDER — ACETAMINOPHEN 325 MG/1
975 TABLET ORAL EVERY 6 HOURS SCHEDULED
Qty: 30 TABLET | Refills: 0 | Status: SHIPPED | OUTPATIENT
Start: 2023-08-26

## 2023-08-26 RX ADMIN — ERYTHROMYCIN 0.5 INCH: 5 OINTMENT OPHTHALMIC at 08:08

## 2023-08-26 RX ADMIN — POLYETHYLENE GLYCOL 3350 17 G: 17 POWDER, FOR SOLUTION ORAL at 08:00

## 2023-08-26 RX ADMIN — DOCUSATE SODIUM 100 MG: 100 CAPSULE, LIQUID FILLED ORAL at 08:00

## 2023-08-26 RX ADMIN — CALCIUM 2 TABLET: 500 TABLET ORAL at 07:59

## 2023-08-26 RX ADMIN — Medication 3000 UNITS: at 08:00

## 2023-08-26 RX ADMIN — ENOXAPARIN SODIUM 40 MG: 100 INJECTION SUBCUTANEOUS at 08:00

## 2023-08-26 RX ADMIN — LISINOPRIL 20 MG: 20 TABLET ORAL at 08:00

## 2023-08-26 RX ADMIN — OXYBUTYNIN CHLORIDE 10 MG: 5 TABLET, EXTENDED RELEASE ORAL at 08:00

## 2023-08-26 RX ADMIN — METOPROLOL SUCCINATE 50 MG: 50 TABLET, EXTENDED RELEASE ORAL at 08:00

## 2023-08-26 RX ADMIN — ACETAMINOPHEN 325MG 975 MG: 325 TABLET ORAL at 05:22

## 2023-08-26 RX ADMIN — ASPIRIN 81 MG: 81 TABLET, COATED ORAL at 08:00

## 2023-08-26 NOTE — PLAN OF CARE
Problem: Potential for Falls  Goal: Patient will remain free of falls  Description: INTERVENTIONS:  - Educate patient/family on patient safety including physical limitations  - Instruct patient to call for assistance with activity   - Consult OT/PT to assist with strengthening/mobility   - Keep Call bell within reach  - Keep bed low and locked with side rails adjusted as appropriate  - Keep care items and personal belongings within reach  - Initiate and maintain comfort rounds  - Make Fall Risk Sign visible to staff  - Offer Toileting every 2 Hours, in advance of need  - Initiate/Maintain 2alarm  - Obtain necessary fall risk management equipment: 2  - Apply yellow socks and bracelet for high fall risk patients  - Consider moving patient to room near nurses station  Outcome: Progressing     Problem: MOBILITY - ADULT  Goal: Maintain or return to baseline ADL function  Description: INTERVENTIONS:  -  Assess patient's ability to carry out ADLs; assess patient's baseline for ADL function and identify physical deficits which impact ability to perform ADLs (bathing, care of mouth/teeth, toileting, grooming, dressing, etc.)  - Assess/evaluate cause of self-care deficits   - Assess range of motion  - Assess patient's mobility; develop plan if impaired  - Assess patient's need for assistive devices and provide as appropriate  - Encourage maximum independence but intervene and supervise when necessary  - Involve family in performance of ADLs  - Assess for home care needs following discharge   - Consider OT consult to assist with ADL evaluation and planning for discharge  - Provide patient education as appropriate  Outcome: Progressing  Goal: Maintains/Returns to pre admission functional level  Description: INTERVENTIONS:  - Perform BMAT or MOVE assessment daily.   - Set and communicate daily mobility goal to care team and patient/family/caregiver.    - Collaborate with rehabilitation services on mobility goals if consulted  - Perform Range of Motion 2 times a day. - Reposition patient every 2 hours.   - Dangle patient 2 times a day  - Stand patient 2 times a day  - Ambulate patient 2 times a day  - Out of bed to chair 2 times a day   - Out of bed for meals 2 times a day  - Out of bed for toileting  - Record patient progress and toleration of activity level   Outcome: Progressing     Problem: PAIN - ADULT  Goal: Verbalizes/displays adequate comfort level or baseline comfort level  Description: Interventions:  - Encourage patient to monitor pain and request assistance  - Assess pain using appropriate pain scale  - Administer analgesics based on type and severity of pain and evaluate response  - Implement non-pharmacological measures as appropriate and evaluate response  - Consider cultural and social influences on pain and pain management  - Notify physician/advanced practitioner if interventions unsuccessful or patient reports new pain  Outcome: Progressing

## 2023-08-26 NOTE — ASSESSMENT & PLAN NOTE
History of Premature ventricular contractions and follows up with Dr. Vignesh Kim. Denies any history of coronary artery disease. Continue with outpatient dose of verapamil and metoprolol.

## 2023-08-26 NOTE — ASSESSMENT & PLAN NOTE
Patient presented with a fall. Trauma work-up including x-rays of the foot shows There is diffuse soft tissue swelling anteriorly and laterally at the ankle. . Minimal cortical irregularity in the distal fibula concerning for a nondisplaced fracture. Splinted in the emergency room  Orthopedic consulted  Ortho recommending that nondisplaced fracture can be treated conservatively at this time with short leg splint. Continue with nonweightbearing to right lower extremity. PT evaluation appreciated. Recommended acute rehab keep splint clean, dry and intact  Ice and elevation to right lower extremity  Pain control as needed  Mr. Imer Huggins is an established patient with foot and ankle specialist Dr. Judie Gutiérrez. Recommend follow-up with Dr. Judie Gutiérrez in 1 to 2 weeks as outpatient.

## 2023-08-26 NOTE — DISCHARGE SUMMARY
[facility]  Discharge- Lizzy Alvarado 1940, 79 y.o. male MRN: [id number]  Unit/Bed#: -Janice Encounter: [id number]  Primary Care Provider: Dorie Hurtado DO   Date and time admitted to hospital: 8/24/2023  7:24 AM    Yoni Killian  Presented with a fall at home. Denies any syncopal event. Complains of generalized weakness. Trauma work-up shows nondisplaced right fibular fracture. CT head unremarkable  Nonweightbearing right lower extremity per orthopedic evaluation  Maintain fall precaution  PT OT evaluation appreciated. Recommended acute rehab. Patient will be nonweightbearing in right lower extremity    Elevated troponin  Assessment & Plan  Present on admission. Patient denies any chest pain or cardiac symptoms. EKG on admission showed normal sinus rhythm with frequent PVCs  Likely non-MI troponin elevation in the setting of fall and fibular fracture. Low concern for ACS  Cardiology input appreciated. Recommended 1 week of outpatient Zio patch. Echocardiogram with normal EF grade 1 diastolic dysfunction and moderate mitral regurgitation and mild aortic and tricuspid regurgitation   Cardiology input appreciated    Compression fracture of L1 vertebra [facility])  Assessment & Plan  S/p kyphoplasty. Has chronic pain. Continue with scheduled Tylenol. As needed muscle relaxant and aqua K-pad. PT evaluation will be obtained. Non-rheumatic mitral regurgitation  Assessment & Plan  Noted on repeat echocardiogram    Nonrheumatic aortic valve insufficiency  Assessment & Plan  Reviewed repeat echocardiogram done on 8/25  No significant changes noted    PVC's (premature ventricular contractions)  Assessment & Plan  History of Premature ventricular contractions and follows up with Dr. Mehran East. Denies any history of coronary artery disease. Continue with outpatient dose of verapamil and metoprolol.     * Closed fracture of right ankle with routine healing  Assessment & Plan  Patient presented with a fall. Trauma work-up including x-rays of the foot shows There is diffuse soft tissue swelling anteriorly and laterally at the ankle. . Minimal cortical irregularity in the distal fibula concerning for a nondisplaced fracture. Splinted in the emergency room  Orthopedic consulted  Ortho recommending that nondisplaced fracture can be treated conservatively at this time with short leg splint. Continue with nonweightbearing to right lower extremity. PT evaluation appreciated. Recommended acute rehab keep splint clean, dry and intact  Ice and elevation to right lower extremity  Pain control as needed  Mr. Elma Serrano is an established patient with foot and ankle specialist Dr. Zulma Trejo. Recommend follow-up with Dr. Zulma Trejo in 1 to 2 weeks as outpatient. Discharge Summary - [facility] Internal Medicine    Patient Information: Morene Spatz 79 y.o. male MRN: [id number]  Unit/Bed#: -01 Encounter: [id number]    Discharging Physician / Practitioner: Marita Alicea MD  PCP: Chiquita Herzog DO  Admission Date: 8/24/2023  Discharge Date: 08/26/23    Reason for Admission: Fall    Discharge Diagnoses:     Principal Problem:    Closed fracture of right ankle with routine healing  Active Problems:    PVC's (premature ventricular contractions)    Nonrheumatic aortic valve insufficiency    Non-rheumatic mitral regurgitation    Compression fracture of L1 vertebra (HCC)    Elevated troponin    Fall  Resolved Problems:    * No resolved hospital problems. *      Consultations During Hospital Stay:  · Orhopedics  · Cardiology    Procedures Performed:     · Leg splint placement    Significant Findings / Test Results:     Right ankle x-ray:   1. There is diffuse soft tissue swelling anteriorly and laterally at the ankle.   2. Minimal cortical irregularity in the distal fibula concerning for a nondisplaced fracture    Incidental Findings:   · None    Test Results Pending at Discharge (will require follow up): · None     Outpatient Tests Requested:  · None    Complications:  None    Hospital Course:     Timothy Gutierrez is a 79 y.o. male patient with PMH of hypertension, PVCs, chronic back pain, osteoporosis,who presents after experiencing a mechanical fall at home. Patient usually ambulates with a walker. He stated that this morning he got out of bed to go to the bathroom. He certainly felt to be weak and fell to the ground and was unable to get up. Wife had to call EMS and they noticed swelling of the right ankle. Patient was subsequently brought to the emergency room. Patient denied any chest pain, palpitations, lightheadedness dizziness or syncopal event. He complains of pain in the right ankle. Also complains of chronic back pain. During the hospital stay, patient was found to have nondisplaced fracture of the right distal fibula. He was assessed by the orthopedic team and they have provided conservative management with a splint. His pain is well controlled. He will follow-up with Dr. Nestor Espinoza from orthopedics in 1 to 2 weeks after the discharge. Patient was also seen by the cardiologist due to history of elevated heart disease and PVCs. He had a repeat echo done which was similar to the previous one done about a year ago. Cardiologist recommends to continue his current medications and also 1 week of outpatient ZIO monitor, which was ordered. Patient had troponin elevation which was believed to be noncardiac in etiology and it trended down. Condition at Discharge: stable     Discharge Day Visit / Exam:     Subjective: Patient was seen and examined. He denies any complaints at this time. His pain is well controlled.   Vitals: Blood Pressure: 143/56 (08/26/23 0804)  Pulse: (!) 51 (08/26/23 0804)  Temperature: 97.6 °F (36.4 °C) (08/26/23 0804)  Temp Source: Oral (08/24/23 0930)  Respirations: 16 (08/25/23 1958)  Height: 5' 10" (177.8 cm) (08/25/23 1030)  Weight - Scale: 70.3 kg (155 lb) (08/25/23 1030)  SpO2: 96 % (08/26/23 0804)  Exam:   Physical Exam  Constitutional:       Appearance: He is well-developed. HENT:      Head: Normocephalic and atraumatic. Eyes:      General:         Left eye: No discharge. Comments: Left eyelid swelling   Cardiovascular:      Rate and Rhythm: Normal rate and regular rhythm. Heart sounds: Normal heart sounds. Pulmonary:      Effort: Pulmonary effort is normal. No respiratory distress. Breath sounds: Normal breath sounds. Abdominal:      General: Bowel sounds are normal.      Palpations: Abdomen is soft. Tenderness: There is no abdominal tenderness. Musculoskeletal:         General: Deformity (RLE splint) present. Skin:     General: Skin is warm. Findings: No erythema or rash. Neurological:      Mental Status: He is alert. Discussion with Family: Patient's wife at the bedside    Discharge instructions/Information to patient and family:   See after visit summary for information provided to patient and family. Provisions for Follow-Up Care:  See after visit summary for information related to follow-up care and any pertinent home health orders. Disposition:     Other [facility] [place] at [address] to [facility]:   · Not Applicable to this Patient - Not Applicable to this Patient    Planned Readmission: no     Discharge Statement:  I spent 45 minutes discharging the patient. This time was spent on the day of discharge. I had direct contact with the patient on the day of discharge. Greater than 50% of the total time was spent examining patient, answering all patient questions, arranging and discussing plan of care with patient as well as directly providing post-discharge instructions. Additional time then spent on discharge activities. Discharge Medications:  See after visit summary for reconciled discharge medications provided to patient and family.       ** Please Note: This note has been constructed using a voice recognition system **

## 2023-08-28 ENCOUNTER — TELEPHONE (OUTPATIENT)
Age: 83
End: 2023-08-28

## 2023-08-28 ENCOUNTER — PATIENT OUTREACH (OUTPATIENT)
Dept: CASE MANAGEMENT | Facility: OTHER | Age: 83
End: 2023-08-28

## 2023-08-28 NOTE — TELEPHONE ENCOUNTER
Caller: Spouse, Adriana Fisher    Doctor: Lachman     Reason for call: Needs appt for closed right ankle fx. [facility] told Adriana Fisher that they could put a boot on him if the doctor ordered it so he can be weight bearing. Please advise when the patient should schedule appt for.     Call back#: [phone]

## 2023-08-28 NOTE — PROGRESS NOTES
Outpatient Care Management [facility]. Discharged 8/26/23 To [address] [place] [address]. This Admin Coordinator will continue to monitor via chart review. This Admin Coordinator will continue to monitor via chart review.

## 2023-08-28 NOTE — TELEPHONE ENCOUNTER
Scheduled patient appt for 9/8 at 0900 at [facility] with Dr. Jax Raymundo

## 2023-08-30 ENCOUNTER — PATIENT OUTREACH (OUTPATIENT)
Dept: CASE MANAGEMENT | Facility: OTHER | Age: 83
End: 2023-08-30

## 2023-08-30 NOTE — PROGRESS NOTES
Call placed to [facility] and spoke with [facility] who confirmed the patient is currently at their facility for STR. This Admin Coordinator will continue to monitor via chart review.

## 2023-09-06 ENCOUNTER — PATIENT OUTREACH (OUTPATIENT)
Dept: CASE MANAGEMENT | Facility: OTHER | Age: 83
End: 2023-09-06

## 2023-09-06 NOTE — PROGRESS NOTES
Call placed to [place] and spoke with [facility] who confirmed the patient is currently at their facility for STR no LCD at this time. This Admin Coordinator will continue to monitor via chart review.

## 2023-09-07 ENCOUNTER — TELEPHONE (OUTPATIENT)
Dept: ENDOCRINOLOGY | Facility: CLINIC | Age: 83
End: 2023-09-07

## 2023-09-07 NOTE — TELEPHONE ENCOUNTER
Received Summary of Benefits from [employer] for Prolia on 9/18. No prior auth required. Patient met the $226 deductible. No OOP cost to the patient.

## 2023-09-08 ENCOUNTER — OFFICE VISIT (OUTPATIENT)
Dept: OBGYN CLINIC | Facility: CLINIC | Age: 83
End: 2023-09-08
Payer: MEDICARE

## 2023-09-08 ENCOUNTER — APPOINTMENT (OUTPATIENT)
Dept: RADIOLOGY | Facility: CLINIC | Age: 83
End: 2023-09-08
Payer: MEDICARE

## 2023-09-08 VITALS — BODY MASS INDEX: 22.24 KG/M2 | HEIGHT: 70 IN

## 2023-09-08 DIAGNOSIS — T14.90XA INJURY: ICD-10-CM

## 2023-09-08 DIAGNOSIS — S82.61XD CLOSED DISPLACED FRACTURE OF LATERAL MALLEOLUS OF RIGHT FIBULA WITH ROUTINE HEALING: Primary | ICD-10-CM

## 2023-09-08 DIAGNOSIS — Z01.89 ENCOUNTER FOR LOWER EXTREMITY COMPARISON IMAGING STUDY: ICD-10-CM

## 2023-09-08 DIAGNOSIS — S82.831A OTHER CLOSED FRACTURE OF DISTAL END OF RIGHT FIBULA, INITIAL ENCOUNTER: ICD-10-CM

## 2023-09-08 PROCEDURE — 99214 OFFICE O/P EST MOD 30 MIN: CPT | Performed by: ORTHOPAEDIC SURGERY

## 2023-09-08 PROCEDURE — 73610 X-RAY EXAM OF ANKLE: CPT

## 2023-09-08 PROCEDURE — 27786 TREATMENT OF ANKLE FRACTURE: CPT | Performed by: ORTHOPAEDIC SURGERY

## 2023-09-08 NOTE — PATIENT INSTRUCTIONS
Joanne Willis M.D. Attending, Orthopaedic Surgery  Foot and Ankle  [facility]        ORTHOPAEDIC FOOT AND ANKLE CLINIC VISIT-ANKLE FRACTURE     Assessment:     Encounter Diagnoses   Name Primary? Injury Yes    Encounter for lower extremity comparison imaging study           Plan:   The patient verbalized understanding of exam findings and treatment plan. We engaged in the shared decision-making process and treatment options were discussed at length with the patient. Surgical and conservative management discussed today along with risks and benefits. The patient has a right ankle distal fibula fracture. Patient placed in short leg cast with weight-bearing shoe. Weight bear as tolerated with his walker. Start taking ASA 81 mg QD for DVT ppx. Follow up in 3 weeks with weight-bearing X-Rays out of cast.    History of Present Illness:   Chief Complaint:   Chief Complaint   Patient presents with    Right Ankle - Fracture     Cruzito Sood is a 79 y.o. male who is being seen for right distal fibula fracture. Reports a fall two weeks ago. States after the fall his ankle was swollen and he did not have significant pain. Pain is localized at lateral ankle with minimal radiating and described as sharp and severe. Patient denies numbness, tingling or radicular pain. Denies history of neuropathy. Patient does not smoke, does not have diabetes and does not take blood thinners. Patient denies family history of anesthesia complications and has not had any complications with anesthesia. Pain/symptom timing:  Worse during the day when active  Pain/symptom context:  Worse with activites and work  Pain/symptom modifying factors:  Rest makes better, activities make worse  Pain/symptom associated signs/symptoms: none    Prior treatment   NSAIDs No    Injections No  Bracing/Orthotics Yes   Physical Therapy No    Orthopedic Surgical History:   See below.     Past Medical, Surgical and Social History:  Past Medical History:  has a past medical history of Astigmatism, Cataract, Gross hematuria, Hematuria, Kidney stones, Lumbar vertebral fracture ([address]), Prostate cancer ([address]), Renal calculi, Squamous cell carcinoma, and Vitamin D deficiency. Problem List: does not have any pertinent problems on file. Past Surgical History:  has a past surgical history that includes Colonoscopy; Cystoscopy (01/28/2016); Elbow surgery; Retropubic prostatectomy (11/13/2001); Lithotripsy; Hernia repair (2002); Cataract extraction (Left, 08/26/2019); Cataract extraction (Right, 08/12/2019); Breast excisional biopsy (N/A); IR kyphoplasty/vertebroplasty (1/4/2023); IR kyphoplasty/vertebroplasty (2/6/2023); and IR kyphoplasty/vertebroplasty (3/23/2023). Family History: family history includes Heart failure (age of onset: 70) in his father; Hypertension in his brother, family, father, and mother; Other (age of onset: 76) in his mother; Peripheral vascular disease in his father; Prostate cancer in his brother; Stroke (age of onset: 48) in his brother. Social History:  reports that he has never smoked. He has never used smokeless tobacco. He reports that he does not currently use alcohol after a past usage of about 4.0 standard drinks of alcohol per week. He reports that he does not use drugs. Current Medications: has a current medication list which includes the following prescription(s): acetaminophen, calcium citrate, cholecalciferol, docusate sodium, erythromycin, lisinopril, lisinopril, methocarbamol, metoprolol succinate, myrbetriq, verapamil, aspirin, and oxybutynin, and the following Facility-Administered Medications: denosumab. Allergies: is allergic to sulfa antibiotics, oxycodone, ciprofloxacin, gabapentin, lyrica [pregabalin], and medical tape.      Review of Systems:  General- denies fever/chills  HEENT- denies hearing loss or sore throat  Eyes- denies eye pain or visual disturbances, denies red eyes  Respiratory- denies cough or SOB  Cardio- denies chest pain or palpitations  GI- denies abdominal pain  Endocrine- denies urinary frequency  Urinary- denies pain with urination  Musculoskeletal- Negative except noted above  Skin- denies rashes or wounds  Neurological- denies dizziness or headache  Psychiatric- denies anxiety or difficulty concentrating    Physical Exam:   Ht 5' 10" (1.778 m)   BMI 22.24 kg/m²   General/Constitutional: No apparent distress: well-nourished and well developed. Eyes: normal ocular motion  Cardio: RRR, Normal S1S2, No m/r/g  Lymphatic: No appreciable lymphadenopathy  Respiratory: Non-labored breathing, CTA b/l no w/c/r  Vascular: No edema, swelling or tenderness, except as noted in detailed exam.  Integumentary: No impressive skin lesions present, except as noted in detailed exam.  Neuro: No ataxia or tremors noted  Psych: Normal mood and affect, oriented to person, place and time. Appropriate affect. Musculoskeletal: Normal, except as noted in detailed exam and in HPI. Examination    right    Gait Not assessed due to ankle fracture   Musculoskeletal Tender to palpation at fracture site    Skin  Normal.      Nails Normal    Range of Motion  Not assessed due to ankle fracture    Stability Stable    Muscle Strength N/A tibialis anterior  N/A gastrocnemius-soleus  N/A posterior tibialis  N/A peroneal/eversion strength  5/5 EHL  5/5 FHL    Neurologic Normal    Sensation Intact to light touch throughout sural, saphenous, superficial peroneal, deep peroneal and medial/lateral plantar nerve distributions. [place]-Bianca 5.07 filament (10g) testing deferred. Cardiovascular Brisk capillary refill < 2 seconds,intact DP and PT pulses    Special Tests None      Imaging Studies:   3 views of the right ankle were taken, reviewed and interpreted independently that demonstrates minimally displaced distal fibula fracture. Reviewed by me personally. Lisa Nathan.  Lachman, MD  Foot & Ankle Surgery [address]. [facility] personally performed the service. Lois Cong. Lachman, MD

## 2023-09-08 NOTE — PROGRESS NOTES
Pawan Duron M.D. Attending, Orthopaedic Surgery  Foot and Ankle  [facility]        ORTHOPAEDIC FOOT AND ANKLE CLINIC VISIT-ANKLE FRACTURE     Assessment:     Encounter Diagnoses   Name Primary? • Other closed fracture of distal end of right fibula, initial encounter Yes   • Encounter for lower extremity comparison imaging study    • Closed displaced fracture of lateral malleolus of right fibula with routine healing           Plan:   · The patient verbalized understanding of exam findings and treatment plan. We engaged in the shared decision-making process and treatment options were discussed at length with the patient. Surgical and conservative management discussed today along with risks and benefits. · The patient has a right ankle distal fibula fracture. · Patient placed in short leg cast with weight-bearing shoe. Weight bear as tolerated with his walker. · Start taking ASA 81 mg QD for DVT ppx. · Follow up in 3 weeks with weight-bearing X-Rays out of cast.    History of Present Illness:   Chief Complaint:   Chief Complaint   Patient presents with   • Right Ankle - Fracture     Leonel Manjarrez is a 79 y.o. male who is being seen for right distal fibula fracture. Reports a fall two weeks ago. States after the fall his ankle was swollen and he did not have significant pain. Pain is localized at lateral ankle with minimal radiating and described as sharp and severe. Patient denies numbness, tingling or radicular pain. Denies history of neuropathy. Patient does not smoke, does not have diabetes and does not take blood thinners. Patient denies family history of anesthesia complications and has not had any complications with anesthesia.      Pain/symptom timing:  Worse during the day when active  Pain/symptom context:  Worse with activites and work  Pain/symptom modifying factors:  Rest makes better, activities make worse  Pain/symptom associated signs/symptoms: none    Prior treatment   · NSAIDs No    · Injections No  · Bracing/Orthotics Yes   · Physical Therapy No    Orthopedic Surgical History:   See below. Past Medical, Surgical and Social History:  Past Medical History:  has a past medical history of Astigmatism, Cataract, Gross hematuria, Hematuria, Kidney stones, Lumbar vertebral fracture ([address]), Prostate cancer ([address]), Renal calculi, Squamous cell carcinoma, and Vitamin D deficiency. Problem List: does not have any pertinent problems on file. Past Surgical History:  has a past surgical history that includes Colonoscopy; Cystoscopy (01/28/2016); Elbow surgery; Retropubic prostatectomy (11/13/2001); Lithotripsy; Hernia repair (2002); Cataract extraction (Left, 08/26/2019); Cataract extraction (Right, 08/12/2019); Breast excisional biopsy (N/A); IR kyphoplasty/vertebroplasty (1/4/2023); IR kyphoplasty/vertebroplasty (2/6/2023); and IR kyphoplasty/vertebroplasty (3/23/2023). Family History: family history includes Heart failure (age of onset: 70) in his father; Hypertension in his brother, family, father, and mother; Other (age of onset: 76) in his mother; Peripheral vascular disease in his father; Prostate cancer in his brother; Stroke (age of onset: 48) in his brother. Social History:  reports that he has never smoked. He has never used smokeless tobacco. He reports that he does not currently use alcohol after a past usage of about 4.0 standard drinks of alcohol per week. He reports that he does not use drugs. Current Medications: has a current medication list which includes the following prescription(s): acetaminophen, calcium citrate, cholecalciferol, docusate sodium, erythromycin, lisinopril, lisinopril, methocarbamol, metoprolol succinate, myrbetriq, verapamil, aspirin, and oxybutynin, and the following Facility-Administered Medications: denosumab. Allergies: is allergic to sulfa antibiotics, oxycodone, ciprofloxacin, gabapentin, lyrica [pregabalin], and medical tape. Review of Systems:  General- denies fever/chills  HEENT- denies hearing loss or sore throat  Eyes- denies eye pain or visual disturbances, denies red eyes  Respiratory- denies cough or SOB  Cardio- denies chest pain or palpitations  GI- denies abdominal pain  Endocrine- denies urinary frequency  Urinary- denies pain with urination  Musculoskeletal- Negative except noted above  Skin- denies rashes or wounds  Neurological- denies dizziness or headache  Psychiatric- denies anxiety or difficulty concentrating    Physical Exam:   Ht 5' 10" (1.778 m)   BMI 22.24 kg/m²   General/Constitutional: No apparent distress: well-nourished and well developed. Eyes: normal ocular motion  Cardio: RRR, Normal S1S2, No m/r/g  Lymphatic: No appreciable lymphadenopathy  Respiratory: Non-labored breathing, CTA b/l no w/c/r  Vascular: No edema, swelling or tenderness, except as noted in detailed exam.  Integumentary: No impressive skin lesions present, except as noted in detailed exam.  Neuro: No ataxia or tremors noted  Psych: Normal mood and affect, oriented to person, place and time. Appropriate affect. Musculoskeletal: Normal, except as noted in detailed exam and in HPI. Examination    right    Gait Not assessed due to ankle fracture   Musculoskeletal Tender to palpation at fracture site    Skin  Normal.      Nails Normal    Range of Motion  Not assessed due to ankle fracture    Stability Stable    Muscle Strength N/A tibialis anterior  N/A gastrocnemius-soleus  N/A posterior tibialis  N/A peroneal/eversion strength  5/5 EHL  5/5 FHL    Neurologic Normal    Sensation Intact to light touch throughout sural, saphenous, superficial peroneal, deep peroneal and medial/lateral plantar nerve distributions. [place]-Bianca 5.07 filament (10g) testing deferred.     Cardiovascular Brisk capillary refill < 2 seconds,intact DP and PT pulses    Special Tests None      Imaging Studies:   3 views of the right ankle were taken, reviewed and interpreted independently that demonstrates minimally displaced distal fibula fracture. Reviewed by me personally. Fracture / Dislocation Treatment    Date/Time: 9/8/2023 9:00 AM    Performed by: Catherine Rob MD  Authorized by: Catherine Rob MD    Patient Location:  [facility] Protocol:  Consent: Verbal consent obtained. Consent given by: patient  Patient understanding: patient states understanding of the procedure being performed  Site marked: the operative site was marked  Patient identity confirmed: verbally with patient      Injury location:  Ankle  Location details:  Right ankle  Injury type:  Fracture  Fracture type: lateral malleolus    Fracture type: lateral malleolus    Distal perfusion: normal    Neurological function: normal    Range of motion: reduced    Local anesthesia used?: No    General anesthesia used?: No    Manipulation performed?: No    Immobilization:  Cast  Cast type:  Short leg walking  Neurovascular status: Neurovascularly intact    Distal perfusion: normal    Neurological function: normal    Patient tolerance:  Patient tolerated the procedure well with no immediate complications          Sharlon Stamp. Lachman, MD  Foot & Ankle Surgery   Department of [address] [place] [address] personally performed the service. Sharlon Stamp. Lachman, MD

## 2023-09-13 ENCOUNTER — PATIENT OUTREACH (OUTPATIENT)
Dept: CASE MANAGEMENT | Facility: OTHER | Age: 83
End: 2023-09-13

## 2023-09-13 NOTE — PROGRESS NOTES
Call placed to [facility] and spoke with [facility] who confirmed the patient is currently at their facility for STR no LCD at this time. This Admin Coordinator will continue to monitor via chart review.

## 2023-09-18 ENCOUNTER — HOME HEALTH ADMISSION (OUTPATIENT)
Dept: HOME HEALTH SERVICES | Facility: HOME HEALTHCARE | Age: 83
End: 2023-09-18
Payer: MEDICARE

## 2023-09-19 ENCOUNTER — TELEPHONE (OUTPATIENT)
Dept: ENDOCRINOLOGY | Facility: CLINIC | Age: 83
End: 2023-09-19

## 2023-09-20 ENCOUNTER — PATIENT OUTREACH (OUTPATIENT)
Dept: CASE MANAGEMENT | Facility: OTHER | Age: 83
End: 2023-09-20

## 2023-09-20 NOTE — PROGRESS NOTES
Call placed to [facility] and spoke with [facility] who confirmed the patient is currently at their facility for STR. This Admin Coordinator will continue to monitor via chart review.

## 2023-09-22 ENCOUNTER — HOME CARE VISIT (OUTPATIENT)
Dept: HOME HEALTH SERVICES | Facility: HOME HEALTHCARE | Age: 83
End: 2023-09-22
Payer: MEDICARE

## 2023-09-22 ENCOUNTER — TELEPHONE (OUTPATIENT)
Dept: FAMILY MEDICINE CLINIC | Facility: CLINIC | Age: 83
End: 2023-09-22

## 2023-09-22 VITALS — SYSTOLIC BLOOD PRESSURE: 98 MMHG | DIASTOLIC BLOOD PRESSURE: 46 MMHG

## 2023-09-22 PROCEDURE — G0151 HHCP-SERV OF PT,EA 15 MIN: HCPCS

## 2023-09-22 PROCEDURE — 400013 VN SOC

## 2023-09-22 PROCEDURE — 10330081 VN NO-PAY CLAIM PROCEDURE

## 2023-09-22 NOTE — TELEPHONE ENCOUNTER
Decrease to just 10 mg lisinopril in the morning. Monitor BP closely. Take time when changing positions/stay hydrated. Let us know how the readings are.

## 2023-09-22 NOTE — TELEPHONE ENCOUNTER
I called and spoke with the patient's wife and made her aware, she verbalized understanding and agreement.

## 2023-09-25 ENCOUNTER — HOME CARE VISIT (OUTPATIENT)
Dept: HOME HEALTH SERVICES | Facility: HOME HEALTHCARE | Age: 83
End: 2023-09-25
Payer: MEDICARE

## 2023-09-25 VITALS — SYSTOLIC BLOOD PRESSURE: 100 MMHG | DIASTOLIC BLOOD PRESSURE: 42 MMHG

## 2023-09-25 PROBLEM — R79.89 ELEVATED TROPONIN: Status: RESOLVED | Noted: 2023-08-24 | Resolved: 2023-09-25

## 2023-09-25 PROCEDURE — G0151 HHCP-SERV OF PT,EA 15 MIN: HCPCS

## 2023-09-25 PROCEDURE — G0321 AUDIO-ONLY HHS: HCPCS

## 2023-09-25 NOTE — TELEPHONE ENCOUNTER
I spoke to the patient's wife, his heart rate is in the 50's/low 60's. She will break the metoprolol 50 mg in half and call in 2 days with BP readings and heart rates.

## 2023-09-25 NOTE — TELEPHONE ENCOUNTER
Patient's wife actually discontinued the Lisinopril, last dose was 9/25/23. He is taking his verapamil and metoprolol. BP this morning at 9:20 AM was 119/62 after transferring from bed to wheelchair to recliner, 11:10 AM was 100/42, 11:45 AM yesterday 91/46, 11:00 PM yesterday 98/60. He is having more weakness than usual.  Home PT was just in today, and he was up and walking. Patient's wife is asking for further directions.

## 2023-09-26 ENCOUNTER — HOME CARE VISIT (OUTPATIENT)
Dept: HOME HEALTH SERVICES | Facility: HOME HEALTHCARE | Age: 83
End: 2023-09-26
Payer: MEDICARE

## 2023-09-26 ENCOUNTER — TELEPHONE (OUTPATIENT)
Dept: ENDOCRINOLOGY | Facility: CLINIC | Age: 83
End: 2023-09-26

## 2023-09-26 ENCOUNTER — PATIENT OUTREACH (OUTPATIENT)
Dept: CASE MANAGEMENT | Facility: OTHER | Age: 83
End: 2023-09-26

## 2023-09-26 ENCOUNTER — CLINICAL SUPPORT (OUTPATIENT)
Dept: ENDOCRINOLOGY | Facility: CLINIC | Age: 83
End: 2023-09-26
Payer: MEDICARE

## 2023-09-26 VITALS — HEART RATE: 57 BPM | DIASTOLIC BLOOD PRESSURE: 60 MMHG | SYSTOLIC BLOOD PRESSURE: 124 MMHG

## 2023-09-26 VITALS — HEART RATE: 64 BPM | OXYGEN SATURATION: 98 % | DIASTOLIC BLOOD PRESSURE: 62 MMHG | SYSTOLIC BLOOD PRESSURE: 108 MMHG

## 2023-09-26 DIAGNOSIS — N18.31 STAGE 3A CHRONIC KIDNEY DISEASE (HCC): Primary | ICD-10-CM

## 2023-09-26 DIAGNOSIS — M80.08XA AGE-RELATED OSTEOPOROSIS WITH CURRENT PATHOLOGICAL FRACTURE OF VERTEBRA, INITIAL ENCOUNTER (HCC): ICD-10-CM

## 2023-09-26 PROCEDURE — 96372 THER/PROPH/DIAG INJ SC/IM: CPT | Performed by: INTERNAL MEDICINE

## 2023-09-26 PROCEDURE — G0152 HHCP-SERV OF OT,EA 15 MIN: HCPCS

## 2023-09-26 NOTE — PROGRESS NOTES
Chart review complete the patient discharged 9/20/23 to Home with [facility]. I have removed myself off of the care team, added the CM to the care team who will follow the patient through the episode, sent the care manager an inbasket notifying them of the LUIS/SNF Pathway. Ambulatory referral placed for complex care management. Discharge paperwork from [facility] was scanned into the patients chart on 9/20/23.

## 2023-09-26 NOTE — PROGRESS NOTES
Assessment/Plan:    Jimmy Young came into the [facility] Endocrinology Office today 09/26/23 to receive Prolia injection. Verbal consent obtained. Consent given by: Patient    Patient states patient has been medically healthy with no underlining concerns/complications. Jimmy Young presents with no symptoms today. All insturctions were reviewed with the patient. If the patient should have any questions/concerns, advised patient to contacted [facility] Endocrinology Office. Subjective:     History provided by: Patient    Patient ID: Jimmy Young is a 79 y.o. male      Objective: There were no vitals filed for this visit. Patient tolerated the injection well without any complications. Injection site/s Left Arm. Medication was provided by the office. Patient signed consent form Yes  Patient signed Swati Silversmith form YES (If no patient is not a medicare patient). Patient waited 15 minutes after injection No(This only applies to patient's receiving first time injection).        Last Visit: 9/19/2023  Next visit:9/26/2023 No

## 2023-09-27 ENCOUNTER — PATIENT OUTREACH (OUTPATIENT)
Dept: CASE MANAGEMENT | Facility: OTHER | Age: 83
End: 2023-09-27

## 2023-09-27 ENCOUNTER — TELEPHONE (OUTPATIENT)
Dept: FAMILY MEDICINE CLINIC | Facility: CLINIC | Age: 83
End: 2023-09-27

## 2023-09-27 DIAGNOSIS — R35.1 NOCTURIA: Primary | ICD-10-CM

## 2023-09-27 NOTE — PROGRESS NOTES
Outreach TC to patient for initial care management assessment. I spoke with the CG, spouse, Haily John. Spouse reports that patient and CG had a very rough night and they were both still resting. CG requested a call back at another time. Outreach Call will occur again at a later date or time.

## 2023-09-28 ENCOUNTER — PATIENT OUTREACH (OUTPATIENT)
Dept: CASE MANAGEMENT | Facility: OTHER | Age: 83
End: 2023-09-28

## 2023-09-28 ENCOUNTER — HOME CARE VISIT (OUTPATIENT)
Dept: HOME HEALTH SERVICES | Facility: HOME HEALTHCARE | Age: 83
End: 2023-09-28
Payer: MEDICARE

## 2023-09-28 ENCOUNTER — TELEPHONE (OUTPATIENT)
Dept: NEUROLOGY | Facility: CLINIC | Age: 83
End: 2023-09-28

## 2023-09-28 ENCOUNTER — TELEPHONE (OUTPATIENT)
Dept: LAB | Facility: HOSPITAL | Age: 83
End: 2023-09-28

## 2023-09-28 VITALS — HEART RATE: 70 BPM | OXYGEN SATURATION: 98 % | DIASTOLIC BLOOD PRESSURE: 64 MMHG | SYSTOLIC BLOOD PRESSURE: 132 MMHG

## 2023-09-28 PROCEDURE — G0152 HHCP-SERV OF OT,EA 15 MIN: HCPCS

## 2023-09-28 NOTE — PROGRESS NOTES
Outpatient Care Manager Note: Patient identified for SNF/LUIS Pathway. BMP order placed and PCP notified. Chart review completed. Patient discharged from [facility] on 9/20. LUIS pathway interventions reviewed  including:  • PCP visit within 1 week  • Avoiding NSAIDs  • Adequate nutrition and hydration  • Keeping BP >130 if normal BP not lower  • Checking temperature  • Assessing for weight gain or loss and edema changes since home. • Medication Review  • BMP     I spoke with spouse who reports patient is doing well except blood pressure was low whn physical therapist took it on 9/22. Since that time patient's PCP has adjusted his Lisinopril. His spouse id a nurse and checks his pressure twice daily and it has returned to normal.  I went over the instructions above with spouse. She manages his medications and is giving them as directed. He has an appointment with his PCP on 10/16 and she will drive patient. She thanked me for calling but declined care management. I gave her my contact information and she repeated it to me. I asked her to call me if I can assist her.

## 2023-09-28 NOTE — TELEPHONE ENCOUNTER
1st attempt to schedule appointment from triage. Patient's wife stated they would have to call back when patient is at a point when he can leave house and declined scheduling at this time.

## 2023-09-29 ENCOUNTER — HOME CARE VISIT (OUTPATIENT)
Dept: HOME HEALTH SERVICES | Facility: HOME HEALTHCARE | Age: 83
End: 2023-09-29
Payer: MEDICARE

## 2023-09-29 PROCEDURE — G0151 HHCP-SERV OF PT,EA 15 MIN: HCPCS

## 2023-10-02 ENCOUNTER — TELEPHONE (OUTPATIENT)
Dept: LAB | Facility: HOSPITAL | Age: 83
End: 2023-10-02

## 2023-10-02 ENCOUNTER — APPOINTMENT (OUTPATIENT)
Dept: LAB | Facility: HOSPITAL | Age: 83
End: 2023-10-02
Payer: MEDICARE

## 2023-10-02 ENCOUNTER — HOME CARE VISIT (OUTPATIENT)
Dept: HOME HEALTH SERVICES | Facility: HOME HEALTHCARE | Age: 83
End: 2023-10-02
Payer: MEDICARE

## 2023-10-02 VITALS — SYSTOLIC BLOOD PRESSURE: 110 MMHG | DIASTOLIC BLOOD PRESSURE: 56 MMHG

## 2023-10-02 DIAGNOSIS — E87.5 HYPERKALEMIA: ICD-10-CM

## 2023-10-02 DIAGNOSIS — R79.89 ABNORMAL CBC: ICD-10-CM

## 2023-10-02 LAB
ANION GAP SERPL CALCULATED.3IONS-SCNC: 9 MMOL/L
ANISOCYTOSIS BLD QL SMEAR: PRESENT
BACTERIA UR QL AUTO: ABNORMAL /HPF
BASOPHILS # BLD MANUAL: 0 THOUSAND/UL (ref 0–0.1)
BASOPHILS NFR MAR MANUAL: 0 % (ref 0–1)
BILIRUB UR QL STRIP: NEGATIVE
BUN SERPL-MCNC: 25 MG/DL (ref 5–25)
CALCIUM SERPL-MCNC: 9.3 MG/DL (ref 8.4–10.2)
CHLORIDE SERPL-SCNC: 99 MMOL/L (ref 96–108)
CLARITY UR: ABNORMAL
CO2 SERPL-SCNC: 28 MMOL/L (ref 21–32)
COLOR UR: ABNORMAL
CREAT SERPL-MCNC: 1.15 MG/DL (ref 0.6–1.3)
EOSINOPHIL # BLD MANUAL: 0.17 THOUSAND/UL (ref 0–0.4)
EOSINOPHIL NFR BLD MANUAL: 2 % (ref 0–6)
ERYTHROCYTE [DISTWIDTH] IN BLOOD BY AUTOMATED COUNT: 15.9 % (ref 11.6–15.1)
GFR SERPL CREATININE-BSD FRML MDRD: 58 ML/MIN/1.73SQ M
GLUCOSE SERPL-MCNC: 71 MG/DL (ref 65–140)
GLUCOSE UR STRIP-MCNC: NEGATIVE MG/DL
HCT VFR BLD AUTO: 34.7 % (ref 36.5–49.3)
HGB BLD-MCNC: 10.9 G/DL (ref 12–17)
HGB UR QL STRIP.AUTO: ABNORMAL
HYALINE CASTS #/AREA URNS LPF: ABNORMAL /LPF
KETONES UR STRIP-MCNC: NEGATIVE MG/DL
LEUKOCYTE ESTERASE UR QL STRIP: ABNORMAL
LYMPHOCYTES # BLD AUTO: 1.12 THOUSAND/UL (ref 0.6–4.47)
LYMPHOCYTES # BLD AUTO: 13 % (ref 14–44)
MACROCYTES BLD QL AUTO: PRESENT
MCH RBC QN AUTO: 32.6 PG (ref 26.8–34.3)
MCHC RBC AUTO-ENTMCNC: 31.4 G/DL (ref 31.4–37.4)
MCV RBC AUTO: 104 FL (ref 82–98)
MONOCYTES # BLD AUTO: 1.12 THOUSAND/UL (ref 0–1.22)
MONOCYTES NFR BLD: 13 % (ref 4–12)
NEUTROPHILS # BLD MANUAL: 6.18 THOUSAND/UL (ref 1.85–7.62)
NEUTS SEG NFR BLD AUTO: 72 % (ref 43–75)
NITRITE UR QL STRIP: POSITIVE
NON-SQ EPI CELLS URNS QL MICRO: ABNORMAL /HPF
PH UR STRIP.AUTO: 7 [PH]
PLATELET # BLD AUTO: 255 THOUSANDS/UL (ref 149–390)
PLATELET BLD QL SMEAR: ADEQUATE
PMV BLD AUTO: 10.7 FL (ref 8.9–12.7)
POTASSIUM SERPL-SCNC: 4.8 MMOL/L (ref 3.5–5.3)
PROT UR STRIP-MCNC: ABNORMAL MG/DL
RBC # BLD AUTO: 3.34 MILLION/UL (ref 3.88–5.62)
RBC #/AREA URNS AUTO: ABNORMAL /HPF
RBC MORPH BLD: PRESENT
SODIUM SERPL-SCNC: 136 MMOL/L (ref 135–147)
SP GR UR STRIP.AUTO: 1.01 (ref 1–1.03)
UROBILINOGEN UR STRIP-ACNC: <2 MG/DL
WBC # BLD AUTO: 8.59 THOUSAND/UL (ref 4.31–10.16)
WBC #/AREA URNS AUTO: ABNORMAL /HPF
WBC CLUMPS # UR AUTO: PRESENT /UL

## 2023-10-02 PROCEDURE — 80048 BASIC METABOLIC PNL TOTAL CA: CPT

## 2023-10-02 PROCEDURE — 36415 COLL VENOUS BLD VENIPUNCTURE: CPT

## 2023-10-02 PROCEDURE — G0151 HHCP-SERV OF PT,EA 15 MIN: HCPCS

## 2023-10-02 PROCEDURE — 81001 URINALYSIS AUTO W/SCOPE: CPT

## 2023-10-02 PROCEDURE — 85007 BL SMEAR W/DIFF WBC COUNT: CPT

## 2023-10-02 PROCEDURE — 85027 COMPLETE CBC AUTOMATED: CPT

## 2023-10-03 ENCOUNTER — HOME CARE VISIT (OUTPATIENT)
Dept: HOME HEALTH SERVICES | Facility: HOME HEALTHCARE | Age: 83
End: 2023-10-03
Payer: MEDICARE

## 2023-10-03 ENCOUNTER — TELEPHONE (OUTPATIENT)
Dept: FAMILY MEDICINE CLINIC | Facility: CLINIC | Age: 83
End: 2023-10-03

## 2023-10-03 PROCEDURE — G0152 HHCP-SERV OF OT,EA 15 MIN: HCPCS

## 2023-10-03 NOTE — TELEPHONE ENCOUNTER
Patient's wife called regarding his BP. His numbers have been stable over the past week 110-120/56-60.

## 2023-10-03 NOTE — TELEPHONE ENCOUNTER
Yes, UA was abnormal and culture pending     CBC showed lower hemoglobin again- was 11.7 and now 10. 9. any signs of bleeding? BMP stable- renal function improved.  Potassium normal.

## 2023-10-03 NOTE — TELEPHONE ENCOUNTER
Patient's wife is aware. There are no signs of bleeding that she is aware of although he has bruising on his arms without any injury or trauma.

## 2023-10-04 ENCOUNTER — HOME CARE VISIT (OUTPATIENT)
Dept: HOME HEALTH SERVICES | Facility: HOME HEALTHCARE | Age: 83
End: 2023-10-04
Payer: MEDICARE

## 2023-10-04 VITALS — SYSTOLIC BLOOD PRESSURE: 98 MMHG | DIASTOLIC BLOOD PRESSURE: 48 MMHG

## 2023-10-04 PROCEDURE — G0151 HHCP-SERV OF PT,EA 15 MIN: HCPCS

## 2023-10-05 ENCOUNTER — APPOINTMENT (OUTPATIENT)
Dept: LAB | Facility: HOSPITAL | Age: 83
End: 2023-10-05
Payer: MEDICARE

## 2023-10-05 ENCOUNTER — HOME CARE VISIT (OUTPATIENT)
Dept: HOME HEALTH SERVICES | Facility: HOME HEALTHCARE | Age: 83
End: 2023-10-05
Payer: MEDICARE

## 2023-10-05 DIAGNOSIS — R79.89 HYPOURICEMIA: ICD-10-CM

## 2023-10-05 PROCEDURE — 87186 SC STD MICRODIL/AGAR DIL: CPT

## 2023-10-05 PROCEDURE — 87086 URINE CULTURE/COLONY COUNT: CPT

## 2023-10-05 PROCEDURE — G0152 HHCP-SERV OF OT,EA 15 MIN: HCPCS

## 2023-10-05 PROCEDURE — 87077 CULTURE AEROBIC IDENTIFY: CPT

## 2023-10-06 ENCOUNTER — OFFICE VISIT (OUTPATIENT)
Dept: OBGYN CLINIC | Facility: CLINIC | Age: 83
End: 2023-10-06

## 2023-10-06 ENCOUNTER — APPOINTMENT (OUTPATIENT)
Dept: RADIOLOGY | Facility: CLINIC | Age: 83
End: 2023-10-06
Payer: MEDICARE

## 2023-10-06 ENCOUNTER — TELEPHONE (OUTPATIENT)
Dept: FAMILY MEDICINE CLINIC | Facility: CLINIC | Age: 83
End: 2023-10-06

## 2023-10-06 VITALS — DIASTOLIC BLOOD PRESSURE: 68 MMHG | SYSTOLIC BLOOD PRESSURE: 110 MMHG

## 2023-10-06 DIAGNOSIS — Z01.89 ENCOUNTER FOR LOWER EXTREMITY COMPARISON IMAGING STUDY: ICD-10-CM

## 2023-10-06 DIAGNOSIS — S82.61XD CLOSED DISPLACED FRACTURE OF LATERAL MALLEOLUS OF RIGHT FIBULA WITH ROUTINE HEALING: Primary | ICD-10-CM

## 2023-10-06 DIAGNOSIS — S82.61XD CLOSED DISPLACED FRACTURE OF LATERAL MALLEOLUS OF RIGHT FIBULA WITH ROUTINE HEALING: ICD-10-CM

## 2023-10-06 DIAGNOSIS — N30.00 ACUTE CYSTITIS WITHOUT HEMATURIA: Primary | ICD-10-CM

## 2023-10-06 PROCEDURE — 99024 POSTOP FOLLOW-UP VISIT: CPT | Performed by: ORTHOPAEDIC SURGERY

## 2023-10-06 PROCEDURE — 73600 X-RAY EXAM OF ANKLE: CPT

## 2023-10-06 PROCEDURE — 73610 X-RAY EXAM OF ANKLE: CPT

## 2023-10-06 RX ORDER — CEPHALEXIN 500 MG/1
500 CAPSULE ORAL 3 TIMES DAILY
Qty: 21 CAPSULE | Refills: 0 | Status: SHIPPED | OUTPATIENT
Start: 2023-10-06 | End: 2023-10-13

## 2023-10-06 NOTE — PROGRESS NOTES
Loly Gonzalez M.D. Attending, Orthopaedic Surgery  Foot and [facility]      ORTHOPAEDIC FOOT AND ANKLE CLINIC VISIT     Assessment:     Encounter Diagnoses   Name Primary? • Closed displaced fracture of lateral malleolus of right fibula with routine healing Yes   • Encounter for lower extremity comparison imaging study             Plan:   · The patient verbalized understanding of exam findings and treatment plan. We engaged in the shared decision-making process and treatment options were discussed at length with the patient. Surgical and conservative management discussed today along with risks and benefits. · He is now 6 weeks out from his injury and his fracture maintains alignment on x-rays today  · We will allow him to weight-bear in a cam boot for 2 weeks and then he may begin weaning the cam boot. Protocol was given. Return in about 6 weeks (around 11/17/2023). DOI 8/24/23  History of Present Illness:   Chief Complaint: Right ankle fractures    Chantelle Jimenez is a 79 y.o. male who is being seen in follow-up for Right ankle fracture. When we last saw he we recommended weightbearing as tolerated in a cast.  Pain has  improved. He tolerated the cast without issue. He is taking aspirin 81 mg daily for DVT ppx.      Pain/symptom timing:  Worse during the day when active  Pain/symptom context:  Worse with activites and work  Pain/symptom modifying factors:  Rest makes better, activities make worse  Pain/symptom associated signs/symptoms: none    Prior treatment   · NSAIDsNo   · Injections No   · Bracing/Orthotics Yes    · Physical Therapy No     Orthopedic Surgical History:   See below    Past Medical, Surgical and Social History:  Past Medical History:  has a past medical history of Astigmatism, Cataract, Gross hematuria, Hematuria, Kidney stones, Lumbar vertebral fracture ([address]), Prostate cancer ([address]), Renal calculi, Squamous cell carcinoma, and Vitamin D deficiency. Problem List: does not have any pertinent problems on file. Past Surgical History:  has a past surgical history that includes Colonoscopy; Cystoscopy (01/28/2016); Elbow surgery; Retropubic prostatectomy (11/13/2001); Lithotripsy; Hernia repair (2002); Cataract extraction (Left, 08/26/2019); Cataract extraction (Right, 08/12/2019); Breast excisional biopsy (N/A); IR kyphoplasty/vertebroplasty (1/4/2023); IR kyphoplasty/vertebroplasty (2/6/2023); and IR kyphoplasty/vertebroplasty (3/23/2023). Family History: family history includes Heart failure (age of onset: 70) in his father; Hypertension in his brother, family, father, and mother; Other (age of onset: 76) in his mother; Peripheral vascular disease in his father; Prostate cancer in his brother; Stroke (age of onset: 48) in his brother. Social History:  reports that he has never smoked. He has never used smokeless tobacco. He reports that he does not currently use alcohol after a past usage of about 4.0 standard drinks of alcohol per week. He reports that he does not use drugs. Current Medications: has a current medication list which includes the following prescription(s): acetaminophen, amitriptyline, aspirin, calcium citrate, cholecalciferol, docusate sodium, methocarbamol, metoprolol succinate, myrbetriq, and verapamil, and the following Facility-Administered Medications: denosumab. Allergies: is allergic to sulfa antibiotics, oxycodone, ciprofloxacin, gabapentin, lyrica [pregabalin], and medical tape.      Review of Systems:  General- denies fever/chills  HEENT- denies hearing loss or sore throat  Eyes- denies eye pain or visual disturbances, denies red eyes  Respiratory- denies cough or SOB  Cardio- denies chest pain or palpitations  GI- denies abdominal pain  Endocrine- denies urinary frequency  Urinary- denies pain with urination  Musculoskeletal- Negative except noted above  Skin- denies rashes or wounds  Neurological- denies dizziness or headache  Psychiatric- denies anxiety or difficulty concentrating    Physical Exam:   /68 (BP Location: Left arm, Patient Position: Supine)   General/Constitutional: No apparent distress: well-nourished and well developed. Eyes: normal ocular motion  Lymphatic: No appreciable lymphadenopathy  Respiratory: Non-labored breathing  Vascular: No edema, swelling or tenderness, except as noted in detailed exam.  Integumentary: No impressive skin lesions present, except as noted in detailed exam.  Neuro: No ataxia or tremors noted  Psych: Normal mood and affect, oriented to person, place and time. Appropriate affect. Musculoskeletal: Normal, except as noted in detailed exam and in HPI. Examination    Right    Gait Not assessed   Musculoskeletal Tender to palpation at lateral malleolus    Skin Normal.      Nails Normal    Range of Motion  Not assessed  Subtalar motion: not assessed    Stability Stable    Muscle Strength Not assessed due to fracture    Neurologic Normal    Sensation  Intact to light touch throughout sural, saphenous, superficial peroneal, deep peroneal and medial/lateral plantar nerve distributions. Union-Bianca 5.07 filament (10g) testing  deferred. Cardiovascular Brisk capillary refill < 2 seconds,intact DP and PT pulses    Special Tests None      Imaging Studies:   3 views of the Right ankle were obtained, reviewed and interpreted independently which demonstrate stable positioning of distal fibula fracture. Reviewed by me personally. Scribe Attestation      I,:  Anneliese Ferrer PA-C am acting as a scribe while in the presence of the attending physician.:       I,:  Dawit Coronado MD personally performed the services described in this documentation    as scribed in my presence.:                 Portia Sieve. Lachman, MD  Foot & Ankle Surgery   Department of [address] [place] [address]      I personally performed the service. Portia Sieve. Lachman, MD

## 2023-10-06 NOTE — PATIENT INSTRUCTIONS
2 weeks of weightbearing as tolerated in the CAM boot. You may begin weaning your boot and transitioning to a sneaker (10/20). It is important to do this gradually to avoid aggravating the healing process. 10/20, you may come out of the boot into a sneaker for 2 hours. 2. 10/21, you may come out of the boot into a sneaker for 4 hours,  3. The next day, you may come out of the boot into a sneaker for 6 hours. 4. Continue this (adding 2 hours per day) as you tolerate. For example, if you do 6 hours out of the boot into a sneaker and your foot swells more than usual at night and it is difficult to control the discomfort, do not advance to 8 hours the next day, stay at 6 hours until you are able to tolerate it. It is essential to follow this protocol and not modify this. No matter when you begin weaning the boot, it will be difficult and there will be swelling and soreness. If you spend more time than is recommended in the boot, this process becomes longer and more painful. Elevation, Ice and tylenol and staying off of it at night will be important to aide in this transition out of the boot. Swelling and soreness are normal as you begin to do more with the injured leg. May DC aspirin/lovenox, no longer needed. Start PT  Compression stocking (Knee high, 20-30mm Hg) to be worn at all times while awake. Recommend taking the following supplements: Vitamin D 4000 units per day and Calcium 1200 mg per day. This will help with bone healing.

## 2023-10-06 NOTE — TELEPHONE ENCOUNTER
Patients wife called asking for results of his urine culture, she asked if you can please look over them and let her know what is going on.

## 2023-10-07 LAB — BACTERIA UR CULT: ABNORMAL

## 2023-10-09 ENCOUNTER — HOME CARE VISIT (OUTPATIENT)
Dept: HOME HEALTH SERVICES | Facility: HOME HEALTHCARE | Age: 83
End: 2023-10-09
Payer: MEDICARE

## 2023-10-09 ENCOUNTER — TELEPHONE (OUTPATIENT)
Dept: FAMILY MEDICINE CLINIC | Facility: CLINIC | Age: 83
End: 2023-10-09

## 2023-10-09 PROCEDURE — G0151 HHCP-SERV OF PT,EA 15 MIN: HCPCS

## 2023-10-09 NOTE — TELEPHONE ENCOUNTER
Patient's BP this morning was 110/50, she did not give him the beta blocker - Metoprolol. Also she was told to call today to find out what time his home visit will be tomorrow.   Thank you

## 2023-10-10 ENCOUNTER — TELEMEDICINE (OUTPATIENT)
Dept: FAMILY MEDICINE CLINIC | Facility: CLINIC | Age: 83
End: 2023-10-10

## 2023-10-10 ENCOUNTER — HOME CARE VISIT (OUTPATIENT)
Dept: HOME HEALTH SERVICES | Facility: HOME HEALTHCARE | Age: 83
End: 2023-10-10
Payer: MEDICARE

## 2023-10-10 DIAGNOSIS — G89.29 CHRONIC BILATERAL LOW BACK PAIN WITHOUT SCIATICA: ICD-10-CM

## 2023-10-10 DIAGNOSIS — D50.9 IRON DEFICIENCY ANEMIA, UNSPECIFIED IRON DEFICIENCY ANEMIA TYPE: Primary | ICD-10-CM

## 2023-10-10 DIAGNOSIS — M54.50 CHRONIC BILATERAL LOW BACK PAIN WITHOUT SCIATICA: ICD-10-CM

## 2023-10-10 DIAGNOSIS — I49.3 PVC'S (PREMATURE VENTRICULAR CONTRACTIONS): Primary | ICD-10-CM

## 2023-10-10 DIAGNOSIS — I49.3 PVC'S (PREMATURE VENTRICULAR CONTRACTIONS): ICD-10-CM

## 2023-10-10 DIAGNOSIS — S82.891D CLOSED FRACTURE OF RIGHT ANKLE WITH ROUTINE HEALING: ICD-10-CM

## 2023-10-10 DIAGNOSIS — M48.062 SPINAL STENOSIS OF LUMBAR REGION WITH NEUROGENIC CLAUDICATION: ICD-10-CM

## 2023-10-10 DIAGNOSIS — I34.0 NON-RHEUMATIC MITRAL REGURGITATION: ICD-10-CM

## 2023-10-10 DIAGNOSIS — R26.2 AMBULATORY DYSFUNCTION: ICD-10-CM

## 2023-10-10 PROBLEM — IMO0002 SQUAMOUS CELL CARCINOMA: Status: ACTIVE | Noted: 2023-10-02

## 2023-10-10 PROBLEM — S32.009A FRACTURE OF LUMBAR SPINE (HCC): Status: ACTIVE | Noted: 2022-11-14

## 2023-10-10 PROCEDURE — G0152 HHCP-SERV OF OT,EA 15 MIN: HCPCS

## 2023-10-10 RX ORDER — AMITRIPTYLINE HYDROCHLORIDE 10 MG/1
10 TABLET, FILM COATED ORAL
Qty: 10 TABLET | Refills: 0 | Status: SHIPPED | OUTPATIENT
Start: 2023-10-10

## 2023-10-10 RX ORDER — METOPROLOL SUCCINATE 25 MG/1
25 TABLET, EXTENDED RELEASE ORAL DAILY
Qty: 90 TABLET | Refills: 0 | Status: SHIPPED | OUTPATIENT
Start: 2023-10-10

## 2023-10-11 ENCOUNTER — HOME CARE VISIT (OUTPATIENT)
Dept: HOME HEALTH SERVICES | Facility: HOME HEALTHCARE | Age: 83
End: 2023-10-11
Payer: MEDICARE

## 2023-10-11 VITALS — SYSTOLIC BLOOD PRESSURE: 116 MMHG | DIASTOLIC BLOOD PRESSURE: 58 MMHG

## 2023-10-11 PROCEDURE — G0151 HHCP-SERV OF PT,EA 15 MIN: HCPCS

## 2023-10-12 ENCOUNTER — HOME CARE VISIT (OUTPATIENT)
Dept: HOME HEALTH SERVICES | Facility: HOME HEALTHCARE | Age: 83
End: 2023-10-12
Payer: MEDICARE

## 2023-10-12 ENCOUNTER — TELEPHONE (OUTPATIENT)
Dept: FAMILY MEDICINE CLINIC | Facility: CLINIC | Age: 83
End: 2023-10-12

## 2023-10-12 VITALS — SYSTOLIC BLOOD PRESSURE: 98 MMHG | DIASTOLIC BLOOD PRESSURE: 56 MMHG

## 2023-10-12 PROCEDURE — G0152 HHCP-SERV OF OT,EA 15 MIN: HCPCS

## 2023-10-12 NOTE — TELEPHONE ENCOUNTER
I spoke with the patient's wife and she stated at 115 pm it was 132/57 after some caffeine coffee and chips, previously today they were 98/56 100/60 102/58 and 106/42. She stated it drops when he stands up and he gets dizzy, she stated he did break his ankle and is in a boot which is why she is concerned.  She stated he is also being treated for a UTI and he voided 7 times between 11 pm and 7 am.

## 2023-10-12 NOTE — TELEPHONE ENCOUNTER
The urinary frequency can happen with the UTI. If he is having any hematuria/fevers then would recommend ER. Recommend close monitoring of BP. I can't view note to see what med changes were made, but would recommend holding medication is BP < 110/60 and continue to check. Separate times he is taking the two different blood pressure medications. They're both daily so make sure it's one in AM and one in PM so they aren't at peak concentrations at the same time. Stay hydrated. If symptoms persisting please send to [facility] tomorrow for review since he most recently saw him.

## 2023-10-12 NOTE — TELEPHONE ENCOUNTER
I called and spoke with the patient's wife and made her aware she verbalized understanding and agreement with current plan. She stated Dr. Lavern Garcia did stop his Metoprolol and changed his Verapamil from 240 mg to 120 mg and he will take his first does of that tonight unless his blood pressure is too low. She stated if there are any changes or continuing of symptoms she will  call tomorrow.

## 2023-10-15 NOTE — PROGRESS NOTES
Assessment/Plan: Patient overall doing well. Home visit done today. Offered because he cannot ambulate well. Has been advanced to a cam boot from orthopedics. Home has first-floor bed for him. Has walker if needed. Eating well. Wife has been checking blood pressures which sometimes go orthostatic on him. Blood pressures range from 88 systolic to 898.  88 systolic was when he stood up. Therefore we discussed holding certain medications. Verapamil 240 will continue. Metoprolol is on hold for systolics less than 072. New prescription for 25 mg sent in and they can go down to 1/2 tablet which is 12.5 mg. Patient is due to start a Zio patch. Once we get those test results I can speak with cardiology to see what their recommendations is as we do not want him to continue orthostatic. Getting home PT and OT. Has tentative follow-up with us. If cannot make it into the office I will be happy to do another housecall. Does have mitral regurgitation which is being followed by cardiology. Patient mentions that his overall chronic low back pain has finally improved. He thinks this may be due to positioning in bed. Very nice family. Wonderful to take care of. Flu shots discussed. COVID vaccines discussed. Wife brought up issue of possible depression. He states that he does not feel he has this. Discussed concept as well as potential early dementia but overall he seems good. Discussed options down the line if mood ever became more of an issue. At this point hold off on medications. Also recommend cutting back on amitriptyline. New prescription for 10 mg written and if he is doing well with that can go off of it completely. No problem-specific Assessment & Plan notes found for this encounter.        Diagnoses and all orders for this visit:    PVC's (premature ventricular contractions)    Non-rheumatic mitral regurgitation    Chronic bilateral low back pain without sciatica    Ambulatory dysfunction    Closed fracture of right ankle with routine healing        1. PVC's (premature ventricular contractions)        2. Non-rheumatic mitral regurgitation        3. Chronic bilateral low back pain without sciatica        4. Ambulatory dysfunction        5. Closed fracture of right ankle with routine healing            Subjective:        Patient ID: Cruzito Sood is a 79 y.o. male. No chief complaint on file. 69-year-old male status post fracture right lower extremity. Sees Dr. Amadeo Horton from [facility] orthopedics. Home visit being done today due to patient's inability to ambulate well. Eating well. No issues with bowels or bladder. The following portions of the patient's history were reviewed and updated as appropriate: past medical history, past surgical history and problem list.      Review of Systems   Constitutional:  Negative for appetite change, fatigue, fever and unexpected weight change. HENT:  Negative for congestion, ear pain, postnasal drip, rhinorrhea, sinus pressure, sinus pain and sore throat. Eyes:  Negative for redness and visual disturbance. Respiratory:  Negative for chest tightness and shortness of breath. Cardiovascular:  Negative for chest pain, palpitations and leg swelling. Gastrointestinal:  Negative for abdominal distention, abdominal pain, diarrhea and nausea. Endocrine: Negative for cold intolerance and heat intolerance. Genitourinary:  Negative for dysuria and hematuria. Musculoskeletal:  Negative for arthralgias, gait problem and myalgias. Skin:  Negative for pallor and rash. Neurological:  Negative for dizziness, tremors, weakness, light-headedness and headaches. Psychiatric/Behavioral:  Negative for behavioral problems. The patient is not nervous/anxious. Objective: There were no vitals taken for this visit. Physical Exam  Vitals and nursing note reviewed. Constitutional:       General: He is not in acute distress. Appearance: Normal appearance. He is not diaphoretic. Comments: Looking very comfortable in chair. Cam boot on right foot   HENT:      Head: Normocephalic and atraumatic. Eyes:      General: No scleral icterus. Conjunctiva/sclera: Conjunctivae normal.      Pupils: Pupils are equal, round, and reactive to light. Neck:      Thyroid: No thyromegaly. Cardiovascular:      Rate and Rhythm: Normal rate and regular rhythm. Pulses:           Carotid pulses are 0 on the right side and 0 on the left side. Heart sounds: Normal heart sounds. No murmur heard. Pulmonary:      Effort: Pulmonary effort is normal. No respiratory distress. Breath sounds: Normal breath sounds. No wheezing. Abdominal:      General: There is no distension. Musculoskeletal:      Cervical back: Normal range of motion and neck supple. Right lower leg: No edema. Left lower leg: No edema. Lymphadenopathy:      Cervical: No cervical adenopathy. Skin:     General: Skin is warm. Coloration: Skin is not pale. Neurological:      General: No focal deficit present. Mental Status: He is alert and oriented to person, place, and time. Mental status is at baseline. Cranial Nerves: No cranial nerve deficit. Deep Tendon Reflexes: Reflexes are normal and symmetric. Psychiatric:         Mood and Affect: Mood normal.         Behavior: Behavior normal.         Thought Content:  Thought content normal.         Judgment: Judgment normal.

## 2023-10-16 ENCOUNTER — HOME CARE VISIT (OUTPATIENT)
Dept: HOME HEALTH SERVICES | Facility: HOME HEALTHCARE | Age: 83
End: 2023-10-16
Payer: MEDICARE

## 2023-10-16 VITALS — DIASTOLIC BLOOD PRESSURE: 64 MMHG | SYSTOLIC BLOOD PRESSURE: 138 MMHG

## 2023-10-16 PROCEDURE — G0151 HHCP-SERV OF PT,EA 15 MIN: HCPCS

## 2023-10-17 ENCOUNTER — HOME CARE VISIT (OUTPATIENT)
Dept: HOME HEALTH SERVICES | Facility: HOME HEALTHCARE | Age: 83
End: 2023-10-17
Payer: MEDICARE

## 2023-10-17 PROCEDURE — G0152 HHCP-SERV OF OT,EA 15 MIN: HCPCS

## 2023-10-18 ENCOUNTER — HOME CARE VISIT (OUTPATIENT)
Dept: HOME HEALTH SERVICES | Facility: HOME HEALTHCARE | Age: 83
End: 2023-10-18
Payer: MEDICARE

## 2023-10-18 ENCOUNTER — TELEPHONE (OUTPATIENT)
Dept: FAMILY MEDICINE CLINIC | Facility: CLINIC | Age: 83
End: 2023-10-18

## 2023-10-18 VITALS — DIASTOLIC BLOOD PRESSURE: 68 MMHG | SYSTOLIC BLOOD PRESSURE: 132 MMHG

## 2023-10-18 PROCEDURE — G0151 HHCP-SERV OF PT,EA 15 MIN: HCPCS

## 2023-10-18 NOTE — TELEPHONE ENCOUNTER
Pt's wife called to inform you that pt's blood pressure has been maintained 108/51 between 130/53. His pulse has been 59-62. He has not been taking Metoprolol 250 mg since you saw pt last week . Pt's wife is giving him amytriptiline 10 mg and they have 6 pills left. She is wondering if she needs to give him one every other day to wean him off the medication. Pt is not having any pain at this time. Please advise.  Thanks

## 2023-10-19 ENCOUNTER — HOME CARE VISIT (OUTPATIENT)
Dept: HOME HEALTH SERVICES | Facility: HOME HEALTHCARE | Age: 83
End: 2023-10-19
Payer: MEDICARE

## 2023-10-19 VITALS — HEART RATE: 60 BPM | DIASTOLIC BLOOD PRESSURE: 56 MMHG | SYSTOLIC BLOOD PRESSURE: 108 MMHG

## 2023-10-19 PROCEDURE — G0152 HHCP-SERV OF OT,EA 15 MIN: HCPCS

## 2023-10-19 NOTE — TELEPHONE ENCOUNTER
I called and left the patient's wife a detailed message making her aware, I advised she give the office a call with any questions

## 2023-10-23 ENCOUNTER — TELEPHONE (OUTPATIENT)
Dept: FAMILY MEDICINE CLINIC | Facility: CLINIC | Age: 83
End: 2023-10-23

## 2023-10-23 DIAGNOSIS — R82.90 FOUL SMELLING URINE: Primary | ICD-10-CM

## 2023-10-23 NOTE — TELEPHONE ENCOUNTER
Pt's wife called and states that pt has foul smelling urine. Pt does not have burning or pain w/ urination but has frequency. She is requesting a script so that she can take a urine sample to the lab. Please advise.  Thanks

## 2023-10-23 NOTE — TELEPHONE ENCOUNTER
Spoke w/ pt's wife and she will go to the lab and get the specimen cup and take it back it to the lab. They will call if any symptoms worsen.

## 2023-10-24 ENCOUNTER — APPOINTMENT (OUTPATIENT)
Dept: LAB | Facility: CLINIC | Age: 83
End: 2023-10-24
Payer: MEDICARE

## 2023-10-24 ENCOUNTER — HOME CARE VISIT (OUTPATIENT)
Dept: HOME HEALTH SERVICES | Facility: HOME HEALTHCARE | Age: 83
End: 2023-10-24
Payer: MEDICARE

## 2023-10-24 VITALS — DIASTOLIC BLOOD PRESSURE: 70 MMHG | SYSTOLIC BLOOD PRESSURE: 110 MMHG

## 2023-10-24 DIAGNOSIS — R82.90 FOUL SMELLING URINE: Primary | ICD-10-CM

## 2023-10-24 LAB
BILIRUB UR QL STRIP: NEGATIVE
CLARITY UR: CLEAR
COLOR UR: NORMAL
GLUCOSE UR STRIP-MCNC: NEGATIVE MG/DL
HGB UR QL STRIP.AUTO: NEGATIVE
KETONES UR STRIP-MCNC: NEGATIVE MG/DL
LEUKOCYTE ESTERASE UR QL STRIP: NEGATIVE
NITRITE UR QL STRIP: NEGATIVE
PH UR STRIP.AUTO: 7 [PH]
PROT UR STRIP-MCNC: NEGATIVE MG/DL
SP GR UR STRIP.AUTO: 1.01 (ref 1–1.03)
UROBILINOGEN UR STRIP-ACNC: <2 MG/DL

## 2023-10-24 PROCEDURE — 81003 URINALYSIS AUTO W/O SCOPE: CPT

## 2023-10-24 PROCEDURE — G0152 HHCP-SERV OF OT,EA 15 MIN: HCPCS

## 2023-10-25 ENCOUNTER — TELEPHONE (OUTPATIENT)
Dept: FAMILY MEDICINE CLINIC | Facility: CLINIC | Age: 83
End: 2023-10-25

## 2023-10-25 ENCOUNTER — HOME CARE VISIT (OUTPATIENT)
Dept: HOME HEALTH SERVICES | Facility: HOME HEALTHCARE | Age: 83
End: 2023-10-25
Payer: MEDICARE

## 2023-10-25 PROCEDURE — G0151 HHCP-SERV OF PT,EA 15 MIN: HCPCS

## 2023-10-25 NOTE — TELEPHONE ENCOUNTER
**Patient's wife called with a few BP readings. He hasn't taken any metoprolol this last week. His Pulse has been around 60-70.     10/20 am 97/67 noon 108/60    10/21 am 117/52 noon 129/78    10/22 am 94/45 noon 108/52 pm 129/78    10/23 pm 148/77    10/24 pm 128/63      **Patient also needs a referral place for PT at [facility].  Fax to [phone] when complete

## 2023-10-26 ENCOUNTER — HOME CARE VISIT (OUTPATIENT)
Dept: HOME HEALTH SERVICES | Facility: HOME HEALTHCARE | Age: 83
End: 2023-10-26
Payer: MEDICARE

## 2023-10-26 ENCOUNTER — CLINICAL SUPPORT (OUTPATIENT)
Dept: CARDIOLOGY CLINIC | Facility: CLINIC | Age: 83
End: 2023-10-26
Payer: MEDICARE

## 2023-10-26 VITALS — DIASTOLIC BLOOD PRESSURE: 60 MMHG | SYSTOLIC BLOOD PRESSURE: 118 MMHG

## 2023-10-26 DIAGNOSIS — D50.9 IRON DEFICIENCY ANEMIA, UNSPECIFIED IRON DEFICIENCY ANEMIA TYPE: ICD-10-CM

## 2023-10-26 DIAGNOSIS — I49.3 PVC'S (PREMATURE VENTRICULAR CONTRACTIONS): ICD-10-CM

## 2023-10-26 DIAGNOSIS — M79.661 RIGHT CALF PAIN: ICD-10-CM

## 2023-10-26 PROCEDURE — G0152 HHCP-SERV OF OT,EA 15 MIN: HCPCS

## 2023-10-26 PROCEDURE — 93242 EXT ECG>48HR<7D RECORDING: CPT | Performed by: INTERNAL MEDICINE

## 2023-10-27 ENCOUNTER — HOME CARE VISIT (OUTPATIENT)
Dept: HOME HEALTH SERVICES | Facility: HOME HEALTHCARE | Age: 83
End: 2023-10-27
Payer: MEDICARE

## 2023-10-27 VITALS
DIASTOLIC BLOOD PRESSURE: 70 MMHG | SYSTOLIC BLOOD PRESSURE: 128 MMHG | OXYGEN SATURATION: 98 % | HEART RATE: 76 BPM | RESPIRATION RATE: 20 BRPM

## 2023-10-27 PROCEDURE — G0151 HHCP-SERV OF PT,EA 15 MIN: HCPCS

## 2023-10-30 ENCOUNTER — TELEPHONE (OUTPATIENT)
Dept: CARDIOLOGY CLINIC | Facility: CLINIC | Age: 83
End: 2023-10-30

## 2023-10-30 ENCOUNTER — HOME CARE VISIT (OUTPATIENT)
Dept: HOME HEALTH SERVICES | Facility: HOME HEALTHCARE | Age: 83
End: 2023-10-30
Payer: MEDICARE

## 2023-10-30 PROCEDURE — G0151 HHCP-SERV OF PT,EA 15 MIN: HCPCS

## 2023-10-30 NOTE — TELEPHONE ENCOUNTER
Marleny, this is Poornima Means. I'm calling on behalf of my , Jazmín Moody. His birth date is [id number]. We received a message from Jose F that he wanted to go over the heart monitor results with my . I we would really appreciate a call back. Thank you.

## 2023-10-30 NOTE — TELEPHONE ENCOUNTER
Called and spoke to wife and patient, will place result note under ZIO results.  Thank you for taking their call

## 2023-10-31 ENCOUNTER — HOME CARE VISIT (OUTPATIENT)
Dept: HOME HEALTH SERVICES | Facility: HOME HEALTHCARE | Age: 83
End: 2023-10-31
Payer: MEDICARE

## 2023-10-31 VITALS — OXYGEN SATURATION: 98 % | DIASTOLIC BLOOD PRESSURE: 68 MMHG | SYSTOLIC BLOOD PRESSURE: 138 MMHG | HEART RATE: 67 BPM

## 2023-10-31 PROCEDURE — G0152 HHCP-SERV OF OT,EA 15 MIN: HCPCS

## 2023-11-01 ENCOUNTER — TELEPHONE (OUTPATIENT)
Dept: FAMILY MEDICINE CLINIC | Facility: CLINIC | Age: 83
End: 2023-11-01

## 2023-11-01 ENCOUNTER — HOME CARE VISIT (OUTPATIENT)
Dept: HOME HEALTH SERVICES | Facility: HOME HEALTHCARE | Age: 83
End: 2023-11-01
Payer: MEDICARE

## 2023-11-01 DIAGNOSIS — R39.9 UTI SYMPTOMS: Primary | ICD-10-CM

## 2023-11-01 PROCEDURE — G0151 HHCP-SERV OF PT,EA 15 MIN: HCPCS

## 2023-11-01 NOTE — TELEPHONE ENCOUNTER
Seamus Mendes, Pt.'s spouse, Nuria Hampton, phoned to report urine in pt.'s urinal this am was "almost black" with "small blood clots",but urine in second urinal was "fine."

## 2023-11-02 ENCOUNTER — TELEPHONE (OUTPATIENT)
Dept: FAMILY MEDICINE CLINIC | Facility: CLINIC | Age: 83
End: 2023-11-02

## 2023-11-02 ENCOUNTER — APPOINTMENT (OUTPATIENT)
Dept: LAB | Facility: CLINIC | Age: 83
End: 2023-11-02
Payer: MEDICARE

## 2023-11-02 ENCOUNTER — HOME CARE VISIT (OUTPATIENT)
Dept: HOME HEALTH SERVICES | Facility: HOME HEALTHCARE | Age: 83
End: 2023-11-02
Payer: MEDICARE

## 2023-11-02 VITALS — DIASTOLIC BLOOD PRESSURE: 60 MMHG | OXYGEN SATURATION: 99 % | SYSTOLIC BLOOD PRESSURE: 120 MMHG | HEART RATE: 58 BPM

## 2023-11-02 DIAGNOSIS — R26.2 AMBULATORY DYSFUNCTION: Primary | ICD-10-CM

## 2023-11-02 DIAGNOSIS — R39.9 UTI SYMPTOMS: Primary | ICD-10-CM

## 2023-11-02 DIAGNOSIS — S82.891D CLOSED FRACTURE OF RIGHT ANKLE WITH ROUTINE HEALING: ICD-10-CM

## 2023-11-02 LAB
BACTERIA UR QL AUTO: ABNORMAL /HPF
BILIRUB UR QL STRIP: NEGATIVE
CLARITY UR: CLEAR
COLOR UR: ABNORMAL
GLUCOSE UR STRIP-MCNC: NEGATIVE MG/DL
HGB UR QL STRIP.AUTO: NEGATIVE
HYALINE CASTS #/AREA URNS LPF: ABNORMAL /LPF
KETONES UR STRIP-MCNC: NEGATIVE MG/DL
LEUKOCYTE ESTERASE UR QL STRIP: NEGATIVE
MUCOUS THREADS UR QL AUTO: ABNORMAL
NITRITE UR QL STRIP: NEGATIVE
NON-SQ EPI CELLS URNS QL MICRO: ABNORMAL /HPF
PH UR STRIP.AUTO: 6 [PH]
PROT UR STRIP-MCNC: ABNORMAL MG/DL
RBC #/AREA URNS AUTO: ABNORMAL /HPF
SP GR UR STRIP.AUTO: 1.02 (ref 1–1.03)
UROBILINOGEN UR STRIP-ACNC: <2 MG/DL
WBC #/AREA URNS AUTO: ABNORMAL /HPF

## 2023-11-02 PROCEDURE — 81001 URINALYSIS AUTO W/SCOPE: CPT

## 2023-11-02 PROCEDURE — G0152 HHCP-SERV OF OT,EA 15 MIN: HCPCS

## 2023-11-02 NOTE — TELEPHONE ENCOUNTER
Dwight Larose with [facility] is requesting a new script for patient to get physical therapy. Please fax the script to [phone]. Any questions Dwight Larose can be reached at [phone].

## 2023-11-03 NOTE — TELEPHONE ENCOUNTER
Faxed the referral over to the number provided by [facility] [place].

## 2023-11-07 ENCOUNTER — TELEPHONE (OUTPATIENT)
Dept: LAB | Facility: HOSPITAL | Age: 83
End: 2023-11-07

## 2023-11-07 ENCOUNTER — TELEPHONE (OUTPATIENT)
Dept: FAMILY MEDICINE CLINIC | Facility: CLINIC | Age: 83
End: 2023-11-07

## 2023-11-07 DIAGNOSIS — M48.062 SPINAL STENOSIS OF LUMBAR REGION WITH NEUROGENIC CLAUDICATION: ICD-10-CM

## 2023-11-07 RX ORDER — AMITRIPTYLINE HYDROCHLORIDE 25 MG/1
25 TABLET, FILM COATED ORAL
Qty: 30 TABLET | Refills: 1 | Status: SHIPPED | OUTPATIENT
Start: 2023-11-07

## 2023-11-07 NOTE — TELEPHONE ENCOUNTER
Patients wife called stating that Sherry Morelos is no longer taking Amitriptyline and his back pain has returned. Would he be able to go back on this medication?      She stated that they are coming in tomorrow at 2pm for their flu shots      Please advise

## 2023-11-08 ENCOUNTER — IMMUNIZATIONS (OUTPATIENT)
Dept: FAMILY MEDICINE CLINIC | Facility: CLINIC | Age: 83
End: 2023-11-08
Payer: MEDICARE

## 2023-11-08 DIAGNOSIS — Z23 ENCOUNTER FOR IMMUNIZATION: Primary | ICD-10-CM

## 2023-11-08 PROCEDURE — G0008 ADMIN INFLUENZA VIRUS VAC: HCPCS

## 2023-11-08 PROCEDURE — 90662 IIV NO PRSV INCREASED AG IM: CPT

## 2023-11-13 ENCOUNTER — HOSPITAL ENCOUNTER (EMERGENCY)
Facility: HOSPITAL | Age: 83
Discharge: HOME | End: 2023-11-13
Attending: EMERGENCY MEDICINE
Payer: MEDICARE

## 2023-11-13 ENCOUNTER — APPOINTMENT (EMERGENCY)
Dept: CT IMAGING | Facility: HOSPITAL | Age: 83
End: 2023-11-13
Payer: MEDICARE

## 2023-11-13 ENCOUNTER — APPOINTMENT (OUTPATIENT)
Dept: LAB | Facility: HOSPITAL | Age: 83
End: 2023-11-13
Payer: MEDICARE

## 2023-11-13 VITALS
OXYGEN SATURATION: 100 % | DIASTOLIC BLOOD PRESSURE: 60 MMHG | WEIGHT: 154.98 LBS | RESPIRATION RATE: 16 BRPM | HEART RATE: 75 BPM | BODY MASS INDEX: 22.24 KG/M2 | SYSTOLIC BLOOD PRESSURE: 136 MMHG | TEMPERATURE: 97.9 F

## 2023-11-13 DIAGNOSIS — S09.90XA INJURY OF HEAD, INITIAL ENCOUNTER: Primary | ICD-10-CM

## 2023-11-13 DIAGNOSIS — S51.011A SKIN TEAR OF RIGHT ELBOW WITHOUT COMPLICATION, INITIAL ENCOUNTER: ICD-10-CM
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
## 2023-11-13 DIAGNOSIS — D50.9 IRON DEFICIENCY ANEMIA, UNSPECIFIED IRON DEFICIENCY ANEMIA TYPE: ICD-10-CM

## 2023-11-13 LAB
BASOPHILS # BLD AUTO: 0.03 THOUSANDS/ÂΜL (ref 0–0.1)
BASOPHILS NFR BLD AUTO: 0 % (ref 0–1)
EOSINOPHIL # BLD AUTO: 0.18 THOUSAND/ÂΜL (ref 0–0.61)
EOSINOPHIL NFR BLD AUTO: 3 % (ref 0–6)
ERYTHROCYTE [DISTWIDTH] IN BLOOD BY AUTOMATED COUNT: 13.3 % (ref 11.6–15.1)
FERRITIN SERPL-MCNC: 140 NG/ML (ref 24–336)
HCT VFR BLD AUTO: 35.7 % (ref 36.5–49.3)
HGB BLD-MCNC: 11.1 G/DL (ref 12–17)
IMM GRANULOCYTES # BLD AUTO: 0.08 THOUSAND/UL (ref 0–0.2)
IMM GRANULOCYTES NFR BLD AUTO: 1 % (ref 0–2)
LYMPHOCYTES # BLD AUTO: 1.38 THOUSANDS/ÂΜL (ref 0.6–4.47)
LYMPHOCYTES NFR BLD AUTO: 21 % (ref 14–44)
MCH RBC QN AUTO: 32.4 PG (ref 26.8–34.3)
MCHC RBC AUTO-ENTMCNC: 31.1 G/DL (ref 31.4–37.4)
MCV RBC AUTO: 104 FL (ref 82–98)
MONOCYTES # BLD AUTO: 0.68 THOUSAND/ÂΜL (ref 0.17–1.22)
MONOCYTES NFR BLD AUTO: 10 % (ref 4–12)
NEUTROPHILS # BLD AUTO: 4.33 THOUSANDS/ÂΜL (ref 1.85–7.62)
NEUTS SEG NFR BLD AUTO: 65 % (ref 43–75)
NRBC BLD AUTO-RTO: 0 /100 WBCS
PLATELET # BLD AUTO: 193 THOUSANDS/UL (ref 149–390)
PMV BLD AUTO: 11.7 FL (ref 8.9–12.7)
RBC # BLD AUTO: 3.43 MILLION/UL (ref 3.88–5.62)
WBC # BLD AUTO: 6.68 THOUSAND/UL (ref 4.31–10.16)

## 2023-11-13 PROCEDURE — 72125 CT NECK SPINE W/O DYE: CPT

## 2023-11-13 PROCEDURE — 99285 EMERGENCY DEPT VISIT HI MDM: CPT

## 2023-11-13 PROCEDURE — 99284 EMERGENCY DEPT VISIT MOD MDM: CPT | Performed by: EMERGENCY MEDICINE

## 2023-11-13 PROCEDURE — G1004 CDSM NDSC: HCPCS

## 2023-11-13 PROCEDURE — 70450 CT HEAD/BRAIN W/O DYE: CPT

## 2023-11-13 PROCEDURE — 85025 COMPLETE CBC W/AUTO DIFF WBC: CPT

## 2023-11-13 RX ORDER — GINSENG 100 MG
1 CAPSULE ORAL ONCE
Status: COMPLETED | OUTPATIENT
Start: 2023-11-13 | End: 2023-11-13

## 2023-11-13 RX ADMIN — BACITRACIN ZINC 1 SMALL APPLICATION: 500 OINTMENT TOPICAL at 20:13

## 2023-11-14 NOTE — ED PROVIDER NOTES
History  Chief Complaint   Patient presents with    Fall     Pt arrived to ER via POV c/o having experienced a fall from a standing position because he "tripped" striking his head on the ground but did not loose consciousness. He takes "no blood thinners." He voices no pain from fall. 76-year-old male presents with valuation of injury sustained after he fell backwards down 2 steps when walking into his house from the garage. This was witnessed by his wife and she states that there was no loss of consciousness, no seizure activity no vomiting. The patient is without complaint at this time aside from a small skin tear to his right elbow. Patient denies headache. Patient denies any preceding lightheadedness dizziness chest pain or shortness of breath. The patient denies any numbness or tingling. Prior to Admission Medications   Prescriptions Last Dose Informant Patient Reported? Taking? Myrbetriq 25 MG TB24   No No   Sig: TAKE ONE TABLET BY MOUTH EVERY DAY IN THE MORNING   acetaminophen (TYLENOL) 325 mg tablet   No No   Sig: Take 3 tablets (975 mg total) by mouth every 6 (six) hours   Patient taking differently: Take 3 tablets by mouth every 6 (six) hours. wife giving 650mg 2 tabs in the am and 500mg at bedtime  Indications: Fever, Headache, Mild Pain   amitriptyline (ELAVIL) 25 mg tablet   No No   Sig: Take 1 tablet (25 mg total) by mouth daily at bedtime   calcium citrate (CALCITRATE) 950 (200 Ca) MG tablet  Spouse/Significant Other Yes No   Sig: Take 1 tablet by mouth daily 1,200 mg daily   cholecalciferol (VITAMIN D3) 1,000 units tablet  Spouse/Significant Other Yes No   Sig: Take 3,000 Units by mouth daily 2,000 IU  daily   docusate sodium (Colace) 100 mg capsule  Spouse/Significant Other No No   Sig: Take 1 capsule (100 mg total) by mouth 2 (two) times a day   Patient taking differently: Take 100 mg by mouth 2 (two) times a day.  pt uses as needed only  Indications: Constipation   metoprolol succinate (TOPROL-XL) 25 mg 24 hr tablet   No No   Sig: Take 1 tablet (25 mg total) by mouth daily   verapamil (CALAN-SR) 240 mg CR tablet   No No   Sig: TAKE 1 TABLET BY MOUTH DAILY AT BEDTIME      Facility-Administered Medications Last Administration Doses Remaining   denosumab (PROLIA) subcutaneous injection 60 mg 9/26/2023  2:42 PM           Past Medical History:   Diagnosis Date    Astigmatism     last assessed 11/15/17    Cataract     Resolved 11/15/17    Gross hematuria     Last assessed 01/28/16    Hematuria     last assessed 01/28/16    Kidney stones     Patient states he has kidney stonesin esch kidney    Lumbar vertebral fracture ([address])     Prostate cancer ([address])     last assessed 05/18/17    Renal calculi     last assessed 08/16/12    Squamous cell carcinoma     Vitamin D deficiency     Last assessed 08/19/13       Past Surgical History:   Procedure Laterality Date    BREAST EXCISIONAL BIOPSY N/A     Benign breast tumor unsure of side over 50 yrs ago    CATARACT EXTRACTION Left 08/26/2019    CATARACT EXTRACTION Right 08/12/2019    COLONOSCOPY      CYSTOSCOPY  01/28/2016    ELBOW SURGERY      Bone chip    HERNIA REPAIR  2002    IR KYPHOPLASTY/VERTEBROPLASTY  1/4/2023    IR KYPHOPLASTY/VERTEBROPLASTY  2/6/2023    IR KYPHOPLASTY/VERTEBROPLASTY  3/23/2023    LITHOTRIPSY      RETROPUBIC PROSTATECTOMY  11/13/2001    Radical with nerve sparing       Family History   Problem Relation Age of Onset    Other Mother 76        Influenza    Hypertension Mother     Heart failure Father 70    Peripheral vascular disease Father     Hypertension Father     Prostate cancer Brother     Stroke Brother 48    Hypertension Brother     Hypertension Family      I have reviewed and agree with the history as documented.     E-Cigarette/Vaping    E-Cigarette Use Never User      E-Cigarette/Vaping Substances    Nicotine No     THC No     CBD No     Flavoring No     Other No     Unknown No      Social History     Tobacco Use    Smoking status: Never    Smokeless tobacco: Never   Vaping Use    Vaping Use: Never used   Substance Use Topics    Alcohol use: Not Currently     Alcohol/week: 4.0 standard drinks of alcohol     Types: 4 Glasses of wine per week     Comment: social    Drug use: No       Review of Systems    Physical Exam  Physical Exam  Vitals and nursing note reviewed. Constitutional:       General: He is not in acute distress. Appearance: He is well-developed. HENT:      Head: Normocephalic and atraumatic. No raccoon eyes or Lewis's sign. Right Ear: External ear normal. No hemotympanum. Left Ear: External ear normal. No hemotympanum. Nose: No nasal deformity. Eyes:      Conjunctiva/sclera: Conjunctivae normal.      Pupils: Pupils are equal, round, and reactive to light. Neck:      Trachea: No tracheal deviation. Cardiovascular:      Rate and Rhythm: Normal rate and regular rhythm. Heart sounds: Normal heart sounds. No murmur heard. Pulmonary:      Effort: Pulmonary effort is normal. No respiratory distress. Breath sounds: Normal breath sounds. Chest:      Chest wall: No tenderness. Abdominal:      General: There is no distension. Palpations: Abdomen is soft. Tenderness: There is no abdominal tenderness. There is no guarding or rebound. Musculoskeletal:         General: No tenderness. Normal range of motion. Cervical back: Normal range of motion. Skin:     General: Skin is warm and dry. Findings: Abrasion (Right elbow) present. Neurological:      Mental Status: He is alert and oriented to person, place, and time. Deep Tendon Reflexes: Reflexes are normal and symmetric.          Vital Signs  ED Triage Vitals [11/13/23 1930]   Temperature Pulse Respirations Blood Pressure SpO2   97.9 °F (36.6 °C) 74 16 168/84 96 %      Temp src Heart Rate Source Patient Position - Orthostatic VS BP Location FiO2 (%)   -- Monitor Lying Left arm --      Pain Score       No Pain Vitals:    11/13/23 1930 11/13/23 2015 11/13/23 2030   BP: 168/84 126/61 136/60   Pulse: 74 73 75   Patient Position - Orthostatic VS: Lying  Lying         Visual Acuity  Visual Acuity      Flowsheet Row Most Recent Value   L Pupil Size (mm) 3   R Pupil Size (mm) 3            ED Medications  Medications   bacitracin topical ointment 1 small application (1 small application Topical Given 11/13/23 2013)       Diagnostic Studies  Results Reviewed       None                   CT head without contrast   Final Result by Janes Klein MD (11/13 2028)      No acute intracranial abnormality. Workstation performed: [id number]         CT cervical spine without contrast   Final Result by Janes Klein MD (11/13 2033)      No cervical spine fracture or traumatic malalignment. Workstation performed: [id number]                    Procedures  Procedures         ED Course                               SBIRT 22yo+      Flowsheet Row Most Recent Value   Initial Alcohol Screen: US AUDIT-C     1. How often do you have a drink containing alcohol? 0 Filed at: 11/13/2023 1933   2. How many drinks containing alcohol do you have on a typical day you are drinking? 0 Filed at: 11/13/2023 1933   3a. Male UNDER 65: How often do you have five or more drinks on one occasion? 0 Filed at: 11/13/2023 1933   3b. FEMALE Any Age, or MALE 65+: How often do you have 4 or more drinks on one occassion? 0 Filed at: 11/13/2023 1933   Audit-C Score 0 Filed at: 11/13/2023 1933   NAGI: How many times in the past year have you. .. Used an illegal drug or used a prescription medication for non-medical reasons? Never Filed at: 11/13/2023 1933                      Medical Decision Making  The plan is to obtain a CT of the head and cervical spine to rule out clinically significant injury such as skull fracture, epidural or subdural hematoma.   Will also rule out cervical spine fracture or dislocation. Patient remained essentially asymptomatic in the emergency department. The skin tear to the right elbow was treated. Discussed plan for discharge and outpatient follow-up as needed. Head injury precautions reviewed with patient and spouse. The patient (and any family present) verbalized understanding of the discharge instructions and warnings that would necessitate return to the Emergency Department. All questions were answered prior to discharge. Amount and/or Complexity of Data Reviewed  Independent Historian: spouse     Details: Wife provided details of the fall she witnessed fall  External Data Reviewed: notes. Details: Recent home care note reviewed  Radiology: ordered. Disposition  Final diagnoses:   Injury of head, initial encounter   Fall down steps   Skin tear of right elbow without complication, initial encounter     Time reflects when diagnosis was documented in both MDM as applicable and the Disposition within this note       Time User Action Codes Description Comment    11/13/2023  7:32 PM Radha Benitez Add [S09.90XA] Injury of head, initial encounter     11/13/2023  7:32 PM Radha Lion Rubio Hilario Fall down steps     11/13/2023  7:32 PM Radha Benitez Add [S51.011A] Skin tear of right elbow without complication, initial encounter           ED Disposition       ED Disposition   Discharge    Condition   Stable    Date/Time   Mon Nov 13, 2023 2040    Cornelius discharge to home/self care. Follow-up Information       Follow up With Specialties Details Why Contact Info Additional Information    Srinivas Corona,  Family Medicine Schedule an appointment as soon as possible for a visit  As needed, For further evaluation [id number] [facility] [address]  [address] [place] [address] Emergency Department Emergency Medicine Go to  If symptoms worsen [address] [facility] Emergency Department, [address] [facility], [address]            Patient's Medications   Discharge Prescriptions    No medications on file       No discharge procedures on file.     PDMP Review         Value Time User    PDMP Reviewed  Yes 10/17/2022  3:08 PM Roxanne Elam, [address]            ED Provider  Electronically Signed by             Moriah Mejia DO  11/13/23 2044

## 2023-11-17 ENCOUNTER — OFFICE VISIT (OUTPATIENT)
Dept: OBGYN CLINIC | Facility: CLINIC | Age: 83
End: 2023-11-17

## 2023-11-17 ENCOUNTER — APPOINTMENT (OUTPATIENT)
Dept: RADIOLOGY | Facility: CLINIC | Age: 83
End: 2023-11-17
Payer: MEDICARE

## 2023-11-17 VITALS
SYSTOLIC BLOOD PRESSURE: 122 MMHG | HEIGHT: 70 IN | BODY MASS INDEX: 22.62 KG/M2 | DIASTOLIC BLOOD PRESSURE: 72 MMHG | WEIGHT: 158 LBS

## 2023-11-17 DIAGNOSIS — Z01.89 ENCOUNTER FOR LOWER EXTREMITY COMPARISON IMAGING STUDY: ICD-10-CM

## 2023-11-17 DIAGNOSIS — S82.61XD CLOSED DISPLACED FRACTURE OF LATERAL MALLEOLUS OF RIGHT FIBULA WITH ROUTINE HEALING: ICD-10-CM

## 2023-11-17 DIAGNOSIS — S82.61XD CLOSED DISPLACED FRACTURE OF LATERAL MALLEOLUS OF RIGHT FIBULA WITH ROUTINE HEALING: Primary | ICD-10-CM

## 2023-11-17 PROCEDURE — 99024 POSTOP FOLLOW-UP VISIT: CPT | Performed by: ORTHOPAEDIC SURGERY

## 2023-11-17 PROCEDURE — 73610 X-RAY EXAM OF ANKLE: CPT

## 2023-11-17 PROCEDURE — 73600 X-RAY EXAM OF ANKLE: CPT

## 2023-11-17 NOTE — PATIENT INSTRUCTIONS
Weightbearing as tolerated in Sneaker    Recommend taking the following supplements: Vitamin D 4000 units per day and Calcium 1200 mg per day. This will help with bone healing. Avoid NSAIDs like Motrin/Aleve/Ibuprofen. Avoid steroids/nicotine products/ rheumatoid medications like DMARDs if possible.       Knee high compression stocking 20/30mm HG of pressure

## 2023-11-17 NOTE — PROGRESS NOTES
Giovana Pichardo M.D. Attending, Orthopaedic Surgery  Foot and [facility]      ORTHOPAEDIC FOOT AND ANKLE CLINIC VISIT     Assessment:     Encounter Diagnoses   Name Primary? Closed displaced fracture of lateral malleolus of right fibula with routine healing Yes    Encounter for lower extremity comparison imaging study             Plan:   The patient verbalized understanding of exam findings and treatment plan. We engaged in the shared decision-making process and treatment options were discussed at length with the patient. Surgical and conservative management discussed today along with risks and benefits. Right lateral malleolar fracture  Fracture has maintained alignment on Xray and he is doing well. He may continue WBAT RLE in a sneaker  Return if symptoms worsen or fail to improve. History of Present Illness:   Chief Complaint:   Chief Complaint   Patient presents with    Right Ankle - Follow-up     Jb Cole is a 79 y.o. male who is being seen in follow-up for Right ankle fracture. When we last saw he we recommended WBAT in a cam boot with weaning to a sneaker over 6 weeks. Pain has  improved. Residual pain is localized at the lateral malleolus with minimal radiating and described as sharp and severe. Pain/symptom timing:  Worse during the day when active  Pain/symptom context:  Worse with activites and work  Pain/symptom modifying factors:  Rest makes better, activities make worse  Pain/symptom associated signs/symptoms: none    Prior treatment   NSAIDsNo   Injections No   Bracing/Orthotics Yes    Physical Therapy No     Orthopedic Surgical History:   See below    Past Medical, Surgical and Social History:  Past Medical History:  has a past medical history of Astigmatism, Cataract, Gross hematuria, Hematuria, Kidney stones, Lumbar vertebral fracture ([address]), Prostate cancer ([address]), Renal calculi, Squamous cell carcinoma, and Vitamin D deficiency.   Problem List: does not have any pertinent problems on file. Past Surgical History:  has a past surgical history that includes Colonoscopy; Cystoscopy (01/28/2016); Elbow surgery; Retropubic prostatectomy (11/13/2001); Lithotripsy; Hernia repair (2002); Cataract extraction (Left, 08/26/2019); Cataract extraction (Right, 08/12/2019); Breast excisional biopsy (N/A); IR kyphoplasty/vertebroplasty (1/4/2023); IR kyphoplasty/vertebroplasty (2/6/2023); and IR kyphoplasty/vertebroplasty (3/23/2023). Family History: family history includes Heart failure (age of onset: 70) in his father; Hypertension in his brother, family, father, and mother; Other (age of onset: 76) in his mother; Peripheral vascular disease in his father; Prostate cancer in his brother; Stroke (age of onset: 48) in his brother. Social History:  reports that he has never smoked. He has never used smokeless tobacco. He reports that he does not currently use alcohol after a past usage of about 4.0 standard drinks of alcohol per week. He reports that he does not use drugs. Current Medications: has a current medication list which includes the following prescription(s): acetaminophen, amitriptyline, calcium citrate, cholecalciferol, docusate sodium, metoprolol succinate, myrbetriq, and verapamil, and the following Facility-Administered Medications: denosumab. Allergies: is allergic to sulfa antibiotics, oxycodone, ciprofloxacin, gabapentin, lyrica [pregabalin], and medical tape.      Review of Systems:  General- denies fever/chills  HEENT- denies hearing loss or sore throat  Eyes- denies eye pain or visual disturbances, denies red eyes  Respiratory- denies cough or SOB  Cardio- denies chest pain or palpitations  GI- denies abdominal pain  Endocrine- denies urinary frequency  Urinary- denies pain with urination  Musculoskeletal- Negative except noted above  Skin- denies rashes or wounds  Neurological- denies dizziness or headache  Psychiatric- denies anxiety or difficulty concentrating    Physical Exam:   /72   Ht 5' 10" (1.778 m)   Wt 71.7 kg (158 lb)   BMI 22.67 kg/m²   General/Constitutional: No apparent distress: well-nourished and well developed. Eyes: normal ocular motion  Lymphatic: No appreciable lymphadenopathy  Respiratory: Non-labored breathing  Vascular: No edema, swelling or tenderness, except as noted in detailed exam.  Integumentary: No impressive skin lesions present, except as noted in detailed exam.  Neuro: No ataxia or tremors noted  Psych: Normal mood and affect, oriented to person, place and time. Appropriate affect. Musculoskeletal: Normal, except as noted in detailed exam and in HPI. Examination    Right    Gait Normal   Musculoskeletal Tender to palpation at lateral malleolus    Skin Normal.    Nails Normal    Range of Motion  20 degrees dorsiflexion, 40 degrees plantarflexion  Subtalar motion: normal    Stability Stable    Muscle Strength 5/5 tibialis anterior  5/5 gastrocnemius-soleus  5/5 posterior tibialis  5/5 peroneal/eversion strength  5/5 EHL  5/5 FHL    Neurologic Normal    Sensation  Intact to light touch throughout sural, saphenous, superficial peroneal, deep peroneal and medial/lateral plantar nerve distributions. Ryegate-Bianca 5.07 filament (10g) testing  deferred. Cardiovascular Brisk capillary refill < 2 seconds,intact DP and PT pulses    Special Tests None      Imaging Studies:     3 views of the Right ankle were obtained, reviewed and interpreted independently which demonstrate interval healing of right lateral malleolar fracture. Reviewed by me personally. Sulema Perone. Lachman, MD  Foot & Ankle Surgery   Department of [address] [place] [address] personally performed the service. Sulema Perone. Lachman, MD

## 2023-11-27 ENCOUNTER — TELEPHONE (OUTPATIENT)
Dept: NEUROSURGERY | Facility: CLINIC | Age: 83
End: 2023-11-27

## 2023-11-27 NOTE — TELEPHONE ENCOUNTER
The following response was received from Dr. Romero Service:     Freddie Alatorre MD   to Keisha Bedolla RN   [place]    11/27/23 12:42 PM  AP next available    Callback received from Palmira Schwab the patients wife. Assisted to schedule AP appointment. She was appreciative.

## 2023-11-27 NOTE — TELEPHONE ENCOUNTER
Received a call from patient's spouse Palmira Schwab requesting a call back regarding patient having back pain. Returned call and spoke to Palmira Schwab and patient who stated over the last few days he has been experiencing intense lower back pain only when he stands or walks. He states the pain is a severe ache. Patient stated he has no pain when sitting or laying down. Patient is questioning if he should have any imaging completed. Per the last office note there was mention of a PM referral however patient's spouse stated he was seen by PM in the past.     Advised this RN will route to Dr Romero Service for his suggestions. They were appreciative of the call back.

## 2023-12-01 ENCOUNTER — OFFICE VISIT (OUTPATIENT)
Dept: NEUROSURGERY | Facility: CLINIC | Age: 83
End: 2023-12-01
Payer: MEDICARE

## 2023-12-01 ENCOUNTER — HOSPITAL ENCOUNTER (OUTPATIENT)
Dept: RADIOLOGY | Facility: HOSPITAL | Age: 83
End: 2023-12-01
Payer: MEDICARE

## 2023-12-01 ENCOUNTER — OFFICE VISIT (OUTPATIENT)
Dept: CARDIOLOGY CLINIC | Facility: CLINIC | Age: 83
End: 2023-12-01
Payer: MEDICARE

## 2023-12-01 VITALS
HEART RATE: 74 BPM | BODY MASS INDEX: 22.82 KG/M2 | OXYGEN SATURATION: 98 % | DIASTOLIC BLOOD PRESSURE: 68 MMHG | TEMPERATURE: 97.7 F | WEIGHT: 163 LBS | RESPIRATION RATE: 16 BRPM | HEIGHT: 71 IN | SYSTOLIC BLOOD PRESSURE: 120 MMHG

## 2023-12-01 VITALS
OXYGEN SATURATION: 98 % | HEART RATE: 74 BPM | BODY MASS INDEX: 23.34 KG/M2 | HEIGHT: 70 IN | RESPIRATION RATE: 18 BRPM | WEIGHT: 163 LBS | SYSTOLIC BLOOD PRESSURE: 112 MMHG | DIASTOLIC BLOOD PRESSURE: 64 MMHG

## 2023-12-01 DIAGNOSIS — M54.50 ACUTE LOW BACK PAIN: Primary | ICD-10-CM

## 2023-12-01 DIAGNOSIS — M53.9 MULTILEVEL DEGENERATIVE DISC DISEASE: ICD-10-CM

## 2023-12-01 DIAGNOSIS — I49.3 PVC'S (PREMATURE VENTRICULAR CONTRACTIONS): ICD-10-CM

## 2023-12-01 DIAGNOSIS — M81.0 OSTEOPOROSIS: ICD-10-CM

## 2023-12-01 DIAGNOSIS — M54.50 ACUTE LOW BACK PAIN: ICD-10-CM

## 2023-12-01 DIAGNOSIS — M43.9 CURVATURE OF SPINE: ICD-10-CM

## 2023-12-01 DIAGNOSIS — I71.21 ANEURYSM OF ASCENDING AORTA WITHOUT RUPTURE (HCC): ICD-10-CM

## 2023-12-01 DIAGNOSIS — I34.0 NON-RHEUMATIC MITRAL REGURGITATION: ICD-10-CM

## 2023-12-01 DIAGNOSIS — I10 PRIMARY HYPERTENSION: Primary | ICD-10-CM

## 2023-12-01 DIAGNOSIS — M47.819 MULTILEVEL DEGENERATIVE JOINT DISEASE OF SPINE: ICD-10-CM

## 2023-12-01 DIAGNOSIS — I35.1 NONRHEUMATIC AORTIC VALVE INSUFFICIENCY: ICD-10-CM

## 2023-12-01 PROCEDURE — 99214 OFFICE O/P EST MOD 30 MIN: CPT | Performed by: NURSE PRACTITIONER

## 2023-12-01 PROCEDURE — 99214 OFFICE O/P EST MOD 30 MIN: CPT | Performed by: INTERNAL MEDICINE

## 2023-12-01 PROCEDURE — 72100 X-RAY EXAM L-S SPINE 2/3 VWS: CPT

## 2023-12-01 NOTE — PROGRESS NOTES
Cardiology Follow Up    Nikolay King  1940  [id number]  [facility] [address]  [phone]    1. Primary hypertension        2. Non-rheumatic mitral regurgitation        3. Nonrheumatic aortic valve insufficiency        4. PVC's (premature ventricular contractions)        5. Aneurysm of ascending aorta without rupture (HCC)            Interval History: No cardiovascular complaints. Denies chest pain shortness of breath orthopnea paroxysmal nocturnal dyspnea or syncope. Has recently broke his ankle. Continues to have significant issues with back pain. He was a good [facility] and blood pressures were running low. Blood pressure medication was adjusted.     Patient Active Problem List   Diagnosis    PVC's (premature ventricular contractions)    Thoracic aortic aneurysm without rupture (HCC)    Nonrheumatic aortic valve insufficiency    Non-rheumatic mitral regurgitation    Malignant tumor of prostate ([address])    Spinal stenosis of lumbar region with neurogenic claudication    Lumbar radiculopathy    Chronic bilateral low back pain without sciatica    Chronic pain syndrome    Renal cyst, acquired, left    Aortic root dilatation (HCC)    Myofascial pain syndrome    Stage 3a chronic kidney disease (HCC)    Lumbar spondylosis    Sacroiliitis (HCC)    Sacroiliac joint dysfunction of both sides    Fracture of lumbar spine (HCC)    Urinary frequency    Vitamin D deficiency    Osteoporosis    Ambulatory dysfunction    Macrocytosis    Hypertensive disorder    Age-related osteoporosis with current pathological fracture of vertebra (HCC)    T12 compression fracture, initial encounter ([address])    Closed fracture of right ankle with routine healing    Fall    Closed fracture of right distal fibula    Closed displaced fracture of lateral malleolus of right fibula with routine healing Squamous cell carcinoma     Past Medical History:   Diagnosis Date    Astigmatism     last assessed 11/15/17    Cataract     Resolved 11/15/17    Gross hematuria     Last assessed 01/28/16    Hematuria     last assessed 01/28/16    Kidney stones     Patient states he has kidney stonesin esch kidney    Lumbar vertebral fracture (HCC)     Prostate cancer ([address])     last assessed 05/18/17    Renal calculi     last assessed 08/16/12    Squamous cell carcinoma     Vitamin D deficiency     Last assessed 08/19/13     Social History     Socioeconomic History    Marital status: /Civil Union     Spouse name: Not on file    Number of children: Not on file    Years of education: Not on file    Highest education level: Not on file   Occupational History    Not on file   Tobacco Use    Smoking status: Never    Smokeless tobacco: Never   Vaping Use    Vaping Use: Never used   Substance and Sexual Activity    Alcohol use: Not Currently     Alcohol/week: 4.0 standard drinks of alcohol     Types: 4 Glasses of wine per week     Comment: social    Drug use: No    Sexual activity: Yes     Partners: Female   Other Topics Concern    Not on file   Social History Narrative    Not on file     Social Determinants of Health     Financial Resource Strain: Not on file   Food Insecurity: No Food Insecurity (8/24/2023)    Hunger Vital Sign     Worried About Running Out of Food in the Last Year: Never true     Ran Out of Food in the Last Year: Never true   Transportation Needs: No Transportation Needs (8/24/2023)    PRAPARE - Transportation     Lack of Transportation (Medical): No     Lack of Transportation (Non-Medical):  No   Physical Activity: Not on file   Stress: Not on file   Social Connections: Not on file   Intimate Partner Violence: Not on file   Housing Stability: Low Risk  (8/24/2023)    Housing Stability Vital Sign     Unable to Pay for Housing in the Last Year: No     Number of Places Lived in the Last Year: 1     Unstable Housing in the Last Year: No      Family History   Problem Relation Age of Onset    Other Mother 76        Influenza    Hypertension Mother     Heart failure Father 70    Peripheral vascular disease Father     Hypertension Father     Prostate cancer Brother     Stroke Brother 48    Hypertension Brother     Hypertension Family      Past Surgical History:   Procedure Laterality Date    BREAST EXCISIONAL BIOPSY N/A     Benign breast tumor unsure of side over 50 yrs ago    CATARACT EXTRACTION Left 08/26/2019    CATARACT EXTRACTION Right 08/12/2019    COLONOSCOPY      CYSTOSCOPY  01/28/2016    ELBOW SURGERY      Bone chip    HERNIA REPAIR  2002    IR KYPHOPLASTY/VERTEBROPLASTY  1/4/2023    IR KYPHOPLASTY/VERTEBROPLASTY  2/6/2023    IR KYPHOPLASTY/VERTEBROPLASTY  3/23/2023    LITHOTRIPSY      RETROPUBIC PROSTATECTOMY  11/13/2001    Radical with nerve sparing       Current Outpatient Medications:     acetaminophen (TYLENOL) 325 mg tablet, Take 3 tablets (975 mg total) by mouth every 6 (six) hours (Patient taking differently: Take 975 mg by mouth every 6 (six) hours wife giving 650mg 2 tabs in the am and 500mg at bedtime), Disp: 30 tablet, Rfl: 0    amitriptyline (ELAVIL) 25 mg tablet, Take 1 tablet (25 mg total) by mouth daily at bedtime, Disp: 30 tablet, Rfl: 1    calcium citrate (CALCITRATE) 950 (200 Ca) MG tablet, Take 1 tablet by mouth daily 1,200 mg daily, Disp: , Rfl:     cholecalciferol (VITAMIN D3) 1,000 units tablet, Take 3,000 Units by mouth daily 2,000 IU  daily, Disp: , Rfl:     docusate sodium (Colace) 100 mg capsule, Take 1 capsule (100 mg total) by mouth 2 (two) times a day (Patient taking differently: Take 100 mg by mouth 2 (two) times a day pt uses as needed only), Disp: , Rfl: 0    metoprolol succinate (TOPROL-XL) 25 mg 24 hr tablet, Take 1 tablet (25 mg total) by mouth daily, Disp: 90 tablet, Rfl: 0    Myrbetriq 25 MG TB24, TAKE ONE TABLET BY MOUTH EVERY DAY IN THE MORNING, Disp: 30 tablet, Rfl: 5    verapamil (CALAN-SR) 240 mg CR tablet, TAKE 1 TABLET BY MOUTH DAILY AT BEDTIME, Disp: 90 tablet, Rfl: 1    Current Facility-Administered Medications:     denosumab (PROLIA) subcutaneous injection 60 mg, 60 mg, Subcutaneous, Q6 Months, Forest Bunn MD, 60 mg at 09/26/23 1442  Allergies   Allergen Reactions    Sulfa Antibiotics Rash    Oxycodone Confusion    Ciprofloxacin     Gabapentin Dizziness    Lyrica [Pregabalin] Hallucinations     Blurry vision , hallucinations, confusion     Medical Tape Rash       Labs:  Appointment on 11/13/2023   Component Date Value    WBC 11/13/2023 6.68     RBC 11/13/2023 3.43 (L)     Hemoglobin 11/13/2023 11.1 (L)     Hematocrit 11/13/2023 35.7 (L)     MCV 11/13/2023 104 (H)     MCH 11/13/2023 32.4     MCHC 11/13/2023 31.1 (L)     RDW 11/13/2023 13.3     MPV 11/13/2023 11.7     Platelets 19/52/6306 193     nRBC 11/13/2023 0     Neutrophils Relative 11/13/2023 65     Immat GRANS % 11/13/2023 1     Lymphocytes Relative 11/13/2023 21     Monocytes Relative 11/13/2023 10     Eosinophils Relative 11/13/2023 3     Basophils Relative 11/13/2023 0     Neutrophils Absolute 11/13/2023 4.33     Immature Grans Absolute 11/13/2023 0.08     Lymphocytes Absolute 11/13/2023 1.38     Monocytes Absolute 11/13/2023 0.68     Eosinophils Absolute 11/13/2023 0.18     Basophils Absolute 11/13/2023 0.03    Appointment on 11/02/2023   Component Date Value    Color, UA 11/02/2023 Light Yellow     Clarity, UA 11/02/2023 Clear     Specific Gravity, UA 11/02/2023 1.018     pH, UA 11/02/2023 6.0     Leukocytes, UA 11/02/2023 Negative     Nitrite, UA 11/02/2023 Negative     Protein, UA 11/02/2023 Trace (A)     Glucose, UA 11/02/2023 Negative     Ketones, UA 11/02/2023 Negative     Urobilinogen, UA 11/02/2023 <2.0     Bilirubin, UA 11/02/2023 Negative     Occult Blood, UA 11/02/2023 Negative     RBC, UA 11/02/2023 None Seen     WBC, UA 11/02/2023 1-2     Epithelial Cells 11/02/2023 Occasional     Bacteria, UA 11/02/2023 None Seen     MUCUS THREADS 11/02/2023 Occasional (A)     Hyaline Casts, UA 11/02/2023 10-25 (A)    Clinical Support on 10/26/2023   Component Date Value    Ferritin 11/13/2023 140    Appointment on 10/24/2023   Component Date Value    Color, UA 10/24/2023 Light Yellow     Clarity, UA 10/24/2023 Clear     Specific Gravity, UA 10/24/2023 1.015     pH, UA 10/24/2023 7.0     Leukocytes, UA 10/24/2023 Negative     Nitrite, UA 10/24/2023 Negative     Protein, UA 10/24/2023 Negative     Glucose, UA 10/24/2023 Negative     Ketones, UA 10/24/2023 Negative     Urobilinogen, UA 10/24/2023 <2.0     Bilirubin, UA 10/24/2023 Negative     Occult Blood, UA 10/24/2023 Negative    Appointment on 10/05/2023   Component Date Value    Urine Culture 10/05/2023 80,000-89,000 cfu/ml Klebsiella pneumoniae (A)    Appointment on 10/02/2023   Component Date Value    WBC 10/02/2023 8.59     RBC 10/02/2023 3.34 (L)     Hemoglobin 10/02/2023 10.9 (L)     Hematocrit 10/02/2023 34.7 (L)     MCV 10/02/2023 104 (H)     MCH 10/02/2023 32.6     MCHC 10/02/2023 31.4     RDW 10/02/2023 15.9 (H)     MPV 10/02/2023 10.7     Platelets 17/70/5661 255     Sodium 10/02/2023 136     Potassium 10/02/2023 4.8     Chloride 10/02/2023 99     CO2 10/02/2023 28     ANION GAP 10/02/2023 9     BUN 10/02/2023 25     Creatinine 10/02/2023 1.15     Glucose 10/02/2023 71     Calcium 10/02/2023 9.3     eGFR 10/02/2023 58     Color, UA 10/02/2023 Light Yellow     Clarity, UA 10/02/2023 Turbid     Specific Gravity, UA 10/02/2023 1.010     pH, UA 10/02/2023 7.0     Leukocytes, UA 10/02/2023 Large (A)     Nitrite, UA 10/02/2023 Positive (A)     Protein, UA 10/02/2023 Trace (A)     Glucose, UA 10/02/2023 Negative     Ketones, UA 10/02/2023 Negative     Urobilinogen, UA 10/02/2023 <2.0     Bilirubin, UA 10/02/2023 Negative     Occult Blood, UA 10/02/2023 Small (A)     RBC, UA 10/02/2023 4-10 (A)     WBC, UA 10/02/2023 Innumerable (A)     Epithelial Cells 10/02/2023 None Seen Bacteria, UA 10/02/2023 Occasional     Hyaline Casts, UA 10/02/2023 10-25 (A)     WBC Clumps 10/02/2023 Present     Segmented % 10/02/2023 72     Lymphocytes % 10/02/2023 13 (L)     Monocytes % 10/02/2023 13 (H)     Eosinophils, % 10/02/2023 2     Basophils % 10/02/2023 0     Absolute Neutrophils 10/02/2023 6.18     Lymphocytes Absolute 10/02/2023 1.12     Monocytes Absolute 10/02/2023 1.12     Eosinophils Absolute 10/02/2023 0.17     Basophils Absolute 10/02/2023 0.00     RBC Morphology 10/02/2023 Present     Platelet Estimate 71/77/2150 Adequate     Anisocytosis 10/02/2023 Present     Macrocytes 10/02/2023 Present    Admission on 08/24/2023, Discharged on 08/26/2023   Component Date Value    ABO Grouping 08/24/2023 O     Rh Factor 08/24/2023 Positive     Antibody Screen 08/24/2023 Negative     Specimen Expiration Date 08/24/2023 [id number]     WBC 08/24/2023 10.25 (H)     RBC 08/24/2023 4.20     Hemoglobin 08/24/2023 13.3     Hematocrit 08/24/2023 40.7     MCV 08/24/2023 97     MCH 08/24/2023 31.7     MCHC 08/24/2023 32.7     RDW 08/24/2023 12.6     MPV 08/24/2023 11.3     Platelets 71/63/8204 148 (L)     Sodium 08/24/2023 134 (L)     Potassium 08/24/2023 5.2     Chloride 08/24/2023 103     CO2 08/24/2023 25     ANION GAP 08/24/2023 6     BUN 08/24/2023 39 (H)     Creatinine 08/24/2023 1.25     Glucose 08/24/2023 95     Calcium 08/24/2023 9.3     AST 08/24/2023 13     ALT 08/24/2023 12     Alkaline Phosphatase 08/24/2023 37     Total Protein 08/24/2023 6.2 (L)     Albumin 08/24/2023 3.7     Total Bilirubin 08/24/2023 0.58     eGFR 08/24/2023 52     hs TnI 0hr 08/24/2023 59 (H)     Color, UA 08/24/2023 Yellow     Clarity, UA 08/24/2023 Clear     Specific Gravity, UA 08/24/2023 1.010     pH, UA 08/24/2023 6.0     Leukocytes, UA 08/24/2023 Negative     Nitrite, UA 08/24/2023 Negative     Protein, UA 08/24/2023 Negative     Glucose, UA 08/24/2023 Negative     Ketones, UA 08/24/2023 Negative     Urobilinogen, UA 08/24/2023 <2.0     Bilirubin, UA 08/24/2023 Negative     Occult Blood, UA 08/24/2023 Negative     SARS-CoV-2 08/24/2023 Negative     INFLUENZA A PCR 08/24/2023 Negative     INFLUENZA B PCR 08/24/2023 Negative     RSV PCR 08/24/2023 Negative     hs TnI 2hr 08/24/2023 61 (H)     Delta 2hr hsTnI 08/24/2023 2     hs TnI 4hr 08/24/2023 57 (H)     Delta 4hr hsTnI 08/24/2023 -2     Segmented % 08/24/2023 73     Lymphocytes % 08/24/2023 15     Monocytes % 08/24/2023 10     Eosinophils, % 08/24/2023 0     Basophils % 08/24/2023 0     Myelocytes % 08/24/2023 1     Atypical Lymphocytes % 08/24/2023 1 (H)     Absolute Neutrophils 08/24/2023 7.48     Lymphocytes Absolute 08/24/2023 1.64     Monocytes Absolute 08/24/2023 1.03     Eosinophils Absolute 08/24/2023 0.00     Basophils Absolute 08/24/2023 0.00     RBC Morphology 08/24/2023 Present     Platelet Estimate 16/96/7241 Adequate     A4C EF 08/25/2023 65     LVIDd 08/25/2023 4.60     LVIDS 08/25/2023 2.70     IVSd 08/25/2023 1. 10     LVPWd 08/25/2023 1.00     FS 08/25/2023 41     MV E' Tissue Velocity Se* 08/25/2023 6     E wave deceleration time 08/25/2023 415     MV Peak E [place] 08/25/2023 60     MV Peak A [place] 08/25/2023 0.9     RVID d 08/25/2023 4.7     Tricuspid annular plane * 08/25/2023 2.20     LA size 08/25/2023 3.6     LA length (A2C) 08/25/2023 3.30     MARY A4C 08/25/2023 18     LA volume (BP) 08/25/2023 60     RAA A4C 08/25/2023 25     LVOT diameter 08/25/2023 2.2     AV Deceleration Time 08/25/2023 2,210     AV regurgitation pressur* 08/25/2023 641     MV VTI RETROGRADE 08/25/2023 188     MV stenosis pressure 1/2* 08/25/2023 120     MV valve area p 1/2 meth* 08/25/2023 1.83     MV mean gradient retrogr* 08/25/2023 89     MR PG 08/25/2023 132     TR Peak Gerson 08/25/2023 2.8     Triscuspid Valve Regurgi* 08/25/2023 32.0     TR MAX PG ANTEGRADE 08/25/2023 30.0     TV mean gradient 08/25/2023 19     TV VTI 08/25/2023 80.17     Ao root 08/25/2023 4.40     Asc Ao 08/25/2023 3.3     AV peak gradient 08/25/2023 24     Mitral regurgitation pea* 08/25/2023 5.75     Mitral valve mean inflow* 08/25/2023 4.41     Tricuspid valve peak reg* 08/25/2023 2.82     Left ventricular stroke * 08/25/2023 67.00     IVS 08/25/2023 1.1     LEFT VENTRICLE SYSTOLIC * 94/64/7967 28     LV DIASTOLIC VOLUME (MOD* 31/99/8928 96     Left Atrium Area-systoli* 08/25/2023 18.3     Tricuspid Valve Mean Gerson* 08/25/2023 2.03     Left Atrium Area-systoli* 08/25/2023 15.8     LVSV, 2D 08/25/2023 67     LVOT area 08/25/2023 3.80     LV EF 08/25/2023 65     Est. RA pres 08/25/2023 3.0     Right Ventricular Peak S* 08/25/2023 35.00     Cholesterol 08/25/2023 171     Triglycerides 08/25/2023 128     HDL, Direct 08/25/2023 48     LDL Calculated 08/25/2023 97     Non-HDL-Chol (CHOL-HDL) 08/25/2023 123     Sodium 08/25/2023 133 (L)     Potassium 08/25/2023 4.9     Chloride 08/25/2023 105     CO2 08/25/2023 23     ANION GAP 08/25/2023 5     BUN 08/25/2023 43 (H)     Creatinine 08/25/2023 1.32 (H)     Glucose 08/25/2023 100     Calcium 08/25/2023 8.5     eGFR 08/25/2023 49     WBC 08/25/2023 9.09     RBC 08/25/2023 3.66 (L)     Hemoglobin 08/25/2023 11.7 (L)     Hematocrit 08/25/2023 35.2 (L)     MCV 08/25/2023 96     MCH 08/25/2023 32.0     MCHC 08/25/2023 33.2     RDW 08/25/2023 12.9     MPV 08/25/2023 10.7     Platelets 74/12/7819 128 (L)     nRBC 08/25/2023 0     Ventricular Rate 08/24/2023 61     Atrial Rate 08/24/2023 61     MS Interval 08/24/2023 210     QRSD Interval 08/24/2023 88     QT Interval 08/24/2023 410     QTC Interval 08/24/2023 412     P Axis 08/24/2023 99     QRS Axis 08/24/2023 69     T Wave [place] 08/24/2023 68     Sodium 08/26/2023 130 (L)     Potassium 08/26/2023 4.8     Chloride 08/26/2023 102     CO2 08/26/2023 22     ANION GAP 08/26/2023 6     BUN 08/26/2023 50 (H)     Creatinine 08/26/2023 1.32 (H)     Glucose 08/26/2023 109     Calcium 08/26/2023 8.6     eGFR 08/26/2023 49    Appointment on 08/21/2023   Component Date Value    WBC 08/21/2023 11.73 (H)     RBC 08/21/2023 4.21     Hemoglobin 08/21/2023 13.3     Hematocrit 08/21/2023 42.3     MCV 08/21/2023 101 (H)     MCH 08/21/2023 31.6     MCHC 08/21/2023 31.4     RDW 08/21/2023 13.1     MPV 08/21/2023 11.6     Platelets 05/75/1060 168     Sodium 08/21/2023 136     Potassium 08/21/2023 5.4 (H)     Chloride 08/21/2023 106     CO2 08/21/2023 26     ANION GAP 08/21/2023 4     BUN 08/21/2023 38 (H)     Creatinine 08/21/2023 1.28     Glucose 08/21/2023 69     Calcium 08/21/2023 9.3     AST 08/21/2023 12     ALT 08/21/2023 22     Alkaline Phosphatase 08/21/2023 46     Total Protein 08/21/2023 6.7     Albumin 08/21/2023 3.5     Total Bilirubin 08/21/2023 0.60     eGFR 08/21/2023 51     CRP 08/21/2023 <3.0     Hemoglobin A1C 08/21/2023 6.0 (H)     EAG 08/21/2023 126     Magnesium 08/21/2023 2.5     Vit D, 25-Hydroxy 08/21/2023 59.6     Vitamin B1, Whole Blood 08/21/2023 131.3     Vitamin B-12 08/21/2023 335     TSH 3RD GENERATON 08/21/2023 2.189     Sed Rate 08/21/2023 6     Phosphorus 08/21/2023 2.4     Segmented % 08/21/2023 82 (H)     Bands % 08/21/2023 1     Lymphocytes % 08/21/2023 8 (L)     Monocytes % 08/21/2023 4     Eosinophils, % 08/21/2023 1     Basophils % 08/21/2023 0     Atypical Lymphocytes % 08/21/2023 4 (H)     Absolute Neutrophils 08/21/2023 9.74 (H)     Lymphocytes Absolute 08/21/2023 1.41     Monocytes Absolute 08/21/2023 0.47     Eosinophils Absolute 08/21/2023 0.12     Basophils Absolute 08/21/2023 0.00     RBC Morphology 08/21/2023 Normal     Platelet Estimate 86/42/5902 Adequate     Differential Comment 08/21/2023 see note    Office Visit on 08/11/2023   Component Date Value    LEUKOCYTE ESTERASE,UA 08/11/2023 neg     NITRITE,UA 08/11/2023 neg     SL AMB POCT UROBILINOGEN 08/11/2023 3.5     POCT URINE PROTEIN 08/11/2023 0.15      PH,UA 08/11/2023 5.0     BLOOD,UA 08/11/2023 neg     SPECIFIC GRAVITY,UA 08/11/2023 1.025     Kathy Linares 08/11/2023 0.5     BILIRUBIN,UA 08/11/2023 neg     GLUCOSE, UA 08/11/2023 neg      COLOR,UA 08/11/2023 Yellow     CLARITY,UA 08/11/2023 Clear     Urine Culture 08/11/2023 <10,000 cfu/ml      Imaging: XR ankle 2 vw left    Result Date: 11/20/2023  Narrative: LEFT ANKLE INDICATION:   Encounter for other specified special examinations. COMPARISON:  Comparison made with previous examination(s) dated (DX) 06-Oct-2023,(DX) 08-Sep-2023,(DX) 24-Aug-2023. VIEWS:  XR ANKLE 2 VW LEFT Images: 1 FINDINGS: Single AP weightbearing radiographic examination bilateral ankles. Stable alignment healing right distal fibular fracture. No acute fracture on the left. No significant degenerative changes. No lytic or blastic osseous lesion. Soft tissues are unremarkable. Impression: Stable alignment right distal fibular fracture. Electronically signed: 11/20/2023 02:15 PM Keyon Ayala MPH,MD    XR ankle 3+ vw right    Result Date: 11/20/2023  Narrative: RIGHT ANKLE INDICATION:   Displaced fracture of lateral malleolus of right fibula, subsequent encounter for closed fracture with routine healing. COMPARISON:  Comparison made with previous examination(s) dated (DX) 06-Oct-2023,(DX) 08-Sep-2023,(DX) 24-Aug-2023. VIEWS:  XR ANKLE 3+ VW RIGHT Images: 3 FINDINGS: Stable alignment of healing distal fibular fracture. No significant degenerative changes. No lytic or blastic osseous lesion. There are atherosclerotic calcifications. Soft tissues are otherwise unremarkable. Impression: Stable alignment distal fibular fracture. Electronically signed: 11/20/2023 11:43 AM Keyon Ayala MPH,MD    CT cervical spine without contrast    Result Date: 11/13/2023  Narrative: CT CERVICAL SPINE - WITHOUT CONTRAST INDICATION:   fall down 2 steps. COMPARISON:  None. TECHNIQUE:  CT examination of the cervical spine was performed without intravenous contrast.  Contiguous axial images were obtained.  Multiplanar 2D reformatted images were created from the source data. Radiation dose length product (DLP) for this visit:  462.64 mGy-cm . This examination, like all CT scans performed in the [facility], was performed utilizing techniques to minimize radiation dose exposure, including the use of iterative  reconstruction and automated exposure control. IMAGE QUALITY:  Diagnostic. FINDINGS: ALIGNMENT:  Normal alignment of the cervical spine. No subluxation. VERTEBRAE:  No fracture. DEGENERATIVE CHANGES:  No significant cervical degenerative changes are noted. PREVERTEBRAL AND PARASPINAL SOFT TISSUES: Dense bilateral carotid bifurcation calcifications. Left mastoid effusion fully visualized on the CT head study. THORACIC INLET: Biapical pleural/parenchymal scarring and pleural calcifications. Impression: No cervical spine fracture or traumatic malalignment. Workstation performed: [id number]     CT head without contrast    Result Date: 11/13/2023  Narrative: CT BRAIN - WITHOUT CONTRAST INDICATION:   Head trauma, minor (Age >= 65y) head injury. "79-year-old male presents with valuation of injury sustained after he fell backwards down 2 steps when walking into his house from the garage. This was witnessed by his wife and she states that there was no loss of consciousness, no seizure activity no vomiting. The patient is without complaint at this time aside from a small skin tear to his right elbow. Patient denies headache. Patient denies any preceding lightheadedness dizziness chest pain or shortness of breath. The patient denies any numbness or tingling. " COMPARISON:  None. TECHNIQUE:  CT examination of the brain was performed. Multiplanar 2D reformatted images were created from the source data. Radiation dose length product (DLP) for this visit:  889.11 mGy-cm .   This examination, like all CT scans performed in the [facility], was performed utilizing techniques to minimize radiation dose exposure, including the use of iterative reconstruction and automated exposure control. IMAGE QUALITY:  Diagnostic. FINDINGS: PARENCHYMA: Decreased attenuation is noted in periventricular and subcortical white matter demonstrating an appearance that is statistically most likely to represent mild microangiopathic change. Unchanged chronic basal ganglia lacunar infarcts. No CT signs of acute infarction. No intracranial mass, mass effect or midline shift. No acute parenchymal hemorrhage. VENTRICLES AND EXTRA-AXIAL SPACES:  Normal for the patient's age. VISUALIZED ORBITS: Normal visualized orbits. PARANASAL SINUSES: Normal visualized paranasal sinuses. CALVARIUM AND EXTRACRANIAL SOFT TISSUES: Left mastoid effusion. No acute fracture. Impression: No acute intracranial abnormality. Workstation performed: [id number]       Review of Systems:  Review of Systems   Constitutional:  Negative for fatigue. HENT:  Negative for nosebleeds. Eyes:  Negative for redness. Respiratory:  Negative for chest tightness and shortness of breath. Cardiovascular:  Negative for chest pain, palpitations and leg swelling. Gastrointestinal:  Positive for constipation. Negative for abdominal pain. Endocrine: Negative for polyuria. Genitourinary:  Negative for urgency. Musculoskeletal:  Positive for back pain. Negative for arthralgias. Skin:  Negative for rash. Neurological:  Negative for dizziness and syncope. Psychiatric/Behavioral:  Negative for confusion and sleep disturbance. The patient is not nervous/anxious. Physical Exam:  Physical Exam  Vitals and nursing note reviewed. Constitutional:       Appearance: Normal appearance. HENT:      Head: Normocephalic and atraumatic. Nose: Nose normal.      Mouth/Throat:      Mouth: Mucous membranes are moist.   Eyes:      Conjunctiva/sclera: Conjunctivae normal.   Cardiovascular:      Rate and Rhythm: Normal rate and regular rhythm.    Pulmonary:      Effort: Pulmonary effort is normal.      Breath sounds: Normal breath sounds. Abdominal:      Palpations: Abdomen is soft. Musculoskeletal:         General: Normal range of motion. Cervical back: Normal range of motion. Right lower leg: Edema present. Left lower leg: Edema present. Skin:     General: Skin is warm and dry. Neurological:      General: No focal deficit present. Mental Status: He is alert and oriented to person, place, and time. Psychiatric:         Mood and Affect: Mood normal.         Discussion/Summary: He had a recent fracture of his ankle. At that time he was seen in consultation by the cardiology service. An echocardiogram was checked. Ventricular systolic function was preserved with normal ocular size. Mitral regurgitation was now felt to be moderate to severe. Aortic root was 4.4 cm. A Zio patch was placed and 11% of his beats were ventricular ectopic. Blood pressure had been running low in rehabilitation so the lisinopril was discontinued. He continues on verapamil 240 mg daily and takes metoprolol 12.5 mg daily if his systolic blood pressures greater than 120. I told him I thought this was very reasonable. No acute cardiovascular issues. I will see him again in 6 months.

## 2023-12-01 NOTE — PROGRESS NOTES
Assessment/Plan:    Osteoporosis  As addressed in HPI. He is currently on optimal therapy calcium plus vitamin D and Prolia. Acute low back pain  As addressed in HPI  He presents with the acute onset of low back pain starting on 11/25/2023 he telephoned the office on 11/27/2023 reporting the same and requesting an appointment, as addressed in HPI. He does have a longstanding history of lumbar stenosis with neurogenic claudication and is well-known to Dr. Pool Larsen office receiving interventional treatments for this pain. With his new onset acute low back pain without radicular symptoms in the setting of osteoporosis there is concern for a possible compression fracture. He has a history of compression fractures and kyphoplasty in the past.  He admits to TTP in low back lumbar sacral area and this pain occurs when he is rising from sitting to standing and walking he denies the pain when he is sitting idle. He denies bowel or bladder dysfunction from his baseline, weakness in his lower extremities, or saddle anesthesia. He ambulates at baseline with a walker. 12/1/2023  LUMBAR SPINE--   No acute fracture. Vertebral body cement augmentation material in the T12, L1 and L2 vertebral bodies. Unchanged levocurvature of the lumbar spine centered at L2-L3. Severe multilevel degenerative change with intervertebral disc space narrowing and facet arthrosis. Multilevel degenerative change of the spine. Plan  Order x-ray lumbar spine, advised patient to complete today if possible. Patient completed the x-ray after the visit. I received report late afternoon. Telephoned wife and patient and reviewed imaging results, no evidence of acute fracture or compression fracture. Advised to continue medicinal treatment acetaminophen and consider increasing "methocarbamol dosing has 500 mg tablets can increase to twice daily although treatment is usually 3 times daily.   Discussed in detail with wife to slowly start increasing the medication therefore start with twice daily (AM and PM). Advised wife to continue acetaminophen dosing currently Tylenol arthritis 650 mg 2 tablets in the morning and 2 tablets in the evening. Discussed with wife MRI lumbar spine is not indicated at this time, wife in agreement. Wife  understands his chronic history of multilevel lumbar degenerative spine disease. Wife wishes for patient to start physical therapy at this time. She request therapy at [facility] as patient is well-known to this facility and is currently undergoing therapy for his right ankle. Ordered physical therapy for lumbar spine core strengthening and lumbar paraspinal strengthening, [facility] been working with the patient. Wife reports patient has an appointment at the rehab center for his ankle and she will verify receipt of the new order. Discussed with wife she should consider calling Dr. Guerda Rojas office and advised of the new onset acute low back pain so he can consider the need for interventional treatment. Advised wife if patient has additional questions or concerns please contact the neurosurgery office. Advised wife if symptoms fail to improve with multimodal treatment physical therapy and pain management to please contact the office for further review fMRI indicated. Reviewed red flag signs and advised if patient develops to report to emergency room for assessment. Subjective: Patient's wife telephoned the office reporting last 2 days he has been experiencing intense lower back pain only when he stands or walks she requested a follow-up appointment.     Patient ID: Stacie Bentley is a 79 y.o. male PM/SH: Aortic root dilatation, HTN, nonrheumatic mitral regurgitation, nonrheumatic aortic valve insufficiency, PVCs, thoracic aortic aneurysm without rupture, age-related osteoporosis with history of pathological fractures, fracture of the right lateral malleolus, lumbar spondylosis, SI joint dysfunction bilaterally, T12 compression fracture history of, malignant tumor of the prostate gland, macrocytosis, chronic pain syndrome, squamous cell carcinoma, urinary frequency, vitamin D deficiency. HPI   Patient is well-known to neurosurgery for prior kyphoplasty. His last neurosurgery visit was 4/21/2023 with Dr. Roger Hector status post an L1 compression fracture treated with kyphoplasty. He was referred to [facility] for physical therapy. Patient is well-known to neurosurgery he presents today with a complaint of severe lower back pain when he stands up and walks, does not have pain when he is sitting or lying. The quality of this pain is reported as severe aching in the low back with distribution across to right and left sides over the buttocks. He reports the left-sided pain is worse than the right and denies radicular symptoms into buttocks or lower extremities. He reports the severity of his symptoms 9-10/10, baseline pain rated as 6-7/10 , this is the level of pain he is experiencing today in the office. He cannot identify any specific inciting event but does report a fall 11/13/2023 for which she was treated in the emergency department. As per ED provider notes patient fell backwards down 2 steps when walking into his house from the garage, this was witnessed by his wife he did not have loss of consciousness. He was without complaints on arrival in the emergency department, had a small skin tear to his right elbow. Brain imaging was completed but no spine imaging. Patient reports reports he had no back pain on the day of this fall, and back pain started about 2 days ago prompting this call. Of note he does have a history of severe osteoporosis and is treating with optimal therapy Prolia, calcium and vitamin D. He reports aggravating factors as whenever he starts to stand from a sitting position and starts walking or moving.   He reports relieving factors as sitting or lying and not moving. Multimodal Treatments:  PT-he is currently receiving physical therapy at [facility] for close displaced fracture of the lateral malleolus of the right fibula right ankle. As per record review he sustained the right ankle fracture 8/24/2023 after a fall in his home, he was treated in the ED and referred to orthopedics. He has not received physical therapy for his current complaints. PM-he has not received pain management services for this new onset low back pain. He has received services from Dr. Joycelyn Marcos in the past and has a known diagnosis of spinal stenosis of the lumbar region with neurogenic claudication. His last documented interventional treatment was 8/3/2023 bilateral S1 TFESI. Medicinal - arthritis  tylenol 2 in morning and evening ---- is effective  50 % he is also treating with methocarbamol once daily 500 mg. Amytriptyline  25  at hs ---not efficacious as per wife he plans to stop the medication plan to stop       Social: He is  and lives with his wife who is a retired nurse. REVIEW OF SYSTEMS  Review of Systems   Respiratory:  Negative for chest tightness and shortness of breath. Gastrointestinal: Negative. Genitourinary: Negative. Musculoskeletal:  Positive for back pain (across low back, non-radiating) and gait problem (using rolling walker, last fall 11/13). F/w PT at [facility]   Skin: Negative. Neurological:  Negative for weakness and numbness. Psychiatric/Behavioral:  Negative for sleep disturbance.           Meds/Allergies     Current Outpatient Medications   Medication Sig Dispense Refill    Acetaminophen (TYLENOL ARTHRITIS PAIN PO) Take 1,300 mg by mouth 2 (two) times a day      calcium citrate (CALCITRATE) 950 (200 Ca) MG tablet Take 1 tablet by mouth daily 1,200 mg daily      cholecalciferol (VITAMIN D3) 1,000 units tablet Take 3,000 Units by mouth daily 2,000 IU  daily      docusate sodium (Colace) 100 mg capsule Take 1 capsule (100 mg total) by mouth 2 (two) times a day (Patient taking differently: Take 100 mg by mouth 2 (two) times a day pt uses as needed only)  0    metoprolol succinate (TOPROL-XL) 25 mg 24 hr tablet Take 1 tablet (25 mg total) by mouth daily (Patient taking differently: Take 12.5 mg by mouth daily at bedtime For BP > 120) 90 tablet 0    Myrbetriq 25 MG TB24 TAKE ONE TABLET BY MOUTH EVERY DAY IN THE MORNING 30 tablet 5    verapamil (CALAN-SR) 240 mg CR tablet TAKE 1 TABLET BY MOUTH DAILY AT BEDTIME 90 tablet 1    acetaminophen (TYLENOL) 325 mg tablet Take 3 tablets (975 mg total) by mouth every 6 (six) hours (Patient not taking: Reported on 12/1/2023) 30 tablet 0    amitriptyline (ELAVIL) 25 mg tablet Take 1 tablet (25 mg total) by mouth daily at bedtime (Patient not taking: Reported on 12/1/2023) 30 tablet 1     Current Facility-Administered Medications   Medication Dose Route Frequency Provider Last Rate Last Admin    denosumab (PROLIA) subcutaneous injection 60 mg  60 mg Subcutaneous Q6 Months Yamileth Ghosh MD   60 mg at 09/26/23 1442       Allergies   Allergen Reactions    Sulfa Antibiotics Rash    Oxycodone Confusion    Ciprofloxacin     Gabapentin Dizziness    Lyrica [Pregabalin] Hallucinations     Blurry vision , hallucinations, confusion     Medical Tape Rash       PAST HISTORY    Past Medical History:   Diagnosis Date    Astigmatism     last assessed 11/15/17    Cataract     Resolved 11/15/17    Gross hematuria     Last assessed 01/28/16    Hematuria     last assessed 01/28/16    Kidney stones     Patient states he has kidney stonesin esch kidney    Lumbar vertebral fracture (HCC)     Prostate cancer ([address])     last assessed 05/18/17    Renal calculi     last assessed 08/16/12    Squamous cell carcinoma     Vitamin D deficiency     Last assessed 08/19/13       Past Surgical History:   Procedure Laterality Date    BREAST EXCISIONAL BIOPSY N/A     Benign breast tumor unsure of side over 50 yrs ago CATARACT EXTRACTION Left 08/26/2019    CATARACT EXTRACTION Right 08/12/2019    COLONOSCOPY      CYSTOSCOPY  01/28/2016    ELBOW SURGERY      Bone chip    HERNIA REPAIR  2002    IR KYPHOPLASTY/VERTEBROPLASTY  1/4/2023    IR KYPHOPLASTY/VERTEBROPLASTY  2/6/2023    IR KYPHOPLASTY/VERTEBROPLASTY  3/23/2023    LITHOTRIPSY      RETROPUBIC PROSTATECTOMY  11/13/2001    Radical with nerve sparing       Social History     Tobacco Use    Smoking status: Never    Smokeless tobacco: Never   Vaping Use    Vaping Use: Never used   Substance Use Topics    Alcohol use: Not Currently     Alcohol/week: 4.0 standard drinks of alcohol     Types: 4 Glasses of wine per week     Comment: social    Drug use: No       Family History   Problem Relation Age of Onset    Other Mother 76        Influenza    Hypertension Mother     Heart failure Father 70    Peripheral vascular disease Father     Hypertension Father     Prostate cancer Brother     Stroke Brother 48    Hypertension Brother     Hypertension Family        The following portions of the patient's history were reviewed and updated as appropriate: allergies, current medications, past family history, past medical history, past social history, past surgical history and problem list.      EXAM    Vitals:Blood pressure 120/68, pulse 74, temperature 97.7 °F (36.5 °C), temperature source Temporal, resp. rate 16, height 5' 11" (1.803 m), weight 73.9 kg (163 lb), SpO2 98 %. ,Body mass index is 22.73 kg/m². Physical Exam  Vitals and nursing note reviewed. Exam conducted with a chaperone present (Wife). Constitutional:       General: He is not in acute distress. Appearance: Normal appearance. He is obese. He is not ill-appearing or diaphoretic. Pulmonary:      Effort: Pulmonary effort is normal. No respiratory distress. Musculoskeletal:         General: Tenderness (Tenderness to palpation midline lumbosacral area) present. Skin:     General: Skin is warm and dry.    Neurological: Mental Status: He is alert and oriented to person, place, and time. Sensory: No sensory deficit. Motor: Weakness present. Gait: Gait abnormal.   Psychiatric:         Mood and Affect: Mood normal.         Behavior: Behavior normal.         Neurologic Exam     Mental Status   Oriented to person, place, and time. Level of consciousness: alert    Motor Exam   Muscle bulk: decreased    Strength   Right iliopsoas: 4/5  Left iliopsoas: 4/5  Right quadriceps: 4/5  Left quadriceps: 4/5  Right hamstrin/5  Left hamstrin/5  Right glutei: 4/5  Left glutei: 4/5  Right anterior tibial: 4/5  Left anterior tibial: 4/5  Right gastroc: 4/5  Left gastroc: 4/5    Gait, Coordination, and Reflexes     Gait  Gait: (walker , slow, guarded)      Imaging Studies           22 DXA SCAN   1. Osteoporosis. 2023  LUMBAR SPINE     INDICATION:   M54.50: Low back pain, unspecified  M81.0: Age-related osteoporosis without current pathological fracture. COMPARISON: Radiographs of the lumbar spine 2023     VIEWS: 2023   XR SPINE LUMBAR 2 OR 3 VIEWS INJURY        FINDINGS:   Unchanged levocurvature of the lumbar spine centered at L2-L3. Vertebral body cement augmentation material in the T12, L1 and L2 vertebral bodies. No acute fracture. Severe multilevel degenerative change with intervertebral disc space narrowing and facet arthrosis. Pelvic surgical clips. IMPRESSION:   No acute osseous abnormality. Multilevel degenerative change of the spine. XR ankle 2 vw left  Result Date: 2023  Narrative: LEFT ANKLE INDICATION:   Encounter for other specified special examinations. COMPARISON:  Comparison made with previous examination(s) dated (DX) 06-Oct-2023,(DX) 08-Sep-2023,(DX) 24-Aug-2023. VIEWS:  XR ANKLE 2 VW LEFT Images: 1 FINDINGS: Single AP weightbearing radiographic examination bilateral ankles. Stable alignment healing right distal fibular fracture.  No acute fracture on the left. No significant degenerative changes. No lytic or blastic osseous lesion. Soft tissues are unremarkable. Impression: Stable alignment right distal fibular fracture. Electronically signed: 11/20/2023 02:15 PM Holland Matute MPH, MD    XR ankle 3+ vw right    Result Date: 11/20/2023  Narrative: RIGHT ANKLE INDICATION:   Displaced fracture of lateral malleolus of right fibula, subsequent encounter for closed fracture with routine healing. COMPARISON:  Comparison made with previous examination(s) dated (DX) 06-Oct-2023,(DX) 08-Sep-2023,(DX) 24-Aug-2023. VIEWS:  XR ANKLE 3+ VW RIGHT Images: 3 FINDINGS: Stable alignment of healing distal fibular fracture. No significant degenerative changes. No lytic or blastic osseous lesion. There are atherosclerotic calcifications. Soft tissues are otherwise unremarkable. Impression: Stable alignment distal fibular fracture. Electronically signed: 11/20/2023 11:43 AM Holland Matute MPH, MD    CT cervical spine without contrast    Result Date: 11/13/2023  Narrative: CT CERVICAL SPINE - WITHOUT CONTRAST INDICATION:   fall down 2 steps. COMPARISON:  None. TECHNIQUE:  CT examination of the cervical spine was performed without intravenous contrast.  Contiguous axial images were obtained. Multiplanar 2D reformatted images were created from the source data. Radiation dose length product (DLP) for this visit:  462.64 mGy-cm . This examination, like all CT scans performed in the [facility], was performed utilizing techniques to minimize radiation dose exposure, including the use of iterative  reconstruction and automated exposure control. IMAGE QUALITY:  Diagnostic. FINDINGS: ALIGNMENT:  Normal alignment of the cervical spine. No subluxation. VERTEBRAE:  No fracture. DEGENERATIVE CHANGES:  No significant cervical degenerative changes are noted. PREVERTEBRAL AND PARASPINAL SOFT TISSUES: Dense bilateral carotid bifurcation calcifications.  Left mastoid effusion fully visualized on the CT head study. THORACIC INLET: Biapical pleural/parenchymal scarring and pleural calcifications. Impression: No cervical spine fracture or traumatic malalignment. Workstation performed: [id number]     CT head without contrast    Result Date: 11/13/2023  Narrative: CT BRAIN - WITHOUT CONTRAST INDICATION:   Head trauma, minor (Age >= 65y) head injury. "56-year-old male presents with valuation of injury sustained after he fell backwards down 2 steps when walking into his house from the garage. This was witnessed by his wife and she states that there was no loss of consciousness, no seizure activity no vomiting. The patient is without complaint at this time aside from a small skin tear to his right elbow. Patient denies headache. Patient denies any preceding lightheadedness dizziness chest pain or shortness of breath. The patient denies any numbness or tingling. " COMPARISON:  None. TECHNIQUE:  CT examination of the brain was performed. Multiplanar 2D reformatted images were created from the source data. Radiation dose length product (DLP) for this visit:  889.11 mGy-cm . This examination, like all CT scans performed in the [facility], was performed utilizing techniques to minimize radiation dose exposure, including the use of iterative  reconstruction and automated exposure control. IMAGE QUALITY:  Diagnostic. FINDINGS: PARENCHYMA: Decreased attenuation is noted in periventricular and subcortical white matter demonstrating an appearance that is statistically most likely to represent mild microangiopathic change. Unchanged chronic basal ganglia lacunar infarcts. No CT signs of acute infarction. No intracranial mass, mass effect or midline shift. No acute parenchymal hemorrhage. VENTRICLES AND EXTRA-AXIAL SPACES:  Normal for the patient's age. VISUALIZED ORBITS: Normal visualized orbits. PARANASAL SINUSES: Normal visualized paranasal sinuses. CALVARIUM AND EXTRACRANIAL SOFT TISSUES: Left mastoid effusion. No acute fracture. Impression: No acute intracranial abnormality. Workstation performed: [id number]       I have personally reviewed pertinent reports. and I have personally reviewed pertinent films in PACS    I have spent a total time of 35 minutes on 12/03/23 in caring for this patient including Diagnostic results, Risks and benefits of tx options, Instructions for management, Patient and family education, Importance of tx compliance, Risk factor reductions, Impressions, Counseling / Coordination of care, Documenting in the medical record, Reviewing / ordering tests, medicine, procedures  , Obtaining or reviewing history  , and Communicating with other healthcare professionals .

## 2023-12-03 PROBLEM — M54.50 ACUTE LOW BACK PAIN: Status: ACTIVE | Noted: 2023-12-03

## 2023-12-03 NOTE — ASSESSMENT & PLAN NOTE
As addressed in HPI  He presents with the acute onset of low back pain starting on 11/25/2023 he telephoned the office on 11/27/2023 reporting the same and requesting an appointment, as addressed in HPI. He does have a longstanding history of lumbar stenosis with neurogenic claudication and is well-known to Dr. Lucinda Monk office receiving interventional treatments for this pain. With his new onset acute low back pain without radicular symptoms in the setting of osteoporosis there is concern for a possible compression fracture. He has a history of compression fractures and kyphoplasty in the past.  He admits to TTP in low back lumbar sacral area and this pain occurs when he is rising from sitting to standing and walking he denies the pain when he is sitting idle. He denies bowel or bladder dysfunction from his baseline, weakness in his lower extremities, or saddle anesthesia. He ambulates at baseline with a walker. 12/1/2023  LUMBAR SPINE--   No acute fracture. Vertebral body cement augmentation material in the T12, L1 and L2 vertebral bodies. Unchanged levocurvature of the lumbar spine centered at L2-L3. Severe multilevel degenerative change with intervertebral disc space narrowing and facet arthrosis. Multilevel degenerative change of the spine. Plan  Order x-ray lumbar spine, advised patient to complete today if possible. Patient completed the x-ray after the visit. I received report late afternoon. Telephoned wife and patient and reviewed imaging results, no evidence of acute fracture or compression fracture. Advised to continue medicinal treatment acetaminophen and consider increasing "methocarbamol dosing has 500 mg tablets can increase to twice daily although treatment is usually 3 times daily. Discussed in detail with wife to slowly start increasing the medication therefore start with twice daily (AM and PM).   Advised wife to continue acetaminophen dosing currently Tylenol arthritis 650 mg 2 tablets in the morning and 2 tablets in the evening. Discussed with wife MRI lumbar spine is not indicated at this time, wife in agreement. Wife  understands his chronic history of multilevel lumbar degenerative spine disease. Wife wishes for patient to start physical therapy at this time. She request therapy at [facility] as patient is well-known to this facility and is currently undergoing therapy for his right ankle. Ordered physical therapy for lumbar spine core strengthening and lumbar paraspinal strengthening, [facility] who has been working with the patient. Wife reports patient has an appointment at the rehab center for his ankle and she will verify receipt of the new order. Discussed with wife she should consider calling Dr. Garcia Balmorhea office and advised of the new onset acute low back pain so he can consider the need for interventional treatment. Advised wife if patient has additional questions or concerns please contact the neurosurgery office. Advised wife if symptoms fail to improve with multimodal treatment physical therapy and pain management to please contact the office for further review fMRI indicated. Reviewed red flag signs and advised if patient develops to report to emergency room for assessment.

## 2024-01-03 DIAGNOSIS — M47.816 LUMBAR SPONDYLOSIS: Primary | ICD-10-CM

## 2024-01-03 DIAGNOSIS — G89.4 CHRONIC PAIN SYNDROME: ICD-10-CM

## 2024-01-03 RX ORDER — DULOXETIN HYDROCHLORIDE 20 MG/1
20 CAPSULE, DELAYED RELEASE ORAL DAILY
Qty: 30 CAPSULE | Refills: 0 | Status: SHIPPED | OUTPATIENT
Start: 2024-01-03

## 2024-01-29 ENCOUNTER — TELEPHONE (OUTPATIENT)
Age: 84
End: 2024-01-29

## 2024-01-29 DIAGNOSIS — G89.4 CHRONIC PAIN SYNDROME: ICD-10-CM

## 2024-01-29 DIAGNOSIS — M47.816 LUMBAR SPONDYLOSIS: ICD-10-CM

## 2024-01-29 RX ORDER — DULOXETIN HYDROCHLORIDE 30 MG/1
30 CAPSULE, DELAYED RELEASE ORAL DAILY
Qty: 30 CAPSULE | Refills: 0 | Status: SHIPPED | OUTPATIENT
Start: 2024-01-29

## 2024-01-29 NOTE — TELEPHONE ENCOUNTER
Wife called wanting to know Dr. Rice would like to increase Rx: Cymbalta 20 mg to 30 mg.  Stated the medication is helping a little and Shayan's mood is the same.    Pharmacy [facility].  Please review and advise.  Thank you

## 2024-02-05 ENCOUNTER — TELEPHONE (OUTPATIENT)
Age: 84
End: 2024-02-05

## 2024-02-05 NOTE — TELEPHONE ENCOUNTER
Patient wife called stating that since patient started taking Duloxetine 20 mg patient stated having softer stool. But once patient started taking Duloxetine 30 mg patient has started getting diarrhea. Patient had diarrhea twice on Sunday and once on Saturday. She did stat he is okay today but wife would like to know if she is able to give patient one of her medication that was prescribed to her which is Cholestyramine.

## 2024-02-07 ENCOUNTER — TELEPHONE (OUTPATIENT)
Dept: FAMILY MEDICINE CLINIC | Facility: CLINIC | Age: 84
End: 2024-02-07

## 2024-02-07 NOTE — TELEPHONE ENCOUNTER
Patient wife tried the cholestyramine this morning and he now has diarrhea now again today. She wants to know how to proceed

## 2024-02-23 ENCOUNTER — OFFICE VISIT (OUTPATIENT)
Dept: FAMILY MEDICINE CLINIC | Facility: CLINIC | Age: 84
End: 2024-02-23
Payer: MEDICARE

## 2024-02-23 VITALS
SYSTOLIC BLOOD PRESSURE: 102 MMHG | DIASTOLIC BLOOD PRESSURE: 52 MMHG | BODY MASS INDEX: 27.03 KG/M2 | HEIGHT: 67 IN | HEART RATE: 57 BPM | RESPIRATION RATE: 16 BRPM | TEMPERATURE: 96.9 F | OXYGEN SATURATION: 99 % | WEIGHT: 172.2 LBS

## 2024-02-23 DIAGNOSIS — R45.89 DEPRESSED MOOD: ICD-10-CM

## 2024-02-23 DIAGNOSIS — Z00.00 ENCOUNTER FOR MEDICARE ANNUAL WELLNESS EXAM: Primary | ICD-10-CM

## 2024-02-23 DIAGNOSIS — M79.18 MYOFASCIAL PAIN SYNDROME: ICD-10-CM

## 2024-02-23 DIAGNOSIS — R19.5 LOOSE STOOLS: ICD-10-CM

## 2024-02-23 PROBLEM — S82.891D CLOSED FRACTURE OF RIGHT ANKLE WITH ROUTINE HEALING: Status: RESOLVED | Noted: 2023-08-24 | Resolved: 2024-02-23

## 2024-02-23 PROCEDURE — 99213 OFFICE O/P EST LOW 20 MIN: CPT | Performed by: FAMILY MEDICINE

## 2024-02-23 PROCEDURE — G0439 PPPS, SUBSEQ VISIT: HCPCS | Performed by: FAMILY MEDICINE

## 2024-02-23 RX ORDER — CHOLESTYRAMINE 4 G/9G
1 POWDER, FOR SUSPENSION ORAL
Qty: 60 PACKET | Refills: 1 | Status: SHIPPED | OUTPATIENT
Start: 2024-02-23

## 2024-02-23 RX ORDER — BUPROPION HYDROCHLORIDE 100 MG/1
100 TABLET, EXTENDED RELEASE ORAL
Qty: 30 TABLET | Refills: 0 | Status: SHIPPED | OUTPATIENT
Start: 2024-02-23

## 2024-02-23 NOTE — PATIENT INSTRUCTIONS
Medicare Preventive Visit Patient Instructions  Thank you for completing your Welcome to Medicare Visit or Medicare Annual Wellness Visit today. Your next wellness visit will be due in one year (2/23/2025).  The screening/preventive services that you may require over the next 5-10 years are detailed below. Some tests may not apply to you based off risk factors and/or age. Screening tests ordered at today's visit but not completed yet may show as past due. Also, please note that scanned in results may not display below.  Preventive Screenings:  Service Recommendations Previous Testing/Comments   Colorectal Cancer Screening  Colonoscopy    Fecal Occult Blood Test (FOBT)/Fecal Immunochemical Test (FIT)  Fecal DNA/Cologuard Test  Flexible Sigmoidoscopy Age: 45-75 years old   Colonoscopy: every 10 years (May be performed more frequently if at higher risk)  OR  FOBT/FIT: every 1 year  OR  Cologuard: every 3 years  OR  Sigmoidoscopy: every 5 years  Screening may be recommended earlier than age 45 if at higher risk for colorectal cancer. Also, an individualized decision between you and your healthcare provider will decide whether screening between the ages of 76-85 would be appropriate. Colonoscopy: Not on file  FOBT/FIT: Not on file  Cologuard: Not on file  Sigmoidoscopy: Not on file          Prostate Cancer Screening Individualized decision between patient and health care provider in men between ages of 55-69   Medicare will cover every 12 months beginning on the day after your 50th birthday PSA: <0.1 ng/mL     History Prostate Cancer  Screening Not Indicated     Hepatitis C Screening Once for adults born between 1945 and 1965  More frequently in patients at high risk for Hepatitis C Hep C Antibody: Not on file        Diabetes Screening 1-2 times per year if you're at risk for diabetes or have pre-diabetes Fasting glucose: 107 mg/dL (3/9/2023)  A1C: 6.0 % (8/21/2023)  Screening Current   Cholesterol Screening Once every 5  years if you don't have a lipid disorder. May order more often based on risk factors. Lipid panel: 08/25/2023  Screening Current      Other Preventive Screenings Covered by Medicare:  Abdominal Aortic Aneurysm (AAA) Screening: covered once if your at risk. You're considered to be at risk if you have a family history of AAA or a male between the age of 65-75 who smoking at least 100 cigarettes in your lifetime.  Lung Cancer Screening: covers low dose CT scan once per year if you meet all of the following conditions: (1) Age 55-77; (2) No signs or symptoms of lung cancer; (3) Current smoker or have quit smoking within the last 15 years; (4) You have a tobacco smoking history of at least 20 pack years (packs per day x number of years you smoked); (5) You get a written order from a healthcare provider.  Glaucoma Screening: covered annually if you're considered high risk: (1) You have diabetes OR (2) Family history of glaucoma OR (3)  aged 50 and older OR (4)  American aged 65 and older  Osteoporosis Screening: covered every 2 years if you meet one of the following conditions: (1) Have a vertebral abnormality; (2) On glucocorticoid therapy for more than 3 months; (3) Have primary hyperparathyroidism; (4) On osteoporosis medications and need to assess response to drug therapy.  HIV Screening: covered annually if you're between the age of 15-65. Also covered annually if you are younger than 15 and older than 65 with risk factors for HIV infection. For pregnant patients, it is covered up to 3 times per pregnancy.    Immunizations:  Immunization Recommendations   Influenza Vaccine Annual influenza vaccination during flu season is recommended for all persons aged >= 6 months who do not have contraindications   Pneumococcal Vaccine   * Pneumococcal conjugate vaccine = PCV13 (Prevnar 13), PCV15 (Vaxneuvance), PCV20 (Prevnar 20)  * Pneumococcal polysaccharide vaccine = PPSV23 (Pneumovax) Adults 19-63 yo  with certain risk factors or if 65+ yo  If never received any pneumonia vaccine: recommend Prevnar 20 (PCV20)  Give PCV20 if previously received 1 dose of PCV13 or PPSV23   Hepatitis B Vaccine 3 dose series if at intermediate or high risk (ex: diabetes, end stage renal disease, liver disease)   Respiratory syncytial virus (RSV) Vaccine - COVERED BY MEDICARE PART D  * RSVPreF3 (Arexvy) CDC recommends that adults 60 years of age and older may receive a single dose of RSV vaccine using shared clinical decision-making (SCDM)   Tetanus (Td) Vaccine - COST NOT COVERED BY MEDICARE PART B Following completion of primary series, a booster dose should be given every 10 years to maintain immunity against tetanus. Td may also be given as tetanus wound prophylaxis.   Tdap Vaccine - COST NOT COVERED BY MEDICARE PART B Recommended at least once for all adults. For pregnant patients, recommended with each pregnancy.   Shingles Vaccine (Shingrix) - COST NOT COVERED BY MEDICARE PART B  2 shot series recommended in those 19 years and older who have or will have weakened immune systems or those 50 years and older     Health Maintenance Due:      Topic Date Due   • DXA SCAN  12/28/2024     Immunizations Due:      Topic Date Due   • COVID-19 Vaccine (4 - 2023-24 season) 09/01/2023     Advance Directives   What are advance directives?  Advance directives are legal documents that state your wishes and plans for medical care. These plans are made ahead of time in case you lose your ability to make decisions for yourself. Advance directives can apply to any medical decision, such as the treatments you want, and if you want to donate organs.   What are the types of advance directives?  There are many types of advance directives, and each state has rules about how to use them. You may choose a combination of any of the following:  Living will:  This is a written record of the treatment you want. You can also choose which treatments you do not  want, which to limit, and which to stop at a certain time. This includes surgery, medicine, IV fluid, and tube feedings.   Durable power of  for healthcare (DPAHC):  This is a written record that states who you want to make healthcare choices for you when you are unable to make them for yourself. This person, called a proxy, is usually a family member or a friend. You may choose more than 1 proxy.  Do not resuscitate (DNR) order:  A DNR order is used in case your heart stops beating or you stop breathing. It is a request not to have certain forms of treatment, such as CPR. A DNR order may be included in other types of advance directives.  Medical directive:  This covers the care that you want if you are in a coma, near death, or unable to make decisions for yourself. You can list the treatments you want for each condition. Treatment may include pain medicine, surgery, blood transfusions, dialysis, IV or tube feedings, and a ventilator (breathing machine).  Values history:  This document has questions about your views, beliefs, and how you feel and think about life. This information can help others choose the care that you would choose.  Why are advance directives important?  An advance directive helps you control your care. Although spoken wishes may be used, it is better to have your wishes written down. Spoken wishes can be misunderstood, or not followed. Treatments may be given even if you do not want them. An advance directive may make it easier for your family to make difficult choices about your care.   Depression   Depression  is a medical condition that causes feelings of sadness or hopelessness that do not go away. Depression may cause you to lose interest in things you used to enjoy. These feelings may interfere with your daily life.  Call your local emergency number (911 in the [place]) if:   You think about harming yourself or someone else.  You have done something on purpose to hurt yourself.  The  following resources are available at any time to help you, if needed:   National Suicide Prevention Lifeline: 1-967.876.8381 (7-628-053-TALK)   Suicide Hotline: 1-176.182.7466 (1-651-IJJDOAU)   For a list of international numbers: https://save.org/find-help/international-resources/  Treatment for depression may include medicine to relieve depression. Medicine is often used together with therapy. Therapy is a way for you to talk about your feelings and anything that may be causing depression. Therapy can be done alone or in a group. It may also be done with family members or a significant other.  Get regular physical activity.    Create a regular sleep schedule.    Eat a variety of healthy foods.    Do not drink alcohol or use drugs.     Fall Prevention    Fall prevention  includes ways to make your home and other areas safer. It also includes ways you can move more carefully to prevent a fall. Health conditions that cause changes in your blood pressure, vision, or muscle strength and coordination may increase your risk for falls. Medicines may also increase your risk for falls if they make you dizzy, weak, or sleepy.   Fall prevention tips:   Stand or sit up slowly.    Use assistive devices as directed.    Wear shoes that fit well and have soles that .    Wear a personal alarm.    Stay active.    Manage your medical conditions.    Home Safety Tips:  Add items to prevent falls in the bathroom.    Keep paths clear.    Install bright lights in your home.    Keep items you use often on shelves within reach.    Paint or place reflective tape on the edges of your stairs.       © Copyright [employer] 2018 Information is for End User's use only and may not be sold, redistributed or otherwise used for commercial purposes. All illustrations and images included in CareNotes® are the copyrighted property of [employer] or [employer]

## 2024-02-23 NOTE — PROGRESS NOTES
Assessment and Plan:   Shayan was seen today for medicare wellness visit.    Diagnoses and all orders for this visit:    Encounter for Medicare annual wellness exam    Loose stools  -     cholestyramine (QUESTRAN) 4 g packet; Take 1 packet (4 g total) by mouth 3 (three) times a day with meals    Myofascial pain syndrome  -     buPROPion (Wellbutrin SR) 100 mg 12 hr tablet; Take 1 tablet (100 mg total) by mouth daily in the early morning    Depressed mood  -     buPROPion (Wellbutrin SR) 100 mg 12 hr tablet; Take 1 tablet (100 mg total) by mouth daily in the early morning       Problem List Items Addressed This Visit          Musculoskeletal and Integument    Myofascial pain syndrome    Relevant Medications    buPROPion (Wellbutrin SR) 100 mg 12 hr tablet     Other Visit Diagnoses       Encounter for Medicare annual wellness exam    -  Primary    Loose stools        Relevant Medications    cholestyramine (QUESTRAN) 4 g packet    Depressed mood        Relevant Medications    buPROPion (Wellbutrin SR) 100 mg 12 hr tablet            Depression Screening and Follow-up Plan: Patient's depression screening was positive with a PHQ-2 score of 4. Their PHQ-9 score was 7. Patient assessed for underlying major depression. Brief counseling provided and recommend additional follow-up/re-evaluation next office visit. Depression likely due to other medical condition. Will treat underlying condition. Patient advised to follow-up with PCP for further management.     Falls Plan of Care: balance, strength, and gait training instructions were provided. Home safety education provided.       Preventive health issues were discussed with patient, and age appropriate screening tests were ordered as noted in patient's After Visit Summary.  Personalized health advice and appropriate referrals for health education or preventive services given if needed, as noted in patient's After Visit Summary.     History of Present Illness:     Patient  presents for a Medicare Wellness Visit    Patient is a 84-year-old male here for wellness and follow-up on multiple medical issues.  Accompanied by his wife.  Recent trial of duloxetine did not go well.(loose stool) cholestyramine was helpful.  Still encouraged and would like to consider next step/alternative with regards to mood improvement    Elevation  Chief Complaint   Patient presents with    Medicare Wellness Visit      Patient Care Team:  Guido Morelos DO as PCP - General  Torri Moseley MD as PCP - Endocrinology (Endocrinology)  Ronn Kent MD     Review of Systems:     Review of Systems   Gastrointestinal:  Positive for constipation (with Ca Channel).        Loose stool with duloxetine, trying to regulate the stool   Musculoskeletal:  Positive for arthralgias (Continued improvement with back pain) and myalgias.   Psychiatric/Behavioral:  Positive for dysphoric mood.      Used cholestyramine.   Problem List:     Patient Active Problem List   Diagnosis    PVC's (premature ventricular contractions)    Thoracic aortic aneurysm without rupture ([facility])    Nonrheumatic aortic valve insufficiency    Non-rheumatic mitral regurgitation    Malignant tumor of prostate ([facility])    Spinal stenosis of lumbar region with neurogenic claudication    Lumbar radiculopathy    Chronic bilateral low back pain without sciatica    Chronic pain syndrome    Renal cyst, acquired, left    Aortic root dilatation ([facility])    Myofascial pain syndrome    Stage 3a chronic kidney disease ([facility])    Lumbar spondylosis    Sacroiliac joint dysfunction of both sides    Fracture of lumbar spine ([facility])    Urinary frequency    Vitamin D deficiency    Osteoporosis    Ambulatory dysfunction    Macrocytosis    Hypertensive disorder    Age-related osteoporosis with current pathological fracture of vertebra ([facility])    T12 compression fracture, initial encounter ([facility])    Fall    Closed fracture of right distal fibula    Closed displaced fracture of  lateral malleolus of right fibula with routine healing    Squamous cell carcinoma    Acute low back pain      Past Medical and Surgical History:     Past Medical History:   Diagnosis Date    Astigmatism     last assessed 11/15/17    Cataract     Resolved 11/15/17    Gross hematuria     Last assessed 01/28/16    Hematuria     last assessed 01/28/16    Kidney stones     Patient states he has kidney stonesin esch kidney    Lumbar vertebral fracture ([facility])     Prostate cancer ([facility])     last assessed 05/18/17    Renal calculi     last assessed 08/16/12    Squamous cell carcinoma     Vitamin D deficiency     Last assessed 08/19/13     Past Surgical History:   Procedure Laterality Date    BREAST EXCISIONAL BIOPSY N/A     Benign breast tumor unsure of side over 50 yrs ago    CATARACT EXTRACTION Left 08/26/2019    CATARACT EXTRACTION Right 08/12/2019    COLONOSCOPY      CYSTOSCOPY  01/28/2016    ELBOW SURGERY      Bone chip    HERNIA REPAIR  2002    IR KYPHOPLASTY/VERTEBROPLASTY  1/4/2023    IR KYPHOPLASTY/VERTEBROPLASTY  2/6/2023    IR KYPHOPLASTY/VERTEBROPLASTY  3/23/2023    LITHOTRIPSY      RETROPUBIC PROSTATECTOMY  11/13/2001    Radical with nerve sparing      Family History:     Family History   Problem Relation Age of Onset    Other Mother 68        Influenza    Hypertension Mother     Heart failure Father 71    Peripheral vascular disease Father     Hypertension Father     Prostate cancer Brother     Stroke Brother 50    Hypertension Brother     Hypertension Family       Social History:     Social History     Socioeconomic History    Marital status: /Civil Union     Spouse name: None    Number of children: None    Years of education: None    Highest education level: None   Occupational History    None   Tobacco Use    Smoking status: Never    Smokeless tobacco: Never   Vaping Use    Vaping status: Never Used   Substance and Sexual Activity    Alcohol use: Not Currently     Alcohol/week: 4.0 standard drinks of  alcohol     Types: 4 Glasses of wine per week     Comment: social    Drug use: No    Sexual activity: Yes     Partners: Female   Other Topics Concern    None   Social History Narrative    None     Social Determinants of Health     Financial Resource Strain: Low Risk  (2/16/2024)    Overall Financial Resource Strain (CARDIA)     Difficulty of Paying Living Expenses: Not hard at all   Food Insecurity: No Food Insecurity (8/24/2023)    Hunger Vital Sign     Worried About Running Out of Food in the Last Year: Never true     Ran Out of Food in the Last Year: Never true   Transportation Needs: No Transportation Needs (2/16/2024)    PRAPARE - Transportation     Lack of Transportation (Medical): No     Lack of Transportation (Non-Medical): No   Physical Activity: Not on file   Stress: Not on file   Social Connections: Not on file   Intimate Partner Violence: Not on file   Housing Stability: Low Risk  (8/24/2023)    Housing Stability Vital Sign     Unable to Pay for Housing in the Last Year: No     Number of Places Lived in the Last Year: 1     Unstable Housing in the Last Year: No      Medications and Allergies:     Current Outpatient Medications   Medication Sig Dispense Refill    Acetaminophen (TYLENOL ARTHRITIS PAIN PO) Take 1,300 mg by mouth 2 (two) times a day      buPROPion (Wellbutrin SR) 100 mg 12 hr tablet Take 1 tablet (100 mg total) by mouth daily in the early morning 30 tablet 0    calcium citrate (CALCITRATE) 950 (200 Ca) MG tablet Take 1 tablet by mouth daily 1,200 mg daily      cholecalciferol (VITAMIN D3) 1,000 units tablet Take 3,000 Units by mouth daily 2,000 IU  daily      cholestyramine (QUESTRAN) 4 g packet Take 1 packet (4 g total) by mouth 3 (three) times a day with meals 60 packet 1    docusate sodium (Colace) 100 mg capsule Take 1 capsule (100 mg total) by mouth 2 (two) times a day (Patient taking differently: Take 100 mg by mouth 2 (two) times a day pt uses as needed only)  0    metoprolol  succinate (TOPROL-XL) 25 mg 24 hr tablet Take 1 tablet (25 mg total) by mouth daily (Patient taking differently: Take 12.5 mg by mouth daily at bedtime For BP > 120) 90 tablet 0    Myrbetriq 25 MG TB24 TAKE ONE TABLET BY MOUTH EVERY DAY IN THE MORNING 30 tablet 5    verapamil (CALAN-SR) 240 mg CR tablet TAKE 1 TABLET BY MOUTH DAILY AT BEDTIME 90 tablet 1     Current Facility-Administered Medications   Medication Dose Route Frequency Provider Last Rate Last Admin    denosumab (PROLIA) subcutaneous injection 60 mg  60 mg Subcutaneous Q6 Months Torri Moseley MD   60 mg at 09/26/23 1442     Allergies   Allergen Reactions    Sulfa Antibiotics Rash    Oxycodone Confusion    Ciprofloxacin     Gabapentin Dizziness    Lyrica [Pregabalin] Hallucinations     Blurry vision , hallucinations, confusion     Medical Tape Rash      Immunizations:     Immunization History   Administered Date(s) Administered    COVID-19 MODERNA VACC 0.5 ML IM 01/21/2021, 02/19/2021, 11/16/2021    INFLUENZA 10/01/2022    Influenza Split High Dose Preservative Free IM 10/05/2012, 09/17/2013, 09/15/2014, 09/28/2015, 10/03/2016, 10/10/2017    Influenza, high dose seasonal 0.7 mL 09/28/2018, 10/23/2019, 10/06/2020, 10/15/2021, 10/22/2022, 11/08/2023    Pneumococcal Conjugate 13-Valent 10/28/2015    Pneumococcal Polysaccharide PPV23 09/18/2008, 11/29/2018    Zoster 08/18/2011, 04/09/2013      Health Maintenance:         Topic Date Due    DXA SCAN  12/28/2024         Topic Date Due    COVID-19 Vaccine (4 - 2023-24 season) 09/01/2023      Medicare Screening Tests and Risk Assessments:     Edison is here for his Subsequent Wellness visit.     Health Risk Assessment:   Patient rates overall health as good. Patient feels that their physical health rating is same. Patient is very satisfied with their life. Eyesight was rated as slightly worse. Hearing was rated as same. Patient feels that their emotional and mental health rating is slightly worse. Patients states  they are never, rarely angry. Patient states they are sometimes unusually tired/fatigued. Pain experienced in the last 7 days has been some. Patient's pain rating has been 4/10. Patient states that he has experienced no weight loss or gain in last 6 months.     Depression Screening:   PHQ-2 Score: 4  PHQ-9 Score: 7      Fall Risk Screening:   In the past year, patient has experienced: history of falling in past year    Number of falls: 1  Injured during fall?: Yes    Feels unsteady when standing or walking?: No    Worried about falling?: No      Home Safety:  Patient does not have trouble with stairs inside or outside of their home. Patient has working smoke alarms and has working carbon monoxide detector. Home safety hazards include: none.     Nutrition:   Current diet is Regular.     Medications:   Patient is currently taking over-the-counter supplements. OTC medications include: see medication list. Patient is able to manage medications.     Activities of Daily Living (ADLs)/Instrumental Activities of Daily Living (IADLs):   Walk and transfer into and out of bed and chair?: Yes  Dress and groom yourself?: Yes    Bathe or shower yourself?: Yes    Feed yourself? Yes  Do your laundry/housekeeping?: No  Manage your money, pay your bills and track your expenses?: Yes  Make your own meals?: No    Do your own shopping?: No    Previous Hospitalizations:   Any hospitalizations or ED visits within the last 12 months?: Yes    How many hospitalizations have you had in the last year?: 1-2    Advance Care Planning:   Living will: No    Durable POA for healthcare: Yes    Advanced directive: No      Cognitive Screening:   Provider or family/friend/caregiver concerned regarding cognition?: No    PREVENTIVE SCREENINGS      Cardiovascular Screening:    General: Screening Current      Diabetes Screening:     General: Screening Current      Colorectal Cancer Screening:     General: Screening Not Indicated      Prostate Cancer  "Screening:    General: History Prostate Cancer and Screening Not Indicated      Osteoporosis Screening:    General: Screening Not Indicated      Abdominal Aortic Aneurysm (AAA) Screening:        General: Screening Not Indicated      Lung Cancer Screening:     General: Screening Not Indicated      Hepatitis C Screening:    General: Screening Not Indicated    Hep C Screening Accepted: No     Screening, Brief Intervention, and Referral to Treatment (SBIRT)    Screening  Typical number of drinks in a day: 0  Typical number of drinks in a week: 0  Interpretation: Low risk drinking behavior.    AUDIT-C Screenin) How often did you have a drink containing alcohol in the past year? never  2) How many drinks did you have on a typical day when you were drinking in the past year? 0  3) How often did you have 6 or more drinks on one occasion in the past year? never    AUDIT-C Score: 0  Interpretation: Score 0-3 (male): Negative screen for alcohol misuse    Single Item Drug Screening:  How often have you used an illegal drug (including marijuana) or a prescription medication for non-medical reasons in the past year? never    Single Item Drug Screen Score: 0  Interpretation: Negative screen for possible drug use disorder    No results found.     Physical Exam:     /52   Pulse 57   Temp (!) 96.9 °F (36.1 °C) (Temporal)   Resp 16   Ht 5' 7.32\" (1.71 m)   Wt 78.1 kg (172 lb 3.2 oz)   SpO2 99%   BMI 26.71 kg/m²     Physical Exam  Constitutional:       Appearance: Normal appearance.      Comments: Quiet   HENT:      Right Ear: Tympanic membrane normal.      Left Ear: Tympanic membrane normal.      Nose: Nose normal.      Mouth/Throat:      Mouth: Mucous membranes are moist.   Eyes:      Pupils: Pupils are equal, round, and reactive to light.   Cardiovascular:      Rate and Rhythm: Normal rate and regular rhythm.   Pulmonary:      Effort: Pulmonary effort is normal.      Breath sounds: Normal breath sounds. "   Abdominal:      Palpations: Abdomen is soft.   Neurological:      Mental Status: He is alert and oriented to person, place, and time.   Psychiatric:         Mood and Affect: Mood is depressed.         Behavior: Behavior normal.         Thought Content: Thought content normal.         Judgment: Judgment normal.          Carol Morelos,

## 2024-02-29 ENCOUNTER — TELEPHONE (OUTPATIENT)
Dept: ENDOCRINOLOGY | Facility: HOSPITAL | Age: 84
End: 2024-02-29

## 2024-02-29 NOTE — TELEPHONE ENCOUNTER
Received summary of benefits for Prolia. No prior auth required. He has met his $240 Medicare Part B Deductible. There is no OOP cost to him.

## 2024-03-05 DIAGNOSIS — I49.3 PVC'S (PREMATURE VENTRICULAR CONTRACTIONS): ICD-10-CM

## 2024-03-06 RX ORDER — VERAPAMIL HYDROCHLORIDE 240 MG/1
240 TABLET, FILM COATED, EXTENDED RELEASE ORAL
Qty: 90 TABLET | Refills: 2 | Status: SHIPPED | OUTPATIENT
Start: 2024-03-06

## 2024-03-14 DIAGNOSIS — N39.41 URGE INCONTINENCE OF URINE: ICD-10-CM

## 2024-03-14 RX ORDER — MIRABEGRON 25 MG/1
25 TABLET, FILM COATED, EXTENDED RELEASE ORAL EVERY MORNING
Qty: 30 TABLET | Refills: 5 | Status: SHIPPED | OUTPATIENT
Start: 2024-03-14

## 2024-03-21 ENCOUNTER — TELEPHONE (OUTPATIENT)
Age: 84
End: 2024-03-21

## 2024-03-21 DIAGNOSIS — R45.89 DEPRESSED MOOD: ICD-10-CM

## 2024-03-21 DIAGNOSIS — M79.18 MYOFASCIAL PAIN SYNDROME: ICD-10-CM

## 2024-03-21 RX ORDER — BUPROPION HYDROCHLORIDE 100 MG/1
100 TABLET, EXTENDED RELEASE ORAL 2 TIMES DAILY
Qty: 60 TABLET | Refills: 0 | Status: SHIPPED | OUTPATIENT
Start: 2024-03-21

## 2024-03-21 NOTE — TELEPHONE ENCOUNTER
Great, will suggest increasing to twice daily with the first dose remaining in the morning and the second dose about 4 PM daily.  Quantity for 1 month sent in with update at that time

## 2024-03-21 NOTE — TELEPHONE ENCOUNTER
Jose Alberto rocha Wellbutrin is helping him but he would like to increase his dose if possible. Please advise.

## 2024-03-29 ENCOUNTER — CLINICAL SUPPORT (OUTPATIENT)
Dept: ENDOCRINOLOGY | Facility: CLINIC | Age: 84
End: 2024-03-29
Payer: MEDICARE

## 2024-03-29 ENCOUNTER — TELEPHONE (OUTPATIENT)
Dept: ENDOCRINOLOGY | Facility: CLINIC | Age: 84
End: 2024-03-29

## 2024-03-29 VITALS — DIASTOLIC BLOOD PRESSURE: 50 MMHG | SYSTOLIC BLOOD PRESSURE: 102 MMHG

## 2024-03-29 DIAGNOSIS — M80.00XA OSTEOPOROSIS WITH CURRENT PATHOLOGICAL FRACTURE, UNSPECIFIED OSTEOPOROSIS TYPE, INITIAL ENCOUNTER: Primary | ICD-10-CM

## 2024-03-29 PROCEDURE — 96372 THER/PROPH/DIAG INJ SC/IM: CPT | Performed by: INTERNAL MEDICINE

## 2024-03-29 NOTE — PROGRESS NOTES
Assessment/Plan:    Edison Mukherjee came into the [facility] today 03/29/24 to receive prolia injection.      Verbal consent obtained.  Consent given by: patient    patient states patient has been medically healthy with no underlining concerns/complications.      Edison Mukherjee presents with no symptoms today.       All insturctions were reviewed with the patient.    If the patient should have any questions/concerns, advised patient to contacted [facility].       Subjective:     History provided by: patient    Patient ID: Edison Mukherjee is a 84 y.o. male      Objective:    Vitals:    03/29/24 1124   BP: 102/50       Patient tolerated the injection well without any complications.  Injection site/s left arm.  Medication was provided by office.    Patient signed consent form yes   Patient signed ABN form yes (If no patient is not a medicare patient).   Patient waited 15 minutes after injection no (This only applies to patient's receiving first time injection).       Last Visit: Visit date not found  Next visit:Visit date not found

## 2024-04-08 ENCOUNTER — OFFICE VISIT (OUTPATIENT)
Dept: ENDOCRINOLOGY | Facility: CLINIC | Age: 84
End: 2024-04-08
Payer: MEDICARE

## 2024-04-08 VITALS
HEART RATE: 61 BPM | BODY MASS INDEX: 24.58 KG/M2 | DIASTOLIC BLOOD PRESSURE: 70 MMHG | WEIGHT: 156.6 LBS | SYSTOLIC BLOOD PRESSURE: 130 MMHG | HEIGHT: 67 IN

## 2024-04-08 DIAGNOSIS — R26.2 AMBULATORY DYSFUNCTION: ICD-10-CM

## 2024-04-08 DIAGNOSIS — M80.00XA OSTEOPOROSIS WITH CURRENT PATHOLOGICAL FRACTURE, UNSPECIFIED OSTEOPOROSIS TYPE, INITIAL ENCOUNTER: Primary | ICD-10-CM

## 2024-04-08 DIAGNOSIS — E55.9 VITAMIN D DEFICIENCY: ICD-10-CM

## 2024-04-08 DIAGNOSIS — I49.3 PVC'S (PREMATURE VENTRICULAR CONTRACTIONS): ICD-10-CM

## 2024-04-08 PROCEDURE — 99214 OFFICE O/P EST MOD 30 MIN: CPT | Performed by: INTERNAL MEDICINE

## 2024-04-08 RX ORDER — METOPROLOL SUCCINATE 25 MG/1
25 TABLET, EXTENDED RELEASE ORAL DAILY
Qty: 90 TABLET | Refills: 1 | Status: SHIPPED | OUTPATIENT
Start: 2024-04-08

## 2024-04-08 NOTE — PROGRESS NOTES
Edison Mukherjee 84 y.o. male MRN: [id number]    Encounter: [id number]      Assessment/Plan     Problem List Items Addressed This Visit          Musculoskeletal and Integument    Osteoporosis - Primary     Continue calcium and vitamin D supplementation 7 fall prevention.  Continue Prolia every 6 months-next dose will be October 1    He will be due for his next DEXA scan in December this year         Relevant Orders    Comprehensive metabolic panel    PTH, intact    Phosphorus       Care Coordination    Ambulatory dysfunction     Counseled on fall prevention-he is in PT 3 days a week            Other    Vitamin D deficiency     Continue vitamin D supplementations         Relevant Orders    Vitamin D 25 hydroxy        CC: Osteoporosis      History of Present Illness     HPI: 84-year-old male with osteoporosis-history of vertebral compression fractures and vitamin D deficiency seen in follow-up    He received his third dose of Prolia  3/29/24  Vitamin D deficiency is currently on Vitamin D3 3000 IU DAILY     CALCIUM 1200 MG DAILY , usually one serving of dairy a day     Using a walker to ambulate-denies any falls or fractures since his last visit          Review of Systems    Historical Information   Past Medical History:   Diagnosis Date    Astigmatism     last assessed 11/15/17    Cataract     Resolved 11/15/17    Gross hematuria     Last assessed 01/28/16    Hematuria     last assessed 01/28/16    Kidney stones     Patient states he has kidney stonesin esc kidney    Lumbar vertebral fracture (HCC)     Prostate cancer (HCC)     last assessed 05/18/17    Renal calculi     last assessed 08/16/12    Squamous cell carcinoma     Vitamin D deficiency     Last assessed 08/19/13     Past Surgical History:   Procedure Laterality Date    BREAST EXCISIONAL BIOPSY N/A     Benign breast tumor unsure of side over 50 yrs ago    CATARACT EXTRACTION Left 08/26/2019    CATARACT EXTRACTION Right 08/12/2019    COLONOSCOPY       CYSTOSCOPY  01/28/2016    ELBOW SURGERY      Bone chip    HERNIA REPAIR  2002    IR KYPHOPLASTY/VERTEBROPLASTY  1/4/2023    IR KYPHOPLASTY/VERTEBROPLASTY  2/6/2023    IR KYPHOPLASTY/VERTEBROPLASTY  3/23/2023    LITHOTRIPSY      RETROPUBIC PROSTATECTOMY  11/13/2001    Radical with nerve sparing     Social History   Social History     Substance and Sexual Activity   Alcohol Use Not Currently    Alcohol/week: 4.0 standard drinks of alcohol    Types: 4 Glasses of wine per week    Comment: social     Social History     Substance and Sexual Activity   Drug Use No     Social History     Tobacco Use   Smoking Status Never   Smokeless Tobacco Never     Family History:   Family History   Problem Relation Age of Onset    Other Mother 68        Influenza    Hypertension Mother     Heart failure Father 71    Peripheral vascular disease Father     Hypertension Father     Prostate cancer Brother     Stroke Brother 50    Hypertension Brother     Hypertension Family        Meds/Allergies   Current Outpatient Medications   Medication Sig Dispense Refill    Acetaminophen (TYLENOL ARTHRITIS PAIN PO) Take 1,300 mg by mouth 2 (two) times a day      buPROPion (Wellbutrin SR) 100 mg 12 hr tablet Take 1 tablet (100 mg total) by mouth 2 (two) times a day (Morning dose and 4:00pm) 60 tablet 0    calcium citrate (CALCITRATE) 950 (200 Ca) MG tablet Take 1 tablet by mouth daily 1,200 mg daily      cholecalciferol (VITAMIN D3) 1,000 units tablet Take 3,000 Units by mouth daily 2,000 IU  daily      cholestyramine (QUESTRAN) 4 g packet Take 1 packet (4 g total) by mouth 3 (three) times a day with meals 60 packet 1    docusate sodium (Colace) 100 mg capsule Take 1 capsule (100 mg total) by mouth 2 (two) times a day (Patient taking differently: Take 100 mg by mouth 2 (two) times a day pt uses as needed only)  0    metoprolol succinate (TOPROL-XL) 25 mg 24 hr tablet Take 1 tablet (25 mg total) by mouth daily (Patient taking differently: Take 12.5 mg  "by mouth daily at bedtime For BP > 120) 90 tablet 0    Mirabegron ER (Myrbetriq) 25 MG TB24 Take 25 mg by mouth every morning 30 tablet 5    verapamil (CALAN-SR) 240 mg CR tablet Take 1 tablet (240 mg total) by mouth daily at bedtime 90 tablet 2     Current Facility-Administered Medications   Medication Dose Route Frequency Provider Last Rate Last Admin    denosumab (PROLIA) subcutaneous injection 60 mg  60 mg Subcutaneous Q6 Months Torri Moseley MD   60 mg at 03/29/24 1136     Allergies   Allergen Reactions    Sulfa Antibiotics Rash    Oxycodone Confusion    Ciprofloxacin     Gabapentin Dizziness    Lyrica [Pregabalin] Hallucinations     Blurry vision , hallucinations, confusion     Medical Tape Rash       Objective   Vitals: Blood pressure 130/70, pulse 61, height 5' 7.32\" (1.71 m), weight 71 kg (156 lb 9.6 oz).    Physical Exam  Vitals reviewed.   Constitutional:       General: He is not in acute distress.     Appearance: Normal appearance. He is obese. He is not ill-appearing, toxic-appearing or diaphoretic.   HENT:      Head: Normocephalic and atraumatic.   Eyes:      General: No scleral icterus.     Extraocular Movements: Extraocular movements intact.   Cardiovascular:      Rate and Rhythm: Normal rate and regular rhythm.      Heart sounds: Normal heart sounds. No murmur heard.  Pulmonary:      Effort: Pulmonary effort is normal. No respiratory distress.      Breath sounds: Normal breath sounds. No wheezing or rales.   Musculoskeletal:      Cervical back: Neck supple.      Right lower leg: No edema.      Left lower leg: No edema.   Lymphadenopathy:      Cervical: No cervical adenopathy.   Skin:     General: Skin is warm and dry.   Neurological:      General: No focal deficit present.      Mental Status: He is alert and oriented to person, place, and time.      Gait: Gait abnormal.   Psychiatric:         Mood and Affect: Mood normal.         Behavior: Behavior normal.         Thought Content: Thought content " normal.         Judgment: Judgment normal.         The history was obtained from the review of the chart, patient and family.    Lab Results:   Lab Results   Component Value Date/Time    Potassium 4.8 10/02/2023 09:27 AM    Potassium 4.8 08/26/2023 03:55 AM    Potassium 4.9 08/25/2023 05:22 AM    Chloride 99 10/02/2023 09:27 AM    Chloride 102 08/26/2023 03:55 AM    Chloride 105 08/25/2023 05:22 AM    CO2 28 10/02/2023 09:27 AM    CO2 22 08/26/2023 03:55 AM    CO2 23 08/25/2023 05:22 AM    BUN 25 10/02/2023 09:27 AM    BUN 50 (H) 08/26/2023 03:55 AM    BUN 43 (H) 08/25/2023 05:22 AM    Creatinine 1.15 10/02/2023 09:27 AM    Creatinine 1.32 (H) 08/26/2023 03:55 AM    Creatinine 1.32 (H) 08/25/2023 05:22 AM    Calcium 9.3 10/02/2023 09:27 AM    Calcium 8.6 08/26/2023 03:55 AM    Calcium 8.5 08/25/2023 05:22 AM    eGFR 58 10/02/2023 09:27 AM    eGFR 49 08/26/2023 03:55 AM    eGFR 49 08/25/2023 05:22 AM    TSH 3RD GENERATON 2.189 08/21/2023 07:11 AM    Vit D, 25-Hydroxy 59.6 08/21/2023 07:11 AM         Imaging Studies:         Results for orders placed during the hospital encounter of 12/28/22    DXA bone density spine hip and pelvis    Impression  1.  Osteoporosis.    2.  The 10 year risk of hip fracture is 9.5% with the 10 year risk of major osteoporotic fracture being 19% as calculated by the [employer] fracture risk assessment tool (FRAX, which is based on data generated by the WHO Collaborating [employer]  for Metabolic Bone Diseases).  3.  The current NOF guidelines recommend treating patients with a T-score of -2.5 or less in the lumbar spine or hips, or in post-menopausal women and men over the age of 50 with low bone mass (osteopenia) and a FRAX 10 year risk score of >3% for hip  fracture and/or >20% for major osteoporotic fracture.    4.  The NOF recommends follow-up DXA in 1-2 years after initiating therapy for osteoporosis and every 2 years thereafter. More frequent evaluation is appropriate for  "patients with conditions associated with rapid bone loss, such as glucocorticoid  therapy. The interval between DXA screenings may be longer for individuals without major risk factors and initial T-score in the normal or upper low bone mass range.      The FRAX algorithm has certain limitations:  -FRAX has not been validated in patients currently or previously treated with pharmacotherapy for osteoporosis.  In such patients, clinical judgment must be exercised in interpreting FRAX scores.  -Prior hip, vertebral and humeral fragility fractures appear to confer greater risk of subsequent fracture than fractures at other sites (this is especially true for individuals with severe vertebral fractures), but quantification of this incremental  risk is not possible with FRAX.  -FRAX underestimates fracture risk in patients with history of multiple fragility fractures.  -FRAX may underestimate fracture risk in patients with history of frequent falls.  -It is not appropriate to use FRAX to monitor treatment response.      WHO CLASSIFICATION:  Normal (a T-score of -1.0 or higher)  Low bone mineral density (a T-score of less than -1.0 but higher than -2.5)  Osteoporosis (a T-score of -2.5 or less)  Severe osteoporosis (a T-score of -2.5 or less with a fragility fracture)                Workstation performed: [id number]            I have personally reviewed pertinent reports.      Portions of the record may have been created with voice recognition software. Occasional wrong word or \"sound a like\" substitutions may have occurred due to the inherent limitations of voice recognition software. Read the chart carefully and recognize, using context, where substitutions have occurred.    "

## 2024-04-08 NOTE — ASSESSMENT & PLAN NOTE
Continue calcium and vitamin D supplementation 7 fall prevention.  Continue Prolia every 6 months-next dose will be October 1    He will be due for his next DEXA scan in December this year

## 2024-04-12 ENCOUNTER — TELEPHONE (OUTPATIENT)
Age: 84
End: 2024-04-12

## 2024-04-12 DIAGNOSIS — G89.4 CHRONIC PAIN SYNDROME: Primary | ICD-10-CM

## 2024-04-12 DIAGNOSIS — R45.89 DEPRESSED MOOD: ICD-10-CM

## 2024-04-12 RX ORDER — BUPROPION HYDROCHLORIDE 150 MG/1
150 TABLET ORAL EVERY MORNING
Qty: 30 TABLET | Refills: 5 | Status: SHIPPED | OUTPATIENT
Start: 2024-04-12 | End: 2024-10-09

## 2024-04-12 NOTE — TELEPHONE ENCOUNTER
Patient wife called states Rx: Wellbutrin is not improving patient moods. Wife states patient is still with lack of motivation.     Please review and advice.  Thank You.

## 2024-04-12 NOTE — TELEPHONE ENCOUNTER
As long as there is been no side effects, I would like to converted to the Wellbutrin 150 mg XL form which may be more effective

## 2024-05-06 ENCOUNTER — TELEPHONE (OUTPATIENT)
Age: 84
End: 2024-05-06

## 2024-05-06 NOTE — TELEPHONE ENCOUNTER
Pt wife states the buspar is not working and that patient feels the same as before. Asking for advice. Offered an appt and wife declined.

## 2024-05-08 DIAGNOSIS — G89.4 CHRONIC PAIN SYNDROME: ICD-10-CM

## 2024-05-08 DIAGNOSIS — R45.89 DEPRESSED MOOD: ICD-10-CM

## 2024-05-08 RX ORDER — BUPROPION HYDROCHLORIDE 300 MG/1
300 TABLET ORAL EVERY MORNING
Qty: 30 TABLET | Refills: 0 | Status: SHIPPED | OUTPATIENT
Start: 2024-05-08 | End: 2024-11-04

## 2024-06-07 ENCOUNTER — TELEPHONE (OUTPATIENT)
Age: 84
End: 2024-06-07

## 2024-06-07 DIAGNOSIS — R45.89 DEPRESSED MOOD: Primary | ICD-10-CM

## 2024-06-07 RX ORDER — VENLAFAXINE HYDROCHLORIDE 37.5 MG/1
37.5 CAPSULE, EXTENDED RELEASE ORAL
Qty: 30 CAPSULE | Refills: 0 | Status: SHIPPED | OUTPATIENT
Start: 2024-06-07

## 2024-06-07 NOTE — TELEPHONE ENCOUNTER
Received call from patient's wife regarding patient's Wellbutrin.  Wife states that this medication is not helping him and she would like to take patient off of it.  States that the last few days, patient has been complaining of LE weakness and has not noticed any benefits from the medication.  Wife called about this earlier in the week and has not heard anything back.  She is concerned because it will be the weekend and patient only has 2 pills left.  States that they also have 100 mg tablets in the home as well.  Wife would like a call back today to advise on process to wean patient off of this medication.

## 2024-06-07 NOTE — TELEPHONE ENCOUNTER
Sorry multiple messages were sent about the same thing. I did make the patient's wife aware that you sent a script over for Effexor. She verbalized understanding to how you want it to be taken.     Patients wife wants to know how she should go about this.   Patient only has two pills left of the 300mg tablets of Wellbutrin but he has enough of the 100mg tablets does he drop down to 100mg only or  take 3 pills to make it 300mg       Please advise.

## 2024-06-10 ENCOUNTER — OFFICE VISIT (OUTPATIENT)
Dept: FAMILY MEDICINE CLINIC | Facility: CLINIC | Age: 84
End: 2024-06-10
Payer: MEDICARE

## 2024-06-10 VITALS
BODY MASS INDEX: 23.86 KG/M2 | SYSTOLIC BLOOD PRESSURE: 110 MMHG | WEIGHT: 152 LBS | HEIGHT: 67 IN | OXYGEN SATURATION: 95 % | TEMPERATURE: 97.6 F | DIASTOLIC BLOOD PRESSURE: 68 MMHG | HEART RATE: 66 BPM

## 2024-06-10 DIAGNOSIS — M54.50 CHRONIC BILATERAL LOW BACK PAIN WITHOUT SCIATICA: ICD-10-CM

## 2024-06-10 DIAGNOSIS — M25.551 RIGHT HIP PAIN: Primary | ICD-10-CM

## 2024-06-10 DIAGNOSIS — G89.29 CHRONIC BILATERAL LOW BACK PAIN WITHOUT SCIATICA: ICD-10-CM

## 2024-06-10 PROCEDURE — 99213 OFFICE O/P EST LOW 20 MIN: CPT | Performed by: NURSE PRACTITIONER

## 2024-06-10 NOTE — PATIENT INSTRUCTIONS
Continue with Tylenol.     Symptoms improving- can re-start PT but make them aware of recent pain.     Can use topical lidocaine ointment or patches.     If any worsening symptoms- would recommend imaging and could consider steroids if needed.     Please call the office if you are experiencing any worsening of symptoms or no symptom improvement.

## 2024-06-10 NOTE — PROGRESS NOTES
Ambulatory Visit  Name: Edison Mukherjee      : 1940      MRN: [id number]  Encounter Provider: MARE Dickens  Encounter Date: 6/10/2024   Encounter department: [facility]    Assessment & Plan   1. Right hip pain  2. Chronic bilateral low back pain without sciatica  Likely more so right SI joint/ low back pain. Symptoms overall moderately improving. Ambulation steady/stable with walker.   Continue with Tylenol.   Symptoms improving- can re-start PT but make them aware of recent pain.   Can use topical lidocaine ointment or patches.   If any worsening symptoms- would recommend imaging and could consider steroids if needed.   Please call the office if you are experiencing any worsening of symptoms or no symptom improvement.        History of Present Illness   {Disappearing Hyperlinks I Encounters * My Last Note * Since Last Visit * History :42202}  Here to discuss right hip and leg pain.     Thursday night started with right hip pain.     Was on Wellbutrin- this was switched to Effexor as Wellbutrin could cause hip pain/joint pains. He is still weaning down on Wellbutrin on 100 mg every other day for 2 weeks. Is starting Effexor as well. They are unsure of medication adjustments have anything to do with the pain.     No injury. No change in activity but does exercises 7 days a week at home and PT 3 days a week.   Pain has been better. It is there now- intensity comes and goes but has improved. Recliner has helped/ laying down has helped. Pain doesn't travel past knee. No skin changes/ no rash/ no swelling. Takes tylenol in morning and night routinely. This does help with pain.   Fall 3 weeks ago - states this is unrelated.     Leg Pain         Review of Systems   Constitutional:  Negative for chills and fever.   Eyes:  Negative for discharge.   Respiratory:  Negative for shortness of breath.    Cardiovascular:  Negative for chest pain.   Gastrointestinal:  Negative for  "constipation and diarrhea.   Genitourinary:  Negative for difficulty urinating.   Musculoskeletal:  Positive for back pain. Negative for joint swelling.   Skin:  Negative for rash.   Neurological:  Negative for headaches.   Hematological:  Negative for adenopathy.   Psychiatric/Behavioral:  The patient is not nervous/anxious.        Objective   {Disappearing Hyperlinks   Review Vitals * Enter New Vitals * Results Review * Labs * Imaging * Cardiology * Procedures * Lung Cancer Screening :98130}  /68   Pulse 66   Temp 97.6 °F (36.4 °C) (Temporal)   Ht 5' 7.3\" (1.709 m)   Wt 68.9 kg (152 lb)   SpO2 95%   BMI 23.59 kg/m²     Physical Exam  Vitals and nursing note reviewed.   Constitutional:       General: He is not in acute distress.     Appearance: Normal appearance. He is well-developed. He is not diaphoretic.   HENT:      Head: Normocephalic and atraumatic.      Right Ear: External ear normal.      Left Ear: External ear normal.   Eyes:      General: Lids are normal.         Right eye: No discharge.         Left eye: No discharge.      Conjunctiva/sclera: Conjunctivae normal.   Pulmonary:      Effort: Pulmonary effort is normal. No respiratory distress.   Musculoskeletal:         General: No deformity.        Back:    Skin:     General: Skin is warm and dry.   Neurological:      General: No focal deficit present.      Mental Status: He is alert and oriented to person, place, and time.   Psychiatric:         Speech: Speech normal.         Behavior: Behavior normal.         Thought Content: Thought content normal.         Judgment: Judgment normal.       Administrative Statements {Disappearing Hyperlinks I  Level of Service * [facility]/PCSP:[id number]}          "

## 2024-07-07 DIAGNOSIS — R45.89 DEPRESSED MOOD: ICD-10-CM

## 2024-07-07 RX ORDER — VENLAFAXINE HYDROCHLORIDE 37.5 MG/1
37.5 CAPSULE, EXTENDED RELEASE ORAL
Qty: 100 CAPSULE | Refills: 1 | Status: SHIPPED | OUTPATIENT
Start: 2024-07-07

## 2024-07-12 ENCOUNTER — OFFICE VISIT (OUTPATIENT)
Dept: CARDIOLOGY CLINIC | Facility: CLINIC | Age: 84
End: 2024-07-12
Payer: MEDICARE

## 2024-07-12 VITALS
BODY MASS INDEX: 24.45 KG/M2 | HEIGHT: 67 IN | DIASTOLIC BLOOD PRESSURE: 60 MMHG | WEIGHT: 155.8 LBS | SYSTOLIC BLOOD PRESSURE: 130 MMHG | HEART RATE: 63 BPM

## 2024-07-12 DIAGNOSIS — I77.810 AORTIC ROOT DILATATION (HCC): ICD-10-CM

## 2024-07-12 DIAGNOSIS — I10 PRIMARY HYPERTENSION: Primary | ICD-10-CM

## 2024-07-12 DIAGNOSIS — I49.3 PVC'S (PREMATURE VENTRICULAR CONTRACTIONS): ICD-10-CM

## 2024-07-12 DIAGNOSIS — I34.0 NON-RHEUMATIC MITRAL REGURGITATION: ICD-10-CM

## 2024-07-12 DIAGNOSIS — I35.1 NONRHEUMATIC AORTIC VALVE INSUFFICIENCY: ICD-10-CM

## 2024-07-12 PROCEDURE — 99214 OFFICE O/P EST MOD 30 MIN: CPT | Performed by: INTERNAL MEDICINE

## 2024-07-17 NOTE — PROGRESS NOTES
Cardiology Follow Up    Edison Mukherjee  1940  [id number]  CARDIOVASC PHYSICIAN  [address] [facility] [address]  [phone]  [phone]    1. Primary hypertension        2. Aortic root dilatation (HCC)        3. Nonrheumatic aortic valve insufficiency        4. Non-rheumatic mitral regurgitation        5. PVC's (premature ventricular contractions)            Interval History: No cardiovascular complaints.  He has recovered from his fractured ankle.  Main complaint is back pain.  He denies chest pain shortness of breath orthopnea paroxysmal nocturnal dyspnea or syncope.    Patient Active Problem List   Diagnosis    PVC's (premature ventricular contractions)    Thoracic aortic aneurysm without rupture (HCC)    Nonrheumatic aortic valve insufficiency    Non-rheumatic mitral regurgitation    Malignant tumor of prostate ([facility]ly [facility])    Spinal stenosis of lumbar region with neurogenic claudication    Lumbar radiculopathy    Chronic bilateral low back pain without sciatica    Chronic pain syndrome    Renal cyst, acquired, left    Aortic root dilatation ([facility]ly [facility])    Myofascial pain syndrome    Stage 3a chronic kidney disease ([facility]ly [facility])    Lumbar spondylosis    Sacroiliac joint dysfunction of both sides    Fracture of lumbar spine ([facility])    Urinary frequency    Vitamin D deficiency    Osteoporosis    Ambulatory dysfunction    Macrocytosis    Hypertensive disorder    Age-related osteoporosis with current pathological fracture of vertebra ([facility]ly [facility])    T12 compression fracture, initial encounter ([facility]ly [facility])    Fall    Closed fracture of right distal fibula    Closed displaced fracture of lateral malleolus of right fibula with routine healing    Squamous cell carcinoma    Acute low back pain     Past Medical History:   Diagnosis Date    Astigmatism     last assessed 11/15/17    Cataract     Resolved 11/15/17    Gross hematuria     Last assessed 01/28/16    Hematuria     last assessed 01/28/16    Kidney  stones     Patient states he has kidney stonesin esch kidney    Lumbar vertebral fracture (HCC)     Prostate cancer (HCC)     last assessed 05/18/17    Renal calculi     last assessed 08/16/12    Squamous cell carcinoma     Vitamin D deficiency     Last assessed 08/19/13     Social History     Socioeconomic History    Marital status: /Civil Union     Spouse name: Not on file    Number of children: Not on file    Years of education: Not on file    Highest education level: Not on file   Occupational History    Not on file   Tobacco Use    Smoking status: Never    Smokeless tobacco: Never   Vaping Use    Vaping status: Never Used   Substance and Sexual Activity    Alcohol use: Not Currently     Alcohol/week: 4.0 standard drinks of alcohol     Types: 4 Glasses of wine per week     Comment: social    Drug use: No    Sexual activity: Yes     Partners: Female   Other Topics Concern    Not on file   Social History Narrative    Not on file     Social Determinants of Health     Financial Resource Strain: Low Risk  (2/16/2024)    Overall Financial Resource Strain (CARDIA)     Difficulty of Paying Living Expenses: Not hard at all   Food Insecurity: No Food Insecurity (8/24/2023)    Hunger Vital Sign     Worried About Running Out of Food in the Last Year: Never true     Ran Out of Food in the Last Year: Never true   Transportation Needs: No Transportation Needs (2/16/2024)    PRAPARE - Transportation     Lack of Transportation (Medical): No     Lack of Transportation (Non-Medical): No   Physical Activity: Not on file   Stress: Not on file   Social Connections: Not on file   Intimate Partner Violence: Not on file   Housing Stability: Low Risk  (8/24/2023)    Housing Stability Vital Sign     Unable to Pay for Housing in the Last Year: No     Number of Times Moved in the Last Year: 1     Homeless in the Last Year: No      Family History   Problem Relation Age of Onset    Other Mother 68        Influenza    Hypertension  Mother     Heart failure Father 71    Peripheral vascular disease Father     Hypertension Father     Prostate cancer Brother     Stroke Brother 50    Hypertension Brother     Hypertension Family      Past Surgical History:   Procedure Laterality Date    BREAST EXCISIONAL BIOPSY N/A     Benign breast tumor unsure of side over 50 yrs ago    CATARACT EXTRACTION Left 08/26/2019    CATARACT EXTRACTION Right 08/12/2019    COLONOSCOPY      CYSTOSCOPY  01/28/2016    ELBOW SURGERY      Bone chip    HERNIA REPAIR  2002    IR KYPHOPLASTY/VERTEBROPLASTY  1/4/2023    IR KYPHOPLASTY/VERTEBROPLASTY  2/6/2023    IR KYPHOPLASTY/VERTEBROPLASTY  3/23/2023    LITHOTRIPSY      RETROPUBIC PROSTATECTOMY  11/13/2001    Radical with nerve sparing       Current Outpatient Medications:     Acetaminophen (TYLENOL ARTHRITIS PAIN PO), Take 1,300 mg by mouth 2 (two) times a day, Disp: , Rfl:     calcium citrate (CALCITRATE) 950 (200 Ca) MG tablet, Take 1 tablet by mouth daily 1,200 mg daily, Disp: , Rfl:     cholecalciferol (VITAMIN D3) 1,000 units tablet, Take 3,000 Units by mouth daily 2,000 IU  daily, Disp: , Rfl:     docusate sodium (Colace) 100 mg capsule, Take 1 capsule (100 mg total) by mouth 2 (two) times a day (Patient taking differently: Take 100 mg by mouth 2 (two) times a day pt uses as needed only), Disp: , Rfl: 0    metoprolol succinate (TOPROL-XL) 25 mg 24 hr tablet, Take 1 tablet (25 mg total) by mouth daily, Disp: 90 tablet, Rfl: 1    Mirabegron ER (Myrbetriq) 25 MG TB24, Take 25 mg by mouth every morning, Disp: 30 tablet, Rfl: 5    venlafaxine (EFFEXOR-XR) 37.5 mg 24 hr capsule, Take 1 capsule (37.5 mg total) by mouth daily with breakfast, Disp: 100 capsule, Rfl: 1    verapamil (CALAN-SR) 240 mg CR tablet, Take 1 tablet (240 mg total) by mouth daily at bedtime, Disp: 90 tablet, Rfl: 2    cholestyramine (QUESTRAN) 4 g packet, Take 1 packet (4 g total) by mouth 3 (three) times a day with meals (Patient not taking: Reported on  7/12/2024), Disp: 60 packet, Rfl: 1    Current Facility-Administered Medications:     denosumab (PROLIA) subcutaneous injection 60 mg, 60 mg, Subcutaneous, Q6 Months, Torri Moseley MD, 60 mg at 03/29/24 1136  Allergies   Allergen Reactions    Sulfa Antibiotics Rash    Oxycodone Confusion    Ciprofloxacin     Gabapentin Dizziness    Lyrica [Pregabalin] Hallucinations     Blurry vision , hallucinations, confusion     Medical Tape Rash       Labs:  No visits with results within 6 Month(s) from this visit.   Latest known visit with results is:   Appointment on 11/13/2023   Component Date Value    WBC 11/13/2023 6.68     RBC 11/13/2023 3.43 (L)     Hemoglobin 11/13/2023 11.1 (L)     Hematocrit 11/13/2023 35.7 (L)     MCV 11/13/2023 104 (H)     MCH 11/13/2023 32.4     MCHC 11/13/2023 31.1 (L)     RDW 11/13/2023 13.3     MPV 11/13/2023 11.7     Platelets 11/13/2023 193     nRBC 11/13/2023 0     Segmented % 11/13/2023 65     Immature Grans % 11/13/2023 1     Lymphocytes % 11/13/2023 21     Monocytes % 11/13/2023 10     Eosinophils Relative 11/13/2023 3     Basophils Relative 11/13/2023 0     Absolute Neutrophils 11/13/2023 4.33     Absolute Immature Grans 11/13/2023 0.08     Absolute Lymphocytes 11/13/2023 1.38     Absolute Monocytes 11/13/2023 0.68     Eosinophils Absolute 11/13/2023 0.18     Basophils Absolute 11/13/2023 0.03      Imaging: No results found.    Review of Systems:  Review of Systems   Constitutional:  Negative for fatigue.   HENT:  Negative for nosebleeds.    Eyes:  Negative for redness.   Respiratory:  Negative for chest tightness and shortness of breath.    Cardiovascular:  Negative for chest pain, palpitations and leg swelling.   Gastrointestinal:  Positive for constipation. Negative for abdominal pain.   Endocrine: Negative for polyuria.   Genitourinary:  Negative for urgency.   Musculoskeletal:  Positive for back pain. Negative for arthralgias.   Skin:  Negative for rash.   Neurological:  Negative for  dizziness and syncope.   Psychiatric/Behavioral:  Negative for confusion and sleep disturbance. The patient is not nervous/anxious.        Physical Exam:  Physical Exam  Vitals and nursing note reviewed.   Constitutional:       Appearance: Normal appearance.   HENT:      Head: Normocephalic and atraumatic.      Nose: Nose normal.      Mouth/Throat:      Mouth: Mucous membranes are moist.   Eyes:      Conjunctiva/sclera: Conjunctivae normal.   Cardiovascular:      Rate and Rhythm: Normal rate and regular rhythm.   Pulmonary:      Effort: Pulmonary effort is normal.      Breath sounds: Normal breath sounds.   Abdominal:      Palpations: Abdomen is soft.   Musculoskeletal:         General: Normal range of motion.      Cervical back: Normal range of motion.      Right lower leg: Edema present.      Left lower leg: Edema present.   Skin:     General: Skin is warm and dry.   Neurological:      General: No focal deficit present.      Mental Status: He is alert and oriented to person, place, and time.   Psychiatric:         Mood and Affect: Mood normal.         Discussion/Summary: He is currently stable from a cardiovascular standpoint.  He has an ascending aortic aneurysm of 4.4 cm.  He has good blood pressure control which includes beta-blockade.  On his most recent echocardiogram his mitral regurgitation was quantified as moderate to severe.  I should note he is asymptomatic.  At that time he also had a Zio patch which showed 11% PVCs.  He is back on a combination of metoprolol and verapamil and had no ectopy on exam today.  We will continue to monitor him.  I have made no changes.  I will see him again in 6 months.

## 2024-08-09 ENCOUNTER — RA CDI HCC (OUTPATIENT)
Dept: OTHER | Facility: HOSPITAL | Age: 84
End: 2024-08-09

## 2024-08-16 ENCOUNTER — TELEPHONE (OUTPATIENT)
Age: 84
End: 2024-08-16

## 2024-08-16 NOTE — TELEPHONE ENCOUNTER
Pts wife will not be doing the pre check in per my instructions. I said she can bypass it. She was prompted to fill out a sleep center questionnaire and she doesn't know why he would be prompted for this. I said we can address at the time of the apt. She was agreeable.

## 2024-08-23 ENCOUNTER — OFFICE VISIT (OUTPATIENT)
Dept: FAMILY MEDICINE CLINIC | Facility: CLINIC | Age: 84
End: 2024-08-23
Payer: MEDICARE

## 2024-08-23 VITALS
DIASTOLIC BLOOD PRESSURE: 60 MMHG | HEIGHT: 67 IN | WEIGHT: 156 LBS | TEMPERATURE: 97.9 F | BODY MASS INDEX: 24.48 KG/M2 | SYSTOLIC BLOOD PRESSURE: 100 MMHG | HEART RATE: 62 BPM | OXYGEN SATURATION: 95 %

## 2024-08-23 DIAGNOSIS — R45.89 DEPRESSED MOOD: ICD-10-CM

## 2024-08-23 DIAGNOSIS — I63.81 BASAL GANGLIA INFARCTION (HCC): ICD-10-CM

## 2024-08-23 DIAGNOSIS — N39.41 URGE INCONTINENCE OF URINE: ICD-10-CM

## 2024-08-23 DIAGNOSIS — I10 PRIMARY HYPERTENSION: ICD-10-CM

## 2024-08-23 DIAGNOSIS — G25.9 MOVEMENT DISORDER: Primary | ICD-10-CM

## 2024-08-23 PROCEDURE — 99214 OFFICE O/P EST MOD 30 MIN: CPT | Performed by: FAMILY MEDICINE

## 2024-08-23 PROCEDURE — G2211 COMPLEX E/M VISIT ADD ON: HCPCS | Performed by: FAMILY MEDICINE

## 2024-08-23 RX ORDER — VENLAFAXINE HYDROCHLORIDE 75 MG/1
75 CAPSULE, EXTENDED RELEASE ORAL
Qty: 30 CAPSULE | Refills: 0 | Status: SHIPPED | OUTPATIENT
Start: 2024-08-23

## 2024-08-23 RX ORDER — MIRABEGRON 50 MG/1
50 TABLET, EXTENDED RELEASE ORAL DAILY
Qty: 30 TABLET | Refills: 3 | Status: SHIPPED | OUTPATIENT
Start: 2024-08-23

## 2024-08-23 NOTE — PROGRESS NOTES
Ambulatory Visit  Name: Edison Mukherjee      : 1940      MRN: [id number]  Encounter Provider: Carol Morelos DO  Encounter Date: 2024   Encounter department: [facility] PRIMARY CARE    Assessment & Plan   1. Movement disorder  -     Ambulatory Referral to Neurology; Future  2. Depressed mood  -     venlafaxine (EFFEXOR-XR) 75 mg 24 hr capsule; Take 1 capsule (75 mg total) by mouth daily with breakfast  3. Urge incontinence of urine  -     Mirabegron ER (Myrbetriq) 50 MG TB24; Take 1 tablet (50 mg total) by mouth daily  4. Basal ganglia infarction (HCC)  -     Ambulatory Referral to Neurology; Future  5. Primary hypertension  Assessment & Plan:  Blood pressure numbers slightly low.  Could consider adjustment.  Await cardiology follow-up  Trial increase to 75 mg and follow-up clinically.  I would like to consider possible neurology opinion on gait disorder and flat affect     History of Present Illness   Chief Complaint   Patient presents with    Follow-up     6m chk up ,    Patient has been trialed on medications to improve mood and motivation.  Currently on Effexor 37.5 mg with discussions today to increase.  Previously tried duloxetine and then bupropion.  Does go to [facility] outpatient facility in [place] for ambulation dysfunction PT.  Wife states he does not carryover the PT at home.  Patient's wife will address this at his next visit with the therapist to try to push patient.  Patient sees  Endocrinology for Prolia injection.  Also follows with cardiology  Review of Systems   Gastrointestinal:  Positive for constipation (Tends to be constipated).        Prune juice and stool softener   Musculoskeletal:         Multifactorial pain-   History of lumbar radiculopathy and spondylosis, had T12 compression fracture as well as right distal fibula fracture all contributing to ambulatory dysfunction.  Kyphoplasty in early     Objective     /60   Pulse 62   Temp 97.9 °F  "(36.6 °C) (Temporal)   Ht 5' 7.3\" (1.709 m)   Wt 70.8 kg (156 lb)   SpO2 95%   BMI 24.22 kg/m²     Physical Exam  Vitals reviewed.   Constitutional:       Appearance: Normal appearance.      Comments: 84-year-old male appears slightly younger than stated age with flat facies   Cardiovascular:      Rate and Rhythm: Normal rate and regular rhythm.   Pulmonary:      Effort: Pulmonary effort is normal.      Breath sounds: Normal breath sounds.   Musculoskeletal:      Comments: Gait with walker is shuffling.  Needs constant cues to increase stride   Skin:     Findings: No rash.   Neurological:      Mental Status: He is alert and oriented to person, place, and time.      Motor: No tremor (No overt).      Gait: Gait abnormal.      Comments: No aphasia either receptive or expressive but speech is slowed and low tone   Psychiatric:         Attention and Perception: Attention normal.         Mood and Affect: Affect is flat.         Behavior: Behavior is slowed.               "

## 2024-09-06 ENCOUNTER — TELEPHONE (OUTPATIENT)
Age: 84
End: 2024-09-06

## 2024-09-06 NOTE — TELEPHONE ENCOUNTER
Patient wife is calling to let Dr. Morelos know that patients mood is the same and is asking if they should increase his medications.

## 2024-09-20 DIAGNOSIS — I63.81 BASAL GANGLIA INFARCTION (HCC): Primary | ICD-10-CM

## 2024-09-20 DIAGNOSIS — R26.2 AMBULATORY DYSFUNCTION: ICD-10-CM

## 2024-09-20 DIAGNOSIS — G25.9 MOVEMENT DISORDER: ICD-10-CM

## 2024-09-20 NOTE — TELEPHONE ENCOUNTER
I called and spoke with the patient's wife and per Dr. Rice he will take 37.5 mg daily x 2 weeks and them one capsule every other day x 1 week and be done. I have her the telephone number for the [place] physical therapy location and she will call and get him scheduled for a Neuro physical therapy appt.

## 2024-09-20 NOTE — PROGRESS NOTES
Daily Note     Today's date: 3/31/2021  Patient name: Little Camacho  : 1940  MRN: [id number]  Referring provider: Robbin Cox DO  Dx:   Encounter Diagnosis     ICD-10-CM    1  Decreased motor strength  R53 1    2  Ambulatory dysfunction  R26 2                   Subjective: Patient reports he struggles sometimes with walking due to right calf and right hip pain  Patient denies any issues with sleeping  Objective: See treatment diary below  Assessment: Patient's balance is challenged when performing additional rockerboard exercises  Tightness noted in the right hamstring and right adductors during the manual stretching  Patient will benefit from continued PT to improve his balance and lower extremity flexibility         Plan: Continue treatment as per PT plan of care         Precautions: h/o prostate CA, PVCs      Manuals  3/10 3/15 3/17 3/24 3/29 3/31      R hip PROM  10' 8' 8' 8' 8' 8'                                             Neuro Re-Ed             NBOS foam  3x30"  No UE airex  30"x3 airex  30"x3 EC 30"x3 airex  EC  30"x3 airex  EC  30"x3      Tandem walking  x2laps 12ft x4 12ft x4 12ftx6 12ft x4 12ft x4      Walking with head turns             rockerboard       a/p, m/l  15 ea      [facility] top taps   2x10 ea 20 ea 30ea 20 ea 20 ea      Marching over hurdles   12ft x6 forward and lateral  12ft x4 ea [id number] forward and lateral  12ft x4 ea forward  12ft x6      sidestepping   12ft x4 12ft x2 12ftx4 12ft x4 12ft x4                                Ther Ex             Roll outs w/ pball - fwd  5"x10 10"x10 10"x10 10"  x10 10"x10 10"x10      Seated march  x15           HR  HEP x20 20 25 25 30 30      Step up/down  6"x10  ea step up  6"  15 on R step up  6"  20 on R 8"x20 R step up on R  8" x20 step up on R  8" x20      bike   7' 8' 8' 8' 8'      Stairs   2 flights 2 flights 2 flights 2 flights 2 flights      Mini squats       20 20                                Ther Activity I would recommend she try increasing to 600 mg an then again to 900 after about 3-5 days.  She can also try adding it in the morning.  300 mg once a day is not enough Gait Training                                       Modalities

## 2024-09-20 NOTE — TELEPHONE ENCOUNTER
Patient wife calling with an update on the increased effexor dose. She states he is still the same and is asking if he needs to continue this or if he can stop it if he needs to wean off of it.

## 2024-09-24 ENCOUNTER — TELEPHONE (OUTPATIENT)
Age: 84
End: 2024-09-24

## 2024-09-24 ENCOUNTER — TELEPHONE (OUTPATIENT)
Dept: LAB | Facility: HOSPITAL | Age: 84
End: 2024-09-24

## 2024-09-24 DIAGNOSIS — E53.8 VITAMIN B12 DEFICIENCY: ICD-10-CM

## 2024-09-24 DIAGNOSIS — Z12.5 SCREENING FOR PROSTATE CANCER: Primary | ICD-10-CM

## 2024-09-24 DIAGNOSIS — D63.1 ANEMIA DUE TO STAGE 3B CHRONIC KIDNEY DISEASE  (HCC): ICD-10-CM

## 2024-09-24 DIAGNOSIS — N18.32 ANEMIA DUE TO STAGE 3B CHRONIC KIDNEY DISEASE  (HCC): ICD-10-CM

## 2024-09-24 NOTE — TELEPHONE ENCOUNTER
Patients spouse called in regards to patient hasn't got in blood work done in almost a year and would like provider to place yearly blood work for patient to get done. Patients spouse would also like provider to add a PSA order in and would like a call back once orders have been placed.

## 2024-09-27 ENCOUNTER — TELEPHONE (OUTPATIENT)
Dept: LAB | Facility: HOSPITAL | Age: 84
End: 2024-09-27

## 2024-10-08 DIAGNOSIS — I49.3 PVC'S (PREMATURE VENTRICULAR CONTRACTIONS): ICD-10-CM

## 2024-10-09 RX ORDER — METOPROLOL SUCCINATE 25 MG/1
25 TABLET, EXTENDED RELEASE ORAL DAILY
Qty: 90 TABLET | Refills: 1 | Status: SHIPPED | OUTPATIENT
Start: 2024-10-09

## 2024-10-14 ENCOUNTER — LAB (OUTPATIENT)
Dept: LAB | Facility: HOSPITAL | Age: 84
End: 2024-10-14
Attending: INTERNAL MEDICINE
Payer: MEDICARE

## 2024-10-14 DIAGNOSIS — E55.9 VITAMIN D DEFICIENCY: ICD-10-CM

## 2024-10-14 DIAGNOSIS — M80.00XA OSTEOPOROSIS WITH CURRENT PATHOLOGICAL FRACTURE, UNSPECIFIED OSTEOPOROSIS TYPE, INITIAL ENCOUNTER: ICD-10-CM

## 2024-10-14 DIAGNOSIS — E53.8 VITAMIN B12 DEFICIENCY: ICD-10-CM

## 2024-10-14 DIAGNOSIS — Z12.5 SCREENING FOR PROSTATE CANCER: ICD-10-CM

## 2024-10-14 DIAGNOSIS — D63.1 ANEMIA DUE TO STAGE 3B CHRONIC KIDNEY DISEASE  (HCC): ICD-10-CM

## 2024-10-14 DIAGNOSIS — N18.32 ANEMIA DUE TO STAGE 3B CHRONIC KIDNEY DISEASE  (HCC): ICD-10-CM

## 2024-10-14 LAB
25(OH)D3 SERPL-MCNC: 59.8 NG/ML (ref 30–100)
ALBUMIN SERPL BCG-MCNC: 3.9 G/DL (ref 3.5–5)
ALP SERPL-CCNC: 39 U/L (ref 34–104)
ALT SERPL W P-5'-P-CCNC: 9 U/L (ref 7–52)
ANION GAP SERPL CALCULATED.3IONS-SCNC: 10 MMOL/L (ref 4–13)
AST SERPL W P-5'-P-CCNC: 16 U/L (ref 13–39)
BILIRUB SERPL-MCNC: 0.51 MG/DL (ref 0.2–1)
BUN SERPL-MCNC: 25 MG/DL (ref 5–25)
CALCIUM SERPL-MCNC: 9.2 MG/DL (ref 8.4–10.2)
CHLORIDE SERPL-SCNC: 103 MMOL/L (ref 96–108)
CO2 SERPL-SCNC: 28 MMOL/L (ref 21–32)
CREAT SERPL-MCNC: 1.01 MG/DL (ref 0.6–1.3)
ERYTHROCYTE [DISTWIDTH] IN BLOOD BY AUTOMATED COUNT: 12.5 % (ref 11.6–15.1)
GFR SERPL CREATININE-BSD FRML MDRD: 67 ML/MIN/1.73SQ M
GLUCOSE P FAST SERPL-MCNC: 85 MG/DL (ref 65–99)
HCT VFR BLD AUTO: 38.2 % (ref 36.5–49.3)
HGB BLD-MCNC: 12 G/DL (ref 12–17)
MCH RBC QN AUTO: 32.2 PG (ref 26.8–34.3)
MCHC RBC AUTO-ENTMCNC: 31.4 G/DL (ref 31.4–37.4)
MCV RBC AUTO: 102 FL (ref 82–98)
PHOSPHATE SERPL-MCNC: 2.7 MG/DL (ref 2.3–4.1)
PLATELET # BLD AUTO: 192 THOUSANDS/UL (ref 149–390)
PMV BLD AUTO: 11.8 FL (ref 8.9–12.7)
POTASSIUM SERPL-SCNC: 4.6 MMOL/L (ref 3.5–5.3)
PROT SERPL-MCNC: 6.4 G/DL (ref 6.4–8.4)
PSA SERPL-MCNC: <0.008 NG/ML (ref 0–4)
PTH-INTACT SERPL-MCNC: 41.2 PG/ML (ref 12–88)
RBC # BLD AUTO: 3.73 MILLION/UL (ref 3.88–5.62)
SODIUM SERPL-SCNC: 141 MMOL/L (ref 135–147)
VIT B12 SERPL-MCNC: 403 PG/ML (ref 180–914)
WBC # BLD AUTO: 6.35 THOUSAND/UL (ref 4.31–10.16)

## 2024-10-14 PROCEDURE — 36415 COLL VENOUS BLD VENIPUNCTURE: CPT

## 2024-10-14 PROCEDURE — 83970 ASSAY OF PARATHORMONE: CPT

## 2024-10-14 PROCEDURE — 85027 COMPLETE CBC AUTOMATED: CPT

## 2024-10-14 PROCEDURE — G0103 PSA SCREENING: HCPCS

## 2024-10-14 PROCEDURE — 82306 VITAMIN D 25 HYDROXY: CPT

## 2024-10-14 PROCEDURE — 80053 COMPREHEN METABOLIC PANEL: CPT

## 2024-10-14 PROCEDURE — 84100 ASSAY OF PHOSPHORUS: CPT

## 2024-10-14 PROCEDURE — 82607 VITAMIN B-12: CPT

## 2024-10-22 ENCOUNTER — TELEPHONE (OUTPATIENT)
Age: 84
End: 2024-10-22

## 2024-10-28 ENCOUNTER — IMMUNIZATIONS (OUTPATIENT)
Dept: FAMILY MEDICINE CLINIC | Facility: CLINIC | Age: 84
End: 2024-10-28
Payer: MEDICARE

## 2024-10-28 DIAGNOSIS — Z23 ENCOUNTER FOR IMMUNIZATION: Primary | ICD-10-CM

## 2024-10-28 PROCEDURE — 90662 IIV NO PRSV INCREASED AG IM: CPT

## 2024-10-28 PROCEDURE — G0008 ADMIN INFLUENZA VIRUS VAC: HCPCS

## 2024-10-29 ENCOUNTER — OFFICE VISIT (OUTPATIENT)
Dept: ENDOCRINOLOGY | Facility: CLINIC | Age: 84
End: 2024-10-29
Payer: MEDICARE

## 2024-10-29 VITALS
HEART RATE: 65 BPM | OXYGEN SATURATION: 97 % | DIASTOLIC BLOOD PRESSURE: 70 MMHG | WEIGHT: 155 LBS | HEIGHT: 67 IN | BODY MASS INDEX: 24.33 KG/M2 | SYSTOLIC BLOOD PRESSURE: 110 MMHG

## 2024-10-29 DIAGNOSIS — N18.31 STAGE 3A CHRONIC KIDNEY DISEASE (HCC): ICD-10-CM

## 2024-10-29 DIAGNOSIS — M81.0 AGE-RELATED OSTEOPOROSIS WITHOUT CURRENT PATHOLOGICAL FRACTURE: Primary | ICD-10-CM

## 2024-10-29 DIAGNOSIS — E55.9 VITAMIN D DEFICIENCY: ICD-10-CM

## 2024-10-29 DIAGNOSIS — R26.2 AMBULATORY DYSFUNCTION: ICD-10-CM

## 2024-10-29 PROCEDURE — 99214 OFFICE O/P EST MOD 30 MIN: CPT | Performed by: INTERNAL MEDICINE

## 2024-10-29 NOTE — ASSESSMENT & PLAN NOTE
Take calcium 600 mg twice daily with meals.  Continue vitamin D3 to 3000 international units daily.  He will be due for DEXA scan in December and next Prolia will be in April 2025.  Counseled on fall prevention

## 2024-10-29 NOTE — ASSESSMENT & PLAN NOTE
Lab Results   Component Value Date    EGFR 67 10/14/2024    EGFR 58 10/02/2023    EGFR 49 08/26/2023    CREATININE 1.01 10/14/2024    CREATININE 1.15 10/02/2023    CREATININE 1.32 (H) 08/26/2023   Being monitored by PCP

## 2024-10-29 NOTE — PROGRESS NOTES
Edison Mukherjee 84 y.o. male MRN: [id number]    Encounter: [id number]      Assessment & Plan     Problem List Items Addressed This Visit          Musculoskeletal and Integument    Osteoporosis - Primary     Take calcium 600 mg twice daily with meals.  Continue vitamin D3 to 3000 international units daily.  He will be due for DEXA scan in December and next Prolia will be in April 2025.  Counseled on fall prevention         Relevant Orders    DXA bone density spine hip and pelvis    Comprehensive metabolic panel    Vitamin D 25 hydroxy       Genitourinary    Stage 3a chronic kidney disease (HCC)     Lab Results   Component Value Date    EGFR 67 10/14/2024    EGFR 58 10/02/2023    EGFR 49 08/26/2023    CREATININE 1.01 10/14/2024    CREATININE 1.15 10/02/2023    CREATININE 1.32 (H) 08/26/2023   Being monitored by PCP            Care Coordination    Ambulatory dysfunction     Uses a walker-no falls since last visit            Other    Vitamin D deficiency     Continue vitamin D supplementations         Relevant Orders    DXA bone density spine hip and pelvis    Vitamin D 25 hydroxy        CC: Osteoporosis      History of Present Illness     HPI:    84-year-old male with osteoporosis-history of vertebral compression fractures and vitamin D deficiency seen in follow-up     He received his 4TH  dose of Prolia  OCTOBER 1ST 2024    Vitamin D3 3000 IU DAILY   Calcium 1200 mg once DAILY     Uses a walker to ambulate-no falls or fractures since last visit    Review of Systems    Historical Information   Past Medical History:   Diagnosis Date    Astigmatism     last assessed 11/15/17    Cataract     Resolved 11/15/17    Gross hematuria     Last assessed 01/28/16    Hematuria     last assessed 01/28/16    Kidney stones     Patient states he has kidney stonesin [facility] kidney    Lumbar vertebral fracture (HCC)     Prostate cancer (HCC)     last assessed 05/18/17    Renal calculi     last assessed 08/16/12    Squamous cell carcinoma      Vitamin D deficiency     Last assessed 08/19/13     Past Surgical History:   Procedure Laterality Date    BREAST EXCISIONAL BIOPSY N/A     Benign breast tumor unsure of side over 50 yrs ago    CATARACT EXTRACTION Left 08/26/2019    CATARACT EXTRACTION Right 08/12/2019    COLONOSCOPY      CYSTOSCOPY  01/28/2016    ELBOW SURGERY      Bone chip    HERNIA REPAIR  2002    IR KYPHOPLASTY/VERTEBROPLASTY  1/4/2023    IR KYPHOPLASTY/VERTEBROPLASTY  2/6/2023    IR KYPHOPLASTY/VERTEBROPLASTY  3/23/2023    LITHOTRIPSY      RETROPUBIC PROSTATECTOMY  11/13/2001    Radical with nerve sparing     Social History   Social History     Substance and Sexual Activity   Alcohol Use Not Currently    Alcohol/week: 4.0 standard drinks of alcohol    Types: 4 Glasses of wine per week    Comment: social     Social History     Substance and Sexual Activity   Drug Use No     Social History     Tobacco Use   Smoking Status Never   Smokeless Tobacco Never     Family History:   Family History   Problem Relation Age of Onset    Other Mother 68        Influenza    Hypertension Mother     Heart failure Father 71    Peripheral vascular disease Father     Hypertension Father     Prostate cancer Brother     Stroke Brother 50    Hypertension Brother     Hypertension Family        Meds/Allergies   Current Outpatient Medications   Medication Sig Dispense Refill    Acetaminophen (TYLENOL ARTHRITIS PAIN PO) Take 1,300 mg by mouth 2 (two) times a day      calcium citrate (CALCITRATE) 950 (200 Ca) MG tablet Take 1 tablet by mouth daily 1,200 mg daily      cholecalciferol (VITAMIN D3) 1,000 units tablet Take 3,000 Units by mouth daily 2,000 IU  daily      docusate sodium (Colace) 100 mg capsule Take 1 capsule (100 mg total) by mouth 2 (two) times a day (Patient taking differently: Take 100 mg by mouth 2 (two) times a day pt uses as needed only)  0    metoprolol succinate (TOPROL-XL) 25 mg 24 hr tablet Take 1 tablet (25 mg total) by mouth daily 90 tablet 1     "Mirabegron ER (Myrbetriq) 50 MG TB24 Take 1 tablet (50 mg total) by mouth daily 30 tablet 3    venlafaxine (EFFEXOR-XR) 75 mg 24 hr capsule Take 1 capsule (75 mg total) by mouth daily with breakfast 30 capsule 0    verapamil (CALAN-SR) 240 mg CR tablet Take 1 tablet (240 mg total) by mouth daily at bedtime 90 tablet 2    cholestyramine (QUESTRAN) 4 g packet Take 1 packet (4 g total) by mouth 3 (three) times a day with meals (Patient not taking: Reported on 7/12/2024) 60 packet 1     Current Facility-Administered Medications   Medication Dose Route Frequency Provider Last Rate Last Admin    denosumab (PROLIA) subcutaneous injection 60 mg  60 mg Subcutaneous Q6 Months Torri Moseley MD   60 mg at 10/01/24 1208    denosumab (PROLIA) subcutaneous injection 60 mg  60 mg Subcutaneous Once          Allergies   Allergen Reactions    Sulfa Antibiotics Rash    Oxycodone Confusion    Ciprofloxacin     Gabapentin Dizziness    Lyrica [Pregabalin] Hallucinations     Blurry vision , hallucinations, confusion     Medical Tape Rash       Objective   Vitals: Blood pressure 110/70, pulse 65, height 5' 7\" (1.702 m), weight 70.3 kg (155 lb), SpO2 97%.    Physical Exam  Vitals reviewed.   Constitutional:       General: He is not in acute distress.     Appearance: Normal appearance. He is not ill-appearing, toxic-appearing or diaphoretic.   HENT:      Head: Normocephalic and atraumatic.   Eyes:      General: No scleral icterus.     Extraocular Movements: Extraocular movements intact.   Cardiovascular:      Rate and Rhythm: Normal rate and regular rhythm.      Heart sounds: Normal heart sounds. No murmur heard.  Pulmonary:      Effort: Pulmonary effort is normal. No respiratory distress.      Breath sounds: Normal breath sounds. No wheezing or rales.   Musculoskeletal:      Cervical back: Neck supple.      Right lower leg: No edema.      Left lower leg: No edema.   Lymphadenopathy:      Cervical: No cervical adenopathy.   Skin:     " General: Skin is warm and dry.   Neurological:      General: No focal deficit present.      Mental Status: He is alert and oriented to person, place, and time.      Gait: Gait normal.   Psychiatric:         Mood and Affect: Mood normal.         Behavior: Behavior normal.         Thought Content: Thought content normal.         Judgment: Judgment normal.         The history was obtained from the review of the chart, patient and family.    Lab Results:   Lab Results   Component Value Date/Time    Potassium 4.6 10/14/2024 08:30 AM    Chloride 103 10/14/2024 08:30 AM    CO2 28 10/14/2024 08:30 AM    BUN 25 10/14/2024 08:30 AM    Creatinine 1.01 10/14/2024 08:30 AM    Glucose, Fasting 85 10/14/2024 08:30 AM    Calcium 9.2 10/14/2024 08:30 AM    eGFR 67 10/14/2024 08:30 AM    PTH 41.2 10/14/2024 08:30 AM    Vit D, 25-Hydroxy 59.8 10/14/2024 08:30 AM         Imaging Studies:         Results for orders placed during the hospital encounter of 12/28/22    DXA bone density spine hip and pelvis    Impression  1.  Osteoporosis.    2.  The 10 year risk of hip fracture is 9.5% with the 10 year risk of major osteoporotic fracture being 19% as calculated by the [employer] fracture risk assessment tool (FRAX, which is based on data generated by the WHO Collaborating [employer]  for Metabolic Bone Diseases).  3.  The current NOF guidelines recommend treating patients with a T-score of -2.5 or less in the lumbar spine or hips, or in post-menopausal women and men over the age of 50 with low bone mass (osteopenia) and a FRAX 10 year risk score of >3% for hip  fracture and/or >20% for major osteoporotic fracture.    4.  The NOF recommends follow-up DXA in 1-2 years after initiating therapy for osteoporosis and every 2 years thereafter. More frequent evaluation is appropriate for patients with conditions associated with rapid bone loss, such as glucocorticoid  therapy. The interval between DXA screenings may be longer for  "individuals without major risk factors and initial T-score in the normal or upper low bone mass range.      The FRAX algorithm has certain limitations:  -FRAX has not been validated in patients currently or previously treated with pharmacotherapy for osteoporosis.  In such patients, clinical judgment must be exercised in interpreting FRAX scores.  -Prior hip, vertebral and humeral fragility fractures appear to confer greater risk of subsequent fracture than fractures at other sites (this is especially true for individuals with severe vertebral fractures), but quantification of this incremental  risk is not possible with FRAX.  -FRAX underestimates fracture risk in patients with history of multiple fragility fractures.  -FRAX may underestimate fracture risk in patients with history of frequent falls.  -It is not appropriate to use FRAX to monitor treatment response.      WHO CLASSIFICATION:  Normal (a T-score of -1.0 or higher)  Low bone mineral density (a T-score of less than -1.0 but higher than -2.5)  Osteoporosis (a T-score of -2.5 or less)  Severe osteoporosis (a T-score of -2.5 or less with a fragility fracture)                Workstation performed: [id number]            Results Review Statement: No pertinent imaging studies reviewed.    Portions of the record may have been created with voice recognition software. Occasional wrong word or \"sound a like\" substitutions may have occurred due to the inherent limitations of voice recognition software. Read the chart carefully and recognize, using context, where substitutions have occurred.    "

## 2024-11-22 ENCOUNTER — TELEPHONE (OUTPATIENT)
Dept: FAMILY MEDICINE CLINIC | Facility: CLINIC | Age: 84
End: 2024-11-22

## 2024-12-02 ENCOUNTER — TELEPHONE (OUTPATIENT)
Dept: NEUROLOGY | Facility: CLINIC | Age: 84
End: 2024-12-02

## 2024-12-02 NOTE — TELEPHONE ENCOUNTER
Confirmed PT appointment on 12/10/24 at 8am with Dr. Nelson at the [address] [place] [address] location.

## 2024-12-07 DIAGNOSIS — I49.3 PVC'S (PREMATURE VENTRICULAR CONTRACTIONS): ICD-10-CM

## 2024-12-09 RX ORDER — VERAPAMIL HYDROCHLORIDE 240 MG/1
240 TABLET, FILM COATED, EXTENDED RELEASE ORAL
Qty: 90 TABLET | Refills: 1 | Status: SHIPPED | OUTPATIENT
Start: 2024-12-09

## 2024-12-10 ENCOUNTER — CONSULT (OUTPATIENT)
Dept: NEUROLOGY | Facility: CLINIC | Age: 84
End: 2024-12-10
Payer: MEDICARE

## 2024-12-10 VITALS
SYSTOLIC BLOOD PRESSURE: 138 MMHG | HEIGHT: 67 IN | BODY MASS INDEX: 25.43 KG/M2 | WEIGHT: 162 LBS | DIASTOLIC BLOOD PRESSURE: 58 MMHG | TEMPERATURE: 98.2 F | HEART RATE: 63 BPM | OXYGEN SATURATION: 97 %

## 2024-12-10 DIAGNOSIS — G20.A1 PARKINSON'S DISEASE (HCC): Primary | ICD-10-CM

## 2024-12-10 DIAGNOSIS — G25.9 MOVEMENT DISORDER: ICD-10-CM

## 2024-12-10 DIAGNOSIS — I63.81 BASAL GANGLIA INFARCTION (HCC): ICD-10-CM

## 2024-12-10 PROCEDURE — 99205 OFFICE O/P NEW HI 60 MIN: CPT | Performed by: PSYCHIATRY & NEUROLOGY

## 2024-12-10 RX ORDER — CARBIDOPA AND LEVODOPA 25; 100 MG/1; MG/1
1 TABLET ORAL 3 TIMES DAILY
Qty: 90 TABLET | Refills: 3 | Status: SHIPPED | OUTPATIENT
Start: 2024-12-10

## 2024-12-10 NOTE — PROGRESS NOTES
Name: Edison Mukherjee      : 1940      MRN: [id number]  Encounter Provider: Anayeli Nelson MD  Encounter Date: 12/10/2024   Encounter department: [facility]  :  Assessment & Plan  Parkinson's disease (HCC)  Patient is a very pleasant 84-year-old male with history of low back pain, aortic root dilatation, right fibula fracture, lumbar spine fracture, hypertension, lumbar stenosis with radiculopathy, prostate cancer, stage III CKD, T12 compression fracture and aortic aneurysm who comes referred by PCP for evaluation of PD. Most of the history is provided by patient wife.     Sx ongoing for several years. They deny any bradykinesia which is key for PD diagnosis, however patient clearly bradykinetic on exam. They report difficulties with gait however this was attributed to back pain. Patient with decreased step height, stride length and en bloc turning on exam.  Other symptoms suggestive of PD include hypomimia, hypophonia and constipation.     At this time, patient with probable PD. Will do a Sinemet trial. Will start Sinemet . Patients wife given instructions on taper starting with a half tab and slow increase to one full tab t.I.d.. Counseled on side effects and identifying on and off states. Advised to call my office with any adverse effects.  Hopefully with medication and PT we can improve patients mobility. Recommend discussing PD [place] therapy with Neuro PT at [facility]. Follow up in 2 months.       Orders:  •  carbidopa-levodopa (Sinemet)  mg per tablet; Take 1 tablet by mouth 3 (three) times a day    Movement disorder    Orders:  •  Ambulatory Referral to Neurology    Basal ganglia infarction (HCC)    Orders:  •  Ambulatory Referral to Neurology          History of Present Illness   HPI  Patient is a very pleasant 84-year-old male with history of low back pain, aortic root dilatation, right fibula fracture, lumbar spine fracture, hypertension,  lumbar stenosis with radiculopathy, prostate cancer, stage III CKD, T12 compression fracture and aortic aneurysm who presents for evaluation of parkinsons disease.     Regarding motor symptoms:  Tremor: No   Slowness: No   Stiffness: No   Changes in facial expression: No  Changes in voice: Yes  Changes in writing: Micrographia   Changes in gait: Takes short steps   Falls: Yes, Was sitting on the bed and fell down   Freezing: Occasional    Trouble with swallowing: No   Drooling: No  Clumsiness/dexterity: No      Regarding non-motor symptoms:   Anosmia: Normal   Constipation: Yes, since verapamil   Urinary sx: Yes  Lightheadedness/OH: No   Changes in Mood: Maybe some sx of depression, a lot possibly due to severe back pain.   Trouble with sleep: No      REM behavior Disorder: No   Memory trouble: Problems with peoples name   Hallucinations/Psychosis: No   Pain: Severe back pain     Started neuro PT at [facility].     No family history of PD.     Back pain:  Severe osteoporosis   Has had 3 kyphoplasty       MRI L spine 2023: Interval development of a fracture along the inferior endplate of T12 with mild loss of height and associated microtrabecular edema. Status post vertebroplasty of the L1 vertebral body with moderate loss of height, progressed. Stable moderate to severe loss of height of the L2 vertebral body status post vertebroplasty.         Review of Systems   Constitutional:  Negative for appetite change, fatigue and fever.   HENT: Negative.  Negative for hearing loss, tinnitus, trouble swallowing and voice change.    Eyes: Negative.  Negative for photophobia, pain and visual disturbance.   Respiratory: Negative.  Negative for shortness of breath.    Cardiovascular: Negative.  Negative for palpitations.   Gastrointestinal: Negative.  Negative for nausea and vomiting.   Endocrine: Negative.  Negative for cold intolerance.   Genitourinary: Negative.  Negative for dysuria, frequency and urgency.  "  Musculoskeletal:  Positive for gait problem. Negative for back pain, myalgias, neck pain and neck stiffness.   Skin: Negative.  Negative for rash.   Allergic/Immunologic: Negative.    Neurological:  Negative for dizziness, tremors, seizures, syncope, facial asymmetry, speech difficulty, weakness, light-headedness, numbness and headaches.        Pt would like to go over MRI spine/cervical to understand why his gait movement is altered. Nurse says his doctor says he's walking differently to avoid back pain.   Hematological: Negative.  Does not bruise/bleed easily.   Psychiatric/Behavioral: Negative.  Negative for confusion, hallucinations and sleep disturbance.     I have personally reviewed the MA's review of systems and made changes as necessary.         Objective   /58 (BP Location: Right arm, Patient Position: Sitting, Cuff Size: Standard)   Pulse 63   Temp 98.2 °F (36.8 °C) (Temporal)   Ht 5' 7\" (1.702 m)   Wt 73.5 kg (162 lb)   SpO2 97%   BMI 25.37 kg/m²     Physical Exam  Neurological Exam  Mental Status    Hypophonia .    Cranial Nerves  Hypomimia .    Motor    Bradykinetic  Increased RUE> LUE  Cogwheeling at the wrist and elbow .    Coordination    Decrement on finger tap, and foot tap R>L   Normal foot stomp  Difficulty with rapid alternating movements .    Gait    Ambulates with a walker, decreased step height, strife length   En bloc turning .      Radiology Results Review: I have reviewed radiology reports from MRI 2023 including: MRI spine.      "

## 2024-12-15 DIAGNOSIS — N39.41 URGE INCONTINENCE OF URINE: ICD-10-CM

## 2024-12-17 RX ORDER — MIRABEGRON 50 MG/1
50 TABLET, FILM COATED, EXTENDED RELEASE ORAL DAILY
Qty: 30 TABLET | Refills: 5 | Status: SHIPPED | OUTPATIENT
Start: 2024-12-17

## 2024-12-23 ENCOUNTER — TELEPHONE (OUTPATIENT)
Dept: FAMILY MEDICINE CLINIC | Facility: CLINIC | Age: 84
End: 2024-12-23

## 2024-12-23 NOTE — TELEPHONE ENCOUNTER
Documents signed and faxed   
[facility] faxed over a PT plan of care,placed in folder.   
Placed in Dr. Rice's to be signed folder on her desk for signature   
Opt out

## 2024-12-31 ENCOUNTER — HOSPITAL ENCOUNTER (OUTPATIENT)
Dept: BONE DENSITY | Facility: IMAGING CENTER | Age: 84
Discharge: HOME | End: 2024-12-31
Payer: MEDICARE

## 2024-12-31 VITALS — BODY MASS INDEX: 25.33 KG/M2 | HEIGHT: 66 IN | WEIGHT: 157.6 LBS

## 2024-12-31 DIAGNOSIS — M81.0 AGE-RELATED OSTEOPOROSIS WITHOUT CURRENT PATHOLOGICAL FRACTURE: ICD-10-CM

## 2024-12-31 DIAGNOSIS — E55.9 VITAMIN D DEFICIENCY: ICD-10-CM

## 2024-12-31 PROCEDURE — 77080 DXA BONE DENSITY AXIAL: CPT

## 2025-01-08 ENCOUNTER — RESULTS FOLLOW-UP (OUTPATIENT)
Dept: ENDOCRINOLOGY | Facility: CLINIC | Age: 85
End: 2025-01-08

## 2025-01-08 NOTE — RESULT ENCOUNTER NOTE
Please call the patient regarding DEXA scan-shows osteoporosis, continue calcium, vitamin D and Prolia every 6 months Admission

## 2025-01-14 ENCOUNTER — OFFICE VISIT (OUTPATIENT)
Dept: FAMILY MEDICINE CLINIC | Facility: CLINIC | Age: 85
End: 2025-01-14
Payer: MEDICARE

## 2025-01-14 VITALS
HEART RATE: 74 BPM | SYSTOLIC BLOOD PRESSURE: 128 MMHG | OXYGEN SATURATION: 97 % | DIASTOLIC BLOOD PRESSURE: 72 MMHG | HEIGHT: 67 IN | TEMPERATURE: 97.3 F | BODY MASS INDEX: 24.68 KG/M2

## 2025-01-14 DIAGNOSIS — I10 PRIMARY HYPERTENSION: ICD-10-CM

## 2025-01-14 DIAGNOSIS — J34.89 RHINORRHEA: ICD-10-CM

## 2025-01-14 DIAGNOSIS — R05.9 COUGH, UNSPECIFIED TYPE: ICD-10-CM

## 2025-01-14 DIAGNOSIS — U07.1 COVID-19: Primary | ICD-10-CM

## 2025-01-14 PROCEDURE — 99214 OFFICE O/P EST MOD 30 MIN: CPT | Performed by: FAMILY MEDICINE

## 2025-01-14 RX ORDER — NIRMATRELVIR AND RITONAVIR 300-100 MG
3 KIT ORAL 2 TIMES DAILY
Qty: 30 TABLET | Refills: 0 | Status: SHIPPED | OUTPATIENT
Start: 2025-01-14 | End: 2025-01-19

## 2025-01-14 NOTE — PROGRESS NOTES
COVID-19 Outpatient Progress Note  Name: Edison Mukherjee      : 1940      MRN: [id number]  Encounter Provider: Jamil Conte DO  Encounter Date: 2025   Encounter department: [facility]  Chief Complaint   Patient presents with    COVID-19     covid +, requesting Paxlovid     Patient Instructions   Here for covid 19 and will start Paxlovid as directed. Take Vitamin D and c and zinc and stay well hydrated. Follow CDC precautions and call if any problems.     Assessment & Plan  COVID-19    Orders:    nirmatrelvir & ritonavir (Paxlovid, 300/100,) tablet therapy pack; Take 3 tablets by mouth 2 (two) times a day for 5 days Take 2 nirmatrelvir tablets + 1 ritonavir tablet together per dose    Rhinorrhea  Stay well hydrated       Cough, unspecified type  Start paxlovid and take Vitamin D and c and zinc.        Primary hypertension         Disposition:     Discussed symptom directed medication options with patient. Discussed vitamin D, vitamin C, and/or zinc supplementation with patient.     I have spent a total time of 30 minutes on the day of the encounter for this patient including discussing diagnostic results, discussing prognosis, risks and benefits of treatment options, instructions for management, patient and family education, importance of treatment compliance, risk factor reductions, impressions, counseling/coordination of care, documenting in the medical record, reviewing/ordering tests, medicine, procedures and obtaining or reviewing history.          Encounter provider: Jamil Conte DO     Provider located at: [facility]  [address]  [address]  [place] [address] [phone]     Recent Visits  No visits were found meeting these conditions.  Showing recent visits within past 7 days and meeting all other requirements  Today's Visits  Date Type Provider Dept   25 Office Visit Jamil Conte DO [facility]  "Care   Showing today's visits and meeting all other requirements  Future Appointments  No visits were found meeting these conditions.  Showing future appointments within next 150 days and meeting all other requirements    History of Present Illness      Subjective:   Edison Mukherjee is a 84 y.o. male who has been screened for COVID-19. Symptom change since last report: resolving. Patient's symptoms include rhinorrhea and cough.     - Date of symptom onset: 1/13/2025  - Date of exposure: 1/12/2025  - Date of positive COVID-19 test: 1/14/2025. Type of test: Home antigen.     COVID-19 vaccination status: Fully vaccinated (primary series)    Edison has been staying home and has isolated themselves in his home. He is taking care to not share personal items and is cleaning all surfaces that are touched often, like counters, tabletops, and doorknobs using household cleaning sprays or wipes. He is wearing a mask when he leaves his room.     Lab Results   Component Value Date    SARSCOV2 Negative 08/24/2023       Review of Systems   Constitutional: Negative.    HENT:  Positive for rhinorrhea.    Eyes: Negative.    Respiratory:  Positive for cough.    Cardiovascular: Negative.    Gastrointestinal: Negative.    Endocrine: Negative.    Genitourinary: Negative.    Musculoskeletal: Negative.    Skin: Negative.    Allergic/Immunologic: Negative.    Neurological: Negative.    Hematological: Negative.    Psychiatric/Behavioral: Negative.       Objective   /72   Pulse 74   Temp (!) 97.3 °F (36.3 °C) (Temporal)   Ht 5' 7\" (1.702 m)   SpO2 97%   BMI 24.68 kg/m²     Physical Exam  Constitutional:       Appearance: He is well-developed.   HENT:      Head: Normocephalic and atraumatic.      Right Ear: External ear normal.      Left Ear: External ear normal.      Nose: Nose normal.      Mouth/Throat:      Mouth: Mucous membranes are moist.   Eyes:      Conjunctiva/sclera: Conjunctivae normal.      Pupils: Pupils are equal, round, " and reactive to light.   Cardiovascular:      Rate and Rhythm: Normal rate and regular rhythm.      Pulses: Normal pulses.      Heart sounds: Normal heart sounds.   Pulmonary:      Effort: Pulmonary effort is normal.      Breath sounds: Normal breath sounds.   Musculoskeletal:         General: Normal range of motion.      Cervical back: Normal range of motion and neck supple.   Skin:     General: Skin is warm and dry.      Capillary Refill: Capillary refill takes less than 2 seconds.   Neurological:      General: No focal deficit present.      Mental Status: He is alert and oriented to person, place, and time. Mental status is at baseline.      Deep Tendon Reflexes: Reflexes are normal and symmetric.   Psychiatric:         Mood and Affect: Mood normal.         Behavior: Behavior normal.         Thought Content: Thought content normal.         Judgment: Judgment normal.

## 2025-01-14 NOTE — PATIENT INSTRUCTIONS
Here for covid 19 and will start Paxlovid as directed. Take Vitamin D and c and zinc and stay well hydrated. Follow CDC precautions and call if any problems.

## 2025-01-17 ENCOUNTER — OFFICE VISIT (OUTPATIENT)
Dept: CARDIOLOGY CLINIC | Facility: CLINIC | Age: 85
End: 2025-01-17
Payer: MEDICARE

## 2025-01-17 VITALS
SYSTOLIC BLOOD PRESSURE: 118 MMHG | HEIGHT: 67 IN | DIASTOLIC BLOOD PRESSURE: 56 MMHG | WEIGHT: 158 LBS | OXYGEN SATURATION: 99 % | HEART RATE: 62 BPM | BODY MASS INDEX: 24.8 KG/M2

## 2025-01-17 DIAGNOSIS — I35.1 NONRHEUMATIC AORTIC VALVE INSUFFICIENCY: ICD-10-CM

## 2025-01-17 DIAGNOSIS — I77.810 AORTIC ROOT DILATATION (HCC): ICD-10-CM

## 2025-01-17 DIAGNOSIS — I10 PRIMARY HYPERTENSION: ICD-10-CM

## 2025-01-17 DIAGNOSIS — I34.0 NON-RHEUMATIC MITRAL REGURGITATION: ICD-10-CM

## 2025-01-17 DIAGNOSIS — I49.3 PVC'S (PREMATURE VENTRICULAR CONTRACTIONS): Primary | ICD-10-CM

## 2025-01-17 PROCEDURE — 99214 OFFICE O/P EST MOD 30 MIN: CPT | Performed by: INTERNAL MEDICINE

## 2025-01-17 NOTE — PROGRESS NOTES
Cardiology Follow Up    Edison Mukherjee  1940  [id number]  CARDIOVASC PHYSICIAN  [address] [place] [id number]  [phone]  [phone]    1. PVC's (premature ventricular contractions)        2. Primary hypertension        3. Non-rheumatic mitral regurgitation        4. Nonrheumatic aortic valve insufficiency        5. Aortic root dilatation (HCC)            Interval History: No new cardiovascular complaints since his previous visit.  He was recently diagnosed with COVID but has minimal symptoms.  No new cardiovascular complaints.  Denies chest pain shortness of breath orthopnea paroxysmal nocturnal dyspnea or syncope.    Patient Active Problem List   Diagnosis    PVC's (premature ventricular contractions)    Thoracic aortic aneurysm without rupture (HCC)    Nonrheumatic aortic valve insufficiency    Non-rheumatic mitral regurgitation    Malignant tumor of prostate (HCC)    Spinal stenosis of lumbar region with neurogenic claudication    Lumbar radiculopathy    Chronic bilateral low back pain without sciatica    Chronic pain syndrome    Renal cyst, acquired, left    Aortic root dilatation (HCC)    Myofascial pain syndrome    Stage 3a chronic kidney disease (Formerly [facility])    Lumbar spondylosis    Sacroiliac joint dysfunction of both sides    Fracture of lumbar spine ([facility])    Urinary frequency    Vitamin D deficiency    Osteoporosis    Ambulatory dysfunction    Macrocytosis    Hypertensive disorder    Age-related osteoporosis with current pathological fracture of vertebra ([facility])    T12 compression fracture, initial encounter ([facility])    Fall    Closed fracture of right distal fibula    Closed displaced fracture of lateral malleolus of right fibula with routine healing    Squamous cell carcinoma    Acute low back pain     Past Medical History:   Diagnosis Date    Astigmatism     last assessed 11/15/17    Cataract     Resolved 11/15/17    Gross hematuria     Last assessed  01/28/16    Hematuria     last assessed 01/28/16    Kidney stones     Patient states he has kidney stonesin esch kidney    Lumbar vertebral fracture (HCC)     Prostate cancer (HCC)     last assessed 05/18/17    Renal calculi     last assessed 08/16/12    Squamous cell carcinoma     Vitamin D deficiency     Last assessed 08/19/13     Social History     Socioeconomic History    Marital status: /Civil Union     Spouse name: Not on file    Number of children: Not on file    Years of education: Not on file    Highest education level: Not on file   Occupational History    Not on file   Tobacco Use    Smoking status: Never    Smokeless tobacco: Never   Vaping Use    Vaping status: Never Used   Substance and Sexual Activity    Alcohol use: Not Currently     Alcohol/week: 4.0 standard drinks of alcohol     Types: 4 Glasses of wine per week     Comment: social    Drug use: No    Sexual activity: Yes     Partners: Female   Other Topics Concern    Not on file   Social History Narrative    Not on file     Social Drivers of Health     Financial Resource Strain: Low Risk  (2/16/2024)    Overall Financial Resource Strain (CARDIA)     Difficulty of Paying Living Expenses: Not hard at all   Food Insecurity: No Food Insecurity (8/24/2023)    Hunger Vital Sign     Worried About Running Out of Food in the Last Year: Never true     Ran Out of Food in the Last Year: Never true   Transportation Needs: No Transportation Needs (2/16/2024)    PRAPARE - Transportation     Lack of Transportation (Medical): No     Lack of Transportation (Non-Medical): No   Physical Activity: Not on file   Stress: Not on file   Social Connections: Not on file   Intimate Partner Violence: Not on file   Housing Stability: Low Risk  (8/24/2023)    Housing Stability Vital Sign     Unable to Pay for Housing in the Last Year: No     Number of Times Moved in the Last Year: 1     Homeless in the Last Year: No      Family History   Problem Relation Age of Onset     Other Mother 68        Influenza    Hypertension Mother     Heart failure Father 71    Peripheral vascular disease Father     Hypertension Father     Prostate cancer Brother     Stroke Brother 50    Hypertension Brother     Hypertension Family      Past Surgical History:   Procedure Laterality Date    BREAST EXCISIONAL BIOPSY N/A     Benign breast tumor unsure of side over 50 yrs ago    CATARACT EXTRACTION Left 08/26/2019    CATARACT EXTRACTION Right 08/12/2019    COLONOSCOPY      CYSTOSCOPY  01/28/2016    ELBOW SURGERY      Bone chip    HERNIA REPAIR  2002    IR KYPHOPLASTY/VERTEBROPLASTY  1/4/2023    IR KYPHOPLASTY/VERTEBROPLASTY  2/6/2023    IR KYPHOPLASTY/VERTEBROPLASTY  3/23/2023    LITHOTRIPSY      RETROPUBIC PROSTATECTOMY  11/13/2001    Radical with nerve sparing       Current Outpatient Medications:     Acetaminophen (TYLENOL ARTHRITIS PAIN PO), Take 1,300 mg by mouth 2 (two) times a day, Disp: , Rfl:     calcium citrate (CALCITRATE) 950 (200 Ca) MG tablet, Take 1 tablet by mouth daily 1,200 mg daily, Disp: , Rfl:     carbidopa-levodopa (Sinemet)  mg per tablet, Take 1 tablet by mouth 3 (three) times a day, Disp: 90 tablet, Rfl: 3    cholecalciferol (VITAMIN D3) 1,000 units tablet, Take 3,000 Units by mouth daily 2,000 IU  daily, Disp: , Rfl:     docusate sodium (Colace) 100 mg capsule, Take 1 capsule (100 mg total) by mouth 2 (two) times a day, Disp: , Rfl: 0    metoprolol succinate (TOPROL-XL) 25 mg 24 hr tablet, Take 1 tablet (25 mg total) by mouth daily, Disp: 90 tablet, Rfl: 1    Mirabegron ER (Myrbetriq) 50 MG TB24, Take 1 tablet (50 mg total) by mouth daily, Disp: 30 tablet, Rfl: 5    nirmatrelvir & ritonavir (Paxlovid, 300/100,) tablet therapy pack, Take 3 tablets by mouth 2 (two) times a day for 5 days Take 2 nirmatrelvir tablets + 1 ritonavir tablet together per dose, Disp: 30 tablet, Rfl: 0    verapamil (CALAN-SR) 240 mg CR tablet, Take 1 tablet (240 mg total) by mouth daily at bedtime,  Disp: 90 tablet, Rfl: 1    cholestyramine (QUESTRAN) 4 g packet, Take 1 packet (4 g total) by mouth 3 (three) times a day with meals (Patient not taking: Reported on 7/12/2024), Disp: 60 packet, Rfl: 1    Current Facility-Administered Medications:     denosumab (PROLIA) subcutaneous injection 60 mg, 60 mg, Subcutaneous, Q6 Months, Torri Moseley MD, 60 mg at 10/01/24 1208    denosumab (PROLIA) subcutaneous injection 60 mg, 60 mg, Subcutaneous, Once,   Allergies   Allergen Reactions    Sulfa Antibiotics Rash    Oxycodone Confusion    Ciprofloxacin     Gabapentin Dizziness    Lyrica [Pregabalin] Hallucinations     Blurry vision , hallucinations, confusion     Medical Tape Rash       Labs:  Lab on 10/14/2024   Component Date Value    Sodium 10/14/2024 141     Potassium 10/14/2024 4.6     Chloride 10/14/2024 103     CO2 10/14/2024 28     ANION GAP 10/14/2024 10     BUN 10/14/2024 25     Creatinine 10/14/2024 1.01     Glucose, Fasting 10/14/2024 85     Calcium 10/14/2024 9.2     AST 10/14/2024 16     ALT 10/14/2024 9     Alkaline Phosphatase 10/14/2024 39     Total Protein 10/14/2024 6.4     Albumin 10/14/2024 3.9     Total Bilirubin 10/14/2024 0.51     eGFR 10/14/2024 67     Vit D, 25-Hydroxy 10/14/2024 59.8     PTH 10/14/2024 41.2     Phosphorus 10/14/2024 2.7     PSA 10/14/2024 <0.008     WBC 10/14/2024 6.35     RBC 10/14/2024 3.73 (L)     Hemoglobin 10/14/2024 12.0     Hematocrit 10/14/2024 38.2     MCV 10/14/2024 102 (H)     MCH 10/14/2024 32.2     MCHC 10/14/2024 31.4     RDW 10/14/2024 12.5     Platelets 10/14/2024 192     MPV 10/14/2024 11.8     Vitamin B-12 10/14/2024 403      Imaging: DXA bone density spine hip and pelvis  Result Date: 1/6/2025  Narrative: DXA SCAN CLINICAL HISTORY: 84-year-old male. OTHER RISK FACTORS: Prior fracture as a result of minor injury, limited mobility. PHARMACOLOGIC THERAPY FOR OSTEOPOROSIS: Prolia. TECHNIQUE: Bone densitometry was performed using a [employer]er[employer] A bone  densitometer.  Regions of interest appear properly placed. COMPARISON: 12/28/2022. RESULTS: LUMBAR SPINE Level: L3-L4  (L1, L2 vertebrae excluded from analysis due to local structural abnormalities or artifact): BMD: 1.072 gm/cm2 T-score: -0.5 LEFT  TOTAL HIP: BMD: 0.659 gm/cm2 T-score: -2.5 LEFT  FEMORAL NECK: BMD: 0.524 gm/cm2 T score: -3.0     Impression: 1. Osteoporosis. 2.  Since a DXA study from 12/28/2022, there has been: A  STATISTICALLY SIGNIFICANT INCREASE in bone mineral density of 0.214 g/cm2 (24.9%) in the lumbar spine. 3.  The 10 year risk of hip fracture is 8.4% with the 10 year risk of major osteoporotic fracture being 18% as calculated by the [employer] fracture risk assessment tool (FRAX, which is based on data generated by the WHO Collaborating [place]  for Metabolic Bone Diseases). 4.  The current Bone Health and Osteoporosis Foundation (BHOF) guidelines recommend treating patients with a T-score of -2.5 or less in the lumbar spine or hips, or in post-menopausal women and men over the age of 50 with low bone mass (osteopenia; T-score between -1.0 and -2.5) and a FRAX 10 year risk score of > 3% for hip fracture and/or > 20% for major osteoporosis-related fracture (clinical vertebral, hip, forearm, or proximal humerus). 5.  The BHOF recommends follow-up DXA in 1-2 years after initiating or changing medical therapy for osteoporosis and at appropriate intervals thereafter, according to clinical circumstances. More frequent BMD testing may be warranted in higher-risk individuals (multiple fractures, older age, very low BMD). Less frequent BMD testing is warranted as follow-up in patients with initial T-scores in the normal or slightly below normal range (osteopenia) and for patients who have remained fracture free on  treatment. The FRAX algorithm has certain limitations: -FRAX has not been validated in patients currently or previously treated with pharmacotherapy for osteoporosis.  In  such patients, clinical judgment must be exercised in interpreting FRAX scores. -Prior hip, vertebral and humeral fragility fractures appear to confer greater risk of subsequent fracture than fractures at other sites (this is especially true for individuals with severe vertebral fractures), but quantification of this incremental risk is not possible with FRAX. -FRAX underestimates fracture risk in patients with history of multiple fragility fractures. -FRAX may underestimate fracture risk in patients with history of frequent falls. -It is not appropriate to use FRAX to monitor treatment response. WHO CLASSIFICATION: Normal (a T-score of -1.0 or higher) Low bone mineral density (a T-score of less than -1.0 but higher than -2.5) Osteoporosis (a T-score of -2.5 or less) Severe osteoporosis (a T-score of -2.5 or less with a fragility fracture) LEAST SIGNIFICANT CHANGE (AT 95% C.I): Lumbar spine: 0.025 g/cm2; 2.8% Total hip: 0.025 g/cm2; 3.7% Forearm: 0.012 g/cm2; 1.9% SELECTED REFERENCES: ConsueloBoff MS, Mindi SL, Pedro KL, et al. The clinician's guide to prevention and treatment of osteoporosis. Osteoporos Int 2022; 33:1898-0310. Shelly D, Camacho SB, Emani A, et al. DXA reporting updates: 2023 Official Positions of the International Society for Clinical Densitometry. J Clin Densitom 2024; 27: 614364 (https://doi.org/10.1016/j.jocd.2023.808867). Workstation performed: [id number]       Review of Systems:  Review of Systems   Constitutional:  Negative for fatigue.   HENT:  Negative for nosebleeds.    Eyes:  Negative for redness.   Respiratory:  Negative for chest tightness and shortness of breath.    Cardiovascular:  Negative for chest pain, palpitations and leg swelling.   Gastrointestinal:  Positive for constipation. Negative for abdominal pain.   Endocrine: Negative for polyuria.   Genitourinary:  Negative for urgency.   Musculoskeletal:  Positive for back pain. Negative for arthralgias.   Skin:  Negative for rash.    Neurological:  Negative for dizziness and syncope.   Psychiatric/Behavioral:  Negative for confusion and sleep disturbance. The patient is not nervous/anxious.        Physical Exam:  Physical Exam  Vitals and nursing note reviewed.   Constitutional:       Appearance: Normal appearance.   HENT:      Head: Normocephalic and atraumatic.      Nose: Nose normal.      Mouth/Throat:      Mouth: Mucous membranes are moist.   Eyes:      Conjunctiva/sclera: Conjunctivae normal.   Cardiovascular:      Rate and Rhythm: Normal rate and regular rhythm.   Pulmonary:      Effort: Pulmonary effort is normal.      Breath sounds: Normal breath sounds.   Abdominal:      Palpations: Abdomen is soft.   Musculoskeletal:         General: Normal range of motion.      Cervical back: Normal range of motion.      Right lower leg: Edema present.      Left lower leg: Edema present.   Skin:     General: Skin is warm and dry.   Neurological:      General: No focal deficit present.      Mental Status: He is alert and oriented to person, place, and time.   Psychiatric:         Mood and Affect: Mood normal.         Discussion/Summary: No change in cardiovascular condition since his previous visit.  His blood pressure is well-controlled.  He has a PVC burden of 11% and is maintained on metoprolol and verapamil.  Mild left ventricular size and function is normal.  His most recent echo showed moderate to severe mitral regurgitation.  He is asymptomatic.  He also has some dilatation of his ascending aorta.  At age 84 he is not a surgical candidate.  We will check an an echocardiogram later this year which will be 2 years since his previous to reassess left-ventricular size and function and severity of mitral regurgitation.  He will continue with his current regimen.

## 2025-02-06 ENCOUNTER — TELEPHONE (OUTPATIENT)
Dept: NEUROLOGY | Facility: CLINIC | Age: 85
End: 2025-02-06

## 2025-02-06 NOTE — TELEPHONE ENCOUNTER
Confirmed PT appointment on 2/12 at 1pm with Dr. Nelson at the [address] [place] [address] location.

## 2025-02-12 ENCOUNTER — OFFICE VISIT (OUTPATIENT)
Dept: NEUROLOGY | Facility: CLINIC | Age: 85
End: 2025-02-12
Payer: MEDICARE

## 2025-02-12 VITALS
DIASTOLIC BLOOD PRESSURE: 68 MMHG | SYSTOLIC BLOOD PRESSURE: 128 MMHG | HEART RATE: 89 BPM | TEMPERATURE: 97.3 F | BODY MASS INDEX: 25.27 KG/M2 | HEIGHT: 67 IN | WEIGHT: 161 LBS | OXYGEN SATURATION: 98 %

## 2025-02-12 DIAGNOSIS — M54.9 BACK PAIN: ICD-10-CM

## 2025-02-12 DIAGNOSIS — G20.A1 PARKINSON'S DISEASE (HCC): Primary | ICD-10-CM

## 2025-02-12 PROCEDURE — G2211 COMPLEX E/M VISIT ADD ON: HCPCS | Performed by: PSYCHIATRY & NEUROLOGY

## 2025-02-12 PROCEDURE — 99214 OFFICE O/P EST MOD 30 MIN: CPT | Performed by: PSYCHIATRY & NEUROLOGY

## 2025-02-12 RX ORDER — CARBIDOPA/LEVODOPA 25MG-250MG
1 TABLET ORAL 3 TIMES DAILY
Qty: 90 TABLET | Refills: 3 | Status: SHIPPED | OUTPATIENT
Start: 2025-02-12

## 2025-02-12 NOTE — PROGRESS NOTES
Name: Edison Mukherjee      : 1940      MRN: [id number]  Encounter Provider: Anayeli Nelson MD  Encounter Date: 2025   Encounter department: [facility]  :  Assessment & Plan  Parkinson's disease (HCC)  Patient with bradykinesia, gait with decreased step height and stride length as well as en bloc turning.  Patient also with hypomimia, hypophonia and constipation.  He was started on Sinemet  mg.  Today they report no improvement.  On physical exam patient with smoother rapid alternating movements and improvement in gait.     Will increase sinemet dose. Patient to take  mg at bedtime for the first three days. Then with breakfast and at bedtime for three days, then continue  mg 3 times a day. Follow up in 3 months.   Orders:    carbidopa-levodopa (SINEMET)  mg per tablet; Take 1 tablet by mouth 3 (three) times a day    Back pain  Patient with history of vertebral fracture s/p 3 kyphoplasty. Today they report patient spends a significant amount of time on his recliner given it is the only position that provides relief. In the past did well with more intense physical therapy. Will refer to PMR for evaluation.   Orders:    Ambulatory Referral to Physiatry; Future          History of Present Illness   HPI    Patient is a very pleasant 84-year-old male with history of low back pain, aortic root dilatation, right fibula fracture, lumbar spine fracture, hypertension, lumbar stenosis with radiculopathy, prostate cancer, stage III CKD, T12 compression fracture and aortic aneurysm who presents for PD follow up.     Initial visit (12/10/2024):  Sx ongoing for several years. They deny any bradykinesia which is key for PD diagnosis, however patient clearly bradykinetic on exam. They report difficulties with gait however this was attributed to back pain. Patient with decreased step height, stride length and en bloc turning on exam.  Other symptoms suggestive  of PD include hypomimia, hypophonia and constipation.      At this time, patient with probable PD. Will do a Sinemet trial. Will start Sinemet . Patients wife given instructions on taper starting with a half tab and slow increase to one full tab t.I.d.. Counseled on side effects and identifying on and off states. Advised to call my office with any adverse effects.  Hopefully with medication and PT we can improve patients mobility. Recommend discussing PD [facility] [place] therapy with [facility] PT [facility].    MRI L spine 2023: Interval development of a fracture along the inferior endplate of T12 with mild loss of height and associated microtrabecular edema. Status post vertebroplasty of the L1 vertebral body with moderate loss of height, progressed. Stable moderate to severe loss of height of the L2 vertebral body status post vertebroplasty.     Interval history:  No improvement with Sinemet   Taking full tab tid   Has been taking it on a low protein diet   Walking is maybe not as good. Doing PT at [facility]   Do[facility] 3 times a week for one hour each time.   Doing a lot of walking and leg exercises   No falls       Back pain?  Severe osteoporosis Has had 3 kyphoplasty   Sits in his recliner because that is the only time he doesn't have back pain   In the past was   Taking extra strength tylenol two tabs twice a day       Review of Systems   Constitutional:  Negative for appetite change, fatigue and fever.   HENT: Negative.  Negative for hearing loss, tinnitus, trouble swallowing and voice change.    Eyes: Negative.  Negative for photophobia, pain and visual disturbance.   Respiratory: Negative.  Negative for shortness of breath.    Cardiovascular: Negative.  Negative for palpitations.   Gastrointestinal: Negative.  Negative for nausea and vomiting.   Endocrine: Negative.  Negative for cold intolerance.   Genitourinary: Negative.  Negative for dysuria, frequency and urgency.  "  Musculoskeletal:  Negative for back pain, gait problem, myalgias, neck pain and neck stiffness.   Skin: Negative.  Negative for rash.   Allergic/Immunologic: Negative.    Neurological: Negative.  Negative for dizziness, tremors, seizures, syncope, facial asymmetry, speech difficulty, weakness, light-headedness, numbness and headaches.        States Sinemet has made no impact. Was also curious about other options/directions for his back situation.   Hematological: Negative.  Does not bruise/bleed easily.   Psychiatric/Behavioral: Negative.  Negative for confusion, hallucinations and sleep disturbance.    All other systems reviewed and are negative.   I have personally reviewed the MA's review of systems and made changes as necessary.         Objective   Ht 5' 7\" (1.702 m)   BMI 24.75 kg/m²     Physical Exam  Neurological Exam          "

## 2025-02-12 NOTE — PATIENT INSTRUCTIONS
Continue taking carbidopa levodopa 3 times a day    Take carbidopa levodopa  at night for 3 days,  then in the morning and at night for 3 days, then continue 3 times a day.

## 2025-02-18 ENCOUNTER — TELEPHONE (OUTPATIENT)
Dept: NEUROLOGY | Facility: CLINIC | Age: 85
End: 2025-02-18

## 2025-02-18 NOTE — TELEPHONE ENCOUNTER
Spoke with wife and made an appointment for patient on 4/22/25 at 2:30 with Dr Ngo for a PMR Consult-back pain.  Patient is also waitlisted.

## 2025-02-27 ENCOUNTER — TELEPHONE (OUTPATIENT)
Dept: FAMILY MEDICINE CLINIC | Facility: CLINIC | Age: 85
End: 2025-02-27

## 2025-02-27 ENCOUNTER — TELEPHONE (OUTPATIENT)
Dept: NEUROLOGY | Facility: CLINIC | Age: 85
End: 2025-02-27

## 2025-02-27 NOTE — TELEPHONE ENCOUNTER
LVM for patient that I need to make some changes to his upcoming PMR Consult in our [place] office.  He is currently scheduled with Dr Ngo on 4/22/25 at 2:30.  He needs to be rescheduled with Dr Michel in [place] on 4/22/25 at either 1:30 or 3:30. (Referral is for spine).  If patient confirms rescheduling please message me.  Thank you!

## 2025-03-03 ENCOUNTER — TELEPHONE (OUTPATIENT)
Dept: NEUROLOGY | Facility: CLINIC | Age: 85
End: 2025-03-03

## 2025-03-03 NOTE — TELEPHONE ENCOUNTER
Pt's wife called to schedule May 1st at 1:30 pm with Dr. Michel in [facility].

## 2025-03-03 NOTE — TELEPHONE ENCOUNTER
LVM that I cancelled patient's appt with Dr Ngo on 4/22/25 at 2:30 because referral is for spine which is Dr Michel.  I gave him several options to schedule with Dr Michel and they are no longer available.  If he calls back to reschedule, please offer him next available 60 minute appointments in [facility] for Dr Michel.  Message me when he responds.  Thank you

## 2025-03-03 NOTE — TELEPHONE ENCOUNTER
Patient accepted a 1pm 5/1 appt with Marilu through call center.  Patient needs 60 minutes, not 30.  Will move schedule around that day.

## 2025-03-12 ENCOUNTER — OFFICE VISIT (OUTPATIENT)
Dept: FAMILY MEDICINE CLINIC | Facility: CLINIC | Age: 85
End: 2025-03-12
Payer: MEDICARE

## 2025-03-12 VITALS
HEIGHT: 70 IN | BODY MASS INDEX: 23.77 KG/M2 | TEMPERATURE: 97.1 F | DIASTOLIC BLOOD PRESSURE: 62 MMHG | WEIGHT: 166 LBS | SYSTOLIC BLOOD PRESSURE: 122 MMHG

## 2025-03-12 DIAGNOSIS — I10 PRIMARY HYPERTENSION: ICD-10-CM

## 2025-03-12 DIAGNOSIS — Z00.00 ENCOUNTER FOR MEDICARE ANNUAL WELLNESS EXAM: Primary | ICD-10-CM

## 2025-03-12 DIAGNOSIS — M80.08XS AGE-RELATED OSTEOPOROSIS WITH CURRENT PATHOLOGICAL FRACTURE OF VERTEBRA, SEQUELA: ICD-10-CM

## 2025-03-12 DIAGNOSIS — N39.41 URGE INCONTINENCE OF URINE: ICD-10-CM

## 2025-03-12 DIAGNOSIS — G20.A1 PARKINSON'S DISEASE (HCC): ICD-10-CM

## 2025-03-12 PROCEDURE — G2211 COMPLEX E/M VISIT ADD ON: HCPCS | Performed by: FAMILY MEDICINE

## 2025-03-12 PROCEDURE — G0439 PPPS, SUBSEQ VISIT: HCPCS | Performed by: FAMILY MEDICINE

## 2025-03-12 PROCEDURE — 99213 OFFICE O/P EST LOW 20 MIN: CPT | Performed by: FAMILY MEDICINE

## 2025-03-12 NOTE — PROGRESS NOTES
Name: Edison Mukherjee      : 1940      MRN: [id number]  Encounter Provider: Carol Morelos DO  Encounter Date: 3/12/2025   Encounter department: [facility] PRIMARY CARE    Assessment & Plan  Encounter for Medicare annual wellness exam  Patient will likely follow-up with Dr. Wilson after my long-term in .       Primary hypertension  Patient is on metoprolol and verapamil with diagnosis for those more likely for PVCs       Parkinson's disease (HCC)  Followed by neurology currently on increased dose for Sinemet with follow-up scheduled.  Orders:  •  carbidopa-levodopa (SINEMET)  mg per tablet; Take 1 tablet by mouth 3 (three) times a day    Age-related osteoporosis with current pathological fracture of vertebra, sequela  Patient is followed by endocrinology and receives Prolia with lab monitoring       Urge incontinence of urine  Patient is on Myrbetriq status post radical prostatectomy  and this provides some improvement in symptoms.         Depression Screening and Follow-up Plan: Patient was screened for depression during today's encounter. They screened negative with a PHQ-2 score of 2.        Preventive health issues were discussed with patient, and age appropriate screening tests were ordered as noted in patient's After Visit Summary. Personalized health advice and appropriate referrals for health education or preventive services given if needed, as noted in patient's After Visit Summary.    History of Present Illness     Patient is a 85-year-old male here accompanied by his wife for wellness exam.       Chief Complaint   Patient presents with   • Follow-up     6 month f/u movement disorder    * Pt due for MCR AWV   Last labs were in October.  He is still going to PT at [facility] for his movement disorder..  Follows regularly with endocrinology.  He will be seeing physical medicine specialist but the appointment is not until May.  Keeps in touch as well with cardiology for  "mitral regurg and aortic valve.  Insufficiency.  Patient Care Team:  Carol Morelos DO as PCP - General (Family Medicine)  Torri Moseley MD as PCP - Endocrinology (Endocrinology)  Ronn Kent MD  Rati MARE Purcell as Nurse Practitioner (Endocrinology)    Review of Systems   Constitutional:  Positive for fatigue.   Cardiovascular:  Negative for leg swelling.   Genitourinary:         On Myrbetriq for urge incontinence   Musculoskeletal:  Positive for back pain and gait problem.   Psychiatric/Behavioral:          Hypersomnolence   Wife says \"I know he can do more \".  He is on Sinemet with increased dose at last visit with neurology  Last renal function with GFR of 67  Annual Wellness Visit Questionnaire   Last Medicare Wellness visit information reviewed, patient interviewed, no change since last AWV.     Health Risk Assessment:   Patient rates overall health as very good. Patient feels that their physical health rating is slightly better. Patient is dissatisfied with their life. Eyesight was rated as same. Hearing was rated as same. Patient feels that their emotional and mental health rating is same. Patients states they are never, rarely angry. Patient states they are never, rarely unusually tired/fatigued. Pain experienced in the last 7 days has been a lot. Patient's pain rating has been 6/10. Patient states that he has experienced no weight loss or gain in last 6 months.     Depression Screening:   PHQ-2 Score: 2      Fall Risk Screening:   In the past year, patient has experienced: no history of falling in past year      Home Safety:  Patient does not have trouble with stairs inside or outside of their home. Patient has working smoke alarms and has working carbon monoxide detector.     Nutrition:   Current diet is Regular.     Medications:   Patient is currently taking over-the-counter supplements. OTC medications include: see medication list. Patient is able to manage medications. Wife " helps    Activities of Daily Living (ADLs)/Instrumental Activities of Daily Living (IADLs):   Walk and transfer into and out of bed and chair?: Yes  Dress and groom yourself?: Yes    Bathe or shower yourself?: Yes    Feed yourself? Yes  Do your laundry/housekeeping?: No  Manage your money, pay your bills and track your expenses?: No  Make your own meals?: No    Do your own shopping?: No    Advance Care Planning:   Living will: Yes    Durable POA for healthcare: Yes    Advanced directive: Yes    Advanced directive counseling given: Yes    End of Life Decisions reviewed with patient: Yes      Cognitive Screening:   Provider or family/friend/caregiver concerned regarding cognition?: No    Preventive Screenings      Cardiovascular Screening:    General: Screening Current      Diabetes Screening:     General: Screening Current      Colorectal Cancer Screening:     General: Screening Not Indicated      Prostate Cancer Screening:    General: History Prostate Cancer and Screening Current      Osteoporosis Screening:    General: History Osteoporosis and Screening Current      Abdominal Aortic Aneurysm (AAA) Screening:        General: Screening Not Indicated      Lung Cancer Screening:     General: Screening Not Indicated      Hepatitis C Screening:      Hep C Screening Accepted: No     Screening, Brief Intervention, and Referral to Treatment (SBIRT)     Screening  Typical number of drinks in a day: 0  Typical number of drinks in a week: 0  Interpretation: Low risk drinking behavior.    Social Drivers of Health     Financial Resource Strain: Low Risk  (2/16/2024)    Overall Financial Resource Strain (CARDIA)    • Difficulty of Paying Living Expenses: Not hard at all   Food Insecurity: No Food Insecurity (8/24/2023)    Hunger Vital Sign    • Worried About Running Out of Food in the Last Year: Never true    • Ran Out of Food in the Last Year: Never true   Transportation Needs: No Transportation Needs (2/16/2024)    PRAPARE -  "Transportation    • Lack of Transportation (Medical): No    • Lack of Transportation (Non-Medical): No   Housing Stability: Low Risk  (8/24/2023)    Housing Stability Vital Sign    • Unable to Pay for Housing in the Last Year: No    • Number of Times Moved in the Last Year: 1    • Homeless in the Last Year: No     No results found.    Objective patient is followed by endocrinology and receives Prolia  /62   Temp (!) 97.1 °F (36.2 °C) (Temporal)   Ht 5' 10\" (1.778 m)   Wt 75.3 kg (166 lb)   BMI 23.82 kg/m²     Physical Exam  Constitutional:       General: He is not in acute distress.     Appearance: He is normal weight. He is not ill-appearing.      Comments: 85-year-old male who appears his stated age,   HENT:      Head:      Comments: Flat facies     Right Ear: Tympanic membrane normal.      Left Ear: Tympanic membrane normal.      Nose: Nose normal.      Mouth/Throat:      Mouth: Mucous membranes are moist.   Neck:      Comments: Neck is forward flexed  Cardiovascular:      Rate and Rhythm: Normal rate and regular rhythm.      Heart sounds: Murmur heard.   Pulmonary:      Effort: Pulmonary effort is normal.      Breath sounds: Normal breath sounds.      Comments: Slight reduced effort  Abdominal:      General: Abdomen is flat. There is no distension.      Palpations: Abdomen is soft.   Musculoskeletal:      Comments: Forward kyphotic posture gait is shuffling slow with minimal arm swing   Skin:     Findings: No rash.   Neurological:      Mental Status: He is alert and oriented to person, place, and time.   Psychiatric:         Mood and Affect: Mood normal. Affect is flat.         Behavior: Behavior normal.         Thought Content: Thought content normal.         Judgment: Judgment normal.         "

## 2025-03-12 NOTE — PATIENT INSTRUCTIONS
Medicare Preventive Visit Patient Instructions  Thank you for completing your Welcome to Medicare Visit or Medicare Annual Wellness Visit today. Your next wellness visit will be due in one year (3/13/2026).  The screening/preventive services that you may require over the next 5-10 years are detailed below. Some tests may not apply to you based off risk factors and/or age. Screening tests ordered at today's visit but not completed yet may show as past due. Also, please note that scanned in results may not display below.  Preventive Screenings:  Service Recommendations Previous Testing/Comments   Colorectal Cancer Screening  Colonoscopy    Fecal Occult Blood Test (FOBT)/Fecal Immunochemical Test (FIT)  Fecal DNA/Cologuard Test  Flexible Sigmoidoscopy Age: 45-75 years old   Colonoscopy: every 10 years (May be performed more frequently if at higher risk)  OR  FOBT/FIT: every 1 year  OR  Cologuard: every 3 years  OR  Sigmoidoscopy: every 5 years  Screening may be recommended earlier than age 45 if at higher risk for colorectal cancer. Also, an individualized decision between you and your healthcare provider will decide whether screening between the ages of 76-85 would be appropriate. Colonoscopy: Not on file  FOBT/FIT: Not on file  Cologuard: Not on file  Sigmoidoscopy: Not on file          Prostate Cancer Screening Individualized decision between patient and health care provider in men between ages of 55-69   Medicare will cover every 12 months beginning on the day after your 50th birthday PSA: <0.008 ng/mL           Hepatitis C Screening Once for adults born between 1945 and 1965  More frequently in patients at high risk for Hepatitis C Hep C Antibody: Not on file        Diabetes Screening 1-2 times per year if you're at risk for diabetes or have pre-diabetes Fasting glucose: 85 mg/dL (10/14/2024)  A1C: 6.0 % (8/21/2023)      Cholesterol Screening Once every 5 years if you don't have a lipid disorder. May order more  often based on risk factors. Lipid panel: 08/25/2023         Other Preventive Screenings Covered by Medicare:  Abdominal Aortic Aneurysm (AAA) Screening: covered once if your at risk. You're considered to be at risk if you have a family history of AAA or a male between the age of 65-75 who smoking at least 100 cigarettes in your lifetime.  Lung Cancer Screening: covers low dose CT scan once per year if you meet all of the following conditions: (1) Age 55-77; (2) No signs or symptoms of lung cancer; (3) Current smoker or have quit smoking within the last 15 years; (4) You have a tobacco smoking history of at least 20 pack years (packs per day x number of years you smoked); (5) You get a written order from a healthcare provider.  Glaucoma Screening: covered annually if you're considered high risk: (1) You have diabetes OR (2) Family history of glaucoma OR (3)  aged 50 and older OR (4)  American aged 65 and older  Osteoporosis Screening: covered every 2 years if you meet one of the following conditions: (1) Have a vertebral abnormality; (2) On glucocorticoid therapy for more than 3 months; (3) Have primary hyperparathyroidism; (4) On osteoporosis medications and need to assess response to drug therapy.  HIV Screening: covered annually if you're between the age of 15-65. Also covered annually if you are younger than 15 and older than 65 with risk factors for HIV infection. For pregnant patients, it is covered up to 3 times per pregnancy.    Immunizations:  Immunization Recommendations   Influenza Vaccine Annual influenza vaccination during flu season is recommended for all persons aged >= 6 months who do not have contraindications   Pneumococcal Vaccine   * Pneumococcal conjugate vaccine = PCV13 (Prevnar 13), PCV15 (Vaxneuvance), PCV20 (Prevnar 20)  * Pneumococcal polysaccharide vaccine = PPSV23 (Pneumovax) Adults 19-63 yo with certain risk factors or if 65+ yo  If never received any pneumonia  vaccine: recommend Prevnar 20 (PCV20)  Give PCV20 if previously received 1 dose of PCV13 or PPSV23   Hepatitis B Vaccine 3 dose series if at intermediate or high risk (ex: diabetes, end stage renal disease, liver disease)   Respiratory syncytial virus (RSV) Vaccine - COVERED BY MEDICARE PART D  * RSVPreF3 (Arexvy) CDC recommends that adults 60 years of age and older may receive a single dose of RSV vaccine using shared clinical decision-making (SCDM)   Tetanus (Td) Vaccine - COST NOT COVERED BY MEDICARE PART B Following completion of primary series, a booster dose should be given every 10 years to maintain immunity against tetanus. Td may also be given as tetanus wound prophylaxis.   Tdap Vaccine - COST NOT COVERED BY MEDICARE PART B Recommended at least once for all adults. For pregnant patients, recommended with each pregnancy.   Shingles Vaccine (Shingrix) - COST NOT COVERED BY MEDICARE PART B  2 shot series recommended in those 19 years and older who have or will have weakened immune systems or those 50 years and older     Health Maintenance Due:      Topic Date Due   • DXA SCAN  12/31/2026     Immunizations Due:      Topic Date Due   • COVID-19 Vaccine (4 - 2024-25 season) 09/01/2024     Advance Directives   What are advance directives?  Advance directives are legal documents that state your wishes and plans for medical care. These plans are made ahead of time in case you lose your ability to make decisions for yourself. Advance directives can apply to any medical decision, such as the treatments you want, and if you want to donate organs.   What are the types of advance directives?  There are many types of advance directives, and each state has rules about how to use them. You may choose a combination of any of the following:  Living will:  This is a written record of the treatment you want. You can also choose which treatments you do not want, which to limit, and which to stop at a certain time. This includes  surgery, medicine, IV fluid, and tube feedings.   Durable power of  for healthcare (DPAHC):  This is a written record that states who you want to make healthcare choices for you when you are unable to make them for yourself. This person, called a proxy, is usually a family member or a friend. You may choose more than 1 proxy.  Do not resuscitate (DNR) order:  A DNR order is used in case your heart stops beating or you stop breathing. It is a request not to have certain forms of treatment, such as CPR. A DNR order may be included in other types of advance directives.  Medical directive:  This covers the care that you want if you are in a coma, near death, or unable to make decisions for yourself. You can list the treatments you want for each condition. Treatment may include pain medicine, surgery, blood transfusions, dialysis, IV or tube feedings, and a ventilator (breathing machine).  Values history:  This document has questions about your views, beliefs, and how you feel and think about life. This information can help others choose the care that you would choose.  Why are advance directives important?  An advance directive helps you control your care. Although spoken wishes may be used, it is better to have your wishes written down. Spoken wishes can be misunderstood, or not followed. Treatments may be given even if you do not want them. An advance directive may make it easier for your family to make difficult choices about your care.       © Copyright [employer] 2018 Information is for End User's use only and may not be sold, redistributed or otherwise used for commercial purposes. All illustrations and images included in CareNotes® are the copyrighted property of A.D.A.M., Inc[employer] or [employer]

## 2025-03-18 ENCOUNTER — TELEPHONE (OUTPATIENT)
Dept: FAMILY MEDICINE CLINIC | Facility: CLINIC | Age: 85
End: 2025-03-18

## 2025-04-01 DIAGNOSIS — I49.3 PVC'S (PREMATURE VENTRICULAR CONTRACTIONS): ICD-10-CM

## 2025-04-02 RX ORDER — METOPROLOL SUCCINATE 25 MG/1
25 TABLET, EXTENDED RELEASE ORAL DAILY
Qty: 90 TABLET | Refills: 1 | Status: SHIPPED | OUTPATIENT
Start: 2025-04-02

## 2025-04-03 ENCOUNTER — TELEPHONE (OUTPATIENT)
Dept: ENDOCRINOLOGY | Facility: CLINIC | Age: 85
End: 2025-04-03

## 2025-04-03 NOTE — TELEPHONE ENCOUNTER
[employer] benefits ran for Prolia     No prior auth needed    $257 met of $257 required    No OOP cost

## 2025-04-04 ENCOUNTER — TELEPHONE (OUTPATIENT)
Dept: LAB | Facility: HOSPITAL | Age: 85
End: 2025-04-04

## 2025-04-04 ENCOUNTER — OFFICE VISIT (OUTPATIENT)
Dept: ENDOCRINOLOGY | Facility: CLINIC | Age: 85
End: 2025-04-04
Payer: MEDICARE

## 2025-04-04 VITALS
WEIGHT: 164.4 LBS | HEIGHT: 70 IN | HEART RATE: 57 BPM | SYSTOLIC BLOOD PRESSURE: 118 MMHG | OXYGEN SATURATION: 99 % | BODY MASS INDEX: 23.54 KG/M2 | DIASTOLIC BLOOD PRESSURE: 60 MMHG

## 2025-04-04 DIAGNOSIS — M81.0 AGE-RELATED OSTEOPOROSIS WITHOUT CURRENT PATHOLOGICAL FRACTURE: Primary | ICD-10-CM

## 2025-04-04 DIAGNOSIS — N18.31 STAGE 3A CHRONIC KIDNEY DISEASE (HCC): ICD-10-CM

## 2025-04-04 PROCEDURE — 96372 THER/PROPH/DIAG INJ SC/IM: CPT

## 2025-04-04 PROCEDURE — 99214 OFFICE O/P EST MOD 30 MIN: CPT

## 2025-04-04 NOTE — PROGRESS NOTES
Name: Edison Mukherjee      : 1940      MRN: [id number]  Encounter Provider: MARE Hodgson  Encounter Date: 2025   Encounter department: [facility] [place]    Chief Complaint   Patient presents with    Osteoporosis     History of Present Illness   History of Present Illness  The patient is an 85-year-old male here for a follow-up of osteoporosis. He is currently treated with Prolia and takes a calcium supplement of 600 mg twice daily with food. He has a history of vertebral compression fracture and received his fifth dose of Prolia this month. His last DEXA scan in 2024 showed a statistical improvement in the bone density of the lumbar spine, with the next DEXA due in 2026. He is also due for updated blood work.    The patient reports no recent falls or fractures. He continues his regimen of calcium and vitamin D supplements. There have been no significant changes to his medical history since the last consultation. His diet includes cheese, crackers, and milk with cereal, although not in excessive amounts. He also consumes ice cream, but not on a daily basis.    He does not experience shortness of breath or chest pain. He has no known history of asthma or allergies. His most recent appointment with his primary care physician was on 2025.    ALLERGIES  The patient has no known allergies.    MEDICATIONS  Prolia, calcium supplement, vitamin D    Assessment & Plan  1. Osteoporosis.  He has been on Prolia for about two years and continues to take calcium 600 mg twice daily with food and vitamin D supplements. His last DEXA scan in 2024 showed improvement in the lumbar spine bone density. He is due for updated blood work, which should be done approximately one week before his next Prolia injection. The laboratory tests will include calcium levels and kidney function. He is advised to limit his daily calcium intake to less  "than 2000 mg. A list of common foods and their calcium content has been provided for his reference. The patient will receive his Prolia injection today.    2. Wheezing.  Mild wheezing was noted on one side during the examination. He reports no history of asthma or allergies. If he starts to notice any shortness of breath, he should contact his primary care provider as he may need a rescue inhaler.        Review of Systems   Constitutional:  Negative for chills and fever.   HENT:  Negative for ear pain and sore throat.    Eyes:  Negative for pain and visual disturbance.   Respiratory:  Negative for cough and shortness of breath.    Cardiovascular:  Negative for chest pain and palpitations.   Gastrointestinal:  Negative for abdominal pain and vomiting.   Genitourinary:  Negative for dysuria and hematuria.   Musculoskeletal:  Negative for arthralgias and back pain.   Skin:  Negative for color change and rash.   Neurological:  Negative for seizures and syncope.   All other systems reviewed and are negative.   as per HPI       Medical History Reviewed by provider this encounter:     .    Objective   /60   Pulse 57   Ht 5' 10\" (1.778 m)   Wt 74.6 kg (164 lb 6.4 oz)   SpO2 99%   BMI 23.59 kg/m²      Body mass index is 23.59 kg/m².  Wt Readings from Last 3 Encounters:   04/04/25 74.6 kg (164 lb 6.4 oz)   03/12/25 75.3 kg (166 lb)   02/12/25 73 kg (161 lb)     Physical Exam  Vitals reviewed.   HENT:      Head: Normocephalic and atraumatic.   Cardiovascular:      Rate and Rhythm: Bradycardia present.   Pulmonary:      Effort: Pulmonary effort is normal.   Neurological:      Mental Status: He is alert.       Physical Exam  There is a faint extra beat in the heart.  Mild wheezing noted on one side of the lungs.    Physical Exam      Results  Imaging  DEXA scan in December 2024 showed a statistical improvement in the bone density of the lumbar spine.  Labs:   Lab Results   Component Value Date    HGBA1C 6.0 (H) " 08/21/2023    HGBA1C 5.2 12/28/2022     Lab Results   Component Value Date    CREATININE 1.01 10/14/2024    CREATININE 1.15 10/02/2023    CREATININE 1.32 (H) 08/26/2023    BUN 25 10/14/2024     04/06/2015    K 4.6 10/14/2024     10/14/2024    CO2 28 10/14/2024     eGFR   Date Value Ref Range Status   10/14/2024 67 ml/min/1.73sq m Final     Lab Results   Component Value Date    CHOL 176 10/16/2015    HDL 48 08/25/2023    TRIG 128 08/25/2023     Lab Results   Component Value Date    ALT 9 10/14/2024    AST 16 10/14/2024    ALKPHOS 39 10/14/2024    BILITOT 0.65 04/06/2015     Lab Results   Component Value Date    EDJ4IRFXQFPX 2.189 08/21/2023    CKD3RWIWGFIQ 2.422 01/23/2023    TAI4HGVCOCQL 1.910 12/28/2022     Lab Results   Component Value Date    FREET4 0.96 12/28/2022       Patient Instructions   Do not exceed 2,000 mg daily    A Guide to Calcium-Rich Foods  We all know that milk is a great source of calcium, but you may be surprised by all the different foods you can work into your diet to reach your daily recommended amount of calcium. Use the guide below to get ideas of additional calcium-rich foods to add to your weekly shopping list.  Produce Serving Size Estimated Calcium*   [place] greens, frozen 8 oz 360 mg   Broccoli star 8 oz 200 mg   Kale, frozen 8 oz 180 mg   Soy Beans, green, boiled 8 oz 175 mg   Bok Bran, cooked, boiled 8 oz 160 mg   Figs, dried 2 figs 65 mg   Broccoli, fresh, cooked 8 oz 60 mg   Oranges 1 whole 55 mg   Seafood Serving Size Estimated Calcium*   Sardines, canned with bones 3 oz 325 mg   [place], canned with bones 3 oz 180 mg   Shrimp, canned 3 oz 125 mg   Dairy Serving Size Estimated Calcium*   Ricotta, part-skim 4 oz 335 mg   Yogurt, plain, low-fat 6 oz 310 mg   Milk, skim, low-fat, whole 8 oz 300 mg   Yogurt with fruit, low-fat 6 oz 260 mg   Mozzarella, part-skim 1 oz 210 mg   Cheddar 1 oz 205 mg   Yogurt, Greek 6 oz 200 mg   American Cheese 1 oz 195 mg   Feta Cheese 4 oz  140 mg   Cottage Cheese, 2% 4 oz 105 mg   Frozen yogurt, vanilla 8 oz 105 mg   Ice Cream, vanilla 8 oz 85 mg   Parmesan 1 tbsp 55 mg   Fortified Food Serving Size Estimated Calcium*   [place] milk, rice milk or soy milk, fortified 8 oz 300 mg   Orange juice and other fruit juices, fortified 8 oz 300 mg   Tofu, prepared with calcium 4 oz 205 mg   Waffle, frozen, fortified 2 pieces 200 mg   Oatmeal, fortified 1 packet 140 mg   English muffin, fortified 1 muffin 100 mg   Cereal, fortified 8 oz 100-1,000 mg   Other Serving Size Estimated Calcium*   Mac & cheese, frozen 1 package 325 mg   Pizza, cheese, frozen 1 serving 115 mg   Pudding, chocolate, prepared with 2% milk 4 oz 160 mg   Beans, baked, canned 4 oz 160 mg   *The calcium content listed for most foods is estimated and can vary due to multiple factors. Check the food label to determine how much calcium is in a particular product.      Discussed with the patient and all questioned fully answered. He will call me if any problems arise.

## 2025-04-04 NOTE — PROGRESS NOTES
"Assessment/Plan:    Edison Mukherjee came into the [facility] Endocrinology Office today 04/04/25 to receive prolia  injection.      Verbal consent obtained.  Consent given by: patient    patient states patient has been medically healthy with no underlining concerns/complications.      Edison Mukherjee presents with no symptoms today.       All insturctions were reviewed with the patient.    If the patient should have any questions/concerns, advised patient to contacted [facility] Endocrinology Office.       Subjective:     History provided by: patient    Patient ID: Edison Mukherjee is a 85 y.o. male      Objective:    Vitals:    04/04/25 1134   BP: 118/60   Pulse: 57   SpO2: 99%   Weight: 74.6 kg (164 lb 6.4 oz)   Height: 5' 10\" (1.778 m)       Patient tolerated the injection well without any complications.  Injection site/s left arm.  Medication was provided by office.    Patient signed consent form yes   Patient signed ABN form yes (If no patient is not a medicare patient).   Patient waited 15 minutes after injection no (This only applies to patient's receiving first time injection).       Last Visit: 4/4/2025  Next visit:Visit date not found     "

## 2025-04-04 NOTE — PATIENT INSTRUCTIONS
Do not exceed 2,000 mg daily    A Guide to Calcium-Rich Foods  We all know that milk is a great source of calcium, but you may be surprised by all the different foods you can work into your diet to reach your daily recommended amount of calcium. Use the guide below to get ideas of additional calcium-rich foods to add to your weekly shopping list.  Produce Serving Size Estimated Calcium*   Rosa Isela greens, frozen 8 oz 360 mg   Broccoli star 8 oz 200 mg   Kale, frozen 8 oz 180 mg   Soy Beans, green, boiled 8 oz 175 mg   Bok Bran, cooked, boiled 8 oz 160 mg   Figs, dried 2 figs 65 mg   Broccoli, fresh, cooked 8 oz 60 mg   Oranges 1 whole 55 mg   Seafood Serving Size Estimated Calcium*   Sardines, canned with bones 3 oz 325 mg   Mc[place]pin, canned with bones 3 oz 180 mg   Shrimp, canned 3 oz 125 mg   Dairy Serving Size Estimated Calcium*   Ricotta, part-skim 4 oz 335 mg   Yogurt, plain, low-fat 6 oz 310 mg   Milk, skim, low-fat, whole 8 oz 300 mg   Yogurt with fruit, low-fat 6 oz 260 mg   Mozzarella, part-skim 1 oz 210 mg   Cheddar 1 oz 205 mg   Yogurt, Greek 6 oz 200 mg   American Cheese 1 oz 195 mg   Feta Cheese 4 oz 140 mg   Cottage Cheese, 2% 4 oz 105 mg   Frozen yogurt, vanilla 8 oz 105 mg   Ice Cream, vanilla 8 oz 85 mg   Parmesan 1 tbsp 55 mg   Fortified Food Serving Size Estimated Calcium*   [place] milk, rice milk or soy milk, fortified 8 oz 300 mg   Orange juice and other fruit juices, fortified 8 oz 300 mg   Tofu, prepared with calcium 4 oz 205 mg   Waffle, frozen, fortified 2 pieces 200 mg   Oatmeal, fortified 1 packet 140 mg   English muffin, fortified 1 muffin 100 mg   Cereal, fortified 8 oz 100-1,000 mg   Other Serving Size Estimated Calcium*   Mac & cheese, frozen 1 package 325 mg   Pizza, cheese, frozen 1 serving 115 mg   Pudding, chocolate, prepared with 2% milk 4 oz 160 mg   Beans, baked, canned 4 oz 160 mg   *The calcium content listed for most foods is estimated and can vary due to multiple factors. Check  the food label to determine how much calcium is in a particular product.

## 2025-04-08 PROBLEM — G20.A1 PARKINSON'S DISEASE (HCC): Status: ACTIVE | Noted: 2025-04-08

## 2025-04-08 RX ORDER — CARBIDOPA/LEVODOPA 25MG-250MG
1 TABLET ORAL 3 TIMES DAILY
Start: 2025-04-08

## 2025-04-08 NOTE — ASSESSMENT & PLAN NOTE
Followed by neurology currently on increased dose for Sinemet with follow-up scheduled.  Orders:  •  carbidopa-levodopa (SINEMET)  mg per tablet; Take 1 tablet by mouth 3 (three) times a day

## 2025-04-11 ENCOUNTER — TELEPHONE (OUTPATIENT)
Dept: FAMILY MEDICINE CLINIC | Facility: CLINIC | Age: 85
End: 2025-04-11

## 2025-04-11 NOTE — TELEPHONE ENCOUNTER
Type of form: plan of care via fax .    .     Forms have been placed in Cne[facility], DO folder to be completed for clinical staff.     Routing to clinical pool.

## 2025-04-14 ENCOUNTER — APPOINTMENT (OUTPATIENT)
Dept: LAB | Facility: HOSPITAL | Age: 85
End: 2025-04-14
Attending: INTERNAL MEDICINE
Payer: MEDICARE

## 2025-04-14 DIAGNOSIS — E55.9 VITAMIN D DEFICIENCY: ICD-10-CM

## 2025-04-14 DIAGNOSIS — M81.0 AGE-RELATED OSTEOPOROSIS WITHOUT CURRENT PATHOLOGICAL FRACTURE: ICD-10-CM

## 2025-04-14 LAB
25(OH)D3 SERPL-MCNC: 67.2 NG/ML (ref 30–100)
ALBUMIN SERPL BCG-MCNC: 4.1 G/DL (ref 3.5–5)
ALP SERPL-CCNC: 40 U/L (ref 34–104)
ALT SERPL W P-5'-P-CCNC: 9 U/L (ref 7–52)
ANION GAP SERPL CALCULATED.3IONS-SCNC: 6 MMOL/L (ref 4–13)
AST SERPL W P-5'-P-CCNC: 16 U/L (ref 13–39)
BILIRUB SERPL-MCNC: 0.44 MG/DL (ref 0.2–1)
BUN SERPL-MCNC: 29 MG/DL (ref 5–25)
CALCIUM SERPL-MCNC: 9.9 MG/DL (ref 8.4–10.2)
CHLORIDE SERPL-SCNC: 103 MMOL/L (ref 96–108)
CO2 SERPL-SCNC: 30 MMOL/L (ref 21–32)
CREAT SERPL-MCNC: 1.18 MG/DL (ref 0.6–1.3)
GFR SERPL CREATININE-BSD FRML MDRD: 55 ML/MIN/1.73SQ M
GLUCOSE P FAST SERPL-MCNC: 86 MG/DL (ref 65–99)
POTASSIUM SERPL-SCNC: 5.2 MMOL/L (ref 3.5–5.3)
PROT SERPL-MCNC: 6.8 G/DL (ref 6.4–8.4)
SODIUM SERPL-SCNC: 139 MMOL/L (ref 135–147)

## 2025-04-14 PROCEDURE — 36415 COLL VENOUS BLD VENIPUNCTURE: CPT

## 2025-04-14 PROCEDURE — 82306 VITAMIN D 25 HYDROXY: CPT

## 2025-04-14 PROCEDURE — 80053 COMPREHEN METABOLIC PANEL: CPT

## 2025-05-01 ENCOUNTER — OFFICE VISIT (OUTPATIENT)
Age: 85
End: 2025-05-01
Payer: MEDICARE

## 2025-05-01 VITALS
WEIGHT: 162 LBS | HEART RATE: 66 BPM | BODY MASS INDEX: 23.24 KG/M2 | SYSTOLIC BLOOD PRESSURE: 102 MMHG | DIASTOLIC BLOOD PRESSURE: 62 MMHG | TEMPERATURE: 97.9 F | OXYGEN SATURATION: 97 %

## 2025-05-01 DIAGNOSIS — M47.816 LUMBAR SPONDYLOSIS: ICD-10-CM

## 2025-05-01 DIAGNOSIS — S32.000D CLOSED WEDGE COMPRESSION FRACTURE OF LUMBAR VERTEBRA WITH ROUTINE HEALING, UNSPECIFIED LUMBAR VERTEBRAL LEVEL, SUBSEQUENT ENCOUNTER: Primary | ICD-10-CM

## 2025-05-01 PROCEDURE — 99204 OFFICE O/P NEW MOD 45 MIN: CPT | Performed by: STUDENT IN AN ORGANIZED HEALTH CARE EDUCATION/TRAINING PROGRAM

## 2025-05-01 NOTE — PROGRESS NOTES
Name: Edison Mukherjee      : 1940      MRN: [id number]  Encounter Provider: Dorian Michel DO  Encounter Date: 2025   Encounter department: [facility] [place]  :  Assessment & Plan  Lumbar spondylosis  Continue with therapy and consider aqua therapy in the future.  Patient has had adverse effects from medications were no significant benefit and recommend overall modification of activity as well as continued exercise and progressive strengthening and stretching program.  He has done well with regards to his general osteoporosis and has made some significant increases in his bone density on last DEXA scan.  For his ongoing back pain we will try trigger point injections and schedule him later in the week as this is an option that has not been trialed previously.       Closed wedge compression fracture of lumbar vertebra with routine healing, unspecified lumbar vertebral level, subsequent encounter  Patient with ongoing pain and generally is taking Tylenol only and rather than taking 1300 mg twice daily I did talk about other options which includes 500 to 650 mg every 6 hours as needed or 1000 mg 3 times daily as needed  by approximately 8 hours.  This is a generally safer regimen rather than taking large doses beyond 1 g at a given time.  All questions were answered.           History of Present Illness   Mr. Mukherjee is an 85-year-old gentleman who presents with 2 to 3 years of insidious onset of low back pain.  He notes that the pain is in the bilateral lumbar region.  He went to the emergency room at 1 point and then was referred to pain management and eventually had a kyphoplasty for compression fractures that were noted.  He has had a total of 3 kyphoplasty's in the past as well as a DEXA scan last December showing some improvement.  The report endorses that since his prior study in  there is a statistically significant increase in bone mineral  density.  This increases 24.9% in the lumbar spine.    For his discomfort he has been going to physical therapy 3 times per week at [facility].  He has tried many different options including medical cannabis without any significantly beneficial results.  He is currently on acetaminophen 1300 mg in the morning and 1300 mg in the evening.  He tried gabapentin but had severe dizziness and had to stop taking the medication.  Trial of muscle relaxers and Cymbalta without benefit.  He additionally attempted acupuncture and massage therapy but still no significant benefit.                Review of Systems   Constitutional:  Negative for chills and unexpected weight change.   HENT: Negative.  Negative for hearing loss and trouble swallowing.    Eyes:  Negative for visual disturbance.   Respiratory:  Negative for cough and chest tightness.    Cardiovascular: Negative.    Gastrointestinal:  Negative for diarrhea and nausea.   Endocrine: Negative for cold intolerance and heat intolerance.   Musculoskeletal:  Positive for back pain. Negative for neck pain.   Allergic/Immunologic: Negative for environmental allergies and food allergies.   Neurological:  Negative for dizziness, weakness and light-headedness.   Psychiatric/Behavioral: Negative.  Negative for dysphoric mood and sleep disturbance.      Current Outpatient Medications on File Prior to Visit   Medication Sig Dispense Refill    Acetaminophen (TYLENOL ARTHRITIS PAIN PO) Take 1,300 mg by mouth 2 (two) times a day      calcium citrate (CALCITRATE) 950 (200 Ca) MG tablet Take 1 tablet by mouth daily 1,200 mg daily      carbidopa-levodopa (SINEMET)  mg per tablet Take 1 tablet by mouth 3 (three) times a day      cholecalciferol (VITAMIN D3) 1,000 units tablet Take 3,000 Units by mouth daily 2,000 IU  daily      docusate sodium (Colace) 100 mg capsule Take 1 capsule (100 mg total) by mouth 2 (two) times a day  0    metoprolol succinate (TOPROL-XL) 25 mg 24 hr tablet  TAKE 1 TABLET BY MOUTH DAILY 90 tablet 1    Mirabegron ER (Myrbetriq) 50 MG TB24 Take 1 tablet (50 mg total) by mouth daily 30 tablet 5    verapamil (CALAN-SR) 240 mg CR tablet Take 1 tablet (240 mg total) by mouth daily at bedtime 90 tablet 1     Current Facility-Administered Medications on File Prior to Visit   Medication Dose Route Frequency Provider Last Rate Last Admin    denosumab (PROLIA) subcutaneous injection 60 mg  60 mg Subcutaneous Q6 Months Torri Moseley MD   60 mg at 04/04/25 1204    denosumab (PROLIA) subcutaneous injection 60 mg  60 mg Subcutaneous Once           Social History     Tobacco Use    Smoking status: Never    Smokeless tobacco: Never   Vaping Use    Vaping status: Never Used   Substance and Sexual Activity    Alcohol use: Not Currently     Alcohol/week: 4.0 standard drinks of alcohol     Types: 4 Glasses of wine per week     Comment: social    Drug use: No    Sexual activity: Yes     Partners: Female        Objective   /62 (BP Location: Right arm, Patient Position: Sitting, Cuff Size: Standard)   Pulse 66   Temp 97.9 °F (36.6 °C) (Temporal)   Wt 73.5 kg (162 lb)   SpO2 97%   BMI 23.24 kg/m²      Physical Exam  Vitals reviewed.   Constitutional:       General: He is not in acute distress.  HENT:      Head: Normocephalic and atraumatic.      Right Ear: External ear normal.      Left Ear: External ear normal.      Nose: Nose normal. No rhinorrhea.      Mouth/Throat:      Mouth: Mucous membranes are moist.      Pharynx: Oropharynx is clear.   Eyes:      General: No scleral icterus.  Cardiovascular:      Rate and Rhythm: Normal rate.      Pulses: Normal pulses.   Pulmonary:      Effort: Pulmonary effort is normal. No respiratory distress.   Abdominal:      General: There is no distension.      Palpations: Abdomen is soft.   Musculoskeletal:      Cervical back: Normal range of motion.      Right lower leg: No edema.      Left lower leg: No edema.      Comments: Tenderness palpation  across the lumbar spine mostly at the lumbosacral junction and across the right and left side of the low back   Skin:     General: Skin is warm and dry.   Neurological:      Mental Status: He is alert and oriented to person, place, and time.      Sensory: No sensory deficit.      Motor: No weakness.      Gait: Gait normal.      Comments: 5/5 strength throughout, negative upper motor neuron signs         Dorian Michel, DO  Physical Medicine and Rehabilitation  [facility] [address] [facility]

## 2025-05-01 NOTE — PATIENT INSTRUCTIONS
As a an alternative regimen for the Tylenol you may take 500 to 650 mg at a time every 6 hours as needed.  You may also take 1000 mg 3 times a day as needed is a different option but try and space the 1000 mg dosing by approximately 8 hours.

## 2025-05-06 NOTE — ASSESSMENT & PLAN NOTE
Continue with therapy and consider aqua therapy in the future.  Patient has had adverse effects from medications were no significant benefit and recommend overall modification of activity as well as continued exercise and progressive strengthening and stretching program.  He has done well with regards to his general osteoporosis and has made some significant increases in his bone density on last DEXA scan.  For his ongoing back pain we will try trigger point injections and schedule him later in the week as this is an option that has not been trialed previously.

## 2025-05-06 NOTE — ASSESSMENT & PLAN NOTE
Patient with ongoing pain and generally is taking Tylenol only and rather than taking 1300 mg twice daily I did talk about other options which includes 500 to 650 mg every 6 hours as needed or 1000 mg 3 times daily as needed  by approximately 8 hours.  This is a generally safer regimen rather than taking large doses beyond 1 g at a given time.  All questions were answered.        video/verbal instruction/skill demonstration/written material

## 2025-05-08 ENCOUNTER — PROCEDURE VISIT (OUTPATIENT)
Age: 85
End: 2025-05-08

## 2025-05-08 VITALS
WEIGHT: 161 LBS | SYSTOLIC BLOOD PRESSURE: 124 MMHG | HEIGHT: 70 IN | BODY MASS INDEX: 23.05 KG/M2 | DIASTOLIC BLOOD PRESSURE: 74 MMHG

## 2025-05-08 DIAGNOSIS — M79.18 MYOFASCIAL PAIN SYNDROME OF LUMBAR SPINE: Primary | ICD-10-CM

## 2025-05-08 RX ORDER — BUPIVACAINE HYDROCHLORIDE 2.5 MG/ML
4 INJECTION, SOLUTION EPIDURAL; INFILTRATION; INTRACAUDAL; PERINEURAL ONCE
Status: COMPLETED | OUTPATIENT
Start: 2025-05-08 | End: 2025-05-08

## 2025-05-08 RX ORDER — TRIAMCINOLONE ACETONIDE 40 MG/ML
40 INJECTION, SUSPENSION INTRA-ARTICULAR; INTRAMUSCULAR ONCE
Status: COMPLETED | OUTPATIENT
Start: 2025-05-08 | End: 2025-05-08

## 2025-05-08 RX ADMIN — BUPIVACAINE HYDROCHLORIDE 4 ML: 2.5 INJECTION, SOLUTION EPIDURAL; INFILTRATION; INTRACAUDAL; PERINEURAL at 16:27

## 2025-05-08 RX ADMIN — TRIAMCINOLONE ACETONIDE 40 MG: 40 INJECTION, SUSPENSION INTRA-ARTICULAR; INTRAMUSCULAR at 16:27

## 2025-05-08 NOTE — PATIENT INSTRUCTIONS
You were seen today for Trigger point injections.It is not abnormal for the area to be slightly sore later in the day with some mild bruising. Make sure to drink fluids and you may apply ice to the affected area. I recommend no more than 20 minutes at a time with 20 minutes break between reapplication. Please do not submerge the area in a pool or hottub for 24 hours but showering is acceptable. If you have any Band-Aid placed, these can be removed a few hours post-procedure.

## 2025-05-08 NOTE — PROGRESS NOTES
Name: Edison Mukherjee      : 1940      MRN: [id number]  Encounter Provider: Dorian Michel DO  Encounter Date: 2025   Encounter department: [facility] PHYSICAL MEDICINE AND REHABILITATION [place]  :  Assessment & Plan  Myofascial pain syndrome of lumbar spine    Orders:    bupivacaine (PF) (MARCAINE) 0.25 % injection 4 mL    triamcinolone acetonide (KENALOG-40) 40 mg/mL injection 40 mg  Complete trigger point and provided education on the overall goal of these injections.  If continues to get a significant benefit we will opt for continued trigger point injections in the future.      History of Present Illness   Mr. Mukherjee is an 85-year-old gentleman who presents with 2 to 3 years of insidious onset of low back pain.  He notes that the pain is in the bilateral lumbar region.  He went to the emergency room at 1 point and then was referred to pain management and eventually had a kyphoplasty for compression fractures that were noted.  He has had a total of 3 kyphoplasty's in the past as well as a DEXA scan last December showing some improvement.  The report endorses that since his prior study in  there is a statistically significant increase in bone mineral density.  This increases 24.9% in the lumbar spine.     For his discomfort he has been going to physical therapy 3 times per week at [facility].  He has tried many different options including medical cannabis without any significantly beneficial results.  He is currently on acetaminophen 1300 mg in the morning and 1300 mg in the evening.  He tried gabapentin but had severe dizziness and had to stop taking the medication.  Trial of muscle relaxers and Cymbalta without benefit.  He additionally attempted acupuncture and massage therapy but still no significant benefit.    He is here to schedule for trigger point injections for the lumbar spine.  Pain is unchanged since the last visit on  with regards to his quality or  quantity.          Review of Systems   Constitutional:  Negative for appetite change, fatigue and fever.   HENT: Negative.  Negative for hearing loss, tinnitus, trouble swallowing and voice change.    Eyes: Negative.  Negative for photophobia, pain and visual disturbance.   Respiratory: Negative.  Negative for shortness of breath.    Cardiovascular: Negative.  Negative for palpitations.   Gastrointestinal: Negative.  Negative for nausea and vomiting.   Endocrine: Negative.  Negative for cold intolerance.   Genitourinary: Negative.  Negative for dysuria, frequency and urgency.   Musculoskeletal:  Positive for back pain. Negative for gait problem, myalgias, neck pain and neck stiffness.   Skin: Negative.  Negative for rash.   Allergic/Immunologic: Negative.    Neurological:  Negative for dizziness, tremors, seizures, syncope, facial asymmetry, speech difficulty, weakness, light-headedness, numbness and headaches.   Hematological: Negative.  Does not bruise/bleed easily.   Psychiatric/Behavioral: Negative.  Negative for confusion, hallucinations and sleep disturbance.      Current Outpatient Medications on File Prior to Visit   Medication Sig Dispense Refill    Acetaminophen (TYLENOL ARTHRITIS PAIN PO) Take 1,300 mg by mouth 2 (two) times a day (Patient taking differently: Take 1,000 mg by mouth 2 (two) times a day)      calcium citrate (CALCITRATE) 950 (200 Ca) MG tablet Take 1 tablet by mouth daily 1,200 mg daily      carbidopa-levodopa (SINEMET)  mg per tablet Take 1 tablet by mouth 3 (three) times a day      cholecalciferol (VITAMIN D3) 1,000 units tablet Take 3,000 Units by mouth daily 2,000 IU  daily      docusate sodium (Colace) 100 mg capsule Take 1 capsule (100 mg total) by mouth 2 (two) times a day  0    metoprolol succinate (TOPROL-XL) 25 mg 24 hr tablet TAKE 1 TABLET BY MOUTH DAILY 90 tablet 1    Mirabegron ER (Myrbetriq) 50 MG TB24 Take 1 tablet (50 mg total) by mouth daily 30 tablet 5    verapamil  (CALAN-SR) 240 mg CR tablet Take 1 tablet (240 mg total) by mouth daily at bedtime 90 tablet 1     Current Facility-Administered Medications on File Prior to Visit   Medication Dose Route Frequency Provider Last Rate Last Admin    denosumab (PROLIA) subcutaneous injection 60 mg  60 mg Subcutaneous Q6 Months Torri oMseley MD   60 mg at 04/04/25 1204    denosumab (PROLIA) subcutaneous injection 60 mg  60 mg Subcutaneous Once           Social History     Tobacco Use    Smoking status: Never    Smokeless tobacco: Never   Vaping Use    Vaping status: Never Used   Substance and Sexual Activity    Alcohol use: Not Currently     Alcohol/week: 4.0 standard drinks of alcohol     Types: 4 Glasses of wine per week     Comment: social    Drug use: No    Sexual activity: Yes     Partners: Female        Objective   There were no vitals taken for this visit.     Physical Exam  Vitals reviewed.   Constitutional:       General: He is not in acute distress.  HENT:      Head: Normocephalic and atraumatic.      Right Ear: External ear normal.      Left Ear: External ear normal.      Nose: Nose normal. No rhinorrhea.      Mouth/Throat:      Mouth: Mucous membranes are moist.      Pharynx: Oropharynx is clear.   Eyes:      General: No scleral icterus.  Cardiovascular:      Rate and Rhythm: Normal rate.   Pulmonary:      Effort: Pulmonary effort is normal. No respiratory distress.   Abdominal:      General: There is no distension.      Palpations: Abdomen is soft.   Musculoskeletal:      Right lower leg: No edema.      Left lower leg: No edema.      Comments: Tenderness to palpation across lumbar spine   Skin:     General: Skin is warm and dry.   Neurological:      Mental Status: He is alert and oriented to person, place, and time.      Comments: MMT not completed today   Psychiatric:         Mood and Affect: Mood normal.         Behavior: Behavior normal.         Procedure Note:     Procedure: Trigger point injections    Indication:  Lumbar myofascial pain syndrome       Informed consent was obtained, risks/benefits of procedure and medication were reviewed with the patient, and consent signed by patient prior to procedure and placed in the chart.       Trigger and tender points were palpated, identified, and marked including 3 injections in the right paraspinal muscles, 2 injections in the left paraspinal muscles, 1 injection in the right quadratus lumborum and 1 injection in the left quadratus lumborum, 1 in the upper right gluteal muscles    Patient's skin was prepped and cleaned with (alcohol, povidone, chlorhexidine)   Using a 26 gauge 38 needle, 4cc of 0.25% bupivacaine mixed with 1 cc of 40 mg/cc Kenalog was injected into each of 8 trigger/tender points marked in the muscles identified.    No complications were experienced and patient tolerated procedure well.       Post injection immediate reduction of pain: 50 %     Post procedure instructions were provided with patient understanding.      Dorian Michel, DO  Physical Medicine and Rehabilitation  [facility]

## 2025-05-28 ENCOUNTER — OFFICE VISIT (OUTPATIENT)
Dept: NEUROLOGY | Facility: CLINIC | Age: 85
End: 2025-05-28
Payer: MEDICARE

## 2025-05-28 VITALS
TEMPERATURE: 97.4 F | SYSTOLIC BLOOD PRESSURE: 136 MMHG | BODY MASS INDEX: 23.19 KG/M2 | HEART RATE: 70 BPM | HEIGHT: 70 IN | DIASTOLIC BLOOD PRESSURE: 64 MMHG | WEIGHT: 162 LBS | OXYGEN SATURATION: 99 %

## 2025-05-28 DIAGNOSIS — G20.C ATYPICAL PARKINSONISM (HCC): Primary | ICD-10-CM

## 2025-05-28 PROCEDURE — 99213 OFFICE O/P EST LOW 20 MIN: CPT | Performed by: PSYCHIATRY & NEUROLOGY

## 2025-05-28 PROCEDURE — G2211 COMPLEX E/M VISIT ADD ON: HCPCS | Performed by: PSYCHIATRY & NEUROLOGY

## 2025-05-28 RX ORDER — FLUOROURACIL 50 MG/G
CREAM TOPICAL
COMMUNITY
Start: 2025-04-15

## 2025-05-28 NOTE — PROGRESS NOTES
Name: Edison Mukherjee      : 1940      MRN: [id number]  Encounter Provider: Anayeli Nelson MD  Encounter Date: 2025   Encounter department: [facility] [place]  :  Assessment & Plan  Atypical parkinsonism (HCC)  Patient presents for follow-up.  I initially saw patient in 2024 for evaluation of Parkinson's disease as per PCPs request.  My initial assessment was probable Parkinson's disease.  I started patient on low-dose Sinemet and they reported no subjective improvement.  On physical exam I did note improvement therefore I increased to Sinemet  mg 3 times daily.  Today they report no further improvement.  I had a long discussion with patient and wife and we decided to stop taking Sinemet.  No taper needed given he has been on this medication for only several months. They are to let me know if symptoms worsen after stopping medication, which would indicate it was having some benefit. Will do an MRI to evaluate for atypical parkinsonism vs stroke. Follow up in 4 months.     Orders:    MRI brain without contrast; Future          History of Present Illness   HPI     Patient is a very pleasant 85-year-old male with history of low back pain, aortic root dilatation, right fibula fracture, lumbar spine fracture, hypertension, lumbar stenosis with radiculopathy, prostate cancer, stage III CKD, T12 compression fracture and aortic aneurysm who presents for PD follow up.      Initial visit (12/10/2024):  Sx ongoing for several years. They deny any bradykinesia which is key for PD diagnosis, however patient clearly bradykinetic on exam. They report difficulties with gait however this was attributed to back pain. Patient with decreased step height, stride length and en bloc turning on exam.  Other symptoms suggestive of PD include hypomimia, hypophonia and constipation.      At this time, patient with probable PD. Will do a Sinemet trial. Will start Sinemet .  Patients wife given instructions on taper starting with a half tab and slow increase to one full tab t.I.d.. Counseled on side effects and identifying on and off states. Advised to call my office with any adverse effects.  Hopefully with medication and PT we can improve patients mobility. Recommend discussing PD [place]oud therapy with Neuro PT [facility].    Prior encounters:  2/12/2025: Today they report no improvement.  On physical exam patient with smoother rapid alternating movements and improvement in gait.  Will increase sinemet dose. Patient to take  mg at bedtime for the first three days. Then with breakfast and at bedtime for three days, then continue  mg 3 times a day. Follow up in 3 months.   Patient with history of vertebral fracture s/p 3 kyphoplasty. Today they report patient spends a significant amount of time on his recliner given it is the only position that provides relief. In the past did well with more intense physical therapy. Will refer to PMR for evaluation.     Workup:  MRI L spine 2023: Interval development of a fracture along the inferior endplate of T12 with mild loss of height and associated microtrabecular edema. Status post vertebroplasty of the L1 vertebral body with moderate loss of height, progressed. Stable moderate to severe loss of height of the L2 vertebral body status post vertebroplasty.      Interval history:    Has not noticed any difference   Wife believes that if he thinks about he walks correctly   Had TPI with Dr. Michel did not help   Has already seen Pain medicine     Review of Systems   Constitutional:  Negative for appetite change, fatigue and fever.   HENT: Negative.  Negative for hearing loss, tinnitus, trouble swallowing and voice change.    Eyes: Negative.  Negative for photophobia, pain and visual disturbance.   Respiratory: Negative.  Negative for shortness of breath.    Cardiovascular: Negative.  Negative for palpitations.  "  Gastrointestinal: Negative.  Negative for nausea and vomiting.   Endocrine: Negative.  Negative for cold intolerance.   Genitourinary: Negative.  Negative for dysuria, frequency and urgency.   Musculoskeletal:  Negative for back pain, gait problem, myalgias, neck pain and neck stiffness.   Skin: Negative.  Negative for rash.   Allergic/Immunologic: Negative.    Neurological:  Negative for dizziness, tremors, seizures, syncope, facial asymmetry, speech difficulty, weakness, light-headedness, numbness and headaches.   Hematological: Negative.  Does not bruise/bleed easily.   Psychiatric/Behavioral: Negative.  Negative for confusion, hallucinations and sleep disturbance.    All other systems reviewed and are negative.   I have personally reviewed the MA's review of systems and made changes as necessary.         Objective   Ht 5' 10\" (1.778 m)   BMI 23.10 kg/m²     Physical Exam  Neurological Exam          "

## 2025-06-01 DIAGNOSIS — I49.3 PVC'S (PREMATURE VENTRICULAR CONTRACTIONS): ICD-10-CM

## 2025-06-01 DIAGNOSIS — N39.41 URGE INCONTINENCE OF URINE: ICD-10-CM

## 2025-06-01 RX ORDER — VERAPAMIL HYDROCHLORIDE 240 MG/1
240 TABLET, FILM COATED, EXTENDED RELEASE ORAL
Qty: 90 TABLET | Refills: 1 | Status: SHIPPED | OUTPATIENT
Start: 2025-06-01

## 2025-06-01 RX ORDER — VERAPAMIL HYDROCHLORIDE 240 MG/1
240 TABLET, FILM COATED, EXTENDED RELEASE ORAL
Qty: 90 TABLET | Refills: 0 | OUTPATIENT
Start: 2025-06-01

## 2025-06-02 RX ORDER — MIRABEGRON 50 MG/1
1 TABLET, FILM COATED, EXTENDED RELEASE ORAL DAILY
Qty: 30 TABLET | Refills: 5 | Status: SHIPPED | OUTPATIENT
Start: 2025-06-02

## 2025-06-02 RX ORDER — MIRABEGRON 50 MG/1
50 TABLET, FILM COATED, EXTENDED RELEASE ORAL DAILY
Qty: 30 TABLET | Refills: 0 | OUTPATIENT
Start: 2025-06-02

## 2025-06-11 ENCOUNTER — TELEPHONE (OUTPATIENT)
Dept: FAMILY MEDICINE CLINIC | Facility: CLINIC | Age: 85
End: 2025-06-11

## 2025-06-11 NOTE — TELEPHONE ENCOUNTER
[facility] PT POC 05/31/25-07/11/25.  Form completed and placed on providers desk for signature.    No

## 2025-06-16 DIAGNOSIS — G20.A1 PARKINSON'S DISEASE (HCC): ICD-10-CM

## 2025-06-16 RX ORDER — CARBIDOPA/LEVODOPA 25MG-250MG
1 TABLET ORAL 3 TIMES DAILY
Qty: 90 TABLET | Refills: 5 | Status: SHIPPED | OUTPATIENT
Start: 2025-06-16

## 2025-06-17 RX ORDER — CARBIDOPA/LEVODOPA 25MG-250MG
1 TABLET ORAL 3 TIMES DAILY
Qty: 90 TABLET | Refills: 3 | OUTPATIENT
Start: 2025-06-17

## 2025-06-25 ENCOUNTER — TELEPHONE (OUTPATIENT)
Dept: FAMILY MEDICINE CLINIC | Facility: CLINIC | Age: 85
End: 2025-06-25

## 2025-06-25 NOTE — TELEPHONE ENCOUNTER
Type of form: Physical Therapy Plan Of Care via fax from [facility].          Forms have been placed in Dr Conte folder to be signed    Routing to clinical pool.

## 2025-06-28 ENCOUNTER — HOSPITAL ENCOUNTER (OUTPATIENT)
Dept: MRI IMAGING | Facility: HOSPITAL | Age: 85
Discharge: HOME | End: 2025-06-28
Attending: PSYCHIATRY & NEUROLOGY
Payer: MEDICARE

## 2025-06-28 DIAGNOSIS — G20.C ATYPICAL PARKINSONISM (HCC): ICD-10-CM

## 2025-06-28 PROCEDURE — 70551 MRI BRAIN STEM W/O DYE: CPT

## 2025-07-10 ENCOUNTER — OFFICE VISIT (OUTPATIENT)
Dept: FAMILY MEDICINE CLINIC | Facility: CLINIC | Age: 85
End: 2025-07-10
Payer: MEDICARE

## 2025-07-10 VITALS
RESPIRATION RATE: 17 BRPM | HEIGHT: 70 IN | TEMPERATURE: 97.8 F | DIASTOLIC BLOOD PRESSURE: 72 MMHG | HEART RATE: 68 BPM | WEIGHT: 160.2 LBS | OXYGEN SATURATION: 99 % | SYSTOLIC BLOOD PRESSURE: 116 MMHG | BODY MASS INDEX: 22.94 KG/M2

## 2025-07-10 DIAGNOSIS — Z12.5 ENCOUNTER FOR SCREENING FOR MALIGNANT NEOPLASM OF PROSTATE: ICD-10-CM

## 2025-07-10 DIAGNOSIS — Z85.46 HISTORY OF PROSTATE CANCER: ICD-10-CM

## 2025-07-10 DIAGNOSIS — I10 PRIMARY HYPERTENSION: Primary | ICD-10-CM

## 2025-07-10 PROCEDURE — 99203 OFFICE O/P NEW LOW 30 MIN: CPT | Performed by: STUDENT IN AN ORGANIZED HEALTH CARE EDUCATION/TRAINING PROGRAM

## 2025-07-18 ENCOUNTER — TELEPHONE (OUTPATIENT)
Age: 85
End: 2025-07-18

## 2025-07-24 NOTE — TELEPHONE ENCOUNTER
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 I would like him to start a baby aspirin if there isnt any contraindication such as bleeding.         Thanks     ____________________    I called Rosa M and left a detailed msg per request. I read provider's note and informed if she has any questions to please call our office back

## 2025-08-06 ENCOUNTER — TELEPHONE (OUTPATIENT)
Dept: LAB | Facility: HOSPITAL | Age: 85
End: 2025-08-06

## 2025-08-08 ENCOUNTER — OFFICE VISIT (OUTPATIENT)
Dept: CARDIOLOGY CLINIC | Facility: CLINIC | Age: 85
End: 2025-08-08
Payer: MEDICARE

## 2025-08-08 VITALS
DIASTOLIC BLOOD PRESSURE: 62 MMHG | HEART RATE: 61 BPM | BODY MASS INDEX: 23.34 KG/M2 | HEIGHT: 70 IN | WEIGHT: 163 LBS | SYSTOLIC BLOOD PRESSURE: 110 MMHG

## 2025-08-08 DIAGNOSIS — I10 PRIMARY HYPERTENSION: ICD-10-CM

## 2025-08-08 DIAGNOSIS — I71.21 ANEURYSM OF ASCENDING AORTA WITHOUT RUPTURE (HCC): ICD-10-CM

## 2025-08-08 DIAGNOSIS — I49.3 PVC'S (PREMATURE VENTRICULAR CONTRACTIONS): Primary | ICD-10-CM

## 2025-08-08 DIAGNOSIS — I35.1 NONRHEUMATIC AORTIC VALVE INSUFFICIENCY: ICD-10-CM

## 2025-08-08 DIAGNOSIS — I34.0 NON-RHEUMATIC MITRAL REGURGITATION: ICD-10-CM

## 2025-08-08 PROCEDURE — 99214 OFFICE O/P EST MOD 30 MIN: CPT | Performed by: INTERNAL MEDICINE

## 2025-08-08 RX ORDER — ASPIRIN 81 MG/1
81 TABLET, CHEWABLE ORAL DAILY
COMMUNITY

## 2025-08-18 ENCOUNTER — APPOINTMENT (OUTPATIENT)
Dept: LAB | Facility: HOSPITAL | Age: 85
End: 2025-08-18
Payer: MEDICARE

## 2025-08-18 DIAGNOSIS — Z12.5 ENCOUNTER FOR SCREENING FOR MALIGNANT NEOPLASM OF PROSTATE: ICD-10-CM

## 2025-08-18 DIAGNOSIS — Z85.46 HISTORY OF PROSTATE CANCER: ICD-10-CM

## 2025-08-18 LAB — PSA SERPL-MCNC: <0.008 NG/ML (ref 0–4)

## 2025-08-18 PROCEDURE — G0103 PSA SCREENING: HCPCS

## 2025-08-18 PROCEDURE — 36415 COLL VENOUS BLD VENIPUNCTURE: CPT
